# Patient Record
Sex: MALE | Race: WHITE | Employment: FULL TIME | URBAN - METROPOLITAN AREA
[De-identification: names, ages, dates, MRNs, and addresses within clinical notes are randomized per-mention and may not be internally consistent; named-entity substitution may affect disease eponyms.]

---

## 2017-01-11 ENCOUNTER — TELEPHONE (OUTPATIENT)
Dept: CARDIOLOGY CLINIC | Age: 57
End: 2017-01-11

## 2017-02-02 RX ORDER — ISOSORBIDE MONONITRATE 120 MG/1
120 TABLET, EXTENDED RELEASE ORAL DAILY
Qty: 30 TABLET | Refills: 11 | Status: SHIPPED | OUTPATIENT
Start: 2017-02-02 | End: 2018-01-26 | Stop reason: SDUPTHER

## 2017-02-22 RX ORDER — GLIPIZIDE 5 MG/1
TABLET ORAL
Qty: 30 TABLET | Refills: 1 | Status: SHIPPED | OUTPATIENT
Start: 2017-02-22 | End: 2017-04-28 | Stop reason: SDUPTHER

## 2017-02-28 ENCOUNTER — TELEPHONE (OUTPATIENT)
Dept: CARDIOLOGY CLINIC | Age: 57
End: 2017-02-28

## 2017-03-17 ENCOUNTER — TELEPHONE (OUTPATIENT)
Dept: CARDIOLOGY CLINIC | Age: 57
End: 2017-03-17

## 2017-04-04 ENCOUNTER — TELEPHONE (OUTPATIENT)
Dept: CARDIOLOGY CLINIC | Age: 57
End: 2017-04-04

## 2017-04-18 ENCOUNTER — OFFICE VISIT (OUTPATIENT)
Dept: CARDIOLOGY CLINIC | Age: 57
End: 2017-04-18

## 2017-04-18 VITALS
HEART RATE: 75 BPM | DIASTOLIC BLOOD PRESSURE: 70 MMHG | BODY MASS INDEX: 38.8 KG/M2 | WEIGHT: 271 LBS | SYSTOLIC BLOOD PRESSURE: 132 MMHG | HEIGHT: 70 IN | OXYGEN SATURATION: 97 %

## 2017-04-18 DIAGNOSIS — I25.118 CORONARY ARTERY DISEASE OF NATIVE ARTERY OF NATIVE HEART WITH STABLE ANGINA PECTORIS (HCC): Primary | ICD-10-CM

## 2017-04-18 DIAGNOSIS — E78.5 HYPERLIPIDEMIA, UNSPECIFIED HYPERLIPIDEMIA TYPE: ICD-10-CM

## 2017-04-18 PROCEDURE — 99214 OFFICE O/P EST MOD 30 MIN: CPT | Performed by: INTERNAL MEDICINE

## 2017-04-25 ENCOUNTER — TELEPHONE (OUTPATIENT)
Dept: CARDIOLOGY CLINIC | Age: 57
End: 2017-04-25

## 2017-04-28 ENCOUNTER — TELEPHONE (OUTPATIENT)
Dept: CARDIOLOGY CLINIC | Age: 57
End: 2017-04-28

## 2017-05-01 ENCOUNTER — TELEPHONE (OUTPATIENT)
Dept: CARDIOLOGY CLINIC | Age: 57
End: 2017-05-01

## 2017-05-01 RX ORDER — GLIPIZIDE 5 MG/1
TABLET ORAL
Qty: 30 TABLET | Refills: 0 | Status: SHIPPED | OUTPATIENT
Start: 2017-05-01 | End: 2017-06-06 | Stop reason: SDUPTHER

## 2017-05-24 ENCOUNTER — TELEPHONE (OUTPATIENT)
Dept: CARDIOLOGY CLINIC | Age: 57
End: 2017-05-24

## 2017-06-06 RX ORDER — GLIPIZIDE 5 MG/1
TABLET ORAL
Qty: 30 TABLET | Refills: 0 | Status: SHIPPED | OUTPATIENT
Start: 2017-06-06 | End: 2017-07-03 | Stop reason: SDUPTHER

## 2017-06-14 ENCOUNTER — TELEPHONE (OUTPATIENT)
Dept: CARDIOLOGY CLINIC | Age: 57
End: 2017-06-14

## 2017-06-16 DIAGNOSIS — Z95.5 S/P CORONARY ARTERY STENT PLACEMENT: Primary | ICD-10-CM

## 2017-07-03 RX ORDER — GLIPIZIDE 5 MG/1
TABLET ORAL
Qty: 30 TABLET | Refills: 0 | Status: SHIPPED | OUTPATIENT
Start: 2017-07-03 | End: 2017-07-18 | Stop reason: SDUPTHER

## 2017-07-07 ENCOUNTER — TELEPHONE (OUTPATIENT)
Dept: CARDIOLOGY CLINIC | Age: 57
End: 2017-07-07

## 2017-07-11 PROBLEM — I21.4 NSTEMI (NON-ST ELEVATED MYOCARDIAL INFARCTION) (HCC): Status: ACTIVE | Noted: 2017-07-11

## 2017-07-12 ENCOUNTER — CARE COORDINATION (OUTPATIENT)
Dept: CARE COORDINATION | Age: 57
End: 2017-07-12

## 2017-07-17 RX ORDER — ATORVASTATIN CALCIUM 40 MG/1
TABLET, FILM COATED ORAL
Qty: 90 TABLET | Refills: 3 | Status: SHIPPED | OUTPATIENT
Start: 2017-07-17 | End: 2018-01-23 | Stop reason: SDUPTHER

## 2017-07-18 ENCOUNTER — OFFICE VISIT (OUTPATIENT)
Dept: FAMILY MEDICINE CLINIC | Age: 57
End: 2017-07-18

## 2017-07-18 VITALS
DIASTOLIC BLOOD PRESSURE: 60 MMHG | SYSTOLIC BLOOD PRESSURE: 110 MMHG | HEIGHT: 70 IN | WEIGHT: 251 LBS | HEART RATE: 77 BPM | BODY MASS INDEX: 35.93 KG/M2 | OXYGEN SATURATION: 98 %

## 2017-07-18 DIAGNOSIS — I25.10 ASHD (ARTERIOSCLEROTIC HEART DISEASE): ICD-10-CM

## 2017-07-18 DIAGNOSIS — E66.3 OVERWEIGHT: ICD-10-CM

## 2017-07-18 DIAGNOSIS — E11.9 CONTROLLED TYPE 2 DIABETES MELLITUS WITHOUT COMPLICATION, WITHOUT LONG-TERM CURRENT USE OF INSULIN (HCC): Primary | ICD-10-CM

## 2017-07-18 LAB
CREATININE URINE POCT: NORMAL
HBA1C MFR BLD: 7.2 %
MICROALBUMIN/CREAT 24H UR: NORMAL MG/G{CREAT}
MICROALBUMIN/CREAT UR-RTO: NORMAL

## 2017-07-18 PROCEDURE — 99214 OFFICE O/P EST MOD 30 MIN: CPT | Performed by: FAMILY MEDICINE

## 2017-07-18 PROCEDURE — 83036 HEMOGLOBIN GLYCOSYLATED A1C: CPT | Performed by: FAMILY MEDICINE

## 2017-07-18 PROCEDURE — 82044 UR ALBUMIN SEMIQUANTITATIVE: CPT | Performed by: FAMILY MEDICINE

## 2017-07-18 RX ORDER — GLIPIZIDE 5 MG/1
TABLET ORAL
Qty: 90 TABLET | Refills: 2 | Status: SHIPPED | OUTPATIENT
Start: 2017-07-18 | End: 2018-01-23 | Stop reason: SDUPTHER

## 2017-07-18 ASSESSMENT — ENCOUNTER SYMPTOMS
STRIDOR: 0
SHORTNESS OF BREATH: 0
CHEST TIGHTNESS: 0
COUGH: 0
CHOKING: 0
WHEEZING: 0

## 2017-07-27 ENCOUNTER — OFFICE VISIT (OUTPATIENT)
Dept: CARDIOLOGY CLINIC | Age: 57
End: 2017-07-27

## 2017-07-27 VITALS
HEIGHT: 70 IN | OXYGEN SATURATION: 98 % | DIASTOLIC BLOOD PRESSURE: 56 MMHG | HEART RATE: 69 BPM | WEIGHT: 260 LBS | SYSTOLIC BLOOD PRESSURE: 100 MMHG | BODY MASS INDEX: 37.22 KG/M2

## 2017-07-27 DIAGNOSIS — E78.2 MIXED HYPERLIPIDEMIA: ICD-10-CM

## 2017-07-27 DIAGNOSIS — Z95.5 S/P DRUG ELUTING CORONARY STENT PLACEMENT: ICD-10-CM

## 2017-07-27 DIAGNOSIS — I25.119 CORONARY ARTERY DISEASE INVOLVING NATIVE CORONARY ARTERY OF NATIVE HEART WITH ANGINA PECTORIS (HCC): Primary | ICD-10-CM

## 2017-07-27 DIAGNOSIS — E11.9 CONTROLLED TYPE 2 DIABETES MELLITUS WITHOUT COMPLICATION, WITHOUT LONG-TERM CURRENT USE OF INSULIN (HCC): ICD-10-CM

## 2017-07-27 DIAGNOSIS — I10 ESSENTIAL HYPERTENSION: ICD-10-CM

## 2017-07-27 PROCEDURE — 99213 OFFICE O/P EST LOW 20 MIN: CPT | Performed by: NURSE PRACTITIONER

## 2017-08-10 ENCOUNTER — TELEPHONE (OUTPATIENT)
Dept: CARDIOLOGY CLINIC | Age: 57
End: 2017-08-10

## 2017-08-17 ENCOUNTER — TELEPHONE (OUTPATIENT)
Dept: CARDIOLOGY CLINIC | Age: 57
End: 2017-08-17

## 2017-08-21 ENCOUNTER — TELEPHONE (OUTPATIENT)
Dept: CARDIOLOGY CLINIC | Age: 57
End: 2017-08-21

## 2017-08-30 ENCOUNTER — TELEPHONE (OUTPATIENT)
Dept: CARDIOLOGY CLINIC | Age: 57
End: 2017-08-30

## 2017-08-31 ENCOUNTER — OFFICE VISIT (OUTPATIENT)
Dept: CARDIOLOGY CLINIC | Age: 57
End: 2017-08-31

## 2017-08-31 VITALS
DIASTOLIC BLOOD PRESSURE: 60 MMHG | HEART RATE: 70 BPM | HEIGHT: 70 IN | WEIGHT: 257 LBS | SYSTOLIC BLOOD PRESSURE: 112 MMHG | BODY MASS INDEX: 36.79 KG/M2

## 2017-08-31 DIAGNOSIS — I25.119 CORONARY ARTERY DISEASE INVOLVING NATIVE CORONARY ARTERY OF NATIVE HEART WITH ANGINA PECTORIS (HCC): Primary | ICD-10-CM

## 2017-08-31 DIAGNOSIS — E78.2 MIXED HYPERLIPIDEMIA: ICD-10-CM

## 2017-08-31 DIAGNOSIS — I10 ESSENTIAL HYPERTENSION: ICD-10-CM

## 2017-08-31 PROCEDURE — 99214 OFFICE O/P EST MOD 30 MIN: CPT | Performed by: INTERNAL MEDICINE

## 2017-09-05 ENCOUNTER — TELEPHONE (OUTPATIENT)
Dept: CARDIOLOGY CLINIC | Age: 57
End: 2017-09-05

## 2017-09-22 ENCOUNTER — TELEPHONE (OUTPATIENT)
Dept: CARDIOLOGY CLINIC | Age: 57
End: 2017-09-22

## 2017-09-25 ENCOUNTER — OFFICE VISIT (OUTPATIENT)
Dept: CARDIOLOGY CLINIC | Age: 57
End: 2017-09-25

## 2017-09-25 VITALS
BODY MASS INDEX: 36.68 KG/M2 | SYSTOLIC BLOOD PRESSURE: 100 MMHG | OXYGEN SATURATION: 98 % | HEART RATE: 77 BPM | WEIGHT: 262 LBS | HEIGHT: 71 IN | DIASTOLIC BLOOD PRESSURE: 58 MMHG

## 2017-09-25 DIAGNOSIS — Z95.5 S/P DRUG ELUTING CORONARY STENT PLACEMENT: ICD-10-CM

## 2017-09-25 DIAGNOSIS — E11.9 CONTROLLED TYPE 2 DIABETES MELLITUS WITHOUT COMPLICATION, WITHOUT LONG-TERM CURRENT USE OF INSULIN (HCC): ICD-10-CM

## 2017-09-25 DIAGNOSIS — I25.119 CORONARY ARTERY DISEASE INVOLVING NATIVE CORONARY ARTERY OF NATIVE HEART WITH ANGINA PECTORIS (HCC): Primary | ICD-10-CM

## 2017-09-25 DIAGNOSIS — I10 ESSENTIAL HYPERTENSION: ICD-10-CM

## 2017-09-25 DIAGNOSIS — E78.2 MIXED HYPERLIPIDEMIA: ICD-10-CM

## 2017-09-25 PROCEDURE — 99213 OFFICE O/P EST LOW 20 MIN: CPT | Performed by: NURSE PRACTITIONER

## 2017-09-25 NOTE — PATIENT INSTRUCTIONS
1. No change in heart medicines  2. Call back on Wednesday to check on Brilinta  3. Follow up with Dr. Ramona Vasquez in 3 months  4. Please call with any symptoms or questions       Ref.  Range 7/9/2017 07:42 9/6/2017 12:10   Cholesterol, Total Latest Ref Range: 0 - 199 mg/dL 147 158   HDL Cholesterol Latest Ref Range: 40 - 60 mg/dL 58 66 (H)   LDL Calculated Latest Ref Range: <100 mg/dL 54 69   Triglycerides Latest Ref Range: 0 - 150 mg/dL 174 (H) 115

## 2017-09-25 NOTE — PROGRESS NOTES
tablet Place 1 tablet under the tongue every 5 minutes as needed for Chest pain 8/19/17  Yes Rosie Nunez CNP   glipiZIDE (GLUCOTROL) 5 MG tablet TAKE 1 TABLET BY MOUTH IN THE MORNING for diabetes 7/18/17  Yes Haja EDWARDS Pena, DO   atorvastatin (LIPITOR) 40 MG tablet TAKE ONE TABLET BY MOUTH ONCE NIGHTLY 7/17/17  Yes Fernanda Ni MD   ticagrelor (BRILINTA) 90 MG TABS tablet Take 1 tablet by mouth 2 times daily 7/11/17  Yes Kevin Lagunas NP   metoprolol tartrate (LOPRESSOR) 25 MG tablet TAKE ONE TABLET BY MOUTH TWICE A DAY 4/18/17  Yes Agus Velazquez MD   isosorbide mononitrate (IMDUR) 120 MG extended release tablet Take 1 tablet by mouth daily 2/2/17  Yes Agus Velazquez MD   aspirin 81 MG chewable tablet CHEW ONE TABLET BY MOUTH DAILY 12/5/16  Yes [facility], DO   lisinopril (PRINIVIL;ZESTRIL) 2.5 MG tablet Take 1 tablet by mouth daily 10/19/16  Yes Agus Velazquez MD   ranolazine (RANEXA) 1000 MG extended release tablet Take 1 tablet by mouth 2 times daily 9/20/16  Yes [facility], DO        Allergies:  Vitamin d analogs and Vitamin d analogs     Review of Systems:   · Constitutional: there has been no unanticipated weight loss. · Eyes: No vision changes  · ENT: No Headaches, no nasal congestion. No mouth sores or sore throat. · Cardiovascular: Reviewed in HPI  · Respiratory: No cough or wheezing, no sputum production. · Gastrointestinal: No abdominal pain, no constipation or diarrhea  · Genitourinary: No dysuria, trouble voiding, or hematuria. · Musculoskeletal:  No weakness or joint complaints. · Integumentary: No rash or pruritis. · Neurological: No numbness or tingling. No weakness. No tremor. · Psychiatric: No anxiety, no depression. · Endocrine:  No excessive thirst or urination. · Hematologic/Lymphatic: No abnormal bruising or bleeding, blood clots or swollen lymph nodes.       Physical Examination:    Vitals:    09/25/17 0831   BP: (!) 100/58   Pulse: 77 systolic function  - Normal LV filling pressure       CATH: 5/27/16  1. Patent drug-eluting stent proximal first obtuse marginal branch. 2. Patent drug-eluting stent from wjpswayf-hv-zhp left anterior descending. 3. Patent drug-eluting stent distal left anterior descending. 4. Patent drug-eluting stent proximal first diagonal branch. 5. Normal left ventricular systolic function with an ejection fraction of  approximately 55% to 60%. 6. Normal left ventricular end-diastolic pressure at 10 mmHg. RECOMMENDATIONS: The patient's stents are all patent and he has no  obstructive disease in other arteries. His angina is from small vessel disease  and will add Ranexa 1000 mg b.i.d. to his antianginal regimen. He will  continue with Imdur and Lopressor for the angina. He will also continue with  aspirin, Brilinta, as well as a moderate-dose Lipitor. CATH: 01/21/2016  1. A 70% proximal medium-caliber first diagonal branch status post 2.25 x  12-mm Xience Alpine drug-eluting stent. 2.  Patent drug-eluting stent in proximal-mid left anterior descending. 3.  Patent drug-eluting stent in the distal left anterior descending. 4.  Patent drug-eluting stent in the wuyabxea-qo-uho first obtuse marginal  branch. RECOMMENDATIONS:  Aspirin indefinitely. Brilinta at least for 12 months. Aggressive medical therapy and risk factors modification for coronary disease. CATH: 01/15/2016  IMPRESSION:   1. An 80% diffuse mid left anterior descending with a fractional flow  reserve of 0.79 (significant), status post 2.75 x 28-mm XIENCE  Alpine drug-eluting stent postdilated with a 3.5-mm noncompliant balloon. 2. A 60% distal left anterior descending lesion with a fractional flow   reserve of 0.5 (significant), status post 2.25 x 23-mm XIENCE Alpine   drug-eluting stent postdilated with 2.5-mm noncompliant balloon. 3. Patent stent in proximal to mid 1st obtuse marginal branch. RECOMMENDATIONS:  1.  Aspirin

## 2017-09-25 NOTE — MR AVS SNAPSHOT
cancers. BMI is not perfect. It may overestimate body fat in athletes and people who are more muscular. Even a small weight loss (between 5 and 10 percent of your current weight) by decreasing your calorie intake and becoming more physically active will help lower your risk of developing or worsening diseases associated with obesity. Learn more at: [employer]          Instructions    1. No change in heart medicines  2. Call back on Wednesday to check on Brilinta  3. Follow up with Dr. Payam Rodríguez in 3 months  4. Please call with any symptoms or questions       Ref.  Range 7/9/2017 07:42 9/6/2017 12:10   Cholesterol, Total Latest Ref Range: 0 - 199 mg/dL 147 158   HDL Cholesterol Latest Ref Range: 40 - 60 mg/dL 58 66 (H)   LDL Calculated Latest Ref Range: <100 mg/dL 54 69   Triglycerides Latest Ref Range: 0 - 150 mg/dL 174 (H) 115               Medications and Orders      Your Current Medications Are              metFORMIN (GLUCOPHAGE) 1000 MG tablet Take 1 tablet by mouth daily (with breakfast)    nitroGLYCERIN (NITROSTAT) 0.4 MG SL tablet Place 1 tablet under the tongue every 5 minutes as needed for Chest pain    glipiZIDE (GLUCOTROL) 5 MG tablet TAKE 1 TABLET BY MOUTH IN THE MORNING for diabetes    atorvastatin (LIPITOR) 40 MG tablet TAKE ONE TABLET BY MOUTH ONCE NIGHTLY    ticagrelor (BRILINTA) 90 MG TABS tablet Take 1 tablet by mouth 2 times daily    metoprolol tartrate (LOPRESSOR) 25 MG tablet TAKE ONE TABLET BY MOUTH TWICE A DAY    isosorbide mononitrate (IMDUR) 120 MG extended release tablet Take 1 tablet by mouth daily    aspirin 81 MG chewable tablet CHEW ONE TABLET BY MOUTH DAILY    lisinopril (PRINIVIL;ZESTRIL) 2.5 MG tablet Take 1 tablet by mouth daily    ranolazine (RANEXA) 1000 MG extended release tablet Take 1 tablet by mouth 2 times daily      Allergies              Vitamin D Analogs Hives    Vitamin D Analogs Swelling    swelling Additional Information        Basic Information     Date Of Birth Sex Race Ethnicity Preferred Language    1960 Male White Non-/Non  English      Problem List as of 9/25/2017  Date Reviewed: 9/25/2017                NSTEMI (non-ST elevated myocardial infarction) [facility])    Coronary artery disease involving native coronary artery of native heart with unstable angina pectoris ([facility]    Precordial pain    Controlled type 2 diabetes mellitus without complication, without long-term current use of insulin (HCC)    ASHD (arteriosclerotic heart disease)    Angina pectoris (HCC)    Angina at rest [facility])    Hyperlipidemia    Angina effort ([facility] [address]    Skin lesion    Abnormal chest x-ray    CAD (coronary artery disease)    Chest pain, moderate coronary artery risk    Unstable angina (HCC)    Shoulder pain    Low serum testosterone level    Arm pain, left    Flank pain    Prostatism    Patient overweight    Noncompliance    Screening for condition    Alcohol abuse    Patient overweight    Type II or unspecified type diabetes mellitus without mention of complication, not stated as uncontrolled    Hypertension      Your Goals as of 9/25/2017 at 8:58 AM              Today    9/7/17 9/7/17       Blood Pressure    Blood Pressure < 140/90   100/58  147/77  118/76    Notes    Ideally your blood pressure goal should be below 130/80. You can learn more about blood pressure and ways to incorporate a healthy lifestlye to help treat it from the American Heart Association website: www.heart. org under the  Getting Healthy link, and the [address] website: www.nhlbi.nih.gov/hbp    A for Activity  It helps your blood pressure when you exercise regularly. You should try to exercise at least 3 times a week for 20 minutes at a pace that you can comfortably carry on a conversation without being out of breath. B for BMI  The Body Mass Index should be below 30.  This is your weight and height Eye Exam By An Eye Doctor 4/14/2017    Yearly Flu Vaccine (1) 9/1/2017    Hemoglobin A1C (Test For Long-Term Glucose Control) 7/18/2018    Urine Check For Kidney Problems 7/18/2018    Cholesterol Screening 9/6/2018    Colonoscopy 10/10/2026            MyChart Signup           Our records indicate that you have an active [employer] account. You can view your After Visit Summary by going to [email or web address] and logging in with your [employer] username and password. If you don't have a [employer] username and password but a parent or guardian has access to your record, the parent or guardian should login with their own [employer] username and password and access your record to view the After Visit Summary. Additional Information  If you have questions, please contact the physician practice where you receive care. Remember, [employer] is NOT to be used for urgent needs. For medical emergencies, dial 911. For questions regarding your [employer] account call [phone]. If you have a clinical question, please call your doctor's office.

## 2017-09-28 ENCOUNTER — TELEPHONE (OUTPATIENT)
Dept: CARDIOLOGY CLINIC | Age: 57
End: 2017-09-28

## 2017-10-12 ENCOUNTER — TELEPHONE (OUTPATIENT)
Dept: CARDIOLOGY CLINIC | Age: 57
End: 2017-10-12

## 2017-10-19 ENCOUNTER — OFFICE VISIT (OUTPATIENT)
Dept: FAMILY MEDICINE CLINIC | Age: 57
End: 2017-10-19

## 2017-10-19 VITALS
BODY MASS INDEX: 35.98 KG/M2 | SYSTOLIC BLOOD PRESSURE: 118 MMHG | OXYGEN SATURATION: 99 % | DIASTOLIC BLOOD PRESSURE: 60 MMHG | HEIGHT: 71 IN | HEART RATE: 75 BPM | WEIGHT: 257 LBS

## 2017-10-19 DIAGNOSIS — R22.41 MASS OF RIGHT FOOT: ICD-10-CM

## 2017-10-19 DIAGNOSIS — E66.3 OVERWEIGHT: ICD-10-CM

## 2017-10-19 DIAGNOSIS — E11.9 CONTROLLED TYPE 2 DIABETES MELLITUS WITHOUT COMPLICATION, WITHOUT LONG-TERM CURRENT USE OF INSULIN (HCC): Primary | ICD-10-CM

## 2017-10-19 LAB
HBA1C MFR BLD: 7.4 %
PROSTATE SPECIFIC ANTIGEN: 0.75 NG/ML (ref 0–4)

## 2017-10-19 PROCEDURE — 99214 OFFICE O/P EST MOD 30 MIN: CPT | Performed by: FAMILY MEDICINE

## 2017-10-19 PROCEDURE — 83036 HEMOGLOBIN GLYCOSYLATED A1C: CPT | Performed by: FAMILY MEDICINE

## 2017-10-19 ASSESSMENT — ENCOUNTER SYMPTOMS
CHOKING: 0
STRIDOR: 0
ABDOMINAL PAIN: 0
CHEST TIGHTNESS: 0
COUGH: 0
BACK PAIN: 0
WHEEZING: 0
SHORTNESS OF BREATH: 0

## 2017-10-19 NOTE — PROGRESS NOTES
Subjective:      Patient ID: Supriya Lyles is a 62 y.o. male. HPI J's here for follow-up on type 2 diabetes which is well-controlled with a hemoglobin A1c of 7.2. We will continue current therapy. He has a new complaint of a mass in the right foot and I referred him to Dr. Hai Dinero, podiatrist, she is J's choice. He recently had a new coronary artery stent placed and is doing well in that regard. His lipids are well controlled with a total of 158 and LDL of 69. His last PSA was in 2015 so ordered a new one. Review of Systems   Constitutional: Negative for activity change, appetite change, chills, diaphoresis, fatigue, fever and unexpected weight change. Respiratory: Negative for cough, choking, chest tightness, shortness of breath, wheezing and stridor. Cardiovascular: Negative for chest pain, palpitations and leg swelling. Gastrointestinal: Negative for abdominal pain. Genitourinary: Negative for difficulty urinating. Musculoskeletal: Negative for arthralgias and back pain. Foot mass on the right   Neurological: Negative for dizziness. All other systems reviewed and are negative. Objective:   Physical Exam   Constitutional: He is oriented to person, place, and time. He appears well-developed and well-nourished. HENT:   Head: Normocephalic and atraumatic. Right Ear: External ear normal.   Left Ear: External ear normal.   Nose: Nose normal.   Mouth/Throat: Oropharynx is clear and moist.   Eyes: Conjunctivae and EOM are normal. Pupils are equal, round, and reactive to light. Neck: Normal range of motion. Neck supple. No JVD present. No tracheal deviation present. No thyromegaly present. Cardiovascular: Normal rate, regular rhythm and normal heart sounds. Exam reveals no gallop and no friction rub. No murmur heard. Pulmonary/Chest: Effort normal and breath sounds normal. No stridor. No respiratory distress. He has no wheezes. He has no rales. He exhibits no tenderness. by mouth daily 90 tablet 3    ranolazine (RANEXA) 1000 MG extended release tablet Take 1 tablet by mouth 2 times daily 60 tablet 3    methocarbamol (ROBAXIN) 500 MG tablet Take 1 tablet by mouth 4 times daily 20 tablet 0     No facility-administered encounter medications on file as of 10/19/2017.

## 2017-10-20 ENCOUNTER — TELEPHONE (OUTPATIENT)
Dept: CARDIOLOGY CLINIC | Age: 57
End: 2017-10-20

## 2017-10-20 NOTE — TELEPHONE ENCOUNTER
Brittny,pt's wife, called requesting samples of Brilinta 90mg. Please call wife back to inform if we have any and if/when they are ready to be picked up at [phone].

## 2017-10-30 RX ORDER — LISINOPRIL 2.5 MG/1
TABLET ORAL
Qty: 30 TABLET | Refills: 2 | Status: SHIPPED | OUTPATIENT
Start: 2017-10-30 | End: 2018-01-26 | Stop reason: SDUPTHER

## 2017-11-13 ENCOUNTER — TELEPHONE (OUTPATIENT)
Dept: CARDIOLOGY CLINIC | Age: 57
End: 2017-11-13

## 2017-11-13 NOTE — TELEPHONE ENCOUNTER
Mrs Randle Dalia notified that we do not have any Renexa at this time.   She stated that she will call [facility] to see if they have any samples

## 2017-11-28 ENCOUNTER — TELEPHONE (OUTPATIENT)
Dept: CARDIOLOGY CLINIC | Age: 57
End: 2017-11-28

## 2017-12-13 ENCOUNTER — OFFICE VISIT (OUTPATIENT)
Dept: CARDIOLOGY CLINIC | Age: 57
End: 2017-12-13

## 2017-12-13 VITALS
HEART RATE: 86 BPM | BODY MASS INDEX: 36.96 KG/M2 | OXYGEN SATURATION: 97 % | HEIGHT: 71 IN | WEIGHT: 264 LBS | DIASTOLIC BLOOD PRESSURE: 66 MMHG | SYSTOLIC BLOOD PRESSURE: 130 MMHG

## 2017-12-13 DIAGNOSIS — E78.2 MIXED HYPERLIPIDEMIA: ICD-10-CM

## 2017-12-13 DIAGNOSIS — E11.9 CONTROLLED TYPE 2 DIABETES MELLITUS WITHOUT COMPLICATION, WITHOUT LONG-TERM CURRENT USE OF INSULIN (HCC): ICD-10-CM

## 2017-12-13 DIAGNOSIS — I10 ESSENTIAL HYPERTENSION: ICD-10-CM

## 2017-12-13 DIAGNOSIS — I25.10 CORONARY ARTERY DISEASE INVOLVING NATIVE CORONARY ARTERY OF NATIVE HEART WITHOUT ANGINA PECTORIS: Primary | ICD-10-CM

## 2017-12-13 PROCEDURE — 99213 OFFICE O/P EST LOW 20 MIN: CPT | Performed by: INTERNAL MEDICINE

## 2017-12-13 NOTE — LETTER
[facility] - [facility] [address] [place]. [address]  Phone: [phone]  Fax: [phone]    Ben Coulter MD        December 15, 2017     Elliot Elyse Valdez, DO  [address]    Patient: Bozena Bañuelos  MR Number: [id number]  YOB: 1960  Date of Visit: 12/13/2017    Dear Dr. Zarate Elyse Cemmary:    Thank you for the request for consultation for Bozena Bañuelos to me for the evaluation. Below are the relevant portions of my assessment and plan of care. Assessment:    - CAD, s/p rotablade guided PCI of OM-1 recurrent in-stent restenosis with placement of SAMUEL (Sept 17'), patent SAMUEL mid LAD, distal LAD, Diag 1 (Aug 17')  - Hyperlipidemia  - Hypertension: BP: (130-146)/(66-76)   - Diabetes     Recommendation:  - His angina has resolved since the last PCI. Due to multiple segments of stented vessels I will continue both his [address] [place] [address] indefinitely. He will continue his current antianginals (Ranexa, Lopressor, Lisinopril). - His LDL is within goal (69 in Sept 17') and he will continue moderate dose Lipitor.  - I will have him see Selma Pittman NP in six months and I will see him back in 12 months.         If you have questions, please do not hesitate to call me. I look forward to following Bozena Bañuelos along with you.     If you have questions, please do not hesitate to call me. I look forward to following Jovany Chow along with you.     Sincerely,        Ben Coulter MD

## 2017-12-13 NOTE — PROGRESS NOTES
Section of Cardiology                                             Outpatient Progress Note      HPI:    Claudio Poon is a 54 y.o. patient with PMH DM, HTN, CAD, s/p PCI, alcohol abuse, nonsmoker, + FH. Was admitted to hospital in January 2016 with [place] and had abnormal stress test. Underwent PCI of OM as well as [id number] LAD and was d/c'd/ Returned to hospital and noted to have mild troponin elevation and CP relieved with NTG SL. He underwent LHC on 1/18/16 and a PCI-D-1 with SAMUEL was done. Stents in prox-mid LAD, distal LAD, proximal-mid OM-1 were found patent. He was switched from Plavix to Brilinta. Imdur was added at office visit in April 2016 due to complaints of exertional chest pain. He was admitted to the hospital in late May 2016 with recurrent chest pain and transient troponin elevation. LHC was showed patent stents. He was started on Ranexa. Today he reports he has had ongoing chest pain. It occurs in the morning, wakes him from sleep. After moving around it goes away. He had to take SL NTG 9 days in a row. This pain is unchanged from the pain he's had in the past. He checks his blood pressure at home and states it can be low at times. He attempts to be as active as possible. Denies shortness of breath, edema, dizziness, palpitations and syncope. Today he is here for a follow up of CAD, HTN. Since his last visit he underwent rotablade guided PCI of OM1 recurrent instent restenosis. His chest pain has resolved since then. Denied shortness of breath, edema, dizziness, palpitations and syncope.     Medications:  Current Outpatient Prescriptions   Medication Sig Dispense Refill    lisinopril (PRINIVIL;ZESTRIL) 2.5 MG tablet TAKE ONE TABLET BY MOUTH DAILY 30 tablet 2    methocarbamol (ROBAXIN) 500 MG tablet Take 1 tablet by mouth 4 times daily 20 tablet 0    metFORMIN (GLUCOPHAGE) 1000 MG tablet Take 1 tablet by mouth daily (with breakfast) 90 tablet 2    nitroGLYCERIN (NITROSTAT) 0.4 MG SL tablet Place 1 tablet under the tongue every 5 minutes as needed for Chest pain 25 tablet 3    glipiZIDE (GLUCOTROL) 5 MG tablet TAKE 1 TABLET BY MOUTH IN THE MORNING for diabetes 90 tablet 2    atorvastatin (LIPITOR) 40 MG tablet TAKE ONE TABLET BY MOUTH ONCE NIGHTLY 90 tablet 3    ticagrelor (BRILINTA) 90 MG TABS tablet Take 1 tablet by mouth 2 times daily 60 tablet 11    metoprolol tartrate (LOPRESSOR) 25 MG tablet TAKE ONE TABLET BY MOUTH TWICE A DAY 60 tablet 11    isosorbide mononitrate (IMDUR) 120 MG extended release tablet Take 1 tablet by mouth daily 30 tablet 11    aspirin 81 MG chewable tablet CHEW ONE TABLET BY MOUTH DAILY 30 tablet 1    ranolazine (RANEXA) 1000 MG extended release tablet Take 1 tablet by mouth 2 times daily 60 tablet 3     No current facility-administered medications for this visit. ROS:  14 point ROS completed and pertinent positives are mentioned in HPI. Vital Signs:                                                 /66   Pulse 86   Ht 5' 11\" (1.803 m)   Wt 264 lb (119.7 kg)   SpO2 97%   BMI 36.82 kg/m²  Weight: 264 lb (119.7 kg)       Respiratory:  · Resp Assessment: Normal respiratory effort, no SOB   · Resp Auscultation: Clear to auscultation bilaterally     Cardiovascular:  · Auscultation: regular rhythm and normal rate, normal S1S2, no murmur, rub or gallop, no carotid bruit   · JVP:  normal  · Extremities: No Edema  · No bruit     Abdomen:  · Soft, non-tender  · Normal bowel sounds  · No organomegaly, no masses     Extremities:  ·  No Cyanosis or Clubbing  · No deformities of hands  · No edema     Neurological/Psychiatric:  · Oriented to time, place, and person  · Non-anxious    Skin Warm and dry. Normal turgor.  No rash or lesions     Lab Data:  CBC:   Lab Results   Component Value Date    WBC 6.8 09/06/2017    HGB 13.7 09/06/2017    HCT 40.1 09/06/2017    MCV 92.0 09/06/2017     09/06/2017     BMP:   Lab Results Component Value Date     09/07/2017    K 4.8 09/07/2017    CL 99 09/07/2017    CO2 23 09/07/2017    PHOS 3.7 07/09/2017    BUN 9 09/07/2017    CREATININE <0.5 09/07/2017    CALCIUM 8.6 09/07/2017    GFRAA >60 09/07/2017     LFTS:   Lab Results   Component Value Date    ALT 22 08/17/2017    AST 14 08/17/2017    ALKPHOS 72 08/17/2017    PROT 6.6 08/17/2017    AGRATIO 1.6 08/17/2017    BILITOT 0.4 08/17/2017     PT/INR: No results found for: PTINR  LIPID PANEL: No components found for: CHLPL  Lab Results   Component Value Date    TRIG 115 09/06/2017    TRIG 174 (H) 07/09/2017    TRIG 166 (H) 01/18/2016     Lab Results   Component Value Date    HDL 66 (H) 09/06/2017    HDL 58 07/09/2017    HDL 38 (L) 01/18/2016     Lab Results   Component Value Date    LDLCALC 69 09/06/2017    LDLCALC 54 07/09/2017    [address] 86 01/18/2016     TSH:   Lab Results   Component Value Date    TSH 1.39 05/21/2015     FREET4: No results found for: Becka Edinger: No components found for: FREET3  BNP: No results found for: BNP    Data Review:  I have reviewed the below testing personally and my interpretation is below. STRESS TEST:   1. Inferolateral basilar reversible stress defect compatible with stress-induced ischemia. 2. Ejection fraction of 56%   3. No focal wall motion abnormality. CATH: 5/27/16  1. Patent drug-eluting stent proximal first obtuse marginal branch. 2. Patent drug-eluting stent from nhygmhtp-rh-bpb left anterior descending. 3. Patent drug-eluting stent distal left anterior descending. 4. Patent drug-eluting stent proximal first diagonal branch. 5. Normal left ventricular systolic function with an ejection fraction of  approximately 55% to 60%. 6. Normal left ventricular end-diastolic pressure at 10 mmHg.      RECOMMENDATIONS: The patient's stents are all patent and he has no  obstructive disease in other arteries.  His angina is from small vessel disease  and will add Ranexa 1000 mg b.i.d. to his in-stent restenosis  - Patent stent prox-mid LAD with mild instent restenoss of distal 1/3rd  - Patent stent distal LAD  - Patent stent prox Diag1  - High grade OM2   - Normal left ventricular systolic function  - Normal LV filling pressure     Cath 8/18/17   LEFT HEART CATH  LM: luminals  LAD: luminals, prox and mid stents patent with minimal restenosis                        Diag 1- patent stent  LCX: mid 30%                        OM1- 90% instent restenosis with haziness suggesting possible thrombosis                        OM2- ostial 60% lesion, distal 40%  RCA: dominant. Proximal 30%, luminals  LVEDP: 20  LVEF: 55%  PCI of mid OM1 90% restenosis  Ballooned with 2.25 x as NC trek up to 18atm  Angiosculpt 2.5 x 15mm up to 14atm- multiple passes  90% GUERLINE-2 flow--> 15% GUERLINE-3 flow  Assessment  1. Restenosis of the OM1 stent following recent POBA  2. Aggressive cutting balloon and NC balloon but still with residual stenosis and concerned about ability to completely deploy stent. If reoccurs or Cp returns, would suggest laser arthrectomy and PCI. CATH: 9/6/17  Successful PCI of OM-1 recurrent in-stent restenosis with Rotablator-assisted PCI with placement of Xience Alpine drug-eluting stent. Assessment:    - CAD, s/p rotablade guided PCI of OM-1 recurrent in-stent restenosis with placement of SAMUEL (Sept 17'), patent SAMUEL mid LAD, distal LAD, Diag 1 (Aug 17')  - Hyperlipidemia  - Hypertension: BP: (130-146)/(66-76)   - Diabetes    Recommendation:  - His angina has resolved since the last PCI. Due to multiple segments of stented vessels I will continue both his [address] [place] [address] indefinitely. He will continue his current antianginals (Ranexa, Lopressor, Lisinopril). - His LDL is within goal (69 in Sept 17') and he will continue moderate dose Lipitor.  - I will have him see Elodia Johnson NP in six months and I will see him back in 12 months. If you have questions, please do not hesitate to call me. I look forward to following Yaima Mora along with you.       Sincere Lanza MD, [facility],  [facility]  [phone] [facility]  [phone] [facility]  12/13/2017 11:20 AM

## 2017-12-13 NOTE — PATIENT INSTRUCTIONS
Recommendation:  - His angina has resolved since the last PCI. Due to multiple segments of stented vessels I will continue both his [address] indefinitely. He will continue his current antianginals (Ranexa, Lopressor, Lisinopril).    - His LDL is within goal (69 in Sept 17') and he will continue moderate dose Lipitor.  - I will have him see Keyur Mederos NP in six months and I will see him back in 12 months

## 2017-12-15 ENCOUNTER — TELEPHONE (OUTPATIENT)
Dept: CARDIOLOGY CLINIC | Age: 57
End: 2017-12-15

## 2017-12-15 NOTE — COMMUNICATION BODY
Assessment:    - CAD, s/p rotablade guided PCI of OM-1 recurrent in-stent restenosis with placement of SAMUEL (Sept 17'), patent SAMUEL mid LAD, distal LAD, Diag 1 (Aug 17')  - Hyperlipidemia  - Hypertension: BP: (130-146)/(66-76)   - Diabetes     Recommendation:  - His angina has resolved since the last PCI. Due to multiple segments of stented vessels I will continue both his [address] [place] [address] indefinitely. He will continue his current antianginals (Ranexa, Lopressor, Lisinopril). - His LDL is within goal (69 in Sept 17') and he will continue moderate dose Lipitor.  - I will have him see Jackie Herrera NP in six months and I will see him back in 12 months.         If you have questions, please do not hesitate to call me.  I look forward to following Alphonse Otero along with you.

## 2017-12-20 ENCOUNTER — TELEPHONE (OUTPATIENT)
Dept: CARDIOLOGY CLINIC | Age: 57
End: 2017-12-20

## 2017-12-20 NOTE — TELEPHONE ENCOUNTER
Was told to call today and enquire if office has any ticagrelor (BRILINTA) 90 MG TABS tablet samples. Please advise.

## 2018-01-04 ENCOUNTER — TELEPHONE (OUTPATIENT)
Dept: CARDIOLOGY CLINIC | Age: 58
End: 2018-01-04

## 2018-01-16 ENCOUNTER — TELEPHONE (OUTPATIENT)
Dept: CARDIOLOGY CLINIC | Age: 58
End: 2018-01-16

## 2018-01-16 NOTE — TELEPHONE ENCOUNTER
Brittny,pt's wife, called requesting samples for pt on Brilinta 90mg BID and Ranexa 1000 BID. Please call José Myers when available at [phone].

## 2018-01-18 ENCOUNTER — OFFICE VISIT (OUTPATIENT)
Dept: FAMILY MEDICINE CLINIC | Age: 58
End: 2018-01-18

## 2018-01-18 VITALS
HEART RATE: 73 BPM | SYSTOLIC BLOOD PRESSURE: 120 MMHG | BODY MASS INDEX: 36.68 KG/M2 | OXYGEN SATURATION: 99 % | WEIGHT: 262 LBS | HEIGHT: 71 IN | DIASTOLIC BLOOD PRESSURE: 70 MMHG

## 2018-01-18 DIAGNOSIS — E11.9 CONTROLLED TYPE 2 DIABETES MELLITUS WITHOUT COMPLICATION, WITHOUT LONG-TERM CURRENT USE OF INSULIN (HCC): Primary | ICD-10-CM

## 2018-01-18 DIAGNOSIS — E66.3 OVERWEIGHT: ICD-10-CM

## 2018-01-18 DIAGNOSIS — I25.10 ASHD (ARTERIOSCLEROTIC HEART DISEASE): ICD-10-CM

## 2018-01-18 LAB — HBA1C MFR BLD: 7.7 %

## 2018-01-18 PROCEDURE — 99214 OFFICE O/P EST MOD 30 MIN: CPT | Performed by: FAMILY MEDICINE

## 2018-01-18 PROCEDURE — 83036 HEMOGLOBIN GLYCOSYLATED A1C: CPT | Performed by: FAMILY MEDICINE

## 2018-01-18 ASSESSMENT — ENCOUNTER SYMPTOMS
CHOKING: 0
STRIDOR: 0
CHEST TIGHTNESS: 0
SHORTNESS OF BREATH: 0
ABDOMINAL PAIN: 0
WHEEZING: 0
BACK PAIN: 0
COUGH: 0

## 2018-01-18 ASSESSMENT — PATIENT HEALTH QUESTIONNAIRE - PHQ9
SUM OF ALL RESPONSES TO PHQ QUESTIONS 1-9: 0
1. LITTLE INTEREST OR PLEASURE IN DOING THINGS: 0
SUM OF ALL RESPONSES TO PHQ9 QUESTIONS 1 & 2: 0
2. FEELING DOWN, DEPRESSED OR HOPELESS: 0

## 2018-01-18 NOTE — PROGRESS NOTES
tablet by mouth 2 times daily 60 tablet 3     No facility-administered encounter medications on file as of 1/18/2018. Recommended weight loss.

## 2018-01-23 RX ORDER — ATORVASTATIN CALCIUM 40 MG/1
TABLET, FILM COATED ORAL
Qty: 90 TABLET | Refills: 3 | Status: SHIPPED | OUTPATIENT
Start: 2018-01-23 | End: 2018-11-20 | Stop reason: SDUPTHER

## 2018-01-23 RX ORDER — GLIPIZIDE 5 MG/1
TABLET ORAL
Qty: 90 TABLET | Refills: 2 | Status: SHIPPED | OUTPATIENT
Start: 2018-01-23 | End: 2018-11-20 | Stop reason: SDUPTHER

## 2018-01-23 NOTE — TELEPHONE ENCOUNTER
LOV 1/18/2018    Future Appointments  Date Time Provider Abdirahman Craft   2/6/2018 9:00 AM Deidre Esparza [facility]   4/17/2018 9:30 AM DO CAYLA Delgado   6/14/2018 8:45 AM ARMIN Maynard   12/6/2018 9:15 AM MD Ralph Dyer     Pt's pharmacy has been changed from [facility] to [facility] at [address].

## 2018-01-26 RX ORDER — LISINOPRIL 2.5 MG/1
TABLET ORAL
Qty: 30 TABLET | Refills: 5 | Status: SHIPPED | OUTPATIENT
Start: 2018-01-26 | End: 2018-07-28 | Stop reason: SDUPTHER

## 2018-01-29 RX ORDER — ISOSORBIDE MONONITRATE 120 MG/1
TABLET, EXTENDED RELEASE ORAL
Qty: 30 TABLET | Refills: 10 | Status: SHIPPED | OUTPATIENT
Start: 2018-01-29 | End: 2018-12-27 | Stop reason: SDUPTHER

## 2018-01-31 ENCOUNTER — TELEPHONE (OUTPATIENT)
Dept: CARDIOLOGY CLINIC | Age: 58
End: 2018-01-31

## 2018-02-05 ENCOUNTER — TELEPHONE (OUTPATIENT)
Dept: CARDIOLOGY CLINIC | Age: 58
End: 2018-02-05

## 2018-02-05 RX ORDER — CLOPIDOGREL BISULFATE 75 MG/1
75 TABLET ORAL DAILY
Qty: 30 TABLET | Refills: 5 | Status: SHIPPED | OUTPATIENT
Start: 2018-02-05 | End: 2018-07-28 | Stop reason: SDUPTHER

## 2018-02-26 ENCOUNTER — TELEPHONE (OUTPATIENT)
Dept: CARDIOLOGY CLINIC | Age: 58
End: 2018-02-26

## 2018-03-19 ENCOUNTER — TELEPHONE (OUTPATIENT)
Dept: CARDIOLOGY CLINIC | Age: 58
End: 2018-03-19

## 2018-04-09 ENCOUNTER — TELEPHONE (OUTPATIENT)
Dept: CARDIOLOGY CLINIC | Age: 58
End: 2018-04-09

## 2018-04-13 ENCOUNTER — TELEPHONE (OUTPATIENT)
Dept: CARDIOLOGY CLINIC | Age: 58
End: 2018-04-13

## 2018-04-23 ENCOUNTER — TELEPHONE (OUTPATIENT)
Dept: CARDIOLOGY CLINIC | Age: 58
End: 2018-04-23

## 2018-05-11 ENCOUNTER — TELEPHONE (OUTPATIENT)
Dept: CARDIOLOGY CLINIC | Age: 58
End: 2018-05-11

## 2018-06-06 ENCOUNTER — TELEPHONE (OUTPATIENT)
Dept: CARDIOLOGY CLINIC | Age: 58
End: 2018-06-06

## 2018-06-14 ENCOUNTER — OFFICE VISIT (OUTPATIENT)
Dept: CARDIOLOGY CLINIC | Age: 58
End: 2018-06-14

## 2018-06-14 VITALS
WEIGHT: 267 LBS | SYSTOLIC BLOOD PRESSURE: 129 MMHG | DIASTOLIC BLOOD PRESSURE: 68 MMHG | OXYGEN SATURATION: 96 % | HEART RATE: 71 BPM | HEIGHT: 70 IN | BODY MASS INDEX: 38.22 KG/M2

## 2018-06-14 DIAGNOSIS — I25.10 CORONARY ARTERY DISEASE INVOLVING NATIVE CORONARY ARTERY OF NATIVE HEART WITHOUT ANGINA PECTORIS: Primary | ICD-10-CM

## 2018-06-14 DIAGNOSIS — I10 ESSENTIAL HYPERTENSION: ICD-10-CM

## 2018-06-14 DIAGNOSIS — E11.9 CONTROLLED TYPE 2 DIABETES MELLITUS WITHOUT COMPLICATION, WITHOUT LONG-TERM CURRENT USE OF INSULIN (HCC): ICD-10-CM

## 2018-06-14 DIAGNOSIS — E78.2 MIXED HYPERLIPIDEMIA: ICD-10-CM

## 2018-06-14 PROCEDURE — 99213 OFFICE O/P EST LOW 20 MIN: CPT | Performed by: NURSE PRACTITIONER

## 2018-06-14 PROCEDURE — 93000 ELECTROCARDIOGRAM COMPLETE: CPT | Performed by: NURSE PRACTITIONER

## 2018-06-29 ENCOUNTER — TELEPHONE (OUTPATIENT)
Dept: CARDIOLOGY CLINIC | Age: 58
End: 2018-06-29

## 2018-07-16 ENCOUNTER — TELEPHONE (OUTPATIENT)
Dept: CARDIOLOGY CLINIC | Age: 58
End: 2018-07-16

## 2018-07-30 RX ORDER — CLOPIDOGREL BISULFATE 75 MG/1
TABLET ORAL
Qty: 90 TABLET | Refills: 2 | Status: SHIPPED | OUTPATIENT
Start: 2018-07-30 | End: 2019-04-19 | Stop reason: SDUPTHER

## 2018-07-30 RX ORDER — LISINOPRIL 2.5 MG/1
TABLET ORAL
Qty: 90 TABLET | Refills: 3 | Status: SHIPPED | OUTPATIENT
Start: 2018-07-30 | End: 2019-07-27 | Stop reason: SDUPTHER

## 2018-08-10 ENCOUNTER — TELEPHONE (OUTPATIENT)
Dept: CARDIOLOGY CLINIC | Age: 58
End: 2018-08-10

## 2018-08-28 RX ORDER — NITROGLYCERIN 0.4 MG/1
0.4 TABLET SUBLINGUAL EVERY 5 MIN PRN
Qty: 25 TABLET | Refills: 3 | Status: SHIPPED | OUTPATIENT
Start: 2018-08-28 | End: 2019-09-20 | Stop reason: SDUPTHER

## 2018-09-04 ENCOUNTER — TELEPHONE (OUTPATIENT)
Dept: CARDIOLOGY CLINIC | Age: 58
End: 2018-09-04

## 2018-09-04 NOTE — TELEPHONE ENCOUNTER
Spoke with Diamante Reyes, let her know samples for Phoebe Carrasquillo of ranexa are available in [facility] to . She verbalized understanding.

## 2018-09-14 ENCOUNTER — TELEPHONE (OUTPATIENT)
Dept: CARDIOLOGY CLINIC | Age: 58
End: 2018-09-14

## 2018-10-16 ENCOUNTER — TELEPHONE (OUTPATIENT)
Dept: CARDIOLOGY CLINIC | Age: 58
End: 2018-10-16

## 2018-11-02 ENCOUNTER — TELEPHONE (OUTPATIENT)
Dept: CARDIOLOGY CLINIC | Age: 58
End: 2018-11-02

## 2018-11-05 ENCOUNTER — TELEPHONE (OUTPATIENT)
Dept: FAMILY MEDICINE CLINIC | Age: 58
End: 2018-11-05

## 2018-11-20 ENCOUNTER — OFFICE VISIT (OUTPATIENT)
Dept: FAMILY MEDICINE CLINIC | Age: 58
End: 2018-11-20
Payer: COMMERCIAL

## 2018-11-20 VITALS
DIASTOLIC BLOOD PRESSURE: 72 MMHG | SYSTOLIC BLOOD PRESSURE: 122 MMHG | WEIGHT: 266 LBS | HEIGHT: 70 IN | OXYGEN SATURATION: 98 % | HEART RATE: 72 BPM | BODY MASS INDEX: 38.08 KG/M2

## 2018-11-20 DIAGNOSIS — E11.9 TYPE 2 DIABETES MELLITUS WITHOUT COMPLICATION, WITHOUT LONG-TERM CURRENT USE OF INSULIN (HCC): Primary | ICD-10-CM

## 2018-11-20 DIAGNOSIS — I10 ESSENTIAL HYPERTENSION: ICD-10-CM

## 2018-11-20 DIAGNOSIS — I20.9 ANGINA PECTORIS (HCC): ICD-10-CM

## 2018-11-20 DIAGNOSIS — E66.3 OVERWEIGHT: ICD-10-CM

## 2018-11-20 DIAGNOSIS — Z91.119 DIETARY NONCOMPLIANCE: ICD-10-CM

## 2018-11-20 LAB — HBA1C MFR BLD: 8.2 %

## 2018-11-20 PROCEDURE — 99214 OFFICE O/P EST MOD 30 MIN: CPT | Performed by: FAMILY MEDICINE

## 2018-11-20 PROCEDURE — 83036 HEMOGLOBIN GLYCOSYLATED A1C: CPT | Performed by: FAMILY MEDICINE

## 2018-11-20 RX ORDER — ATORVASTATIN CALCIUM 40 MG/1
TABLET, FILM COATED ORAL
Qty: 90 TABLET | Refills: 3 | Status: SHIPPED | OUTPATIENT
Start: 2018-11-20 | End: 2019-12-05 | Stop reason: SDUPTHER

## 2018-11-20 RX ORDER — GLIPIZIDE 5 MG/1
TABLET ORAL
Qty: 90 TABLET | Refills: 2 | Status: SHIPPED | OUTPATIENT
Start: 2018-11-20 | End: 2019-06-20

## 2018-11-20 ASSESSMENT — ENCOUNTER SYMPTOMS
STRIDOR: 0
CHOKING: 0
CHEST TIGHTNESS: 0
COUGH: 0
WHEEZING: 0
SHORTNESS OF BREATH: 0
BACK PAIN: 0
ABDOMINAL PAIN: 0

## 2018-11-20 NOTE — PROGRESS NOTES
Subjective:      Patient ID: Nahid Hammond is a 62 y.o. male. HPI Nelida Shin here for follow-up on type 2 diabetes whose control is poor with a hemoglobin A1c of 8.1. He eats toast for breakfast and fast food for lunch. We discussed the fact that he should be avoiding high carb foods. He has some anginal chest pain when he goes out in the cold air in the morning. I recommended he get a re breathing mask to help prevent that. Recommended he discuss that with his cardiologist at his upcoming visit. He remains overweight. Review of Systems   Constitutional: Negative for activity change, appetite change, chills, diaphoresis, fatigue, fever and unexpected weight change. Respiratory: Negative for cough, choking, chest tightness, shortness of breath, wheezing and stridor. Cardiovascular: Positive for chest pain (when exposed to cold air). Negative for palpitations and leg swelling. Gastrointestinal: Negative for abdominal pain. Genitourinary: Negative for difficulty urinating. Musculoskeletal: Negative for arthralgias and back pain. Neurological: Negative for dizziness. All other systems reviewed and are negative. Objective:   Physical Exam   Constitutional: He is oriented to person, place, and time. He appears well-developed and well-nourished. HENT:   Head: Normocephalic and atraumatic. Right Ear: External ear normal.   Left Ear: External ear normal.   Nose: Nose normal.   Mouth/Throat: Oropharynx is clear and moist.   Eyes: Pupils are equal, round, and reactive to light. Conjunctivae and EOM are normal.   Neck: Normal range of motion. Neck supple. No JVD present. No tracheal deviation present. No thyromegaly present. Cardiovascular: Normal rate, regular rhythm and normal heart sounds. Exam reveals no gallop and no friction rub. No murmur heard. Pulmonary/Chest: Effort normal and breath sounds normal. No stridor. No respiratory distress. He has no wheezes. He has no rales.  He exhibits no

## 2018-11-26 ENCOUNTER — TELEPHONE (OUTPATIENT)
Dept: CARDIOLOGY CLINIC | Age: 58
End: 2018-11-26

## 2018-12-07 ENCOUNTER — TELEPHONE (OUTPATIENT)
Dept: CARDIOLOGY CLINIC | Age: 58
End: 2018-12-07

## 2018-12-28 ENCOUNTER — TELEPHONE (OUTPATIENT)
Dept: CARDIOLOGY CLINIC | Age: 58
End: 2018-12-28

## 2019-01-04 ENCOUNTER — OFFICE VISIT (OUTPATIENT)
Dept: CARDIOLOGY CLINIC | Age: 59
End: 2019-01-04
Payer: COMMERCIAL

## 2019-01-04 VITALS
SYSTOLIC BLOOD PRESSURE: 130 MMHG | WEIGHT: 264.8 LBS | BODY MASS INDEX: 37.91 KG/M2 | OXYGEN SATURATION: 98 % | HEART RATE: 72 BPM | DIASTOLIC BLOOD PRESSURE: 70 MMHG | HEIGHT: 70 IN

## 2019-01-04 DIAGNOSIS — I25.110 CORONARY ARTERY DISEASE INVOLVING NATIVE CORONARY ARTERY OF NATIVE HEART WITH UNSTABLE ANGINA PECTORIS (HCC): Primary | ICD-10-CM

## 2019-01-04 DIAGNOSIS — E78.2 MIXED HYPERLIPIDEMIA: ICD-10-CM

## 2019-01-04 DIAGNOSIS — R07.2 PRECORDIAL PAIN: ICD-10-CM

## 2019-01-04 DIAGNOSIS — I10 ESSENTIAL HYPERTENSION: ICD-10-CM

## 2019-01-04 PROCEDURE — 99213 OFFICE O/P EST LOW 20 MIN: CPT | Performed by: INTERNAL MEDICINE

## 2019-02-11 ENCOUNTER — OFFICE VISIT (OUTPATIENT)
Dept: FAMILY MEDICINE CLINIC | Age: 59
End: 2019-02-11
Payer: COMMERCIAL

## 2019-02-11 ENCOUNTER — TELEPHONE (OUTPATIENT)
Dept: FAMILY MEDICINE CLINIC | Age: 59
End: 2019-02-11

## 2019-02-11 VITALS
HEART RATE: 106 BPM | DIASTOLIC BLOOD PRESSURE: 76 MMHG | OXYGEN SATURATION: 96 % | WEIGHT: 267 LBS | HEIGHT: 70 IN | BODY MASS INDEX: 38.22 KG/M2 | SYSTOLIC BLOOD PRESSURE: 128 MMHG

## 2019-02-11 DIAGNOSIS — J01.01 ACUTE RECURRENT MAXILLARY SINUSITIS: Primary | ICD-10-CM

## 2019-02-11 DIAGNOSIS — J06.9 VIRAL URI: ICD-10-CM

## 2019-02-11 PROCEDURE — 99213 OFFICE O/P EST LOW 20 MIN: CPT | Performed by: NURSE PRACTITIONER

## 2019-02-11 RX ORDER — ACETAMINOPHEN 500 MG
1000 TABLET ORAL EVERY 8 HOURS PRN
Status: DISCONTINUED | OUTPATIENT
Start: 2019-02-11 | End: 2019-02-11

## 2019-02-11 RX ORDER — GUAIFENESIN DEXTROMETHORPHAN HYDROBROMIDE ORAL SOLUTION 10; 100 MG/5ML; MG/5ML
10 SOLUTION ORAL EVERY 4 HOURS PRN
COMMUNITY
Start: 2019-02-11 | End: 2019-06-20 | Stop reason: ALTCHOICE

## 2019-02-11 RX ORDER — AZITHROMYCIN 250 MG/1
TABLET, FILM COATED ORAL
Qty: 6 TABLET | Refills: 0 | Status: SHIPPED | OUTPATIENT
Start: 2019-02-11 | End: 2019-02-21

## 2019-02-11 ASSESSMENT — PATIENT HEALTH QUESTIONNAIRE - PHQ9
SUM OF ALL RESPONSES TO PHQ QUESTIONS 1-9: 0
2. FEELING DOWN, DEPRESSED OR HOPELESS: 0
1. LITTLE INTEREST OR PLEASURE IN DOING THINGS: 0
SUM OF ALL RESPONSES TO PHQ QUESTIONS 1-9: 0
SUM OF ALL RESPONSES TO PHQ9 QUESTIONS 1 & 2: 0

## 2019-02-11 ASSESSMENT — ENCOUNTER SYMPTOMS
FACIAL SWELLING: 0
COUGH: 1
ABDOMINAL PAIN: 0
VOICE CHANGE: 0
RECTAL PAIN: 0
APNEA: 0
COLOR CHANGE: 0
PHOTOPHOBIA: 0
CHEST TIGHTNESS: 0
EYE DISCHARGE: 0
ALLERGIC/IMMUNOLOGIC NEGATIVE: 1
SHORTNESS OF BREATH: 0
EYE PAIN: 0
CHOKING: 0
ABDOMINAL DISTENTION: 0
GASTROINTESTINAL NEGATIVE: 1
TROUBLE SWALLOWING: 1
EYE REDNESS: 0
WHEEZING: 1
STRIDOR: 0

## 2019-04-19 RX ORDER — CLOPIDOGREL BISULFATE 75 MG/1
TABLET ORAL
Qty: 30 TABLET | Refills: 1 | Status: SHIPPED | OUTPATIENT
Start: 2019-04-19 | End: 2019-06-27 | Stop reason: SDUPTHER

## 2019-06-20 ENCOUNTER — OFFICE VISIT (OUTPATIENT)
Dept: FAMILY MEDICINE CLINIC | Age: 59
End: 2019-06-20
Payer: COMMERCIAL

## 2019-06-20 VITALS
OXYGEN SATURATION: 98 % | WEIGHT: 258.4 LBS | BODY MASS INDEX: 36.99 KG/M2 | HEART RATE: 78 BPM | HEIGHT: 70 IN | DIASTOLIC BLOOD PRESSURE: 60 MMHG | SYSTOLIC BLOOD PRESSURE: 110 MMHG

## 2019-06-20 DIAGNOSIS — E66.3 OVERWEIGHT: ICD-10-CM

## 2019-06-20 DIAGNOSIS — Z91.119 DIETARY NONCOMPLIANCE: ICD-10-CM

## 2019-06-20 DIAGNOSIS — E11.9 CONTROLLED TYPE 2 DIABETES MELLITUS WITHOUT COMPLICATION, WITHOUT LONG-TERM CURRENT USE OF INSULIN (HCC): Primary | ICD-10-CM

## 2019-06-20 DIAGNOSIS — Z86.79 HISTORY OF ANGINA PECTORIS: ICD-10-CM

## 2019-06-20 LAB — HBA1C MFR BLD: 9 %

## 2019-06-20 PROCEDURE — 99214 OFFICE O/P EST MOD 30 MIN: CPT | Performed by: FAMILY MEDICINE

## 2019-06-20 PROCEDURE — 83036 HEMOGLOBIN GLYCOSYLATED A1C: CPT | Performed by: FAMILY MEDICINE

## 2019-06-20 RX ORDER — GLIPIZIDE 10 MG/1
TABLET ORAL
Qty: 90 TABLET | Refills: 1 | Status: SHIPPED | OUTPATIENT
Start: 2019-06-20 | End: 2019-12-26

## 2019-06-20 ASSESSMENT — ENCOUNTER SYMPTOMS
STRIDOR: 0
CHOKING: 0
SHORTNESS OF BREATH: 0
BACK PAIN: 0
COUGH: 0
WHEEZING: 0
CHEST TIGHTNESS: 0
ABDOMINAL PAIN: 0

## 2019-06-20 NOTE — PROGRESS NOTES
Subjective:      Patient ID: Erick Capps is a 62 y.o. male. ARMINDA EDWARDS is here for follow-up on type 2 diabetes whose control is poor with hemoglobin A1c of 9.1. He admits to not following a low-carb diet and not exercising. He reports drinking bourbon every evening. He is going to discontinue drinking and start a low-carb diet. He remains overweight. His angina has been much improved and his cardiologist was able to discontinue some of his medications. Review of Systems   Constitutional: Negative for activity change, appetite change, chills, diaphoresis, fatigue, fever and unexpected weight change. Respiratory: Negative for cough, choking, chest tightness, shortness of breath, wheezing and stridor. Cardiovascular: Negative for chest pain, palpitations and leg swelling. Gastrointestinal: Negative for abdominal pain. Genitourinary: Negative for difficulty urinating. Musculoskeletal: Negative for arthralgias and back pain. Neurological: Negative for dizziness. All other systems reviewed and are negative. Objective:   Physical Exam   Constitutional: He is oriented to person, place, and time. He appears well-developed and well-nourished. HENT:   Head: Normocephalic and atraumatic. Right Ear: External ear normal.   Left Ear: External ear normal.   Nose: Nose normal.   Mouth/Throat: Oropharynx is clear and moist.   Eyes: Pupils are equal, round, and reactive to light. Conjunctivae and EOM are normal.   Neck: Normal range of motion. Neck supple. No JVD present. No tracheal deviation present. No thyromegaly present. Cardiovascular: Normal rate, regular rhythm and normal heart sounds. Exam reveals no gallop and no friction rub. No murmur heard. Pulmonary/Chest: Effort normal and breath sounds normal. No stridor. No respiratory distress. He has no wheezes. He has no rales. He exhibits no tenderness. Abdominal: Soft. Bowel sounds are normal. He exhibits no distension and no mass.  There is no tenderness. There is no rebound and no guarding. Musculoskeletal: Normal range of motion. He exhibits no edema or tenderness. Lymphadenopathy:     He has no cervical adenopathy. Neurological: He is alert and oriented to person, place, and time. He has normal reflexes. He displays normal reflexes. No cranial nerve deficit. He exhibits normal muscle tone. Coordination normal.   Skin: Skin is warm and dry. No rash noted. No erythema. No pallor. Psychiatric: He has a normal mood and affect. Nursing note and vitals reviewed. Assessment and plan      1. Controlled type 2 diabetes mellitus without complication, without long-term current use of insulin (HCC)-begin a low-carb diet and increase glipizide to 10 mg daily in the a.m.    - POCT glycosylated hemoglobin (Hb A1C)  - TSH without Reflex; Future  - Lipid Panel; Future  - CBC; Future  - Hepatic Function Panel; Future  - Comprehensive Metabolic Panel; Future  - Psa screening; Future    2. Overweight-increase exercise and decrease calories      3. Dietary noncompliance-discontinue carbohydrate dense foods      4.  History of angina pectoris-improved          [address], DO

## 2019-06-28 RX ORDER — CLOPIDOGREL BISULFATE 75 MG/1
TABLET ORAL
Qty: 30 TABLET | Refills: 8 | Status: SHIPPED | OUTPATIENT
Start: 2019-06-28 | End: 2020-03-23

## 2019-07-29 RX ORDER — LISINOPRIL 2.5 MG/1
TABLET ORAL
Qty: 90 TABLET | Refills: 1 | Status: SHIPPED | OUTPATIENT
Start: 2019-07-29 | End: 2020-01-24

## 2019-07-29 NOTE — TELEPHONE ENCOUNTER
1/4/2019 JJP  Patient Plan:  1. Decrease ranexa to 500 mg twice daily for 1 weeks then 500 once daily for 1 weeks then stop. If symptoms recur, restart medication  2.  Follow up in 1 year

## 2019-09-20 RX ORDER — NITROGLYCERIN 0.4 MG/1
0.4 TABLET SUBLINGUAL EVERY 5 MIN PRN
Qty: 25 TABLET | Refills: 3 | Status: SHIPPED | OUTPATIENT
Start: 2019-09-20 | End: 2020-11-04

## 2019-12-05 RX ORDER — ATORVASTATIN CALCIUM 40 MG/1
TABLET, FILM COATED ORAL
Qty: 30 TABLET | Refills: 0 | Status: SHIPPED | OUTPATIENT
Start: 2019-12-05 | End: 2020-01-06

## 2019-12-06 ENCOUNTER — HOSPITAL ENCOUNTER (EMERGENCY)
Age: 59
Discharge: HOME | End: 2019-12-06
Payer: COMMERCIAL

## 2019-12-06 VITALS
HEART RATE: 75 BPM | OXYGEN SATURATION: 97 % | HEIGHT: 70 IN | WEIGHT: 250 LBS | RESPIRATION RATE: 18 BRPM | TEMPERATURE: 96.9 F | BODY MASS INDEX: 35.79 KG/M2 | SYSTOLIC BLOOD PRESSURE: 127 MMHG | DIASTOLIC BLOOD PRESSURE: 66 MMHG

## 2019-12-06 DIAGNOSIS — S39.012A STRAIN OF LUMBAR REGION, INITIAL ENCOUNTER: Primary | ICD-10-CM

## 2019-12-06 PROCEDURE — 6360000002 HC RX W HCPCS: Performed by: PHYSICIAN ASSISTANT

## 2019-12-06 PROCEDURE — 6370000000 HC RX 637 (ALT 250 FOR IP): Performed by: PHYSICIAN ASSISTANT

## 2019-12-06 PROCEDURE — 97161 PT EVAL LOW COMPLEX 20 MIN: CPT

## 2019-12-06 PROCEDURE — 96372 THER/PROPH/DIAG INJ SC/IM: CPT

## 2019-12-06 PROCEDURE — 97110 THERAPEUTIC EXERCISES: CPT

## 2019-12-06 PROCEDURE — 99283 EMERGENCY DEPT VISIT LOW MDM: CPT

## 2019-12-06 PROCEDURE — G0283 ELEC STIM OTHER THAN WOUND: HCPCS

## 2019-12-06 RX ORDER — METHYLPREDNISOLONE 4 MG/1
TABLET ORAL
Qty: 1 KIT | Refills: 0 | Status: SHIPPED | OUTPATIENT
Start: 2019-12-06 | End: 2020-01-28 | Stop reason: ALTCHOICE

## 2019-12-06 RX ORDER — METHYLPREDNISOLONE SODIUM SUCCINATE 125 MG/2ML
125 INJECTION, POWDER, LYOPHILIZED, FOR SOLUTION INTRAMUSCULAR; INTRAVENOUS ONCE
Status: COMPLETED | OUTPATIENT
Start: 2019-12-06 | End: 2019-12-06

## 2019-12-06 RX ORDER — LIDOCAINE 50 MG/G
1 PATCH TOPICAL DAILY
Qty: 10 PATCH | Refills: 0 | Status: SHIPPED | OUTPATIENT
Start: 2019-12-06 | End: 2019-12-16

## 2019-12-06 RX ORDER — HYDROCODONE BITARTRATE AND ACETAMINOPHEN 5; 325 MG/1; MG/1
1 TABLET ORAL EVERY 6 HOURS PRN
Qty: 5 TABLET | Refills: 0 | Status: SHIPPED | OUTPATIENT
Start: 2019-12-06 | End: 2019-12-09

## 2019-12-06 RX ORDER — LIDOCAINE 4 G/G
1 PATCH TOPICAL ONCE
Status: DISCONTINUED | OUTPATIENT
Start: 2019-12-06 | End: 2019-12-06 | Stop reason: HOSPADM

## 2019-12-06 RX ADMIN — METHYLPREDNISOLONE SODIUM SUCCINATE 125 MG: 125 INJECTION, POWDER, FOR SOLUTION INTRAMUSCULAR; INTRAVENOUS at 15:20

## 2019-12-06 ASSESSMENT — PAIN SCALES - GENERAL: PAINLEVEL_OUTOF10: 7

## 2019-12-06 ASSESSMENT — PAIN DESCRIPTION - ORIENTATION: ORIENTATION: LOWER;MID

## 2019-12-06 ASSESSMENT — ENCOUNTER SYMPTOMS
BACK PAIN: 1
SHORTNESS OF BREATH: 0
VOMITING: 0
NAUSEA: 0
COUGH: 0

## 2019-12-06 ASSESSMENT — PAIN DESCRIPTION - DESCRIPTORS: DESCRIPTORS: STABBING

## 2019-12-06 ASSESSMENT — PAIN DESCRIPTION - PAIN TYPE: TYPE: ACUTE PAIN

## 2019-12-06 ASSESSMENT — PAIN DESCRIPTION - FREQUENCY: FREQUENCY: CONTINUOUS

## 2019-12-06 ASSESSMENT — PAIN DESCRIPTION - LOCATION: LOCATION: BACK

## 2019-12-26 RX ORDER — ISOSORBIDE MONONITRATE 120 MG/1
TABLET, EXTENDED RELEASE ORAL
Qty: 90 TABLET | Refills: 1 | Status: SHIPPED | OUTPATIENT
Start: 2019-12-26 | End: 2020-06-15

## 2019-12-26 RX ORDER — GLIPIZIDE 10 MG/1
TABLET ORAL
Qty: 30 TABLET | Refills: 2 | Status: SHIPPED | OUTPATIENT
Start: 2019-12-26 | End: 2020-01-28 | Stop reason: SDUPTHER

## 2019-12-30 NOTE — PROGRESS NOTES
[facility]   Cardiovascular Evaluation    PATIENT: Rose Sky  DATE: 2020  MRN: <[id number]>  CSN: [id number]  : 1960      Primary Care Doctor: Josué Tierney DO  Reason for evaluation:   1 Year Follow Up (no new cardiac complaints)      Subjective:   History of present illness on initial date of evaluation:   Rose Sky is a 61 y.o. patient who presents for follow up. He has a PMH DM, HTN, CAD, s/p PCI, alcohol abuse, nonsmoker, + FH. Was admitted to hospital in 2016 with [place] and had abnormal stress test. Underwent PCI of OM as well as mlo-uh-wvvnyx LAD and was d/c'd/ Returned to hospital and noted to have mild troponin elevation and CP relieved with NTG SL. He underwent LHC on 16 and a PCI-D-1 with SAMUEL was done. Stents in prox-mid LAD, distal LAD, proximal-mid OM-1 were found patent. He was switched from Plavix to Brilinta. Imdur was added at office visit in 2016 due to complaints of exertional chest pain. He was admitted to the hospital in late May 2016 with recurrent chest pain and transient troponin elevation. LHC was showed patent stents. He was started on Ranexa. He underwent 17 rotablade guided PCI of OM1 recurrent instent restenosis. Today he reports feeling good. His wife is present and states he has been snoring. She states 2 weeks ago she didn't hear him snoring and checked on him. She states his body was ice cold. She thought he had . She woke him up and he states he felt fine. She gave him a nitro just in case. He states he had an steroid injection earlier that day, but that was the only thing different. Patient denies chest pain, sob, palpitations, dizziness or syncope.        Patient Active Problem List   Diagnosis    Type II or unspecified type diabetes mellitus without mention of complication, not stated as uncontrolled    Hypertension    Diabetes mellitus    HTN (hypertension)    History of ETOH abuse    Patient overweight    Patient overweight    Dietary noncompliance    Alcohol abuse    Flank pain    Prostatism    Low serum testosterone level    Arm pain, left    Shoulder pain    Precordial pain    Lung nodule    Acute angina (HCC)    Abnormal stress test    Acute coronary syndrome (HCC)    Unstable angina (HCC)    Coronary artery disease involving native coronary artery of native heart with unstable angina pectoris (HCC)    Chest pain, moderate coronary artery risk    S/P drug eluting coronary stent placement    Essential hypertension    Obesity    Abnormal chest x-ray    CAD (coronary artery disease)    Angina effort    Skin lesion    Angina at rest [facility])    Hyperlipidemia    Angina pectoris ([address]    NSTEMI (non-ST elevated myocardial infarction) ([address]    Controlled type 2 diabetes mellitus without complication, without long-term current use of insulin ([facility])    ASHD (arteriosclerotic heart disease)    Mass of right foot    Overweight    History of angina pectoris         Past Medical History:   has a past medical history of Alcohol abuse, Coronary artery disease, Hyperlipidemia, Hypertension, Non morbid obesity, and Type II or unspecified type diabetes mellitus without mention of complication, not stated as uncontrolled. Surgical History:   has a past surgical history that includes Vein Surgery and Coronary angioplasty with stent (1/14/2016). Social History:   reports that he has never smoked. He has never used smokeless tobacco. He reports current alcohol use of about 56.0 standard drinks of alcohol per week. He reports that he does not use drugs. Family History:  No evidence for sudden cardiac death or premature CAD    Home Medications:  Reviewed and are listed in nursing record.  and/or listed below  Current Outpatient Medications   Medication Sig Dispense Refill    atorvastatin (LIPITOR) 40 MG tablet TAKE ONE TABLET BY MOUTH ONCE NIGHTLY 30 tablet 0    isosorbide stress-induced ischemia. 2. Ejection fraction of 56%   3. No focal wall motion abnormality. CATH: 5/27/16  1. Patent drug-eluting stent proximal first obtuse marginal branch. 2. Patent drug-eluting stent from amxwlhzf-zt-tzw left anterior descending. 3. Patent drug-eluting stent distal left anterior descending. 4. Patent drug-eluting stent proximal first diagonal branch. 5. Normal left ventricular systolic function with an ejection fraction of  approximately 55% to 60%. 6. Normal left ventricular end-diastolic pressure at 10 mmHg. RECOMMENDATIONS: The patient's stents are all patent and he has no  obstructive disease in other arteries. His angina is from small vessel disease  and will add Ranexa 1000 mg b.i.d. to his antianginal regimen. He will  continue with Imdur and Lopressor for the angina. He will also continue with  aspirin, Brilinta, as well as a moderate-dose Lipitor. CATH: 01/21/2016  1. A 70% proximal medium-caliber first diagonal branch status post 2.25 x  12-mm Xience Alpine drug-eluting stent. 2.  Patent drug-eluting stent in proximal-mid left anterior descending. 3.  Patent drug-eluting stent in the distal left anterior descending. 4.  Patent drug-eluting stent in the fnmjyqgs-wd-zjb first obtuse marginal  branch. RECOMMENDATIONS:  Aspirin indefinitely. Brilinta at least for 12 months. Aggressive medical therapy and risk factors modification for coronary disease. CATH: 01/15/2016  IMPRESSION:   1. An 80% diffuse mid left anterior descending with a fractional flow  reserve of 0.79 (significant), status post 2.75 x 28-mm XIENCE  Alpine drug-eluting stent postdilated with a 3.5-mm noncompliant balloon. 2. A 60% distal left anterior descending lesion with a fractional flow   reserve of 0.5 (significant), status post 2.25 x 23-mm XIENCE Alpine   drug-eluting stent postdilated with 2.5-mm noncompliant balloon. 3. Patent stent in proximal to mid 1st obtuse marginal branch. stent.         Assessment and Plan   Jacques Abdalla is a 61 y.o. male who presents today for the following problems:      1. CAD   - 9/2017 Rotablade and PCI to [address]   - 8/2017 PCI to m/d LAD, diag 1  2. HTN: controlled  3. HLD: controlled but out of date      MD Plan:  1. Pt doing very well, active w/o CP  2. Will update lipids today  3. Given extent of CAD and dual layer stents, continue DAPT for life for now but can come off for elective procedures     Patient Active Problem List   Diagnosis    Type II or unspecified type diabetes mellitus without mention of complication, not stated as uncontrolled    Hypertension    Diabetes mellitus    HTN (hypertension)    History of ETOH abuse    Patient overweight    Patient overweight    Dietary noncompliance    Alcohol abuse    Flank pain    Prostatism    Low serum testosterone level    Arm pain, left    Shoulder pain    Precordial pain    Lung nodule    Acute angina (HCC)    Abnormal stress test    Acute coronary syndrome (HCC)    Unstable angina ([address]    Coronary artery disease involving native coronary artery of native heart with unstable angina pectoris (HCC)    Chest pain, moderate coronary artery risk    S/P drug eluting coronary stent placement    Essential hypertension    Obesity    Abnormal chest x-ray    CAD (coronary artery disease)    Angina effort    Skin lesion    Angina at rest ([address]    Hyperlipidemia    Angina pectoris ([address]    NSTEMI (non-ST elevated myocardial infarction) ([address]    Controlled type 2 diabetes mellitus without complication, without long-term current use of insulin ([address]    ASHD (arteriosclerotic heart disease)    Mass of right foot    Overweight    History of angina pectoris       Patient Plan:  1. Labs - Lipids - fasting (we will call you with the results)  2. No change in medications  3. No cardiac testing warranted at this time  4. Follow up in 1 year       It is a pleasure to assist in the care of Jacques Abdalla. Please call with any questions. This note was scribed in the presence of Hayley Yun MD by Evan Carr, ANGELITO.          Leonid Arriaga MD, personally performed the services described in this documentation as scribed by the above signed scribe in my presence, and it is both accurate and complete to the best of our ability and knowledge. I agree with the details independently gathered by my clinical support staff, while the remaining scribed note accurately describes my personal service to the patient. The above RN is working as a scribe for and in the presence of myself . Working as a scribe, the RN may have prepopulated components of this note with my historical intellectual property under my direct supervision. Any additions to this intellectual property were performed at my direction. Furthermore, the content and accuracy of this note have been reviewed by me to the best of my ability.        Josiane Abad MD, [facility] Cardiologist  [address]  [phone] [facility]  [phone] [address] [place] [address]  1/6/2020  10:21 AM

## 2020-01-06 ENCOUNTER — HOSPITAL ENCOUNTER (OUTPATIENT)
Age: 60
Discharge: HOME | End: 2020-01-06
Payer: COMMERCIAL

## 2020-01-06 ENCOUNTER — OFFICE VISIT (OUTPATIENT)
Dept: CARDIOLOGY CLINIC | Age: 60
End: 2020-01-06
Payer: COMMERCIAL

## 2020-01-06 VITALS
SYSTOLIC BLOOD PRESSURE: 118 MMHG | DIASTOLIC BLOOD PRESSURE: 62 MMHG | OXYGEN SATURATION: 98 % | BODY MASS INDEX: 38.14 KG/M2 | WEIGHT: 266.4 LBS | HEART RATE: 73 BPM | HEIGHT: 70 IN

## 2020-01-06 LAB
CHOLESTEROL, FASTING: 157 MG/DL (ref 0–199)
HDLC SERPL-MCNC: 75 MG/DL (ref 40–60)
LDL CHOLESTEROL CALCULATED: 63 MG/DL
TRIGLYCERIDE, FASTING: 95 MG/DL (ref 0–150)
VLDLC SERPL CALC-MCNC: 19 MG/DL

## 2020-01-06 PROCEDURE — 80061 LIPID PANEL: CPT

## 2020-01-06 PROCEDURE — 99213 OFFICE O/P EST LOW 20 MIN: CPT | Performed by: INTERNAL MEDICINE

## 2020-01-06 PROCEDURE — 36415 COLL VENOUS BLD VENIPUNCTURE: CPT

## 2020-01-06 RX ORDER — ATORVASTATIN CALCIUM 40 MG/1
TABLET, FILM COATED ORAL
Qty: 30 TABLET | Refills: 0 | Status: SHIPPED | OUTPATIENT
Start: 2020-01-06 | End: 2020-01-06

## 2020-01-06 RX ORDER — ATORVASTATIN CALCIUM 40 MG/1
TABLET, FILM COATED ORAL
Qty: 30 TABLET | Refills: 0 | Status: SHIPPED | OUTPATIENT
Start: 2020-01-06 | End: 2020-01-28 | Stop reason: SDUPTHER

## 2020-01-06 NOTE — LETTER
[address] [facility]   Cardiovascular Evaluation    PATIENT: Yudi Tidwell  DATE: 2020  MRN: <[id number]>  CSN: [id number]  : 1960      Primary Care Doctor: Katrin Hanson DO  Reason for evaluation:   1 Year Follow Up (no new cardiac complaints)      Subjective:   History of present illness on initial date of evaluation:   Yudi Tidwell is a 61 y.o. patient who presents for follow up. He has a PMH DM, HTN, CAD, s/p PCI, alcohol abuse, nonsmoker, + FH. Was admitted to hospital in 2016 with [place] and had abnormal stress test. Underwent PCI of OM as well as jnu-ma-dajeft LAD and was d/c'd/ Returned to hospital and noted to have mild troponin elevation and CP relieved with NTG SL. He underwent LHC on 16 and a PCI-D-1 with SAMUEL was done. Stents in prox-mid LAD, distal LAD, proximal-mid OM-1 were found patent. He was switched from Plavix to Brilinta. Imdur was added at office visit in 2016 due to complaints of exertional chest pain. He was admitted to the hospital in late May 2016 with recurrent chest pain and transient troponin elevation. LHC was showed patent stents. He was started on Ranexa. He underwent 17 rotablade guided PCI of OM1 recurrent instent restenosis. Today he reports feeling good. His wife is present and states he has been snoring. She states 2 weeks ago she didn't hear him snoring and checked on him. She states his body was ice cold. She thought he had . She woke him up and he states he felt fine. She gave him a nitro just in case. He states he had an steroid injection earlier that day, but that was the only thing different. Patient denies chest pain, sob, palpitations, dizziness or syncope.        Patient Active Problem List   Diagnosis    Type II or unspecified type diabetes mellitus without mention of complication, not stated as uncontrolled    Hypertension    Diabetes mellitus    HTN (hypertension)    History of ETOH abuse  Patient overweight    Patient overweight    Dietary noncompliance    Alcohol abuse    Flank pain    Prostatism    Low serum testosterone level    Arm pain, left    Shoulder pain    Precordial pain    Lung nodule    Acute angina (HCC)    Abnormal stress test    Acute coronary syndrome (HCC)    Unstable angina (HCC)    Coronary artery disease involving native coronary artery of native heart with unstable angina pectoris (HCC)    Chest pain, moderate coronary artery risk    S/P drug eluting coronary stent placement    Essential hypertension    Obesity    Abnormal chest x-ray    CAD (coronary artery disease)    Angina effort    Skin lesion    Angina at rest [facility])    Hyperlipidemia    Angina pectoris [facility]    NSTEMI (non-ST elevated myocardial infarction) ([facility]    Controlled type 2 diabetes mellitus without complication, without long-term current use of insulin ([facility])    ASHD (arteriosclerotic heart disease)    Mass of right foot    Overweight    History of angina pectoris         Past Medical History:   has a past medical history of Alcohol abuse, Coronary artery disease, Hyperlipidemia, Hypertension, Non morbid obesity, and Type II or unspecified type diabetes mellitus without mention of complication, not stated as uncontrolled. Surgical History:   has a past surgical history that includes Vein Surgery and Coronary angioplasty with stent (1/14/2016). Social History:   reports that he has never smoked. He has never used smokeless tobacco. He reports current alcohol use of about 56.0 standard drinks of alcohol per week. He reports that he does not use drugs. Family History:  No evidence for sudden cardiac death or premature CAD    Home Medications:  Reviewed and are listed in nursing record.  and/or listed below  Current Outpatient Medications   Medication Sig Dispense Refill    atorvastatin (LIPITOR) 40 MG tablet TAKE ONE TABLET BY MOUTH ONCE NIGHTLY 30 tablet 0  isosorbide mononitrate (IMDUR) 120 MG extended release tablet TAKE ONE TABLET BY MOUTH DAILY 90 tablet 1    glipiZIDE (GLUCOTROL) 10 MG tablet TAKE ONE TABLET BY MOUTH EVERY MORNING FOR DIABETES (Patient taking differently: 5 mg TAKE ONE TABLET BY MOUTH EVERY MORNING FOR DIABETES) 30 tablet 2    metFORMIN (GLUCOPHAGE) 1000 MG tablet TAKE ONE TABLET BY MOUTH DAILY WITH BREAKFAST 30 tablet 2    nitroGLYCERIN (NITROSTAT) 0.4 MG SL tablet Place 1 tablet under the tongue every 5 minutes as needed for Chest pain 25 tablet 3    lisinopril (PRINIVIL;ZESTRIL) 2.5 MG tablet TAKE ONE TABLET BY MOUTH DAILY 90 tablet 1    clopidogrel (PLAVIX) 75 MG tablet TAKE ONE TABLET BY MOUTH DAILY 30 tablet 8    metoprolol tartrate (LOPRESSOR) 25 MG tablet TAKE ONE TABLET BY MOUTH TWICE A  tablet 3    aspirin 81 MG chewable tablet CHEW ONE TABLET BY MOUTH DAILY 30 tablet 1    methylPREDNISolone (MEDROL, ALEJO,) 4 MG tablet Take by mouth. (Patient not taking: Reported on 1/6/2020) 1 kit 0     No current facility-administered medications for this visit. Allergies:  Vitamin d analogs and Vitamin d analogs     Review of Systems:   A 14 point review of symptoms completed. Pertinent positives identified in the HPI, all other review of symptoms negative as below.     Objective:   PHYSICAL EXAM:    Vitals:    01/06/20 1001   BP: 118/62   Pulse: 73   SpO2: 98%    Weight: 266 lb 6.4 oz (120.8 kg)     Wt Readings from Last 3 Encounters:   01/06/20 266 lb 6.4 oz (120.8 kg)   12/06/19 250 lb (113.4 kg)   06/20/19 258 lb 6.4 oz (117.2 kg)         General Appearance:  Alert, cooperative, no distress, appears stated age   Head:  Normocephalic, atraumatic   Eyes:  PERRL, conjunctiva/corneas clear   Nose: Nares normal, no drainage or sinus tenderness   Throat: Lips, mucosa, and tongue normal   Neck: Supple, symmetrical, trachea midline, NL thyroid no carotid bruit or JVD   Lungs:   CTAB, respirations unlabored Successful PCI of OM-1 recurrent in-stent restenosis with Rotablator-assisted PCI with placement of Xience Alpine drug-eluting stent. Assessment and Plan   [place] Jackelyn is a 61 y.o. male who presents today for the following problems:      1. CAD   - 9/2017 Rotablade and PCI to [address]   - 8/2017 PCI to m/d LAD, diag 1  2. HTN: controlled  3. HLD: controlled but out of date      MD Plan:  1. Pt doing very well, active w/o CP  2. Will update lipids today  3. Given extent of CAD and dual layer stents, continue DAPT for life for now but can come off for elective procedures     Patient Active Problem List   Diagnosis    Type II or unspecified type diabetes mellitus without mention of complication, not stated as uncontrolled    Hypertension    Diabetes mellitus    HTN (hypertension)    History of ETOH abuse    Patient overweight    Patient overweight    Dietary noncompliance    Alcohol abuse    Flank pain    Prostatism    Low serum testosterone level    Arm pain, left    Shoulder pain    Precordial pain    Lung nodule    Acute angina (HCC)    Abnormal stress test    Acute coronary syndrome (HCC)    Unstable angina ([address]    Coronary artery disease involving native coronary artery of native heart with unstable angina pectoris (HCC)    Chest pain, moderate coronary artery risk    S/P drug eluting coronary stent placement    Essential hypertension    Obesity    Abnormal chest x-ray    CAD (coronary artery disease)    Angina effort    Skin lesion    Angina at rest ([address]    Hyperlipidemia    Angina pectoris ([address]    NSTEMI (non-ST elevated myocardial infarction) ([address]    Controlled type 2 diabetes mellitus without complication, without long-term current use of insulin ([address]    ASHD (arteriosclerotic heart disease)    Mass of right foot    Overweight    History of angina pectoris       Patient Plan:  1. Labs - Lipids - fasting (we will call you with the results)  2.  No change in medications

## 2020-01-06 NOTE — PATIENT INSTRUCTIONS
Patient Plan:  1. Labs - Lipids - fasting (we will call you with the results)  2. No change in medications  3. No cardiac testing warranted at this time  4.  Follow up in 1 year

## 2020-01-07 ENCOUNTER — TELEPHONE (OUTPATIENT)
Dept: CARDIOLOGY CLINIC | Age: 60
End: 2020-01-07

## 2020-01-07 NOTE — TELEPHONE ENCOUNTER
Created telephone encounter. Spoke with Jackie Harley, who is on HIPAA form,  relayed message per Joshua Milian regarding labs. Pt wife verbalized understanding.

## 2020-01-16 ENCOUNTER — TELEPHONE (OUTPATIENT)
Dept: CARDIOLOGY CLINIC | Age: 60
End: 2020-01-16

## 2020-01-16 ENCOUNTER — TELEPHONE (OUTPATIENT)
Dept: FAMILY MEDICINE CLINIC | Age: 60
End: 2020-01-16

## 2020-01-16 NOTE — TELEPHONE ENCOUNTER
Patients wife advised. I scheduled him for a regular office visit with Dr. Kamron Molina for his Diabetes.   Future Appointments   Date Time Provider Abdirahman Craft   1/28/2020 10:00 AM DO CAYLA Mann

## 2020-01-16 NOTE — TELEPHONE ENCOUNTER
Please tell Cathy March that the medical board will not allow me to prescribe pain medicine without examining the patient. Tell her I think Hipolito Gallardo should go to a Urgent Dental office and be treated today. It is not reasonable for him to wait till 3 February.

## 2020-01-23 DIAGNOSIS — E11.9 CONTROLLED TYPE 2 DIABETES MELLITUS WITHOUT COMPLICATION, WITHOUT LONG-TERM CURRENT USE OF INSULIN (HCC): ICD-10-CM

## 2020-01-23 LAB
A/G RATIO: 2.1 (ref 1.1–2.2)
ALBUMIN SERPL-MCNC: 4.4 G/DL (ref 3.4–5)
ALP BLD-CCNC: 65 U/L (ref 40–129)
ALT SERPL-CCNC: 20 U/L (ref 10–40)
ANION GAP SERPL CALCULATED.3IONS-SCNC: 14 MMOL/L (ref 3–16)
AST SERPL-CCNC: 20 U/L (ref 15–37)
BILIRUB SERPL-MCNC: 0.7 MG/DL (ref 0–1)
BILIRUBIN DIRECT: <0.2 MG/DL (ref 0–0.3)
BILIRUBIN, INDIRECT: NORMAL MG/DL (ref 0–1)
BUN BLDV-MCNC: 13 MG/DL (ref 7–20)
CALCIUM SERPL-MCNC: 9.5 MG/DL (ref 8.3–10.6)
CHLORIDE BLD-SCNC: 99 MMOL/L (ref 99–110)
CO2: 27 MMOL/L (ref 21–32)
CREAT SERPL-MCNC: 0.6 MG/DL (ref 0.9–1.3)
GFR AFRICAN AMERICAN: >60
GFR NON-AFRICAN AMERICAN: >60
GLOBULIN: 2.1 G/DL
GLUCOSE BLD-MCNC: 217 MG/DL (ref 70–99)
HCT VFR BLD CALC: 43.2 % (ref 40.5–52.5)
HEMOGLOBIN: 14.6 G/DL (ref 13.5–17.5)
MCH RBC QN AUTO: 31.9 PG (ref 26–34)
MCHC RBC AUTO-ENTMCNC: 33.8 G/DL (ref 31–36)
MCV RBC AUTO: 94.4 FL (ref 80–100)
PDW BLD-RTO: 14.5 % (ref 12.4–15.4)
PLATELET # BLD: 188 K/UL (ref 135–450)
PMV BLD AUTO: 8 FL (ref 5–10.5)
POTASSIUM SERPL-SCNC: 5.1 MMOL/L (ref 3.5–5.1)
PROSTATE SPECIFIC ANTIGEN: 1.25 NG/ML (ref 0–4)
RBC # BLD: 4.57 M/UL (ref 4.2–5.9)
SODIUM BLD-SCNC: 140 MMOL/L (ref 136–145)
TOTAL PROTEIN: 6.5 G/DL (ref 6.4–8.2)
TSH SERPL DL<=0.05 MIU/L-ACNC: 1.74 UIU/ML (ref 0.27–4.2)
WBC # BLD: 5.6 K/UL (ref 4–11)

## 2020-01-24 RX ORDER — LISINOPRIL 2.5 MG/1
TABLET ORAL
Qty: 90 TABLET | Refills: 3 | Status: SHIPPED | OUTPATIENT
Start: 2020-01-24 | End: 2021-01-13

## 2020-01-28 ENCOUNTER — OFFICE VISIT (OUTPATIENT)
Dept: FAMILY MEDICINE CLINIC | Age: 60
End: 2020-01-28
Payer: COMMERCIAL

## 2020-01-28 VITALS
HEIGHT: 70 IN | SYSTOLIC BLOOD PRESSURE: 128 MMHG | HEART RATE: 68 BPM | BODY MASS INDEX: 37.71 KG/M2 | DIASTOLIC BLOOD PRESSURE: 76 MMHG | WEIGHT: 263.4 LBS | OXYGEN SATURATION: 98 %

## 2020-01-28 LAB — HBA1C MFR BLD: 7.8 %

## 2020-01-28 PROCEDURE — 83036 HEMOGLOBIN GLYCOSYLATED A1C: CPT | Performed by: FAMILY MEDICINE

## 2020-01-28 PROCEDURE — 99214 OFFICE O/P EST MOD 30 MIN: CPT | Performed by: FAMILY MEDICINE

## 2020-01-28 RX ORDER — ATORVASTATIN CALCIUM 40 MG/1
TABLET, FILM COATED ORAL
Qty: 90 TABLET | Refills: 2 | Status: SHIPPED | OUTPATIENT
Start: 2020-01-28 | End: 2020-10-09

## 2020-01-28 RX ORDER — GLIPIZIDE 10 MG/1
TABLET ORAL
Qty: 30 TABLET | Refills: 2 | Status: SHIPPED | OUTPATIENT
Start: 2020-01-28 | End: 2020-06-22

## 2020-01-28 ASSESSMENT — ENCOUNTER SYMPTOMS
COUGH: 0
BACK PAIN: 0
CHOKING: 0
ABDOMINAL PAIN: 0
CHEST TIGHTNESS: 0
WHEEZING: 0
STRIDOR: 0
SHORTNESS OF BREATH: 0

## 2020-01-28 ASSESSMENT — PATIENT HEALTH QUESTIONNAIRE - PHQ9
SUM OF ALL RESPONSES TO PHQ QUESTIONS 1-9: 0
SUM OF ALL RESPONSES TO PHQ9 QUESTIONS 1 & 2: 0
1. LITTLE INTEREST OR PLEASURE IN DOING THINGS: 0
SUM OF ALL RESPONSES TO PHQ QUESTIONS 1-9: 0
2. FEELING DOWN, DEPRESSED OR HOPELESS: 0

## 2020-02-10 NOTE — TELEPHONE ENCOUNTER
LV - 01/06/2020 NORM BARCLAY    Patient Plan:  1. Labs - Lipids - fasting (we will call you with the results)  2. No change in medications  3. No cardiac testing warranted at this time  4.  Follow up in 1 year

## 2020-02-13 ENCOUNTER — OFFICE VISIT (OUTPATIENT)
Dept: FAMILY MEDICINE CLINIC | Age: 60
End: 2020-02-13
Payer: COMMERCIAL

## 2020-02-13 VITALS
SYSTOLIC BLOOD PRESSURE: 118 MMHG | BODY MASS INDEX: 37.88 KG/M2 | HEART RATE: 80 BPM | WEIGHT: 264 LBS | TEMPERATURE: 97.4 F | RESPIRATION RATE: 16 BRPM | DIASTOLIC BLOOD PRESSURE: 64 MMHG | OXYGEN SATURATION: 94 %

## 2020-02-13 PROBLEM — J40 BRONCHITIS: Status: ACTIVE | Noted: 2020-02-13

## 2020-02-13 PROBLEM — J45.909 REACTIVE AIRWAY DISEASE: Status: ACTIVE | Noted: 2020-02-13

## 2020-02-13 LAB
CREATININE URINE POCT: 100
MICROALBUMIN/CREAT 24H UR: 10 MG/G{CREAT}
MICROALBUMIN/CREAT UR-RTO: <30

## 2020-02-13 PROCEDURE — 82044 UR ALBUMIN SEMIQUANTITATIVE: CPT | Performed by: FAMILY MEDICINE

## 2020-02-13 PROCEDURE — 99214 OFFICE O/P EST MOD 30 MIN: CPT | Performed by: FAMILY MEDICINE

## 2020-02-13 RX ORDER — METHYLPREDNISOLONE 4 MG/1
TABLET ORAL
Qty: 1 KIT | Refills: 0 | Status: SHIPPED | OUTPATIENT
Start: 2020-02-13 | End: 2020-02-19

## 2020-02-13 RX ORDER — DOXYCYCLINE HYCLATE 100 MG
100 TABLET ORAL 2 TIMES DAILY
Qty: 20 TABLET | Refills: 0 | Status: SHIPPED | OUTPATIENT
Start: 2020-02-13 | End: 2020-02-23

## 2020-02-13 RX ORDER — ALBUTEROL SULFATE 90 UG/1
2 AEROSOL, METERED RESPIRATORY (INHALATION) 4 TIMES DAILY PRN
Qty: 1 INHALER | Refills: 1 | Status: SHIPPED | OUTPATIENT
Start: 2020-02-13 | End: 2020-03-01 | Stop reason: SDUPTHER

## 2020-02-13 RX ORDER — BENZONATATE 100 MG/1
100 CAPSULE ORAL 2 TIMES DAILY PRN
Qty: 20 CAPSULE | Refills: 0 | Status: SHIPPED | OUTPATIENT
Start: 2020-02-13 | End: 2020-02-20

## 2020-02-13 ASSESSMENT — PATIENT HEALTH QUESTIONNAIRE - PHQ9
1. LITTLE INTEREST OR PLEASURE IN DOING THINGS: 0
SUM OF ALL RESPONSES TO PHQ QUESTIONS 1-9: 0
2. FEELING DOWN, DEPRESSED OR HOPELESS: 0
SUM OF ALL RESPONSES TO PHQ9 QUESTIONS 1 & 2: 0
SUM OF ALL RESPONSES TO PHQ QUESTIONS 1-9: 0

## 2020-02-13 ASSESSMENT — ENCOUNTER SYMPTOMS
CHEST TIGHTNESS: 0
CHOKING: 0
BACK PAIN: 0
COUGH: 1
STRIDOR: 0
ABDOMINAL PAIN: 0
WHEEZING: 1
SHORTNESS OF BREATH: 0

## 2020-03-01 ENCOUNTER — HOSPITAL ENCOUNTER (EMERGENCY)
Age: 60
Discharge: HOME | DRG: 203 | End: 2020-03-01
Attending: EMERGENCY MEDICINE
Payer: COMMERCIAL

## 2020-03-01 ENCOUNTER — APPOINTMENT (OUTPATIENT)
Dept: GENERAL RADIOLOGY | Age: 60
DRG: 203 | End: 2020-03-01
Payer: COMMERCIAL

## 2020-03-01 VITALS
SYSTOLIC BLOOD PRESSURE: 143 MMHG | OXYGEN SATURATION: 96 % | BODY MASS INDEX: 36.51 KG/M2 | HEART RATE: 93 BPM | WEIGHT: 255 LBS | HEIGHT: 70 IN | RESPIRATION RATE: 19 BRPM | DIASTOLIC BLOOD PRESSURE: 84 MMHG | TEMPERATURE: 99.2 F

## 2020-03-01 LAB
A/G RATIO: 1.8 (ref 1.1–2.2)
ALBUMIN SERPL-MCNC: 4.2 G/DL (ref 3.4–5)
ALP BLD-CCNC: 80 U/L (ref 40–129)
ALT SERPL-CCNC: 20 U/L (ref 10–40)
ANION GAP SERPL CALCULATED.3IONS-SCNC: 11 MMOL/L (ref 3–16)
AST SERPL-CCNC: 16 U/L (ref 15–37)
BASOPHILS ABSOLUTE: 0.1 K/UL (ref 0–0.2)
BASOPHILS RELATIVE PERCENT: 1 %
BILIRUB SERPL-MCNC: 0.7 MG/DL (ref 0–1)
BUN BLDV-MCNC: 9 MG/DL (ref 7–20)
CALCIUM SERPL-MCNC: 8.9 MG/DL (ref 8.3–10.6)
CHLORIDE BLD-SCNC: 93 MMOL/L (ref 99–110)
CO2: 25 MMOL/L (ref 21–32)
CREAT SERPL-MCNC: 0.6 MG/DL (ref 0.9–1.3)
EKG ATRIAL RATE: 78 BPM
EKG DIAGNOSIS: NORMAL
EKG P AXIS: 62 DEGREES
EKG P-R INTERVAL: 164 MS
EKG Q-T INTERVAL: 396 MS
EKG QRS DURATION: 94 MS
EKG QTC CALCULATION (BAZETT): 451 MS
EKG R AXIS: 15 DEGREES
EKG T AXIS: 54 DEGREES
EKG VENTRICULAR RATE: 78 BPM
EOSINOPHILS ABSOLUTE: 0.3 K/UL (ref 0–0.6)
EOSINOPHILS RELATIVE PERCENT: 4.6 %
GFR AFRICAN AMERICAN: >60
GFR NON-AFRICAN AMERICAN: >60
GLOBULIN: 2.3 G/DL
GLUCOSE BLD-MCNC: 388 MG/DL (ref 70–99)
HCT VFR BLD CALC: 44.6 % (ref 40.5–52.5)
HEMOGLOBIN: 14.6 G/DL (ref 13.5–17.5)
LYMPHOCYTES ABSOLUTE: 1.2 K/UL (ref 1–5.1)
LYMPHOCYTES RELATIVE PERCENT: 18.9 %
MCH RBC QN AUTO: 29.9 PG (ref 26–34)
MCHC RBC AUTO-ENTMCNC: 32.7 G/DL (ref 31–36)
MCV RBC AUTO: 91.4 FL (ref 80–100)
MONOCYTES ABSOLUTE: 0.4 K/UL (ref 0–1.3)
MONOCYTES RELATIVE PERCENT: 6.7 %
NEUTROPHILS ABSOLUTE: 4.3 K/UL (ref 1.7–7.7)
NEUTROPHILS RELATIVE PERCENT: 68.8 %
PDW BLD-RTO: 13.7 % (ref 12.4–15.4)
PLATELET # BLD: 194 K/UL (ref 135–450)
PMV BLD AUTO: 8.2 FL (ref 5–10.5)
POTASSIUM REFLEX MAGNESIUM: 4.3 MMOL/L (ref 3.5–5.1)
PRO-BNP: 123 PG/ML (ref 0–124)
RBC # BLD: 4.88 M/UL (ref 4.2–5.9)
SODIUM BLD-SCNC: 129 MMOL/L (ref 136–145)
TOTAL PROTEIN: 6.5 G/DL (ref 6.4–8.2)
WBC # BLD: 6.3 K/UL (ref 4–11)

## 2020-03-01 PROCEDURE — 93010 ELECTROCARDIOGRAM REPORT: CPT | Performed by: INTERNAL MEDICINE

## 2020-03-01 PROCEDURE — 71046 X-RAY EXAM CHEST 2 VIEWS: CPT

## 2020-03-01 PROCEDURE — 6370000000 HC RX 637 (ALT 250 FOR IP): Performed by: EMERGENCY MEDICINE

## 2020-03-01 PROCEDURE — 6360000002 HC RX W HCPCS: Performed by: EMERGENCY MEDICINE

## 2020-03-01 PROCEDURE — 99285 EMERGENCY DEPT VISIT HI MDM: CPT

## 2020-03-01 PROCEDURE — 83880 ASSAY OF NATRIURETIC PEPTIDE: CPT

## 2020-03-01 PROCEDURE — 85025 COMPLETE CBC W/AUTO DIFF WBC: CPT

## 2020-03-01 PROCEDURE — 93005 ELECTROCARDIOGRAM TRACING: CPT | Performed by: EMERGENCY MEDICINE

## 2020-03-01 PROCEDURE — 80053 COMPREHEN METABOLIC PANEL: CPT

## 2020-03-01 RX ORDER — ALBUTEROL SULFATE 2.5 MG/3ML
2.5 SOLUTION RESPIRATORY (INHALATION) ONCE
Status: COMPLETED | OUTPATIENT
Start: 2020-03-01 | End: 2020-03-01

## 2020-03-01 RX ORDER — IPRATROPIUM BROMIDE AND ALBUTEROL SULFATE 2.5; .5 MG/3ML; MG/3ML
1 SOLUTION RESPIRATORY (INHALATION) ONCE
Status: COMPLETED | OUTPATIENT
Start: 2020-03-01 | End: 2020-03-01

## 2020-03-01 RX ORDER — 0.9 % SODIUM CHLORIDE 0.9 %
1000 INTRAVENOUS SOLUTION INTRAVENOUS ONCE
Status: DISCONTINUED | OUTPATIENT
Start: 2020-03-01 | End: 2020-03-01

## 2020-03-01 RX ORDER — PREDNISONE 10 MG/1
TABLET ORAL
Qty: 20 TABLET | Refills: 0 | Status: ON HOLD | OUTPATIENT
Start: 2020-03-01 | End: 2020-03-06 | Stop reason: HOSPADM

## 2020-03-01 RX ORDER — ACETAMINOPHEN 325 MG/1
650 TABLET ORAL ONCE
Status: COMPLETED | OUTPATIENT
Start: 2020-03-01 | End: 2020-03-01

## 2020-03-01 RX ORDER — PREDNISONE 20 MG/1
60 TABLET ORAL ONCE
Status: COMPLETED | OUTPATIENT
Start: 2020-03-01 | End: 2020-03-01

## 2020-03-01 RX ADMIN — ACETAMINOPHEN 650 MG: 325 TABLET ORAL at 09:17

## 2020-03-01 RX ADMIN — PREDNISONE 60 MG: 20 TABLET ORAL at 09:17

## 2020-03-01 RX ADMIN — ALBUTEROL SULFATE 2.5 MG: 2.5 SOLUTION RESPIRATORY (INHALATION) at 09:17

## 2020-03-01 RX ADMIN — IPRATROPIUM BROMIDE AND ALBUTEROL SULFATE 1 AMPULE: .5; 3 SOLUTION RESPIRATORY (INHALATION) at 09:17

## 2020-03-01 ASSESSMENT — PAIN SCALES - GENERAL: PAINLEVEL_OUTOF10: 0

## 2020-03-01 NOTE — ED PROVIDER NOTES
[address] ED    CHIEF COMPLAINT  Shortness of Breath (pt states feels like his lungs are full just cant get his breath seen by Dr. Ronald Blue and started on atb and steroids pt states not getting any better)       HISTORY OF PRESENT ILLNESS  Wesley Singleton is a 61 y.o. male with past medical history moderate persistent reactive airway disease, DM 2, hypertension, hyperlipidemia, coronary artery disease, and as below who presents to the ED with shortness of breath. Of note, the patient did see his primary care physician on February 13 for this complaint. He was diagnosed with bronchitis and prescribed doxycycline x10 days. Also diagnosed with acute exacerbation of reactive airway disease and prescribed Medrol Dosepak and Ventolin inhaler. The patient had been feeling better. However, 2 days ago, symptoms worsened. Last night, felt like \"I was drowning\". Denies fever, chest pain, nausea, vomiting, diarrhea, abdominal pain, leg swelling. Cough, productive of scant phlegm. Wheezing. Wife with similar symptoms. No other complaints, modifying factors or associated symptoms.      I have reviewed the following from the nursing documentation:    Past Medical History:   Diagnosis Date    Alcohol abuse 12/3/2012    Coronary artery disease     Hyperlipidemia     Hypertension     Non morbid obesity     Type II or unspecified type diabetes mellitus without mention of complication, not stated as uncontrolled      Past Surgical History:   Procedure Laterality Date    CORONARY ANGIOPLASTY WITH STENT PLACEMENT  1/14/2016    1 [employer] SAMUEL [id number]    VEIN SURGERY       Family History   Problem Relation Age of Onset    Heart Attack Mother     Heart Disease Mother     Heart Attack Father     Cancer Father     Hearing Loss Father     Diabetes Father     Heart Disease Father     Heart Disease Paternal Uncle         bypass     Social History     Socioeconomic History    Marital status:      Spouse name: Not on file    Number of children: Not on file    Years of education: Not on file    Highest education level: Not on file   Occupational History    Not on file   Social Needs    Financial resource strain: Not on file    Food insecurity:     Worry: Not on file     Inability: Not on file    Transportation needs:     Medical: Not on file     Non-medical: Not on file   Tobacco Use    Smoking status: Never Smoker    Smokeless tobacco: Never Used   Substance and Sexual Activity    Alcohol use: Yes     Alcohol/week: 1.0 standard drinks     Types: 1 Shots of liquor per week     Comment: pt states he drinks whiskey daily     Drug use: No    Sexual activity: Yes     Partners: Female   Lifestyle    Physical activity:     Days per week: Not on file     Minutes per session: Not on file    Stress: Not on file   Relationships    Social connections:     Talks on phone: Not on file     Gets together: Not on file     Attends Jehov[employer]'s witness service: Not on file     Active member of club or organization: Not on file     Attends meetings of clubs or organizations: Not on file     Relationship status: Not on file    Intimate partner violence:     Fear of current or ex partner: Not on file     Emotionally abused: Not on file     Physically abused: Not on file     Forced sexual activity: Not on file   Other Topics Concern    Not on file   Social History Narrative    Not on file     No current facility-administered medications for this encounter.       Current Outpatient Medications   Medication Sig Dispense Refill    albuterol sulfate (PROAIR RESPICLICK) 923 (90 Base) MCG/ACT aerosol powder inhalation Inhale 2 puffs into the lungs every 4 hours as needed for Wheezing 1 Inhaler 1    Spacer/Aero-Holding Chambers (E-Z SPACER) REAL 1 Device by Does not apply route daily as needed (wheezing) 1 Device 0    predniSONE (DELTASONE) 10 MG tablet Take 4 tablets by mouth once daily for 5 days 20 tablet 0    metoprolol tartrate (LOPRESSOR) 25 MG tablet TAKE ONE TABLET BY MOUTH TWICE A  tablet 3    atorvastatin (LIPITOR) 40 MG tablet Take 1 qd in the evening. 90 tablet 2    glipiZIDE (GLUCOTROL) 10 MG tablet TAKE ONE TABLET BY MOUTH EVERY MORNING FOR DIABETES 30 tablet 2    metFORMIN (GLUCOPHAGE) 1000 MG tablet TAKE ONE TABLET BY MOUTH DAILY WITH BREAKFAST 90 tablet 2    lisinopril (PRINIVIL;ZESTRIL) 2.5 MG tablet TAKE ONE TABLET BY MOUTH DAILY 90 tablet 3    isosorbide mononitrate (IMDUR) 120 MG extended release tablet TAKE ONE TABLET BY MOUTH DAILY 90 tablet 1    clopidogrel (PLAVIX) 75 MG tablet TAKE ONE TABLET BY MOUTH DAILY 30 tablet 8    aspirin 81 MG chewable tablet CHEW ONE TABLET BY MOUTH DAILY 30 tablet 1    nitroGLYCERIN (NITROSTAT) 0.4 MG SL tablet Place 1 tablet under the tongue every 5 minutes as needed for Chest pain 25 tablet 3     Allergies   Allergen Reactions    Vitamin D Analogs Hives    Vitamin D Analogs Swelling     swelling       REVIEW OF SYSTEMS  10 systems reviewed, pertinent positives and negatives per HPI, otherwise noted to be negative. PHYSICAL EXAM  ED Triage Vitals [03/01/20 0854]   Enc Vitals Group      BP (!) 161/77      Pulse 82      Resp 20      Temp 99.2 °F (37.3 °C)      Temp Source Oral      SpO2 98 %      Weight 255 lb (115.7 kg)      Height 5' 10\" (1.778 m)      Head Circumference       Peak Flow       Pain Score       Pain Loc       Pain Edu? Excl. in [address]? General appearance: Awake and alert. Cooperative. No acute distress. HENT: Normocephalic. Atraumatic. Mucous membranes are moist.  Neck: Supple. Eyes: PERRL. EOMI. Heart/Chest: RRR. No murmurs. Lungs: Respirations unlabored. Scant diffuse wheezing. No increased WOB, good air exchange. Speaking comfortably in full sentences. Abdomen: Soft. Non-tender. Non-distended. No rebound or guarding. Musculoskeletal: No extremity edema. No deformity. No tenderness in the extremities.   All extremities neurovascularly intact. Skin: Warm and dry. No acute rashes. Neurological: Alert and oriented. CN II-XII intact. Strength 5/5 bilateral upper and lower extremities. Sensation intact to light touch. Gait normal.  Psychiatric: Mood/affect: normal      LABS  I have reviewed all labs for this visit.    Results for orders placed or performed during the hospital encounter of 03/01/20   Brain Natriuretic Peptide   Result Value Ref Range    Pro- 0 - 124 pg/mL   CBC Auto Differential   Result Value Ref Range    WBC 6.3 4.0 - 11.0 K/uL    RBC 4.88 4.20 - 5.90 M/uL    Hemoglobin 14.6 13.5 - 17.5 g/dL    Hematocrit 44.6 40.5 - 52.5 %    MCV 91.4 80.0 - 100.0 fL    MCH 29.9 26.0 - 34.0 pg    MCHC 32.7 31.0 - 36.0 g/dL    RDW 13.7 12.4 - 15.4 %    Platelets 919 236 - 217 K/uL    MPV 8.2 5.0 - 10.5 fL    Neutrophils % 68.8 %    Lymphocytes % 18.9 %    Monocytes % 6.7 %    Eosinophils % 4.6 %    Basophils % 1.0 %    Neutrophils Absolute 4.3 1.7 - 7.7 K/uL    Lymphocytes Absolute 1.2 1.0 - 5.1 K/uL    Monocytes Absolute 0.4 0.0 - 1.3 K/uL    Eosinophils Absolute 0.3 0.0 - 0.6 K/uL    Basophils Absolute 0.1 0.0 - 0.2 K/uL   Comprehensive Metabolic Panel w/ Reflex to MG   Result Value Ref Range    Sodium 129 (L) 136 - 145 mmol/L    Potassium reflex Magnesium 4.3 3.5 - 5.1 mmol/L    Chloride 93 (L) 99 - 110 mmol/L    CO2 25 21 - 32 mmol/L    Anion Gap 11 3 - 16    Glucose 388 (H) 70 - 99 mg/dL    BUN 9 7 - 20 mg/dL    CREATININE 0.6 (L) 0.9 - 1.3 mg/dL    GFR Non-African American >60 >60    GFR African American >60 >60    Calcium 8.9 8.3 - 10.6 mg/dL    Total Protein 6.5 6.4 - 8.2 g/dL    Alb 4.2 3.4 - 5.0 g/dL    Albumin/Globulin Ratio 1.8 1.1 - 2.2    Total Bilirubin 0.7 0.0 - 1.0 mg/dL    Alkaline Phosphatase 80 40 - 129 U/L    ALT 20 10 - 40 U/L    AST 16 15 - 37 U/L    Globulin 2.3 g/dL   EKG 12 Lead   Result Value Ref Range    Ventricular Rate 78 BPM    Atrial Rate 78 BPM    P-R Interval 164 ms    QRS Duration 94 ms

## 2020-03-02 ENCOUNTER — OFFICE VISIT (OUTPATIENT)
Dept: FAMILY MEDICINE CLINIC | Age: 60
End: 2020-03-02
Payer: COMMERCIAL

## 2020-03-02 ENCOUNTER — TELEPHONE (OUTPATIENT)
Dept: FAMILY MEDICINE CLINIC | Age: 60
End: 2020-03-02

## 2020-03-02 VITALS
WEIGHT: 258 LBS | OXYGEN SATURATION: 98 % | DIASTOLIC BLOOD PRESSURE: 64 MMHG | TEMPERATURE: 97.7 F | SYSTOLIC BLOOD PRESSURE: 122 MMHG | BODY MASS INDEX: 36.94 KG/M2 | HEART RATE: 82 BPM | HEIGHT: 70 IN

## 2020-03-02 PROCEDURE — 99214 OFFICE O/P EST MOD 30 MIN: CPT | Performed by: FAMILY MEDICINE

## 2020-03-02 ASSESSMENT — ENCOUNTER SYMPTOMS
COUGH: 1
WHEEZING: 1
ABDOMINAL PAIN: 0
CHOKING: 0
CHEST TIGHTNESS: 0
STRIDOR: 0
SHORTNESS OF BREATH: 1
BACK PAIN: 0

## 2020-03-02 NOTE — PROGRESS NOTES
Subjective:      Patient ID: Rose Sky is a 61 y.o. male. ARMINDA Berry is here for follow-up on an exacerbation of reactive airway disease. He was seen at the [facility] emergency room yesterday and he is improved. He remains overweight. His blood pressure is well controlled. During a coughing episode last night he experienced some chest pain and took nitroglycerin x2 with good relief. Review of Systems   Constitutional: Negative for activity change, appetite change, chills, diaphoresis, fatigue, fever and unexpected weight change. Respiratory: Positive for cough, shortness of breath and wheezing. Negative for choking, chest tightness and stridor. Cardiovascular: Negative for chest pain, palpitations and leg swelling. Gastrointestinal: Negative for abdominal pain. Genitourinary: Negative for difficulty urinating. Musculoskeletal: Negative for arthralgias and back pain. Neurological: Negative for dizziness. All other systems reviewed and are negative. Objective:   Physical Exam  Vitals signs and nursing note reviewed. Constitutional:       Appearance: He is well-developed. HENT:      Head: Normocephalic and atraumatic. Right Ear: External ear normal.      Left Ear: External ear normal.      Nose: Nose normal.   Eyes:      Conjunctiva/sclera: Conjunctivae normal.      Pupils: Pupils are equal, round, and reactive to light. Neck:      Musculoskeletal: Normal range of motion and neck supple. Thyroid: No thyromegaly. Vascular: No JVD. Trachea: No tracheal deviation. Cardiovascular:      Rate and Rhythm: Normal rate and regular rhythm. Heart sounds: Normal heart sounds. No murmur. No friction rub. No gallop. Pulmonary:      Effort: Pulmonary effort is normal. No respiratory distress. Breath sounds: Normal breath sounds. No stridor. No wheezing or rales. Chest:      Chest wall: No tenderness.    Abdominal:      General: Bowel sounds are normal. There is no distension. Palpations: Abdomen is soft. There is no mass. Tenderness: There is no abdominal tenderness. There is no guarding or rebound. Musculoskeletal: Normal range of motion. General: No tenderness. Lymphadenopathy:      Cervical: No cervical adenopathy. Skin:     General: Skin is warm and dry. Coloration: Skin is not pale. Findings: No erythema or rash. Neurological:      Mental Status: He is alert and oriented to person, place, and time. Cranial Nerves: No cranial nerve deficit. Motor: No abnormal muscle tone. Coordination: Coordination normal.      Deep Tendon Reflexes: Reflexes are normal and symmetric. Reflexes normal.         Assessment/Plan      1. Bronchitis-improving, continue current therapy    - albuterol sulfate (PROAIR RESPICLICK) 582 (90 Base) MCG/ACT aerosol powder inhalation; Inhale 2 puffs into the lungs every 4 hours as needed for Wheezing  Dispense: 1 Inhaler; Refill: 1    2. Moderate persistent reactive airway disease with acute exacerbation-improving, continue current therapy  - albuterol sulfate (PROAIR RESPICLICK) 848 (90 Base) MCG/ACT aerosol powder inhalation; Inhale 2 puffs into the lungs every 4 hours as needed for Wheezing  Dispense: 1 Inhaler; Refill: 1    3. Overweight-increase exercise and decrease calories      4. Essential hypertension-well-controlled, continue current therapy      5.  History of angina-treated with nitroglycerin x2 yesterday evening successfully during a severe coughing episode        [employer], DO

## 2020-03-03 ENCOUNTER — HOSPITAL ENCOUNTER (INPATIENT)
Age: 60
LOS: 3 days | Discharge: HOME | DRG: 203 | End: 2020-03-06
Attending: EMERGENCY MEDICINE | Admitting: INTERNAL MEDICINE
Payer: COMMERCIAL

## 2020-03-03 ENCOUNTER — TELEPHONE (OUTPATIENT)
Dept: FAMILY MEDICINE CLINIC | Age: 60
End: 2020-03-03

## 2020-03-03 ENCOUNTER — APPOINTMENT (OUTPATIENT)
Dept: GENERAL RADIOLOGY | Age: 60
DRG: 203 | End: 2020-03-03
Payer: COMMERCIAL

## 2020-03-03 PROBLEM — J45.41: Status: ACTIVE | Noted: 2020-03-03

## 2020-03-03 LAB
A/G RATIO: 1.4 (ref 1.1–2.2)
ALBUMIN SERPL-MCNC: 4.3 G/DL (ref 3.4–5)
ALP BLD-CCNC: 73 U/L (ref 40–129)
ALT SERPL-CCNC: 21 U/L (ref 10–40)
ANION GAP SERPL CALCULATED.3IONS-SCNC: 16 MMOL/L (ref 3–16)
AST SERPL-CCNC: 23 U/L (ref 15–37)
BASOPHILS ABSOLUTE: 0.1 K/UL (ref 0–0.2)
BASOPHILS RELATIVE PERCENT: 0.8 %
BILIRUB SERPL-MCNC: 0.4 MG/DL (ref 0–1)
BUN BLDV-MCNC: 16 MG/DL (ref 7–20)
CALCIUM SERPL-MCNC: 9.1 MG/DL (ref 8.3–10.6)
CHLORIDE BLD-SCNC: 96 MMOL/L (ref 99–110)
CO2: 21 MMOL/L (ref 21–32)
CREAT SERPL-MCNC: 0.7 MG/DL (ref 0.9–1.3)
EKG ATRIAL RATE: 89 BPM
EKG DIAGNOSIS: NORMAL
EKG P AXIS: 69 DEGREES
EKG P-R INTERVAL: 154 MS
EKG Q-T INTERVAL: 362 MS
EKG QRS DURATION: 82 MS
EKG QTC CALCULATION (BAZETT): 440 MS
EKG R AXIS: 22 DEGREES
EKG T AXIS: 39 DEGREES
EKG VENTRICULAR RATE: 89 BPM
EOSINOPHILS ABSOLUTE: 0.1 K/UL (ref 0–0.6)
EOSINOPHILS RELATIVE PERCENT: 1.5 %
ETHANOL: NORMAL MG/DL (ref 0–0.08)
GFR AFRICAN AMERICAN: >60
GFR NON-AFRICAN AMERICAN: >60
GLOBULIN: 3 G/DL
GLUCOSE BLD-MCNC: 361 MG/DL (ref 70–99)
HCT VFR BLD CALC: 43.6 % (ref 40.5–52.5)
HEMOGLOBIN: 14.6 G/DL (ref 13.5–17.5)
LYMPHOCYTES ABSOLUTE: 0.9 K/UL (ref 1–5.1)
LYMPHOCYTES RELATIVE PERCENT: 9.4 %
MCH RBC QN AUTO: 31.2 PG (ref 26–34)
MCHC RBC AUTO-ENTMCNC: 33.5 G/DL (ref 31–36)
MCV RBC AUTO: 93.3 FL (ref 80–100)
MONOCYTES ABSOLUTE: 0.4 K/UL (ref 0–1.3)
MONOCYTES RELATIVE PERCENT: 4 %
NEUTROPHILS ABSOLUTE: 8.3 K/UL (ref 1.7–7.7)
NEUTROPHILS RELATIVE PERCENT: 84.3 %
PDW BLD-RTO: 13.6 % (ref 12.4–15.4)
PLATELET # BLD: 222 K/UL (ref 135–450)
PMV BLD AUTO: 8.3 FL (ref 5–10.5)
POTASSIUM REFLEX MAGNESIUM: 4.7 MMOL/L (ref 3.5–5.1)
PRO-BNP: 178 PG/ML (ref 0–124)
PROCALCITONIN: 0.06 NG/ML (ref 0–0.15)
RBC # BLD: 4.68 M/UL (ref 4.2–5.9)
SODIUM BLD-SCNC: 133 MMOL/L (ref 136–145)
TOTAL PROTEIN: 7.3 G/DL (ref 6.4–8.2)
TROPONIN: <0.01 NG/ML
WBC # BLD: 9.8 K/UL (ref 4–11)

## 2020-03-03 PROCEDURE — 6370000000 HC RX 637 (ALT 250 FOR IP): Performed by: EMERGENCY MEDICINE

## 2020-03-03 PROCEDURE — 87804 INFLUENZA ASSAY W/OPTIC: CPT

## 2020-03-03 PROCEDURE — 93010 ELECTROCARDIOGRAM REPORT: CPT | Performed by: INTERNAL MEDICINE

## 2020-03-03 PROCEDURE — 6370000000 HC RX 637 (ALT 250 FOR IP)

## 2020-03-03 PROCEDURE — 96374 THER/PROPH/DIAG INJ IV PUSH: CPT

## 2020-03-03 PROCEDURE — 36415 COLL VENOUS BLD VENIPUNCTURE: CPT

## 2020-03-03 PROCEDURE — 85025 COMPLETE CBC W/AUTO DIFF WBC: CPT

## 2020-03-03 PROCEDURE — 6360000002 HC RX W HCPCS: Performed by: EMERGENCY MEDICINE

## 2020-03-03 PROCEDURE — 83036 HEMOGLOBIN GLYCOSYLATED A1C: CPT

## 2020-03-03 PROCEDURE — 83880 ASSAY OF NATRIURETIC PEPTIDE: CPT

## 2020-03-03 PROCEDURE — 99285 EMERGENCY DEPT VISIT HI MDM: CPT

## 2020-03-03 PROCEDURE — G0480 DRUG TEST DEF 1-7 CLASSES: HCPCS

## 2020-03-03 PROCEDURE — 93005 ELECTROCARDIOGRAM TRACING: CPT | Performed by: EMERGENCY MEDICINE

## 2020-03-03 PROCEDURE — 84484 ASSAY OF TROPONIN QUANT: CPT

## 2020-03-03 PROCEDURE — 2060000000 HC ICU INTERMEDIATE R&B

## 2020-03-03 PROCEDURE — 71046 X-RAY EXAM CHEST 2 VIEWS: CPT

## 2020-03-03 PROCEDURE — 84145 PROCALCITONIN (PCT): CPT

## 2020-03-03 PROCEDURE — 83605 ASSAY OF LACTIC ACID: CPT

## 2020-03-03 PROCEDURE — 80053 COMPREHEN METABOLIC PANEL: CPT

## 2020-03-03 RX ORDER — IPRATROPIUM BROMIDE AND ALBUTEROL SULFATE 2.5; .5 MG/3ML; MG/3ML
1 SOLUTION RESPIRATORY (INHALATION) ONCE
Status: COMPLETED | OUTPATIENT
Start: 2020-03-03 | End: 2020-03-03

## 2020-03-03 RX ORDER — METHYLPREDNISOLONE SODIUM SUCCINATE 125 MG/2ML
125 INJECTION, POWDER, LYOPHILIZED, FOR SOLUTION INTRAMUSCULAR; INTRAVENOUS DAILY
Status: DISCONTINUED | OUTPATIENT
Start: 2020-03-03 | End: 2020-03-03

## 2020-03-03 RX ORDER — IPRATROPIUM BROMIDE AND ALBUTEROL SULFATE 2.5; .5 MG/3ML; MG/3ML
SOLUTION RESPIRATORY (INHALATION)
Status: COMPLETED
Start: 2020-03-03 | End: 2020-03-03

## 2020-03-03 RX ORDER — METHYLPREDNISOLONE SODIUM SUCCINATE 125 MG/2ML
125 INJECTION, POWDER, LYOPHILIZED, FOR SOLUTION INTRAMUSCULAR; INTRAVENOUS ONCE
Status: DISCONTINUED | OUTPATIENT
Start: 2020-03-04 | End: 2020-03-06 | Stop reason: HOSPADM

## 2020-03-03 RX ADMIN — IPRATROPIUM BROMIDE AND ALBUTEROL SULFATE 1 AMPULE: 2.5; .5 SOLUTION RESPIRATORY (INHALATION) at 17:48

## 2020-03-03 RX ADMIN — IPRATROPIUM BROMIDE AND ALBUTEROL SULFATE 1 AMPULE: .5; 3 SOLUTION RESPIRATORY (INHALATION) at 21:06

## 2020-03-03 RX ADMIN — METHYLPREDNISOLONE SODIUM SUCCINATE 125 MG: 125 INJECTION, POWDER, FOR SOLUTION INTRAMUSCULAR; INTRAVENOUS at 21:06

## 2020-03-03 RX ADMIN — IPRATROPIUM BROMIDE AND ALBUTEROL SULFATE 1 AMPULE: .5; 3 SOLUTION RESPIRATORY (INHALATION) at 17:48

## 2020-03-03 NOTE — TELEPHONE ENCOUNTER
Patient called and was seen yesterday. He stated that he was told to call in today. I told him that I would pass along the message that he called. He stated that he would like a call back at [phone].

## 2020-03-03 NOTE — TELEPHONE ENCOUNTER
Loraine Up is still Short of breath, using inhaler a little more often last night, he states he cant breathe, he feels he has fluid in his upper chest.  No fever. Not really feeling much better today.

## 2020-03-04 LAB
ANION GAP SERPL CALCULATED.3IONS-SCNC: 18 MMOL/L (ref 3–16)
BASOPHILS ABSOLUTE: 0 K/UL (ref 0–0.2)
BASOPHILS RELATIVE PERCENT: 0.2 %
BUN BLDV-MCNC: 17 MG/DL (ref 7–20)
CALCIUM SERPL-MCNC: 9 MG/DL (ref 8.3–10.6)
CHLORIDE BLD-SCNC: 97 MMOL/L (ref 99–110)
CO2: 19 MMOL/L (ref 21–32)
CREAT SERPL-MCNC: 0.7 MG/DL (ref 0.9–1.3)
EOSINOPHILS ABSOLUTE: 0 K/UL (ref 0–0.6)
EOSINOPHILS RELATIVE PERCENT: 0.2 %
ESTIMATED AVERAGE GLUCOSE: 211.6 MG/DL
GFR AFRICAN AMERICAN: >60
GFR NON-AFRICAN AMERICAN: >60
GLUCOSE BLD-MCNC: 304 MG/DL (ref 70–99)
GLUCOSE BLD-MCNC: 305 MG/DL (ref 70–99)
GLUCOSE BLD-MCNC: 353 MG/DL (ref 70–99)
GLUCOSE BLD-MCNC: 356 MG/DL (ref 70–99)
GLUCOSE BLD-MCNC: 371 MG/DL (ref 70–99)
GLUCOSE BLD-MCNC: 395 MG/DL (ref 70–99)
HBA1C MFR BLD: 9 %
HCT VFR BLD CALC: 42.6 % (ref 40.5–52.5)
HEMOGLOBIN: 14.3 G/DL (ref 13.5–17.5)
LACTIC ACID: 1.4 MMOL/L (ref 0.4–2)
LV EF: 55 %
LVEF MODALITY: NORMAL
LYMPHOCYTES ABSOLUTE: 0.5 K/UL (ref 1–5.1)
LYMPHOCYTES RELATIVE PERCENT: 6.2 %
MCH RBC QN AUTO: 31.6 PG (ref 26–34)
MCHC RBC AUTO-ENTMCNC: 33.5 G/DL (ref 31–36)
MCV RBC AUTO: 94.4 FL (ref 80–100)
MONOCYTES ABSOLUTE: 0.1 K/UL (ref 0–1.3)
MONOCYTES RELATIVE PERCENT: 1.7 %
NEUTROPHILS ABSOLUTE: 7.5 K/UL (ref 1.7–7.7)
NEUTROPHILS RELATIVE PERCENT: 91.7 %
PDW BLD-RTO: 13.6 % (ref 12.4–15.4)
PERFORMED ON: ABNORMAL
PLATELET # BLD: 202 K/UL (ref 135–450)
PMV BLD AUTO: 8.4 FL (ref 5–10.5)
POTASSIUM REFLEX MAGNESIUM: 4.5 MMOL/L (ref 3.5–5.1)
RAPID INFLUENZA  B AGN: NEGATIVE
RAPID INFLUENZA A AGN: NEGATIVE
RBC # BLD: 4.51 M/UL (ref 4.2–5.9)
SODIUM BLD-SCNC: 134 MMOL/L (ref 136–145)
WBC # BLD: 8.2 K/UL (ref 4–11)

## 2020-03-04 PROCEDURE — 2500000003 HC RX 250 WO HCPCS: Performed by: INTERNAL MEDICINE

## 2020-03-04 PROCEDURE — 2060000000 HC ICU INTERMEDIATE R&B

## 2020-03-04 PROCEDURE — 80048 BASIC METABOLIC PNL TOTAL CA: CPT

## 2020-03-04 PROCEDURE — 6370000000 HC RX 637 (ALT 250 FOR IP): Performed by: INTERNAL MEDICINE

## 2020-03-04 PROCEDURE — 6360000002 HC RX W HCPCS: Performed by: INTERNAL MEDICINE

## 2020-03-04 PROCEDURE — 94150 VITAL CAPACITY TEST: CPT

## 2020-03-04 PROCEDURE — 2580000003 HC RX 258: Performed by: INTERNAL MEDICINE

## 2020-03-04 PROCEDURE — 94761 N-INVAS EAR/PLS OXIMETRY MLT: CPT

## 2020-03-04 PROCEDURE — 2580000003 HC RX 258

## 2020-03-04 PROCEDURE — 99254 IP/OBS CNSLTJ NEW/EST MOD 60: CPT | Performed by: INTERNAL MEDICINE

## 2020-03-04 PROCEDURE — 6360000004 HC RX CONTRAST MEDICATION: Performed by: INTERNAL MEDICINE

## 2020-03-04 PROCEDURE — 85025 COMPLETE CBC W/AUTO DIFF WBC: CPT

## 2020-03-04 PROCEDURE — 36415 COLL VENOUS BLD VENIPUNCTURE: CPT

## 2020-03-04 PROCEDURE — C8929 TTE W OR WO FOL WCON,DOPPLER: HCPCS

## 2020-03-04 PROCEDURE — 94640 AIRWAY INHALATION TREATMENT: CPT

## 2020-03-04 RX ORDER — SODIUM CHLORIDE 0.9 % (FLUSH) 0.9 %
10 SYRINGE (ML) INJECTION PRN
Status: DISCONTINUED | OUTPATIENT
Start: 2020-03-04 | End: 2020-03-06 | Stop reason: HOSPADM

## 2020-03-04 RX ORDER — PREDNISONE 20 MG/1
40 TABLET ORAL DAILY
Status: DISCONTINUED | OUTPATIENT
Start: 2020-03-08 | End: 2020-03-06 | Stop reason: HOSPADM

## 2020-03-04 RX ORDER — LISINOPRIL 5 MG/1
2.5 TABLET ORAL DAILY
Status: DISCONTINUED | OUTPATIENT
Start: 2020-03-04 | End: 2020-03-06 | Stop reason: HOSPADM

## 2020-03-04 RX ORDER — ISOSORBIDE MONONITRATE 60 MG/1
120 TABLET, EXTENDED RELEASE ORAL DAILY
Status: DISCONTINUED | OUTPATIENT
Start: 2020-03-04 | End: 2020-03-06 | Stop reason: HOSPADM

## 2020-03-04 RX ORDER — METHYLPREDNISOLONE SODIUM SUCCINATE 40 MG/ML
40 INJECTION, POWDER, LYOPHILIZED, FOR SOLUTION INTRAMUSCULAR; INTRAVENOUS EVERY 12 HOURS
Status: DISCONTINUED | OUTPATIENT
Start: 2020-03-04 | End: 2020-03-06 | Stop reason: HOSPADM

## 2020-03-04 RX ORDER — PROMETHAZINE HYDROCHLORIDE 25 MG/1
12.5 TABLET ORAL EVERY 6 HOURS PRN
Status: DISCONTINUED | OUTPATIENT
Start: 2020-03-04 | End: 2020-03-06 | Stop reason: HOSPADM

## 2020-03-04 RX ORDER — INSULIN GLARGINE 100 [IU]/ML
18 INJECTION, SOLUTION SUBCUTANEOUS NIGHTLY
Status: DISCONTINUED | OUTPATIENT
Start: 2020-03-04 | End: 2020-03-06 | Stop reason: HOSPADM

## 2020-03-04 RX ORDER — SODIUM CHLORIDE 0.9 % (FLUSH) 0.9 %
10 SYRINGE (ML) INJECTION EVERY 12 HOURS SCHEDULED
Status: DISCONTINUED | OUTPATIENT
Start: 2020-03-04 | End: 2020-03-06 | Stop reason: HOSPADM

## 2020-03-04 RX ORDER — IPRATROPIUM BROMIDE AND ALBUTEROL SULFATE 2.5; .5 MG/3ML; MG/3ML
1 SOLUTION RESPIRATORY (INHALATION)
Status: DISCONTINUED | OUTPATIENT
Start: 2020-03-04 | End: 2020-03-04

## 2020-03-04 RX ORDER — ATORVASTATIN CALCIUM 40 MG/1
40 TABLET, FILM COATED ORAL NIGHTLY
Status: DISCONTINUED | OUTPATIENT
Start: 2020-03-04 | End: 2020-03-06 | Stop reason: HOSPADM

## 2020-03-04 RX ORDER — SODIUM CHLORIDE 9 MG/ML
INJECTION, SOLUTION INTRAVENOUS
Status: COMPLETED
Start: 2020-03-04 | End: 2020-03-04

## 2020-03-04 RX ORDER — IPRATROPIUM BROMIDE AND ALBUTEROL SULFATE 2.5; .5 MG/3ML; MG/3ML
1 SOLUTION RESPIRATORY (INHALATION)
Status: DISCONTINUED | OUTPATIENT
Start: 2020-03-04 | End: 2020-03-06 | Stop reason: HOSPADM

## 2020-03-04 RX ORDER — NICOTINE POLACRILEX 4 MG
15 LOZENGE BUCCAL PRN
Status: DISCONTINUED | OUTPATIENT
Start: 2020-03-04 | End: 2020-03-06 | Stop reason: HOSPADM

## 2020-03-04 RX ORDER — ACETAMINOPHEN 650 MG/1
650 SUPPOSITORY RECTAL EVERY 6 HOURS PRN
Status: DISCONTINUED | OUTPATIENT
Start: 2020-03-04 | End: 2020-03-06 | Stop reason: HOSPADM

## 2020-03-04 RX ORDER — ACETAMINOPHEN 325 MG/1
650 TABLET ORAL EVERY 6 HOURS PRN
Status: DISCONTINUED | OUTPATIENT
Start: 2020-03-04 | End: 2020-03-06 | Stop reason: HOSPADM

## 2020-03-04 RX ORDER — DEXTROSE MONOHYDRATE 50 MG/ML
100 INJECTION, SOLUTION INTRAVENOUS PRN
Status: DISCONTINUED | OUTPATIENT
Start: 2020-03-04 | End: 2020-03-06 | Stop reason: HOSPADM

## 2020-03-04 RX ORDER — INSULIN GLARGINE 100 [IU]/ML
10 INJECTION, SOLUTION SUBCUTANEOUS NIGHTLY
Status: DISCONTINUED | OUTPATIENT
Start: 2020-03-04 | End: 2020-03-04

## 2020-03-04 RX ORDER — ONDANSETRON 2 MG/ML
4 INJECTION INTRAMUSCULAR; INTRAVENOUS EVERY 6 HOURS PRN
Status: DISCONTINUED | OUTPATIENT
Start: 2020-03-04 | End: 2020-03-06 | Stop reason: HOSPADM

## 2020-03-04 RX ORDER — ASPIRIN 81 MG/1
81 TABLET, CHEWABLE ORAL DAILY
Status: DISCONTINUED | OUTPATIENT
Start: 2020-03-04 | End: 2020-03-06 | Stop reason: HOSPADM

## 2020-03-04 RX ORDER — NITROGLYCERIN 0.4 MG/1
0.4 TABLET SUBLINGUAL EVERY 5 MIN PRN
Status: DISCONTINUED | OUTPATIENT
Start: 2020-03-04 | End: 2020-03-06 | Stop reason: HOSPADM

## 2020-03-04 RX ORDER — ALBUTEROL SULFATE 2.5 MG/3ML
2.5 SOLUTION RESPIRATORY (INHALATION)
Status: DISCONTINUED | OUTPATIENT
Start: 2020-03-04 | End: 2020-03-06 | Stop reason: HOSPADM

## 2020-03-04 RX ORDER — DEXTROSE MONOHYDRATE 25 G/50ML
12.5 INJECTION, SOLUTION INTRAVENOUS PRN
Status: DISCONTINUED | OUTPATIENT
Start: 2020-03-04 | End: 2020-03-06 | Stop reason: HOSPADM

## 2020-03-04 RX ORDER — CLOPIDOGREL BISULFATE 75 MG/1
75 TABLET ORAL DAILY
Status: DISCONTINUED | OUTPATIENT
Start: 2020-03-04 | End: 2020-03-06 | Stop reason: HOSPADM

## 2020-03-04 RX ADMIN — METOPROLOL TARTRATE 25 MG: 25 TABLET, FILM COATED ORAL at 08:14

## 2020-03-04 RX ADMIN — CLOPIDOGREL BISULFATE 75 MG: 75 TABLET ORAL at 08:14

## 2020-03-04 RX ADMIN — IPRATROPIUM BROMIDE AND ALBUTEROL SULFATE 1 AMPULE: .5; 3 SOLUTION RESPIRATORY (INHALATION) at 14:58

## 2020-03-04 RX ADMIN — ENOXAPARIN SODIUM 40 MG: 40 INJECTION SUBCUTANEOUS at 08:14

## 2020-03-04 RX ADMIN — Medication 10 ML: at 08:14

## 2020-03-04 RX ADMIN — METOPROLOL TARTRATE 25 MG: 25 TABLET, FILM COATED ORAL at 21:02

## 2020-03-04 RX ADMIN — INSULIN GLARGINE 10 UNITS: 100 INJECTION, SOLUTION SUBCUTANEOUS at 02:41

## 2020-03-04 RX ADMIN — ATORVASTATIN CALCIUM 40 MG: 40 TABLET, FILM COATED ORAL at 21:02

## 2020-03-04 RX ADMIN — IPRATROPIUM BROMIDE AND ALBUTEROL SULFATE 1 AMPULE: .5; 3 SOLUTION RESPIRATORY (INHALATION) at 18:56

## 2020-03-04 RX ADMIN — DOXYCYCLINE 100 MG: 100 INJECTION, POWDER, LYOPHILIZED, FOR SOLUTION INTRAVENOUS at 07:45

## 2020-03-04 RX ADMIN — LISINOPRIL 2.5 MG: 5 TABLET ORAL at 08:13

## 2020-03-04 RX ADMIN — PERFLUTREN 2.2 MG: 6.52 INJECTION, SUSPENSION INTRAVENOUS at 14:37

## 2020-03-04 RX ADMIN — METHYLPREDNISOLONE SODIUM SUCCINATE 40 MG: 40 INJECTION, POWDER, FOR SOLUTION INTRAMUSCULAR; INTRAVENOUS at 08:14

## 2020-03-04 RX ADMIN — ATORVASTATIN CALCIUM 40 MG: 40 TABLET, FILM COATED ORAL at 01:58

## 2020-03-04 RX ADMIN — ISOSORBIDE MONONITRATE 120 MG: 60 TABLET ORAL at 08:14

## 2020-03-04 RX ADMIN — INSULIN LISPRO 5 UNITS: 100 INJECTION, SOLUTION INTRAVENOUS; SUBCUTANEOUS at 21:30

## 2020-03-04 RX ADMIN — IPRATROPIUM BROMIDE AND ALBUTEROL SULFATE 1 AMPULE: .5; 3 SOLUTION RESPIRATORY (INHALATION) at 22:47

## 2020-03-04 RX ADMIN — INSULIN LISPRO 5 UNITS: 100 INJECTION, SOLUTION INTRAVENOUS; SUBCUTANEOUS at 02:40

## 2020-03-04 RX ADMIN — METHYLPREDNISOLONE SODIUM SUCCINATE 40 MG: 40 INJECTION, POWDER, FOR SOLUTION INTRAMUSCULAR; INTRAVENOUS at 21:03

## 2020-03-04 RX ADMIN — INSULIN GLARGINE 18 UNITS: 100 INJECTION, SOLUTION SUBCUTANEOUS at 22:10

## 2020-03-04 RX ADMIN — ASPIRIN 81 MG 81 MG: 81 TABLET ORAL at 08:14

## 2020-03-04 RX ADMIN — METOPROLOL TARTRATE 25 MG: 25 TABLET, FILM COATED ORAL at 01:58

## 2020-03-04 RX ADMIN — IPRATROPIUM BROMIDE AND ALBUTEROL SULFATE 1 AMPULE: .5; 3 SOLUTION RESPIRATORY (INHALATION) at 01:30

## 2020-03-04 RX ADMIN — DOXYCYCLINE 100 MG: 100 INJECTION, POWDER, LYOPHILIZED, FOR SOLUTION INTRAVENOUS at 21:02

## 2020-03-04 RX ADMIN — IPRATROPIUM BROMIDE AND ALBUTEROL SULFATE 1 AMPULE: .5; 3 SOLUTION RESPIRATORY (INHALATION) at 10:30

## 2020-03-04 RX ADMIN — OLODATEROL RESPIMAT INHALATION SPRAY 2 PUFF: 2.5 SPRAY, METERED RESPIRATORY (INHALATION) at 15:01

## 2020-03-04 RX ADMIN — INSULIN LISPRO 10 UNITS: 100 INJECTION, SOLUTION INTRAVENOUS; SUBCUTANEOUS at 12:03

## 2020-03-04 RX ADMIN — INSULIN LISPRO 8 UNITS: 100 INJECTION, SOLUTION INTRAVENOUS; SUBCUTANEOUS at 08:15

## 2020-03-04 RX ADMIN — Medication 10 ML: at 21:02

## 2020-03-04 RX ADMIN — IPRATROPIUM BROMIDE AND ALBUTEROL SULFATE 1 AMPULE: .5; 3 SOLUTION RESPIRATORY (INHALATION) at 06:38

## 2020-03-04 RX ADMIN — SODIUM CHLORIDE 250 ML: 9 INJECTION, SOLUTION INTRAVENOUS at 21:02

## 2020-03-04 RX ADMIN — INSULIN LISPRO 8 UNITS: 100 INJECTION, SOLUTION INTRAVENOUS; SUBCUTANEOUS at 17:23

## 2020-03-04 NOTE — PROGRESS NOTES
Hospitalist Progress Note      PCP: Elliot Pappas Courtney,     Date of Admission: 3/3/2020    Subjective: feels like his SOB is improved today but is still wheezing    Medications:  Reviewed    Infusion Medications    dextrose       Scheduled Medications    aspirin  81 mg Oral Daily    atorvastatin  40 mg Oral Nightly    clopidogrel  75 mg Oral Daily    isosorbide mononitrate  120 mg Oral Daily    lisinopril  2.5 mg Oral Daily    metoprolol tartrate  25 mg Oral BID    sodium chloride flush  10 mL Intravenous 2 times per day    enoxaparin  40 mg Subcutaneous Daily    insulin lispro  0-12 Units Subcutaneous TID WC    insulin lispro  0-6 Units Subcutaneous Nightly    methylPREDNISolone  40 mg Intravenous Q12H    Followed by   Lam Molina ON 3/6/2020] predniSONE  40 mg Oral Daily    ipratropium-albuterol  1 ampule Inhalation Q4H While awake    doxycycline (VIBRAMYCIN) IV  100 mg Intravenous Q12H    insulin glargine  18 Units Subcutaneous Nightly    olodaterol  2 puff Inhalation Daily    methylPREDNISolone  125 mg Intravenous Once     PRN Meds: nitroGLYCERIN, sodium chloride flush, acetaminophen **OR** acetaminophen, magnesium hydroxide, promethazine **OR** ondansetron, albuterol, glucose, dextrose, glucagon (rDNA), dextrose      Intake/Output Summary (Last 24 hours) at 3/4/2020 1801  Last data filed at 3/4/2020 1245  Gross per 24 hour   Intake 475 ml   Output 1400 ml   Net -925 ml       Physical Exam Performed:    /63   Pulse 91   Temp 97.5 °F (36.4 °C) (Oral)   Resp 18   Ht 5' 10\" (1.778 m)   Wt 252 lb (114.3 kg)   SpO2 96%   BMI 36.16 kg/m²     GEN:        A&Ox3, NAD. HEENT:   NC/AT,EOMI, MMM, no erythema/exudates or visible masses. CVS:        Normal S1,S2. Mild tachy RRR. Without M/G/R.   LUNG:     b/l exp wheezes +    ABD:        Soft, ND/NT, BS+ x4. Without G/R.  EXT:        2+ pulses, no c/c/e. Brisk cap refill. PSY:        Thought process intact, affect appropriate.   TANA: CN III-XII intact, moves all 4 spontaneously, sensory grossly intact. SKIN:       No rash or lesions on visible skin    Labs:   Recent Labs     03/03/20 1750 03/04/20  0412   WBC 9.8 8.2   HGB 14.6 14.3   HCT 43.6 42.6    202     Recent Labs     03/03/20  1750 03/04/20  0412   * 134*   K 4.7 4.5   CL 96* 97*   CO2 21 19*   BUN 16 17   CREATININE 0.7* 0.7*   CALCIUM 9.1 9.0     Recent Labs     03/03/20  1750   AST 23   ALT 21   BILITOT 0.4   ALKPHOS 73     No results for input(s): INR in the last 72 hours. Recent Labs     03/03/20 1750   TROPONINI <0.01       Urinalysis:    No results found for: [place], BACTERIA, RBCUA, BLOODU, SPECGRAV, GLUCOSEU    Radiology:  XR CHEST STANDARD (2 VW)   Final Result   No acute process. Assessment/Plan:    Active Hospital Problems    Diagnosis    Reactive airway disease with wheezing, moderate persistent, with acute exacerbation [J45.41]       1. Acute exacerbation of \"moderate persistent reactive airway disease\" (per PCP's note), extensive wheezing and + orthopnea. Failed OP ABX/steroids x2 over last few weeks. Started on IV solumedrol, PRN/MINERVA intensive NEB therapy. pulm consulted. Since pt has hx of CAD and now having orthopnea, will get an echo. BNP only minimally elevated. Hold home regimen for now. echo with EF 55%  2. DM2, poorly controlled, initial , hold oral Rx, chk A1c 9.0, on SSI, increase Lantus. Possibly exacerbated by recent steroids. 3. HTN, cont home regimen. 4. CAD, cont home BB, Plavix, ASA, Imdur.   5. HLD, cont statin    Diet: DIET CARB CONTROL; Carb Control: 4 carb choices (60 gms)/meal  Code Status: Full Code    PT/OT Eval Status: ordered    Dispo - cont care, poss d/c in 1-2 days    Josephine Beaver MD

## 2020-03-04 NOTE — ED PROVIDER NOTES
[facility] PCU TELEMETRY      CHIEF COMPLAINT  Shortness of Breath (shortness of breath x 2 days, was seen 2 days ago and given medications with improvement, worsened again that night and now having wheezing)       HISTORY OF PRESENT ILLNESS  Jade Mathis is a 61 y.o. male with a past medical history of coronary artery disease, hypertension, and diabetes who presents to the ED complaining of shortness of breath and wheezing. The patient states that he has been suffering with this for several weeks. He states this all started when he contracted a viral URI and a party a few weeks ago. He saw his family doctor and was prescribed a Medrol Dosepak, doxycycline, and an inhaler. He states this helped for a little while and then he started to have shortness of breath again this past Saturday night. He was in the emergency department here on Sunday, felt much improved after DuoNeb's, and was discharged home again on prednisone. Patient states he is continued to decline at home. He states that after an hour of using his inhaler he continues to have wheezing and shortness of breath. He also describes orthopnea. He denies any history of CHF or leg swelling. He denies chest pain, states he has a bit with coughing but otherwise denies. He denies fever or chills. States his other symptoms of nasal congestion and body aches have improved. He denies vomiting or diarrhea. He is not a smoker. He denies a history of COPD or asthma. No other complaints, modifying factors or associated symptoms. I have reviewed the following from the nursing documentation.     Past Medical History:   Diagnosis Date    Alcohol abuse 12/3/2012    Coronary artery disease     Hyperlipidemia     Hypertension     Non morbid obesity     Reactive airway disease with wheezing, moderate persistent, with acute exacerbation 3/3/2020    Type II or unspecified type diabetes mellitus without mention of complication, not stated as uncontrolled tenderness. Soft. Non distended. No peritoneal signs. Musculoskeletal: No extremity edema. Compartments soft. No deformity. No calf tenderness in the extremities. All extremities neurovascularly intact. Radial, Dp, and PT pulses +2/4 bilaterally  Neurological: Alert and oriented. No focal deficits. No aphasia or dysarthria. No gait ataxia. Psychiatric: Normal mood and affect. LABS  I have reviewed all labs for this visit.    Results for orders placed or performed during the hospital encounter of 03/03/20   Rapid influenza A/B antigens   Result Value Ref Range    Rapid Influenza A Ag Negative Negative    Rapid Influenza B Ag Negative Negative   CBC auto differential   Result Value Ref Range    WBC 9.8 4.0 - 11.0 K/uL    RBC 4.68 4.20 - 5.90 M/uL    Hemoglobin 14.6 13.5 - 17.5 g/dL    Hematocrit 43.6 40.5 - 52.5 %    MCV 93.3 80.0 - 100.0 fL    MCH 31.2 26.0 - 34.0 pg    MCHC 33.5 31.0 - 36.0 g/dL    RDW 13.6 12.4 - 15.4 %    Platelets 292 963 - 376 K/uL    MPV 8.3 5.0 - 10.5 fL    Neutrophils % 84.3 %    Lymphocytes % 9.4 %    Monocytes % 4.0 %    Eosinophils % 1.5 %    Basophils % 0.8 %    Neutrophils Absolute 8.3 (H) 1.7 - 7.7 K/uL    Lymphocytes Absolute 0.9 (L) 1.0 - 5.1 K/uL    Monocytes Absolute 0.4 0.0 - 1.3 K/uL    Eosinophils Absolute 0.1 0.0 - 0.6 K/uL    Basophils Absolute 0.1 0.0 - 0.2 K/uL   Comprehensive Metabolic Panel w/ Reflex to MG   Result Value Ref Range    Sodium 133 (L) 136 - 145 mmol/L    Potassium reflex Magnesium 4.7 3.5 - 5.1 mmol/L    Chloride 96 (L) 99 - 110 mmol/L    CO2 21 21 - 32 mmol/L    Anion Gap 16 3 - 16    Glucose 361 (H) 70 - 99 mg/dL    BUN 16 7 - 20 mg/dL    CREATININE 0.7 (L) 0.9 - 1.3 mg/dL    GFR Non-African American >60 >60    GFR African American >60 >60    Calcium 9.1 8.3 - 10.6 mg/dL    Total Protein 7.3 6.4 - 8.2 g/dL    Alb 4.3 3.4 - 5.0 g/dL    Albumin/Globulin Ratio 1.4 1.1 - 2.2    Total Bilirubin 0.4 0.0 - 1.0 mg/dL    Alkaline Phosphatase 73 40 - 129 U/L PROVIDED HISTORY: Reason for exam:->sob Reason for Exam: SOB since 1am Acuity: Acute Type of Exam: Initial FINDINGS: Frontal and lateral views of the chest were obtained. There is no acute skeletal abnormality. There is mild degenerative change within the lower thoracic spine. The heart size and mediastinal contours are stable, and within normal limits. A coronary stent is noted. The lungs are clear, without evidence of acute airspace consolidation, pneumothorax, or pleural effusion. No acute cardiopulmonary disease. ED COURSE/MDM  Patient seen and evaluated. Old records reviewed. Labs and imaging reviewed and results discussed with patient. Patient is a 22-year-old male presenting with shortness of breath. On arrival he has normal vital signs, he is afebrile. He was wheezing in triage and RN administered DuoNeb and he had good relief. Patient was brought back to the room. On my evaluation of him, he has audible wheezing at rest.  He has some mild tachypnea but does not appear to be in respiratory distress. He is given an additional DuoNeb and Solu-Medrol. He has been treated outpatient with 2 separate courses of steroids, and he has not had any improvement. It does appear that his symptoms are due to reactive airway disease, although he is never had this in the past.  He also is describing some orthopnea and has a bit of an elevated BNP and at this could be related to CHF since he has not had any improvement in his symptoms with treatments for reactive airway disease. However he is responding to duo nebs here, his has refractory wheezing at home with significant dyspnea. His EKG is nonischemic and troponin is negative. Otherwise influenza is negative and chest x-ray is clear. He will require admission since he has exhausted all treatment options at home and continues to be fairly symptomatic. He is not hypoxic. I spoke with Dr. Bhavin Mena who accepts the patient for admission.     During the patient's ED course, the patient was given:  Medications   methylPREDNISolone sodium (SOLU-MEDROL) injection 125 mg (125 mg Intravenous Not Given 3/4/20 0058)   aspirin chewable tablet 81 mg (has no administration in time range)   atorvastatin (LIPITOR) tablet 40 mg (40 mg Oral Given 3/4/20 0158)   clopidogrel (PLAVIX) tablet 75 mg (has no administration in time range)   isosorbide mononitrate (IMDUR) extended release tablet 120 mg (has no administration in time range)   lisinopril (PRINIVIL;ZESTRIL) tablet 2.5 mg (has no administration in time range)   perflutren lipid microspheres (DEFINITY) injection 1.65 mg (has no administration in time range)   metoprolol tartrate (LOPRESSOR) tablet 25 mg (25 mg Oral Given 3/4/20 0158)   nitroGLYCERIN (NITROSTAT) SL tablet 0.4 mg (has no administration in time range)   sodium chloride flush 0.9 % injection 10 mL (has no administration in time range)   sodium chloride flush 0.9 % injection 10 mL (has no administration in time range)   acetaminophen (TYLENOL) tablet 650 mg (has no administration in time range)     Or   acetaminophen (TYLENOL) suppository 650 mg (has no administration in time range)   magnesium hydroxide (MILK OF MAGNESIA) 400 MG/5ML suspension 30 mL (has no administration in time range)   promethazine (PHENERGAN) tablet 12.5 mg (has no administration in time range)     Or   ondansetron (ZOFRAN) injection 4 mg (has no administration in time range)   enoxaparin (LOVENOX) injection 40 mg (has no administration in time range)   albuterol (PROVENTIL) nebulizer solution 2.5 mg (has no administration in time range)   glucose (GLUTOSE) 40 % oral gel 15 g (has no administration in time range)   dextrose 50 % IV solution (has no administration in time range)   glucagon (rDNA) injection 1 mg (has no administration in time range)   dextrose 5 % solution (has no administration in time range)   insulin glargine (LANTUS) injection vial 10 Units (has no administration in

## 2020-03-04 NOTE — PROGRESS NOTES
4 Eyes Skin Assessment     The patient is being assess for   Admission    I agree that 2 RN's have performed a thorough Head to Toe Skin Assessment on the patient. ALL assessment sites listed below have been assessed. Areas assessed by both nurses:   [x]   Head, Face, and Ears   [x]   Shoulders, Back, and Chest, Abdomen  [x]   Arms, Elbows, and Hands   [x]   Coccyx, Sacrum, and Ischium  [x]   Legs, Feet, and Heels        No skin issues upon admission    **SHARE this note so that the co-signing nurse is able to place an eSignature**    Co-signer eSignature: {Esignature:[id number]}    Does the Patient have Skin Breakdown?   No          Octavio Prevention initiated:  No   Wound Care Orders initiated:  No      WOC nurse consulted for Pressure Injury (Stage 3,4, Unstageable, DTI, NWPT, Complex wounds)and New or Established Ostomies:  No      Primary Nurse eSignature: Electronically signed by Enmanuel Pierce RN on 3/4/20 at 5:51 AM

## 2020-03-04 NOTE — PROGRESS NOTES
hold  4. Bronchodilators will be administered via Hand Held Nebulizer BALDOMERO [facility]) to patients when ANY of the following criteria are met  a. Incognizant or uncooperative          b. Patients treated with HHN at Home        c. Unable to demonstrate proper use of MDI with spacer     d. RR > 30 bpm   5. Bronchodilators will be delivered via Metered Dose Inhaler (MDI), HHN, Aerogen to intubated patients on mechanical ventilation. 6. Inhalation medication orders will be delivered and/or substituted as outlined below. Aerosolized Medications Ordering and Administration Guidelines:    1. All Medications will be ordered by a physician, and their frequency and/or modality will be adjusted as defined by the patients Respiratory Severity Index (RSI) score. 2. If the patient does not have documented COPD, consider discontinuing anticholinergics when RSI is less than 9.  3. If the bronchospasm worsens (increased RSI), then the bronchodilator frequency can be increased to a maximum of every 4 hours. If greater than every 4 hours is required, the physician will be contacted. 4. If the bronchospasm improves, the frequency of the bronchodilator can be decreased, based on the patient's RSI, but not less than home treatment regimen frequency. 5. Bronchodilator(s) will be discontinued if patient has a RSI less than 9 and has received no scheduled or as needed treatment for 72  Hrs. Patients Ordered on a Mucolytic Agent:    1. Must always be administered with a bronchodilator. 2. Discontinue if patient experiences worsened bronchospasm, or secretions have lessened to the point that the patient is able to clear them with a cough. Anti-inflammatory and Combination Medications:    1. If the patient lacks prior history of lung disease, is not using inhaled anti-inflammatory medication at home, and lacks wheezing by examination or by history for at least 24 hours, contact physician for possible discontinuation.

## 2020-03-04 NOTE — PROGRESS NOTES
Pulmonology consult has been called to Dr. Sandra Patton on 3/4/20 through answering service @ [phone].  Jael Sheets [place]

## 2020-03-04 NOTE — CARE COORDINATION
and explained the role of the CM. Plans to return to his home. IPTA. No HHC or DME needs identified. CM not following. Explained Case Management role/services.

## 2020-03-04 NOTE — H&P
1 Device by Does not apply route daily as needed (wheezing) 3/1/20   Malinda Chandler MD   predniSONE (DELTASONE) 10 MG tablet Take 4 tablets by mouth once daily for 5 days 3/1/20 3/11/20  Malinda Chandler MD   metoprolol tartrate (LOPRESSOR) 25 MG tablet TAKE ONE TABLET BY MOUTH TWICE A DAY 2/10/20   Barney Campos MD   atorvastatin (LIPITOR) 40 MG tablet Take 1 qd in the evening. 1/28/20   [facility], DO   glipiZIDE (GLUCOTROL) 10 MG tablet TAKE ONE TABLET BY MOUTH EVERY MORNING FOR DIABETES 1/28/20   [facility], DO   metFORMIN (GLUCOPHAGE) 1000 MG tablet TAKE ONE TABLET BY MOUTH DAILY WITH BREAKFAST 1/28/20   [facility], DO   lisinopril (PRINIVIL;ZESTRIL) 2.5 MG tablet TAKE ONE TABLET BY MOUTH DAILY 1/24/20   Too Proctor MD   isosorbide mononitrate (IMDUR) 120 MG extended release tablet TAKE ONE TABLET BY MOUTH DAILY 12/26/19   Too Proctor MD   nitroGLYCERIN (NITROSTAT) 0.4 MG SL tablet Place 1 tablet under the tongue every 5 minutes as needed for Chest pain 9/20/19   Too Proctor MD   clopidogrel (PLAVIX) 75 MG tablet TAKE ONE TABLET BY MOUTH DAILY 6/28/19   Barney Campos MD   aspirin 81 MG chewable tablet CHEW ONE TABLET BY MOUTH DAILY 12/5/16   [facility], DO       Allergies:  Vitamin d analogs and Vitamin d analogs    Social History:    TOBACCO:   reports that he has never smoked. He has never used smokeless tobacco.  ETOH:   reports current alcohol use of about 1.0 standard drinks of alcohol per week. Family History:  Reviewed in detail and negative for DM, Early CAD, Cancer (except as below).  Positive as follows:        Problem Relation Age of Onset    Heart Attack Mother     Heart Disease Mother     Heart Attack Father     Cancer Father     Hearing Loss Father     Diabetes Father     Heart Disease Father     Heart Disease Paternal Uncle         bypass       REVIEW OF SYSTEMS:   Pertinent positives/negatives as follows: SOB, wheezing, orthopnea, and as discussed in HPI, to DD for cards c/s if needed. He denies hx of any lung problems prior to a few weeks ago. 2. DM2, poorly controlled, initial , hold oral Rx, chk A1c, add Mod SSI, start Lantus. Possibly exacerbated by recent steroids. 3. HTN, cont home regimen. 4. CAD, cont home BB, Plavix, ASA, Imdur. 5. HLD, cont statin. DVT Prophylaxis: Lx  Diet: carb control  Code Status: Full Code  PT/OT Eval Status: Will order if needed and as patient condition allows  Dispo - Admit to inpatient PCU w/ tele. Harris Franco MD    Thank you Francisco Javier Moreau DO for the opportunity to be involved in this patient's care. If you have any questions or concerns please feel free to contact me via the [employer] at [phone]. This chart was generated using the [address] [place] [address] [address] dictation system. I created this record but it may contain dictation errors given the limitations of this technology.

## 2020-03-04 NOTE — ED NOTES
Report given to [facility] RN at this time     Ksenia Barrios, [address] [facility]  03/03/20 0017

## 2020-03-04 NOTE — CONSULTS
Patient is being seen at the request of Dr. Jef Villalba for a consultation for wheezing    HISTORY OF PRESENT ILLNESS: This is a 63-year-old male with a history of diabetes and CAD S/P stenting who presented to the emergency department on 3/3/2020 with a 3-week history of URI symptoms that have worsened to include moderately severe shortness of breath, worse with exertion, associated with wheezing. He has been treated with steroids, initially with improvement, but the second course that he obtained in the emergency department earlier this week did not seem to make much difference. He has not had similar events in the past.  He is feeling a little better since treatment in the hospital.  He has no tobacco history and no personal history of asthma    PAST MEDICAL HISTORY:  Past Medical History:   Diagnosis Date    Alcohol abuse 12/3/2012    Coronary artery disease     Hyperlipidemia     Hypertension     Non morbid obesity     Reactive airway disease with wheezing, moderate persistent, with acute exacerbation 3/3/2020    Type II or unspecified type diabetes mellitus without mention of complication, not stated as uncontrolled      PAST SURGICAL HISTORY:  Past Surgical History:   Procedure Laterality Date    CORONARY ANGIOPLASTY WITH STENT PLACEMENT  1/14/2016    1 Prom[employer] Premier SAMUEL 2.25x16    VEIN SURGERY         FAMILY HISTORY:  family history includes Cancer in his father; Diabetes in his father; Hearing Loss in his father; Heart Attack in his father and mother; Heart Disease in his father, mother, and paternal uncle. SOCIAL HISTORY:   reports that he has never smoked.  He has never used smokeless tobacco.    Scheduled Meds:   aspirin  81 mg Oral Daily    atorvastatin  40 mg Oral Nightly    clopidogrel  75 mg Oral Daily    isosorbide mononitrate  120 mg Oral Daily    lisinopril  2.5 mg Oral Daily    metoprolol tartrate  25 mg Oral BID    sodium chloride flush  10 mL Intravenous 2 times per day  enoxaparin  40 mg Subcutaneous Daily    insulin glargine  10 Units Subcutaneous Nightly    insulin lispro  0-12 Units Subcutaneous TID WC    insulin lispro  0-6 Units Subcutaneous Nightly    methylPREDNISolone  40 mg Intravenous Q12H    Followed by   Pura Kelly ON 3/6/2020] predniSONE  40 mg Oral Daily    doxycycline (VIBRAMYCIN) IV  100 mg Intravenous Q12H    ipratropium-albuterol  1 ampule Inhalation Q4H While awake    methylPREDNISolone  125 mg Intravenous Once     Continuous Infusions:   dextrose       PRN Meds:  perflutren lipid microspheres, nitroGLYCERIN, sodium chloride flush, acetaminophen **OR** acetaminophen, magnesium hydroxide, promethazine **OR** ondansetron, albuterol, glucose, dextrose, glucagon (rDNA), dextrose    ALLERGIES:  Patient is allergic to vitamin d analogs and vitamin d analogs. REVIEW OF SYSTEMS:  Constitutional: Negative for fever  HENT: Negative for sore throat  Eyes: Negative for redness   Respiratory: + for dyspnea, cough  Cardiovascular: Negative for chest pain  Gastrointestinal: Negative for vomiting, diarrhea   Genitourinary: Negative for hematuria   Musculoskeletal: Negative for arthralgias   Skin: Negative for rash  Neurological: Negative for syncope  Hematological: Negative for adenopathy  Psychiatric/Behavorial: Negative for anxiety    PHYSICAL EXAM:  Blood pressure (!) 150/84, pulse 98, temperature 97.9 °F (36.6 °C), temperature source Oral, resp. rate 16, height 5' 10\" (1.778 m), weight 252 lb (114.3 kg), SpO2 96 %.' on RA  Gen: No distress. Eyes: PERRL. No sclera icterus. No conjunctival injection. ENT: No discharge. Pharynx clear. Neck: Trachea midline. No obvious mass. Resp: No accessory muscle use. No crackles. few wheezes. No rhonchi. No dullness on percussion. CV: Regular rate. Regular rhythm. No murmur or rub. No edema. Peripheral pulses are 2+. Capillary refill is less than 3 seconds. GI: Non-tender. Non-distended. No hernia.    Skin: Warm and

## 2020-03-05 LAB
ANION GAP SERPL CALCULATED.3IONS-SCNC: 11 MMOL/L (ref 3–16)
BUN BLDV-MCNC: 17 MG/DL (ref 7–20)
CALCIUM SERPL-MCNC: 9.1 MG/DL (ref 8.3–10.6)
CHLORIDE BLD-SCNC: 93 MMOL/L (ref 99–110)
CO2: 27 MMOL/L (ref 21–32)
CREAT SERPL-MCNC: 0.7 MG/DL (ref 0.9–1.3)
GFR AFRICAN AMERICAN: >60
GFR NON-AFRICAN AMERICAN: >60
GLUCOSE BLD-MCNC: 274 MG/DL (ref 70–99)
GLUCOSE BLD-MCNC: 297 MG/DL (ref 70–99)
GLUCOSE BLD-MCNC: 324 MG/DL (ref 70–99)
GLUCOSE BLD-MCNC: 354 MG/DL (ref 70–99)
GLUCOSE BLD-MCNC: 385 MG/DL (ref 70–99)
GLUCOSE BLD-MCNC: 391 MG/DL (ref 70–99)
PERFORMED ON: ABNORMAL
POTASSIUM SERPL-SCNC: 4.4 MMOL/L (ref 3.5–5.1)
SODIUM BLD-SCNC: 131 MMOL/L (ref 136–145)

## 2020-03-05 PROCEDURE — 6370000000 HC RX 637 (ALT 250 FOR IP): Performed by: INTERNAL MEDICINE

## 2020-03-05 PROCEDURE — 2500000003 HC RX 250 WO HCPCS: Performed by: INTERNAL MEDICINE

## 2020-03-05 PROCEDURE — 36415 COLL VENOUS BLD VENIPUNCTURE: CPT

## 2020-03-05 PROCEDURE — 6360000002 HC RX W HCPCS: Performed by: INTERNAL MEDICINE

## 2020-03-05 PROCEDURE — 94761 N-INVAS EAR/PLS OXIMETRY MLT: CPT

## 2020-03-05 PROCEDURE — 2060000000 HC ICU INTERMEDIATE R&B

## 2020-03-05 PROCEDURE — 2580000003 HC RX 258: Performed by: INTERNAL MEDICINE

## 2020-03-05 PROCEDURE — 99232 SBSQ HOSP IP/OBS MODERATE 35: CPT | Performed by: INTERNAL MEDICINE

## 2020-03-05 PROCEDURE — 80048 BASIC METABOLIC PNL TOTAL CA: CPT

## 2020-03-05 PROCEDURE — 87205 SMEAR GRAM STAIN: CPT

## 2020-03-05 PROCEDURE — 94640 AIRWAY INHALATION TREATMENT: CPT

## 2020-03-05 PROCEDURE — 87070 CULTURE OTHR SPECIMN AEROBIC: CPT

## 2020-03-05 RX ORDER — POLYETHYLENE GLYCOL 3350 17 G/17G
17 POWDER, FOR SOLUTION ORAL DAILY
Status: DISCONTINUED | OUTPATIENT
Start: 2020-03-05 | End: 2020-03-06 | Stop reason: HOSPADM

## 2020-03-05 RX ORDER — GUAIFENESIN/DEXTROMETHORPHAN 100-10MG/5
5 SYRUP ORAL EVERY 4 HOURS PRN
Status: DISCONTINUED | OUTPATIENT
Start: 2020-03-05 | End: 2020-03-06

## 2020-03-05 RX ORDER — BENZONATATE 100 MG/1
100 CAPSULE ORAL 3 TIMES DAILY PRN
Status: DISCONTINUED | OUTPATIENT
Start: 2020-03-05 | End: 2020-03-06 | Stop reason: HOSPADM

## 2020-03-05 RX ORDER — DOXYCYCLINE HYCLATE 100 MG
100 TABLET ORAL EVERY 12 HOURS SCHEDULED
Status: DISCONTINUED | OUTPATIENT
Start: 2020-03-05 | End: 2020-03-06 | Stop reason: HOSPADM

## 2020-03-05 RX ADMIN — INSULIN LISPRO 10 UNITS: 100 INJECTION, SOLUTION INTRAVENOUS; SUBCUTANEOUS at 17:29

## 2020-03-05 RX ADMIN — IPRATROPIUM BROMIDE AND ALBUTEROL SULFATE 1 AMPULE: .5; 3 SOLUTION RESPIRATORY (INHALATION) at 06:13

## 2020-03-05 RX ADMIN — METOPROLOL TARTRATE 25 MG: 25 TABLET, FILM COATED ORAL at 09:01

## 2020-03-05 RX ADMIN — IPRATROPIUM BROMIDE AND ALBUTEROL SULFATE 1 AMPULE: .5; 3 SOLUTION RESPIRATORY (INHALATION) at 18:38

## 2020-03-05 RX ADMIN — ENOXAPARIN SODIUM 40 MG: 40 INJECTION SUBCUTANEOUS at 09:01

## 2020-03-05 RX ADMIN — Medication 10 ML: at 09:12

## 2020-03-05 RX ADMIN — INSULIN LISPRO 6 UNITS: 100 INJECTION, SOLUTION INTRAVENOUS; SUBCUTANEOUS at 12:18

## 2020-03-05 RX ADMIN — IPRATROPIUM BROMIDE AND ALBUTEROL SULFATE 1 AMPULE: .5; 3 SOLUTION RESPIRATORY (INHALATION) at 22:55

## 2020-03-05 RX ADMIN — OLODATEROL RESPIMAT INHALATION SPRAY 2 PUFF: 2.5 SPRAY, METERED RESPIRATORY (INHALATION) at 06:15

## 2020-03-05 RX ADMIN — INSULIN GLARGINE 18 UNITS: 100 INJECTION, SOLUTION SUBCUTANEOUS at 20:47

## 2020-03-05 RX ADMIN — IPRATROPIUM BROMIDE AND ALBUTEROL SULFATE 1 AMPULE: .5; 3 SOLUTION RESPIRATORY (INHALATION) at 10:30

## 2020-03-05 RX ADMIN — METHYLPREDNISOLONE SODIUM SUCCINATE 40 MG: 40 INJECTION, POWDER, FOR SOLUTION INTRAMUSCULAR; INTRAVENOUS at 08:59

## 2020-03-05 RX ADMIN — IPRATROPIUM BROMIDE AND ALBUTEROL SULFATE 1 AMPULE: .5; 3 SOLUTION RESPIRATORY (INHALATION) at 14:36

## 2020-03-05 RX ADMIN — GUAIFENESIN AND DEXTROMETHORPHAN 5 ML: 100; 10 SYRUP ORAL at 19:45

## 2020-03-05 RX ADMIN — INSULIN LISPRO 5 UNITS: 100 INJECTION, SOLUTION INTRAVENOUS; SUBCUTANEOUS at 20:46

## 2020-03-05 RX ADMIN — LISINOPRIL 2.5 MG: 5 TABLET ORAL at 09:00

## 2020-03-05 RX ADMIN — CLOPIDOGREL BISULFATE 75 MG: 75 TABLET ORAL at 08:59

## 2020-03-05 RX ADMIN — ATORVASTATIN CALCIUM 40 MG: 40 TABLET, FILM COATED ORAL at 20:41

## 2020-03-05 RX ADMIN — Medication 10 ML: at 20:41

## 2020-03-05 RX ADMIN — ASPIRIN 81 MG 81 MG: 81 TABLET ORAL at 08:59

## 2020-03-05 RX ADMIN — METOPROLOL TARTRATE 25 MG: 25 TABLET, FILM COATED ORAL at 20:41

## 2020-03-05 RX ADMIN — ISOSORBIDE MONONITRATE 120 MG: 60 TABLET ORAL at 08:58

## 2020-03-05 RX ADMIN — INSULIN LISPRO 8 UNITS: 100 INJECTION, SOLUTION INTRAVENOUS; SUBCUTANEOUS at 08:55

## 2020-03-05 RX ADMIN — DOXYCYCLINE HYCLATE 100 MG: 100 TABLET, COATED ORAL at 20:41

## 2020-03-05 RX ADMIN — DOXYCYCLINE 100 MG: 100 INJECTION, POWDER, LYOPHILIZED, FOR SOLUTION INTRAVENOUS at 09:11

## 2020-03-05 RX ADMIN — METHYLPREDNISOLONE SODIUM SUCCINATE 40 MG: 40 INJECTION, POWDER, FOR SOLUTION INTRAMUSCULAR; INTRAVENOUS at 20:41

## 2020-03-05 NOTE — PROGRESS NOTES
c/c/e.  Brisk cap refill. PSY:        Thought process intact, affect appropriate. GMX:        PL III-XII intact, moves all 4 spontaneously, sensory grossly intact. SKIN:       No rash or lesions on visible skin      Labs:   Recent Labs     03/03/20  1750 03/04/20  0412   WBC 9.8 8.2   HGB 14.6 14.3   HCT 43.6 42.6    202     Recent Labs     03/03/20  1750 03/04/20  0412 03/05/20  1006   * 134* 131*   K 4.7 4.5 4.4   CL 96* 97* 93*   CO2 21 19* 27   BUN 16 17 17   CREATININE 0.7* 0.7* 0.7*   CALCIUM 9.1 9.0 9.1     Recent Labs     03/03/20  1750   AST 23   ALT 21   BILITOT 0.4   ALKPHOS 73     No results for input(s): INR in the last 72 hours. Recent Labs     03/03/20  1750   TROPONINI <0.01       Urinalysis:    No results found for: Ella Leti, BACTERIA, RBCUA, BLOODU, SPECGRAV, GLUCOSEU    Radiology:  XR CHEST STANDARD (2 VW)   Final Result   No acute process. Assessment/Plan:    Active Hospital Problems    Diagnosis    Reactive airway disease with wheezing, moderate persistent, with acute exacerbation [J45.41]       1. Acute exacerbation of \"moderate persistent reactive airway disease\" (per PCP's note), extensive wheezing and + orthopnea.  Failed OP ABX/steroids x2 over last few weeks. Started on IV solumedrol, PRN/MINERVA intensive NEB therapy. pulm consulted. Echo reviewed with EF 55%  2. DM2, poorly controlled, initial , hold oral Rx, chk A1c 9.0, on SSI, increased Lantus.  Possibly exacerbated by recent steroids.    3. HTN, cont home regimen.    4. CAD, cont home BB, Plavix, ASA, Imdur.   5. HLD, cont statin    Diet: DIET CARB CONTROL; Carb Control: 4 carb choices (60 gms)/meal  Code Status: Full Code    PT/OT Eval Status: ordered    Dispo - cont care, poss d/c in am if ok with Jaquelin Pantoja MD

## 2020-03-05 NOTE — PROGRESS NOTES
Pt assessed. Expiratory wheezes auscultated throughout bilateral lobes. Pt states he hasn't had a bowel movement in three days, offered options, he wanted to just try prune juice again like yesterday, will provide soon. Scheduled medications given at this time. Pt denies any needs. Call light and bedside table with-in easy reach. Judi Hannon

## 2020-03-05 NOTE — PROGRESS NOTES
Pt in bed, awake, A/O X4. Shift assessment complete, night meds given. No C/o pain at this time. . No other needs expressed. Call light in reach. Will monitor.   Petra Shea

## 2020-03-05 NOTE — PROGRESS NOTES
Respiratory made aware that pt is sleeping and desating into the 60's and 70's. Woke pt up and sats slowly went back to the 90's.  Pt states he is ready to go to sleep. resp made aware that sleep study is ready to start Sebastian Ricks

## 2020-03-05 NOTE — PROGRESS NOTES
93.3 94.4    202     BMP:   Recent Labs     03/03/20  1750 03/04/20  0412 03/05/20  1006   * 134* 131*   K 4.7 4.5 4.4   CL 96* 97* 93*   CO2 21 19* 27   BUN 16 17 17   CREATININE 0.7* 0.7* 0.7*       Cultures:  3/5/2020 respiratory culture pending    Films:  3/3/2020 CXR no focal infiltrate    Echo 3/4/2020  EF 55%. Mild concentric left ventricular hypertrophy. No regional wall motion abnormalities are seen. Grade I diastolic dysfunction with normal LV filling pressures. The right atrium is mildly enlarged.     CXR 3/3/2020 no focal infiltrate     ASSESSMENT:  · Reactive airway disease with acute exacerbation, airways are still very reactive   · Diabetes mellitus   · CAD s/p PCI     PLAN:  · Supplemental oxygen to maintain SaO2 >92%; wean as tolerated    · IV solumedrol with plan to convert to oral prednisone with improvement  · Inhaled bronchodilators, add LABA  · Doxycycline D#2  · Will need LABA/ICS at discharge and can f/u with me in 2-3 weeks to reassess  · Will need nebulizer at discharge

## 2020-03-05 NOTE — PROGRESS NOTES
Pt in bed, eyes closed. No distress noted. Call light in reach. Will continue to monitor.   Jignesh Starr

## 2020-03-06 ENCOUNTER — TELEPHONE (OUTPATIENT)
Dept: PULMONOLOGY | Age: 60
End: 2020-03-06

## 2020-03-06 VITALS
HEIGHT: 70 IN | SYSTOLIC BLOOD PRESSURE: 145 MMHG | HEART RATE: 107 BPM | RESPIRATION RATE: 18 BRPM | DIASTOLIC BLOOD PRESSURE: 88 MMHG | OXYGEN SATURATION: 94 % | TEMPERATURE: 97.4 F | BODY MASS INDEX: 36.33 KG/M2 | WEIGHT: 253.8 LBS

## 2020-03-06 LAB
GLUCOSE BLD-MCNC: 291 MG/DL (ref 70–99)
GLUCOSE BLD-MCNC: 309 MG/DL (ref 70–99)
GLUCOSE BLD-MCNC: 381 MG/DL (ref 70–99)
PERFORMED ON: ABNORMAL

## 2020-03-06 PROCEDURE — 6370000000 HC RX 637 (ALT 250 FOR IP): Performed by: INTERNAL MEDICINE

## 2020-03-06 PROCEDURE — 94640 AIRWAY INHALATION TREATMENT: CPT

## 2020-03-06 PROCEDURE — 99232 SBSQ HOSP IP/OBS MODERATE 35: CPT | Performed by: INTERNAL MEDICINE

## 2020-03-06 PROCEDURE — 2580000003 HC RX 258: Performed by: INTERNAL MEDICINE

## 2020-03-06 PROCEDURE — 99238 HOSP IP/OBS DSCHRG MGMT 30/<: CPT | Performed by: INTERNAL MEDICINE

## 2020-03-06 PROCEDURE — 6360000002 HC RX W HCPCS: Performed by: INTERNAL MEDICINE

## 2020-03-06 RX ORDER — GUAIFENESIN/DEXTROMETHORPHAN 100-10MG/5
5 SYRUP ORAL EVERY 4 HOURS
Qty: 120 ML | Refills: 0 | Status: SHIPPED | OUTPATIENT
Start: 2020-03-06 | End: 2020-03-16

## 2020-03-06 RX ORDER — DOXYCYCLINE HYCLATE 100 MG
100 TABLET ORAL EVERY 12 HOURS SCHEDULED
Qty: 10 TABLET | Refills: 0 | Status: SHIPPED | OUTPATIENT
Start: 2020-03-06 | End: 2020-03-11

## 2020-03-06 RX ORDER — PREDNISONE 10 MG/1
TABLET ORAL
Qty: 18 TABLET | Refills: 0 | Status: SHIPPED | OUTPATIENT
Start: 2020-03-06 | End: 2020-05-06

## 2020-03-06 RX ORDER — ALBUTEROL SULFATE 2.5 MG/3ML
2.5 SOLUTION RESPIRATORY (INHALATION) EVERY 6 HOURS PRN
Qty: 120 EACH | Refills: 3 | Status: SHIPPED | OUTPATIENT
Start: 2020-03-06 | End: 2021-03-02

## 2020-03-06 RX ORDER — GUAIFENESIN/DEXTROMETHORPHAN 100-10MG/5
5 SYRUP ORAL EVERY 4 HOURS
Status: DISCONTINUED | OUTPATIENT
Start: 2020-03-06 | End: 2020-03-06 | Stop reason: HOSPADM

## 2020-03-06 RX ADMIN — DOXYCYCLINE HYCLATE 100 MG: 100 TABLET, COATED ORAL at 09:08

## 2020-03-06 RX ADMIN — OLODATEROL RESPIMAT INHALATION SPRAY 2 PUFF: 2.5 SPRAY, METERED RESPIRATORY (INHALATION) at 06:28

## 2020-03-06 RX ADMIN — GUAIFENESIN AND DEXTROMETHORPHAN 5 ML: 100; 10 SYRUP ORAL at 09:13

## 2020-03-06 RX ADMIN — INSULIN LISPRO 6 UNITS: 100 INJECTION, SOLUTION INTRAVENOUS; SUBCUTANEOUS at 09:17

## 2020-03-06 RX ADMIN — CLOPIDOGREL BISULFATE 75 MG: 75 TABLET ORAL at 09:09

## 2020-03-06 RX ADMIN — METOPROLOL TARTRATE 25 MG: 25 TABLET, FILM COATED ORAL at 09:09

## 2020-03-06 RX ADMIN — ASPIRIN 81 MG 81 MG: 81 TABLET ORAL at 09:09

## 2020-03-06 RX ADMIN — METHYLPREDNISOLONE SODIUM SUCCINATE 40 MG: 40 INJECTION, POWDER, FOR SOLUTION INTRAMUSCULAR; INTRAVENOUS at 09:11

## 2020-03-06 RX ADMIN — IPRATROPIUM BROMIDE AND ALBUTEROL SULFATE 1 AMPULE: .5; 3 SOLUTION RESPIRATORY (INHALATION) at 06:27

## 2020-03-06 RX ADMIN — ENOXAPARIN SODIUM 40 MG: 40 INJECTION SUBCUTANEOUS at 09:13

## 2020-03-06 RX ADMIN — IPRATROPIUM BROMIDE AND ALBUTEROL SULFATE 1 AMPULE: .5; 3 SOLUTION RESPIRATORY (INHALATION) at 10:48

## 2020-03-06 RX ADMIN — LISINOPRIL 2.5 MG: 5 TABLET ORAL at 09:09

## 2020-03-06 RX ADMIN — INSULIN LISPRO 8 UNITS: 100 INJECTION, SOLUTION INTRAVENOUS; SUBCUTANEOUS at 13:30

## 2020-03-06 RX ADMIN — ISOSORBIDE MONONITRATE 120 MG: 60 TABLET ORAL at 09:08

## 2020-03-06 RX ADMIN — Medication 10 ML: at 09:12

## 2020-03-06 NOTE — DISCHARGE SUMMARY
Name:  Reese Blanton  Room:  /7507-59  MRN:    [id number]    Discharge Summary      This discharge summary is in conjunction with a complete physical exam done on the day of discharge. Discharging Physician: Dr. Alcala Bach: 3/3/2020  Discharge: 3/6/2020      HPI taken from admission H&P:    The patient is a 61 y.o. male with PMH below, presents with SOB, wheezing, orthopnea. Pt reports that the above sx have been persistent over last few weeks. He reports a had a viral URI 2-3 weeks ago. He has had varying degrees of sx since. His sx are worsened by exertion and laying flat. He has been on 2 courses of ABX/steroids since. He was in ED on 3/1 and felt somewhat better with treatment in ED. He was sent home but worsened that night. He has progressively worsened for last 2 days. He has hx of CAD but denies hx of lung problems or previous issues w/ reactive airway/asthma/COPD. He has never been a smoker. Diagnoses this Admission and Hospital Course   Acute exacerbation of \"moderate persistent reactive airway disease\"    - extensive wheezing and + orthopnea.    - Failed OP ABX/steroids x2 over last few weeks.   - Started on IV solumedrol, PRN/MINERVA intensive NEB therapy. pulm consulted.   - Echo reviewed with EF 55%  - D/C home on Doxycyline, steroid taper, and LABA/ICS, will need OP f/u with pulmonology     DM2  - poorly controlled  - initial   - A1c 9.0, on SSI, increased Lantus.   - Possibly exacerbated by recent steroids      HTN  - cont home regimen      CAD  - cont home BB, Plavix, ASA, Imdur    HLD  - cont statin    Procedures (Please Review Full Report for Details)  None     Consults    Pulmonology     Physical Exam at Discharge:    BP (!) 145/88   Pulse 107   Temp 97.4 °F (36.3 °C) (Oral)   Resp 18   Ht 5' 10\" (1.778 m)   Wt 253 lb 12.8 oz (115.1 kg)   SpO2 94%   BMI 36.42 kg/m²     GEN:        A&Ox3, NAD.   HEENT:   NC/AT,EOMI, MMM, no erythema/exudates or visible Wheezing  What changed:  Another medication with the same name was added. Make sure you understand how and when to take each. Notes to patient:  Not given in the last 4 hours     * albuterol (2.5 MG/3ML) 0.083% nebulizer solution  Commonly known as:  PROVENTIL  Take 3 mLs by nebulization every 6 hours as needed for Wheezing  What changed: You were already taking a medication with the same name, and this prescription was added. Make sure you understand how and when to take each. Notes to patient:  Not given in the last 6 hours     predniSONE 10 MG tablet  Commonly known as:  DELTASONE  Take 3 tabs a day for 3 days,  Then 2 tabs a day for 3 days ,  Then 1 tab a day for 3 days. What changed:  additional instructions         * This list has 2 medication(s) that are the same as other medications prescribed for you. Read the directions carefully, and ask your doctor or other care provider to review them with you. CONTINUE taking these medications    aspirin 81 MG chewable tablet  CHEW ONE TABLET BY MOUTH DAILY     atorvastatin 40 MG tablet  Commonly known as:  LIPITOR  Take 1 qd in the evening.      clopidogrel 75 MG tablet  Commonly known as:  PLAVIX  TAKE ONE TABLET BY MOUTH DAILY     E-Z Spacer Deja  1 Device by Does not apply route daily as needed (wheezing)     glipiZIDE 10 MG tablet  Commonly known as:  GLUCOTROL  TAKE ONE TABLET BY MOUTH EVERY MORNING FOR DIABETES     isosorbide mononitrate 120 MG extended release tablet  Commonly known as:  IMDUR  TAKE ONE TABLET BY MOUTH DAILY     lisinopril 2.5 MG tablet  Commonly known as:  PRINIVIL;ZESTRIL  TAKE ONE TABLET BY MOUTH DAILY     metFORMIN 1000 MG tablet  Commonly known as:  GLUCOPHAGE  TAKE ONE TABLET BY MOUTH DAILY WITH BREAKFAST     metoprolol tartrate 25 MG tablet  Commonly known as:  LOPRESSOR  TAKE ONE TABLET BY MOUTH TWICE A DAY     nitroGLYCERIN 0.4 MG SL tablet  Commonly known as:  Nitrostat  Place 1 tablet under the tongue every 5 minutes as needed for Chest pain           Where to Get Your Medications      You can get these medications from any pharmacy    Bring a paper prescription for each of these medications  · albuterol (2.5 MG/3ML) 0.083% nebulizer solution  · doxycycline hyclate 100 MG tablet  · fluticasone-salmeterol 250-50 MCG/DOSE Aepb  · guaiFENesin-dextromethorphan 100-10 MG/5ML syrup  · predniSONE 10 MG tablet       Discharged in stable condition to home    Follow Up:   Follow up with PCP in 1 week, pulmonology OP           Carmen Vasquez MD  3/6/2020

## 2020-03-06 NOTE — PROGRESS NOTES
Pt in bed, awake, A/O X4. Shift assessment complete, night meds given. No C/O pain at this time. . No other needs expressed. Call light in reach. Will monitor.   Ani Aguila

## 2020-03-06 NOTE — DISCHARGE INSTR - COC
 Low serum testosterone level R79.89    Arm pain, left M79.602    Shoulder pain M25.519    Precordial pain R07.2    Lung nodule R91.1    Acute angina (HCC) I20.9    Abnormal stress test R94.39    Acute coronary syndrome (HCC) I24.9    Unstable angina (HCC) I20.0    Coronary artery disease involving native coronary artery of native heart with unstable angina pectoris (HCC) I25.110    Chest pain, moderate coronary artery risk R07.9    S/P drug eluting coronary stent placement Z95.5    Essential hypertension I10    Obesity E66.9    Abnormal chest x-ray R93.89    CAD (coronary artery disease) I25.10    Angina effort I20.8    Skin lesion L98.9    Angina at rest ([facility] [address] I20.8    Hyperlipidemia E78.5    Angina pectoris ([facility]) I20.9    NSTEMI (non-ST elevated myocardial infarction) ([facility] [address] I21.4    Controlled type 2 diabetes mellitus without complication, without long-term current use of insulin ([facility]) E11.9    ASHD (arteriosclerotic heart disease) I25.10    Mass of right foot R22.41    Overweight E66.3    History of angina pectoris Z86.79    Reactive airway disease J45.909    Bronchitis J40    Reactive airway disease with wheezing, moderate persistent, with acute exacerbation J45.41       Isolation/Infection:   Isolation          No Isolation        Patient Infection Status     None to display          Nurse Assessment:  Last Vital Signs: BP (!) 145/88   Pulse 107   Temp 97.4 °F (36.3 °C) (Oral)   Resp 18   Ht 5' 10\" (1.778 m)   Wt 253 lb 12.8 oz (115.1 kg)   SpO2 94%   BMI 36.42 kg/m²     Last documented pain score (0-10 scale):    Last Weight:   Wt Readings from Last 1 Encounters:   03/06/20 253 lb 12.8 oz (115.1 kg)     Mental Status:  {IP PT MENTAL STATUS:76602}    IV Access:  {[facility] ACCESS:627533912}    Nursing Mobility/ADLs:  Walking   {P DME YJKE:117357912}  Transfer  {P DME EAHT:030059094}  Bathing  {P DME RSVB:868023002}  Dressing  {P DME MDXF:816401140}  Toileting {CHP DME TTOV:510280743}  Feeding  {CHP DME RUZH:623913945}  Med Admin  {CHP DME ODJV:226393893}  Med Delivery   { NAOMIE MED Delivery:300549636}    Wound Care Documentation and Therapy:        Elimination:  Continence:   · Bowel: {YES / Z}  · Bladder: {YES / PB:67221}  Urinary Catheter: {Urinary Catheter:495498579}   Colostomy/Ileostomy/Ileal Conduit: {YES / TH:08487}       Date of Last BM: ***    Intake/Output Summary (Last 24 hours) at 3/6/2020 1128  Last data filed at 3/6/2020 0855  Gross per 24 hour   Intake 780 ml   Output 3150 ml   Net -2370 ml     I/O last 3 completed shifts:   In: 80 [P.O.:780]  Out: 2450 [Urine:2450]    Safety Concerns:     [address] Safety Concerns:691637091}    Impairments/Disabilities:      [address] Impairments/Disabilities:662050567}    Nutrition Therapy:  Current Nutrition Therapy:   [address] Diet List:045822492}    Routes of Feeding: {[facility] DME Other Feedings:832400668}  Liquids: {Slp liquid thickness:21357}  Daily Fluid Restriction: {CHP DME Yes amt example:751607439}  Last Modified Barium Swallow with Video (Video Swallowing Test): {Done Not Done FOSV:488786799}    Treatments at the Time of Hospital Discharge:   Respiratory Treatments: ***  Oxygen Therapy:  {Therapy; copd oxygen:58031}  Ventilator:    { CC Vent TFCP:488328121}    Rehab Therapies: {THERAPEUTIC INTERVENTION:9790602004}  Weight Bearing Status/Restrictions: [address] [employer] Weight Bearin}  Other Medical Equipment (for information only, NOT a DME order):  {EQUIPMENT:181717477}  Other Treatments: ***    Patient's personal belongings (please select all that are sent with patient):  {P DME Belongings:141339806}    RN SIGNATURE:  {Esignature:765711635}    CASE MANAGEMENT/SOCIAL WORK SECTION    Inpatient Status Date: ***    Readmission Risk Assessment Score:  Readmission Risk              Risk of Unplanned Readmission:        15           Discharging to Facility/ Agency   · Name:

## 2020-03-06 NOTE — PROGRESS NOTES
Pt in bed, eyes closed. No distress noted. Call light in reach.  Will continue to monitor Dashawn Craft

## 2020-03-07 LAB
CULTURE, RESPIRATORY: NORMAL
GRAM STAIN RESULT: NORMAL

## 2020-03-17 ENCOUNTER — TELEPHONE (OUTPATIENT)
Dept: PULMONOLOGY | Age: 60
End: 2020-03-17

## 2020-03-17 NOTE — TELEPHONE ENCOUNTER
Appointment canceled for Wesley Singleton (<[id number]>)   Visit Type: OFFICE VISIT   Date        Time      Length    Provider                  Department   3/20/2020   11:45 AM  15 mins. Dilma Oneil MD             [facility] SLEEP      Reason for Cancellation: Feeling better/No longer need this appointment         ASSESSMENT:3/6/20  [facility] note  · Reactive airway disease with acute exacerbation, improved  · Diabetes mellitus   · CAD s/p PCI     PLAN:  · Supplemental oxygen to maintain SaO2 >92%; wean as tolerated    · Prednisone taper  · Doxycycline D#3  · Will need LABA/ICS at discharge and can f/u with me in 2-3 weeks to reassess - home with advair 250/50 - generic  · Will need nebulizer at discharge

## 2020-03-23 RX ORDER — CLOPIDOGREL BISULFATE 75 MG/1
TABLET ORAL
Qty: 90 TABLET | Refills: 3 | Status: SHIPPED | OUTPATIENT
Start: 2020-03-23 | End: 2021-04-08 | Stop reason: SDUPTHER

## 2020-05-06 ENCOUNTER — OFFICE VISIT (OUTPATIENT)
Dept: PRIMARY CARE CLINIC | Age: 60
End: 2020-05-06
Payer: COMMERCIAL

## 2020-05-06 ENCOUNTER — APPOINTMENT (OUTPATIENT)
Dept: GENERAL RADIOLOGY | Age: 60
End: 2020-05-06
Payer: COMMERCIAL

## 2020-05-06 ENCOUNTER — HOSPITAL ENCOUNTER (EMERGENCY)
Age: 60
Discharge: HOME | End: 2020-05-06
Attending: EMERGENCY MEDICINE
Payer: COMMERCIAL

## 2020-05-06 ENCOUNTER — NURSE TRIAGE (OUTPATIENT)
Dept: OTHER | Facility: CLINIC | Age: 60
End: 2020-05-06

## 2020-05-06 VITALS
HEART RATE: 100 BPM | TEMPERATURE: 97.8 F | OXYGEN SATURATION: 98 % | HEIGHT: 70 IN | SYSTOLIC BLOOD PRESSURE: 162 MMHG | BODY MASS INDEX: 36.51 KG/M2 | RESPIRATION RATE: 18 BRPM | WEIGHT: 255 LBS | DIASTOLIC BLOOD PRESSURE: 84 MMHG

## 2020-05-06 VITALS — HEART RATE: 91 BPM | OXYGEN SATURATION: 98 % | TEMPERATURE: 98.6 F

## 2020-05-06 LAB
A/G RATIO: 1.6 (ref 1.1–2.2)
ALBUMIN SERPL-MCNC: 4.6 G/DL (ref 3.4–5)
ALP BLD-CCNC: 85 U/L (ref 40–129)
ALT SERPL-CCNC: 20 U/L (ref 10–40)
ANION GAP SERPL CALCULATED.3IONS-SCNC: 12 MMOL/L (ref 3–16)
AST SERPL-CCNC: 15 U/L (ref 15–37)
BASOPHILS ABSOLUTE: 0.1 K/UL (ref 0–0.2)
BASOPHILS RELATIVE PERCENT: 0.8 %
BILIRUB SERPL-MCNC: 0.4 MG/DL (ref 0–1)
BUN BLDV-MCNC: 11 MG/DL (ref 7–20)
CALCIUM SERPL-MCNC: 9.9 MG/DL (ref 8.3–10.6)
CHLORIDE BLD-SCNC: 98 MMOL/L (ref 99–110)
CO2: 27 MMOL/L (ref 21–32)
CREAT SERPL-MCNC: 0.7 MG/DL (ref 0.9–1.3)
EOSINOPHILS ABSOLUTE: 0.3 K/UL (ref 0–0.6)
EOSINOPHILS RELATIVE PERCENT: 4.2 %
GFR AFRICAN AMERICAN: >60
GFR NON-AFRICAN AMERICAN: >60
GLOBULIN: 2.8 G/DL
GLUCOSE BLD-MCNC: 208 MG/DL (ref 70–99)
HCT VFR BLD CALC: 45.7 % (ref 40.5–52.5)
HEMOGLOBIN: 15.1 G/DL (ref 13.5–17.5)
LYMPHOCYTES ABSOLUTE: 1.4 K/UL (ref 1–5.1)
LYMPHOCYTES RELATIVE PERCENT: 18.4 %
MCH RBC QN AUTO: 30.6 PG (ref 26–34)
MCHC RBC AUTO-ENTMCNC: 33 G/DL (ref 31–36)
MCV RBC AUTO: 92.6 FL (ref 80–100)
MONOCYTES ABSOLUTE: 0.5 K/UL (ref 0–1.3)
MONOCYTES RELATIVE PERCENT: 6.6 %
NEUTROPHILS ABSOLUTE: 5.4 K/UL (ref 1.7–7.7)
NEUTROPHILS RELATIVE PERCENT: 70 %
PDW BLD-RTO: 14.5 % (ref 12.4–15.4)
PLATELET # BLD: 238 K/UL (ref 135–450)
PMV BLD AUTO: 8.1 FL (ref 5–10.5)
POTASSIUM REFLEX MAGNESIUM: 4 MMOL/L (ref 3.5–5.1)
PRO-BNP: 85 PG/ML (ref 0–124)
RBC # BLD: 4.93 M/UL (ref 4.2–5.9)
SODIUM BLD-SCNC: 137 MMOL/L (ref 136–145)
TOTAL PROTEIN: 7.4 G/DL (ref 6.4–8.2)
TROPONIN: <0.01 NG/ML
WBC # BLD: 7.8 K/UL (ref 4–11)

## 2020-05-06 PROCEDURE — 96374 THER/PROPH/DIAG INJ IV PUSH: CPT

## 2020-05-06 PROCEDURE — 99212 OFFICE O/P EST SF 10 MIN: CPT | Performed by: NURSE PRACTITIONER

## 2020-05-06 PROCEDURE — 84484 ASSAY OF TROPONIN QUANT: CPT

## 2020-05-06 PROCEDURE — 6360000002 HC RX W HCPCS: Performed by: EMERGENCY MEDICINE

## 2020-05-06 PROCEDURE — 71046 X-RAY EXAM CHEST 2 VIEWS: CPT

## 2020-05-06 PROCEDURE — 94640 AIRWAY INHALATION TREATMENT: CPT

## 2020-05-06 PROCEDURE — 85025 COMPLETE CBC W/AUTO DIFF WBC: CPT

## 2020-05-06 PROCEDURE — 93005 ELECTROCARDIOGRAM TRACING: CPT | Performed by: EMERGENCY MEDICINE

## 2020-05-06 PROCEDURE — 83880 ASSAY OF NATRIURETIC PEPTIDE: CPT

## 2020-05-06 PROCEDURE — 80053 COMPREHEN METABOLIC PANEL: CPT

## 2020-05-06 PROCEDURE — 6370000000 HC RX 637 (ALT 250 FOR IP): Performed by: EMERGENCY MEDICINE

## 2020-05-06 PROCEDURE — 99285 EMERGENCY DEPT VISIT HI MDM: CPT

## 2020-05-06 RX ORDER — PREDNISONE 50 MG/1
50 TABLET ORAL DAILY
Qty: 5 TABLET | Refills: 0 | Status: SHIPPED | OUTPATIENT
Start: 2020-05-06 | End: 2020-05-11

## 2020-05-06 RX ORDER — ALBUTEROL SULFATE 2.5 MG/3ML
2.5 SOLUTION RESPIRATORY (INHALATION) ONCE
Status: COMPLETED | OUTPATIENT
Start: 2020-05-06 | End: 2020-05-06

## 2020-05-06 RX ORDER — METHYLPREDNISOLONE SODIUM SUCCINATE 125 MG/2ML
125 INJECTION, POWDER, LYOPHILIZED, FOR SOLUTION INTRAMUSCULAR; INTRAVENOUS ONCE
Status: COMPLETED | OUTPATIENT
Start: 2020-05-06 | End: 2020-05-06

## 2020-05-06 RX ORDER — IPRATROPIUM BROMIDE AND ALBUTEROL SULFATE 2.5; .5 MG/3ML; MG/3ML
1 SOLUTION RESPIRATORY (INHALATION) ONCE
Status: COMPLETED | OUTPATIENT
Start: 2020-05-06 | End: 2020-05-06

## 2020-05-06 RX ADMIN — METHYLPREDNISOLONE SODIUM SUCCINATE 125 MG: 125 INJECTION, POWDER, FOR SOLUTION INTRAMUSCULAR; INTRAVENOUS at 20:06

## 2020-05-06 RX ADMIN — IPRATROPIUM BROMIDE AND ALBUTEROL SULFATE 1 AMPULE: .5; 3 SOLUTION RESPIRATORY (INHALATION) at 20:19

## 2020-05-06 RX ADMIN — ALBUTEROL SULFATE 2.5 MG: 2.5 SOLUTION RESPIRATORY (INHALATION) at 20:19

## 2020-05-06 NOTE — PATIENT INSTRUCTIONS
bathroom, if available. Animals: You should restrict contact with pets and other animals while you are sick with COVID-19, just like you would around other people. Although there have not been reports of pets or other animals becoming sick with COVID-19, it is still recommended that people sick with COVID-19 limit contact with animals until more information is known about the virus. When possible, have another member of your household care for your animals while you are sick. If you are sick with COVID-19, avoid contact with your pet, including petting, snuggling, being kissed or licked, and sharing food. If you must care for your pet or be around animals while you are sick, wash your hands before and after you interact with pets and wear a facemask. Call ahead before visiting your doctor  If you have a medical appointment, call the healthcare provider and tell them that you have or may have COVID-19. This will help the healthcare providers office take steps to keep other people from getting infected or exposed. Wear a facemask  You should wear a facemask when you are around other people (e.g., sharing a room or vehicle) or pets and before you enter a healthcare providers office. If you are not able to wear a facemask (for example, because it causes trouble breathing), then people who live with you should not stay in the same room with you, or they should wear a facemask if they enter your room. Cover your coughs and sneezes  Cover your mouth and nose with a tissue when you cough or sneeze. Throw used tissues in a lined trash can. Immediately wash your hands with soap and water for at least 20 seconds or, if soap and water are not available, clean your hands with an alcohol-based hand  that contains at least 60% alcohol.   Clean your hands often  Wash your hands often with soap and water for at least 20 seconds, especially after blowing your nose, coughing, or sneezing; going to the bathroom; and before eating or preparing food. If soap and water are not readily available, use an alcohol-based hand  with at least 60% alcohol, covering all surfaces of your hands and rubbing them together until they feel dry. Soap and water are the best option if hands are visibly dirty. Avoid touching your eyes, nose, and mouth with unwashed hands. Avoid sharing personal household items  You should not share dishes, drinking glasses, cups, eating utensils, towels, or bedding with other people or pets in your home. After using these items, they should be washed thoroughly with soap and water. Clean all high-touch surfaces everyday  High touch surfaces include counters, tabletops, doorknobs, bathroom fixtures, toilets, phones, keyboards, tablets, and bedside tables. Also, clean any surfaces that may have blood, stool, or body fluids on them. Use a household cleaning spray or wipe, according to the label instructions. Labels contain instructions for safe and effective use of the cleaning product including precautions you should take when applying the product, such as wearing gloves and making sure you have good ventilation during use of the product. Monitor your symptoms  Seek prompt medical attention if your illness is worsening (e.g., difficulty breathing). Before seeking care, call your healthcare provider and tell them that you have, or are being evaluated for, COVID-19. Put on a facemask before you enter the facility. These steps will help the healthcare providers office to keep other people in the office or waiting room from getting infected or exposed. Ask your healthcare provider to call the local or state health department. Persons who are placed under active monitoring or facilitated self-monitoring should follow instructions provided by their local health department or occupational health professionals, as appropriate. When working with your local health department check their available hours.   If you You can now view your medical record. Additional Information  If you have questions, please contact your physician practice where you receive care. Remember, [facility] is NOT to be used for urgent needs. For medical emergencies, dial 911.

## 2020-05-06 NOTE — PROGRESS NOTES
5/6/2020    FLU/COVID-19 CLINIC EVALUATION    HPI  SYMPTOMS:    Symptom duration, days:  [] 1   [x] 2   [] 3   [] 4   [] 5   [] 6   [] 7   [] 8   [] 9   [] 10   [] 11   [] 12   [] 13 [] 14 +      Symptom course:   [] Worsening     [x] Stable     [x] Improving    [] Fevers  [] Symptom (not measured)  [] Measured (Result: )  [] Chills  [x] Cough  [] Coughing up blood  [] Productive  [x] Dry  [] Chest Congestion  [] Chest Tightness  [] Nasal Congestion  [] Runny Nose  [x] Feeling short of breath, wheezing  [] Fatigue  [] Chest pain  [] Headaches  []Tolerable  [] Severe  [] Sore throat  [] Muscle aches  [] Nausea  [] Decreased appetite  [] Vomiting  []Unable to keep fluids down  [] Diarrhea  []Severe    [x] OTHER SYMPTOMS:  wheezing  RISK FACTORS:    Uses albuterol with nebulizer   Wife was tested no results yet  [] Pregnant or possibly pregnant  [] Age over 61  [x] Diabetes  [x] Heart disease  [] Asthma  [] COPD/Other chronic lung diseases  [] Active Cancer  [] On Chemotherapy  [] Taking oral steroids  [] History Lymphoma/Leukemia  [] Close contact with a lab confirmed COVID-19 patient within 14 days of symptom onset  [] History of travel from affected geographical areas within 14 days of symptom onset  [] Health Care Worker Exposure no symptoms  [] Health Care Worker Exposure symptomatic      VITALS:  Vitals:    05/06/20 1218   Pulse: 91   Temp: 98.6 °F (37 °C)   TempSrc: Oral   SpO2: 98%        PHYSICAL EXAMINATION:  [x]Alert   [x]Oriented to person/place/time    [x]No apparent distress     []Toxic appearing    [x]Breathing appears normal     []Appears tachypneic         [x]Speaking in full sentences     TESTS:  POCT FLU:  [] Positive     []Negative  POCT STREP:  [] Positive     []Negative    [x] COVID-19 Test sent: [x] Blue   [] Red   [] Chest X-ray     ASSESSMENT:  [] Flu  [] Strep Throat  [] Uncertain Viral Respiratory Illness  [x] Possible COVID-19  [] Exposure COVID-19  [] Other:     PLAN:  [x] Discharge home

## 2020-05-07 ENCOUNTER — CARE COORDINATION (OUTPATIENT)
Dept: CARE COORDINATION | Age: 60
End: 2020-05-07

## 2020-05-07 LAB
EKG ATRIAL RATE: 90 BPM
EKG DIAGNOSIS: NORMAL
EKG P AXIS: 70 DEGREES
EKG P-R INTERVAL: 162 MS
EKG Q-T INTERVAL: 372 MS
EKG QRS DURATION: 86 MS
EKG QTC CALCULATION (BAZETT): 455 MS
EKG R AXIS: 42 DEGREES
EKG T AXIS: 63 DEGREES
EKG VENTRICULAR RATE: 90 BPM

## 2020-05-07 PROCEDURE — 93010 ELECTROCARDIOGRAM REPORT: CPT | Performed by: INTERNAL MEDICINE

## 2020-05-07 NOTE — CARE COORDINATION
Author provided contact information for future reference. Patient/family/caregiver given information for [employer] and agrees to enroll yes  Patient's preferred e-mail: [email or web address]    Patient's preferred phone number: [phone]  Based on Loop alert triggers, patient will be contacted by nurse care manager for worsening symptoms.

## 2020-05-08 LAB
SARS-COV-2: NOT DETECTED
SOURCE: NORMAL

## 2020-05-12 ENCOUNTER — VIRTUAL VISIT (OUTPATIENT)
Dept: FAMILY MEDICINE CLINIC | Age: 60
End: 2020-05-12
Payer: COMMERCIAL

## 2020-05-12 ENCOUNTER — TELEPHONE (OUTPATIENT)
Dept: FAMILY MEDICINE CLINIC | Age: 60
End: 2020-05-12

## 2020-05-12 PROCEDURE — 99213 OFFICE O/P EST LOW 20 MIN: CPT | Performed by: FAMILY MEDICINE

## 2020-05-12 RX ORDER — METHYLPREDNISOLONE 4 MG/1
TABLET ORAL
Qty: 1 KIT | Refills: 0 | Status: SHIPPED | OUTPATIENT
Start: 2020-05-12 | End: 2020-05-18

## 2020-05-12 ASSESSMENT — ENCOUNTER SYMPTOMS
SHORTNESS OF BREATH: 1
WHEEZING: 1
COUGH: 1

## 2020-05-12 NOTE — PROGRESS NOTES
Subjective:      Patient ID: Justin Stone is a 61 y.o. male. ARMINDA EDWARDS is here in follow-up to a visit at [facility] for an asthma exacerbation. He feels much improved compared to his condition the day of the emergency room visit. He was sent home with 5 days of prednisone 50. We both agree that probably several more days of steroids would be a good idea so I sent him in a Medrol Dosepak. He has been spending a lot of time cutting particle board with a saw. He thinks this may be the precipitating trigger for his bronchial spasm. Review of Systems   Respiratory: Positive for cough, shortness of breath (improved) and wheezing (improved). Objective:   Physical Exam  Physical exam was not performed because this was a virtual visit  Assessment and plan      1. Bronchitis-Medrol Dosepak      2. Moderate persistent reactive airway disease with acute exacerbation-Medrol Dosepak, continue home nebulizer treatments      3. Overweight-decrease calories and increase exercise  I spent 15 minutes in activities related to this visit.         Itz Hartman, DO

## 2020-05-12 NOTE — TELEPHONE ENCOUNTER
lvm for patient Dr. Melissa Farmer wants him back or 3 m diabetic ck in August.  Please cancel June appt and move to an in office visit in office if patient calls back.     Also I was calling to collect his copay for today's visit

## 2020-06-09 ENCOUNTER — NURSE TRIAGE (OUTPATIENT)
Dept: OTHER | Facility: CLINIC | Age: 60
End: 2020-06-09

## 2020-06-09 ENCOUNTER — OFFICE VISIT (OUTPATIENT)
Dept: FAMILY MEDICINE CLINIC | Age: 60
End: 2020-06-09
Payer: COMMERCIAL

## 2020-06-09 ENCOUNTER — TELEPHONE (OUTPATIENT)
Dept: FAMILY MEDICINE CLINIC | Age: 60
End: 2020-06-09

## 2020-06-09 VITALS
HEART RATE: 79 BPM | TEMPERATURE: 98 F | HEIGHT: 70 IN | SYSTOLIC BLOOD PRESSURE: 119 MMHG | BODY MASS INDEX: 36.79 KG/M2 | WEIGHT: 257 LBS | DIASTOLIC BLOOD PRESSURE: 65 MMHG | OXYGEN SATURATION: 95 %

## 2020-06-09 LAB — HBA1C MFR BLD: 9.7 %

## 2020-06-09 PROCEDURE — 83036 HEMOGLOBIN GLYCOSYLATED A1C: CPT | Performed by: NURSE PRACTITIONER

## 2020-06-09 PROCEDURE — 99214 OFFICE O/P EST MOD 30 MIN: CPT | Performed by: NURSE PRACTITIONER

## 2020-06-09 PROCEDURE — 96372 THER/PROPH/DIAG INJ SC/IM: CPT | Performed by: NURSE PRACTITIONER

## 2020-06-09 RX ORDER — IPRATROPIUM BROMIDE AND ALBUTEROL SULFATE 2.5; .5 MG/3ML; MG/3ML
1 SOLUTION RESPIRATORY (INHALATION) ONCE
Status: CANCELLED | OUTPATIENT
Start: 2020-06-09 | End: 2020-06-09

## 2020-06-09 RX ORDER — LEVALBUTEROL INHALATION SOLUTION 0.63 MG/3ML
0.63 SOLUTION RESPIRATORY (INHALATION) ONCE
Status: COMPLETED | OUTPATIENT
Start: 2020-06-09 | End: 2020-06-09

## 2020-06-09 RX ORDER — METHYLPREDNISOLONE 4 MG/1
TABLET ORAL
Qty: 1 KIT | Refills: 0 | Status: SHIPPED | OUTPATIENT
Start: 2020-06-09 | End: 2020-08-13

## 2020-06-09 RX ORDER — METHYLPREDNISOLONE ACETATE 40 MG/ML
40 INJECTION, SUSPENSION INTRA-ARTICULAR; INTRALESIONAL; INTRAMUSCULAR; SOFT TISSUE ONCE
Status: COMPLETED | OUTPATIENT
Start: 2020-06-09 | End: 2020-06-09

## 2020-06-09 RX ADMIN — METHYLPREDNISOLONE ACETATE 40 MG: 40 INJECTION, SUSPENSION INTRA-ARTICULAR; INTRALESIONAL; INTRAMUSCULAR; SOFT TISSUE at 13:43

## 2020-06-09 RX ADMIN — LEVALBUTEROL INHALATION SOLUTION 0.63 MG: 0.63 SOLUTION RESPIRATORY (INHALATION) at 13:42

## 2020-06-09 ASSESSMENT — ENCOUNTER SYMPTOMS
WHEEZING: 1
COUGH: 1
STRIDOR: 0
SHORTNESS OF BREATH: 1
CHOKING: 0
CHEST TIGHTNESS: 1

## 2020-06-09 NOTE — TELEPHONE ENCOUNTER
Please call Prakash Arreguin and tell him I recommend he make an appointment with Imani or Srikanth Burr today. I do think a virtual visit is appropriate for this issue.

## 2020-06-09 NOTE — PROGRESS NOTES
de    6/9/20    Chief Complaint   Patient presents with    Shortness of Breath     this episode lasted for 2 days       HPI: Rosas Olsen is a 61 y.o. male who presents of shortness of breath, wheezing. Symptoms started Sunday (06/07/2020) after he was clearing a field with a bush hog. He complains of chest tightness. Denies fever, chest pain. He is using albuterol HFA and nebulizer as needed. Did start back using Advair nightly, was off of it prior to Sunday. He has been in the ER 3 times since March for similar symptoms. DM: uncontrolled, a1C 9.7. Diet poor. Not monitoring glucose at home. Past Medical History:   Diagnosis Date    Alcohol abuse 12/3/2012    Coronary artery disease     Hyperlipidemia     Hypertension     Non morbid obesity     Reactive airway disease with wheezing, moderate persistent, with acute exacerbation 3/3/2020    Type II or unspecified type diabetes mellitus without mention of complication, not stated as uncontrolled        Past Surgical History:   Procedure Laterality Date    CORONARY ANGIOPLASTY WITH STENT PLACEMENT  1/14/2016    1 [employer] Premier SAMUEL [id number]    VEIN SURGERY         Social History     Tobacco Use    Smoking status: Never Smoker    Smokeless tobacco: Never Used   Substance Use Topics    Alcohol use: Yes     Alcohol/week: 1.0 standard drinks     Types: 1 Shots of liquor per week     Comment: pt states he drinks whiskey daily     Drug use: No       Family History   Problem Relation Age of Onset    Heart Attack Mother     Heart Disease Mother     Heart Attack Father     Cancer Father     Hearing Loss Father     Diabetes Father     Heart Disease Father     Heart Disease Paternal Uncle         bypass       Review of Systems   Constitutional: Negative for chills, diaphoresis, fatigue and fever. Respiratory: Positive for cough, chest tightness, shortness of breath and wheezing. Negative for choking and stridor.     Cardiovascular: Negative for chest pain and leg swelling. Physical Exam  Vitals signs and nursing note reviewed. Constitutional:       General: He is not in acute distress. Appearance: He is well-developed. He is not diaphoretic. HENT:      Head: Normocephalic and atraumatic. Eyes:      General: No scleral icterus. Neck:      Musculoskeletal: Normal range of motion and neck supple. Cardiovascular:      Rate and Rhythm: Normal rate and regular rhythm. Heart sounds: Normal heart sounds. No murmur. No friction rub. No gallop. Pulmonary:      Effort: Pulmonary effort is normal. No respiratory distress. Breath sounds: No stridor. Examination of the right-upper field reveals wheezing. Examination of the left-upper field reveals wheezing. Examination of the right-middle field reveals wheezing. Examination of the left-middle field reveals wheezing. Examination of the right-lower field reveals wheezing. Examination of the left-lower field reveals wheezing. Wheezing present. No rhonchi or rales. Lymphadenopathy:      Cervical: No cervical adenopathy. Skin:     General: Skin is warm and dry. Neurological:      Mental Status: He is alert and oriented to person, place, and time. Cranial Nerves: No cranial nerve deficit. Vitals:    06/09/20 1307   BP: 119/65   Site: Right Upper Arm   Position: Sitting   Cuff Size: Large Adult   Pulse: 79   Temp: 98 °F (36.7 °C)   TempSrc: Infrared   SpO2: 95%   Weight: 257 lb (116.6 kg)   Height: 5' 10\" (1.778 m)       Assessment/Plan:  1. Moderate persistent reactive airway disease with acute exacerbation  - Lisset  Bill Balbuena MD, Pulmonary, [place]  - methylPREDNISolone (MEDROL DOSEPACK) 4 MG tablet; Take by mouth. Dispense: 1 kit; Refill: 0  - methylPREDNISolone acetate (DEPO-MEDROL) injection 40 mg  - levalbuterol (XOPENEX) nebulization 0.63 mg    2.  Shortness of breath  - Cassidy Regan [facility]  - methylPREDNISolone (MEDROL DOSEPACK) 4 MG tablet; Take by mouth. Dispense: 1 kit; Refill: 0  - methylPREDNISolone acetate (DEPO-MEDROL) injection 40 mg  - levalbuterol (XOPENEX) nebulization 0.63 mg    3. Type 2 diabetes mellitus without complication, without long-term current use of insulin (HCC)  - POCT glycosylated hemoglobin (Hb A1C)  - metFORMIN (GLUCOPHAGE) 1000 MG tablet; TAKE ONE TABLET BY MOUTH TWICE DAILY  Dispense: 90 tablet; Refill: 2    Pt given depo-medrol injection and nebulizer treatment in office  SpO2 95% on room air  Diabetes is uncontrolled-- increase Metformin 1,000 mg BID  Advair 2 puffs BID  Carry rescue inhaler with you at all times (does not have with him today)  Continue nebulizer as needed  Referral to Dr. Reta Penn    Patient Instructions   Follow up with Dr. Reta Penn  Medrol dose pack (oral steroid taper)- you should avoid taking NSAIDs while on this medication (ex: ibuprofen, aleve, aspirin). Tylenol is OK to take. Advair two puffs twice daily-- rinse mouth after each use  Continue albuterol and neb as needed  Take one tablet of Metformin twice daily       Outpatient Encounter Medications as of 6/9/2020   Medication Sig Dispense Refill    methylPREDNISolone (MEDROL DOSEPACK) 4 MG tablet Take by mouth.  1 kit 0    metFORMIN (GLUCOPHAGE) 1000 MG tablet TAKE ONE TABLET BY MOUTH TWICE DAILY 90 tablet 2    clopidogrel (PLAVIX) 75 MG tablet TAKE ONE TABLET BY MOUTH DAILY 90 tablet 3    fluticasone-salmeterol (ADVAIR DISKUS) 250-50 MCG/DOSE AEPB Inhale 1 puff into the lungs every 12 hours 60 each 3    albuterol (PROVENTIL) (2.5 MG/3ML) 0.083% nebulizer solution Take 3 mLs by nebulization every 6 hours as needed for Wheezing 120 each 3    albuterol sulfate (PROAIR RESPICLICK) 959 (90 Base) MCG/ACT aerosol powder inhalation Inhale 2 puffs into the lungs every 4 hours as needed for Wheezing 1 Inhaler 1    Spacer/Aero-Holding Chambers (E-Z SPACER) REAL 1 Device by Does not apply route daily as needed (wheezing) 1 Device 0    metoprolol

## 2020-06-10 ENCOUNTER — VIRTUAL VISIT (OUTPATIENT)
Dept: PULMONOLOGY | Age: 60
End: 2020-06-10
Payer: COMMERCIAL

## 2020-06-10 PROCEDURE — 99213 OFFICE O/P EST LOW 20 MIN: CPT | Performed by: INTERNAL MEDICINE

## 2020-06-10 RX ORDER — PREDNISONE 10 MG/1
TABLET ORAL
Qty: 30 TABLET | Refills: 0 | Status: SHIPPED | OUTPATIENT
Start: 2020-06-10 | End: 2020-06-22

## 2020-06-10 NOTE — PROGRESS NOTES
Pulmonary Follow-up Note  Chief Complaint: shortness of breath, wheezing  Referring Provider: hospital f/u    Interval history: sent home with Advair 250/50 BID and did well. In early May was cutting plywood and had flare requiring ED visit. Used nebulizer with benefit. More recently had flare related to cutting grass. Saw Dr. Minnie Tomas and feels better after receiving steroids. Presenting history: ED 3/3/2020 with a 3-week history of URI symptoms that have worsened to include moderately severe shortness of breath, worse with exertion, associated with wheezing. He has been treated with steroids, initially with improvement, but the second course that he obtained in the emergency department earlier this week did not seem to make much difference     reports that he has never smoked. He has never used smokeless tobacco.     Review of Systems:    Physical Exam:  There were no vitals taken for this visit. VIRTUAL  Constitutional:  No acute distress. Appears well developed and nourished. Eyes: EOM intact. Conjunctivae anicteric. No visible discharge. HENT: Head is normocephalic and atraumatic. Mucus membranes are moist and the tongue appears normal. Normal appearing nose. External Ears normal. Neck with no obvious mass and the trachea is midline. Respiratory: No accessory muscle usage. Respiratory effort normal. No visualized signs of difficulty breathing or respiratory distress  Cardiovascular: No obvious peripheral edema. Skin: No significant exanthematous lesions or discoloration noted on facial skin    Psychiatric: No anxiety or Agitation. Alert and Oriented to person, place and time. Normal judgement and insight. Data:   5/6/20 CXR clear  2/12/16 CT CHEST no nodule or mass      Assessment:  · Severe persistent asthma with 3 flares in 1st half of 2020. Very steroid responsive    Not addressed today  · Diabetes mellitus   · CAD s/p PCI    Plan:   · Advair 250/50 BID without exception.   Increase to 500/50 if still inadequate control with better usage  · Albuterol neb/MDI PRN  · Emergency action plan/emergency prescription for asthma  · See me in 3 months.

## 2020-06-15 RX ORDER — ISOSORBIDE MONONITRATE 120 MG/1
TABLET, EXTENDED RELEASE ORAL
Qty: 90 TABLET | Refills: 2 | Status: ON HOLD
Start: 2020-06-15 | End: 2021-02-23 | Stop reason: HOSPADM

## 2020-06-29 ENCOUNTER — TELEPHONE (OUTPATIENT)
Dept: FAMILY MEDICINE CLINIC | Age: 60
End: 2020-06-29

## 2020-06-29 NOTE — TELEPHONE ENCOUNTER
Patient was seen in office 6/9/2020 by [facility]. His metformin 1000 mg was increased to 1 1/2 tablets daily.     Can we please send new Rx to [facility]

## 2020-08-10 ENCOUNTER — TELEPHONE (OUTPATIENT)
Dept: CARDIOLOGY CLINIC | Age: 60
End: 2020-08-10

## 2020-08-11 NOTE — TELEPHONE ENCOUNTER
Spoke with patient. Making sure tylenol was ok. Also if IBF was used sparingly. I informed him that as long as it was sparingly. Don't recommend it daily or long term. He voiced understanding.

## 2020-08-13 ENCOUNTER — OFFICE VISIT (OUTPATIENT)
Dept: FAMILY MEDICINE CLINIC | Age: 60
End: 2020-08-13
Payer: COMMERCIAL

## 2020-08-13 VITALS
OXYGEN SATURATION: 96 % | HEART RATE: 74 BPM | BODY MASS INDEX: 37.31 KG/M2 | WEIGHT: 260 LBS | TEMPERATURE: 98.2 F | DIASTOLIC BLOOD PRESSURE: 74 MMHG | RESPIRATION RATE: 18 BRPM | SYSTOLIC BLOOD PRESSURE: 128 MMHG

## 2020-08-13 PROBLEM — Z23 NEED FOR PROPHYLACTIC VACCINATION AND INOCULATION AGAINST VARICELLA: Status: ACTIVE | Noted: 2020-08-13

## 2020-08-13 PROBLEM — Z23 NEED FOR PROPHYLACTIC VACCINATION AGAINST DIPHTHERIA-TETANUS-PERTUSSIS (DTP): Status: ACTIVE | Noted: 2020-08-13

## 2020-08-13 LAB — HBA1C MFR BLD: 8.6 %

## 2020-08-13 PROCEDURE — 99214 OFFICE O/P EST MOD 30 MIN: CPT | Performed by: FAMILY MEDICINE

## 2020-08-13 PROCEDURE — 3052F HG A1C>EQUAL 8.0%<EQUAL 9.0%: CPT | Performed by: FAMILY MEDICINE

## 2020-08-13 PROCEDURE — 83036 HEMOGLOBIN GLYCOSYLATED A1C: CPT | Performed by: FAMILY MEDICINE

## 2020-08-13 RX ORDER — METHYLPREDNISOLONE 4 MG/1
TABLET ORAL
Qty: 1 KIT | Refills: 0 | Status: SHIPPED | OUTPATIENT
Start: 2020-08-13 | End: 2020-08-19

## 2020-08-13 ASSESSMENT — ENCOUNTER SYMPTOMS
CHOKING: 0
WHEEZING: 0
SHORTNESS OF BREATH: 0
ABDOMINAL PAIN: 0
STRIDOR: 0
CHEST TIGHTNESS: 0
BACK PAIN: 0
COUGH: 0

## 2020-08-17 ENCOUNTER — TELEPHONE (OUTPATIENT)
Dept: FAMILY MEDICINE CLINIC | Age: 60
End: 2020-08-17

## 2020-08-17 NOTE — TELEPHONE ENCOUNTER
I talked to K. She tells me that so far the Yasemin More has not been helpful to her  JERRY.  He took an Aleve today which relieved his pain. I explained to her that you should not take Aleve while taking steroids. And she tells me that she and her  both knew that. I recommended they discontinue the Medrol Dosepak today take no more Aleve today and then starting tomorrow take 1 or 2 Aleve twice daily with food. I asked her or J to call me tomorrow afternoon with progress.

## 2020-08-18 ENCOUNTER — TELEPHONE (OUTPATIENT)
Dept: FAMILY MEDICINE CLINIC | Age: 60
End: 2020-08-18

## 2020-08-18 NOTE — TELEPHONE ENCOUNTER
Please tell J that I think 2 Aleve in the morning with food, 1 at noon with food, 1 in the evening with food would be a good schedule.

## 2020-08-18 NOTE — TELEPHONE ENCOUNTER
Patients wife called back to update Dr. Hortencia Mtz regarding the patients medication. She stated that the Aleve is helping more than the Medrol dose emanuel. Within 10 minutes of taking the Aleve his pain is gone.

## 2020-08-18 NOTE — TELEPHONE ENCOUNTER
I called Trena Love gave her the information she stated that she spoke with Carlos Hashimoto a few minutes prior to me calling she stated that he was saying that the am dose of Aleve works  But after lunch he starts having issues again and would like to know if he can change up the way he is taking the Aleve, examples one every 4 hours or 2 in the am in and one at noon and then one at night .  He would like to know what way you feel is best.

## 2020-08-24 NOTE — PROGRESS NOTES
[address] [facility]   Cardiovascular Evaluation    PATIENT: Atif Michael  DATE: 2020  MRN: <[id number]>  CSN: [id number]  : 1960      Primary Care Doctor: Elicia Fraser DO  Reason for evaluation:   Follow-up; Chest Pain (comes and goes, usually in the morning); and Back Pain      Subjective:   History of present illness on initial date of evaluation:   Atif Michael is a 61 y.o. patient who presents for follow up. He has a PMH DM, HTN, CAD, s/p PCI, alcohol abuse, nonsmoker, + FH. Was admitted to hospital in 2016 with [place] and had abnormal stress test. Underwent PCI of OM as well as bld-re-gopopq LAD and was d/c'd/ Returned to hospital and noted to have mild troponin elevation and CP relieved with NTG SL. He underwent LHC on 16 and a PCI-D-1 with SAMUEL was done. Stents in prox-mid LAD, distal LAD, proximal-mid OM-1 were found patent. He was switched from Plavix to Brilinta. Imdur was added at office visit in 2016 due to complaints of exertional chest pain. He was admitted to the hospital in late May 2016 with recurrent chest pain and transient troponin elevation. LHC was showed patent stents. He was started on Ranexa. He underwent 17 rotablade guided PCI of OM1 recurrent instent restenosis. Today he reports he has been having frequent chest pain. He states it occurs mainly in the mornings. He has been taking SL Nitro for this. He states it feels similar prior to his stents. He states the feeling is making him concerned. Echo 3/3/20 demonstrated EF 55%, Grade 1 DD. He denies SOB, palpitations, dizziness or syncope.        Patient Active Problem List   Diagnosis    Type II or unspecified type diabetes mellitus without mention of complication, not stated as uncontrolled    Hypertension    Diabetes mellitus    HTN (hypertension)    History of ETOH abuse    Patient overweight    Patient overweight    Dietary noncompliance    Alcohol abuse    Flank pain    Prostatism    Low serum testosterone level    Arm pain, left    Shoulder pain    Precordial pain    Lung nodule    Acute angina (HCC)    Abnormal stress test    Acute coronary syndrome (HCC)    Unstable angina (HCC)    Coronary artery disease involving native coronary artery of native heart with unstable angina pectoris (HCC)    Chest pain, moderate coronary artery risk    S/P drug eluting coronary stent placement    Essential hypertension    Obesity    Abnormal chest x-ray    CAD (coronary artery disease)    Angina effort    Skin lesion    Angina at rest [facility])    Hyperlipidemia    Angina pectoris [facility]    NSTEM[facility]non-ST elevated [facility] infarction) [facility]    Controlled type 2 diabetes mellitus without complication, without long-term current use of insulin (HCC)    ASHD (arteriosclerotic heart disease)    Mass of right foot    Overweight    History of angina pectoris    Reactive airway disease    Bronchitis    Reactive airway disease with wheezing, moderate persistent, with acute exacerbation    Need for prophylactic vaccination and inoculation against varicella    Need for prophylactic vaccination against diphtheria-tetanus-pertussis (DTP)         Past Medical History:   has a past medical history of Alcohol abuse, Coronary artery disease, Hyperlipidemia, Hypertension, Non morbid obesity, Reactive airway disease with wheezing, moderate persistent, with acute exacerbation, and Type II or unspecified type diabetes mellitus without mention of complication, not stated as uncontrolled. Surgical History:   has a past surgical history that includes Vein Surgery and Coronary angioplasty with stent (1/14/2016). Social History:   reports that he has never smoked. He has never used smokeless tobacco. He reports current alcohol use of about 1.0 standard drinks of alcohol per week. He reports that he does not use drugs.      Family History:  No evidence for sudden cardiac death or premature CAD    Home Medications:  Reviewed and are listed in nursing record. and/or listed below  Current Outpatient Medications   Medication Sig Dispense Refill    Naproxen Sodium (ALEVE PO) Take by mouth as needed      metFORMIN (GLUCOPHAGE) 1000 MG tablet TAKE ONE  TABLET BY MOUTH TWICE DAILY 135 tablet 2    glipiZIDE (GLUCOTROL) 10 MG tablet TAKE ONE TABLET BY MOUTH EVERY MORNING FOR DIABETES 30 tablet 2    isosorbide mononitrate (IMDUR) 120 MG extended release tablet TAKE ONE TABLET BY MOUTH DAILY 90 tablet 2    clopidogrel (PLAVIX) 75 MG tablet TAKE ONE TABLET BY MOUTH DAILY 90 tablet 3    fluticasone-salmeterol (ADVAIR DISKUS) 250-50 MCG/DOSE AEPB Inhale 1 puff into the lungs every 12 hours 60 each 3    albuterol (PROVENTIL) (2.5 MG/3ML) 0.083% nebulizer solution Take 3 mLs by nebulization every 6 hours as needed for Wheezing 120 each 3    albuterol sulfate (PROAIR RESPICLICK) 189 (90 Base) MCG/ACT aerosol powder inhalation Inhale 2 puffs into the lungs every 4 hours as needed for Wheezing 1 Inhaler 1    Spacer/Aero-Holding Chambers (E-Z SPACER) REAL 1 Device by Does not apply route daily as needed (wheezing) 1 Device 0    metoprolol tartrate (LOPRESSOR) 25 MG tablet TAKE ONE TABLET BY MOUTH TWICE A  tablet 3    atorvastatin (LIPITOR) 40 MG tablet Take 1 qd in the evening. 90 tablet 2    lisinopril (PRINIVIL;ZESTRIL) 2.5 MG tablet TAKE ONE TABLET BY MOUTH DAILY 90 tablet 3    nitroGLYCERIN (NITROSTAT) 0.4 MG SL tablet Place 1 tablet under the tongue every 5 minutes as needed for Chest pain 25 tablet 3    aspirin 81 MG chewable tablet CHEW ONE TABLET BY MOUTH DAILY 30 tablet 1     No current facility-administered medications for this visit. Allergies:  Vitamin d analogs and Vitamin d analogs     Review of Systems:   A 14 point review of symptoms completed. Pertinent positives identified in the HPI, all other review of symptoms negative as below.     Objective:   PHYSICAL EXAM: Vitals:    08/25/20 0816   BP: 120/66   Pulse: 67   SpO2: 97%    Weight: 259 lb (117.5 kg)     Wt Readings from Last 3 Encounters:   08/25/20 259 lb (117.5 kg)   08/13/20 260 lb (117.9 kg)   06/09/20 257 lb (116.6 kg)         General Appearance:  Alert, cooperative, no distress, appears stated age   Head:  Normocephalic, atraumatic   Eyes:  PERRL, conjunctiva/corneas clear   Nose: Nares normal, no drainage or sinus tenderness   Throat: Lips, mucosa, and tongue normal   Neck: Supple, symmetrical, trachea midline, NL thyroid no carotid bruit or JVD   Lungs:   CTAB, respirations unlabored   Chest Wall:  No tenderness or deformity   Heart:  Regular rhythm and normal rate; S1, S2 are normal;   no murmur noted; no rub or gallop   Abdomen:   Soft, non-tender, +BS x 4, no masses, no organomegaly   Extremities: Extremities normal, atraumatic, no cyanosis or edema   Pulses: 2+ and symmetric   Skin: Skin color, texture, turgor normal, no rashes or lesions   Pysch: Normal mood and affect   Neurologic: Normal gross motor and sensory exam.         LABS   CBC:      Lab Results   Component Value Date    WBC 7.8 05/06/2020    RBC 4.93 05/06/2020    HGB 15.1 05/06/2020    HCT 45.7 05/06/2020    MCV 92.6 05/06/2020    RDW 14.5 05/06/2020     05/06/2020     CMP:  Lab Results   Component Value Date     05/06/2020    K 4.0 05/06/2020    CL 98 05/06/2020    CO2 27 05/06/2020    BUN 11 05/06/2020    CREATININE 0.7 05/06/2020    GFRAA >60 05/06/2020    AGRATIO 1.6 05/06/2020    LABGLOM >60 05/06/2020    GLUCOSE 208 05/06/2020    PROT 7.4 05/06/2020    CALCIUM 9.9 05/06/2020    BILITOT 0.4 05/06/2020    ALKPHOS 85 05/06/2020    AST 15 05/06/2020    ALT 20 05/06/2020     PT/INR:   No results found for: PTINR  Liver:  No components found for: CHLPL  Lab Results   Component Value Date    ALT 20 05/06/2020    AST 15 05/06/2020    ALKPHOS 85 05/06/2020    BILITOT 0.4 05/06/2020     Lab Results   Component Value Date    LABA1C 8.6 08/13/2020     Lipids:         Lab Results   Component Value Date    TRIG 115 09/06/2017    TRIG 174 (H) 07/09/2017    TRIG 166 (H) 01/18/2016            Lab Results   Component Value Date    HDL 75 (H) 01/06/2020    HDL 66 (H) 09/06/2017    HDL 58 07/09/2017            Lab Results   Component Value Date    LDLCALC 63 01/06/2020    LDLCALC 69 09/06/2017    LDLCALC 54 07/09/2017            Lab Results   Component Value Date    LABVLDL 19 01/06/2020    LABVLDL 23 09/06/2017    LABVLDL 35 07/09/2017         CARDIAC DATA   EKG:    ECHO 3/3/2020  Summary   Technically difficult examination due to body habitus. Definity® used for   myocardial border enhancement. Normal left ventricle size and systolic function with a visually estimated   ejection fraction of 55%. Mild concentric left ventricular hypertrophy. No regional wall motion abnormalities are seen. Grade I diastolic dysfunction with normal LV filling pressures. The right atrium is mildly enlarged. STRESS TEST:   1. Inferolateral basilar reversible stress defect compatible with stress-induced ischemia. 2. Ejection fraction of 56%   3. No focal wall motion abnormality. CATH: 5/27/16  1. Patent drug-eluting stent proximal first obtuse marginal branch. 2. Patent drug-eluting stent from [id number]-tve left anterior descending. 3. Patent drug-eluting stent distal left anterior descending. 4. Patent drug-eluting stent proximal first diagonal branch. 5. Normal left ventricular systolic function with an ejection fraction of  approximately 55% to 60%. 6. Normal left ventricular end-diastolic pressure at 10 mmHg. RECOMMENDATIONS: The patient's stents are all patent and he has no  obstructive disease in other arteries. His angina is from small vessel disease  and will add Ranexa 1000 mg b.i.d. to his antianginal regimen. He will  continue with Imdur and Lopressor for the angina.  He will also continue with  aspirin, Brilinta, as well as a moderate-dose Lipitor. CATH: 01/21/2016  1. A 70% proximal medium-caliber first diagonal branch status post 2.25 x  12-mm Xience Alpine drug-eluting stent. 2.  Patent drug-eluting stent in proximal-mid left anterior descending. 3.  Patent drug-eluting stent in the distal left anterior descending. 4.  Patent drug-eluting stent in the olzyqwfw-ha-lvx first obtuse marginal  branch. RECOMMENDATIONS:  Aspirin indefinitely. Brilinta at least for 12 months. Aggressive medical therapy and risk factors modification for coronary disease. CATH: 01/15/2016  IMPRESSION:   1. An 80% diffuse mid left anterior descending with a fractional flow  reserve of 0.79 (significant), status post 2.75 x 28-mm XIENCE  Alpine drug-eluting stent postdilated with a 3.5-mm noncompliant balloon. 2. A 60% distal left anterior descending lesion with a fractional flow   reserve of 0.5 (significant), status post 2.25 x 23-mm XIENCE Alpine   drug-eluting stent postdilated with 2.5-mm noncompliant balloon. 3. Patent stent in proximal to mid 1st obtuse marginal branch. RECOMMENDATIONS:  1. Aspirin indefinitely. 2. Brilinta at least for 12 months. 3. Continue aggressive medical therapy for coronary artery disease. CATH: 01/14/2016  1. A 90% large first obtuse marginal branch status post 2.25 x 16 mm Promus  Premier drug-eluting stent. 2. An 80% diffuse mid and 60% distal left anterior descending. 3. An 80% proximal medium caliber first diagonal branch. 4. Normal left ventricular systolic function with an ejection fraction of  55%. 5. Elevated left ventricular end diastolic pressure 19 mmHg.      Cath 7/11/17  Intervention:  90% prox OM1 in-stent restenosis, s/p PTCA with 2.5-mm NC as well as 2.5-mm Flexitome cutting balloon  Assessment:  - Successful PCI of high grade prox OM1 in-stent restenosis  - Patent stent prox-mid LAD with mild instent restenoss of distal 1/3rd  - Patent stent distal LAD  - Patent stent prox  Hypertension    Diabetes mellitus    HTN (hypertension)    History of ETOH abuse    Patient overweight    Patient overweight    Dietary noncompliance    Alcohol abuse    Flank pain    Prostatism    Low serum testosterone level    Arm pain, left    Shoulder pain    Precordial pain    Lung nodule    Acute angina (HCC)    Abnormal stress test    Acute coronary syndrome (HCC)    Unstable angina (HCC)    Coronary artery disease involving native coronary artery of native heart with unstable angina pectoris (HCC)    Chest pain, moderate coronary artery risk    S/P drug eluting coronary stent placement    Essential hypertension    Obesity    Abnormal chest x-ray    CAD (coronary artery disease)    Angina effort    Skin lesion    Angina at rest ([address]    Hyperlipidemia    Angina pectoris ([address]    NSTEMI (non-ST elevated myocardial infarction) ([facility]    Controlled type 2 diabetes mellitus without complication, without long-term current use of insulin (HCC)    ASHD (arteriosclerotic heart disease)    Mass of right foot    Overweight    History of angina pectoris    Reactive airway disease    Bronchitis    Reactive airway disease with wheezing, moderate persistent, with acute exacerbation    Need for prophylactic vaccination and inoculation against varicella    Need for prophylactic vaccination against diphtheria-tetanus-pertussis (DTP)       Patient Plan:  1. Left Heart Cath (angiogram) - Schedule rep for LASER    Hold Metformin 48 hours prior to the procedure and 2 days after    Hold Glipizide the morning of the procedure   NO COVID TESTING REQUIRED     2. Follow up after procedure          It is a pleasure to assist in the care of Atif Michael. Please call with any questions.       This note was scribed in the presence of Ti Lazo MD by Afshan Schilling RN.      Chas Delgado MD, personally performed the services described in this documentation as scribed by the above signed scribe in my presence, and it is both accurate and complete to the best of our ability and knowledge. I agree with the details independently gathered by my clinical support staff, while the remaining scribed note accurately describes my personal service to the patient. The above RN is working as a scribe for and in the presence of myself . Working as a scribe, the RN may have prepopulated components of this note with my historical intellectual property under my direct supervision. Any additions to this intellectual property were performed at my direction. Furthermore, the content and accuracy of this note have been reviewed by me to the best of my ability.          Tashia Belle MD, [id number] [facility] Cardiologist  [facility]  [phone] [facility]  [phone] [address] [place] [address]  8/25/2020  8:28 AM

## 2020-08-25 ENCOUNTER — OFFICE VISIT (OUTPATIENT)
Dept: CARDIOLOGY CLINIC | Age: 60
End: 2020-08-25
Payer: COMMERCIAL

## 2020-08-25 ENCOUNTER — TELEPHONE (OUTPATIENT)
Dept: CARDIOLOGY CLINIC | Age: 60
End: 2020-08-25

## 2020-08-25 VITALS
WEIGHT: 259 LBS | HEIGHT: 70 IN | OXYGEN SATURATION: 97 % | BODY MASS INDEX: 37.08 KG/M2 | DIASTOLIC BLOOD PRESSURE: 66 MMHG | HEART RATE: 67 BPM | SYSTOLIC BLOOD PRESSURE: 120 MMHG

## 2020-08-25 PROCEDURE — 99215 OFFICE O/P EST HI 40 MIN: CPT | Performed by: INTERNAL MEDICINE

## 2020-08-25 NOTE — LETTER
[address] [facility]   Cardiovascular Evaluation    PATIENT: Lety Umanzor  DATE: 2020  MRN: <[id number]>  CSN: [id number]  : 1960      Primary Care Doctor: Elliot Valdez DO  Reason for evaluation:   Follow-up; Chest Pain (comes and goes, usually in the morning); and Back Pain      Subjective:   History of present illness on initial date of evaluation:   Lety Umanzor is a 61 y.o. patient who presents for follow up. He has a PMH DM, HTN, CAD, s/p PCI, alcohol abuse, nonsmoker, + FH. Was admitted to hospital in 2016 with [place] and had abnormal stress test. Underwent PCI of OM as well as [id number] LAD and was d/c'd/ Returned to hospital and noted to have mild troponin elevation and CP relieved with NTG SL. He underwent LHC on 16 and a PCI-D-1 with SAMUEL was done. Stents in prox-mid LAD, distal LAD, proximal-mid OM-1 were found patent. He was switched from Plavix to Brilinta. Imdur was added at office visit in 2016 due to complaints of exertional chest pain. He was admitted to the hospital in late May 2016 with recurrent chest pain and transient troponin elevation. LHC was showed patent stents. He was started on Ranexa. He underwent 17 rotablade guided PCI of OM1 recurrent instent restenosis. Today he reports he has been having frequent chest pain. He states it occurs mainly in the mornings. He has been taking SL Nitro for this. He states it feels similar prior to his stents. He states the feeling is making him concerned. Echo 3/3/20 demonstrated EF 55%, Grade 1 DD. He denies SOB, palpitations, dizziness or syncope.        Patient Active Problem List   Diagnosis    Type II or unspecified type diabetes mellitus without mention of complication, not stated as uncontrolled    Hypertension    Diabetes mellitus    HTN (hypertension)    History of ETOH abuse    Patient overweight    Patient overweight    Dietary noncompliance    Alcohol abuse    Flank pain  Prostatism    Low serum testosterone level    Arm pain, left    Shoulder pain    Precordial pain    Lung nodule    Acute angina (HCC)    Abnormal stress test    Acute coronary syndrome (HCC)    Unstable angina (HCC)    Coronary artery disease involving native coronary artery of native heart with unstable angina pectoris (HCC)    Chest pain, moderate coronary artery risk    S/P drug eluting coronary stent placement    Essential hypertension    Obesity    Abnormal chest x-ray    CAD (coronary artery disease)    Angina effort    Skin lesion    Angina at rest [facility])    Hyperlipidemia    Angina pectoris ([facility]    NSTEMI (non-ST elevated [facility] infarction) [facility]    Controlled type 2 diabetes mellitus without complication, without long-term current use of insulin (HCC)    ASHD (arteriosclerotic heart disease)    Mass of right foot    Overweight    History of angina pectoris    Reactive airway disease    Bronchitis    Reactive airway disease with wheezing, moderate persistent, with acute exacerbation    Need for prophylactic vaccination and inoculation against varicella    Need for prophylactic vaccination against diphtheria-tetanus-pertussis (DTP)         Past Medical History:   has a past medical history of Alcohol abuse, Coronary artery disease, Hyperlipidemia, Hypertension, Non morbid obesity, Reactive airway disease with wheezing, moderate persistent, with acute exacerbation, and Type II or unspecified type diabetes mellitus without mention of complication, not stated as uncontrolled. Surgical History:   has a past surgical history that includes Vein Surgery and Coronary angioplasty with stent (1/14/2016). Social History:   reports that he has never smoked. He has never used smokeless tobacco. He reports current alcohol use of about 1.0 standard drinks of alcohol per week. He reports that he does not use drugs.      Family History: No evidence for sudden cardiac death or premature CAD    Home Medications:  Reviewed and are listed in nursing record. and/or listed below  Current Outpatient Medications   Medication Sig Dispense Refill    Naproxen Sodium (ALEVE PO) Take by mouth as needed      metFORMIN (GLUCOPHAGE) 1000 MG tablet TAKE ONE  TABLET BY MOUTH TWICE DAILY 135 tablet 2    glipiZIDE (GLUCOTROL) 10 MG tablet TAKE ONE TABLET BY MOUTH EVERY MORNING FOR DIABETES 30 tablet 2    isosorbide mononitrate (IMDUR) 120 MG extended release tablet TAKE ONE TABLET BY MOUTH DAILY 90 tablet 2    clopidogrel (PLAVIX) 75 MG tablet TAKE ONE TABLET BY MOUTH DAILY 90 tablet 3    fluticasone-salmeterol (ADVAIR DISKUS) 250-50 MCG/DOSE AEPB Inhale 1 puff into the lungs every 12 hours 60 each 3    albuterol (PROVENTIL) (2.5 MG/3ML) 0.083% nebulizer solution Take 3 mLs by nebulization every 6 hours as needed for Wheezing 120 each 3    albuterol sulfate (PROAIR RESPICLICK) 045 (90 Base) MCG/ACT aerosol powder inhalation Inhale 2 puffs into the lungs every 4 hours as needed for Wheezing 1 Inhaler 1    Spacer/Aero-Holding Chambers (E-Z SPACER) REAL 1 Device by Does not apply route daily as needed (wheezing) 1 Device 0    metoprolol tartrate (LOPRESSOR) 25 MG tablet TAKE ONE TABLET BY MOUTH TWICE A  tablet 3    atorvastatin (LIPITOR) 40 MG tablet Take 1 qd in the evening. 90 tablet 2    lisinopril (PRINIVIL;ZESTRIL) 2.5 MG tablet TAKE ONE TABLET BY MOUTH DAILY 90 tablet 3    nitroGLYCERIN (NITROSTAT) 0.4 MG SL tablet Place 1 tablet under the tongue every 5 minutes as needed for Chest pain 25 tablet 3    aspirin 81 MG chewable tablet CHEW ONE TABLET BY MOUTH DAILY 30 tablet 1     No current facility-administered medications for this visit. Allergies:  Vitamin d analogs and Vitamin d analogs     Review of Systems:   A 14 point review of symptoms completed.  Pertinent positives identified in the HPI, all other review of symptoms negative as below.     Objective:   PHYSICAL EXAM:    Vitals:    08/25/20 0816   BP: 120/66   Pulse: 67   SpO2: 97%    Weight: 259 lb (117.5 kg)     Wt Readings from Last 3 Encounters:   08/25/20 259 lb (117.5 kg)   08/13/20 260 lb (117.9 kg)   06/09/20 257 lb (116.6 kg)         General Appearance:  Alert, cooperative, no distress, appears stated age   Head:  Normocephalic, atraumatic   Eyes:  PERRL, conjunctiva/corneas clear   Nose: Nares normal, no drainage or sinus tenderness   Throat: Lips, mucosa, and tongue normal   Neck: Supple, symmetrical, trachea midline, NL thyroid no carotid bruit or JVD   Lungs:   CTAB, respirations unlabored   Chest Wall:  No tenderness or deformity   Heart:  Regular rhythm and normal rate; S1, S2 are normal;   no murmur noted; no rub or gallop   Abdomen:   Soft, non-tender, +BS x 4, no masses, no organomegaly   Extremities: Extremities normal, atraumatic, no cyanosis or edema   Pulses: 2+ and symmetric   Skin: Skin color, texture, turgor normal, no rashes or lesions   Pysch: Normal mood and affect   Neurologic: Normal gross motor and sensory exam.         LABS   CBC:      Lab Results   Component Value Date    WBC 7.8 05/06/2020    RBC 4.93 05/06/2020    HGB 15.1 05/06/2020    HCT 45.7 05/06/2020    MCV 92.6 05/06/2020    RDW 14.5 05/06/2020     05/06/2020     CMP:  Lab Results   Component Value Date     05/06/2020    K 4.0 05/06/2020    CL 98 05/06/2020    CO2 27 05/06/2020    BUN 11 05/06/2020    CREATININE 0.7 05/06/2020    GFRAA >60 05/06/2020    AGRATIO 1.6 05/06/2020    LABGLOM >60 05/06/2020    GLUCOSE 208 05/06/2020    PROT 7.4 05/06/2020    CALCIUM 9.9 05/06/2020    BILITOT 0.4 05/06/2020    ALKPHOS 85 05/06/2020    AST 15 05/06/2020    ALT 20 05/06/2020     PT/INR:   No results found for: PTINR  Liver:  No components found for: CHLPL  Lab Results   Component Value Date    ALT 20 05/06/2020    AST 15 05/06/2020 ALKPHOS 85 05/06/2020    BILITOT 0.4 05/06/2020     Lab Results   Component Value Date    LABA1C 8.6 08/13/2020     Lipids:         Lab Results   Component Value Date    TRIG 115 09/06/2017    TRIG 174 (H) 07/09/2017    TRIG 166 (H) 01/18/2016            Lab Results   Component Value Date    HDL 75 (H) 01/06/2020    HDL 66 (H) 09/06/2017    HDL 58 07/09/2017            Lab Results   Component Value Date    LDLCALC 63 01/06/2020    LDLCALC 69 09/06/2017    LDLCALC 54 07/09/2017            Lab Results   Component Value Date    LABVLDL 19 01/06/2020    LABVLDL 23 09/06/2017    LABVLDL 35 07/09/2017         CARDIAC DATA   EKG:    ECHO 3/3/2020  Summary   Technically difficult examination due to body habitus. Definity® used for   myocardial border enhancement. Normal left ventricle size and systolic function with a visually estimated   ejection fraction of 55%. Mild concentric left ventricular hypertrophy. No regional wall motion abnormalities are seen. Grade I diastolic dysfunction with normal LV filling pressures. The right atrium is mildly enlarged. STRESS TEST:   1. Inferolateral basilar reversible stress defect compatible with stress-induced ischemia. 2. Ejection fraction of 56%   3. No focal wall motion abnormality. CATH: 5/27/16  1. Patent drug-eluting stent proximal first obtuse marginal branch. 2. Patent drug-eluting stent from xijaegzg-cz-xrs left anterior descending. 3. Patent drug-eluting stent distal left anterior descending. 4. Patent drug-eluting stent proximal first diagonal branch. 5. Normal left ventricular systolic function with an ejection fraction of  approximately 55% to 60%. 6. Normal left ventricular end-diastolic pressure at 10 mmHg. RECOMMENDATIONS: The patient's stents are all patent and he has no  obstructive disease in other arteries. His angina is from small vessel disease  and will add Ranexa 1000 mg b.i.d. to his antianginal regimen.  He will continue with Imdur and Lopressor for the angina. He will also continue with  aspirin, Brilinta, as well as a moderate-dose Lipitor. CATH: 01/21/2016  1. A 70% proximal medium-caliber first diagonal branch status post 2.25 x  12-mm Xience Alpine drug-eluting stent. 2.  Patent drug-eluting stent in proximal-mid left anterior descending. 3.  Patent drug-eluting stent in the distal left anterior descending. 4.  Patent drug-eluting stent in the [id number]-gb-zza first obtuse marginal  branch. RECOMMENDATIONS:  Aspirin indefinitely. Brilinta at least for 12 months. Aggressive medical therapy and risk factors modification for coronary disease. CATH: 01/15/2016  IMPRESSION:   1. An 80% diffuse mid left anterior descending with a fractional flow  reserve of 0.79 (significant), status post 2.75 x 28-mm XIENCE  Alpine drug-eluting stent postdilated with a 3.5-mm noncompliant balloon. 2. A 60% distal left anterior descending lesion with a fractional flow   reserve of 0.5 (significant), status post 2.25 x 23-mm XIENCE Alpine   drug-eluting stent postdilated with 2.5-mm noncompliant balloon. 3. Patent stent in proximal to mid 1st obtuse marginal branch. RECOMMENDATIONS:  1. Aspirin indefinitely. 2. Brilinta at least for 12 months. 3. Continue aggressive medical therapy for coronary artery disease. CATH: 01/14/2016  1. A 90% large first obtuse marginal branch status post 2.25 x 16 mm Promus  Premier drug-eluting stent. 2. An 80% diffuse mid and 60% distal left anterior descending. 3. An 80% proximal medium caliber first diagonal branch. 4. Normal left ventricular systolic function with an ejection fraction of  55%. 5. Elevated left ventricular end diastolic pressure 19 mmHg.      Cath 7/11/17  Intervention:  90% prox OM1 in-stent restenosis, s/p PTCA with 2.5-mm NC as well as 2.5-mm Flexitome cutting balloon  Assessment:  - Successful PCI of high grade prox OM1 in-stent restenosis - Patent stent prox-mid LAD with mild instent restenoss of distal 1/3rd  - Patent stent distal LAD  - Patent stent prox Diag1  - High grade OM2   - Normal left ventricular systolic function  - Normal LV filling pressure      Cath 8/18/17   LEFT HEART CATH  LM: luminals  LAD: luminals, prox and mid stents patent with minimal restenosis                        Diag 1- patent stent  LCX: mid 30%                        OM1- 90% instent restenosis with haziness suggesting possible thrombosis                        OM2- ostial 60% lesion, distal 40%  RCA: dominant. Proximal 30%, luminals  LVEDP: 20  LVEF: 55%  PCI of mid OM1 90% restenosis  Ballooned with 2.25 x as NC trek up to 18atm  Angiosculpt 2.5 x 15mm up to 14atm- multiple passes  90% GUERLINE-2 flow--> 15% GUERLINE-3 flow  Assessment  1. Restenosis of the OM1 stent following recent POBA  2. Aggressive cutting balloon and NC balloon but still with residual stenosis and concerned about ability to completely deploy stent. If reoccurs or Cp returns, would suggest laser arthrectomy and PCI. CATH: 9/6/17  Successful PCI of OM-1 recurrent in-stent restenosis with Rotablator-assisted PCI with placement of Xience Alpine drug-eluting stent. Assessment and Plan   Jose Temple is a 61 y.o. male who presents today for the following problems:      1. CAD   - 9/2017 Rotablade and PCI to [facility]   - 8/2017 PCI to m/d LAD, diag 1  2. HTN: controlled  3. HLD: controlled but out of date  4. HX of likely COVID +: Patient has an excellent story for COVID symptomatology but unfortunately testing was too late and was negative  5. CP      MD Plan:  1. Story concerning for unstable angina. 2.  Plan for left heart cath, laser backup  3. If testing is negative will consider cardiac MRI given likely COVID infection back in February/March 2020  4.  Given extent of CAD and dual layer stents, continue DAPT for life for now but can come off for elective procedures     Patient Active Problem List Diagnosis    Type II or unspecified type diabetes mellitus without mention of complication, not stated as uncontrolled    Hypertension    Diabetes mellitus    HTN (hypertension)    History of ETOH abuse    Patient overweight    Patient overweight    Dietary noncompliance    Alcohol abuse    Flank pain    Prostatism    Low serum testosterone level    Arm pain, left    Shoulder pain    Precordial pain    Lung nodule    Acute angina (HCC)    Abnormal stress test    Acute coronary syndrome (HCC)    Unstable angina ([address]    Coronary artery disease involving native coronary artery of native heart with unstable angina pectoris (HCC)    Chest pain, moderate coronary artery risk    S/P drug eluting coronary stent placement    Essential hypertension    Obesity    Abnormal chest x-ray    CAD (coronary artery disease)    Angina effort    Skin lesion    Angina at rest ([address]    Hyperlipidemia    Angina pectoris ([address]    NSTEMI (non-ST elevated myocardial infarction) ([address]    Controlled type 2 diabetes mellitus without complication, without long-term current use of insulin (HCC)    ASHD (arteriosclerotic heart disease)    Mass of right foot    Overweight    History of angina pectoris    Reactive airway disease    Bronchitis    Reactive airway disease with wheezing, moderate persistent, with acute exacerbation    Need for prophylactic vaccination and inoculation against varicella    Need for prophylactic vaccination against diphtheria-tetanus-pertussis (DTP)       Patient Plan:  1. Left Heart Cath (angiogram) - Schedule rep for LASER    Hold Metformin 48 hours prior to the procedure and 2 days after    Hold Glipizide the morning of the procedure   NO COVID TESTING REQUIRED     2. Follow up after procedure          It is a pleasure to assist in the care of Angelita Boyer. Please call with any questions. This note was scribed in the presence of Thaddeus Eugene MD by Eloisa Lima RN. Teresita Villafana MD, personally performed the services described in this documentation as scribed by the above signed scribe in my presence, and it is both accurate and complete to the best of our ability and knowledge. I agree with the details independently gathered by my clinical support staff, while the remaining scribed note accurately describes my personal service to the patient. The above RN is working as a scribe for and in the presence of myself . Working as a scribe, the RN may have prepopulated components of this note with my historical intellectual property under my direct supervision. Any additions to this intellectual property were performed at my direction. Furthermore, the content and accuracy of this note have been reviewed by me to the best of my ability.          Dasha Jauregui MD, [facility] Cardiologist  [address]  [phone] [facility]  [phone] [address] [place] [address]  8/25/2020  8:28 AM

## 2020-08-25 NOTE — PATIENT INSTRUCTIONS
Patient Plan:  1. Left Heart Cath (angiogram) - Schedule rep for LASER    Hold Metformin 48 hours prior to the procedure and 2 days after    Hold Glipizide the morning of the procedure   NO COVID TESTING REQUIRED     2.  Follow up after procedure

## 2020-08-25 NOTE — TELEPHONE ENCOUNTER
Patient Plan:  1. Left Heart Cath (angiogram) - Schedule rep for LASER    Hold Metformin 48 hours prior to the procedure   Hold Glipizide the morning of the procedure   NO COVID TESTING REQUIRED   2.  Follow up after procedure

## 2020-08-26 ENCOUNTER — TELEPHONE (OUTPATIENT)
Dept: FAMILY MEDICINE CLINIC | Age: 60
End: 2020-08-26

## 2020-08-26 NOTE — TELEPHONE ENCOUNTER
Carin Rodríguez the pt's wife called and stated that Sergio Gar will be having Surgery and she would like for you to change the Metformin documentation to say take 1 and a half tablet daily. This is how Sergio Gar has been taking it and she does not want the hospital to give him two tablets a day.

## 2020-08-26 NOTE — TELEPHONE ENCOUNTER
Wife called back she also wants  to know Clive Murcia is still having the back pain.   The Aleve 4 x a day only give temporary relief

## 2020-08-26 NOTE — TELEPHONE ENCOUNTER
I talked to [address] wife Surinder Sparks. I changed the Sig on the metformin. I told her that as soon as she recovers from his upcoming heart procedures we should begin a diagnostic work-up for his back pain and she agreed.

## 2020-08-27 NOTE — TELEPHONE ENCOUNTER
Spoke with patient. Patient is scheduled with Dr. Sammy Chaudhry for Left Heart Cath with Laser/Cheryl on 9/1/20 at [facility], arrival time of 6:30am to the Cath Lab. Please have patient arrive to the main entrance of [facility] and check in with the registration desk. Remind patient to be NPO after midnight (8 hours prior). Do not apply lotions/creams on skin the day of procedure. COVID testing - NO per JERRYJP.

## 2020-08-31 ENCOUNTER — TELEPHONE (OUTPATIENT)
Dept: CARDIOLOGY CLINIC | Age: 60
End: 2020-08-31

## 2020-09-01 ENCOUNTER — TELEPHONE (OUTPATIENT)
Dept: FAMILY MEDICINE CLINIC | Age: 60
End: 2020-09-01

## 2020-09-01 ENCOUNTER — NURSE TRIAGE (OUTPATIENT)
Dept: OTHER | Facility: CLINIC | Age: 60
End: 2020-09-01

## 2020-09-01 ENCOUNTER — OFFICE VISIT (OUTPATIENT)
Dept: PRIMARY CARE CLINIC | Age: 60
End: 2020-09-01
Payer: COMMERCIAL

## 2020-09-01 PROCEDURE — 99211 OFF/OP EST MAY X REQ PHY/QHP: CPT | Performed by: NURSE PRACTITIONER

## 2020-09-01 NOTE — TELEPHONE ENCOUNTER
I talked to Arturo's wife Shira Chavez. He was found to have symptoms of a viral illness when he presented to the hospital this morning. His procedure was canceled. I asked her to call me with a progress report on Thursday. We discussed his persistent back pain. I told Antoinette Michel that as soon as he recovers from his viral illness we should begin some diagnostic testing to try to determine the cause of his back pain and she agreed.

## 2020-09-01 NOTE — PROGRESS NOTES
Reyna Montemayor received a viral test for COVID-19. They were educated on isolation and quarantine as appropriate. For any symptoms, they were directed to seek care from their PCP, given contact information to establish with a doctor, directed to an urgent care or the emergency room.

## 2020-09-01 NOTE — PATIENT INSTRUCTIONS

## 2020-09-01 NOTE — TELEPHONE ENCOUNTER
----- Message from Momo Rodarte sent at 9/1/2020  7:48 AM EDT -----  Subject: Appointment Request    Reason for Call: Immediate Return from RN Triage    QUESTIONS  Type of Appointment? Established Patient  Reason for appointment request? Other - Patient out in the Parking lot  Additional Information for Provider? Spoke to spouse again and she stated   the patient's temp was 100.6 . Advised again office doesn't open until 8am   and may not be seen due to fever.   ---------------------------------------------------------------------------  --------------  CALL BACK INFO  What is the best way for the office to contact you? OK to leave message on   voicemail  Preferred Call Back Phone Number? [phone]  ---------------------------------------------------------------------------  --------------  SCRIPT ANSWERS  Patient needs to be seen today? Yes  Nurse Name? Luis Eduardo  (Did RN indicate the need for Red Scheduling?)?  Yes

## 2020-09-01 NOTE — TELEPHONE ENCOUNTER
Reason for Disposition   [1] Fever > 100.0 F (37.8 C) AND [2] diabetes mellitus or weak immune system (e.g., HIV positive, cancer chemo, splenectomy, organ transplant, chronic steroids)    Answer Assessment - Initial Assessment Questions  1. TEMPERATURE: \"What is the most recent temperature? \"  \"How was it measured? \"       101.6 oral this morning before heart surgery  2. ONSET: \"When did the fever start? \"       Last night but did not measure wife states pt was very hot to the touch  3. SYMPTOMS: \"Do you have any other symptoms besides the fever? \"  (e.g., colds, headache, sore throat, earache, cough, rash, diarrhea, vomiting, abdominal pain)      Chlls, nausea  4. CAUSE: If there are no symptoms, ask: \"What do you think is causing the fever? \"       Not sure  5. CONTACTS: \"Does anyone else in the family have an infection? \"      NO  6. TREATMENT: \"What have you done so far to treat this fever? \" (e.g., medications)      No  7. IMMUNOCOMPROMISE: \"Do you have of the following: diabetes, HIV positive, splenectomy, cancer chemotherapy, chronic steroid treatment, transplant patient, etc.\"      no  8. PREGNANCY: \"Is there any chance you are pregnant? \" \"When was your last menstrual period? \"      n/a  9. TRAVEL: \"Have you traveled out of the country in the last month? \" (e.g., travel history, exposures)      no    Protocols used: FEVER-ADULT-AH  Pt went in the morning for heart surgery and had temp of 101.6 on arrival was sent by cardiolgist to see primary care pt states chills and nausea asking if they can see Dr. Elie Medina transfer to [facility] will put message to MD office as they are not open yet pt and his wife want to wait in the parking lot although informed they may not be able to be seen with symptoms  Please do not respond to the triage nurse through this encounter. Any subsequent communication should be directly with the patient.

## 2020-09-01 NOTE — TELEPHONE ENCOUNTER
----- Message from Nasimgordo Wolf sent at 9/1/2020  7:34 AM EDT -----  Subject: Message to Provider    QUESTIONS  Information for Provider? Return from Nurse Triage call   Patient to be seen with in 4 hrs today   Fever 101.6   Nausea   surgery cancelled Per Cardiologist see PCP. PATIENT IS WAITING IN PARKING   LOT   Too far to leave and come back   Please call  ---------------------------------------------------------------------------  --------------  CALL BACK INFO  What is the best way for the office to contact you? OK to leave message on   voicemail  Preferred Call Back Phone Number? [phone]  ---------------------------------------------------------------------------  --------------  SCRIPT ANSWERS  Relationship to Patient? Other  Representative Name? Brittny/ Spouse  Is the Representative on the appropriate HIPAA document in Epic?  Yes

## 2020-09-02 ENCOUNTER — TELEPHONE (OUTPATIENT)
Dept: ADMINISTRATIVE | Age: 60
End: 2020-09-02

## 2020-09-02 ENCOUNTER — TELEPHONE (OUTPATIENT)
Dept: CARDIOLOGY CLINIC | Age: 60
End: 2020-09-02

## 2020-09-02 LAB — SARS-COV-2, NAA: NOT DETECTED

## 2020-09-02 NOTE — TELEPHONE ENCOUNTER
Pt wife called, did get his covid test back, was negative, but his PCP stated he might have the flu, is feeling better. But when pt laid down his chest did hurt, took nitro. Pt has been using more of them because of the CP. Needs to get rescheduled at some point.

## 2020-09-03 NOTE — TELEPHONE ENCOUNTER
Patient needs to be afebrile for 48 hours without the use of fever reducing medicines like Tylenol or ibuprofen's. And ideally symptoms should be improving. once that happens okay to schedule back in Cath Lab with laser backup. Of note we will check his labs that morning but if there is any signs of infection cannot proceed with invasive procedures.   Just make sure patient is aware

## 2020-09-04 NOTE — TELEPHONE ENCOUNTER
Spoke with pt, he has had no fever today, but still feels sick to his stomach. He will call on Tuesday if he feels better to reschedule.

## 2020-09-06 ENCOUNTER — TELEPHONE (OUTPATIENT)
Dept: FAMILY MEDICINE CLINIC | Age: 60
End: 2020-09-06

## 2020-09-07 NOTE — TELEPHONE ENCOUNTER
----- Message from [id number] sent at 9/5/2020 10:38 AM EDT -----  Subject: Message to Provider    QUESTIONS  Information for Provider? Klarissa Mehtas called in to report that Arturo's fever has   broken and is calling heart surgeon to reschedule to surgery that was   postponed. Also reports that back is much better. ---------------------------------------------------------------------------  --------------  [employer] INFO  What is the best way for the office to contact you? OK to leave message on   voicemail  Preferred Call Back Phone Number? [phone]  ---------------------------------------------------------------------------  --------------  SCRIPT ANSWERS  Relationship to Patient? Other  Representative Name? Chase Baker - wife  Is the Representative on the appropriate HIPAA document in Epic?  Yes

## 2020-09-08 ENCOUNTER — TELEPHONE (OUTPATIENT)
Dept: CARDIOLOGY CLINIC | Age: 60
End: 2020-09-08

## 2020-09-08 NOTE — TELEPHONE ENCOUNTER
Another encounter was started as wife called the office this AM.  That message was forwarded to Dr. Nancy Durbin.

## 2020-09-08 NOTE — TELEPHONE ENCOUNTER
Pt's wife Marcial Morris's fever broke 4 days ago. Today temp is 98.0. This past weekend Diana Reyes was SOB, fatigued, and no appetite. Pt has lost 18 lbs over 5 days. Pt felt weak, to where he could not walk from 1 end of house to the other without feeling weak. Pt has appt with his pulmonary doctor 9/22. Marti Obrien does say he feels good today. Marti Obrien wants to know is it better to go ahead with angio or wait. Please advise.

## 2020-09-09 NOTE — TELEPHONE ENCOUNTER
Pt's wife called stating pt has everything SX of COVID except fever. Pt sts he feels it in his lungs. Pt has productive cough, not much though.

## 2020-09-10 NOTE — TELEPHONE ENCOUNTER
Spoke with patient's wife. Patient is scheduled with Dr. Avinash Diaz for Left Heart Cath with Laser back up on 9/21/20 at [facility], arrival time of 8:30am to the Cath Lab. Please have patient arrive to the main entrance of [facility] and check in with the registration desk. No changes in medications from last time reviewed - Hold Metformin 48 hours prior to the procedure. Hold Glipizide the morning of the procedure. Remind patient to be NPO after midnight (8 hours prior). Do not apply lotions/creams on skin the day of procedure. COVID testing 9/15. She is asking if patient can have a prescription for COVID testing due to procedure so that insurance will cover it instead of having a separate lab charge. Please advise.

## 2020-09-15 ENCOUNTER — OFFICE VISIT (OUTPATIENT)
Dept: PRIMARY CARE CLINIC | Age: 60
End: 2020-09-15
Payer: COMMERCIAL

## 2020-09-15 PROCEDURE — 99211 OFF/OP EST MAY X REQ PHY/QHP: CPT | Performed by: NURSE PRACTITIONER

## 2020-09-15 NOTE — PROGRESS NOTES
Karina Darek received a viral test for COVID-19. They were educated on isolation and quarantine as appropriate. For any symptoms, they were directed to seek care from their PCP, given contact information to establish with a doctor, directed to an urgent care or the emergency room.

## 2020-09-15 NOTE — TELEPHONE ENCOUNTER
Message left got patient to see if he needs meds reviewed. Told him to call back if they have not been reviewed yet.

## 2020-09-15 NOTE — PATIENT INSTRUCTIONS
Advance Care Planning  People with COVID-19 may have no symptoms, mild symptoms, such as fever, cough, and shortness of breath or they may have more severe illness, developing severe and fatal pneumonia. As a result, Advance Care Planning with attention to naming a health care decision maker (someone you trust to make healthcare decisions for you if you could not speak for yourself) and sharing other health care preferences is important BEFORE a possible health crisis. Please contact your Primary Care Provider to discuss Advance Care Planning. Preventing the Spread of Coronavirus Disease 2019 in Homes and Residential Communities  For the most recent information go to [email or web address]    Prevention steps for People with confirmed or suspected COVID-19 (including persons under investigation) who do not need to be hospitalized  and   People with confirmed COVID-19 who were hospitalized and determined to be medically stable to go home    Your healthcare provider and public health staff will evaluate whether you can be cared for at home. If it is determined that you do not need to be hospitalized and can be isolated at home, you will be monitored by staff from your local or state health department. You should follow the prevention steps below until a healthcare provider or local or state health department says you can return to your normal activities. Stay home except to get medical care  People who are mildly ill with COVID-19 are able to isolate at home during their illness. You should restrict activities outside your home, except for getting medical care. Do not go to work, school, or public areas. Avoid using public transportation, ride-sharing, or taxis. Separate yourself from other people and animals in your home  People: As much as possible, you should stay in a specific room and away from other people in your home.  Also, you should use a separate have a medical emergency and need to call 911, notify the dispatch personnel that you have, or are being evaluated for COVID-19. If possible, put on a facemask before emergency medical services arrive. Discontinuing home isolation  Patients with confirmed COVID-19 should remain under home isolation precautions until the risk of secondary transmission to others is thought to be low. The decision to discontinue home isolation precautions should be made on a case-by-case basis, in consultation with healthcare providers and state and local health departments.

## 2020-09-16 LAB — SARS-COV-2, NAA: NOT DETECTED

## 2020-09-21 ENCOUNTER — HOSPITAL ENCOUNTER (INPATIENT)
Dept: CARDIAC CATH/INVASIVE PROCEDURES | Age: 60
LOS: 1 days | Discharge: HOME | DRG: 247 | End: 2020-09-22
Attending: INTERNAL MEDICINE | Admitting: INTERNAL MEDICINE
Payer: COMMERCIAL

## 2020-09-21 PROBLEM — R07.9 CHEST PAIN: Status: ACTIVE | Noted: 2020-09-21

## 2020-09-21 LAB
ANION GAP SERPL CALCULATED.3IONS-SCNC: 8 MMOL/L (ref 3–16)
BUN BLDV-MCNC: 21 MG/DL (ref 7–20)
CALCIUM SERPL-MCNC: 8.8 MG/DL (ref 8.3–10.6)
CHLORIDE BLD-SCNC: 99 MMOL/L (ref 99–110)
CHOLESTEROL, TOTAL: 151 MG/DL (ref 0–199)
CO2: 28 MMOL/L (ref 21–32)
CREAT SERPL-MCNC: 0.6 MG/DL (ref 0.9–1.3)
EKG ATRIAL RATE: 67 BPM
EKG DIAGNOSIS: NORMAL
EKG P AXIS: 2 DEGREES
EKG P-R INTERVAL: 158 MS
EKG Q-T INTERVAL: 398 MS
EKG QRS DURATION: 84 MS
EKG QTC CALCULATION (BAZETT): 420 MS
EKG R AXIS: 9 DEGREES
EKG T AXIS: 24 DEGREES
EKG VENTRICULAR RATE: 67 BPM
GFR AFRICAN AMERICAN: >60
GFR NON-AFRICAN AMERICAN: >60
GLUCOSE BLD-MCNC: 300 MG/DL (ref 70–99)
GLUCOSE BLD-MCNC: 331 MG/DL (ref 70–99)
GLUCOSE BLD-MCNC: 380 MG/DL (ref 70–99)
HCT VFR BLD CALC: 40 % (ref 40.5–52.5)
HDLC SERPL-MCNC: 57 MG/DL (ref 40–60)
HEMOGLOBIN: 13.4 G/DL (ref 13.5–17.5)
INR BLD: 0.99 (ref 0.86–1.14)
LDL CHOLESTEROL CALCULATED: 69 MG/DL
LEFT VENTRICULAR EJECTION FRACTION HIGH VALUE: 55 %
MCH RBC QN AUTO: 31.2 PG (ref 26–34)
MCHC RBC AUTO-ENTMCNC: 33.6 G/DL (ref 31–36)
MCV RBC AUTO: 93 FL (ref 80–100)
PDW BLD-RTO: 13.7 % (ref 12.4–15.4)
PERFORMED ON: ABNORMAL
PERFORMED ON: ABNORMAL
PLATELET # BLD: 260 K/UL (ref 135–450)
PMV BLD AUTO: 7.9 FL (ref 5–10.5)
POC ACT LR: 251 SEC
POTASSIUM SERPL-SCNC: 4.6 MMOL/L (ref 3.5–5.1)
PROTHROMBIN TIME: 11.5 SEC (ref 10–13.2)
RBC # BLD: 4.3 M/UL (ref 4.2–5.9)
SODIUM BLD-SCNC: 135 MMOL/L (ref 136–145)
TRIGL SERPL-MCNC: 123 MG/DL (ref 0–150)
VLDLC SERPL CALC-MCNC: 25 MG/DL
WBC # BLD: 6.5 K/UL (ref 4–11)

## 2020-09-21 PROCEDURE — 99152 MOD SED SAME PHYS/QHP 5/>YRS: CPT

## 2020-09-21 PROCEDURE — G0378 HOSPITAL OBSERVATION PER HR: HCPCS

## 2020-09-21 PROCEDURE — 6360000004 HC RX CONTRAST MEDICATION

## 2020-09-21 PROCEDURE — 93458 L HRT ARTERY/VENTRICLE ANGIO: CPT

## 2020-09-21 PROCEDURE — C1874 STENT, COATED/COV W/DEL SYS: HCPCS

## 2020-09-21 PROCEDURE — 80048 BASIC METABOLIC PNL TOTAL CA: CPT

## 2020-09-21 PROCEDURE — 2500000003 HC RX 250 WO HCPCS

## 2020-09-21 PROCEDURE — 6370000000 HC RX 637 (ALT 250 FOR IP): Performed by: INTERNAL MEDICINE

## 2020-09-21 PROCEDURE — 2060000000 HC ICU INTERMEDIATE R&B

## 2020-09-21 PROCEDURE — 99153 MOD SED SAME PHYS/QHP EA: CPT

## 2020-09-21 PROCEDURE — 85027 COMPLETE CBC AUTOMATED: CPT

## 2020-09-21 PROCEDURE — C1887 CATHETER, GUIDING: HCPCS

## 2020-09-21 PROCEDURE — 93005 ELECTROCARDIOGRAM TRACING: CPT | Performed by: INTERNAL MEDICINE

## 2020-09-21 PROCEDURE — 2709999900 HC NON-CHARGEABLE SUPPLY

## 2020-09-21 PROCEDURE — 92933 PRQ TRLML C ATHRC ST ANGIOP1: CPT | Performed by: INTERNAL MEDICINE

## 2020-09-21 PROCEDURE — 93458 L HRT ARTERY/VENTRICLE ANGIO: CPT | Performed by: INTERNAL MEDICINE

## 2020-09-21 PROCEDURE — 99152 MOD SED SAME PHYS/QHP 5/>YRS: CPT | Performed by: INTERNAL MEDICINE

## 2020-09-21 PROCEDURE — 6360000002 HC RX W HCPCS

## 2020-09-21 PROCEDURE — 027034Z DILATION OF CORONARY ARTERY, ONE ARTERY WITH DRUG-ELUTING INTRALUMINAL DEVICE, PERCUTANEOUS APPROACH: ICD-10-PCS | Performed by: INTERNAL MEDICINE

## 2020-09-21 PROCEDURE — B2111ZZ FLUOROSCOPY OF MULTIPLE CORONARY ARTERIES USING LOW OSMOLAR CONTRAST: ICD-10-PCS | Performed by: INTERNAL MEDICINE

## 2020-09-21 PROCEDURE — C1769 GUIDE WIRE: HCPCS

## 2020-09-21 PROCEDURE — 6370000000 HC RX 637 (ALT 250 FOR IP)

## 2020-09-21 PROCEDURE — C1725 CATH, TRANSLUMIN NON-LASER: HCPCS

## 2020-09-21 PROCEDURE — C1894 INTRO/SHEATH, NON-LASER: HCPCS

## 2020-09-21 PROCEDURE — C1885 CATH, TRANSLUMIN ANGIO LASER: HCPCS

## 2020-09-21 PROCEDURE — 85610 PROTHROMBIN TIME: CPT

## 2020-09-21 PROCEDURE — 85347 COAGULATION TIME ACTIVATED: CPT

## 2020-09-21 PROCEDURE — 92933 PRQ TRLML C ATHRC ST ANGIOP1: CPT

## 2020-09-21 PROCEDURE — 80061 LIPID PANEL: CPT

## 2020-09-21 PROCEDURE — 93010 ELECTROCARDIOGRAM REPORT: CPT | Performed by: INTERNAL MEDICINE

## 2020-09-21 PROCEDURE — 4A023N7 MEASUREMENT OF CARDIAC SAMPLING AND PRESSURE, LEFT HEART, PERCUTANEOUS APPROACH: ICD-10-PCS | Performed by: INTERNAL MEDICINE

## 2020-09-21 PROCEDURE — B2151ZZ FLUOROSCOPY OF LEFT HEART USING LOW OSMOLAR CONTRAST: ICD-10-PCS | Performed by: INTERNAL MEDICINE

## 2020-09-21 RX ORDER — SODIUM CHLORIDE 9 MG/ML
INJECTION, SOLUTION INTRAVENOUS CONTINUOUS
Status: DISCONTINUED | OUTPATIENT
Start: 2020-09-21 | End: 2020-09-21

## 2020-09-21 RX ORDER — SODIUM CHLORIDE 9 MG/ML
INJECTION, SOLUTION INTRAVENOUS CONTINUOUS
Status: ACTIVE | OUTPATIENT
Start: 2020-09-21 | End: 2020-09-21

## 2020-09-21 RX ORDER — NICOTINE POLACRILEX 4 MG
15 LOZENGE BUCCAL PRN
Status: DISCONTINUED | OUTPATIENT
Start: 2020-09-21 | End: 2020-09-22 | Stop reason: HOSPADM

## 2020-09-21 RX ORDER — FENTANYL CITRATE 50 UG/ML
INJECTION, SOLUTION INTRAMUSCULAR; INTRAVENOUS
Status: COMPLETED | OUTPATIENT
Start: 2020-09-21 | End: 2020-09-21

## 2020-09-21 RX ORDER — DEXTROSE MONOHYDRATE 50 MG/ML
100 INJECTION, SOLUTION INTRAVENOUS PRN
Status: DISCONTINUED | OUTPATIENT
Start: 2020-09-21 | End: 2020-09-22 | Stop reason: HOSPADM

## 2020-09-21 RX ORDER — MIDAZOLAM HYDROCHLORIDE 5 MG/ML
INJECTION INTRAMUSCULAR; INTRAVENOUS
Status: COMPLETED | OUTPATIENT
Start: 2020-09-21 | End: 2020-09-21

## 2020-09-21 RX ORDER — CLOPIDOGREL BISULFATE 75 MG/1
75 TABLET ORAL DAILY
Status: DISCONTINUED | OUTPATIENT
Start: 2020-09-22 | End: 2020-09-22 | Stop reason: HOSPADM

## 2020-09-21 RX ORDER — MORPHINE SULFATE 2 MG/ML
2 INJECTION, SOLUTION INTRAMUSCULAR; INTRAVENOUS EVERY 4 HOURS PRN
Status: DISCONTINUED | OUTPATIENT
Start: 2020-09-21 | End: 2020-09-22 | Stop reason: HOSPADM

## 2020-09-21 RX ORDER — HEPARIN SODIUM 1000 [USP'U]/ML
INJECTION, SOLUTION INTRAVENOUS; SUBCUTANEOUS
Status: COMPLETED | OUTPATIENT
Start: 2020-09-21 | End: 2020-09-21

## 2020-09-21 RX ORDER — ISOSORBIDE MONONITRATE 60 MG/1
120 TABLET, EXTENDED RELEASE ORAL DAILY
Status: DISCONTINUED | OUTPATIENT
Start: 2020-09-21 | End: 2020-09-22 | Stop reason: HOSPADM

## 2020-09-21 RX ORDER — NITROGLYCERIN 0.4 MG/1
0.4 TABLET SUBLINGUAL EVERY 5 MIN PRN
Status: DISCONTINUED | OUTPATIENT
Start: 2020-09-21 | End: 2020-09-22 | Stop reason: HOSPADM

## 2020-09-21 RX ORDER — ATORVASTATIN CALCIUM 40 MG/1
40 TABLET, FILM COATED ORAL NIGHTLY
Status: DISCONTINUED | OUTPATIENT
Start: 2020-09-21 | End: 2020-09-22 | Stop reason: HOSPADM

## 2020-09-21 RX ORDER — DEXTROSE MONOHYDRATE 25 G/50ML
12.5 INJECTION, SOLUTION INTRAVENOUS PRN
Status: DISCONTINUED | OUTPATIENT
Start: 2020-09-21 | End: 2020-09-22 | Stop reason: HOSPADM

## 2020-09-21 RX ORDER — LISINOPRIL 2.5 MG/1
2.5 TABLET ORAL DAILY
Status: DISCONTINUED | OUTPATIENT
Start: 2020-09-21 | End: 2020-09-22 | Stop reason: HOSPADM

## 2020-09-21 RX ORDER — ASPIRIN 81 MG/1
81 TABLET, CHEWABLE ORAL DAILY
Status: DISCONTINUED | OUTPATIENT
Start: 2020-09-21 | End: 2020-09-22 | Stop reason: HOSPADM

## 2020-09-21 RX ORDER — GLIPIZIDE 5 MG/1
10 TABLET ORAL
Status: DISCONTINUED | OUTPATIENT
Start: 2020-09-22 | End: 2020-09-22 | Stop reason: HOSPADM

## 2020-09-21 RX ORDER — CLOPIDOGREL 300 MG/1
TABLET, FILM COATED ORAL
Status: COMPLETED | OUTPATIENT
Start: 2020-09-21 | End: 2020-09-21

## 2020-09-21 RX ORDER — MORPHINE SULFATE 4 MG/ML
4 INJECTION, SOLUTION INTRAMUSCULAR; INTRAVENOUS EVERY 4 HOURS PRN
Status: DISCONTINUED | OUTPATIENT
Start: 2020-09-21 | End: 2020-09-22 | Stop reason: HOSPADM

## 2020-09-21 RX ADMIN — MIDAZOLAM HYDROCHLORIDE 1 MG: 5 INJECTION INTRAMUSCULAR; INTRAVENOUS at 11:27

## 2020-09-21 RX ADMIN — MIDAZOLAM HYDROCHLORIDE 2 MG: 5 INJECTION INTRAMUSCULAR; INTRAVENOUS at 10:53

## 2020-09-21 RX ADMIN — FENTANYL CITRATE 50 MCG: 50 INJECTION, SOLUTION INTRAMUSCULAR; INTRAVENOUS at 10:46

## 2020-09-21 RX ADMIN — FENTANYL CITRATE 25 MCG: 50 INJECTION, SOLUTION INTRAMUSCULAR; INTRAVENOUS at 11:05

## 2020-09-21 RX ADMIN — ATORVASTATIN CALCIUM 40 MG: 40 TABLET, FILM COATED ORAL at 20:56

## 2020-09-21 RX ADMIN — HEPARIN SODIUM 3000 UNITS: 1000 INJECTION, SOLUTION INTRAVENOUS; SUBCUTANEOUS at 11:15

## 2020-09-21 RX ADMIN — FENTANYL CITRATE 25 MCG: 50 INJECTION, SOLUTION INTRAMUSCULAR; INTRAVENOUS at 11:26

## 2020-09-21 RX ADMIN — INSULIN LISPRO 10 UNITS: 100 INJECTION, SOLUTION INTRAVENOUS; SUBCUTANEOUS at 17:19

## 2020-09-21 RX ADMIN — MIDAZOLAM HYDROCHLORIDE 1 MG: 5 INJECTION INTRAMUSCULAR; INTRAVENOUS at 11:05

## 2020-09-21 RX ADMIN — FENTANYL CITRATE 50 MCG: 50 INJECTION, SOLUTION INTRAMUSCULAR; INTRAVENOUS at 11:40

## 2020-09-21 RX ADMIN — METOPROLOL TARTRATE 25 MG: 25 TABLET, FILM COATED ORAL at 20:56

## 2020-09-21 RX ADMIN — MIDAZOLAM HYDROCHLORIDE 1 MG: 5 INJECTION INTRAMUSCULAR; INTRAVENOUS at 11:21

## 2020-09-21 RX ADMIN — FENTANYL CITRATE 50 MCG: 50 INJECTION, SOLUTION INTRAMUSCULAR; INTRAVENOUS at 11:28

## 2020-09-21 RX ADMIN — CLOPIDOGREL 300 MG: 300 TABLET, FILM COATED ORAL at 11:53

## 2020-09-21 RX ADMIN — MIDAZOLAM HYDROCHLORIDE 1 MG: 5 INJECTION INTRAMUSCULAR; INTRAVENOUS at 11:28

## 2020-09-21 RX ADMIN — HEPARIN SODIUM 5000 UNITS: 1000 INJECTION, SOLUTION INTRAVENOUS; SUBCUTANEOUS at 11:06

## 2020-09-21 RX ADMIN — FENTANYL CITRATE 50 MCG: 50 INJECTION, SOLUTION INTRAMUSCULAR; INTRAVENOUS at 10:53

## 2020-09-21 RX ADMIN — MIDAZOLAM HYDROCHLORIDE 1 MG: 5 INJECTION INTRAMUSCULAR; INTRAVENOUS at 11:15

## 2020-09-21 RX ADMIN — MIDAZOLAM HYDROCHLORIDE 2 MG: 5 INJECTION INTRAMUSCULAR; INTRAVENOUS at 10:47

## 2020-09-21 RX ADMIN — INSULIN LISPRO 4 UNITS: 100 INJECTION, SOLUTION INTRAVENOUS; SUBCUTANEOUS at 20:56

## 2020-09-21 RX ADMIN — SODIUM CHLORIDE: 9 INJECTION, SOLUTION INTRAVENOUS at 09:06

## 2020-09-21 RX ADMIN — HEPARIN SODIUM 1500 UNITS: 1000 INJECTION, SOLUTION INTRAVENOUS; SUBCUTANEOUS at 11:41

## 2020-09-21 ASSESSMENT — PAIN SCALES - GENERAL
PAINLEVEL_OUTOF10: 0
PAINLEVEL_OUTOF10: 0

## 2020-09-21 NOTE — H&P
Brief Pre-Op Note/Sedation Assessment      Germaine oJy  1960  Cath Pool Rm/NONE      [id number]  8:54 AM    Planned Procedure: Cardiac Catheterization Procedure    Post Procedure Plan: Return to same level of care    Consent: I have discussed with the patient and/or the patient representative the indication, alternatives, and the possible risks and/or complications of the planned procedure and the anesthesia methods. The patient and/or patient representative appear to understand and agree to proceed. Chief Complaint: Chest Pain/Pressure      Indications for Cath Procedure:  ACS > 24 hrs  Anginal Classification within 2 weeks:  CCS III - Symptoms with everyday living activities, i.e., moderate limitation  NYHA Heart Failure Class within 2 weeks: Class II - Symptoms of HF on ordinary exertion, Newly Diagnosed? No, Heart Failure Type: Unknown  Is Cath Lab Visit Valve-related?: No  Surgical Risk: N/A  Functional Type: N/A    Anti- Anginal Meds within 2 weeks:   Yes: Beta Blockers, Long Acting Nitrates (Any), Aspirin and Statin (Any)    Stress or Imaging Studies Performed (within 6 months):  None     Vital Signs: There were no vitals taken for this visit. Allergies:   Allergies   Allergen Reactions    Vitamin D Analogs Hives    Vitamin D Analogs Swelling     swelling       Past Medical History:  Past Medical History:   Diagnosis Date    Alcohol abuse 12/3/2012    Coronary artery disease     Hyperlipidemia     Hypertension     Non morbid obesity     Reactive airway disease with wheezing, moderate persistent, with acute exacerbation 3/3/2020    Type II or unspecified type diabetes mellitus without mention of complication, not stated as uncontrolled          Surgical History:  Past Surgical History:   Procedure Laterality Date    CORONARY ANGIOPLASTY WITH STENT PLACEMENT  1/14/2016    1 Promus Premier SAMUEL 2.25x16    VEIN SURGERY           Medications:  Current Facility-Administered Medications

## 2020-09-21 NOTE — BRIEF OP NOTE
Brief Postoperative Note      Patient: Antonio Candelaria  YOB: 1960  MRN: [id number]      Brief Postoperative Note  Pre-operative Diagnosis: unstable angina  Post-operative Diagnosis: Same  Procedure: Moderate sedation. Ultrasound-guided access to right radial artery. .  Left heart catheterization. Bilateral coronary angiography. Left ventriculogram.  Laser atherectomy of mid LAD. Balloon angioplasty and PCI to mid LAD      Anesthesia: Moderate Sedation  Surgeons/Assistants: Sonali Gale MD, Kristen Aguilar  Estimated Blood Loss: less than 50   Complications: None  Specimens: Was Not Obtained    Findings:     LEFT HEART CATH  LM: Luminal  LAD: Prior stenting in the proximal to mid with an 80% restenosis in the mid LAD stent. There is another stent in the mid to distal that has minimal restenosis. GUERLINE II flow   Diagonal 1: Prior stent with minimal restenosis. LCX: Luminal   Om1-prior proximal stent that is patent 50% distal restenosis. RCA: Minute. Proximal/mid eccentric 30%. LVEDP: 15  LVEF: 55%    PCI of mid LAD  Status post predilation using laser arthrectomy, angioscope and NC balloon dilatation. Stent resolute [place] 3.5 mm x 15 mm, postdilated to 3.6 mm. Assessment  1. Interval restenosis of mid LAD in-stent 80% lesion, GUERLINE II flow. Treated successfully with laser atherectomy and aggressive predilatation using cutting balloons    -0% residual, GUERLINE-3 flow. 2.  Continue aspirin 81 mg daily for life. Plavix 75 mg daily for 1 year minimum, given extensive CAD will continue dual therapy for life as tolerated  3. We will closely follow OM1 lesion as appears to be borderline at this time  4.   Continue beta-blocker and statin as tolerated aggressive risk factor reduction medical management              Electronically signed by Gumaro Mackenzie MD on 9/21/2020 at 12:00 PM

## 2020-09-21 NOTE — H&P
Hospital Medicine History & Physical      PCP: Katelin Rosado DO    Date of Admission: 9/21/2020    Date of Service: Pt seen/examined on 9/21/2020 and placed in outpatient in a bed status with expected LOS greater than two midnights due to medical therapy. Chief Complaint:  Chest pain    History Of Present Illness:   61 y.o. male who presented to [facility] with chest pain. PMHx significant for CAD, hypertension, hyperlipidemia, diabetes, obesity. He was recently evaluated as an outpatient and noted to have symptoms concerning for unstable angina. He was scheduled for elective cardiac catheterization by his cardiologist.  However this was delayed due to a febrile illness that lasted 1 to 2 weeks. He did have multiple negative cover tests during that time and the etiology of his illness was unclear. He did take a course of prednisone for some respiratory symptoms. Symptoms had resolved and he now presents for elective cardiac catheterization. He was found to have in-stent stenosis in the mid LAD which was treated with laser atherectomy. He was admitted for further monitoring. Past Medical History:          Diagnosis Date    Alcohol abuse 12/3/2012    Coronary artery disease     Hyperlipidemia     Hypertension     Non morbid obesity     Reactive airway disease with wheezing, moderate persistent, with acute exacerbation 3/3/2020    Type II or unspecified type diabetes mellitus without mention of complication, not stated as uncontrolled        Past Surgical History:          Procedure Laterality Date    CORONARY ANGIOPLASTY WITH STENT PLACEMENT  1/14/2016    1 Promus Premier SAMUEL 2.25x16    VEIN SURGERY         Medications Prior to Admission:      Prior to Admission medications    Medication Sig Start Date End Date Taking?  Authorizing Provider   Naproxen Sodium (ALEVE PO) Take by mouth as needed   Yes Historical Provider, MD   glipiZIDE (GLUCOTROL) 10 MG tablet TAKE ONE TABLET BY MOUTH EVERY MORNING FOR DIABETES 6/22/20  Yes [facility], DO   isosorbide mononitrate (IMDUR) 120 MG extended release tablet TAKE ONE TABLET BY MOUTH DAILY 6/15/20  Yes Noel Devine MD   clopidogrel (PLAVIX) 75 MG tablet TAKE ONE TABLET BY MOUTH DAILY 3/23/20  Yes Adrián Garcia MD   fluticasone-salmeterol (ADVAIR DISKUS) 250-50 MCG/DOSE AEPB Inhale 1 puff into the lungs every 12 hours 3/6/20  Yes Rito Ma MD   albuterol (PROVENTIL) (2.5 MG/3ML) 0.083% nebulizer solution Take 3 mLs by nebulization every 6 hours as needed for Wheezing 3/6/20  Yes Rito Ma MD   albuterol sulfate (PROAIR RESPICLICK) 229 (90 Base) MCG/ACT aerosol powder inhalation Inhale 2 puffs into the lungs every 4 hours as needed for Wheezing 3/2/20  Yes [facility], DO   metoprolol tartrate (LOPRESSOR) 25 MG tablet TAKE ONE TABLET BY MOUTH TWICE A DAY 2/10/20  Yes Adrián Garcia MD   atorvastatin (LIPITOR) 40 MG tablet Take 1 qd in the evening. 1/28/20  Yes [facility], DO   lisinopril (PRINIVIL;ZESTRIL) 2.5 MG tablet TAKE ONE TABLET BY MOUTH DAILY 1/24/20  Yes Noel Devine MD   nitroGLYCERIN (NITROSTAT) 0.4 MG SL tablet Place 1 tablet under the tongue every 5 minutes as needed for Chest pain 9/20/19  Yes Noel Devine MD   aspirin 81 MG chewable tablet CHEW ONE TABLET BY MOUTH DAILY 12/5/16  Yes [facility], DO   metFORMIN (GLUCOPHAGE) 1000 MG tablet TAKE ONE AND ONE HALF TABLETS DAILY. 8/26/20   [facility], DO   Spacer/Aero-Holding Chambers (E-Z SPACER) REAL 1 Device by Does not apply route daily as needed (wheezing) 3/1/20   Froilan Reynoso MD       Allergies:  Vitamin d analogs and Vitamin d analogs    Social History:    TOBACCO:   reports that he has never smoked. He has never used smokeless tobacco.  ETOH:   reports current alcohol use of about 1.0 standard drinks of alcohol per week. E-Cigarettes Vaping or Juuling     Questions Responses    Vaping Use Never User    Start Date     Does device contain nicotine? Quit Date     Vaping Type             Family History:          Problem Relation Age of Onset    Heart Attack Mother     Heart Disease Mother     Heart Attack Father     Cancer Father     Hearing Loss Father     Diabetes Father     Heart Disease Father     Heart Disease Paternal Uncle         bypass       REVIEW OF SYSTEMS:   Pertinent positives as noted in the HPI. All other systems reviewed and negative. Physical Exam Performed:    Ht 5' 10\" (1.778 m)   Wt 237 lb (107.5 kg)   BMI 34.01 kg/m²     General appearance: No apparent distress, appears stated age and cooperative. HEENT:  Normal cephalic, atraumatic without obvious deformity. Pupils equal, round, and reactive to light. Extra ocular muscles intact. Conjunctivae/corneas clear. Neck: Supple, no jugular venous distention. Trachea midline with full range of motion. Respiratory:  Normal respiratory effort. Clear to auscultation, bilaterally without Rales/Wheezes/Rhonchi. Cardiovascular: Regular rate and rhythm with normal S1/S2 without murmurs, rubs or gallops. Abdomen: Soft, non-tender, non-distended with normal bowel sounds. Musculoskelatal: No clubbing, cyanosis or edema bilaterally. Full range of motion without deformity. Neurologic:  Neurovascularly intact without any focal sensory/motor deficits. Cranial nerves: II-XII intact, grossly non-focal.  Psychiatric: Alert and oriented, thought content appropriate, normal insight  Skin: Skin color, texture, turgor normal.  No rashes or lesions. Capillary Refill: Brisk,< 3 seconds   Peripheral Pulses: +2 palpable, equal bilaterally       Labs:     Recent Labs     09/21/20  0850   WBC 6.5   HGB 13.4*   HCT 40.0*        Recent Labs     09/21/20  0850   *   K 4.6   CL 99   CO2 28   BUN 21*   CREATININE 0.6*   CALCIUM 8.8     No results for input(s): AST, ALT, BILIDIR, BILITOT, ALKPHOS in the last 72 hours.   Recent Labs     09/21/20  0850   INR 0.99     No results for input(s): Nya Arambula in the last 72 hours. ASSESSMENT:    Active Problems:    Coronary artery disease involving native coronary artery of native heart with unstable angina pectoris (HCC)    Diabetes mellitus    HTN (hypertension)    Unstable angina (HCC)    S/P drug eluting coronary stent placement    Reactive airway disease with wheezing, moderate persistent, with acute exacerbation    Chest pain  Resolved Problems:    * No resolved hospital problems. *      PLAN:    CAD/unstable angina: Underwent cardiac catheterization with treatment of in-stent stenosis. Continue on DAPT, beta-blocker, statin. Monitor. DM2, controlled. BS likely elevated due to recent Prednisone taper for bronchitis, and being off Glipizide. Okay to resume glipizide tomorrow morning as usual.  Hold metformin while inpatient. Continue medium SSI and monitor. Hypertension, benign: Controlled. Continue current medication regimen, monitor and adjust as needed. DVT Prophylaxis: Post PCI  Diet: DIET CARDIAC; Carb Control: 4 carb choices (60 gms)/meal  Code Status: Full Code    PT/OT Eval Status: NA    Dispo -1 to 2 days       Thai Mitchell MD    Thank you Leslye Espinoza DO for the opportunity to be involved in this patient's care. If you have any questions or concerns please feel free to contact me at [phone].

## 2020-09-22 VITALS
BODY MASS INDEX: 33.92 KG/M2 | TEMPERATURE: 97.9 F | SYSTOLIC BLOOD PRESSURE: 131 MMHG | RESPIRATION RATE: 18 BRPM | OXYGEN SATURATION: 98 % | WEIGHT: 236.9 LBS | HEIGHT: 70 IN | HEART RATE: 69 BPM | DIASTOLIC BLOOD PRESSURE: 62 MMHG

## 2020-09-22 LAB
ANION GAP SERPL CALCULATED.3IONS-SCNC: 11 MMOL/L (ref 3–16)
BUN BLDV-MCNC: 13 MG/DL (ref 7–20)
CALCIUM SERPL-MCNC: 9.2 MG/DL (ref 8.3–10.6)
CHLORIDE BLD-SCNC: 98 MMOL/L (ref 99–110)
CO2: 25 MMOL/L (ref 21–32)
CREAT SERPL-MCNC: <0.5 MG/DL (ref 0.9–1.3)
EKG ATRIAL RATE: 66 BPM
EKG DIAGNOSIS: NORMAL
EKG P AXIS: 46 DEGREES
EKG P-R INTERVAL: 172 MS
EKG Q-T INTERVAL: 410 MS
EKG QRS DURATION: 96 MS
EKG QTC CALCULATION (BAZETT): 429 MS
EKG R AXIS: 19 DEGREES
EKG T AXIS: 28 DEGREES
EKG VENTRICULAR RATE: 66 BPM
GFR AFRICAN AMERICAN: >60
GFR NON-AFRICAN AMERICAN: >60
GLUCOSE BLD-MCNC: 238 MG/DL (ref 70–99)
GLUCOSE BLD-MCNC: 317 MG/DL (ref 70–99)
GLUCOSE BLD-MCNC: 358 MG/DL (ref 70–99)
HCT VFR BLD CALC: 42 % (ref 40.5–52.5)
HEMOGLOBIN: 14.3 G/DL (ref 13.5–17.5)
MCH RBC QN AUTO: 31.5 PG (ref 26–34)
MCHC RBC AUTO-ENTMCNC: 33.9 G/DL (ref 31–36)
MCV RBC AUTO: 92.9 FL (ref 80–100)
PDW BLD-RTO: 13.6 % (ref 12.4–15.4)
PERFORMED ON: ABNORMAL
PERFORMED ON: ABNORMAL
PLATELET # BLD: 248 K/UL (ref 135–450)
PMV BLD AUTO: 8.3 FL (ref 5–10.5)
POC ACT LR: >400 SEC
POTASSIUM REFLEX MAGNESIUM: 4.9 MMOL/L (ref 3.5–5.1)
RBC # BLD: 4.52 M/UL (ref 4.2–5.9)
SODIUM BLD-SCNC: 134 MMOL/L (ref 136–145)
WBC # BLD: 7.6 K/UL (ref 4–11)

## 2020-09-22 PROCEDURE — 6370000000 HC RX 637 (ALT 250 FOR IP): Performed by: INTERNAL MEDICINE

## 2020-09-22 PROCEDURE — G0378 HOSPITAL OBSERVATION PER HR: HCPCS

## 2020-09-22 PROCEDURE — 80048 BASIC METABOLIC PNL TOTAL CA: CPT

## 2020-09-22 PROCEDURE — 93005 ELECTROCARDIOGRAM TRACING: CPT | Performed by: INTERNAL MEDICINE

## 2020-09-22 PROCEDURE — 85027 COMPLETE CBC AUTOMATED: CPT

## 2020-09-22 PROCEDURE — 93010 ELECTROCARDIOGRAM REPORT: CPT | Performed by: INTERNAL MEDICINE

## 2020-09-22 PROCEDURE — 99232 SBSQ HOSP IP/OBS MODERATE 35: CPT | Performed by: NURSE PRACTITIONER

## 2020-09-22 PROCEDURE — 36415 COLL VENOUS BLD VENIPUNCTURE: CPT

## 2020-09-22 RX ADMIN — INSULIN LISPRO 4 UNITS: 100 INJECTION, SOLUTION INTRAVENOUS; SUBCUTANEOUS at 08:57

## 2020-09-22 RX ADMIN — METOPROLOL TARTRATE 25 MG: 25 TABLET, FILM COATED ORAL at 08:55

## 2020-09-22 RX ADMIN — ASPIRIN 81 MG: 81 TABLET, CHEWABLE ORAL at 08:55

## 2020-09-22 RX ADMIN — ISOSORBIDE MONONITRATE 120 MG: 60 TABLET, EXTENDED RELEASE ORAL at 08:55

## 2020-09-22 RX ADMIN — LISINOPRIL 2.5 MG: 2.5 TABLET ORAL at 08:55

## 2020-09-22 RX ADMIN — CLOPIDOGREL BISULFATE 75 MG: 75 TABLET ORAL at 08:55

## 2020-09-22 ASSESSMENT — ENCOUNTER SYMPTOMS
RESPIRATORY NEGATIVE: 1
GASTROINTESTINAL NEGATIVE: 1

## 2020-09-22 NOTE — PLAN OF CARE
Problem: SAFETY  Goal: Free from accidental physical injury  Outcome: Ongoing     Problem: Falls - Risk of:  Goal: Will remain free from falls  Description: Will remain free from falls  Outcome: Ongoing

## 2020-09-22 NOTE — DISCHARGE SUMMARY
Hospital Medicine Discharge Summary    Patient: Italo Wadsworth     Age: 61 y.o. Gender: male  : 1960   MRN: [id number]  Code Status: Full     Admit Date: 2020   Discharge Date: 2020    Disposition:  Home     Condition at Discharge: Stable    Primary Care Provider: Ashely Amaro DO    Admitting Physician: Brea Jiménez MD  Discharge Physician: Brea Jiménez MD       Discharge Diagnoses: Active Hospital Problems    Diagnosis    Coronary artery disease involving native coronary artery of native heart with unstable angina pectoris ([facility] [address] [I25.110]     Priority: High    Chest pain [R07.9]    Reactive airway disease with wheezing, moderate persistent, with acute exacerbation [J45.41]    S/P drug eluting coronary stent placement [Z95.5]    Unstable angina ([facility] [address] [I20.0]    Diabetes mellitus [E11.9]    HTN (hypertension) [I10]       Hospital Course:     61 y.o. male who presented to [facility] with chest pain. PMHx significant for CAD, hypertension, hyperlipidemia, diabetes, obesity. He was recently evaluated as an outpatient and noted to have symptoms concerning for unstable angina. He was scheduled for elective cardiac catheterization by his cardiologist.  However this was delayed due to a febrile illness that lasted 1 to 2 weeks. He did have multiple negative cover tests during that time and the etiology of his illness was unclear. He did take a course of prednisone for some respiratory symptoms. Symptoms had resolved and he now presents for elective cardiac catheterization. He was found to have in-stent stenosis in the mid LAD which was treated with laser atherectomy. He was admitted for further monitoring. Assessment/Plan:    CAD/unstable angina: Underwent cardiac catheterization with treatment of in-stent stenosis. Continue on DAPT, beta-blocker, statin. DM2, controlled. BS likely elevated due to recent Prednisone taper for bronchitis, and being off Glipizide.   Okay to resume glipizide and Metformin. Hypertension, benign: Controlled. Continue current medication regimen. Exam:   /62   Pulse 69   Temp 97.9 °F (36.6 °C) (Oral)   Resp 18   Ht 5' 10\" (1.778 m)   Wt 236 lb 14.4 oz (107.5 kg)   SpO2 98%   BMI 33.99 kg/m²   General appearance: No apparent distress, appears stated age and cooperative. HEENT:  Normal cephalic, atraumatic without obvious deformity. Pupils equal, round, and reactive to light. Extra ocular muscles intact. Conjunctivae/corneas clear. Neck: Supple, no jugular venous distention. Trachea midline with full range of motion. Respiratory:  Normal respiratory effort. Clear to auscultation, bilaterally without Rales/Wheezes/Rhonchi. Cardiovascular: Regular rate and rhythm with normal S1/S2 without murmurs, rubs or gallops. Abdomen: Soft, non-tender, non-distended with normal bowel sounds. Musculoskelatal: No clubbing, cyanosis or edema bilaterally. Full range of motion without deformity. Neurologic:  Neurovascularly intact without any focal sensory/motor deficits. Cranial nerves: II-XII intact, grossly non-focal.  Psychiatric: Alert and oriented, thought content appropriate, normal insight  Skin: Skin color, texture, turgor normal.  No rashes or lesions. Capillary Refill: Brisk,< 3 seconds   Peripheral Pulses: +2 palpable, equal bilaterally       Patient Discharge Instructions: Follow-up appointment on October 12th at 9:30AM with Saray García CNP, [facility]. Discharge Medications:   Discharge Medication List as of 9/22/2020 12:17 PM        Discharge Medication List as of 9/22/2020 12:17 PM        Discharge Medication List as of 9/22/2020 12:17 PM      CONTINUE these medications which have NOT CHANGED    Details   metFORMIN (GLUCOPHAGE) 1000 MG tablet TAKE ONE AND ONE HALF TABLETS DAILY. , Disp-135 tablet,R-2NO PRINT      Naproxen Sodium (ALEVE PO) Take by mouth as neededHistorical Med      glipiZIDE (GLUCOTROL) 10 MG tablet TAKE ONE TABLET BY MOUTH EVERY MORNING FOR DIABETES, Disp-30 tablet, R-2Normal      isosorbide mononitrate (IMDUR) 120 MG extended release tablet TAKE ONE TABLET BY MOUTH DAILY, Disp-90 tablet, R-2Normal      clopidogrel (PLAVIX) 75 MG tablet TAKE ONE TABLET BY MOUTH DAILY, Disp-90 tablet, R-3Normal      fluticasone-salmeterol (ADVAIR DISKUS) 250-50 MCG/DOSE AEPB Inhale 1 puff into the lungs every 12 hours, Disp-60 each, R-3Print      albuterol (PROVENTIL) (2.5 MG/3ML) 0.083% nebulizer solution Take 3 mLs by nebulization every 6 hours as needed for Wheezing, Disp-120 each, R-3Print      albuterol sulfate (PROAIR RESPICLICK) 600 (90 Base) MCG/ACT aerosol powder inhalation Inhale 2 puffs into the lungs every 4 hours as needed for Wheezing, Disp-1 Inhaler, R-1Normal      Spacer/Aero-Holding Chambers (E-Z SPACER) REAL DAILY PRN Starting Sun 3/1/2020, Disp-1 Device, R-0, Print      metoprolol tartrate (LOPRESSOR) 25 MG tablet TAKE ONE TABLET BY MOUTH TWICE A DAY, Disp-180 tablet, R-3Normal      atorvastatin (LIPITOR) 40 MG tablet Take 1 qd in the evening., Disp-90 tablet, R-2Normal      lisinopril (PRINIVIL;ZESTRIL) 2.5 MG tablet TAKE ONE TABLET BY MOUTH DAILY, Disp-90 tablet, R-3Normal      nitroGLYCERIN (NITROSTAT) 0.4 MG SL tablet Place 1 tablet under the tongue every 5 minutes as needed for Chest pain, Disp-25 tablet, R-3Normal      aspirin 81 MG chewable tablet CHEW ONE TABLET BY MOUTH DAILY, Disp-30 tablet, R-1           Discharge Medication List as of 9/22/2020 12:17 PM            Significant Test Results    No results found. Consults:     IP CONSULT TO CARDIAC REHAB    Labs:  For convenience and continuity at follow-up the following most recent labs are provided:    Lab Results   Component Value Date    WBC 7.6 09/22/2020    HGB 14.3 09/22/2020    HCT 42.0 09/22/2020    MCV 92.9 09/22/2020     09/22/2020     09/22/2020    K 4.9 09/22/2020    CL 98 09/22/2020    CO2 25 09/22/2020    BUN 13 09/22/2020    CREATININE <0.5 09/22/2020    CALCIUM 9.2 09/22/2020    PHOS 3.7 07/09/2017    TROPONINI <0.01 05/06/2020    ALKPHOS 85 05/06/2020    ALT 20 05/06/2020    AST 15 05/06/2020    BILITOT 0.4 05/06/2020    BILIDIR <0.2 01/23/2020    LABALBU 4.6 05/06/2020    LDLCALC 69 09/21/2020    TRIG 123 09/21/2020    LABA1C 8.6 08/13/2020     Lab Results   Component Value Date    INR 0.99 09/21/2020    INR 1.04 09/06/2017    INR 1.04 07/08/2017         The patient was seen and examined on day of discharge and this discharge summary is in conjunction with any daily progress note from day of discharge. Time spent on discharge is more than 30 minutes in the examination, evaluation, counseling and review of medications and discharge plan. Signed:    Donovan Blandon MD   10/18/2020    Thank you Elliot Valdez DO for the opportunity to be involved in this patient's care. If you have any questions or concerns please feel free to contact my office [phone].

## 2020-09-22 NOTE — FLOWSHEET NOTE
09/21/20 2017   Assessment   Charting Type Shift assessment   Neurological   Neuro (WDL) WDL   Level of Consciousness 0   [place] Coma Scale   Eye Opening 4   Best Verbal Response 5   Best Motor Response 6   Sree Coma Scale Score 15   HEENT   HEENT (WDL) WDL   Respiratory   Respiratory (WDL) WDL   Cardiac   Cardiac (WDL) WDL   Cardiac Monitor   Telemetry Monitor On Yes   Telemetry Audible Yes   Telemetry Alarms Set Yes   Gastrointestinal   Abdominal (WDL) WDL   Peripheral Vascular   Peripheral Vascular (WDL) WDL   Puncture Site Assessment 1   Location Radial - right   Site Assessment No redness, drainage, swelling or hematoma   Dressing Applied Transparent occlusive dressing   Skin Color/Condition   Skin Color/Condition (WDL) WDL   Skin Integrity   Skin Integrity (WDL) WDL   Musculoskeletal   Musculoskeletal (WDL) WDL   Genitourinary   Genitourinary (WDL) WDL

## 2020-09-22 NOTE — PROGRESS NOTES
Pt d/c'd. Removed IV and stopped bleeding. Catheter intact. Pt tolerated well. No redness noted at site. Notified [facility] and removed tele box. Reviewed d/c instructions, including s/p radial cath restrictions, home meds, and  f/u information utilizing teach-back method. Patient verbalized understanding.

## 2020-09-22 NOTE — PROGRESS NOTES
[address]  Cardiology  Progress Note    Admission date:  2020    Reason for follow up visit: CAD    HPI/CC: Marcelina Gutierrez is a 61 y.o. male who was admitted 2020 following laser atherectomy and SAMUEL LAD. No post procedure complications, rhythm overnight has been sinus. Subjective: Denies chest pain, shortness of breath, palpitations and dizziness. Did have chest soreness post procedure that has resolved. Vitals:  Blood pressure 131/62, pulse 69, temperature 97.9 °F (36.6 °C), temperature source Oral, resp. rate 18, height 5' 10\" (1.778 m), weight 236 lb 14.4 oz (107.5 kg), SpO2 98 %.   Temp  Av.8 °F (36.6 °C)  Min: 97.6 °F (36.4 °C)  Max: 98 °F (36.7 °C)  Pulse  Av.3  Min: 68  Max: 85  BP  Min: 108/67  Max: 137/73  SpO2  Av %  Min: 97 %  Max: 99 %    24 hour I/O    Intake/Output Summary (Last 24 hours) at 2020 0950  Last data filed at 2020 0532  Gross per 24 hour   Intake 240 ml   Output --   Net 240 ml     Current Facility-Administered Medications   Medication Dose Route Frequency Provider Last Rate Last Dose    aspirin chewable tablet 81 mg  81 mg Oral Daily Ginny Fatima MD   81 mg at 20 0855    atorvastatin (LIPITOR) tablet 40 mg  40 mg Oral Nightly Ginny Fatima MD   40 mg at 20    clopidogrel (PLAVIX) tablet 75 mg  75 mg Oral Daily Ginny Fatima MD   75 mg at 20 0855    isosorbide mononitrate (IMDUR) extended release tablet 120 mg  120 mg Oral Daily Ginny Fatima MD   120 mg at 20 0855    lisinopril (PRINIVIL;ZESTRIL) tablet 2.5 mg  2.5 mg Oral Daily Ginny Fatima MD   2.5 mg at 20 0855    metoprolol tartrate (LOPRESSOR) tablet 25 mg  25 mg Oral BID Ginny Fatima MD   25 mg at 20 0855    glucose (GLUTOSE) 40 % oral gel 15 g  15 g Oral PRN Leena Jang MD        dextrose 50 % IV solution  12.5 g Intravenous PRN Leena Jang MD        glucagon (rDNA) injection 1 mg  1 mg Intramuscular PRN Thai Mitchell MD        dextrose 5 % solution  100 mL/hr Intravenous PRN Thai Mitchell MD        glipiZIDE (GLUCOTROL) tablet 10 mg  10 mg Oral QAM AC Thai Mitchell MD        insulin lispro (HUMALOG) injection vial 0-12 Units  0-12 Units Subcutaneous TID WC Thai Mitchell MD   4 Units at 09/22/20 0857    insulin lispro (HUMALOG) injection vial 0-6 Units  0-6 Units Subcutaneous Nightly Thai Mitchell MD   4 Units at 09/21/20 2056    nitroGLYCERIN (NITROSTAT) SL tablet 0.4 mg  0.4 mg Sublingual Q5 Min PRN Thai Mitchell MD        morphine (PF) injection 2 mg  2 mg Intravenous Q4H PRN Thai Mitchell MD        Or   [facility] morphine (PF) injection 4 mg  4 mg Intravenous Q4H PRN Thai Mitchell MD         Review of Systems   Constitutional: Negative. Respiratory: Negative. Cardiovascular: Negative. Gastrointestinal: Negative. Neurological: Negative. Objective:     Telemetry monitor: SR    Physical Exam:  Constitutional:  Comfortable and alert, NAD, appears stated age, obese  Eyes: PERRL, sclera nonicteric  Neck:  Supple, no masses, no thyroidmegaly, no JVD  Skin:  Warm and dry; no rash or lesions  Heart: Regular, normal apex, S1 and S2 normal, no M/G/R  Lungs:  Normal respiratory effort; clear; no wheezing/rhonchi/rales  Abdomen: soft, non tender, + bowel sounds  Extremities:  No edema or cyanosis; no clubbing  Neuro: alert and oriented, moves legs and arms equally, normal mood and affect  Right radial site soft, no hematoma, 2+ pulse    Data Reviewed:    Coronary angiogram 9/21/2020:  LM: Luminal  LAD: Prior stenting in the proximal to mid with an 80% restenosis in the mid LAD stent. There is another stent in the mid to distal that has minimal restenosis. GUERLINE II flow                Diagonal 1: Prior stent with minimal restenosis. LCX: Luminal                Om1-prior proximal stent that is patent 50% distal restenosis. RCA: Minute. Proximal/mid eccentric 30%.   LVEDP: 15  LVEF: 55%  PCI of mid LAD  Status post predilation using laser arthrectomy, angioscope and NC balloon dilatation. Stent resolute Zak 3.5 mm x 15 mm, postdilated to 3.6 mm. Assessment  1. Interval restenosis of mid LAD in-stent 80% lesion, GUERLINE II flow. Treated successfully with laser atherectomy and aggressive predilatation using cutting balloons                           -0% residual, GUERLINE-3 flow. 2.  Continue aspirin 81 mg daily for life. Plavix 75 mg daily for 1 year minimum, given extensive CAD will continue dual therapy for life as tolerated  3. We will closely follow OM1 lesion as appears to be borderline at this time  4. Continue beta-blocker and statin as tolerated aggressive risk factor reduction medical management    Echo 3/4/2020:   Technically difficult examination due to body habitus. Definity® used for   myocardial border enhancement. Normal left ventricle size and systolic function with a visually estimated   ejection fraction of 55%. Mild concentric left ventricular hypertrophy. No regional wall motion abnormalities are seen. Grade I diastolic dysfunction with normal LV filling pressures. The right atrium is mildly enlarged.     Lab Reviewed:     Renal Profile:  Lab Results   Component Value Date    CREATININE <0.5 09/22/2020    BUN 13 09/22/2020     09/22/2020    K 4.9 09/22/2020    CL 98 09/22/2020    CO2 25 09/22/2020     CBC:    Lab Results   Component Value Date    WBC 7.6 09/22/2020    RBC 4.52 09/22/2020    HGB 14.3 09/22/2020    HCT 42.0 09/22/2020    MCV 92.9 09/22/2020    RDW 13.6 09/22/2020     09/22/2020     BNP:    Lab Results   Component Value Date    PROBNP 85 05/06/2020    PROBNP 178 03/03/2020    PROBNP 123 03/01/2020    PROBNP 136 01/13/2016     Fasting Lipid Panel:    Lab Results   Component Value Date    CHOL 151 09/21/2020    HDL 57 09/21/2020    TRIG 123 09/21/2020     Cardiac Enzymes:  CK/MbTroponin  Lab Results   Component Value Date TROPONINI <0.01 05/06/2020     PT/ INR   Lab Results   Component Value Date    INR 0.99 09/21/2020    INR 1.04 09/06/2017    INR 1.04 07/08/2017    PROTIME 11.5 09/21/2020    PROTIME 11.7 09/06/2017    PROTIME 11.7 07/08/2017     PTT No results found for: PTT No results found for: MG   Lab Results   Component Value Date    TSH 1.74 01/23/2020     All labs and imaging reviewed today    Assessment:  CAD: no angina; s/p laser atherectomy/SAMUEL LAD 9/21/2020; s/p rotablator/ SAMUEL OM 2017; s/p PCI Diag, LAD and OM 1/2016  HTN: controlled  HLD: controlled, LDL 69, continue statin  DM: uncontrolled, hgb a1c 8.6    Plan:   1. Activity restrictions reviewed  2. Continue aspirin, statin, plavix, lisinopril, lopressor; consider lifelong DAPT given extensive CAD  3. Aggressive DM management  4. Check BP at home and call the office if consistently out of goal range  5. Ok to discharge from cardiology perspective, please call with any questions. Follow up will be arranged.     Clement Cueva, APRN-CNP  [facility]  [phone]

## 2020-09-30 ENCOUNTER — OFFICE VISIT (OUTPATIENT)
Dept: PULMONOLOGY | Age: 60
End: 2020-09-30
Payer: COMMERCIAL

## 2020-09-30 VITALS
HEART RATE: 81 BPM | DIASTOLIC BLOOD PRESSURE: 64 MMHG | WEIGHT: 245 LBS | HEIGHT: 70 IN | BODY MASS INDEX: 35.07 KG/M2 | SYSTOLIC BLOOD PRESSURE: 122 MMHG | OXYGEN SATURATION: 98 % | RESPIRATION RATE: 16 BRPM

## 2020-09-30 PROCEDURE — 99213 OFFICE O/P EST LOW 20 MIN: CPT | Performed by: INTERNAL MEDICINE

## 2020-09-30 RX ORDER — PREDNISONE 10 MG/1
TABLET ORAL
Qty: 30 TABLET | Refills: 0 | Status: SHIPPED | OUTPATIENT
Start: 2020-09-30 | End: 2020-10-12

## 2020-09-30 RX ORDER — AMOXICILLIN 500 MG/1
500 CAPSULE ORAL 3 TIMES DAILY
Qty: 21 CAPSULE | Refills: 0 | Status: SHIPPED | OUTPATIENT
Start: 2020-09-30 | End: 2020-10-07

## 2020-09-30 NOTE — PROGRESS NOTES
Pulmonary Follow-up Note  Chief Complaint: shortness of breath, wheezing  Referring Provider: hospital f/u    Interval history: doing well with advair. Some shortness of breath when exposed to cold dry air. Single use of prednisone in Sept with viral illness prior to cardiac cath. PCI with in stent stenosis, laser and new stent to LAD    Presenting history: ED 3/3/2020 with a 3-week history of URI symptoms that have worsened to include moderately severe shortness of breath, worse with exertion, associated with wheezing. He has been treated with steroids, initially with improvement, but the second course that he obtained in the emergency department earlier this week did not seem to make much difference     reports that he has never smoked. He has never used smokeless tobacco.     Review of Systems:    Physical Exam:  Blood pressure 122/64, pulse 81, resp. rate 16, height 5' 10\" (1.778 m), weight 245 lb (111.1 kg), SpO2 98 %. Constitutional:  No acute distress. HENT:  Oropharynx is clear and moist.   Neck: No tracheal deviation present. Cardiovascular: Normal heart sounds. No lower extremity edema. Pulmonary/Chest: No wheezes. No rhonchi. No rales. No decreased breath sounds. No accessory muscle usage or stridor. Musculoskeletal: No cyanosis. No clubbing. Skin: Skin is warm and dry. Psychiatric: Normal mood and affect. Neurologic: speech fluent, alert and oriented, strength symmetric       Data:   5/6/20 CXR clear  2/12/16 CT CHEST no nodule or mass    Assessment:  · Moderate persistent asthma with 3 flares in 1st half of 2020. Very steroid responsive. 1 flare in September with viral illness around time of cardiac PCI. Otherwise doing well.       Not addressed today  · Diabetes mellitus   · CAD s/p PCI    Plan:   · Advair 250/50 BID without exception   · Albuterol neb/MDI PRN; use prior to exposure to cold/dry air  · Emergency Rx for pred/amox given   · See me in 6 months

## 2020-10-01 RX ORDER — GLIPIZIDE 10 MG/1
TABLET ORAL
Qty: 30 TABLET | Refills: 3 | Status: SHIPPED | OUTPATIENT
Start: 2020-10-01 | End: 2021-02-03

## 2020-10-09 RX ORDER — ATORVASTATIN CALCIUM 40 MG/1
TABLET, FILM COATED ORAL
Qty: 30 TABLET | Refills: 0 | Status: SHIPPED | OUTPATIENT
Start: 2020-10-09 | End: 2020-12-07

## 2020-10-09 NOTE — TELEPHONE ENCOUNTER
Future Appointments   Date Time Provider Abdirahman Craft   10/12/2020  9:30 AM Melba Romberg, APRN - CNP P [facility]   11/12/2020  9:00 AM DO CAYLA Goodman [facility]   4/7/2021  9:00 AM [facility]

## 2020-10-12 ENCOUNTER — OFFICE VISIT (OUTPATIENT)
Dept: CARDIOLOGY CLINIC | Age: 60
End: 2020-10-12
Payer: COMMERCIAL

## 2020-10-12 VITALS
WEIGHT: 253 LBS | BODY MASS INDEX: 36.22 KG/M2 | TEMPERATURE: 96 F | DIASTOLIC BLOOD PRESSURE: 62 MMHG | SYSTOLIC BLOOD PRESSURE: 114 MMHG | HEIGHT: 70 IN | HEART RATE: 70 BPM | OXYGEN SATURATION: 97 %

## 2020-10-12 PROCEDURE — 99214 OFFICE O/P EST MOD 30 MIN: CPT | Performed by: NURSE PRACTITIONER

## 2020-10-12 RX ORDER — RANOLAZINE 500 MG/1
500 TABLET, EXTENDED RELEASE ORAL 2 TIMES DAILY
Qty: 60 TABLET | Refills: 3 | Status: SHIPPED | OUTPATIENT
Start: 2020-10-12 | End: 2020-11-20 | Stop reason: ALTCHOICE

## 2020-10-12 ASSESSMENT — ENCOUNTER SYMPTOMS
RESPIRATORY NEGATIVE: 1
GASTROINTESTINAL NEGATIVE: 1

## 2020-10-12 NOTE — PROGRESS NOTES
DR FREEMAN CALLS TO CHECK ON PT. PT STABLE. -140 SYSTOLIC. 98% 1L O2.   CBI IN PROGRESS TO KEEP  WILDE PALE PINK.   [facility]   Cardiology Note              Date:  October 11, 2020  Patientname: Chantal Quintanilla  YOB: 1960    Primary Care physician: Elliot Valdez DO    HISTORY OF PRESENT ILLNESS: Chantal Quintanilla is a 61 y.o. male with a history of CAD, HTN, HLD, DM. In 1/2016 he had an abnormal stress test and SAMUEL OM and LAD. Readmitted 1/18/2016 with chest pain and had SAMUEL Diag 1. In 8/2017 he had rotablator and SAMULE OM1. On 9/21/2020 he had laser atherectomy and SAMUEL LAD. Today he presents for hospital follow up for CAD post PCI. Initially after PCI he felt great but has been having increased chest pain over the past week. Chest pain usually occurs when he exerts himself after he has been sitting. He takes 1-2 SL nitro per day and pain resolves. Pain is similar to symptoms with PCI. He has no shortness of breath, edema, dizziness, palpitations. BP controlled at home. His wife is concerned because he drinks bourbon nightly, about 6 shots total.     Past Medical History:   has a past medical history of Alcohol abuse, Coronary artery disease, Hyperlipidemia, Hypertension, Non morbid obesity, Reactive airway disease with wheezing, moderate persistent, with acute exacerbation, and Type II or unspecified type diabetes mellitus without mention of complication, not stated as uncontrolled. Past Surgical History:   has a past surgical history that includes Vein Surgery and Coronary angioplasty with stent (1/14/2016 9/21/2020). Home Medications:    Prior to Admission medications    Medication Sig Start Date End Date Taking?  Authorizing Provider   atorvastatin (LIPITOR) 40 MG tablet TAKE ONE TABLET BY MOUTH EVERY EVENING 10/9/20   [facility], DO   glipiZIDE (GLUCOTROL) 10 MG tablet TAKE ONE TABLET BY MOUTH EVERY MORNING FOR DIABETES 10/1/20   [facility], DO   predniSONE (DELTASONE) 10 MG tablet Take 4 tabs by mouth for three days, then 3 tabs by mouth for 3 days, then 2 tabs by mouth for 3 days, then 1 tab by continue dual therapy for life as tolerated  3.  We will closely follow OM1 lesion as appears to be borderline at this time  4.  Continue beta-blocker and statin as tolerated aggressive risk factor reduction medical management     Echo 3/4/2020:   Technically difficult examination due to body habitus. Definity® used for   myocardial border enhancement.   Normal left ventricle size and systolic function with a visually estimated   ejection fraction of 55%.   Mild concentric left ventricular hypertrophy.   No regional wall motion abnormalities are seen.   Grade I diastolic dysfunction with normal LV filling pressures.   The right atrium is mildly enlarged. Cardiology Labs Reviewed:   CBC: No results for input(s): WBC, HGB, HCT, PLT in the last 72 hours. BMP:No results for input(s): NA, K, CO2, BUN, CREATININE, LABGLOM, GLUCOSE in the last 72 hours. PT/INR: No results for input(s): PROTIME, INR in the last 72 hours. APTT:No results for input(s): APTT in the last 72 hours. FASTING LIPID PANEL:  Lab Results   Component Value Date    HDL 57 09/21/2020    LDLCALC 69 09/21/2020    TRIG 123 09/21/2020     LIVER PROFILE:No results for input(s): AST, ALT, ALB in the last 72 hours. BNP:   Lab Results   Component Value Date    PROBNP 85 05/06/2020    PROBNP 178 03/03/2020    PROBNP 123 03/01/2020    PROBNP 136 01/13/2016     Reviewed all labs and imaging today    Assessment:   Chest pain: recurrent  CAD: concern for progression; s/p laser atherectomy/SAMUEL LAD 9/21/2020; s/p rotablator/ SAMUEL OM 2017; s/p PCI Diag, LAD and OM 1/2016  HTN: controlled  HLD: controlled, LDL 69, continue statin  DM: uncontrolled, hgb a1c 8.6  Alcohol abuse: drinks bourbon nightly, about 6 shots total     Plan:   1. Start ranexa for worsening chest pain  2. Will discuss recurrent chest pain and need for coronary angiography with Dr. Marrero and contact patient  3. Hold cardiac rehab referral until plan determined  4.  Continue aspirin, statin, plavix, imdur, lisinopril, lopressor; likely lifelong DAPT given extensive CAD  5. Aggressive DM management  6. Check BP at home and call the office if consistently out of goal range  7.  Follow up to be determined    [facility] [address]  [phone]

## 2020-10-12 NOTE — PATIENT INSTRUCTIONS
Start ranexa for chest pain  Continue other medications  Will discuss with Dr. Eladia Fihs and call you

## 2020-10-14 ENCOUNTER — TELEPHONE (OUTPATIENT)
Dept: CARDIOLOGY | Age: 60
End: 2020-10-14

## 2020-10-14 ENCOUNTER — TELEPHONE (OUTPATIENT)
Dept: FAMILY MEDICINE CLINIC | Age: 60
End: 2020-10-14

## 2020-10-14 NOTE — TELEPHONE ENCOUNTER
Discussed recurrent angina with Dr. Jake Langley. Recommend increased anti anginals and cardiac rehab. If symptoms persist/worsen, can consider repeat coronary angiography. Started on ranexa at office visit. Called patient and he agrees with plan. Cardiac rehab referral sent. He will call the office with worsening symptoms. Please make an appointment with Dr. Jake Langley in 4-5 weeks. Thanks!

## 2020-10-14 NOTE — TELEPHONE ENCOUNTER
Pt called about question about the recall on metformin, I suggested he contact the pharmacy to see if it was the dose he is on that was recalled.

## 2020-11-03 PROBLEM — I10 HYPERTENSION: Status: RESOLVED | Noted: 2020-11-03 | Resolved: 2020-11-03

## 2020-11-03 PROBLEM — R07.9 CHEST PAIN: Status: RESOLVED | Noted: 2020-09-21 | Resolved: 2020-11-03

## 2020-11-04 RX ORDER — NITROGLYCERIN 0.4 MG/1
TABLET SUBLINGUAL
Qty: 25 TABLET | Refills: 3 | Status: ON HOLD
Start: 2020-11-04 | End: 2021-02-23 | Stop reason: HOSPADM

## 2020-11-11 ENCOUNTER — TELEPHONE (OUTPATIENT)
Dept: FAMILY MEDICINE CLINIC | Age: 60
End: 2020-11-11

## 2020-11-11 NOTE — TELEPHONE ENCOUNTER
Pt wife called asking refill on medication    fluticasone-salmeterol (ADVAIR DISKUS) 250-50 MCG/DOSE AE     Pharmacy:  [address] [place] [address] Drive [address] [place] [address]    Last appt.   8/13/2020    Future Appointments   Date Time Provider Abdirahman Craft   11/12/2020  9:00 AM Pradeep Pena DO [facility]   11/20/2020 10:15 AM MD Chandni Breaux [facility]   4/7/2021  9:00 AM [facility]

## 2020-11-12 ENCOUNTER — OFFICE VISIT (OUTPATIENT)
Dept: FAMILY MEDICINE CLINIC | Age: 60
End: 2020-11-12
Payer: COMMERCIAL

## 2020-11-12 ENCOUNTER — HOSPITAL ENCOUNTER (OUTPATIENT)
Dept: GENERAL RADIOLOGY | Age: 60
Discharge: HOME | End: 2020-11-12
Payer: COMMERCIAL

## 2020-11-12 VITALS
DIASTOLIC BLOOD PRESSURE: 66 MMHG | TEMPERATURE: 97.9 F | OXYGEN SATURATION: 97 % | SYSTOLIC BLOOD PRESSURE: 140 MMHG | HEIGHT: 70 IN | HEART RATE: 75 BPM | BODY MASS INDEX: 36.51 KG/M2 | WEIGHT: 255 LBS

## 2020-11-12 PROBLEM — M54.6 CHRONIC RIGHT-SIDED THORACIC BACK PAIN: Status: ACTIVE | Noted: 2020-11-12

## 2020-11-12 PROBLEM — G89.29 CHRONIC RIGHT-SIDED THORACIC BACK PAIN: Status: ACTIVE | Noted: 2020-11-12

## 2020-11-12 LAB — HBA1C MFR BLD: 8.2 %

## 2020-11-12 PROCEDURE — 3052F HG A1C>EQUAL 8.0%<EQUAL 9.0%: CPT | Performed by: FAMILY MEDICINE

## 2020-11-12 PROCEDURE — 99214 OFFICE O/P EST MOD 30 MIN: CPT | Performed by: FAMILY MEDICINE

## 2020-11-12 PROCEDURE — 90471 IMMUNIZATION ADMIN: CPT | Performed by: FAMILY MEDICINE

## 2020-11-12 PROCEDURE — 90686 IIV4 VACC NO PRSV 0.5 ML IM: CPT | Performed by: FAMILY MEDICINE

## 2020-11-12 PROCEDURE — 72072 X-RAY EXAM THORAC SPINE 3VWS: CPT

## 2020-11-12 PROCEDURE — 83036 HEMOGLOBIN GLYCOSYLATED A1C: CPT | Performed by: FAMILY MEDICINE

## 2020-11-12 RX ORDER — ALBUTEROL SULFATE 90 UG/1
2 POWDER, METERED RESPIRATORY (INHALATION) EVERY 4 HOURS PRN
Qty: 1 INHALER | Refills: 1 | Status: SHIPPED | OUTPATIENT
Start: 2020-11-12 | End: 2021-03-02

## 2020-11-12 ASSESSMENT — ENCOUNTER SYMPTOMS
CHEST TIGHTNESS: 0
SHORTNESS OF BREATH: 0
COUGH: 0
ABDOMINAL PAIN: 0
CHOKING: 0
BACK PAIN: 0
WHEEZING: 0
STRIDOR: 0

## 2020-11-12 NOTE — TELEPHONE ENCOUNTER
11/12/2020  Future Appointments   Date Time Provider Abdirahman Scotti   11/20/2020 10:15 [facility]   4/7/2021  9:00 AM MD SANDHYA Mccabe [facility]

## 2020-11-12 NOTE — PROGRESS NOTES
sounds are normal. There is no distension. Palpations: Abdomen is soft. There is no mass. Tenderness: There is no abdominal tenderness. There is no guarding or rebound. Musculoskeletal: Normal range of motion. General: Tenderness (to palpation in thoracolumbar paraspinal muscles) present. Lymphadenopathy:      Cervical: No cervical adenopathy. Skin:     General: Skin is warm and dry. Coloration: Skin is not pale. Findings: No erythema or rash. Neurological:      Mental Status: He is alert and oriented to person, place, and time. Cranial Nerves: No cranial nerve deficit. Motor: No abnormal muscle tone. Coordination: Coordination normal.      Deep Tendon Reflexes: Reflexes are normal and symmetric. Reflexes normal.         Assessment and plan      1. Bronchitis-continue as needed albuterol  - albuterol sulfate (PROAIR RESPICLICK) 965 (90 Base) MCG/ACT aerosol powder inhalation; Inhale 2 puffs into the lungs every 4 hours as needed for Wheezing  Dispense: 1 Inhaler; Refill: 1    2. Moderate persistent reactive airway disease with acute exacerbation-stable, continue as needed albuterol    - albuterol sulfate (PROAIR RESPICLICK) 880 (90 Base) MCG/ACT aerosol powder inhalation; Inhale 2 puffs into the lungs every 4 hours as needed for Wheezing  Dispense: 1 Inhaler; Refill: 1    3. Type 2 diabetes mellitus without complication, without long-term current use of insulin (HCC)-improved control, decrease calories and increase exercise  - POCT glycosylated hemoglobin (Hb A1C)    4. Chronic right-sided thoracic back pain-x-ray thoracic spine    - XR THORACIC SPINE (2 VIEWS); Future    5.  Angina at rest (HCC)-continue Ranexa and follow-up with cardiologist        Shen Cough, DO

## 2020-11-19 NOTE — PROGRESS NOTES
[facility]   Cardiovascular Evaluation    PATIENT: Kelly Temple  DATE: 2020  MRN: <[id number]>  CSN: [id number]  : 1960      Primary Care Doctor: Abhay Maki DO  Reason for evaluation:   Other (4-5 week follow up. Pt states he has been having chest pain in the mornings and in the evenings, takes nitro 2 times daily for this. Pain is felt on the left side. Rates pain at a 7)      Subjective:   History of present illness on initial date of evaluation:   Kelly Temple is a 61 y.o. patient who presents for follow up. He has a PMH DM, HTN, CAD, s/p PCI, alcohol abuse, nonsmoker, + FH. Was admitted to hospital in 2016 with [place] and had abnormal stress test. Underwent PCI of OM as well as [id number] LAD and was d/c'd/ Returned to hospital and noted to have mild troponin elevation and CP relieved with NTG SL. He underwent LHC on 16 and a PCI-D-1 with SAMUEL was done. Stents in prox-mid LAD, distal LAD, proximal-mid OM-1 were found patent. He was switched from Plavix to Brilinta. Imdur was added at office visit in 2016 due to complaints of exertional chest pain. He was admitted to the hospital in late May 2016 with recurrent chest pain and transient troponin elevation. LHC was showed patent stents. He was started on Ranexa. He underwent 17 rotablade guided PCI of OM1 recurrent instent restenosis. Today he presents for follow up. He had an elective LHC on 2020 that demonstrated restenosis of mid LAD. Treated successfully with laser atherectomy and aggressive pre dilatation using cutting balloons. He has not noticed any improvement with the Renexa. He states he continues to have chest pain. He states he has been taking 2 SL nitro's daily and this does help. He states the pain will occur in the AM and when he takes a SL nitro the pain stops. He continues to drink a good amount of bourbon nightly. He was positive for COVID in October.        Patient Active Problem List   Diagnosis    Type II or unspecified type diabetes mellitus without mention of complication, not stated as uncontrolled    Diabetes mellitus    History of ETOH abuse    Patient overweight    Patient overweight    Dietary noncompliance    Alcohol abuse    Flank pain    Prostatism    Low serum testosterone level    Arm pain, left    Shoulder pain    Precordial pain    Lung nodule    Acute angina (HCC)    Abnormal stress test    Acute coronary syndrome (HCC)    Unstable angina ([facility] [address]    Coronary artery disease involving native coronary artery of native heart with unstable angina pectoris (HCC)    Other chest pain    S/P drug eluting coronary stent placement    Essential hypertension    Obesity    Abnormal chest x-ray    Angina effort    Skin lesion    Angina at rest [facility])    Hyperlipidemia    Angina pectoris ([facility] [address]    NSTEMI (non-ST elevated myocardial infarction) ([facility] [address]    Controlled type 2 diabetes mellitus without complication, without long-term current use of insulin ([facility] [address]    ASHD (arteriosclerotic heart disease)    Mass of right foot    Overweight    History of angina pectoris    Reactive airway disease    Bronchitis    Reactive airway disease with wheezing, moderate persistent, with acute exacerbation    Need for prophylactic vaccination and inoculation against varicella    Need for prophylactic vaccination against diphtheria-tetanus-pertussis (DTP)    Chronic right-sided thoracic back pain         Past Medical History:   has a past medical history of Alcohol abuse, Coronary artery disease, Hyperlipidemia, Hypertension, Non morbid obesity, Reactive airway disease with wheezing, moderate persistent, with acute exacerbation, and Type II or unspecified type diabetes mellitus without mention of complication, not stated as uncontrolled.     Surgical History:   has a past surgical history that includes Vein Surgery and Coronary angioplasty with stent (1/14/2016 9/21/2020). Social History:   reports that he has never smoked. He has never used smokeless tobacco. He reports current alcohol use of about 1.0 standard drinks of alcohol per week. He reports that he does not use drugs. Family History:  No evidence for sudden cardiac death or premature CAD    Home Medications:  Reviewed and are listed in nursing record. and/or listed below  Current Outpatient Medications   Medication Sig Dispense Refill    albuterol sulfate (PROAIR RESPICLICK) 617 (90 Base) MCG/ACT aerosol powder inhalation Inhale 2 puffs into the lungs every 4 hours as needed for Wheezing 1 Inhaler 1    fluticasone-salmeterol (ADVAIR DISKUS) 250-50 MCG/DOSE AEPB Inhale 1 puff into the lungs every 12 hours 60 each 3    nitroGLYCERIN (NITROSTAT) 0.4 MG SL tablet DISSOLVE 1 TAB UNDER TONGUE FOR CHEST PAIN - IF PAIN REMAINS AFTER 5 MIN, CALL 911 AND REPEAT DOSE. MAX 3 TABS IN 15 MINUTES 25 tablet 3    ranolazine (RANEXA) 500 MG extended release tablet Take 1 tablet by mouth 2 times daily 60 tablet 3    atorvastatin (LIPITOR) 40 MG tablet TAKE ONE TABLET BY MOUTH EVERY EVENING 30 tablet 0    glipiZIDE (GLUCOTROL) 10 MG tablet TAKE ONE TABLET BY MOUTH EVERY MORNING FOR DIABETES 30 tablet 3    metFORMIN (GLUCOPHAGE) 1000 MG tablet TAKE ONE AND ONE HALF TABLETS DAILY.  135 tablet 2    Naproxen Sodium (ALEVE PO) Take by mouth as needed      isosorbide mononitrate (IMDUR) 120 MG extended release tablet TAKE ONE TABLET BY MOUTH DAILY 90 tablet 2    clopidogrel (PLAVIX) 75 MG tablet TAKE ONE TABLET BY MOUTH DAILY 90 tablet 3    albuterol (PROVENTIL) (2.5 MG/3ML) 0.083% nebulizer solution Take 3 mLs by nebulization every 6 hours as needed for Wheezing 120 each 3    metoprolol tartrate (LOPRESSOR) 25 MG tablet TAKE ONE TABLET BY MOUTH TWICE A  tablet 3    lisinopril (PRINIVIL;ZESTRIL) 2.5 MG tablet TAKE ONE TABLET BY MOUTH DAILY 90 tablet 3    aspirin 81 MG chewable tablet CHEW ONE TABLET BY MOUTH DAILY 30 tablet 1    Spacer/Aero-Holding Chambers (E-Z SPACER) REAL 1 Device by Does not apply route daily as needed (wheezing) 1 Device 0     No current facility-administered medications for this visit. Allergies:  Vitamin d analogs and Vitamin d analogs     Review of Systems:   A 14 point review of symptoms completed. Pertinent positives identified in the HPI, all other review of symptoms negative as below.     Objective:   PHYSICAL EXAM:    Vitals:    11/20/20 1023   BP: 98/60   Pulse: 83   SpO2: 97%    Weight: 256 lb (116.1 kg)     Wt Readings from Last 3 Encounters:   11/20/20 256 lb (116.1 kg)   11/12/20 255 lb (115.7 kg)   10/12/20 253 lb (114.8 kg)         General Appearance:  Alert, cooperative, no distress, appears stated age   Head:  Normocephalic, atraumatic   Eyes:  PERRL, conjunctiva/corneas clear   Nose: Nares normal, no drainage or sinus tenderness   Throat: Lips, mucosa, and tongue normal   Neck: Supple, symmetrical, trachea midline, NL thyroid no carotid bruit or JVD   Lungs:   CTAB, respirations unlabored   Chest Wall:  No tenderness or deformity   Heart:  Regular rhythm and normal rate; S1, S2 are normal;   no murmur noted; no rub or gallop   Abdomen:   Soft, non-tender, +BS x 4, no masses, no organomegaly   Extremities: Extremities normal, atraumatic, no cyanosis or edema   Pulses: 2+ and symmetric   Skin: Skin color, texture, turgor normal, no rashes or lesions   Pysch: Normal mood and affect   Neurologic: Normal gross motor and sensory exam.         LABS   CBC:      Lab Results   Component Value Date    WBC 7.6 09/22/2020    RBC 4.52 09/22/2020    HGB 14.3 09/22/2020    HCT 42.0 09/22/2020    MCV 92.9 09/22/2020    RDW 13.6 09/22/2020     09/22/2020     CMP:  Lab Results   Component Value Date     09/22/2020    K 4.9 09/22/2020    CL 98 09/22/2020    CO2 25 09/22/2020    BUN 13 09/22/2020    CREATININE <0.5 09/22/2020    GFRAA >60 09/22/2020    AGRATIO 1.6 05/06/2020 LABGLOM >60 09/22/2020    GLUCOSE 358 09/22/2020    PROT 7.4 05/06/2020    CALCIUM 9.2 09/22/2020    BILITOT 0.4 05/06/2020    ALKPHOS 85 05/06/2020    AST 15 05/06/2020    ALT 20 05/06/2020     PT/INR:   No results found for: PTINR  Liver:  No components found for: CHLPL  Lab Results   Component Value Date    ALT 20 05/06/2020    AST 15 05/06/2020    ALKPHOS 85 05/06/2020    BILITOT 0.4 05/06/2020     Lab Results   Component Value Date    LABA1C 8.2 11/12/2020     Lipids:         Lab Results   Component Value Date    TRIG 123 09/21/2020    TRIG 115 09/06/2017    TRIG 174 (H) 07/09/2017            Lab Results   Component Value Date    HDL 57 09/21/2020    HDL 75 (H) 01/06/2020    HDL 66 (H) 09/06/2017            Lab Results   Component Value Date    LDLCALC 69 09/21/2020    LDLCALC 63 01/06/2020    LDLCALC 69 09/06/2017            Lab Results   Component Value Date    LABVLDL 25 09/21/2020    LABVLDL 19 01/06/2020    LABVLDL 23 09/06/2017         CARDIAC DATA   EKG:    ECHO 3/3/2020  Summary   Technically difficult examination due to body habitus. Definity® used for   myocardial border enhancement. Normal left ventricle size and systolic function with a visually estimated   ejection fraction of 55%. Mild concentric left ventricular hypertrophy. No regional wall motion abnormalities are seen. Grade I diastolic dysfunction with normal LV filling pressures. The right atrium is mildly enlarged. STRESS TEST: 1/13/2016   1. Inferolateral basilar reversible stress defect compatible with stress-induced ischemia. 2. Ejection fraction of 56%   3. No focal wall motion abnormality. CARDIAC CATH   LEFT HEART CATH 9/21/2020  LM: Luminal  LAD: Prior stenting in the proximal to mid with an 80% restenosis in the mid LAD stent. There is another stent in the mid to distal that has minimal restenosis. GUERLINE II flow                Diagonal 1: Prior stent with minimal restenosis.   LCX: Luminal Om1-prior proximal stent that is patent 50% distal restenosis. RCA: Minute. Proximal/mid eccentric 30%. LVEDP: 15  LVEF: 55%  PCI of mid LAD  Status post predilation using laser arthrectomy, angioscope and NC balloon dilatation. Stent resolute [place] 3.5 mm x 15 mm, postdilated to 3.6 mm. Assessment  1. Interval restenosis of mid LAD in-stent 80% lesion, GUERLINE II flow. Treated successfully with laser atherectomy and aggressive predilatation using cutting balloons                           -0% residual, GUERLINE-3 flow. 2.  Continue aspirin 81 mg daily for life. Plavix 75 mg daily for 1 year minimum, given extensive CAD will continue dual therapy for life as tolerated  3. We will closely follow OM1 lesion as appears to be borderline at this time  4. Continue beta-blocker and statin as tolerated aggressive risk factor reduction medical management    CATH: 5/27/16  1. Patent drug-eluting stent proximal first obtuse marginal branch. 2. Patent drug-eluting stent from [id number] left anterior descending. 3. Patent drug-eluting stent distal left anterior descending. 4. Patent drug-eluting stent proximal first diagonal branch. 5. Normal left ventricular systolic function with an ejection fraction of  approximately 55% to 60%. 6. Normal left ventricular end-diastolic pressure at 10 mmHg. RECOMMENDATIONS: The patient's stents are all patent and he has no  obstructive disease in other arteries. His angina is from small vessel disease  and will add Ranexa 1000 mg b.i.d. to his antianginal regimen. He will  continue with Imdur and Lopressor for the angina. He will also continue with  aspirin, Brilinta, as well as a moderate-dose Lipitor. CATH: 01/21/2016  1. A 70% proximal medium-caliber first diagonal branch status post 2.25 x  12-mm Xience Alpine drug-eluting stent. 2.  Patent drug-eluting stent in proximal-mid left anterior descending.   3.  Patent drug-eluting stent in the distal left anterior OM1- 90% instent restenosis with haziness suggesting possible thrombosis                        OM2- ostial 60% lesion, distal 40%  RCA: dominant. Proximal 30%, luminals  LVEDP: 20  LVEF: 55%  PCI of mid OM1 90% restenosis  Ballooned with 2.25 x as NC trek up to 18atm  Angiosculpt 2.5 x 15mm up to 14atm- multiple passes  90% GUERLINE-2 flow--> 15% GUERLINE-3 flow  Assessment  1. Restenosis of the OM1 stent following recent POBA  2. Aggressive cutting balloon and NC balloon but still with residual stenosis and concerned about ability to completely deploy stent. If reoccurs or Cp returns, would suggest laser arthrectomy and PCI. CATH: 9/6/17  Successful PCI of OM-1 recurrent in-stent restenosis with Rotablator-assisted PCI with placement of Xience Alpine drug-eluting stent. Assessment and Plan   Dony Wharton is a 61 y.o. male who presents today for the following problems:      1. CAD    -9/21/2020 Laser/cutter and PCI to mid LAD   - 9/2017 Rotablade and PCI to [address]   - 8/2017 PCI to m/d LAD, diag 1  2. HTN: controlled  3. HLD: controlled  4. HX of likely COVID +: Patient has an excellent story for COVID symptomatology but unfortunately testing was too late and was negative  5. CP  6. EtOH abuse: Several bourbon drinks a night    MD Plan:  1. Continues to have chest pain despite Ranexa and despite PCI  2.  DC Ranexa at this time for lack of benefit  3. We will send off for GXT mild to evaluate for ischemia  4. Also check sed rate/CRP for inflammation  5. If all above is negative will get cardiac MRI to evaluate for myocardial edema given history of Covid positive   -If all above is negative may need to see PCP to evaluate noncardiac chest pain   -Also asked for alcohol cessation as can be a direct cardiac toxin  6.  Given extent of CAD and dual layer stents, continue DAPT for life for now but can come off for elective procedures     Patient Active Problem List   Diagnosis    Type II or unspecified type diabetes mellitus without mention of complication, not stated as uncontrolled    Diabetes mellitus    History of ETOH abuse    Patient overweight    Patient overweight    Dietary noncompliance    Alcohol abuse    Flank pain    Prostatism    Low serum testosterone level    Arm pain, left    Shoulder pain    Precordial pain    Lung nodule    Acute angina (HCC)    Abnormal stress test    Acute coronary syndrome (HCC)    Unstable angina (HCC)    Coronary artery disease involving native coronary artery of native heart with unstable angina pectoris (HCC)    Other chest pain    S/P drug eluting coronary stent placement    Essential hypertension    Obesity    Abnormal chest x-ray    Angina effort    Skin lesion    Angina at rest [facility])    Hyperlipidemia    Angina pectoris ([facility] [address]    NSTEMI (non-ST elevated myocardial infarction) ([facility] [address]    Controlled type 2 diabetes mellitus without complication, without long-term current use of insulin ([facility] [address]    ASHD (arteriosclerotic heart disease)    Mass of right foot    Overweight    History of angina pectoris    Reactive airway disease    Bronchitis    Reactive airway disease with wheezing, moderate persistent, with acute exacerbation    Need for prophylactic vaccination and inoculation against varicella    Need for prophylactic vaccination against diphtheria-tetanus-pertussis (DTP)    Chronic right-sided thoracic back pain       Patient Plan:  1. Stress test to risk stratify   2. Labs -  Sed rate and CRP. 3. We will call you with the results  4. May stop taking the Renexa   5. Continue all other medications        It is a pleasure to assist in the care of Reyna Black. Please call with any questions.     This note was scribed in the presence of Andre Garcia MD by Adriana Holloway RN.      Johnna Rangel MD, personally performed the services described in this documentation as scribed by the above signed scribe in my presence, and it is both accurate and complete to the best of our ability and knowledge. I agree with the details independently gathered by my clinical support staff, while the remaining scribed note accurately describes my personal service to the patient. The above RN is working as a scribe for and in the presence of myself . Working as a scribe, the RN may have prepopulated components of this note with my historical intellectual property under my direct supervision. Any additions to this intellectual property were performed at my direction. Furthermore, the content and accuracy of this note have been reviewed by me to the best of my ability.        Judy Kingsley MD, [facility] Cardiologist  [facility], [place], operated by [facility]  [phone] [facility]  [phone] [address] [place] [address]  11/20/2020  10:53 AM

## 2020-11-20 ENCOUNTER — OFFICE VISIT (OUTPATIENT)
Dept: CARDIOLOGY CLINIC | Age: 60
End: 2020-11-20
Payer: COMMERCIAL

## 2020-11-20 VITALS
HEIGHT: 70 IN | WEIGHT: 256 LBS | BODY MASS INDEX: 36.65 KG/M2 | SYSTOLIC BLOOD PRESSURE: 98 MMHG | DIASTOLIC BLOOD PRESSURE: 60 MMHG | HEART RATE: 83 BPM | OXYGEN SATURATION: 97 %

## 2020-11-20 PROCEDURE — 99214 OFFICE O/P EST MOD 30 MIN: CPT | Performed by: INTERNAL MEDICINE

## 2020-11-20 NOTE — LETTER
Patient Active Problem List   Diagnosis    Type II or unspecified type diabetes mellitus without mention of complication, not stated as uncontrolled    Diabetes mellitus    History of ETOH abuse    Patient overweight    Patient overweight    Dietary noncompliance    Alcohol abuse    Flank pain    Prostatism    Low serum testosterone level    Arm pain, left    Shoulder pain    Precordial pain    Lung nodule    Acute angina (HCC)    Abnormal stress test    Acute coronary syndrome (HCC)    [facility]    Coronary artery disease involving native coronary artery of native heart with unstable angina pectoris (HCC)    Other chest pain    S/P drug eluting coronary stent placement    Essential hypertension    Obesity    Abnormal chest x-ray    Angina effort    Skin lesion    Angina at rest [facility])    Hyperlipidemia    Angina pectoris ([facility]    NSTEMI (non-ST elevated myocardial infarction) ([facility]    Controlled type 2 diabetes mellitus without complication, without long-term current use of insulin ([facility]    ASHD (arteriosclerotic heart disease)    Mass of right foot    Overweight    History of angina pectoris    Reactive airway disease    Bronchitis    Reactive airway disease with wheezing, moderate persistent, with acute exacerbation    Need for prophylactic vaccination and inoculation against varicella    Need for prophylactic vaccination against diphtheria-tetanus-pertussis (DTP)    Chronic right-sided thoracic back pain         Past Medical History:   has a past medical history of Alcohol abuse, Coronary artery disease, Hyperlipidemia, Hypertension, Non morbid obesity, Reactive airway disease with wheezing, moderate persistent, with acute exacerbation, and Type II or unspecified type diabetes mellitus without mention of complication, not stated as uncontrolled.     Surgical History:   has a past surgical history that includes Vein Surgery and Coronary angioplasty with stent (1/14/2016 9/21/2020). Social History:   reports that he has never smoked. He has never used smokeless tobacco. He reports current alcohol use of about 1.0 standard drinks of alcohol per week. He reports that he does not use drugs. Family History:  No evidence for sudden cardiac death or premature CAD    Home Medications:  Reviewed and are listed in nursing record. and/or listed below  Current Outpatient Medications   Medication Sig Dispense Refill    albuterol sulfate (PROAIR RESPICLICK) 411 (90 Base) MCG/ACT aerosol powder inhalation Inhale 2 puffs into the lungs every 4 hours as needed for Wheezing 1 Inhaler 1    fluticasone-salmeterol (ADVAIR DISKUS) 250-50 MCG/DOSE AEPB Inhale 1 puff into the lungs every 12 hours 60 each 3    nitroGLYCERIN (NITROSTAT) 0.4 MG SL tablet DISSOLVE 1 TAB UNDER TONGUE FOR CHEST PAIN - IF PAIN REMAINS AFTER 5 MIN, CALL 911 AND REPEAT DOSE. MAX 3 TABS IN 15 MINUTES 25 tablet 3    ranolazine (RANEXA) 500 MG extended release tablet Take 1 tablet by mouth 2 times daily 60 tablet 3    atorvastatin (LIPITOR) 40 MG tablet TAKE ONE TABLET BY MOUTH EVERY EVENING 30 tablet 0    glipiZIDE (GLUCOTROL) 10 MG tablet TAKE ONE TABLET BY MOUTH EVERY MORNING FOR DIABETES 30 tablet 3    metFORMIN (GLUCOPHAGE) 1000 MG tablet TAKE ONE AND ONE HALF TABLETS DAILY.  135 tablet 2    Naproxen Sodium (ALEVE PO) Take by mouth as needed      isosorbide mononitrate (IMDUR) 120 MG extended release tablet TAKE ONE TABLET BY MOUTH DAILY 90 tablet 2    clopidogrel (PLAVIX) 75 MG tablet TAKE ONE TABLET BY MOUTH DAILY 90 tablet 3    albuterol (PROVENTIL) (2.5 MG/3ML) 0.083% nebulizer solution Take 3 mLs by nebulization every 6 hours as needed for Wheezing 120 each 3    metoprolol tartrate (LOPRESSOR) 25 MG tablet TAKE ONE TABLET BY MOUTH TWICE A  tablet 3    lisinopril (PRINIVIL;ZESTRIL) 2.5 MG tablet TAKE ONE TABLET BY MOUTH DAILY 90 tablet 3 CREATININE <0.5 09/22/2020    GFRAA >60 09/22/2020    AGRATIO 1.6 05/06/2020    LABGLOM >60 09/22/2020    GLUCOSE 358 09/22/2020    PROT 7.4 05/06/2020    CALCIUM 9.2 09/22/2020    BILITOT 0.4 05/06/2020    ALKPHOS 85 05/06/2020    AST 15 05/06/2020    ALT 20 05/06/2020     PT/INR:   No results found for: PTINR  Liver:  No components found for: CHLPL  Lab Results   Component Value Date    ALT 20 05/06/2020    AST 15 05/06/2020    ALKPHOS 85 05/06/2020    BILITOT 0.4 05/06/2020     Lab Results   Component Value Date    LABA1C 8.2 11/12/2020     Lipids:         Lab Results   Component Value Date    TRIG 123 09/21/2020    TRIG 115 09/06/2017    TRIG 174 (H) 07/09/2017            Lab Results   Component Value Date    HDL 57 09/21/2020    HDL 75 (H) 01/06/2020    HDL 66 (H) 09/06/2017            Lab Results   Component Value Date    LDLCALC 69 09/21/2020    LDLCALC 63 01/06/2020    LDLCALC 69 09/06/2017            Lab Results   Component Value Date    LABVLDL 25 09/21/2020    LABVLDL 19 01/06/2020    LABVLDL 23 09/06/2017         CARDIAC DATA   EKG:    ECHO 3/3/2020  Summary   Technically difficult examination due to body habitus. Definity® used for   myocardial border enhancement. Normal left ventricle size and systolic function with a visually estimated   ejection fraction of 55%. Mild concentric left ventricular hypertrophy. No regional wall motion abnormalities are seen. Grade I diastolic dysfunction with normal LV filling pressures. The right atrium is mildly enlarged. STRESS TEST: 1/13/2016   1. Inferolateral basilar reversible stress defect compatible with stress-induced ischemia. 2. Ejection fraction of 56%   3. No focal wall motion abnormality. CARDIAC CATH   LEFT HEART CATH 9/21/2020  LM: Luminal  LAD: Prior stenting in the proximal to mid with an 80% restenosis in the mid LAD stent. There is another stent in the mid to distal that has minimal restenosis.   GUERLINE II flow Diagonal 1: Prior stent with minimal restenosis. LCX: Luminal                Om1-prior proximal stent that is patent 50% distal restenosis. RCA: Minute. Proximal/mid eccentric 30%. LVEDP: 15  LVEF: 55%  PCI of mid LAD  Status post predilation using laser arthrectomy, angioscope and NC balloon dilatation. Stent resolute Zak 3.5 mm x 15 mm, postdilated to 3.6 mm. Assessment  1. Interval restenosis of mid LAD in-stent 80% lesion, GUERLINE II flow. Treated successfully with laser atherectomy and aggressive predilatation using cutting balloons                           -0% residual, GUERLINE-3 flow. 2.  Continue aspirin 81 mg daily for life. Plavix 75 mg daily for 1 year minimum, given extensive CAD will continue dual therapy for life as tolerated  3. We will closely follow OM1 lesion as appears to be borderline at this time  4. Continue beta-blocker and statin as tolerated aggressive risk factor reduction medical management    CATH: 5/27/16  1. Patent drug-eluting stent proximal first obtuse marginal branch. 2. Patent drug-eluting stent from xeeegfaq-wv-ovn left anterior descending. 3. Patent drug-eluting stent distal left anterior descending. 4. Patent drug-eluting stent proximal first diagonal branch. 5. Normal left ventricular systolic function with an ejection fraction of  approximately 55% to 60%. 6. Normal left ventricular end-diastolic pressure at 10 mmHg. RECOMMENDATIONS: The patient's stents are all patent and he has no  obstructive disease in other arteries. His angina is from small vessel disease  and will add Ranexa 1000 mg b.i.d. to his antianginal regimen. He will  continue with Imdur and Lopressor for the angina. He will also continue with  aspirin, Brilinta, as well as a moderate-dose Lipitor. CATH: 01/21/2016  1. A 70% proximal medium-caliber first diagonal branch status post 2.25 x  12-mm Xience Alpine drug-eluting stent. 2.  Patent drug-eluting stent in proximal-mid left anterior descending. 3.  Patent drug-eluting stent in the distal left anterior descending. 4.  Patent drug-eluting stent in the yptcefix-qi-fet first obtuse marginal  branch. RECOMMENDATIONS:  Aspirin indefinitely. Brilinta at least for 12 months. Aggressive medical therapy and risk factors modification for coronary disease. CATH: 01/15/2016  IMPRESSION:   1. An 80% diffuse mid left anterior descending with a fractional flow  reserve of 0.79 (significant), status post 2.75 x 28-mm XIENCE  Alpine drug-eluting stent postdilated with a 3.5-mm noncompliant balloon. 2. A 60% distal left anterior descending lesion with a fractional flow   reserve of 0.5 (significant), status post 2.25 x 23-mm XIENCE Alpine   drug-eluting stent postdilated with 2.5-mm noncompliant balloon. 3. Patent stent in proximal to mid 1st obtuse marginal branch. RECOMMENDATIONS:  1. Aspirin indefinitely. 2. Brilinta at least for 12 months. 3. Continue aggressive medical therapy for coronary artery disease. CATH: 01/14/2016  1. A 90% large first obtuse marginal branch status post 2.25 x 16 mm Promus  Premier drug-eluting stent. 2. An 80% diffuse mid and 60% distal left anterior descending. 3. An 80% proximal medium caliber first diagonal branch. 4. Normal left ventricular systolic function with an ejection fraction of  55%. 5. Elevated left ventricular end diastolic pressure 19 mmHg.      Cath 7/11/17  Intervention:  90% prox OM1 in-stent restenosis, s/p PTCA with 2.5-mm NC as well as 2.5-mm Flexitome cutting balloon  Assessment:  - Successful PCI of high grade prox OM1 in-stent restenosis  - Patent stent prox-mid LAD with mild instent restenoss of distal 1/3rd  - Patent stent distal LAD  - Patent stent prox Diag1  - High grade OM2   - Normal left ventricular systolic function  - Normal LV filling pressure      Cath 8/18/17   LEFT HEART CATH  LM: luminals LAD: luminals, prox and mid stents patent with minimal restenosis                        Diag 1- patent stent  LCX: mid 30%                        OM1- 90% instent restenosis with haziness suggesting possible thrombosis                        OM2- ostial 60% lesion, distal 40%  RCA: dominant. Proximal 30%, luminals  LVEDP: 20  LVEF: 55%  PCI of mid OM1 90% restenosis  Ballooned with 2.25 x as NC trek up to 18atm  Angiosculpt 2.5 x 15mm up to 14atm- multiple passes  90% GUERLINE-2 flow--> 15% GUERLINE-3 flow  Assessment  1. Restenosis of the OM1 stent following recent POBA  2. Aggressive cutting balloon and NC balloon but still with residual stenosis and concerned about ability to completely deploy stent. If reoccurs or Cp returns, would suggest laser arthrectomy and PCI. CATH: 9/6/17  Successful PCI of OM-1 recurrent in-stent restenosis with Rotablator-assisted PCI with placement of Xience Alpine drug-eluting stent. Assessment and Plan   Nelida Reddy is a 61 y.o. male who presents today for the following problems:      1. CAD    -9/21/2020 Laser/cutter and PCI to mid LAD   - 9/2017 Rotablade and PCI to [address]   - 8/2017 PCI to m/d LAD, diag 1  2. HTN: controlled  3. HLD: controlled  4. HX of likely COVID +: Patient has an excellent story for COVID symptomatology but unfortunately testing was too late and was negative  5. CP  6. EtOH abuse: Several bourbon drinks a night    MD Plan:  1. Continues to have chest pain despite Ranexa and despite PCI  2.  DC Ranexa at this time for lack of benefit  3. We will send off for GXT mild to evaluate for ischemia  4. Also check sed rate/CRP for inflammation  5.   If all above is negative will get cardiac MRI to evaluate for myocardial edema given history of Covid positive   -If all above is negative may need to see PCP to evaluate noncardiac chest pain   -Also asked for alcohol cessation as can be a direct cardiac toxin 6. Given extent of CAD and dual layer stents, continue DAPT for life for now but can come off for elective procedures     Patient Active Problem List   Diagnosis    Type II or unspecified type diabetes mellitus without mention of complication, not stated as uncontrolled    Diabetes mellitus    History of ETOH abuse    Patient overweight    Patient overweight    Dietary noncompliance    Alcohol abuse    Flank pain    Prostatism    Low serum testosterone level    Arm pain, left    Shoulder pain    Precordial pain    Lung nodule    Acute angina (HCC)    Abnormal stress test    Acute coronary syndrome (HCC)    Unstable angina ([address]    Coronary artery disease involving native coronary artery of native heart with unstable angina pectoris (HCC)    Other chest pain    S/P drug eluting coronary stent placement    Essential hypertension    Obesity    Abnormal chest x-ray    Angina effort    Skin lesion    Angina at rest [facility])    Hyperlipidemia    Angina pectoris ([address]    NSTEMI (non-ST elevated myocardial infarction) ([address]    Controlled type 2 diabetes mellitus without complication, without long-term current use of insulin ([facility]    ASHD (arteriosclerotic heart disease)    Mass of right foot    Overweight    History of angina pectoris    Reactive airway disease    Bronchitis    Reactive airway disease with wheezing, moderate persistent, with acute exacerbation    Need for prophylactic vaccination and inoculation against varicella    Need for prophylactic vaccination against diphtheria-tetanus-pertussis (DTP)    Chronic right-sided thoracic back pain       Patient Plan:  1. Stress test to risk stratify   2. Labs -  Sed rate and CRP. 3. We will call you with the results  4. May stop taking the Renexa   5. Continue all other medications        It is a pleasure to assist in the care of Trev Eng. Please call with any questions. This note was scribed in the presence of Brenda Madison MD by Brenda Rebollar, ANGELITO.      Mary Mena MD, personally performed the services described in this documentation as scribed by the above signed scribe in my presence, and it is both accurate and complete to the best of our ability and knowledge. I agree with the details independently gathered by my clinical support staff, while the remaining scribed note accurately describes my personal service to the patient. The above RN is working as a scribe for and in the presence of myself . Working as a scribe, the RN may have prepopulated components of this note with my historical intellectual property under my direct supervision. Any additions to this intellectual property were performed at my direction. Furthermore, the content and accuracy of this note have been reviewed by me to the best of my ability.        Nahomy Pérez MD, [address] Cardiologist  [facility]  [phone] [facility]  [phone] [address] [place] [address]  11/20/2020  10:53 AM

## 2020-11-20 NOTE — PATIENT INSTRUCTIONS
Patient Plan:  1. Stress test to risk stratify   2. Labs -  Sed rate and CRP. 3. We will call you with the results  4. May stop taking the Renexa   5. Continue all other medications    Your provider has ordered testing for further evaluation. An order/prescription has been included in your paper work.  To schedule outpatient testing, contact Central Scheduling by calling [address] [place] [address] ([phone]).

## 2020-11-30 ENCOUNTER — TELEPHONE (OUTPATIENT)
Dept: CARDIOLOGY CLINIC | Age: 60
End: 2020-11-30

## 2020-11-30 NOTE — TELEPHONE ENCOUNTER
Pt wife called on answering service pt has stress test on Tues 12/01 and they need to know about his medications should he take them or not?  Pls call to advise Thank you

## 2020-11-30 NOTE — TELEPHONE ENCOUNTER
I would recommend he hold his beta-blocker and diabetes medicines on the day of the test.  Thank you.

## 2020-12-01 ENCOUNTER — TELEPHONE (OUTPATIENT)
Dept: CARDIOLOGY CLINIC | Age: 60
End: 2020-12-01

## 2020-12-01 NOTE — TELEPHONE ENCOUNTER
Patient's wife called requesting a return call to clarify if patient should hold medications prior to stress test scheduled for tomorrow  12/2

## 2020-12-01 NOTE — TELEPHONE ENCOUNTER
Patient will only need to hold diabetes medicines and the beta-blocker for the test tomorrow. Thank you.

## 2020-12-01 NOTE — TELEPHONE ENCOUNTER
Pt wife states Donegustavo Hidden told the pt to take all but the diabetic medications for testing tomorrow. She would like SRJ to look at his chart again to verify that he can take beta blockers per JJP? Pt wife says he takes 2 nitro daily for CP while in a resting state. She wants to know if he can take the nitro with testing tomorrow?

## 2020-12-02 ENCOUNTER — HOSPITAL ENCOUNTER (OUTPATIENT)
Dept: NUCLEAR MEDICINE | Age: 60
Discharge: HOME | End: 2020-12-02
Payer: COMMERCIAL

## 2020-12-02 ENCOUNTER — HOSPITAL ENCOUNTER (OUTPATIENT)
Dept: NON INVASIVE DIAGNOSTICS | Age: 60
Discharge: HOME | End: 2020-12-02
Payer: COMMERCIAL

## 2020-12-02 LAB
LV EF: 59 %
LVEF MODALITY: NORMAL

## 2020-12-02 PROCEDURE — 78452 HT MUSCLE IMAGE SPECT MULT: CPT

## 2020-12-02 PROCEDURE — 93017 CV STRESS TEST TRACING ONLY: CPT

## 2020-12-02 PROCEDURE — A9502 TC99M TETROFOSMIN: HCPCS | Performed by: INTERNAL MEDICINE

## 2020-12-02 PROCEDURE — 3430000000 HC RX DIAGNOSTIC RADIOPHARMACEUTICAL: Performed by: INTERNAL MEDICINE

## 2020-12-02 RX ADMIN — TETROFOSMIN 33.3 MILLICURIE: 1.38 INJECTION, POWDER, LYOPHILIZED, FOR SOLUTION INTRAVENOUS at 10:00

## 2020-12-02 RX ADMIN — TETROFOSMIN 11.6 MILLICURIE: 1.38 INJECTION, POWDER, LYOPHILIZED, FOR SOLUTION INTRAVENOUS at 08:45

## 2020-12-02 NOTE — PROGRESS NOTES
Pt completed stress portion of cardiac stress test. Patient unable to reach target HR due to chest pain 7/10. myoview injected at max effort. Power outage also occurred at the same time that patient wanted to stop, so treadmill was stopped anyway. Chest pain resolved at rest. Pt is discharged to nuclear department for final scan. Nuclear tech will remove PIV. Discharge instructions given to pt. Pt verbalizes understanding to discharge instructions.

## 2020-12-03 ENCOUNTER — TELEPHONE (OUTPATIENT)
Dept: CARDIOLOGY CLINIC | Age: 60
End: 2020-12-03

## 2020-12-03 NOTE — TELEPHONE ENCOUNTER
----- Message from Cheyenne Durant MD sent at 12/2/2020 12:58 PM EST -----  Let patient know their stress test is abnormal, rec f/u cath, he had one recently but I would rec: a f/u procedure with dr Heidi Mendes on his return. Thanks.

## 2020-12-07 NOTE — TELEPHONE ENCOUNTER
I called and spoke to patient. He continues to have chest pain. Please go ahead and schedule him for left heart cath. Metformin needs to be held 2 days prior.   I did discuss if test is negative can consider MRI to evaluate for edema in the heart following Covid diagnosed

## 2020-12-10 ENCOUNTER — OFFICE VISIT (OUTPATIENT)
Dept: PRIMARY CARE CLINIC | Age: 60
End: 2020-12-10

## 2020-12-10 NOTE — PROGRESS NOTES
Marcelina Gutierrez received a viral test for COVID-19. They were educated on isolation and quarantine as appropriate. For any symptoms, they were directed to seek care from their PCP, given contact information to establish with a doctor, directed to an urgent care or the emergency room.

## 2020-12-10 NOTE — PATIENT INSTRUCTIONS

## 2020-12-10 NOTE — TELEPHONE ENCOUNTER
Spoke with patient. Patient is scheduled with Dr. Rebekah Her for Left Heart Cath on 12/15/20 at [facility], arrival time of 11:30am to the Cath Lab. Please have patient arrive to the main entrance of [facility] and check in with the registration desk. Please call patient regarding medication instructions. Remind patient to be NPO after midnight (8 hours prior). Do not apply lotions/creams on skin the day of procedure. COVID testing 12/10.

## 2020-12-10 NOTE — TELEPHONE ENCOUNTER
HOLD Metformin for 48 hours. HOLD Glipizide that morning. NPO. No lotions/creams. Pt informed and VU.

## 2020-12-12 LAB — SARS-COV-2, NAA: NOT DETECTED

## 2020-12-15 ENCOUNTER — HOSPITAL ENCOUNTER (OUTPATIENT)
Dept: CARDIAC CATH/INVASIVE PROCEDURES | Age: 60
Setting detail: OBSERVATION
Discharge: HOME | End: 2020-12-16
Attending: INTERNAL MEDICINE | Admitting: INTERNAL MEDICINE
Payer: COMMERCIAL

## 2020-12-15 PROBLEM — R07.9 CHEST PAIN: Status: ACTIVE | Noted: 2020-12-15

## 2020-12-15 PROBLEM — I25.10 CAD IN NATIVE ARTERY: Status: ACTIVE | Noted: 2020-12-15

## 2020-12-15 PROBLEM — Z98.61 S/P PTCA (PERCUTANEOUS TRANSLUMINAL CORONARY ANGIOPLASTY): Status: ACTIVE | Noted: 2020-12-15

## 2020-12-15 LAB
A/G RATIO: 2 (ref 1.1–2.2)
ALBUMIN SERPL-MCNC: 4.5 G/DL (ref 3.4–5)
ALP BLD-CCNC: 60 U/L (ref 40–129)
ALT SERPL-CCNC: 18 U/L (ref 10–40)
ANION GAP SERPL CALCULATED.3IONS-SCNC: 7 MMOL/L (ref 3–16)
AST SERPL-CCNC: 17 U/L (ref 15–37)
BILIRUB SERPL-MCNC: 0.8 MG/DL (ref 0–1)
BUN BLDV-MCNC: 13 MG/DL (ref 7–20)
CALCIUM SERPL-MCNC: 9.2 MG/DL (ref 8.3–10.6)
CHLORIDE BLD-SCNC: 98 MMOL/L (ref 99–110)
CHOLESTEROL, TOTAL: 163 MG/DL (ref 0–199)
CO2: 30 MMOL/L (ref 21–32)
CREAT SERPL-MCNC: 0.7 MG/DL (ref 0.8–1.3)
EKG ATRIAL RATE: 65 BPM
EKG DIAGNOSIS: NORMAL
EKG P AXIS: 63 DEGREES
EKG P-R INTERVAL: 184 MS
EKG Q-T INTERVAL: 420 MS
EKG QRS DURATION: 92 MS
EKG QTC CALCULATION (BAZETT): 436 MS
EKG R AXIS: 11 DEGREES
EKG T AXIS: 38 DEGREES
EKG VENTRICULAR RATE: 65 BPM
GFR AFRICAN AMERICAN: >60
GFR NON-AFRICAN AMERICAN: >60
GLOBULIN: 2.2 G/DL
GLUCOSE BLD-MCNC: 183 MG/DL (ref 70–99)
GLUCOSE BLD-MCNC: 245 MG/DL (ref 70–99)
HCT VFR BLD CALC: 43.4 % (ref 40.5–52.5)
HDLC SERPL-MCNC: 76 MG/DL (ref 40–60)
HEMOGLOBIN: 14.4 G/DL (ref 13.5–17.5)
INR BLD: 1.01 (ref 0.86–1.14)
LDL CHOLESTEROL CALCULATED: 71 MG/DL
LEFT VENTRICULAR EJECTION FRACTION MODE: NORMAL
LV EF: 55 %
MCH RBC QN AUTO: 31.1 PG (ref 26–34)
MCHC RBC AUTO-ENTMCNC: 33.3 G/DL (ref 31–36)
MCV RBC AUTO: 93.5 FL (ref 80–100)
PDW BLD-RTO: 14.2 % (ref 12.4–15.4)
PERFORMED ON: ABNORMAL
PLATELET # BLD: 195 K/UL (ref 135–450)
PMV BLD AUTO: 7.7 FL (ref 5–10.5)
POC ACT LR: 254 SEC
POC ACT LR: 298 SEC
POTASSIUM SERPL-SCNC: 4.6 MMOL/L (ref 3.5–5.1)
PROTHROMBIN TIME: 11.7 SEC (ref 10–13.2)
RBC # BLD: 4.65 M/UL (ref 4.2–5.9)
SODIUM BLD-SCNC: 135 MMOL/L (ref 136–145)
TOTAL PROTEIN: 6.7 G/DL (ref 6.4–8.2)
TRIGL SERPL-MCNC: 80 MG/DL (ref 0–150)
VLDLC SERPL CALC-MCNC: 16 MG/DL
WBC # BLD: 7.1 K/UL (ref 4–11)

## 2020-12-15 PROCEDURE — 85347 COAGULATION TIME ACTIVATED: CPT

## 2020-12-15 PROCEDURE — 2500000003 HC RX 250 WO HCPCS

## 2020-12-15 PROCEDURE — 6370000000 HC RX 637 (ALT 250 FOR IP): Performed by: INTERNAL MEDICINE

## 2020-12-15 PROCEDURE — 6360000004 HC RX CONTRAST MEDICATION

## 2020-12-15 PROCEDURE — 92924 PRQ TRLUML C ATHRC 1 ART&/BR: CPT | Performed by: INTERNAL MEDICINE

## 2020-12-15 PROCEDURE — C1725 CATH, TRANSLUMIN NON-LASER: HCPCS

## 2020-12-15 PROCEDURE — 93458 L HRT ARTERY/VENTRICLE ANGIO: CPT | Performed by: INTERNAL MEDICINE

## 2020-12-15 PROCEDURE — C1887 CATHETER, GUIDING: HCPCS

## 2020-12-15 PROCEDURE — 99152 MOD SED SAME PHYS/QHP 5/>YRS: CPT | Performed by: INTERNAL MEDICINE

## 2020-12-15 PROCEDURE — 92924 PRQ TRLUML C ATHRC 1 ART&/BR: CPT

## 2020-12-15 PROCEDURE — 2580000003 HC RX 258: Performed by: INTERNAL MEDICINE

## 2020-12-15 PROCEDURE — C1769 GUIDE WIRE: HCPCS

## 2020-12-15 PROCEDURE — 85610 PROTHROMBIN TIME: CPT

## 2020-12-15 PROCEDURE — 2709999900 HC NON-CHARGEABLE SUPPLY

## 2020-12-15 PROCEDURE — C1894 INTRO/SHEATH, NON-LASER: HCPCS

## 2020-12-15 PROCEDURE — 6360000002 HC RX W HCPCS

## 2020-12-15 PROCEDURE — 93010 ELECTROCARDIOGRAM REPORT: CPT | Performed by: INTERNAL MEDICINE

## 2020-12-15 PROCEDURE — 93005 ELECTROCARDIOGRAM TRACING: CPT | Performed by: INTERNAL MEDICINE

## 2020-12-15 PROCEDURE — C1885 CATH, TRANSLUMIN ANGIO LASER: HCPCS

## 2020-12-15 PROCEDURE — 80061 LIPID PANEL: CPT

## 2020-12-15 PROCEDURE — 80053 COMPREHEN METABOLIC PANEL: CPT

## 2020-12-15 PROCEDURE — 6370000000 HC RX 637 (ALT 250 FOR IP)

## 2020-12-15 PROCEDURE — 93458 L HRT ARTERY/VENTRICLE ANGIO: CPT

## 2020-12-15 PROCEDURE — G0378 HOSPITAL OBSERVATION PER HR: HCPCS

## 2020-12-15 PROCEDURE — 85027 COMPLETE CBC AUTOMATED: CPT

## 2020-12-15 RX ORDER — SODIUM CHLORIDE 0.9 % (FLUSH) 0.9 %
10 SYRINGE (ML) INJECTION EVERY 12 HOURS SCHEDULED
Status: DISCONTINUED | OUTPATIENT
Start: 2020-12-15 | End: 2020-12-16 | Stop reason: HOSPADM

## 2020-12-15 RX ORDER — HEPARIN SODIUM 1000 [USP'U]/ML
INJECTION, SOLUTION INTRAVENOUS; SUBCUTANEOUS
Status: COMPLETED | OUTPATIENT
Start: 2020-12-15 | End: 2020-12-15

## 2020-12-15 RX ORDER — MIDAZOLAM HYDROCHLORIDE 5 MG/ML
INJECTION INTRAMUSCULAR; INTRAVENOUS
Status: COMPLETED | OUTPATIENT
Start: 2020-12-15 | End: 2020-12-15

## 2020-12-15 RX ORDER — FENTANYL CITRATE 50 UG/ML
INJECTION, SOLUTION INTRAMUSCULAR; INTRAVENOUS
Status: COMPLETED | OUTPATIENT
Start: 2020-12-15 | End: 2020-12-15

## 2020-12-15 RX ORDER — ATORVASTATIN CALCIUM 40 MG/1
40 TABLET, FILM COATED ORAL EVERY EVENING
Status: DISCONTINUED | OUTPATIENT
Start: 2020-12-15 | End: 2020-12-16 | Stop reason: HOSPADM

## 2020-12-15 RX ORDER — NICOTINE POLACRILEX 4 MG
15 LOZENGE BUCCAL PRN
Status: DISCONTINUED | OUTPATIENT
Start: 2020-12-15 | End: 2020-12-16 | Stop reason: HOSPADM

## 2020-12-15 RX ORDER — HYDRALAZINE HYDROCHLORIDE 20 MG/ML
INJECTION INTRAMUSCULAR; INTRAVENOUS
Status: COMPLETED | OUTPATIENT
Start: 2020-12-15 | End: 2020-12-15

## 2020-12-15 RX ORDER — ISOSORBIDE MONONITRATE 60 MG/1
120 TABLET, EXTENDED RELEASE ORAL DAILY
Status: DISCONTINUED | OUTPATIENT
Start: 2020-12-15 | End: 2020-12-16 | Stop reason: HOSPADM

## 2020-12-15 RX ORDER — ONDANSETRON 2 MG/ML
4 INJECTION INTRAMUSCULAR; INTRAVENOUS ONCE
Status: COMPLETED | OUTPATIENT
Start: 2020-12-15 | End: 2020-12-15

## 2020-12-15 RX ORDER — LISINOPRIL 2.5 MG/1
2.5 TABLET ORAL DAILY
Status: DISCONTINUED | OUTPATIENT
Start: 2020-12-15 | End: 2020-12-16 | Stop reason: HOSPADM

## 2020-12-15 RX ORDER — SODIUM CHLORIDE 9 MG/ML
INJECTION, SOLUTION INTRAVENOUS ONCE
Status: DISCONTINUED | OUTPATIENT
Start: 2020-12-15 | End: 2020-12-16 | Stop reason: HOSPADM

## 2020-12-15 RX ORDER — CLOPIDOGREL 300 MG/1
TABLET, FILM COATED ORAL
Status: COMPLETED | OUTPATIENT
Start: 2020-12-15 | End: 2020-12-15

## 2020-12-15 RX ORDER — ONDANSETRON 2 MG/ML
INJECTION INTRAMUSCULAR; INTRAVENOUS
Status: COMPLETED | OUTPATIENT
Start: 2020-12-15 | End: 2020-12-15

## 2020-12-15 RX ORDER — DEXTROSE MONOHYDRATE 25 G/50ML
12.5 INJECTION, SOLUTION INTRAVENOUS PRN
Status: DISCONTINUED | OUTPATIENT
Start: 2020-12-15 | End: 2020-12-16 | Stop reason: HOSPADM

## 2020-12-15 RX ORDER — CLOPIDOGREL BISULFATE 75 MG/1
75 TABLET ORAL DAILY
Status: DISCONTINUED | OUTPATIENT
Start: 2020-12-16 | End: 2020-12-16 | Stop reason: HOSPADM

## 2020-12-15 RX ORDER — SODIUM CHLORIDE 0.9 % (FLUSH) 0.9 %
10 SYRINGE (ML) INJECTION PRN
Status: DISCONTINUED | OUTPATIENT
Start: 2020-12-15 | End: 2020-12-16 | Stop reason: HOSPADM

## 2020-12-15 RX ORDER — ASPIRIN 81 MG/1
81 TABLET, CHEWABLE ORAL DAILY
Status: DISCONTINUED | OUTPATIENT
Start: 2020-12-15 | End: 2020-12-16 | Stop reason: HOSPADM

## 2020-12-15 RX ORDER — DEXTROSE MONOHYDRATE 50 MG/ML
100 INJECTION, SOLUTION INTRAVENOUS PRN
Status: DISCONTINUED | OUTPATIENT
Start: 2020-12-15 | End: 2020-12-16 | Stop reason: HOSPADM

## 2020-12-15 RX ADMIN — HEPARIN SODIUM 2500 UNITS: 1000 INJECTION, SOLUTION INTRAVENOUS; SUBCUTANEOUS at 14:25

## 2020-12-15 RX ADMIN — ATORVASTATIN CALCIUM 40 MG: 40 TABLET, FILM COATED ORAL at 22:20

## 2020-12-15 RX ADMIN — CLOPIDOGREL 300 MG: 300 TABLET, FILM COATED ORAL at 14:56

## 2020-12-15 RX ADMIN — MIDAZOLAM HYDROCHLORIDE 1 MG: 5 INJECTION INTRAMUSCULAR; INTRAVENOUS at 14:02

## 2020-12-15 RX ADMIN — MIDAZOLAM HYDROCHLORIDE 2 MG: 5 INJECTION INTRAMUSCULAR; INTRAVENOUS at 14:43

## 2020-12-15 RX ADMIN — MIDAZOLAM HYDROCHLORIDE 2 MG: 5 INJECTION INTRAMUSCULAR; INTRAVENOUS at 14:17

## 2020-12-15 RX ADMIN — FENTANYL CITRATE 25 MCG: 50 INJECTION, SOLUTION INTRAMUSCULAR; INTRAVENOUS at 13:35

## 2020-12-15 RX ADMIN — FENTANYL CITRATE 50 MCG: 50 INJECTION, SOLUTION INTRAMUSCULAR; INTRAVENOUS at 14:46

## 2020-12-15 RX ADMIN — HEPARIN SODIUM 5900 UNITS: 1000 INJECTION, SOLUTION INTRAVENOUS; SUBCUTANEOUS at 13:52

## 2020-12-15 RX ADMIN — HEPARIN SODIUM 2300 UNITS: 1000 INJECTION, SOLUTION INTRAVENOUS; SUBCUTANEOUS at 14:00

## 2020-12-15 RX ADMIN — FENTANYL CITRATE 25 MCG: 50 INJECTION, SOLUTION INTRAMUSCULAR; INTRAVENOUS at 14:35

## 2020-12-15 RX ADMIN — FENTANYL CITRATE 25 MCG: 50 INJECTION, SOLUTION INTRAMUSCULAR; INTRAVENOUS at 13:40

## 2020-12-15 RX ADMIN — METOPROLOL TARTRATE 25 MG: 25 TABLET, FILM COATED ORAL at 22:21

## 2020-12-15 RX ADMIN — Medication 10 ML: at 22:23

## 2020-12-15 RX ADMIN — MIDAZOLAM HYDROCHLORIDE 2 MG: 5 INJECTION INTRAMUSCULAR; INTRAVENOUS at 13:40

## 2020-12-15 RX ADMIN — FENTANYL CITRATE 50 MCG: 50 INJECTION, SOLUTION INTRAMUSCULAR; INTRAVENOUS at 14:17

## 2020-12-15 RX ADMIN — ISOSORBIDE MONONITRATE 120 MG: 60 TABLET, EXTENDED RELEASE ORAL at 22:20

## 2020-12-15 RX ADMIN — FENTANYL CITRATE 25 MCG: 50 INJECTION, SOLUTION INTRAMUSCULAR; INTRAVENOUS at 14:12

## 2020-12-15 RX ADMIN — LISINOPRIL 2.5 MG: 2.5 TABLET ORAL at 22:21

## 2020-12-15 RX ADMIN — HYDRALAZINE HYDROCHLORIDE 10 MG: 20 INJECTION INTRAMUSCULAR; INTRAVENOUS at 15:04

## 2020-12-15 RX ADMIN — MIDAZOLAM HYDROCHLORIDE 2 MG: 5 INJECTION INTRAMUSCULAR; INTRAVENOUS at 14:35

## 2020-12-15 RX ADMIN — ONDANSETRON 4 MG: 2 INJECTION INTRAMUSCULAR; INTRAVENOUS at 16:03

## 2020-12-15 RX ADMIN — ONDANSETRON 4 MG: 2 INJECTION INTRAMUSCULAR; INTRAVENOUS at 14:47

## 2020-12-15 RX ADMIN — MIDAZOLAM HYDROCHLORIDE 2 MG: 5 INJECTION INTRAMUSCULAR; INTRAVENOUS at 13:48

## 2020-12-15 RX ADMIN — FENTANYL CITRATE 25 MCG: 50 INJECTION, SOLUTION INTRAMUSCULAR; INTRAVENOUS at 13:48

## 2020-12-15 RX ADMIN — MIDAZOLAM HYDROCHLORIDE 2 MG: 5 INJECTION INTRAMUSCULAR; INTRAVENOUS at 13:35

## 2020-12-15 RX ADMIN — FENTANYL CITRATE 25 MCG: 50 INJECTION, SOLUTION INTRAMUSCULAR; INTRAVENOUS at 14:43

## 2020-12-15 RX ADMIN — HYDRALAZINE HYDROCHLORIDE 10 MG: 20 INJECTION INTRAMUSCULAR; INTRAVENOUS at 15:15

## 2020-12-15 RX ADMIN — MIDAZOLAM HYDROCHLORIDE 2 MG: 5 INJECTION INTRAMUSCULAR; INTRAVENOUS at 14:12

## 2020-12-15 RX ADMIN — FENTANYL CITRATE 25 MCG: 50 INJECTION, SOLUTION INTRAMUSCULAR; INTRAVENOUS at 14:02

## 2020-12-15 NOTE — H&P
Brief Pre-Op Note/Sedation Assessment      Trev Eng  1960  Cath Pool Rm/NONE      [id number]  1:13 PM    Planned Procedure: Cardiac Catheterization Procedure    Post Procedure Plan: Return to same level of care    Consent: I have discussed with the patient and/or the patient representative the indication, alternatives, and the possible risks and/or complications of the planned procedure and the anesthesia methods. The patient and/or patient representative appear to understand and agree to proceed. Chief Complaint: Chest Pain/Pressure      Indications for Cath Procedure:  ACS > 24 hrs and Worsening Angina  Anginal Classification within 2 weeks:  CCS IV - Inability to perform any activity without angina or angina at rest, i.e., severe limitation  NYHA Heart Failure Class within 2 weeks: No symptoms  Is Cath Lab Visit Valve-related?: No  Surgical Risk: N/A  Functional Type: >= 4 METS with symptoms    Anti- Anginal Meds within 2 weeks:   Yes: Beta Blockers, Long Acting Nitrates (Any), Aspirin and Statin (Any)    Stress or Imaging Studies Performed (within 6 months):  Stress Test with SPECT Result: Positive:  apical Risk/Extent of Ischemia:  Intermediate     Vital Signs:  Ht 5' 10\" (1.778 m)   Wt 257 lb 9.6 oz (116.8 kg)   BMI 36.96 kg/m²     Allergies:   Allergies   Allergen Reactions    Vitamin D Analogs Hives    Vitamin D Analogs Swelling     swelling       Past Medical History:  Past Medical History:   Diagnosis Date    Alcohol abuse 12/3/2012    Coronary artery disease     Hyperlipidemia     Hypertension     Non morbid obesity     Reactive airway disease with wheezing, moderate persistent, with acute exacerbation 3/3/2020    Type II or unspecified type diabetes mellitus without mention of complication, not stated as uncontrolled          Surgical History:  Past Surgical History:   Procedure Laterality Date    CORONARY ANGIOPLASTY WITH STENT PLACEMENT  1/14/2016 9/21/2020 1 Promus Premier SAMUEL 2.25x16    VEIN SURGERY           Medications:  Current Facility-Administered Medications   Medication Dose Route Frequency Provider Last Rate Last Admin    0.9 % sodium chloride infusion   Intravenous Once Katie Pan MD               Pre-Sedation:    Pre-Sedation Documentation and Exam:  I have assessed the patient and agree with the H&P present on the chart. Prior History of Anesthesia Complications:   none    Modified Mallampati:  III (soft palate, base of uvula visible)    ASA Classification:  Class 3 - A patient with severe systemic disease that limits activity but is not incapacitating      Una Scale: Activity:  2 - Able to move 4 extremities voluntarily on command  Respiration:  2 - Able to breathe deeply and cough freely  Circulation:  2 - BP+/- 20mmHg of normal  Consciousness:  2 - Fully awake  Oxygen Saturation (color):  2 - Able to maintain oxygen saturation >92% on room air    Sedation/Anesthesia Plan:  Guard the patient's safety and welfare. Minimize physical discomfort and pain. Minimize negative psychological responses to treatment by providing sedation and analgesia and maximize the potential amnesia. Patient to meet pre-procedure discharge plan.     Medication Planned:  midazolam intravenously and fentanyl intravenously    Patient is an appropriate candidate for plan of sedation: yes      Electronically signed by Jeison Mederos MD on 12/15/2020 at 1:13 PM

## 2020-12-15 NOTE — BRIEF OP NOTE
Brief Postoperative Note      Patient: Nelida Reddy  YOB: 1960  MRN: [id number]    Brief Postoperative Note  1. Pre-operative Diagnosis: unstable angina        Post-operative Diagnosis: Same  2. Surgeons/Assistants: Marilyn House MD, [facility]  3. Complications: None  4. Estimated Blood Loss: less than 50   5. Specimens: Were Not Obtained  6. Anesthesia: Moderate Sedation  For sedation: Moderated sedation was achieved with Versed (16mg) and Fentanyl (300mcg). Monitoring of the patient's vital signs and respiratory status was provided by a trained independent observer nurse during the entire course of the procedure(s) and under my direct supervision and recorded in patient's medical record. The duration of sedation was 1334 to 1505. 7. Procedure(s) performed: Moderate sedation. Ultrasound-guided access to right radial artery. Left heart catheterization. Bilateral coronary angiogram.  Left ventriculogram.  Balloon and cutting angioplasty to OM1. Laser atherectomy to OM1 for restenosis. Procedure Details:  Consent Access  site Bleed Risk Sedat US   Used*? Contrast Flouro TIme Fluoro  mGy   Yes  right radial  MCSFC  yes  170 mL Optiray  13.6  2849   *CPT 21139: If stated, Ultrasound guidance was used to access right radial using Seldinger technique after local infiltration of 1% lidocaine. Ultrasound documented/visualized size, patency, pulsatility and exact location for puncture and determined ok to proceed. [id number] imaging used for needle entry into the vessel(s). Image was printed off [employer] and sent to medical records on a progress note. *Sedation Note: MCSFC = minimal conscious sedation for comfort      LEFT HEART CATH:  Artery Findings/Result   LM  luminal regularities   LAD  patent stent in proximal LAD with no significant restenosis. ,  GUERLINE-3 flow  Diagonal 1.   Patent stent with normal significant restenosis   Cx  luminal irregularities OM1-proximal 70%. Proximal stent with 50% restenosis. Distal stent edge with 90% restenosis  OM2proximal 40%       RCA  dominant. Mid 30%   LVEDP  16   LVG  55%. Normal wall motion  No aortic stenosis, 1+ mitral regurgitation       Results of intervention     Lesion 1: OM1  Multiple noncompliant balloons used up to 2.75 mm. Angio sculpt also used. In addition to several runs of laser atherectomy. Pre:   90% stenosis, GUERLINE 2 flow  Post: 40% stenosis, GUERLINE 3 flow      Assessment/Plan  1. Severe and progressive in-stent restenosis of OM1. Heavily fibrotic. Multiple runs of noncompliant balloon inflations were performed but there was balloon slippage. Angioscope and multiple runs of laser atherectomy was performed. Unfortunately patient was no longer able to tolerate the procedure was severe chest pain and hypertension therefore we aborted the procedure. Residual stenosis was 40% of the distal stent edge but is GUERLINE-3 flow.   -Symptoms return we will consider again laser atherectomy versus rotoblade versus shockwave lithotripsy therapy  2.   Continue aspirin and plavix        Electronically signed by Evita Suarez MD on 12/15/2020 at 3:11 PM

## 2020-12-16 VITALS
RESPIRATION RATE: 18 BRPM | HEIGHT: 70 IN | TEMPERATURE: 98.4 F | OXYGEN SATURATION: 96 % | DIASTOLIC BLOOD PRESSURE: 78 MMHG | BODY MASS INDEX: 36.92 KG/M2 | SYSTOLIC BLOOD PRESSURE: 164 MMHG | HEART RATE: 101 BPM | WEIGHT: 257.9 LBS

## 2020-12-16 LAB
ANION GAP SERPL CALCULATED.3IONS-SCNC: 9 MMOL/L (ref 3–16)
BUN BLDV-MCNC: 14 MG/DL (ref 7–20)
CALCIUM SERPL-MCNC: 8.7 MG/DL (ref 8.3–10.6)
CHLORIDE BLD-SCNC: 101 MMOL/L (ref 99–110)
CO2: 28 MMOL/L (ref 21–32)
CREAT SERPL-MCNC: 0.6 MG/DL (ref 0.8–1.3)
EKG ATRIAL RATE: 88 BPM
EKG DIAGNOSIS: NORMAL
EKG P AXIS: 58 DEGREES
EKG P-R INTERVAL: 186 MS
EKG Q-T INTERVAL: 376 MS
EKG QRS DURATION: 94 MS
EKG QTC CALCULATION (BAZETT): 454 MS
EKG R AXIS: 30 DEGREES
EKG T AXIS: 38 DEGREES
EKG VENTRICULAR RATE: 88 BPM
GFR AFRICAN AMERICAN: >60
GFR NON-AFRICAN AMERICAN: >60
GLUCOSE BLD-MCNC: 224 MG/DL (ref 70–99)
GLUCOSE BLD-MCNC: 267 MG/DL (ref 70–99)
GLUCOSE BLD-MCNC: 372 MG/DL (ref 70–99)
GLUCOSE BLD-MCNC: 395 MG/DL (ref 70–99)
HCT VFR BLD CALC: 39.4 % (ref 40.5–52.5)
HEMOGLOBIN: 13.2 G/DL (ref 13.5–17.5)
MCH RBC QN AUTO: 31.6 PG (ref 26–34)
MCHC RBC AUTO-ENTMCNC: 33.5 G/DL (ref 31–36)
MCV RBC AUTO: 94.3 FL (ref 80–100)
PDW BLD-RTO: 13.8 % (ref 12.4–15.4)
PERFORMED ON: ABNORMAL
PLATELET # BLD: 190 K/UL (ref 135–450)
PMV BLD AUTO: 7.8 FL (ref 5–10.5)
POC ACT LR: >400 SEC
POTASSIUM SERPL-SCNC: 3.9 MMOL/L (ref 3.5–5.1)
RBC # BLD: 4.18 M/UL (ref 4.2–5.9)
SODIUM BLD-SCNC: 138 MMOL/L (ref 136–145)
WBC # BLD: 7.2 K/UL (ref 4–11)

## 2020-12-16 PROCEDURE — 6370000000 HC RX 637 (ALT 250 FOR IP): Performed by: INTERNAL MEDICINE

## 2020-12-16 PROCEDURE — G0378 HOSPITAL OBSERVATION PER HR: HCPCS

## 2020-12-16 PROCEDURE — 93005 ELECTROCARDIOGRAM TRACING: CPT | Performed by: NURSE PRACTITIONER

## 2020-12-16 PROCEDURE — 80048 BASIC METABOLIC PNL TOTAL CA: CPT

## 2020-12-16 PROCEDURE — 2580000003 HC RX 258: Performed by: INTERNAL MEDICINE

## 2020-12-16 PROCEDURE — 93010 ELECTROCARDIOGRAM REPORT: CPT | Performed by: INTERNAL MEDICINE

## 2020-12-16 PROCEDURE — 99214 OFFICE O/P EST MOD 30 MIN: CPT | Performed by: NURSE PRACTITIONER

## 2020-12-16 PROCEDURE — 85027 COMPLETE CBC AUTOMATED: CPT

## 2020-12-16 PROCEDURE — 36415 COLL VENOUS BLD VENIPUNCTURE: CPT

## 2020-12-16 RX ADMIN — Medication 10 ML: at 10:04

## 2020-12-16 RX ADMIN — LISINOPRIL 2.5 MG: 2.5 TABLET ORAL at 10:03

## 2020-12-16 RX ADMIN — CLOPIDOGREL BISULFATE 75 MG: 75 TABLET ORAL at 10:03

## 2020-12-16 RX ADMIN — METOPROLOL TARTRATE 25 MG: 25 TABLET, FILM COATED ORAL at 10:03

## 2020-12-16 RX ADMIN — ISOSORBIDE MONONITRATE 120 MG: 60 TABLET, EXTENDED RELEASE ORAL at 10:03

## 2020-12-16 RX ADMIN — ASPIRIN 81 MG CHEWABLE TABLET 81 MG: 81 TABLET CHEWABLE at 10:03

## 2020-12-16 RX ADMIN — INSULIN LISPRO 3 UNITS: 100 INJECTION, SOLUTION INTRAVENOUS; SUBCUTANEOUS at 10:09

## 2020-12-16 ASSESSMENT — ENCOUNTER SYMPTOMS
GASTROINTESTINAL NEGATIVE: 1
RESPIRATORY NEGATIVE: 1

## 2020-12-16 NOTE — PLAN OF CARE
Problem:  Activity:  Goal: Risk for activity intolerance will decrease  Description: Risk for activity intolerance will decrease  Outcome: Completed     Problem: Coping:  Goal: Ability to adjust to condition or change in health will improve  Description: Ability to adjust to condition or change in health will improve  Outcome: Completed     Problem: Fluid Volume:  Goal: Ability to maintain a balanced intake and output will improve  Description: Ability to maintain a balanced intake and output will improve  Outcome: Completed     Problem: Health Behavior:  Goal: Ability to identify and utilize available resources and services will improve  Description: Ability to identify and utilize available resources and services will improve  Outcome: Completed  Goal: Ability to manage health-related needs will improve  Description: Ability to manage health-related needs will improve  Outcome: Completed     Problem: Metabolic:  Goal: Ability to maintain appropriate glucose levels will improve  Description: Ability to maintain appropriate glucose levels will improve  12/16/2020 1306 by Hola Schaffer RN  Outcome: Completed  12/16/2020 0145 by Cindy Collins RN  Outcome: Ongoing     Problem: Nutritional:  Goal: Maintenance of adequate nutrition will improve  Description: Maintenance of adequate nutrition will improve  Outcome: Completed  Goal: Progress toward achieving an optimal weight will improve  Description: Progress toward achieving an optimal weight will improve  Outcome: Completed     Problem: Physical Regulation:  Goal: Complications related to the disease process, condition or treatment will be avoided or minimized  Description: Complications related to the disease process, condition or treatment will be avoided or minimized  Outcome: Completed  Goal: Diagnostic test results will improve  Description: Diagnostic test results will improve  Outcome: Completed     Problem: Skin Integrity: Goal: Risk for impaired skin integrity will decrease  Description: Risk for impaired skin integrity will decrease  Outcome: Completed     Problem: Tissue Perfusion:  Goal: Adequacy of tissue perfusion will improve  Description: Adequacy of tissue perfusion will improve  Outcome: Completed     Problem: SAFETY  Goal: Free from accidental physical injury  Outcome: Completed  Goal: Free from intentional harm  Outcome: Completed     Problem: DAILY CARE  Goal: Daily care needs are met  12/16/2020 1306 by Lupis Onofre RN  Outcome: Completed  12/16/2020 0145 by Gisele Hatchet, RN  Outcome: Ongoing     Problem: PAIN  Goal: Patient's pain/discomfort is manageable  Outcome: Completed     Problem: SKIN INTEGRITY  Goal: Skin integrity is maintained or improved  Outcome: Completed     Problem: KNOWLEDGE DEFICIT  Goal: Patient/S.O. demonstrates understanding of disease process, treatment plan, medications, and discharge instructions.   Outcome: Completed     Problem: DISCHARGE BARRIERS  Goal: Patient's continuum of care needs are met  Outcome: Completed     Problem: Pain:  Goal: Pain level will decrease  Description: Pain level will decrease  Outcome: Completed  Goal: Control of acute pain  Description: Control of acute pain  12/16/2020 1306 by Lupis Onofre RN  Outcome: Completed  12/16/2020 0147 by Gisele Hatchet, RN  Outcome: Ongoing  Goal: Control of chronic pain  Description: Control of chronic pain  Outcome: Completed

## 2020-12-16 NOTE — PROGRESS NOTES
Patient discharged home with wife transporting. Discharge instructions explained and given to patient. Tele box removed and returned. IV removed with no complications. Patient belongings gathered and accounted for. Patient declined wheelchair to hospital lobby and ambulated out of hospital with wife.

## 2020-12-16 NOTE — DISCHARGE SUMMARY
Hospital Medicine Discharge Summary    Patient: Mary George     Age: 61 y.o. Gender: male  : 1960   MRN: [id number]  Code Status: Full     Admit Date: 12/15/2020   Discharge Date:   2020  Disposition:  Home     Condition at Discharge: Stable    Primary Care Provider: Katelin Rosado DO    Admitting Physician: Lee Ann Shah MD  Discharge Physician: Johny English MD       Discharge Diagnoses: Active Hospital Problems    Diagnosis    CAD in native artery [I25.10]    S/P PTCA (percutaneous transluminal coronary angioplasty) [Z98.61]    Chest pain [R07.9]    Essential hypertension [I10]    Obesity [E66.9]    Diabetes mellitus [E11.9]       Hospital Course:   61 y.o. male who [facility] to [facility] with above complaints  Patient with complicated history of CAD s/p multiple PCI's, HTN, HLD, obesity, EtOH abuse, DM 2 presented for an elective cath today. Patient had ongoing anginal symptoms even with Ranexa. He underwent a stress test on  which was abnormal with moderate to high risk study. He was brought in today for an elective cath and PCI of OM1 stent for restenosis. The procedure was difficult, patient stenosed lesion was heavily fibrotic requiring multiple rounds of noncompliant balloon inflations, laser atherectomy. Patient could not tolerate the procedure any longer and developed severe chest pain and severe hypertension, procedure was aborted, residual stenosis at the end of the procedure was 40% of the distal stent edge with GUERLINE-3 flow. Plan is to admit the patient overnight for observation and resolution of his symptoms, and possible laser atherectomy versus Rotablator versus shockwave lithotripsy in the near future. Currently patient seen in the post-cath room, he reports his chest tightness is slowly resolving. Patient almost had a vagal episode with lightheadedness nausea vomiting which resolved. Blood pressures are much better now with systolic in the 359C. Assessment/Plan:    CAD in native artery   Complicated by in-stent restenosis OM1 , also s/p multiple PCI's in the past to LAD, OM1, diagonal 1  S/p C today with difficult OM1 stenosis heavily fibrotic lesion, requiring multiple attempts of balloon inflation and laser atherectomy. Patient could not tolerate procedure anymore, developed chest pain and procedure aborted. Symptoms now resolved. Continue DAPT    Essential hypertension, uncontrolled  Periprocedure hypertensive crisis slowly resolving  Resume home antihypertensive regimen    Hyperlipidemia-resume statin    DM type II-controlled, Metformin and glipizide on hold, placed on low-dose sliding scale insulin with Accu-Cheks and hypoglycemia protocol    Obesity BMI 29.57  Complicating assessment and treatment, placing patient at high risk for multiple co-morbidities as well as early death and contributing to the patient's presentation. Counseled on weight loss. Exam:   BP (!) 164/78   Pulse 101   Temp 98.4 °F (36.9 °C) (Oral)   Resp 18   Ht 5' 10\" (1.778 m)   Wt 257 lb 14.4 oz (117 kg)   SpO2 96%   BMI 37.00 kg/m²   General appearance:  No apparent distress, appears stated age and cooperative. HEENT:  Normal cephalic, atraumatic without obvious deformity. Pupils equal, round, and reactive to light. Extra ocular muscles intact. Conjunctivae/corneas clear. Neck: Supple, with full range of motion. No jugular venous distention. Trachea midline. Respiratory:  Normal respiratory effort. Clear to auscultation, bilaterally without Rales/Wheezes/Rhonchi. Cardiovascular:  Regular rate and rhythm with normal S1/S2 without murmurs, rubs or gallops. Abdomen: Soft, non-tender, non-distended with normal bowel sounds. Musculoskeletal:  No clubbing, cyanosis or edema bilaterally. Full range of motion without deformity. Skin: Skin color, texture, turgor normal.  No rashes or lesions. glipiZIDE (GLUCOTROL) 10 MG tablet TAKE ONE TABLET BY MOUTH EVERY MORNING FOR DIABETES  Qty: 30 tablet, Refills: 3      metFORMIN (GLUCOPHAGE) 1000 MG tablet TAKE ONE AND ONE HALF TABLETS DAILY.   Qty: 135 tablet, Refills: 2    Associated Diagnoses: Type 2 diabetes mellitus without complication, without long-term current use of insulin (HCC)      albuterol (PROVENTIL) (2.5 MG/3ML) 0.083% nebulizer solution Take 3 mLs by nebulization every 6 hours as needed for Wheezing  Qty: 120 each, Refills: 3      Spacer/Aero-Holding Chambers (E-Z SPACER) REAL 1 Device by Does not apply route daily as needed (wheezing)  Qty: 1 Device, Refills: 0           Current Discharge Medication List            Significant Test Results    Nm Cardiac Stress Test Nuclear Imaging    Result Date: 12/2/2020 Cardiac Perfusion Imaging  Demographics   Patient Name       Alexei Randhawa   Date of Study      12/02/2020         Gender              Male   Patient Number     [id number]         Date of Birth       1960   Visit Number       [id number]          Age                 61 year(s)   Accession Number   [id number]         Room Number         OP   Corporate ID       [id number]            NM Technician       Susana Ch,                                                            [facility]   Nurse              Karmen Jamil      Interpreting        [address]           [facility] MD   Ordering Physician Lawrence Storm MD   The procedure was explained in detail to the patient. Risks,  complications and alternative treatments were reviewed. Written consent  was obtained. Procedure Procedure Type:   Nuclear Stress Test:Exercise, NM MYOCARDIAL SPECT REST EXERCISE OR RX   Study location: [address] [place] [address] Nuclear Medicine   Indications: Chest pain. Hospital Status: Outpatient. Height: 70 inches Weight: 256 pounds  Risk Factors   The patient risk factors include:prior PCI on 09/21/2020;obesity, physical  activity, former tobacco use, treated hypercholesterolemia, treated  hypertension, family history of premature CAD appeared at age 48,  orally-treated diabetes mellitus and chronic lung disease.    Conclusions   Summary  Normal LVEF 59%  Normal wall motion  Diaphragmatic attenuation artifact  Apical scar with small amount of inferoapical gifty-infarct ischemia   Overall, this would be considered an abnormal, intermediate to high risk,  study  Stress Protocols   Resting ECG  nsr, irbbb   Resting HR:67 bpm  Resting BP:163/79 mmHg  Stress Protocol:Exercise - David  Peak HR:112 bpm                             HR/BP product:[id number]  Peak BP:206/96 mmHg                         Max exercise: 7 METS  Predicted HR: 160 bpm  % of predicted HR: 70  Test duration:4 min and 9 sec  Reason for termination:Chest pain   ECG Findings  There were technical difficulties during ekg acquistion with loss of  electrical power, limited ekgs were taken, no significant ST changes noted  at 120bpm.   Overall, ekgs would be considered nondiagnostic due to technical  difficulties. Arrhythmias  None noted with limitation of data acquisition   Symptoms  There was stress induced chest pain 7/10. patient unable to reach target  HR due to chest pain. chest pain resolved at rest.   Complications  Procedure complication was none. Stress Interpretation  Normal LVEF 59%  Normal wall motion  Diaphragmatic attenuation artifact  Apical scar with small amount of inferoapical gifty-infarct ischemia   Imaging Protocols   - One Day   Rest                          Stress   Isotope:Myoview/Tetrofosmin   Isotope: Myoview/Tetrofosmin  Isotope dose:11.6 mCi         Isotope dose:33.3 mCi  Administration Route:I.V. Administration Route:I.V.  Date:12/02/2020 08:45         Date:12/02/2020 10:00                                 Technique:      Gated  Imaging Results   Applied corrections   - Attenuation correction  applied     Stress ejection    Ejection fraction:59 %    EDV :108 ml    ESV :44 ml    Stroke volume :64 ml    LV mass :134 gr  Medical History  Signatures   ------------------------------------------------------------------  Electronically signed by Gal Sandhu MD (Interpreting  physician) on 12/02/2020 at 12:57  ------------------------------------------------------------------          Consults:     IP CONSULT TO CARDIAC REHAB    Labs:  For convenience and continuity at follow-up the following most recent labs are provided:    Lab Results   Component Value Date    WBC 7.2 12/16/2020    HGB 13.2 12/16/2020    HCT 39.4 12/16/2020    MCV 94.3 12/16/2020     12/16/2020     12/16/2020    K 3.9 12/16/2020    K 4.9 09/22/2020     12/16/2020    CO2 28 12/16/2020 BUN 14 12/16/2020    CREATININE 0.6 12/16/2020    CALCIUM 8.7 12/16/2020    PHOS 3.7 07/09/2017    TROPONINI <0.01 05/06/2020    ALKPHOS 60 12/15/2020    ALT 18 12/15/2020    AST 17 12/15/2020    BILITOT 0.8 12/15/2020    BILIDIR <0.2 01/23/2020    LABALBU 4.5 12/15/2020    LDLCALC 71 12/15/2020    TRIG 80 12/15/2020    LABA1C 8.2 11/12/2020     Lab Results   Component Value Date    INR 1.01 12/15/2020    INR 0.99 09/21/2020    INR 1.04 09/06/2017         The patient was seen and examined on day of discharge and this discharge summary is in conjunction with any daily progress note from day of discharge. Time spent on discharge is more than 30 minutes in the examination, evaluation, counseling and review of medications and discharge plan. Signed:    Osito March MD   12/16/2020    Thank you Mago Swan DO for the opportunity to be involved in this patient's care. If you have any questions or concerns please feel free to contact my office [phone].

## 2020-12-16 NOTE — PLAN OF CARE
Problem: Metabolic:  Goal: Ability to maintain appropriate glucose levels will improve  Description: Ability to maintain appropriate glucose levels will improve  Outcome: Ongoing   Glucose monitoring. Insulin provided if needed. Problem: DAILY CARE  Goal: Daily care needs are met  Outcome: Ongoing   Hourly rounding  Problem: Pain:  Goal: Control of acute pain  Description: Control of acute pain  Outcome: Ongoing   Q4hr vitals.

## 2020-12-16 NOTE — H&P
Hospital Medicine History & Physical      PCP: Janny Monterroso DO    Date of Admission: 12/15/2020    Date of Service: Pt seen/examined on 12/15/2020 and Placed in Observation. Chief Complaint: Post cath for unstable angina      History Of Present Illness:    61 y.o. male who presented to [facility] with above complaints  Patient with complicated history of CAD s/p multiple PCI's, HTN, HLD, obesity, EtOH abuse, DM 2 presented for an elective cath today. Patient had ongoing anginal symptoms even with Ranexa. He underwent a stress test on 12/2 which was abnormal with moderate to high risk study. He was brought in today for an elective cath and PCI of OM1 stent for restenosis. The procedure was difficult, patient stenosed lesion was heavily fibrotic requiring multiple rounds of noncompliant balloon inflations, laser atherectomy. Patient could not tolerate the procedure any longer and developed severe chest pain and severe hypertension, procedure was aborted, residual stenosis at the end of the procedure was 40% of the distal stent edge with GUERLINE-3 flow. Plan is to admit the patient overnight for observation and resolution of his symptoms, and possible laser atherectomy versus Rotablator versus shockwave lithotripsy in the near future. Currently patient seen in the post-cath room, he reports his chest tightness is slowly resolving. Patient almost had a vagal episode with lightheadedness nausea vomiting which resolved. Blood pressures are much better now with systolic in the 488O.     Past Medical History:          Diagnosis Date    Alcohol abuse 12/3/2012    Coronary artery disease     Hyperlipidemia     Hypertension     Non morbid obesity     Reactive airway disease with wheezing, moderate persistent, with acute exacerbation 3/3/2020    Type II or unspecified type diabetes mellitus without mention of complication, not stated as uncontrolled        Past Surgical History: Spacer/Aero-Holding Chambers (E-Z SPACER) REAL 1 Device by Does not apply route daily as needed (wheezing) 3/1/20   Didi Mora MD       Allergies:  Vitamin d analogs and Vitamin d analogs    Social History:      The patient currently lives at home    TOBACCO:   reports that he has never smoked. He has never used smokeless tobacco.  ETOH:   reports current alcohol use of about 1.0 standard drinks of alcohol per week. E-Cigarettes/Vaping Use     Questions Responses    E-Cigarette/Vaping Use Never User    Start Date     Passive Exposure     Quit Date     Counseling Given     Comments             Family History:     Positive as follows:        Problem Relation Age of Onset    Heart Attack Mother     Heart Disease Mother     Heart Attack Father     Cancer Father     Hearing Loss Father     Diabetes Father     Heart Disease Father     Heart Disease Paternal Uncle         bypass       REVIEW OF SYSTEMS:   Pertinent positives as noted in the HPI. All other systems reviewed and negative. PHYSICAL EXAM PERFORMED:    Ht 5' 10\" (1.778 m)   Wt 257 lb 9.6 oz (116.8 kg)   BMI 36.96 kg/m²     General appearance:  No apparent distress, appears stated age and cooperative. HEENT:  Normal cephalic, atraumatic without obvious deformity. Pupils equal, round, and reactive to light. Extra ocular muscles intact. Conjunctivae/corneas clear. Neck: Supple, with full range of motion. No jugular venous distention. Trachea midline. Respiratory:  Normal respiratory effort. Clear to auscultation, bilaterally without Rales/Wheezes/Rhonchi. Cardiovascular:  Regular rate and rhythm with normal S1/S2 without murmurs, rubs or gallops. Abdomen: Soft, non-tender, non-distended with normal bowel sounds. Musculoskeletal:  No clubbing, cyanosis or edema bilaterally. Full range of motion without deformity. Skin: Skin color, texture, turgor normal.  No rashes or lesions. Neurologic:  Neurovascularly intact without any focal sensory/motor deficits. Cranial nerves: II-XII intact, grossly non-focal.  Psychiatric:  Alert and oriented, thought content appropriate, normal insight  Capillary Refill: Brisk,< 3 seconds   Peripheral Pulses: +2 palpable, equal bilaterally       Labs:     Recent Labs     12/15/20  1146   WBC 7.1   HGB 14.4   HCT 43.4        Recent Labs     12/15/20  1146   *   K 4.6   CL 98*   CO2 30   BUN 13   CREATININE 0.7*   CALCIUM 9.2     Recent Labs     12/15/20  1146   AST 17   ALT 18   BILITOT 0.8   ALKPHOS 60     Recent Labs     12/15/20  1146   INR 1.01     EKG:  I have reviewed the EKG with the following interpretation: NSR, no acute ischemic changes    No orders to display       ASSESSMENT:PLAN:    CAD in native artery   Complicated by in-stent restenosis OM1 , also s/p multiple PCI's in the past to LAD, OM1, diagonal 1  S/p C today with difficult OM1 stenosis heavily fibrotic lesion, requiring multiple attempts of balloon inflation and laser atherectomy. Patient could not tolerate procedure anymore, developed chest pain and procedure aborted. Will observe overnight for symptom resolution  Cardiology following  Continue DAPT  Check BMP in a.m. Essential hypertension, uncontrolled  Periprocedure hypertensive crisis slowly resolving  Continue to monitor BP  Resume home antihypertensive regimen    Hyperlipidemia-resume statin    DM type II-controlled, Metformin and glipizide on hold, placed on low-dose sliding scale insulin with Accu-Cheks and hypoglycemia protocol    Obesity BMI 53.69  Complicating assessment and treatment, placing patient at high risk for multiple co-morbidities as well as early death and contributing to the patient's presentation. Counseled on weight loss.       DVT Prophylaxis: Lovenox  Diet: DIET CARDIAC;  Code Status: Full Code    PT/OT Eval Status: Not consulted    Dispo -observation       Kentrell Aceves MD Thank you [facility], DO for the opportunity to be involved in this patient's care. If you have any questions or concerns please feel free to contact me at [phone].

## 2020-12-16 NOTE — FLOWSHEET NOTE
12/15/20 6249   Neurological   Neuro (WDL) WDL   Level of Consciousness Alert (0)   Sree Coma Scale   Eye Opening 4   Best Verbal Response 5   Best Motor Response 6   [place] Coma Scale Score 15   HEENT   HEENT (WDL) WDL   Respiratory   Respiratory (WDL) WDL   Cardiac   Cardiac (WDL) X  (ST at times)   Cardiac Regularity Regular   Heart Sounds S1, S2   Cardiac Rhythm ST   Cardiac Monitor   Telemetry Monitor On Yes   Telemetry Audible Yes   Telemetry Alarms Set Yes   Gastrointestinal   Abdominal (WDL) WDL   Peripheral Vascular   Peripheral Vascular (WDL) WDL   Edema None   Skin Color/Condition   Skin Color/Condition (WDL) WDL   Skin Integrity   Skin Integrity (WDL) WDL   Musculoskeletal   Musculoskeletal (WDL) WDL   Genitourinary   Genitourinary (WDL) WDL   Flank Tenderness No   Suprapubic Tenderness No   Dysuria No   Anus/Rectum   Anus/Rectum (WDL) WDL   Psychosocial   Psychosocial (WDL) WDL

## 2020-12-16 NOTE — PROGRESS NOTES
Patient admitted to room 440 from cath. Patient oriented to room, call light, bed rails, phone, lights and bathroom. Patient instructed about the schedule of the day including: vital sign frequency, lab draws, possible tests, frequency of MD and staff rounds, including RN/MD rounding together at bedside, daily weights, and I &O's. Patient instructed about prescribed diet, how to use 8MENU, and television. No bed alarm in place, patient aware of placement and reason. Telemetry box  in place, patient aware of placement and reason. Bed locked, in lowest position, side rails up 2/4, call light within reach. Will continue to monitor.

## 2020-12-16 NOTE — PROGRESS NOTES
[address]  Cardiology  Progress Note    Admission date:  12/15/2020    Reason for follow up visit: CAD    HPI/CC: Elicia Braden is a 61 y.o. male who was admitted 12/15/2020 following POBA and laser atherectomy OM1. Rhythm has been sinus. Subjective: Denies chest pain, shortness of breath, palpitations and dizziness. Vitals:  Blood pressure 125/75, pulse 88, temperature 98.4 °F (36.9 °C), temperature source Oral, resp. rate 18, height 5' 10\" (1.778 m), weight 257 lb 14.4 oz (117 kg), SpO2 97 %.   Temp  Av °F (36.7 °C)  Min: 97.6 °F (36.4 °C)  Max: 98.4 °F (36.9 °C)  Pulse  Av.5  Min: 88  Max: 111  BP  Min: 125/75  Max: 131/96  SpO2  Av.5 %  Min: 97 %  Max: 98 %    24 hour I/O    Intake/Output Summary (Last 24 hours) at 2020 0908  Last data filed at 2020 0550  Gross per 24 hour   Intake 480 ml   Output 500 ml   Net -20 ml     Current Facility-Administered Medications   Medication Dose Route Frequency Provider Last Rate Last Admin    0.9 % sodium chloride infusion   Intravenous Once Felicia MD Reginald        aspirin chewable tablet 81 mg  81 mg Oral Daily Felicia MD Reginald        atorvastatin (LIPITOR) tablet 40 mg  40 mg Oral QPM Felicia MD Reginald   40 mg at 12/15/20 2220    clopidogrel (PLAVIX) tablet 75 mg  75 mg Oral Daily Felicia MD Reginald        isosorbide mononitrate (IMDUR) extended release tablet 120 mg  120 mg Oral Daily Felicia MD Reginald   120 mg at 12/15/20 2220    lisinopril (PRINIVIL;ZESTRIL) tablet 2.5 mg  2.5 mg Oral Daily Felicia MD Reginald   2.5 mg at 12/15/20 222    metoprolol tartrate (LOPRESSOR) tablet 25 mg  25 mg Oral BID Felicia MD Reginald   25 mg at 12/15/20 222    sodium chloride flush 0.9 % injection 10 mL  10 mL Intravenous 2 times per day Felicia MD Reginald   10 mL at 12/15/20 2223    sodium chloride flush 0.9 % injection 10 mL  10 mL Intravenous PRN Felicia Reginald, MD Results of intervention    Lesion 1: OM1  Multiple noncompliant balloons used up to 2.75 mm. Angio sculpt also used. In addition to several runs of laser atherectomy. Pre:   90% stenosis, GUERLINE 2 flow  Post: 40% stenosis, GUERLINE 3 flow  Assessment/Plan  1. Severe and progressive in-stent restenosis of OM1. Heavily fibrotic. Multiple runs of noncompliant balloon inflations were performed but there was balloon slippage. Angioscope and multiple runs of laser atherectomy was performed. Unfortunately patient was no longer able to tolerate the procedure was severe chest pain and hypertension therefore we aborted the procedure. Residual stenosis was 40% of the distal stent edge but is GUERLINE-3 flow.                -Symptoms return we will consider again laser atherectomy versus rotoblade versus shockwave lithotripsy therapy  2. Continue aspirin and plavix    Coronary angiogram 9/21/2020:  LM: Luminal  LAD: Prior stenting in the proximal to mid with an 80% restenosis in the mid LAD stent.  There is another stent in the mid to distal that has minimal restenosis.  GUERLINE II flow                [id number] 1: Prior stent with minimal restenosis. LCX: Luminal                Om1-prior proximal stent that is patent 50% distal restenosis. RCA: Minute.  Proximal/mid eccentric 30%. LVEDP: 15  LVEF: 55%  PCI of mid LAD  Status post predilation using laser arthrectomy, angioscope and NC balloon dilatation. Stent resolute [place] 3.5 mm x 15 mm, postdilated to 3.6 mm. Assessment  1.  Interval restenosis of mid LAD in-stent 80% lesion, GUERLINE II flow.  Treated successfully with laser atherectomy and aggressive predilatation using cutting balloons                           -0% residual, GUERLINE-3 flow.   2.  Continue aspirin 81 mg daily for life.  Plavix 75 mg daily for 1 year minimum, given extensive CAD will continue dual therapy for life as tolerated  3.  We will closely follow OM1 lesion as appears to be borderline at this time 4.  Continue beta-blocker and statin as tolerated aggressive risk factor reduction medical management     Echo 3/4/2020:   Technically difficult examination due to body habitus. Definity® used for   myocardial border enhancement.   Normal left ventricle size and systolic function with a visually estimated   ejection fraction of 55%.   Mild concentric left ventricular hypertrophy.   No regional wall motion abnormalities are seen.   Grade I diastolic dysfunction with normal LV filling pressures.   The right atrium is mildly enlarged.     Lab Reviewed:     Renal Profile:  Lab Results   Component Value Date    CREATININE 0.6 12/16/2020    BUN 14 12/16/2020     12/16/2020    K 3.9 12/16/2020    K 4.9 09/22/2020     12/16/2020    CO2 28 12/16/2020     CBC:    Lab Results   Component Value Date    WBC 7.2 12/16/2020    RBC 4.18 12/16/2020    HGB 13.2 12/16/2020    HCT 39.4 12/16/2020    MCV 94.3 12/16/2020    RDW 13.8 12/16/2020     12/16/2020     BNP:    Lab Results   Component Value Date    PROBNP 85 05/06/2020    PROBNP 178 03/03/2020    PROBNP 123 03/01/2020    PROBNP 136 01/13/2016     Fasting Lipid Panel:    Lab Results   Component Value Date    CHOL 163 12/15/2020    HDL 76 12/15/2020    TRIG 80 12/15/2020     Cardiac Enzymes:  CK/MbTroponin  Lab Results   Component Value Date    TROPONINI <0.01 05/06/2020     PT/ INR   Lab Results   Component Value Date    INR 1.01 12/15/2020    INR 0.99 09/21/2020    INR 1.04 09/06/2017    PROTIME 11.7 12/15/2020    PROTIME 11.5 09/21/2020    PROTIME 11.7 09/06/2017     PTT No results found for: PTT No results found for: MG   Lab Results   Component Value Date    TSH 1.74 01/23/2020     All labs and imaging reviewed today    Assessment:  CAD: s/p POBA and laser atherectomy OM1 in stent restenosis 12/15/2020; s/p laser atherectomy/SAMUEL LAD 9/21/2020; s/p rotablator/ SAMUEL OM 2017; s/p PCI Diag, LAD and OM 1/2016  HTN: controlled  HLD: stable, LDL 71, continue statin

## 2020-12-21 ENCOUNTER — OFFICE VISIT (OUTPATIENT)
Dept: FAMILY MEDICINE CLINIC | Age: 60
End: 2020-12-21
Payer: COMMERCIAL

## 2020-12-21 VITALS
OXYGEN SATURATION: 99 % | HEIGHT: 70 IN | DIASTOLIC BLOOD PRESSURE: 65 MMHG | BODY MASS INDEX: 36.94 KG/M2 | HEART RATE: 97 BPM | TEMPERATURE: 97.1 F | SYSTOLIC BLOOD PRESSURE: 94 MMHG | WEIGHT: 258 LBS

## 2020-12-21 PROCEDURE — 3052F HG A1C>EQUAL 8.0%<EQUAL 9.0%: CPT | Performed by: FAMILY MEDICINE

## 2020-12-21 PROCEDURE — 99214 OFFICE O/P EST MOD 30 MIN: CPT | Performed by: FAMILY MEDICINE

## 2020-12-21 ASSESSMENT — ENCOUNTER SYMPTOMS
CHEST TIGHTNESS: 0
ABDOMINAL PAIN: 0
SHORTNESS OF BREATH: 0
COUGH: 0
CHOKING: 0
STRIDOR: 0
BACK PAIN: 0
WHEEZING: 0

## 2020-12-21 NOTE — PROGRESS NOTES
Subjective:      Patient ID: Germaine Hamilton is a 61 y.o. male. ARMINDA EDWARDS is here for follow-up on his thoracic spine x-rays. The x-rays revealed DISH. We discussed this at length. He remains overweight. He has stopped drinking. His thoracic pain is minimal at the moment. Review of Systems   Constitutional: Negative for activity change, appetite change, chills, diaphoresis, fatigue, fever and unexpected weight change. Respiratory: Negative for cough, choking, chest tightness, shortness of breath, wheezing and stridor. Cardiovascular: Negative for chest pain, palpitations and leg swelling. Gastrointestinal: Negative for abdominal pain. Genitourinary: Negative for difficulty urinating. Musculoskeletal: Negative for arthralgias and back pain. Neurological: Negative for dizziness. All other systems reviewed and are negative. Objective:   Physical Exam  Vitals signs and nursing note reviewed. Constitutional:       Appearance: He is well-developed. HENT:      Head: Normocephalic and atraumatic. Right Ear: External ear normal.      Left Ear: External ear normal.      Nose: Nose normal.   Eyes:      Conjunctiva/sclera: Conjunctivae normal.      Pupils: Pupils are equal, round, and reactive to light. Neck:      Musculoskeletal: Normal range of motion and neck supple. Thyroid: No thyromegaly. Vascular: No JVD. Trachea: No tracheal deviation. Cardiovascular:      Rate and Rhythm: Normal rate and regular rhythm. Heart sounds: Normal heart sounds. No murmur. No friction rub. No gallop. Pulmonary:      Effort: Pulmonary effort is normal. No respiratory distress. Breath sounds: Normal breath sounds. No stridor. No wheezing or rales. Chest:      Chest wall: No tenderness. Abdominal:      General: Bowel sounds are normal. There is no distension. Palpations: Abdomen is soft. There is no mass. Tenderness: There is no abdominal tenderness. There is no guarding or rebound. Musculoskeletal: Normal range of motion. General: No tenderness. Lymphadenopathy:      Cervical: No cervical adenopathy. Skin:     General: Skin is warm and dry. Coloration: Skin is not pale. Findings: No erythema or rash. Neurological:      Mental Status: He is alert and oriented to person, place, and time. Cranial Nerves: No cranial nerve deficit. Motor: No abnormal muscle tone. Coordination: Coordination normal.      Deep Tendon Reflexes: Reflexes are normal and symmetric. Reflexes normal.         Assessment and plan      1. DISH (diffuse idiopathic skeletal hyperostosis)-discussed at length      2. Overweight-decrease calories and increase exercise      3. Type 2 diabetes mellitus without complication, without long-term current use of insulin (HCC)-reasonable control, continue current therapy    4.  History of alcohol abuse-no drinking for the last 2 weeks, discussed at length, continue          [employer], DO

## 2021-01-06 NOTE — PROGRESS NOTES
[address] [facility]   Cardiovascular Evaluation    PATIENT: Agus rGoss  DATE: 2021  MRN: [id number]  CSN: [id number]  : 1960      Primary Care Doctor: Deborah Terrazas DO  Reason for evaluation:   Follow-Up from Hospital (no cardiac complaints )      Subjective:   History of present illness on initial date of evaluation:   Agus Gross is a 61 y.o. patient who presents for follow up. He has a PMH DM, HTN, CAD, s/p PCI, alcohol abuse, nonsmoker, + FH. Was admitted to hospital in 2016 with [place] and had abnormal stress test. Underwent PCI of OM as well as [id number] LAD and was d/c'd/ Returned to hospital and noted to have mild troponin elevation and CP relieved with NTG SL. He underwent LHC on 16 and a PCI-D-1 with SAMUEL was done. Stents in prox-mid LAD, distal LAD, proximal-mid OM-1 were found patent. He was switched from Plavix to Brilinta. Imdur was added at office visit in 2016 due to complaints of exertional chest pain. He was admitted to the hospital in late May 2016 with recurrent chest pain and transient troponin elevation. LHC was showed patent stents. He was started on Ranexa. He underwent 17 rotablade guided PCI of OM1 recurrent instent restenosis. Last OV 20 he presented for follow up. He had an elective LHC on 2020 that demonstrated restenosis of mid LAD. Treated successfully with laser atherectomy and aggressive pre dilatation using cutting balloons. He has not noticed any improvement with the Renexa. He stated he continues to have chest pain. He stated he has been taking 2 SL nitro's daily and this does help. He stated the pain will occur in the AM and when he takes a SL nitro the pain stops. He continued to drink a good amount of bourbon nightly. He was positive for COVID in October. Today he presents for follow up s/p LHC. On 12/15/20 LHC performed. Severe and progressive in-stent restenosis of OM1. Heavily fibrotic.   Multiple runs of noncompliant balloon inflations were performed but there was balloon slippage. Procedure aborted due to HTN and severe chest pain. He reports he feels well overall. He denies CP, SOB, dizziness or syncope. He has not needed NTG. He has been trying to control his carb intake. He has stopped alcohol consumption.  His BP at home has been controlled but is elevated on exam.        Patient Active Problem List   Diagnosis    Type II or unspecified type diabetes mellitus without mention of complication, not stated as uncontrolled    Diabetes mellitus    History of ETOH abuse    Patient overweight    Patient overweight    Dietary noncompliance    Alcohol abuse    Flank pain    Prostatism    Low serum testosterone level    Arm pain, left    Shoulder pain    Precordial pain    Lung nodule    Acute angina (HCC)    Abnormal stress test    Acute coronary syndrome (HCC)    Unstable angina ([address]    Coronary artery disease involving native coronary artery of native heart with unstable angina pectoris (HCC)    Other chest pain    S/P drug eluting coronary stent placement    Essential hypertension    Obesity    Abnormal chest x-ray    Angina effort    Skin lesion    Angina at rest [facility])    Hyperlipidemia    Angina pectoris ([address]    NSTEMI (non-ST elevated myocardial infarction) ([address]    Controlled type 2 diabetes mellitus without complication, without long-term current use of insulin ([address]    ASHD (arteriosclerotic heart disease)    Mass of right foot    Overweight    History of angina pectoris    Reactive airway disease    Bronchitis    Reactive airway disease with wheezing, moderate persistent, with acute exacerbation    Need for prophylactic vaccination and inoculation against varicella    Need for prophylactic vaccination against diphtheria-tetanus-pertussis (DTP)    Chronic right-sided thoracic back pain    CAD in native artery    S/P PTCA (percutaneous transluminal coronary angioplasty)    Chest pain         Past Medical History:   has a past medical history of Alcohol abuse, Coronary artery disease, Hyperlipidemia, Hypertension, Non morbid obesity, Reactive airway disease with wheezing, moderate persistent, with acute exacerbation, and Type II or unspecified type diabetes mellitus without mention of complication, not stated as uncontrolled. Surgical History:   has a past surgical history that includes Vein Surgery and Coronary angioplasty with stent (1/14/2016 9/21/2020). Social History:   reports that he has never smoked. He has never used smokeless tobacco. He reports current alcohol use of about 1.0 standard drinks of alcohol per week. He reports that he does not use drugs. Family History:  No evidence for sudden cardiac death or premature CAD    Home Medications:  Reviewed and are listed in nursing record. and/or listed below  Current Outpatient Medications   Medication Sig Dispense Refill    metoprolol tartrate (LOPRESSOR) 25 MG tablet TAKE ONE TABLET BY MOUTH TWICE A DAY 60 tablet 5    atorvastatin (LIPITOR) 40 MG tablet TAKE ONE TABLET BY MOUTH EVERY EVENING 29 tablet 1    fluticasone-salmeterol (ADVAIR DISKUS) 250-50 MCG/DOSE AEPB Inhale 1 puff into the lungs every 12 hours 60 each 3    nitroGLYCERIN (NITROSTAT) 0.4 MG SL tablet DISSOLVE 1 TAB UNDER TONGUE FOR CHEST PAIN - IF PAIN REMAINS AFTER 5 MIN, CALL 911 AND REPEAT DOSE. MAX 3 TABS IN 15 MINUTES 25 tablet 3    glipiZIDE (GLUCOTROL) 10 MG tablet TAKE ONE TABLET BY MOUTH EVERY MORNING FOR DIABETES 30 tablet 3    metFORMIN (GLUCOPHAGE) 1000 MG tablet TAKE ONE AND ONE HALF TABLETS DAILY.  135 tablet 2    isosorbide mononitrate (IMDUR) 120 MG extended release tablet TAKE ONE TABLET BY MOUTH DAILY 90 tablet 2    clopidogrel (PLAVIX) 75 MG tablet TAKE ONE TABLET BY MOUTH DAILY 90 tablet 3    lisinopril (PRINIVIL;ZESTRIL) 2.5 MG tablet TAKE ONE TABLET BY MOUTH DAILY 90 tablet 3    aspirin 81 MG chewable tablet CHEW ONE TABLET BY MOUTH DAILY 30 tablet 1    albuterol sulfate (PROAIR RESPICLICK) 541 (90 Base) MCG/ACT aerosol powder inhalation Inhale 2 puffs into the lungs every 4 hours as needed for Wheezing (Patient not taking: Reported on 1/7/2021) 1 Inhaler 1    Naproxen Sodium (ALEVE PO) Take by mouth as needed      albuterol (PROVENTIL) (2.5 MG/3ML) 0.083% nebulizer solution Take 3 mLs by nebulization every 6 hours as needed for Wheezing (Patient not taking: Reported on 1/7/2021) 120 each 3    Spacer/Aero-Holding Chambers (E-Z SPACER) REAL 1 Device by Does not apply route daily as needed (wheezing) 1 Device 0     No current facility-administered medications for this visit. Allergies:  Vitamin d analogs     Review of Systems:   A 14 point review of symptoms completed. Pertinent positives identified in the HPI, all other review of symptoms negative as below.     Objective:   PHYSICAL EXAM:    Vitals:    01/07/21 0921   BP: (!) 152/81   Pulse:    SpO2:     Weight: 258 lb (117 kg)     Wt Readings from Last 3 Encounters:   01/07/21 258 lb (117 kg)   12/21/20 258 lb (117 kg)   12/16/20 257 lb 14.4 oz (117 kg)         General Appearance:  Alert, cooperative, no distress, appears stated age   Head:  Normocephalic, atraumatic   Eyes:  PERRL, conjunctiva/corneas clear   Nose: Nares normal, no drainage or sinus tenderness   Throat: Lips, mucosa, and tongue normal   Neck: Supple, symmetrical, trachea midline, NL thyroid no carotid bruit or JVD   Lungs:   CTAB, respirations unlabored   Chest Wall:  No tenderness or deformity   Heart:  Regular rhythm and normal rate; S1, S2 are normal;   no murmur noted; no rub or gallop   Abdomen:   Soft, non-tender, +BS x 4, no masses, no organomegaly   Extremities: Extremities normal, atraumatic, no cyanosis or edema   Pulses: 2+ and symmetric   Skin: Skin color, texture, turgor normal, no rashes or lesions   Pysch: Normal mood and affect   Neurologic: Normal gross motor and sensory exam.         LABS   CBC:      Lab Results   Component Value Date    WBC 7.2 2020    RBC 4.18 2020    HGB 13.2 2020    HCT 39.4 2020    MCV 94.3 2020    RDW 13.8 2020     2020     CMP:  Lab Results   Component Value Date     2020    K 3.9 2020    K 4.9 2020     2020    CO2 28 2020    BUN 14 2020    CREATININE 0.6 2020    GFRAA >60 2020    AGRATIO 2.0 12/15/2020    LABGLOM >60 2020    GLUCOSE 224 2020    PROT 6.7 12/15/2020    CALCIUM 8.7 2020    BILITOT 0.8 12/15/2020    ALKPHOS 60 12/15/2020    AST 17 12/15/2020    ALT 18 12/15/2020     PT/INR:   No results found for: PTINR  Liver:  No components found for: CHLPL  Lab Results   Component Value Date    ALT 18 12/15/2020    AST 17 12/15/2020    ALKPHOS 60 12/15/2020    BILITOT 0.8 12/15/2020     Lab Results   Component Value Date    LABA1C 8.2 2020     Lipids:         Lab Results   Component Value Date    TRIG 80 12/15/2020    TRIG 123 2020    TRIG 115 2017            Lab Results   Component Value Date    HDL 76 (H) 12/15/2020    HDL 57 2020    HDL 75 (H) 2020            Lab Results   Component Value Date    LDLCALC 71 12/15/2020    LDLCALC 69 2020    LDLCALC 63 2020            Lab Results   Component Value Date    LABVLDL 16 12/15/2020    LABVLDL 25 2020    LABVLDL 19 2020         CARDIAC DATA   EK/15/2020 NSR    ECHO 3/3/2020   Technically difficult examination due to body habitus. Definity® used for   myocardial border enhancement. Normal left ventricle size and systolic function with a visually estimated   ejection fraction of 55%. Mild concentric left ventricular hypertrophy. No regional wall motion abnormalities are seen. Grade I diastolic dysfunction with normal LV filling pressures. The right atrium is mildly enlarged.     STRESS TEST 2020  Summary Normal LVEF 59%  Normal wall motion  Diaphragmatic attenuation artifact  Apical scar with small amount of inferoapical gifty-infarct ischemia   Overall, this would be considered an abnormal, intermediate to high risk,  study     STRESS TEST: 1/13/2016   1. Inferolateral basilar reversible stress defect compatible with stress-induced ischemia. 2. Ejection fraction of 56%   3. No focal wall motion abnormality. CARDIAC CATH  LEFT HEART CATH: 12/15/20  Artery Findings/Result   LM  luminal regularities   LAD  patent stent in proximal LAD with no significant restenosis. ,  GUERLINE-3 flow  Diagonal 1. Patent stent with normal significant restenosis   Cx  luminal irregularities  OM1-proximal 70%. Proximal stent with 50% restenosis. Distal stent edge with 90% restenosis  OM2proximal 40%         RCA  dominant. Mid 30%   LVEDP  16   LVG  55%. Normal wall motion  No aortic stenosis, 1+ mitral regurgitation      Results of intervention      Lesion 1: OM1  Multiple noncompliant balloons used up to 2.75 mm. Angio sculpt also used. In addition to several runs of laser atherectomy. Pre:   90% stenosis, GUERLINE 2 flow  Post: 40% stenosis, GUERLINE 3 flow  Assessment/Plan  1. Severe and progressive in-stent restenosis of OM1. Heavily fibrotic. Multiple runs of noncompliant balloon inflations were performed but there was balloon slippage. Angioscope and multiple runs of laser atherectomy was performed. Unfortunately patient was no longer able to tolerate the procedure was severe chest pain and hypertension therefore we aborted the procedure. Residual stenosis was 40% of the distal stent edge but is GUERLINE-3 flow.                -Symptoms return we will consider again laser atherectomy versus rotoblade versus shockwave lithotripsy therapy  2. Continue aspirin and plavix       LEFT HEART CATH 9/21/2020  PCI of mid LAD  Status post predilation using laser arthrectomy, angioscope and NC balloon dilatation.   Stent resolute Zak 3.5 mm x 15 mm, postdilated to 3.6 mm. Assessment  1. Interval restenosis of mid LAD in-stent 80% lesion, GUERLINE II flow. Treated successfully with laser atherectomy and aggressive predilatation using cutting balloons                           -0% residual, GUERLINE-3 flow. CATH: 9/6/17  Successful PCI of OM-1 recurrent in-stent restenosis with Rotablator-assisted PCI with placement of Xience Alpine drug-eluting stent. Cath 7/11/17  Intervention:  90% prox OM1 in-stent restenosis, s/p PTCA with 2.5-mm NC as well as 2.5-mm Flexitome cutting balloon     Cath 8/18/17  LEFT HEART CATH  PCI of mid OM1 90% restenosis  Ballooned with 2.25 x as NC trek up to 18atm  Angiosculpt 2.5 x 15mm up to 14atm- multiple passes  90% GUERLINE-2 flow--> 15% GUERLINE-3 flow  Assessment  1. Restenosis of the OM1 stent following recent POBA  2. Aggressive cutting balloon and NC balloon but still with residual stenosis and concerned about ability to completely deploy stent. If reoccurs or Cp returns, would suggest laser arthrectomy and PCI. CATH: 01/21/2016  1. A 70% proximal medium-caliber first diagonal branch status post 2.25 x  12-mm Xience Alpine drug-eluting stent. 2.  Patent drug-eluting stent in proximal-mid left anterior descending. 3.  Patent drug-eluting stent in the distal left anterior descending. 4.  Patent drug-eluting stent in the [id number] first obtuse marginal  branch. RECOMMENDATIONS:  Aspirin indefinitely. Brilinta at least for 12 months. Aggressive medical therapy and risk factors modification for coronary disease. CATH: 01/15/2016  IMPRESSION:   1. An 80% diffuse mid left anterior descending with a fractional flow  reserve of 0.79 (significant), status post 2.75 x 28-mm XIENCE  Alpine drug-eluting stent postdilated with a 3.5-mm noncompliant balloon.    2. A 60% distal left anterior descending lesion with a fractional flow   reserve of 0.5 (significant), status post 2.25 x 23-mm XIENCE Alpine Lung nodule    Acute angina (HCC)    Abnormal stress test    Acute coronary syndrome (HCC)    Unstable angina (HCC)    Coronary artery disease involving native coronary artery of native heart with unstable angina pectoris (HCC)    Other chest pain    S/P drug eluting coronary stent placement    Essential hypertension    Obesity    Abnormal chest x-ray    Angina effort    Skin lesion    Angina at rest [facility])    Hyperlipidemia    Angina pectoris [facility]    NSTEMI (non-ST elevated myocardial infarction) ([facility]    Controlled type 2 diabetes mellitus without complication, without long-term current use of insulin (HCC)    ASHD (arteriosclerotic heart disease)    Mass of right foot    Overweight    History of angina pectoris    Reactive airway disease    Bronchitis    Reactive airway disease with wheezing, moderate persistent, with acute exacerbation    Need for prophylactic vaccination and inoculation against varicella    Need for prophylactic vaccination against diphtheria-tetanus-pertussis (DTP)    Chronic right-sided thoracic back pain    CAD in native artery    S/P PTCA (percutaneous transluminal coronary angioplasty)    Chest pain       Patient Plan:  1. Discussed referral to DR Elbert Maradiaga at [facility]. Will defer for now. 2. Continue current medications  3. Follow up in 6 months        It is a pleasure to assist in the care of Drea Zamudio. Please call with any questions. Randy Bae MD, personally performed the services described in this documentation as scribed by the above signed scribe in my presence, and it is both accurate and complete to the best of our ability and knowledge. I agree with the details independently gathered by my clinical support staff, while the remaining scribed note accurately describes my personal service to the patient. The above RN is working as a scribe for and in the presence of myself .   Working as a scribe, the RN may have prepopulated components of this note with my historical intellectual property under my direct supervision. Any additions to this intellectual property were performed at my direction. Furthermore, the content and accuracy of this note have been reviewed by me to the best of my ability. Scribe's attestation:   This note was scribed in the presence of Dr. Yusef Avila by Owen Avial MD, [address] [facility] Cardiologist  [place]   [phone] [facility]  [phone] [address] [place] [address]  1/7/2021  9:31 AM

## 2021-01-07 ENCOUNTER — OFFICE VISIT (OUTPATIENT)
Dept: CARDIOLOGY CLINIC | Age: 61
End: 2021-01-07
Payer: COMMERCIAL

## 2021-01-07 VITALS
HEART RATE: 69 BPM | BODY MASS INDEX: 36.94 KG/M2 | WEIGHT: 258 LBS | SYSTOLIC BLOOD PRESSURE: 152 MMHG | HEIGHT: 70 IN | DIASTOLIC BLOOD PRESSURE: 81 MMHG | OXYGEN SATURATION: 98 %

## 2021-01-07 DIAGNOSIS — I25.110 CORONARY ARTERY DISEASE INVOLVING NATIVE CORONARY ARTERY OF NATIVE HEART WITH UNSTABLE ANGINA PECTORIS (HCC): Primary | ICD-10-CM

## 2021-01-07 DIAGNOSIS — E78.2 MIXED HYPERLIPIDEMIA: ICD-10-CM

## 2021-01-07 DIAGNOSIS — I10 ESSENTIAL HYPERTENSION: ICD-10-CM

## 2021-01-07 DIAGNOSIS — I25.9 CHEST PAIN DUE TO MYOCARDIAL ISCHEMIA, UNSPECIFIED ISCHEMIC CHEST PAIN TYPE: ICD-10-CM

## 2021-01-07 DIAGNOSIS — F10.10 ETOH ABUSE: ICD-10-CM

## 2021-01-07 PROCEDURE — 99213 OFFICE O/P EST LOW 20 MIN: CPT | Performed by: INTERNAL MEDICINE

## 2021-01-07 NOTE — LETTER
[address] [facility]   Cardiovascular Evaluation    PATIENT: Padmini Page  DATE: 2021  MRN: [id number]  CSN: [id number]  : 1960      Primary Care Doctor: Sammy Dalal DO  Reason for evaluation:   Follow-Up from Hospital (no cardiac complaints )      Subjective:   History of present illness on initial date of evaluation:   Padmini Page is a 61 y.o. patient who presents for follow up. He has a PMH DM, HTN, CAD, s/p PCI, alcohol abuse, nonsmoker, + FH. Was admitted to hospital in 2016 with [place] and had abnormal stress test. Underwent PCI of OM as well as owz-in-scmfyh LAD and was d/c'd/ Returned to hospital and noted to have mild troponin elevation and CP relieved with NTG SL. He underwent LHC on 16 and a PCI-D-1 with SAMUEL was done. Stents in prox-mid LAD, distal LAD, proximal-mid OM-1 were found patent. He was switched from Plavix to Brilinta. Imdur was added at office visit in 2016 due to complaints of exertional chest pain. He was admitted to the hospital in late May 2016 with recurrent chest pain and transient troponin elevation. LHC was showed patent stents. He was started on Ranexa. He underwent 17 rotablade guided PCI of OM1 recurrent instent restenosis. Last OV 20 he presented for follow up. He had an elective LHC on 2020 that demonstrated restenosis of mid LAD. Treated successfully with laser atherectomy and aggressive pre dilatation using cutting balloons. He has not noticed any improvement with the Renexa. He stated he continues to have chest pain. He stated he has been taking 2 SL nitro's daily and this does help. He stated the pain will occur in the AM and when he takes a SL nitro the pain stops. He continued to drink a good amount of bourbon nightly. He was positive for COVID in October. Today he presents for follow up s/p [facility]. On 12/15/20 [facility] performed. Severe and progressive in-stent restenosis of OM1. Heavily fibrotic. Multiple runs of noncompliant balloon inflations were performed but there was balloon slippage. Procedure aborted due to HTN and severe chest pain. He reports he feels well overall. He denies CP, SOB, dizziness or syncope. He has not needed NTG. He has been trying to control his carb intake. He has stopped alcohol consumption.  His BP at home has been controlled but is elevated on exam.        Patient Active Problem List   Diagnosis    Type II or unspecified type diabetes mellitus without mention of complication, not stated as uncontrolled    Diabetes mellitus    History of ETOH abuse    Patient overweight    Patient overweight    Dietary noncompliance    Alcohol abuse    Flank pain    Prostatism    Low serum testosterone level    Arm pain, left    Shoulder pain    Precordial pain    Lung nodule    Acute angina (HCC)    Abnormal stress test    Acute coronary syndrome (HCC)    Unstable angina ([address]    Coronary artery disease involving native coronary artery of native heart with unstable angina pectoris (HCC)    Other chest pain    S/P drug eluting coronary stent placement    Essential hypertension    Obesity    Abnormal chest x-ray    Angina effort    Skin lesion    Angina at rest [facility])    Hyperlipidemia    Angina pectoris ([address]    NSTEMI (non-ST elevated myocardial infarction) ([address]    Controlled type 2 diabetes mellitus without complication, without long-term current use of insulin ([address]    ASHD (arteriosclerotic heart disease)    Mass of right foot    Overweight    History of angina pectoris    Reactive airway disease    Bronchitis    Reactive airway disease with wheezing, moderate persistent, with acute exacerbation    Need for prophylactic vaccination and inoculation against varicella  clopidogrel (PLAVIX) 75 MG tablet TAKE ONE TABLET BY MOUTH DAILY 90 tablet 3    lisinopril (PRINIVIL;ZESTRIL) 2.5 MG tablet TAKE ONE TABLET BY MOUTH DAILY 90 tablet 3    aspirin 81 MG chewable tablet CHEW ONE TABLET BY MOUTH DAILY 30 tablet 1    albuterol sulfate (PROAIR RESPICLICK) 757 (90 Base) MCG/ACT aerosol powder inhalation Inhale 2 puffs into the lungs every 4 hours as needed for Wheezing (Patient not taking: Reported on 1/7/2021) 1 Inhaler 1    Naproxen Sodium (ALEVE PO) Take by mouth as needed      albuterol (PROVENTIL) (2.5 MG/3ML) 0.083% nebulizer solution Take 3 mLs by nebulization every 6 hours as needed for Wheezing (Patient not taking: Reported on 1/7/2021) 120 each 3    Spacer/Aero-Holding Chambers (E-Z SPACER) REAL 1 Device by Does not apply route daily as needed (wheezing) 1 Device 0     No current facility-administered medications for this visit. Allergies:  Vitamin d analogs     Review of Systems:   A 14 point review of symptoms completed. Pertinent positives identified in the HPI, all other review of symptoms negative as below.     Objective:   PHYSICAL EXAM:    Vitals:    01/07/21 0921   BP: (!) 152/81   Pulse:    SpO2:     Weight: 258 lb (117 kg)     Wt Readings from Last 3 Encounters:   01/07/21 258 lb (117 kg)   12/21/20 258 lb (117 kg)   12/16/20 257 lb 14.4 oz (117 kg)         General Appearance:  Alert, cooperative, no distress, appears stated age   Head:  Normocephalic, atraumatic   Eyes:  PERRL, conjunctiva/corneas clear   Nose: Nares normal, no drainage or sinus tenderness   Throat: Lips, mucosa, and tongue normal   Neck: Supple, symmetrical, trachea midline, NL thyroid no carotid bruit or JVD   Lungs:   CTAB, respirations unlabored   Chest Wall:  No tenderness or deformity   Heart:  Regular rhythm and normal rate; S1, S2 are normal;   no murmur noted; no rub or gallop   Abdomen:   Soft, non-tender, +BS x 4, no masses, no organomegaly Extremities: Extremities normal, atraumatic, no cyanosis or edema   Pulses: 2+ and symmetric   Skin: Skin color, texture, turgor normal, no rashes or lesions   Pysch: Normal mood and affect   Neurologic: Normal gross motor and sensory exam.         LABS   CBC:      Lab Results   Component Value Date    WBC 7.2 2020    RBC 4.18 2020    HGB 13.2 2020    HCT 39.4 2020    MCV 94.3 2020    RDW 13.8 2020     2020     CMP:  Lab Results   Component Value Date     2020    K 3.9 2020    K 4.9 2020     2020    CO2 28 2020    BUN 14 2020    CREATININE 0.6 2020    GFRAA >60 2020    AGRATIO 2.0 12/15/2020    LABGLOM >60 2020    GLUCOSE 224 2020    PROT 6.7 12/15/2020    CALCIUM 8.7 2020    BILITOT 0.8 12/15/2020    ALKPHOS 60 12/15/2020    AST 17 12/15/2020    ALT 18 12/15/2020     PT/INR:   No results found for: PTINR  Liver:  No components found for: CHLPL  Lab Results   Component Value Date    ALT 18 12/15/2020    AST 17 12/15/2020    ALKPHOS 60 12/15/2020    BILITOT 0.8 12/15/2020     Lab Results   Component Value Date    LABA1C 8.2 2020     Lipids:         Lab Results   Component Value Date    TRIG 80 12/15/2020    TRIG 123 2020    TRIG 115 2017            Lab Results   Component Value Date    HDL 76 (H) 12/15/2020    HDL 57 2020    HDL 75 (H) 2020            Lab Results   Component Value Date    LDLCALC 71 12/15/2020    LDLCALC 69 2020    LDLCALC 63 2020            Lab Results   Component Value Date    LABVLDL 16 12/15/2020    LABVLDL 25 2020    LABVLDL 19 2020         CARDIAC DATA   EK/15/2020 NSR    ECHO 3/3/2020   Technically difficult examination due to body habitus. Definity® used for   myocardial border enhancement. Normal left ventricle size and systolic function with a visually estimated   ejection fraction of 55%. Mild concentric left ventricular hypertrophy. No regional wall motion abnormalities are seen. Grade I diastolic dysfunction with normal LV filling pressures. The right atrium is mildly enlarged. STRESS TEST 12/2/2020  Summary Normal LVEF 59%  Normal wall motion  Diaphragmatic attenuation artifact  Apical scar with small amount of inferoapical gifty-infarct ischemia   Overall, this would be considered an abnormal, intermediate to high risk,  study     STRESS TEST: 1/13/2016   1. Inferolateral basilar reversible stress defect compatible with stress-induced ischemia. 2. Ejection fraction of 56%   3. No focal wall motion abnormality. CARDIAC CATH  LEFT HEART CATH: 12/15/20  Artery Findings/Result   LM  luminal regularities   LAD  patent stent in proximal LAD with no significant restenosis. ,  GUERLINE-3 flow  Diagonal 1. Patent stent with normal significant restenosis   Cx  luminal irregularities  OM1-proximal 70%. Proximal stent with 50% restenosis. Distal stent edge with 90% restenosis  OM2proximal 40%         RCA  dominant. Mid 30%   LVEDP  16   LVG  55%. Normal wall motion  No aortic stenosis, 1+ mitral regurgitation      Results of intervention      Lesion 1: OM1  Multiple noncompliant balloons used up to 2.75 mm. Angio sculpt also used. In addition to several runs of laser atherectomy. Pre:   90% stenosis, GUERLINE 2 flow  Post: 40% stenosis, GUERLINE 3 flow  Assessment/Plan  1. Severe and progressive in-stent restenosis of OM1. Heavily fibrotic. Multiple runs of noncompliant balloon inflations were performed but there was balloon slippage. Angioscope and multiple runs of laser atherectomy was performed. Unfortunately patient was no longer able to tolerate the procedure was severe chest pain and hypertension therefore we aborted the procedure. Residual stenosis was 40% of the distal stent edge but is GUERLINE-3 flow. -Symptoms return we will consider again laser atherectomy versus rotoblade versus shockwave lithotripsy therapy  2. Continue aspirin and plavix       LEFT HEART CATH 9/21/2020  PCI of mid LAD  Status post predilation using laser arthrectomy, angioscope and NC balloon dilatation. Stent resolute [place] 3.5 mm x 15 mm, postdilated to 3.6 mm. Assessment  1. Interval restenosis of mid LAD in-stent 80% lesion, GUERLINE II flow. Treated successfully with laser atherectomy and aggressive predilatation using cutting balloons                           -0% residual, GUERLINE-3 flow. CATH: 9/6/17  Successful PCI of OM-1 recurrent in-stent restenosis with Rotablator-assisted PCI with placement of Xience Alpine drug-eluting stent. Cath 7/11/17  Intervention:  90% prox OM1 in-stent restenosis, s/p PTCA with 2.5-mm NC as well as 2.5-mm Flexitome cutting balloon     Cath 8/18/17  LEFT HEART CATH  PCI of mid OM1 90% restenosis  Ballooned with 2.25 x as NC trek up to 18atm  Angiosculpt 2.5 x 15mm up to 14atm- multiple passes  90% GUERLINE-2 flow--> 15% GUERLINE-3 flow  Assessment  1. Restenosis of the OM1 stent following recent POBA  2. Aggressive cutting balloon and NC balloon but still with residual stenosis and concerned about ability to completely deploy stent. If reoccurs or Cp returns, would suggest laser arthrectomy and PCI. CATH: 01/21/2016  1. A 70% proximal medium-caliber first diagonal branch status post 2.25 x  12-mm Xience Alpine drug-eluting stent. 2.  Patent drug-eluting stent in proximal-mid left anterior descending. 3.  Patent drug-eluting stent in the distal left anterior descending. 4.  Patent drug-eluting stent in the [id number] first obtuse marginal  branch. RECOMMENDATIONS:  Aspirin indefinitely. Brilinta at least for 12 months. Aggressive medical therapy and risk factors modification for coronary disease.      CATH: 01/15/2016  IMPRESSION: 1. An 80% diffuse mid left anterior descending with a fractional flow  reserve of 0.79 (significant), status post 2.75 x 28-mm XIENCE  Alpine drug-eluting stent postdilated with a 3.5-mm noncompliant balloon. 2. A 60% distal left anterior descending lesion with a fractional flow   reserve of 0.5 (significant), status post 2.25 x 23-mm XIENCE Alpine   drug-eluting stent postdilated with 2.5-mm noncompliant balloon. 3. Patent stent in proximal to mid 1st obtuse marginal branch. CATH: 01/14/2016  1. A 90% large first obtuse marginal branch status post 2.25 x 16 mm Promus  Premier drug-eluting stent. 2. An 80% diffuse mid and 60% distal left anterior descending. 3. An 80% proximal medium caliber first diagonal branch. 4. Normal left ventricular systolic function with an ejection fraction of  55%. 5. Elevated left ventricular end diastolic pressure 19 mmHg. Assessment and Plan   Jax Melton is a 61 y.o. male who presents today for the following problems:      1. CAD   - 12/15/2020 POBA/Laser to OM1 (40% residual) case aborted due to intollerance    -9/21/2020 Laser/cutter and PCI to mid LAD   - 9/2017 Rotablade and PCI to [address]   - 8/2017 PCI to m/d LAD, diag 1  2. HTN: controlled (high today a in visit but normal home logs)  3. HLD: controlled  4. HX of likely COVID +: Patient has an excellent story for COVID symptomatology but unfortunately testing was too late and was negative  5. CP: resolved  6. EtOH abuse: Has quit!    -Previous several bourbon drinks a night    MD Plan:  1. Patient states all chest pain has resolved following angioplasty  2. He has stopped drinking in hopes that and with less sugar intake improves his diabetes  3. If chest pain returns may need to look at brachytherapy or lithotripsy for restenosis  4.  Given extent of CAD and dual layer stents, continue DAPT for life for now but can come off for elective procedures     Patient Active Problem List   Diagnosis  Type II or unspecified type diabetes mellitus without mention of complication, not stated as uncontrolled    Diabetes mellitus    History of ETOH abuse    Patient overweight    Patient overweight    Dietary noncompliance    Alcohol abuse    Flank pain    Prostatism    Low serum testosterone level    Arm pain, left    Shoulder pain    Precordial pain    Lung nodule    Acute angina (HCC)    Abnormal stress test    Acute coronary syndrome (HCC)    Unstable angina (HCC)    Coronary artery disease involving native coronary artery of native heart with unstable angina pectoris (HCC)    Other chest pain    S/P drug eluting coronary stent placement    Essential hypertension    Obesity    Abnormal chest x-ray    Angina effort    Skin lesion    Angina at rest [facility])    Hyperlipidemia    Angina pectoris [address]    NSTEMI (non-ST elevated myocardial infarction) ([facility]    Controlled type 2 diabetes mellitus without complication, without long-term current use of insulin ([facility]    ASHD (arteriosclerotic heart disease)    Mass of right foot    Overweight    History of angina pectoris    Reactive airway disease    Bronchitis    Reactive airway disease with wheezing, moderate persistent, with acute exacerbation    Need for prophylactic vaccination and inoculation against varicella    Need for prophylactic vaccination against diphtheria-tetanus-pertussis (DTP)    Chronic right-sided thoracic back pain    CAD in native artery    S/P PTCA (percutaneous transluminal coronary angioplasty)    Chest pain       Patient Plan:  1. Discussed referral to DR Elbert Maradiaga at [facility]. Will defer for now. 2. Continue current medications  3. Follow up in 6 months        It is a pleasure to assist in the care of Drea Zamudio. Please call with any questions. Dorothy Natarajan MD, personally performed the services described in this documentation as scribed by the above signed scribe in my presence, and it is both accurate and complete to the best of our ability and knowledge. I agree with the details independently gathered by my clinical support staff, while the remaining scribed note accurately describes my personal service to the patient. The above RN is working as a scribe for and in the presence of myself . Working as a scribe, the RN may have prepopulated components of this note with my historical intellectual property under my direct supervision. Any additions to this intellectual property were performed at my direction. Furthermore, the content and accuracy of this note have been reviewed by me to the best of my ability. Scribe's attestation:   This note was scribed in the presence of Dr. Eve Gamino by Latonya Gamino MD, [address] [facility] Cardiologist  [facility]  [phone] [facility]  [phone] [address] [place] [address]  1/7/2021  9:31 AM

## 2021-01-07 NOTE — PATIENT INSTRUCTIONS
Patient Plan:  1. Discussed referral to DR Marco Antonio Escamilla at [facility]. Will defer for now. 2. Continue current medications  3.  Follow up in 6 months

## 2021-01-13 RX ORDER — LISINOPRIL 2.5 MG/1
TABLET ORAL
Qty: 90 TABLET | Refills: 2 | Status: ON HOLD
Start: 2021-01-13 | End: 2021-02-23 | Stop reason: HOSPADM

## 2021-02-03 RX ORDER — ATORVASTATIN CALCIUM 40 MG/1
TABLET, FILM COATED ORAL
Qty: 29 TABLET | Refills: 0 | Status: SHIPPED | OUTPATIENT
Start: 2021-02-03 | End: 2021-03-17

## 2021-02-03 RX ORDER — GLIPIZIDE 10 MG/1
TABLET ORAL
Qty: 30 TABLET | Refills: 2 | Status: SHIPPED | OUTPATIENT
Start: 2021-02-03 | End: 2021-05-20

## 2021-02-03 NOTE — TELEPHONE ENCOUNTER
12/21/2020  Future Appointments   Date Time Provider Abdirahman Craft   7/8/2021  8:30 AM [facility]

## 2021-02-08 ENCOUNTER — TELEPHONE (OUTPATIENT)
Dept: CARDIOLOGY CLINIC | Age: 61
End: 2021-02-08

## 2021-02-08 NOTE — TELEPHONE ENCOUNTER
Pt's wife sts pt still having CP. He takes 1 nitroGLYCERIN (NITROSTAT) 0.4 MG SL tablet in AM and 1 in PM.  Wants to know if there is another med that would be better for CP. Please advise.

## 2021-02-08 NOTE — TELEPHONE ENCOUNTER
He gets chest pain when he lies down for bed and then again in the morning after he drinks his coffee. Wife is not sure if it's heart burn or not. This happens every morning and night. Pain does go away after taking 1 nitro though.  called back and states that he does have heartburn. Cold also causes his chest pain.

## 2021-02-09 NOTE — TELEPHONE ENCOUNTER
I called patient back spoke to his wife as he was out hanging the door. He is having return of the same angina he had before. Went over in great detail the issues with restenosis that we have. I Lou reach out to our colleagues over [facility] and see if they can perform shock. Wave and then brachytherapy. Aggressive attempts were made including laser arthrectomy last time that he was unable to tolerate had a 40% residual.  Informed the wife the patient should not be doing strenuous activity if having chest pain.   Also if things get worse he needs to go the emergency room

## 2021-02-09 NOTE — TELEPHONE ENCOUNTER
Spoke with patient. .. Paige Beltre he states he knows this is not heartburn related. He states he has had that before and this is different. He states he is actually scared. He states he took 5 SL Nitro yesterday. He states it's when he gets up and moves. He thinks you may need to take another look or even a bypass. Please advise.     Call patient back on his cell #  [phone]

## 2021-02-12 ENCOUNTER — HOSPITAL ENCOUNTER (INPATIENT)
Age: 61
LOS: 11 days | Discharge: HOME HEALTH | DRG: 231 | End: 2021-02-23
Attending: EMERGENCY MEDICINE | Admitting: THORACIC SURGERY (CARDIOTHORACIC VASCULAR SURGERY)
Payer: COMMERCIAL

## 2021-02-12 ENCOUNTER — APPOINTMENT (OUTPATIENT)
Dept: GENERAL RADIOLOGY | Age: 61
DRG: 231 | End: 2021-02-12
Payer: COMMERCIAL

## 2021-02-12 ENCOUNTER — TELEPHONE (OUTPATIENT)
Dept: CARDIOLOGY CLINIC | Age: 61
End: 2021-02-12

## 2021-02-12 DIAGNOSIS — G89.18 ACUTE POST-OPERATIVE PAIN: ICD-10-CM

## 2021-02-12 DIAGNOSIS — R07.9 CHEST PAIN, UNSPECIFIED TYPE: Primary | ICD-10-CM

## 2021-02-12 LAB
A/G RATIO: 1.8 (ref 1.1–2.2)
ALBUMIN SERPL-MCNC: 4.6 G/DL (ref 3.4–5)
ALP BLD-CCNC: 82 U/L (ref 40–129)
ALT SERPL-CCNC: 38 U/L (ref 10–40)
ANION GAP SERPL CALCULATED.3IONS-SCNC: 14 MMOL/L (ref 3–16)
APTT: 29.1 SEC (ref 24.2–36.2)
AST SERPL-CCNC: 29 U/L (ref 15–37)
BASOPHILS ABSOLUTE: 0.1 K/UL (ref 0–0.2)
BASOPHILS RELATIVE PERCENT: 0.6 %
BILIRUB SERPL-MCNC: 0.7 MG/DL (ref 0–1)
BUN BLDV-MCNC: 14 MG/DL (ref 7–20)
CALCIUM SERPL-MCNC: 10.1 MG/DL (ref 8.3–10.6)
CHLORIDE BLD-SCNC: 97 MMOL/L (ref 99–110)
CO2: 27 MMOL/L (ref 21–32)
CREAT SERPL-MCNC: 0.6 MG/DL (ref 0.8–1.3)
EOSINOPHILS ABSOLUTE: 0.1 K/UL (ref 0–0.6)
EOSINOPHILS RELATIVE PERCENT: 1.2 %
GFR AFRICAN AMERICAN: >60
GFR NON-AFRICAN AMERICAN: >60
GLOBULIN: 2.6 G/DL
GLUCOSE BLD-MCNC: 165 MG/DL (ref 70–99)
GLUCOSE BLD-MCNC: 229 MG/DL (ref 70–99)
HCT VFR BLD CALC: 44.8 % (ref 40.5–52.5)
HEMOGLOBIN: 15.2 G/DL (ref 13.5–17.5)
LYMPHOCYTES ABSOLUTE: 2 K/UL (ref 1–5.1)
LYMPHOCYTES RELATIVE PERCENT: 20.3 %
MCH RBC QN AUTO: 30.5 PG (ref 26–34)
MCHC RBC AUTO-ENTMCNC: 34 G/DL (ref 31–36)
MCV RBC AUTO: 89.8 FL (ref 80–100)
MONOCYTES ABSOLUTE: 0.6 K/UL (ref 0–1.3)
MONOCYTES RELATIVE PERCENT: 5.8 %
NEUTROPHILS ABSOLUTE: 7 K/UL (ref 1.7–7.7)
NEUTROPHILS RELATIVE PERCENT: 72.1 %
PDW BLD-RTO: 13.3 % (ref 12.4–15.4)
PERFORMED ON: ABNORMAL
PLATELET # BLD: 257 K/UL (ref 135–450)
PMV BLD AUTO: 8.1 FL (ref 5–10.5)
POTASSIUM SERPL-SCNC: 4.4 MMOL/L (ref 3.5–5.1)
RBC # BLD: 4.99 M/UL (ref 4.2–5.9)
SODIUM BLD-SCNC: 138 MMOL/L (ref 136–145)
TOTAL PROTEIN: 7.2 G/DL (ref 6.4–8.2)
TROPONIN: <0.01 NG/ML
TROPONIN: <0.01 NG/ML
WBC # BLD: 9.8 K/UL (ref 4–11)

## 2021-02-12 PROCEDURE — 6370000000 HC RX 637 (ALT 250 FOR IP): Performed by: EMERGENCY MEDICINE

## 2021-02-12 PROCEDURE — 2500000003 HC RX 250 WO HCPCS: Performed by: NURSE PRACTITIONER

## 2021-02-12 PROCEDURE — 93005 ELECTROCARDIOGRAM TRACING: CPT | Performed by: EMERGENCY MEDICINE

## 2021-02-12 PROCEDURE — 6360000002 HC RX W HCPCS: Performed by: INTERNAL MEDICINE

## 2021-02-12 PROCEDURE — 36415 COLL VENOUS BLD VENIPUNCTURE: CPT

## 2021-02-12 PROCEDURE — 85730 THROMBOPLASTIN TIME PARTIAL: CPT

## 2021-02-12 PROCEDURE — 85025 COMPLETE CBC W/AUTO DIFF WBC: CPT

## 2021-02-12 PROCEDURE — 80053 COMPREHEN METABOLIC PANEL: CPT

## 2021-02-12 PROCEDURE — 6360000002 HC RX W HCPCS: Performed by: NURSE PRACTITIONER

## 2021-02-12 PROCEDURE — 6370000000 HC RX 637 (ALT 250 FOR IP): Performed by: INTERNAL MEDICINE

## 2021-02-12 PROCEDURE — 6370000000 HC RX 637 (ALT 250 FOR IP): Performed by: NURSE PRACTITIONER

## 2021-02-12 PROCEDURE — 99284 EMERGENCY DEPT VISIT MOD MDM: CPT

## 2021-02-12 PROCEDURE — 2060000000 HC ICU INTERMEDIATE R&B

## 2021-02-12 PROCEDURE — 84484 ASSAY OF TROPONIN QUANT: CPT

## 2021-02-12 PROCEDURE — 71045 X-RAY EXAM CHEST 1 VIEW: CPT

## 2021-02-12 PROCEDURE — 94640 AIRWAY INHALATION TREATMENT: CPT

## 2021-02-12 RX ORDER — DEXTROSE MONOHYDRATE 50 MG/ML
100 INJECTION, SOLUTION INTRAVENOUS PRN
Status: DISCONTINUED | OUTPATIENT
Start: 2021-02-12 | End: 2021-02-19

## 2021-02-12 RX ORDER — ATORVASTATIN CALCIUM 40 MG/1
40 TABLET, FILM COATED ORAL EVERY EVENING
Status: DISCONTINUED | OUTPATIENT
Start: 2021-02-12 | End: 2021-02-19

## 2021-02-12 RX ORDER — HEPARIN SODIUM 1000 [USP'U]/ML
4000 INJECTION, SOLUTION INTRAVENOUS; SUBCUTANEOUS PRN
Status: DISCONTINUED | OUTPATIENT
Start: 2021-02-12 | End: 2021-02-15

## 2021-02-12 RX ORDER — ONDANSETRON 2 MG/ML
4 INJECTION INTRAMUSCULAR; INTRAVENOUS EVERY 6 HOURS PRN
Status: DISCONTINUED | OUTPATIENT
Start: 2021-02-12 | End: 2021-02-19

## 2021-02-12 RX ORDER — NITROGLYCERIN 0.4 MG/1
0.4 TABLET SUBLINGUAL EVERY 5 MIN PRN
Status: DISCONTINUED | OUTPATIENT
Start: 2021-02-12 | End: 2021-02-19

## 2021-02-12 RX ORDER — ACETAMINOPHEN 650 MG/1
650 SUPPOSITORY RECTAL EVERY 6 HOURS PRN
Status: DISCONTINUED | OUTPATIENT
Start: 2021-02-12 | End: 2021-02-19

## 2021-02-12 RX ORDER — MORPHINE SULFATE 4 MG/ML
4 INJECTION, SOLUTION INTRAMUSCULAR; INTRAVENOUS
Status: COMPLETED | OUTPATIENT
Start: 2021-02-12 | End: 2021-02-12

## 2021-02-12 RX ORDER — ACETAMINOPHEN 325 MG/1
650 TABLET ORAL EVERY 6 HOURS PRN
Status: DISCONTINUED | OUTPATIENT
Start: 2021-02-12 | End: 2021-02-19

## 2021-02-12 RX ORDER — PROMETHAZINE HYDROCHLORIDE 25 MG/1
12.5 TABLET ORAL EVERY 6 HOURS PRN
Status: DISCONTINUED | OUTPATIENT
Start: 2021-02-12 | End: 2021-02-19

## 2021-02-12 RX ORDER — ASPIRIN 81 MG/1
81 TABLET, CHEWABLE ORAL DAILY
Status: DISCONTINUED | OUTPATIENT
Start: 2021-02-13 | End: 2021-02-19

## 2021-02-12 RX ORDER — MORPHINE SULFATE 2 MG/ML
2 INJECTION, SOLUTION INTRAMUSCULAR; INTRAVENOUS EVERY 4 HOURS PRN
Status: DISCONTINUED | OUTPATIENT
Start: 2021-02-12 | End: 2021-02-14

## 2021-02-12 RX ORDER — LISINOPRIL 5 MG/1
2.5 TABLET ORAL DAILY
Status: DISCONTINUED | OUTPATIENT
Start: 2021-02-13 | End: 2021-02-19

## 2021-02-12 RX ORDER — NICOTINE POLACRILEX 4 MG
15 LOZENGE BUCCAL PRN
Status: DISCONTINUED | OUTPATIENT
Start: 2021-02-12 | End: 2021-02-19

## 2021-02-12 RX ORDER — HEPARIN SODIUM 1000 [USP'U]/ML
4000 INJECTION, SOLUTION INTRAVENOUS; SUBCUTANEOUS ONCE
Status: COMPLETED | OUTPATIENT
Start: 2021-02-12 | End: 2021-02-12

## 2021-02-12 RX ORDER — HEPARIN SODIUM 10000 [USP'U]/100ML
910 INJECTION, SOLUTION INTRAVENOUS CONTINUOUS
Status: DISCONTINUED | OUTPATIENT
Start: 2021-02-12 | End: 2021-02-14

## 2021-02-12 RX ORDER — BUDESONIDE AND FORMOTEROL FUMARATE DIHYDRATE 80; 4.5 UG/1; UG/1
2 AEROSOL RESPIRATORY (INHALATION) 2 TIMES DAILY
Status: DISCONTINUED | OUTPATIENT
Start: 2021-02-12 | End: 2021-02-14

## 2021-02-12 RX ORDER — POLYETHYLENE GLYCOL 3350 17 G/17G
17 POWDER, FOR SOLUTION ORAL DAILY PRN
Status: DISCONTINUED | OUTPATIENT
Start: 2021-02-12 | End: 2021-02-19

## 2021-02-12 RX ORDER — SODIUM CHLORIDE 0.9 % (FLUSH) 0.9 %
10 SYRINGE (ML) INJECTION PRN
Status: DISCONTINUED | OUTPATIENT
Start: 2021-02-12 | End: 2021-02-19

## 2021-02-12 RX ORDER — CLOPIDOGREL BISULFATE 75 MG/1
75 TABLET ORAL DAILY
Status: DISCONTINUED | OUTPATIENT
Start: 2021-02-13 | End: 2021-02-15

## 2021-02-12 RX ORDER — ASPIRIN 81 MG/1
324 TABLET, CHEWABLE ORAL ONCE
Status: COMPLETED | OUTPATIENT
Start: 2021-02-12 | End: 2021-02-12

## 2021-02-12 RX ORDER — HEPARIN SODIUM 1000 [USP'U]/ML
2000 INJECTION, SOLUTION INTRAVENOUS; SUBCUTANEOUS PRN
Status: DISCONTINUED | OUTPATIENT
Start: 2021-02-12 | End: 2021-02-15

## 2021-02-12 RX ORDER — ISOSORBIDE MONONITRATE 60 MG/1
120 TABLET, EXTENDED RELEASE ORAL DAILY
Status: DISCONTINUED | OUTPATIENT
Start: 2021-02-13 | End: 2021-02-14

## 2021-02-12 RX ORDER — SODIUM CHLORIDE 0.9 % (FLUSH) 0.9 %
10 SYRINGE (ML) INJECTION EVERY 12 HOURS SCHEDULED
Status: DISCONTINUED | OUTPATIENT
Start: 2021-02-12 | End: 2021-02-19

## 2021-02-12 RX ORDER — DEXTROSE MONOHYDRATE 25 G/50ML
12.5 INJECTION, SOLUTION INTRAVENOUS PRN
Status: DISCONTINUED | OUTPATIENT
Start: 2021-02-12 | End: 2021-02-19

## 2021-02-12 RX ADMIN — INSULIN LISPRO 1 UNITS: 100 INJECTION, SOLUTION INTRAVENOUS; SUBCUTANEOUS at 21:15

## 2021-02-12 RX ADMIN — MORPHINE SULFATE 4 MG: 4 INJECTION, SOLUTION INTRAMUSCULAR; INTRAVENOUS at 19:38

## 2021-02-12 RX ADMIN — ATORVASTATIN CALCIUM 40 MG: 40 TABLET, FILM COATED ORAL at 21:10

## 2021-02-12 RX ADMIN — MORPHINE SULFATE 4 MG: 4 INJECTION, SOLUTION INTRAMUSCULAR; INTRAVENOUS at 22:17

## 2021-02-12 RX ADMIN — HEPARIN SODIUM 1000 UNITS/HR: 10000 INJECTION, SOLUTION INTRAVENOUS at 19:41

## 2021-02-12 RX ADMIN — NITROGLYCERIN 0.5 INCH: 20 OINTMENT TOPICAL at 19:14

## 2021-02-12 RX ADMIN — METOPROLOL TARTRATE 25 MG: 25 TABLET, FILM COATED ORAL at 21:10

## 2021-02-12 RX ADMIN — Medication 2 PUFF: at 20:43

## 2021-02-12 RX ADMIN — ASPIRIN 324 MG: 81 TABLET, CHEWABLE ORAL at 18:34

## 2021-02-12 RX ADMIN — HEPARIN SODIUM 4000 UNITS: 1000 INJECTION INTRAVENOUS; SUBCUTANEOUS at 19:40

## 2021-02-12 RX ADMIN — MORPHINE SULFATE 2 MG: 2 INJECTION, SOLUTION INTRAMUSCULAR; INTRAVENOUS at 21:10

## 2021-02-12 ASSESSMENT — PAIN DESCRIPTION - PAIN TYPE: TYPE: ACUTE PAIN

## 2021-02-12 ASSESSMENT — PAIN SCALES - GENERAL
PAINLEVEL_OUTOF10: 8
PAINLEVEL_OUTOF10: 8
PAINLEVEL_OUTOF10: 7
PAINLEVEL_OUTOF10: 7

## 2021-02-12 ASSESSMENT — PAIN DESCRIPTION - LOCATION: LOCATION: CHEST

## 2021-02-12 NOTE — ED PROVIDER NOTES
Emergency Department Provider Note     Location: [address] [place] [address]  ED  2/12/2021    I independently performed a history and physical on Torin Bates. All diagnostic, treatment, and disposition decisions were made by myself in conjunction with the mid-level provider. Briefly, this is a 61 y.o. male here for worsening chest pain especially on exertion as well as shortness of breath on exertion. Patient has known coronary artery disease. He is taking 9 nitro today but pain keeps coming back. Patient met with a new cardiologist today to discuss a procedure to help with his in-stent restenosis. Patient was advised to come to [facility] for further work-up. ED Triage Vitals   BP Temp Temp src Pulse Resp SpO2 Height Weight   02/12/21 1731 02/12/21 1735 -- 02/12/21 1715 02/12/21 1731 02/12/21 1715 02/12/21 1736 02/12/21 1736   (!) 189/77 98.9 °F (37.2 °C)  97 20 98 % 5' 10\" (1.778 m) 244 lb (110.7 kg)        Patient resting comfortably in no acute distress. Heart is regular rate and rhythm. Lungs clear to auscultation bilaterally. Abdomen is soft, nondistended, and nontender. No peripheral edema noted. Patient seen and examined. Vital signs notable for hypertension. Physical exam as documented above. EKG without acute changes. Initial troponin is negative. Cardiology consulted and they recommend admission here at [facility] with heparin drip. Patient given Nitropaste. Will admit for further work-up and management. EKG  The Ekg interpreted by me in the absence of a cardiologist shows. Normal sinus rhythm, rate 74, normal intervals, no STEMI, similar to previous EKG dated December 16, 2020      Xr Chest Portable    Result Date: 2/12/2021  EXAMINATION: ONE XRAY VIEW OF THE CHEST 2/12/2021 5:49 pm COMPARISON: 05/06/2020.  HISTORY: ORDERING SYSTEM PROVIDED HISTORY: chest pain TECHNOLOGIST PROVIDED HISTORY: Reason for exam:->chest pain FINDINGS: The cardiomediastinal silhouette is unremarkable. The lungs clear. No infiltrate, pleural fluid or focal process is identified. No acute cardiopulmonary disease.          Results for orders placed or performed during the hospital encounter of 02/12/21   CBC auto differential   Result Value Ref Range    WBC 9.8 4.0 - 11.0 K/uL    RBC 4.99 4.20 - 5.90 M/uL    Hemoglobin 15.2 13.5 - 17.5 g/dL    Hematocrit 44.8 40.5 - 52.5 %    MCV 89.8 80.0 - 100.0 fL    MCH 30.5 26.0 - 34.0 pg    MCHC 34.0 31.0 - 36.0 g/dL    RDW 13.3 12.4 - 15.4 %    Platelets 797 058 - 138 K/uL    MPV 8.1 5.0 - 10.5 fL    Neutrophils % 72.1 %    Lymphocytes % 20.3 %    Monocytes % 5.8 %    Eosinophils % 1.2 %    Basophils % 0.6 %    Neutrophils Absolute 7.0 1.7 - 7.7 K/uL    Lymphocytes Absolute 2.0 1.0 - 5.1 K/uL    Monocytes Absolute 0.6 0.0 - 1.3 K/uL    Eosinophils Absolute 0.1 0.0 - 0.6 K/uL    Basophils Absolute 0.1 0.0 - 0.2 K/uL   Comprehensive metabolic panel   Result Value Ref Range    Sodium 138 136 - 145 mmol/L    Potassium 4.4 3.5 - 5.1 mmol/L    Chloride 97 (L) 99 - 110 mmol/L    CO2 27 21 - 32 mmol/L    Anion Gap 14 3 - 16    Glucose 165 (H) 70 - 99 mg/dL    BUN 14 7 - 20 mg/dL    CREATININE 0.6 (L) 0.8 - 1.3 mg/dL    GFR Non-African American >60 >60    GFR African American >60 >60    Calcium 10.1 8.3 - 10.6 mg/dL    Total Protein 7.2 6.4 - 8.2 g/dL    Albumin 4.6 3.4 - 5.0 g/dL    Albumin/Globulin Ratio 1.8 1.1 - 2.2    Total Bilirubin 0.7 0.0 - 1.0 mg/dL    Alkaline Phosphatase 82 40 - 129 U/L    ALT 38 10 - 40 U/L    AST 29 15 - 37 U/L    Globulin 2.6 g/dL   Troponin   Result Value Ref Range    Troponin <0.01 <0.01 ng/mL   EKG 12 Lead   Result Value Ref Range    Ventricular Rate 74 BPM    Atrial Rate 74 BPM    P-R Interval 172 ms    QRS Duration 92 ms    Q-T Interval 384 ms    QTc Calculation (Bazett) 426 ms    P Axis 58 degrees    R Axis 13 degrees    T Axis 50 degrees    Diagnosis       Normal sinus rhythmNormal ECGWhen compared with ECG of 16-DEC-2020 11: 28,ST now depressed in Lateral leads       For further details [facility] encounter, please see Evie Sung's documentation. Total critical care time is 10 minutes, which excludes separately billable procedures and updating family. Time spent is specifically for management of the presenting complaint and symptoms initially, direct bedside care, reevaluation, review of records, and consultation. There was a high probability of clinically significant life-threatening deterioration in the patient's condition, which required my urgent intervention. This chart was generated in part by using Dragon Dictation system and may contain errors related to that system including errors in grammar, punctuation, and spelling, as well as words and phrases that may be inappropriate. If there are any questions or concerns please feel free to contact the dictating provider for clarification.             Juventino Shepard MD  02/12/21 2036

## 2021-02-12 NOTE — TELEPHONE ENCOUNTER
Patient called and stated that he has taken 8 nitro today for chest pain with no relief. I referred to [facility] ED. He states that he will go.

## 2021-02-12 NOTE — ED PROVIDER NOTES
EMERGENCY DEPARTMENT ENCOUNTER      This patient was seen and evaluated by the attending physician. Pt Name: Rhina Montanez  MRN: [id number]  [place] 1960  Date of evaluation: 2/12/2021  Provider: Dewane Meigs, APRN - CNP-C  PCP: [address] [place] [address]  ED Attending: Kelly Pearl MD    History provided by the patient. CHIEF COMPLAINT:     Chief Complaint   Patient presents with    Chest Pain     for a couple of days saw cards today, and is scheduled for a procedure on 3/5/21. pt reports he's taken 9 nitro today, took 6 yesterday. and 5 for two days prior to that. HX of CAD, stent placement. HISTORY OF PRESENT ILLNESS:      Rhina Montanez is a 61 y.o. male who presents [address] [place] [address]  ED with complaints of chest pain. Patient has an extensive cardiac history, states that he actually saw a cardiologist today, he scheduled for some type of procedure in early March. Patient has had worsening exertional chest pain and shortness of breath. He denies any trauma. Denies fever. He is here for further evaluation. Location:[facility]  Severity:7  Duration:last few days  Modifying factors:worse with exertion. Nursing Notes were reviewed     REVIEW OF SYSTEMS:     Review of Systems  All systems, atotal of 10, are reviewed and negative except for those that were just noted in history present illness.         PAST MEDICAL HISTORY:     Past Medical History:   Diagnosis Date    Alcohol abuse 12/3/2012    Coronary artery disease     Hyperlipidemia     Hypertension     Non morbid obesity     Reactive airway disease with wheezing, moderate persistent, with acute exacerbation 3/3/2020    Type II or unspecified type diabetes mellitus without mention of complication, not stated as uncontrolled          SURGICAL HISTORY:      Past Surgical History:   Procedure Laterality Date    CORONARY ANGIOPLASTY WITH STENT PLACEMENT  1/14/2016 9/21/2020    1 [employer] SAMUEL 2.25x16   [facility] None    Years of education: None    Highest education level: None   Occupational History    None   Social Needs    Financial resource strain: None    Food insecurity     Worry: None     Inability: None    Transportation needs     Medical: None     Non-medical: None   Tobacco Use    Smoking status: Never Smoker    Smokeless tobacco: Never Used   Substance and Sexual Activity    Alcohol use: Not Currently     Alcohol/week: 1.0 standard drinks     Types: 1 Shots of liquor per week     Comment: no alcohol in 2 months.  Drug use: No    Sexual activity: Yes     Partners: Female   Lifestyle    Physical activity     Days per week: None     Minutes per session: None    Stress: None   Relationships    Social connections     Talks on phone: None     Gets together: None     Attends Jainism service: None     Active member of club or organization: None     Attends meetings of clubs or organizations: None     Relationship status: None    Intimate partner violence     Fear of current or ex partner: None     Emotionally abused: None     Physically abused: None     Forced sexual activity: None   Other Topics Concern    None   Social History Narrative    None       SCREENINGS:   [place] Coma Scale  Eye Opening: Spontaneous  Best Verbal Response: Oriented  Best Motor Response: Obeys commands  Sree Coma Scale Score: 15        PHYSICAL EXAM:       ED Triage Vitals   BP Temp Temp src Pulse Resp SpO2 Height Weight   02/12/21 1731 02/12/21 1735 -- 02/12/21 1715 02/12/21 1731 02/12/21 1715 02/12/21 1736 02/12/21 1736   (!) 189/77 98.9 °F (37.2 °C)  97 20 98 % 5' 10\" (1.778 m) 244 lb (110.7 kg)       Physical Exam    CONSTITUTIONAL: Awake and alert. Cooperative. Well-developed. Well-nourished.    Vitals:    02/12/21 1735 02/12/21 1736 02/12/21 1749 02/12/21 1819   BP:   (!) 148/69 (!) 144/68   Pulse:   77 81   Resp:   12 16   Temp: 98.9 °F (37.2 °C)      SpO2:   96% 97%   Weight:  244 lb (110.7 kg)     Height:  5' 10\" (1.778 m)       HENT: Normocephalic. Atraumatic. External ears normal, without discharge. TMs clear bilaterally. No nasal discharge. Oropharynx clear, no erythema. Mucous membranes moist.  EYES: Conjunctiva non-injected, no lid abnormalities noted. No scleral icterus. PERRL. EOM's grossly intact. Anterior chambers clear. NECK: Supple. Normal ROM. No meningismus. No thyroid tenderness or swelling noted. CARDIOVASCULAR: RRR. No Murmer. PULMONARY/CHEST WALL: Effort normal. No tachypnea. Lungs clear to ausculation. ABDOMEN: Normal BS. Soft. Nondistended. No tenderness to palpate. No guarding. No hernias noted. No splenomegaly. Back: Spine is midline. No ecchymosis. No crepitus on palpation. No obvious subluxation of vertebral column. No saddle anesthesia or evidence of cauda equina. /ANORECTAL: Not assessed  MUSKULOSKELETAL: Normal ROM. No acute deformities. No edema. No tenderness to palpate. SKIN: Warm and dry. NEUROLOGICAL:  GCS 15. CN II-XII grossly intact. Strength is 5/5 in allextremities and sensation is intact. PSYCHIATRIC: Normal affect, normal insight and judgement. Alert andoriented x 3.         DIAGNOSTIC RESULTS:     LABS:    Results for orders placed or performed during the hospital encounter of 02/12/21   CBC auto differential   Result Value Ref Range    WBC 9.8 4.0 - 11.0 K/uL    RBC 4.99 4.20 - 5.90 M/uL    Hemoglobin 15.2 13.5 - 17.5 g/dL    Hematocrit 44.8 40.5 - 52.5 %    MCV 89.8 80.0 - 100.0 fL    MCH 30.5 26.0 - 34.0 pg    MCHC 34.0 31.0 - 36.0 g/dL    RDW 13.3 12.4 - 15.4 %    Platelets 789 908 - 249 K/uL    MPV 8.1 5.0 - 10.5 fL    Neutrophils % 72.1 %    Lymphocytes % 20.3 %    Monocytes % 5.8 %    Eosinophils % 1.2 %    Basophils % 0.6 %    Neutrophils Absolute 7.0 1.7 - 7.7 K/uL    Lymphocytes Absolute 2.0 1.0 - 5.1 K/uL    Monocytes Absolute 0.6 0.0 - 1.3 K/uL    Eosinophils Absolute 0.1 0.0 - 0.6 K/uL    Basophils Absolute 0.1 0.0 - 0.2 K/uL   Comprehensive metabolic panel Result Value Ref Range    Sodium 138 136 - 145 mmol/L    Potassium 4.4 3.5 - 5.1 mmol/L    Chloride 97 (L) 99 - 110 mmol/L    CO2 27 21 - 32 mmol/L    Anion Gap 14 3 - 16    Glucose 165 (H) 70 - 99 mg/dL    BUN 14 7 - 20 mg/dL    CREATININE 0.6 (L) 0.8 - 1.3 mg/dL    GFR Non-African American >60 >60    GFR African American >60 >60    Calcium 10.1 8.3 - 10.6 mg/dL    Total Protein 7.2 6.4 - 8.2 g/dL    Albumin 4.6 3.4 - 5.0 g/dL    Albumin/Globulin Ratio 1.8 1.1 - 2.2    Total Bilirubin 0.7 0.0 - 1.0 mg/dL    Alkaline Phosphatase 82 40 - 129 U/L    ALT 38 10 - 40 U/L    AST 29 15 - 37 U/L    Globulin 2.6 g/dL   Troponin   Result Value Ref Range    Troponin <0.01 <0.01 ng/mL   EKG 12 Lead   Result Value Ref Range    Ventricular Rate 74 BPM    Atrial Rate 74 BPM    P-R Interval 172 ms    QRS Duration 92 ms    Q-T Interval 384 ms    QTc Calculation (Bazett) 426 ms    P Axis 58 degrees    R Axis 13 degrees    T Axis 50 degrees    Diagnosis       Normal sinus rhythmNormal ECGWhen compared with ECG of 16-DEC-2020 11:28,ST now depressed in Lateral leads         RADIOLOGY:  All x-ray studies are viewed/reviewedby me. Formal interpretations per the radiologist are as follows:      XR CHEST PORTABLE   Final Result   No acute cardiopulmonary disease.                  EKG:  See EKG interpretation by an attending phsyician      PROCEDURES:   N/A    CRITICAL CARE TIME:   N/A    CONSULTS:  IP CONSULT TO CARDIOLOGY      EMERGENCYDEPARTMENT COURSE and DIFFERENTIAL DIAGNOSIS/MDM:   Vitals:    Vitals:    02/12/21 1735 02/12/21 1736 02/12/21 1749 02/12/21 1819   BP:   (!) 148/69 (!) 144/68   Pulse:   77 81   Resp:   12 16   Temp: 98.9 °F (37.2 °C)      SpO2:   96% 97%   Weight:  244 lb (110.7 kg)     Height:  5' 10\" (1.778 m)         Patient was given the following medications:  Medications   heparin (porcine) injection 4,000 Units (has no administration in time range)   heparin (porcine) injection 4,000 Units (has no administration in time range)   heparin (porcine) injection 2,000 Units (has no administration in time range)   heparin 25,000 unit in sodium chloride 0.45% 250 mL (premix) infusion (has no administration in time range)   nitroglycerin (NITRO-BID) 2 % ointment 0.5 inch (has no administration in time range)   morphine (PF) injection 4 mg (has no administration in time range)   aspirin chewable tablet 324 mg (324 mg Oral Given 2/12/21 9204)         Patient was evaluated by both myself and Juventino Ballesteros MD.    Patient presented to the emergency room today with complaints of chest pain. Patient has had worsening exertional chest pain over last 3 days. Patient with extensive cardiac disease. I did review notations from cardiologist today, Dr. Tomasa Younger suggested patient will need a left heart cath. I did consult cardiology from the ER and spoke with Dr. Rosie Quezada, he agreed with plan for admission to the hospital for further evaluation and treatment. Patient was started on a heparin drip. Patient has remained stable here in the ED. Troponin negative. EKG showed no ischemia here. Patient laboratory studies, radiographic imaging, andassessment were all discussed with the patient and/or patient family. There was shared decision-making between myself, the attending physician, as well as the patient and/or their surrogate and we are all in agreement with admission. There was an opportunity for questions and all questions were answered to the best of my ability and to the satisfaction of the patient and/or patient family. FINAL IMPRESSION:      1. Chest pain, unspecified type          DISPOSITION/PLAN:   DISPOSITION      PATIENT REFERRED TO:  No follow-up provider specified.     DISCHARGE MEDICATIONS:  New Prescriptions    No medications on file                  (Please note that portions of this note were completed with a voice recognition program.  Efforts were made to edit the dictations, but occasionally words are mis-transcribed.)    KORIN Vaughan CNP-C (electronically signed)        KORIN Vaughan CNP  02/12/21 2004

## 2021-02-12 NOTE — TELEPHONE ENCOUNTER
Noted and absolutely agree. Patient notes his chest pain if is increasing he does need to be seen in the ER. We can evaluate him. Likely needs left heart cath with possible balloon angioplasty to circumflex as this is known to be a area of restenosis and concern.   He did see Dr. Tracie Brennan today who they are looking to get him in for brachytherapy in this region

## 2021-02-12 NOTE — ED NOTES
Called [facility] @1131  RE: Dr. Sajan Lyons, ACS per ARMIN [address] Dr. called back @ [phone] [facility]  02/12/21 3891

## 2021-02-13 LAB
ANTI-XA UNFRAC HEPARIN: 0.11 IU/ML (ref 0.3–0.7)
ANTI-XA UNFRAC HEPARIN: 0.12 IU/ML (ref 0.3–0.7)
ANTI-XA UNFRAC HEPARIN: 0.33 IU/ML (ref 0.3–0.7)
APTT: 50.1 SEC (ref 24.2–36.2)
APTT: 55.1 SEC (ref 24.2–36.2)
APTT: 80.3 SEC (ref 24.2–36.2)
EKG ATRIAL RATE: 74 BPM
EKG DIAGNOSIS: NORMAL
EKG P AXIS: 58 DEGREES
EKG P-R INTERVAL: 172 MS
EKG Q-T INTERVAL: 384 MS
EKG QRS DURATION: 92 MS
EKG QTC CALCULATION (BAZETT): 426 MS
EKG R AXIS: 13 DEGREES
EKG T AXIS: 50 DEGREES
EKG VENTRICULAR RATE: 74 BPM
GLUCOSE BLD-MCNC: 140 MG/DL (ref 70–99)
GLUCOSE BLD-MCNC: 189 MG/DL (ref 70–99)
GLUCOSE BLD-MCNC: 264 MG/DL (ref 70–99)
GLUCOSE BLD-MCNC: 277 MG/DL (ref 70–99)
PERFORMED ON: ABNORMAL
TROPONIN: 0.02 NG/ML

## 2021-02-13 PROCEDURE — 2060000000 HC ICU INTERMEDIATE R&B

## 2021-02-13 PROCEDURE — 94640 AIRWAY INHALATION TREATMENT: CPT

## 2021-02-13 PROCEDURE — 83036 HEMOGLOBIN GLYCOSYLATED A1C: CPT

## 2021-02-13 PROCEDURE — 2500000003 HC RX 250 WO HCPCS: Performed by: INTERNAL MEDICINE

## 2021-02-13 PROCEDURE — 93010 ELECTROCARDIOGRAM REPORT: CPT | Performed by: INTERNAL MEDICINE

## 2021-02-13 PROCEDURE — 6360000002 HC RX W HCPCS: Performed by: INTERNAL MEDICINE

## 2021-02-13 PROCEDURE — 85730 THROMBOPLASTIN TIME PARTIAL: CPT

## 2021-02-13 PROCEDURE — 85520 HEPARIN ASSAY: CPT

## 2021-02-13 PROCEDURE — 36415 COLL VENOUS BLD VENIPUNCTURE: CPT

## 2021-02-13 PROCEDURE — 6370000000 HC RX 637 (ALT 250 FOR IP): Performed by: INTERNAL MEDICINE

## 2021-02-13 PROCEDURE — 84484 ASSAY OF TROPONIN QUANT: CPT

## 2021-02-13 PROCEDURE — 99254 IP/OBS CNSLTJ NEW/EST MOD 60: CPT | Performed by: INTERNAL MEDICINE

## 2021-02-13 RX ORDER — AMLODIPINE BESYLATE 2.5 MG/1
2.5 TABLET ORAL DAILY
Status: DISCONTINUED | OUTPATIENT
Start: 2021-02-13 | End: 2021-02-19

## 2021-02-13 RX ADMIN — AMLODIPINE BESYLATE 2.5 MG: 2.5 TABLET ORAL at 18:30

## 2021-02-13 RX ADMIN — ASPIRIN 81 MG: 81 TABLET, CHEWABLE ORAL at 11:26

## 2021-02-13 RX ADMIN — LISINOPRIL 2.5 MG: 5 TABLET ORAL at 11:27

## 2021-02-13 RX ADMIN — INSULIN LISPRO 2 UNITS: 100 INJECTION, SOLUTION INTRAVENOUS; SUBCUTANEOUS at 20:22

## 2021-02-13 RX ADMIN — ISOSORBIDE MONONITRATE 120 MG: 60 TABLET, EXTENDED RELEASE ORAL at 11:26

## 2021-02-13 RX ADMIN — Medication 2 PUFF: at 21:07

## 2021-02-13 RX ADMIN — MORPHINE SULFATE 2 MG: 2 INJECTION, SOLUTION INTRAMUSCULAR; INTRAVENOUS at 01:36

## 2021-02-13 RX ADMIN — INSULIN LISPRO 3 UNITS: 100 INJECTION, SOLUTION INTRAVENOUS; SUBCUTANEOUS at 18:31

## 2021-02-13 RX ADMIN — HEPARIN SODIUM 820 UNITS/HR: 10000 INJECTION, SOLUTION INTRAVENOUS at 22:19

## 2021-02-13 RX ADMIN — ATORVASTATIN CALCIUM 40 MG: 40 TABLET, FILM COATED ORAL at 20:22

## 2021-02-13 RX ADMIN — METOPROLOL TARTRATE 25 MG: 25 TABLET, FILM COATED ORAL at 20:22

## 2021-02-13 RX ADMIN — METOPROLOL TARTRATE 25 MG: 25 TABLET, FILM COATED ORAL at 11:26

## 2021-02-13 RX ADMIN — Medication 2 PUFF: at 09:19

## 2021-02-13 RX ADMIN — CLOPIDOGREL BISULFATE 75 MG: 75 TABLET ORAL at 11:26

## 2021-02-13 ASSESSMENT — PAIN SCALES - GENERAL: PAINLEVEL_OUTOF10: 5

## 2021-02-13 NOTE — PROGRESS NOTES

## 2021-02-13 NOTE — PROGRESS NOTES
Hospitalist Progress Note      PCP: Sury Fernandez DO    Date of Admission: 2/12/2021    Chief Complaint: chest pain    Hospital Course: 60 yo WM with hx of cad(multiple pci), htn, hld etoh use, dm2 p/w chest pain. Subjective: currently cp free       Medications:  Reviewed    Infusion Medications    heparin (PORCINE) Infusion 820 Units/hr (02/13/21 0300)    dextrose       Scheduled Medications    aspirin  81 mg Oral Daily    atorvastatin  40 mg Oral QPM    clopidogrel  75 mg Oral Daily    budesonide-formoterol  2 puff Inhalation BID    isosorbide mononitrate  120 mg Oral Daily    lisinopril  2.5 mg Oral Daily    metoprolol tartrate  25 mg Oral BID    insulin lispro  0-6 Units Subcutaneous TID WC    insulin lispro  0-3 Units Subcutaneous Nightly    sodium chloride flush  10 mL Intravenous 2 times per day     PRN Meds: heparin (porcine), heparin (porcine), glucose, dextrose, glucagon (rDNA), dextrose, sodium chloride flush, promethazine **OR** ondansetron, polyethylene glycol, acetaminophen **OR** acetaminophen, morphine, nitroGLYCERIN      Intake/Output Summary (Last 24 hours) at 2/13/2021 0805  Last data filed at 2/13/2021 0428  Gross per 24 hour   Intake 240 ml   Output    Net 240 ml       Physical Exam Performed:    /64   Pulse 87   Temp 97.4 °F (36.3 °C) (Oral)   Resp 16   Ht 5' 10\" (1.778 m)   Wt 242 lb 4.8 oz (109.9 kg)   SpO2 98%   BMI 34.77 kg/m²     General appearance: No apparent distress, appears stated age and cooperative. HEENT: Pupils equal, round, and reactive to light. Conjunctivae/corneas clear. Neck: Supple, with full range of motion. No jugular venous distention. Trachea midline. Respiratory:  Normal respiratory effort. Clear to auscultation, bilaterally without Rales/Wheezes/Rhonchi. Cardiovascular: Regular rate and rhythm with normal S1/S2 without murmurs, rubs or gallops. Abdomen: Soft, non-tender, non-distended with normal bowel sounds.   Musculoskeletal: No clubbing, cyanosis or edema bilaterally. Full range of motion without deformity. Skin: Skin color, texture, turgor normal.  No rashes or lesions. Neurologic:  Neurovascularly intact without any focal sensory/motor deficits. Cranial nerves: II-XII intact, grossly non-focal.  Psychiatric: Alert and oriented, thought content appropriate, normal insight  Capillary Refill: Brisk,< 3 seconds   Peripheral Pulses: +2 palpable, equal bilaterally       Labs:   Recent Labs     02/12/21  1746   WBC 9.8   HGB 15.2   HCT 44.8        Recent Labs     02/12/21  1746      K 4.4   CL 97*   CO2 27   BUN 14   CREATININE 0.6*   CALCIUM 10.1     Recent Labs     02/12/21  1746   AST 29   ALT 38   BILITOT 0.7   ALKPHOS 82     No results for input(s): INR in the last 72 hours. Recent Labs     02/12/21  1746 02/12/21  2139 02/13/21  0053   TROPONINI <0.01 <0.01 0.02*       Urinalysis:    No results found for: Burton Kroner, BACTERIA, RBCUA, BLOODU, SPECGRAV, GLUCOSEU    Radiology:  XR CHEST PORTABLE   Final Result   No acute cardiopulmonary disease. Assessment/Plan:    Active Hospital Problems    Diagnosis    Coronary artery disease involving native coronary artery of native heart with unstable angina pectoris ([address] [I25.110]     Priority: High    CAD in native artery [I25.10]    Hyperlipidemia [E78.5]    Unstable angina ([address] [I20.0]    Diabetes mellitus [E11.9]     Chest pain- with Coronary artery disease involving native coronary artery of native heart with unstable angina pectoris  -Known LCx 40% residual stenosis in OM1 stent.   Needing intervention.   -Cardiology consulted  -Rule out ACS with serial cardiac biomarkers  -Heparin GTT initiated  -Telemetry monitored  -Continued Imdur and beta-blocker     CAD in native artery   Complicated by in-stent restenosis OM1 , also s/p multiple PCI's in the past to LAD, OM1, diagonal 1  last [facility] - difficult OM1 stenosis heavily fibrotic lesion, requiring multiple attempts of balloon inflation and laser atherectomy.  Patient could not tolerate procedure anymore, developed chest pain and procedure aborted. Still has 40% residual stenosis. Continued DAPT, statin, beta-blocker, Imdur, lisinopril  May need lithotripsy and brachytherapy in [facility](defer to cards on this)     Essential hypertension, uncontrolled iinitially on admission  -Resumed home antihypertensive regimen     Hyperlipidemia-resume statin     DM type II-controlled, Metformin and glipizide on hold, placed on low-dose sliding scale insulin with Accu-Cheks and hypoglycemia protocol     Obesity BMI  96.59  Complicating assessment and treatment, placing patient at high risk for multiple co-morbidities as well as early death and contributing to the patient's presentation.  Counseled on weight loss.        DVT Prophylaxis: On heparin drip  Diet: Diet NPO, After Midnight  Code Status: Full Code    PT/OT Eval Status: not ordered    Dispo - pending cards eval    Dorota Montoya MD

## 2021-02-13 NOTE — H&P
Hospital Medicine History & Physical      PCP: Paz Bello DO    Date of Admission: 2/12/2021    Date of Service: Pt seen/examined on 2/12/2021 and Admitted to Inpatient with expected LOS greater than two midnights due to medical therapy. Chief Complaint: Chest pain      History Of Present Illness:   61 y.o. male who presented to [facility] with above complaints  Patient with complicated history of CAD s/p multiple PCI's, HTN, HLD, obesity, EtOH abuse, DM 2 presented today with chest pain. He recently underwent left heart cath in December 2020 to open up OM1 stent that had 70% blockage, it was heavily fibrotic and underwent multiple noncompliant balloon inflations and laser atherectomy, unfortunately patient could not tolerate the procedure well and needs the patient still had 40% residual stenosis. He is supposed to see cardiology in [facility] to get brachytherapy and possible shockwave lithotripsy. He has been having intermittent severe chest pain typical of his angina over the past few days which responds to sublingual nitroglycerin and is worse with exertion. Today the pain was severe enough that he took 9 nitroglycerin at home and ultimately decided to come to the ED. he has been in touch with his cardiologist Dr. Saad Gomez who recommended he come to the ED. patient being admitted to the hospital, started on a heparin drip.       Past Medical History:          Diagnosis Date    Alcohol abuse 12/3/2012    Coronary artery disease     Hyperlipidemia     Hypertension     Non morbid obesity     Reactive airway disease with wheezing, moderate persistent, with acute exacerbation 3/3/2020    Type II or unspecified type diabetes mellitus without mention of complication, not stated as uncontrolled        Past Surgical History:          Procedure Laterality Date    CORONARY ANGIOPLASTY WITH STENT PLACEMENT  1/14/2016 9/21/2020    1 Prom[employer] Premier SAMUEL 2.25x16    VEIN SURGERY         Medications Prior to Admission:      Prior to Admission medications    Medication Sig Start Date End Date Taking? Authorizing Provider   glipiZIDE (GLUCOTROL) 10 MG tablet TAKE ONE TABLET BY MOUTH EVERY MORNING FOR DIABETES 2/3/21   [place] Flirt Pena, DO   atorvastatin (LIPITOR) 40 MG tablet TAKE ONE TABLET BY MOUTH EVERY EVENING 2/3/21   [place] Fl[place] Pena, DO   lisinopril (PRINIVIL;ZESTRIL) 2.5 MG tablet TAKE ONE TABLET BY MOUTH DAILY 1/13/21   Franck Lopez MD   metoprolol tartrate (LOPRESSOR) 25 MG tablet TAKE ONE TABLET BY MOUTH TWICE A DAY 1/6/21   Franck Lopez MD   albuterol sulfate (PROAIR RESPICLICK) 803 (90 Base) MCG/ACT aerosol powder inhalation Inhale 2 puffs into the lungs every 4 hours as needed for Wheezing  Patient not taking: Reported on 1/7/2021 11/12/20   [place] Flirt Pena, DO   fluticasone-salmeterol (ADVAIR DISKUS) 250-50 MCG/DOSE AEPB Inhale 1 puff into the lungs every 12 hours 11/12/20   [facility], DO   nitroGLYCERIN (NITROSTAT) 0.4 MG SL tablet DISSOLVE 1 TAB UNDER TONGUE FOR CHEST PAIN - IF PAIN REMAINS AFTER 5 MIN, CALL 911 AND REPEAT DOSE. MAX 3 TABS IN 15 MINUTES 11/4/20   James Maynard, APRN - CNP   metFORMIN (GLUCOPHAGE) 1000 MG tablet TAKE ONE AND ONE HALF TABLETS DAILY.  8/26/20   [facility], DO   Naproxen Sodium (ALEVE PO) Take by mouth as needed    Historical Provider, MD   isosorbide mononitrate (IMDUR) 120 MG extended release tablet TAKE ONE TABLET BY MOUTH DAILY 6/15/20   Chester Aguirre MD   clopidogrel (PLAVIX) 75 MG tablet TAKE ONE TABLET BY MOUTH DAILY 3/23/20   Franck Lopez MD   albuterol (PROVENTIL) (2.5 MG/3ML) 0.083% nebulizer solution Take 3 mLs by nebulization every 6 hours as needed for Wheezing  Patient not taking: Reported on 1/7/2021 3/6/20   Magaly Linares MD   Spacer/Aero-Holding Chambers (E-Z SPACER) REAL 1 Device by Does not apply route daily as needed (wheezing) 3/1/20   Naeem Knight MD   aspirin 81 MG chewable tablet CHEW ONE TABLET BY MOUTH DAILY 12/5/16   [facility], DO Allergies:  Vitamin d analogs    Social History:      The patient currently lives at home    TOBACCO:   reports that he has never smoked. He has never used smokeless tobacco.  ETOH:   reports previous alcohol use of about 1.0 standard drinks of alcohol per week. E-Cigarettes/Vaping Use     Questions Responses    E-Cigarette/Vaping Use Never User    Start Date     Passive Exposure     Quit Date     Counseling Given     Comments             Family History:     Positive as follows:        Problem Relation Age of Onset    Heart Attack Mother     Heart Disease Mother     Heart Attack Father     Cancer Father     Hearing Loss Father     Diabetes Father     Heart Disease Father     Heart Disease Paternal Uncle         bypass       REVIEW OF SYSTEMS:   Pertinent positives as noted in the HPI. All other systems reviewed and negative. PHYSICAL EXAM PERFORMED:    BP (!) 166/79   Pulse 75   Temp 98.1 °F (36.7 °C) (Oral)   Resp 18   Ht 5' 10\" (1.778 m)   Wt 244 lb (110.7 kg)   SpO2 99%   BMI 35.01 kg/m²     General appearance:  No apparent distress, appears stated age and cooperative. HEENT:  Normal cephalic, atraumatic without obvious deformity. Pupils equal, round, and reactive to light. Extra ocular muscles intact. Conjunctivae/corneas clear. Neck: Supple, with full range of motion. No jugular venous distention. Trachea midline. Respiratory:  Normal respiratory effort. Clear to auscultation, bilaterally without Rales/Wheezes/Rhonchi. Cardiovascular:  Regular rate and rhythm with normal S1/S2 without murmurs, rubs or gallops. Abdomen: Soft, non-tender, non-distended with normal bowel sounds. Musculoskeletal:  No clubbing, cyanosis or edema bilaterally. Full range of motion without deformity. Skin: Skin color, texture, turgor normal.  No rashes or lesions. Neurologic:  Neurovascularly intact without any focal sensory/motor deficits.  Cranial nerves: II-XII intact, grossly non-focal.  Psychiatric:  Alert and oriented, thought content appropriate, normal insight  Capillary Refill: Brisk,< 3 seconds   Peripheral Pulses: +2 palpable, equal bilaterally       Labs:     Recent Labs     02/12/21  1746   WBC 9.8   HGB 15.2   HCT 44.8        Recent Labs     02/12/21  1746      K 4.4   CL 97*   CO2 27   BUN 14   CREATININE 0.6*   CALCIUM 10.1     Recent Labs     02/12/21  1746   AST 29   ALT 38   BILITOT 0.7   ALKPHOS 82     No results for input(s): INR in the last 72 hours. Recent Labs     02/12/21  1746   TROPONINI <0.01       Urinalysis:    No results found for: Flor Mitchell, [address], BACTERIA, RBCUA, BLOODU, [address], GLUCOSEU    Radiology:     CXR: I have reviewed the CXR with the following interpretation: No acute process  EKG:  I have reviewed the EKG with the following interpretation: NSR, no acute ischemic changes, normal intervals    XR CHEST PORTABLE   Final Result   No acute cardiopulmonary disease. ASSESSMENT:  PLAN:    Coronary artery disease involving native coronary artery of native heart with unstable angina pectoris  -Known LCx 40% residual stenosis in OM1 stent. Needing intervention.   -Cardiology consulted  -Rule out ACS with serial cardiac biomarkers  -Heparin GTT initiated  -Telemetry monitoring  -N.p.o. after midnight for possible cath in a.m.  -Continue Imdur and beta-blocker    CAD in native artery   Complicated by in-stent restenosis OM1 , also s/p multiple PCI's in the past to LAD, OM1, diagonal 1  last [facility] - difficult OM1 stenosis heavily fibrotic lesion, requiring multiple attempts of balloon inflation and laser atherectomy. Patient could not tolerate procedure anymore, developed chest pain and procedure aborted. Still has 40% residual stenosis.   Continue DAPT, statin, beta-blocker, Imdur, lisinopril  May need lithotripsy and brachytherapy in [facility]     Essential hypertension, uncontrolled  Controlled  Resume home antihypertensive regimen     Hyperlipidemia-resume statin     DM type II-controlled, Metformin and glipizide on hold, placed on low-dose sliding scale insulin with Accu-Cheks and hypoglycemia protocol     Obesity BMI  30.37  Complicating assessment and treatment, placing patient at high risk for multiple co-morbidities as well as early death and contributing to the patient's presentation. Counseled on weight loss. DVT Prophylaxis: On heparin drip  Diet: Diet NPO, After Midnight  DIET CARB CONTROL;  Code Status: Full Code    PT/OT Eval Status: Ambulatory    Dispo -IP stay       Caio MD Michele    Thank you Ronnie Merritt DO for the opportunity to be involved in this patient's care. If you have any questions or concerns please feel free to contact me at [phone].

## 2021-02-13 NOTE — CONSULTS
Cardiovascular Consultation     Attending Physician: Rohan Hernandez MD    PATIENT: Gurjit Chavis  : 1960  MRN: [id number]    Reason for Consultation:   Chief Complaint   Patient presents with    Chest Pain     for a couple of days saw cards today, and is scheduled for a procedure on 3/5/21. pt reports he's taken 9 nitro today, took 6 yesterday. and 5 for two days prior to that. HX of CAD, stent placement. History of present illness:   Mr. Gurjit Chavis is a 61 y.o. male patient, with Dr. Genet Doan of [facility], who arrived to the emergency room yesterday following increased nitroglycerin usage. He had actually established by referral with Dr. Scott Bentley at [facility] for consideration of brachytherapy regarding complex CAD and in-stent restenosis. He states that he was scheduled for  to have this procedure under general anesthesia at [facility].  He admits leaving the office and feeling as if he was having a 9 out of 10 level of angina and took multiple additional nitro. Dr. Potts Gain when last intervention was in 2020 and was aborted due to combativeness and difficulty tolerating the procedure under conscious sedation. Orquidea Fajardo states that he is made significant changes in knowing the effect of diabetes on his recurrent stent issues. No longer drinks daily bourbon; having cut out alcohol altogether. He also is on the keto diet and down to 242 pounds with \"A1c under 10\". He has hesitated with endocrinology referral as he would like to continue to try to get control on his own. Non-smoker. Compliant  with medications. States his chest calmed on the heparin drip and no concerns this morning. Denies palpitations, lightheadedness, F/chills, N/V, shortness of breath, PND, orthopnea, or LE edema.        Medical History:      Diagnosis Date    Alcohol abuse 12/3/2012    Coronary artery disease     Hyperlipidemia     Hypertension  Non morbid obesity     Reactive airway disease with wheezing, moderate persistent, with acute exacerbation 3/3/2020    Type II or unspecified type diabetes mellitus without mention of complication, not stated as uncontrolled        Surgical History:      Procedure Laterality Date    CORONARY ANGIOPLASTY WITH STENT PLACEMENT  1/14/2016 9/21/2020    1 Rome[employer] Valent[employer] SAMUEL 2.25x16    VEIN SURGERY         Social History:  Social History     Socioeconomic History    Marital status:      Spouse name: Not on file    Number of children: Not on file    Years of education: Not on file    Highest education level: Not on file   Occupational History    Not on file   Social Needs    Financial resource strain: Not on file    Food insecurity     Worry: Not on file     Inability: Not on file    Transportation needs     Medical: Not on file     Non-medical: Not on file   Tobacco Use    Smoking status: Never Smoker    Smokeless tobacco: Never Used   Substance and Sexual Activity    Alcohol use: Not Currently     Alcohol/week: 1.0 standard drinks     Types: 1 Shots of liquor per week     Comment: no alcohol in 2 months.      Drug use: No    Sexual activity: Yes     Partners: Female   Lifestyle    Physical activity     Days per week: Not on file     Minutes per session: Not on file    Stress: Not on file   Relationships    Social connections     Talks on phone: Not on file     Gets together: Not on file     Attends [employer] service: Not on file     Active member of club or organization: Not on file     Attends meetings of clubs or organizations: Not on file     Relationship status: Not on file    Intimate partner violence     Fear of current or ex partner: Not on file     Emotionally abused: Not on file     Physically abused: Not on file     Forced sexual activity: Not on file   Other Topics Concern    Not on file   Social History Narrative    Not on file        Family History:  No evidence for sudden cardiac death or premature CAD.       Problem Relation Age of Onset    Heart Attack Mother     Heart Disease Mother     Heart Attack Father     Cancer Father     Hearing Loss Father     Diabetes Father     Heart Disease Father     Heart Disease Paternal Uncle         bypass       Medications:      amLODIPine (NORVASC) tablet 2.5 mg, Daily      heparin (porcine) injection 4,000 Units, PRN      heparin (porcine) injection 2,000 Units, PRN      heparin 25,000 unit in sodium chloride 0.45% 250 mL (premix) infusion, Continuous      aspirin chewable tablet 81 mg, Daily      atorvastatin (LIPITOR) tablet 40 mg, QPM      clopidogrel (PLAVIX) tablet 75 mg, Daily      budesonide-formoterol (SYMBICORT) 80-4.5 MCG/ACT inhaler 2 puff, BID      isosorbide mononitrate (IMDUR) extended release tablet 120 mg, Daily      lisinopril (PRINIVIL;ZESTRIL) tablet 2.5 mg, Daily      metoprolol tartrate (LOPRESSOR) tablet 25 mg, BID      glucose (GLUTOSE) 40 % oral gel 15 g, PRN      dextrose 50 % IV solution, PRN      glucagon (rDNA) injection 1 mg, PRN      dextrose 5 % solution, PRN      insulin lispro (HUMALOG) injection vial 0-6 Units, TID WC      insulin lispro (HUMALOG) injection vial 0-3 Units, Nightly      sodium chloride flush 0.9 % injection 10 mL, 2 times per day      sodium chloride flush 0.9 % injection 10 mL, PRN      promethazine (PHENERGAN) tablet 12.5 mg, Q6H PRN    Or      ondansetron (ZOFRAN) injection 4 mg, Q6H PRN      polyethylene glycol (GLYCOLAX) packet 17 g, Daily PRN      acetaminophen (TYLENOL) tablet 650 mg, Q6H PRN    Or      acetaminophen (TYLENOL) suppository 650 mg, Q6H PRN      morphine (PF) injection 2 mg, Q4H PRN      nitroGLYCERIN (NITROSTAT) SL tablet 0.4 mg, Q5 Min PRN        Allergies:  Vitamin d analogs     Review of Systems:   [x]Full ROS obtained and negative except as mentioned in HPI    Physical Examination:    BP (!) 157/82   Pulse 79   Temp 97.9 °F (36.6 °C) (Oral)   Resp 16   Ht 5' 10\" (1.778 m)   Wt 242 lb 4.8 oz (109.9 kg)   SpO2 97%   BMI 34.77 kg/m²   Wt Readings from Last 3 Encounters:   21 242 lb 4.8 oz (109.9 kg)   21 258 lb (117 kg)   20 258 lb (117 kg)       GENERAL: Well developed, well nourished, no acute distress  NEUROLOGICAL: Alert and oriented x3  PSYCH: Normal mood and affect   SKIN: Warm and dry, without lesions  HEENT: Normocephalic, atraumatic, Sclera non-icteric, mucous membranes moist  NECK: supple, JVP normal, thyroid not enlarged   CAROTID: Normal upstroke, no bruits  CARDIAC: Normal PMI, regular rate and rhythm, normal S1S2, no murmur, rub  RESPIRATORY: Normal respiratory effort, clear to auscultation bilaterally  EXTREMITIES: No cyanosis, clubbing or edema, palpable pulses bilaterally   MUSCULOSKELETAL: No joint swelling or tenderness, no chest wall tenderness  GASTROINTESTINAL:  soft, non-tender, no bruit    Labs:  Lab Review   Lab Results   Component Value Date     2021    K 4.4 2021    K 4.9 2020    CL 97 2021    CO2 27 2021    BUN 14 2021    CREATININE 0.6 2021    GLUCOSE 165 2021    CALCIUM 10.1 2021     Lab Results   Component Value Date    TROPONINI 0.02 2021     Lab Results   Component Value Date    WBC 9.8 2021    HGB 15.2 2021    HCT 44.8 2021    MCV 89.8 2021     2021     Lab Results   Component Value Date    CHOL 163 12/15/2020    TRIG 80 12/15/2020    HDL 76 12/15/2020       Imaging:  I have reviewed the below testing personally:    EK/12/21  NSR    ECHO 3/3/2020   Technically difficult examination due to body habitus.  Definity® used for   myocardial border enhancement.   Normal left ventricle size and systolic function with a visually estimated   ejection fraction of 55%.   Mild concentric left ventricular hypertrophy.   No regional wall motion abnormalities are seen.   Grade I diastolic dysfunction with normal LV filling pressures.   The right atrium is mildly enlarged.     STRESS TEST 12/2/2020  Summary Normal LVEF 59%  Normal wall motion  Diaphragmatic attenuation artifact  Apical scar with small amount of inferoapical gifty-infarct ischemia   Overall, this would be considered an abnormal, intermediate to high risk,  study     CARDIAC CATH  LEFT HEART CATH: 12/15/20  Artery Findings/Result   LM  luminal regularities   LAD  patent stent in proximal LAD with no significant restenosis. ,  GUERLINE-3 flow  Diagonal 1.  Patent stent with normal significant restenosis   Cx  luminal irregularities  OM1-proximal 70%.  Proximal stent with 50% restenosis.  Distal stent edge with 90% restenosis  OM2proximal 40%         RCA  dominant.  Mid 30%   LVEDP  16   LVG  55%.  Normal wall motion  No aortic stenosis, 1+ mitral regurgitation      Results of intervention      Lesion 1: OM1  Multiple noncompliant balloons used up to 2.75 mm.  Angio sculpt also used.  In addition to several runs of laser atherectomy.   Pre:   90% stenosis, GUERLINE 2 flow  Post: 40% stenosis, GUERLINE 3 flow  Assessment/Plan  1.  Severe and progressive in-stent restenosis of OM1.  Heavily fibrotic.  Multiple runs of noncompliant balloon inflations were performed but there was balloon slippage.  Angioscope and multiple runs of laser atherectomy was performed.  Unfortunately patient was no longer able to tolerate the procedure was severe chest pain and hypertension therefore we aborted the procedure.  Residual stenosis was 40% of the distal stent edge but is GUERLINE-3 flow.                -Symptoms return we will consider again laser atherectomy versus rotoblade versus shockwave lithotripsy therapy  2.  Continue aspirin and plavix      Impression/Recommendations    Mr. Nelli Norman is a 61 y.o. male patient of Dr. Warren Ledesma:    Chest pain, consistent with refractory angina  Complex CAD: history of MVPCI starting in 2016 to D1, LAD, OM1 with most recent 12/15/2020 POBA/Laser atherectomy to recurrent ISR of OM1 (40% residual 2/2 case aborted due to intolerance)  Diabetes mellitus, uncontrolled    Hypertension  Hyperlipidemia  Obesity       Donya Diop is currently on maximally tolerated beta blocker and nitrates (previously trialed Ranexa) and was prescribed Amlodipine yesterday when establishing with Dr. Rommel Kumari at [facility]  re: consideration for brachytherapy or lithotripsy for recurrent in stent restenosis. He was scheduled for 3/5/21 and came to ER here shortly after appointment with worsening angina CCS III---> CCSIV. His chest calmed on Heparin gtt, alongside long term DAPT and statin. Discussed temporizing with POBA this admission but concerns for preferring general anesthesia, based on last procedure. Tentatively, he has opted to stay in hospital for this with Dr. Maribel Ahmadi 2/15/21. We discussed proceeding with LHC+/-PCI over the weekend if breakthrough angina or significant electrical change dictates sooner. Risks, benefits, goals, and alternatives of left heart catheterization with the potential for percutaneous coronary intervention discussed with patient; including stroke, heart attack, kidney damage, death, paralysis, disability, damage to nerves/arteries/veins. All questions answered and informed consent obtained. Further recommendations pending coronary angiography and clinical course. Thank you for allowing me to participate in the care of your patient. Please do not hesitate to call. Winsome Coy DO, [facility]  Interventional Cardiology     o: [phone]  [address] [place] [address]., [address] [facility], [facility]      NOTE:  This report was transcribed using voice recognition software. Every effort was made to ensure accuracy; however, inadvertent computerized transcription errors may be present.

## 2021-02-13 NOTE — CONSULTS
Pharmacy to Manage Heparin Infusion per [facility] CL[facility]    Dx: Chest pain  Pt wt = 88.1 kg (adjusted body weight). Baseline aPTT = 29.1 sec at 1746. Low Dose Heparin Infusion  Heparin 4000 units IVP bolus followed by Heparin infusion at 10 mL/hr. Recheck aPTT in 6 hours. Goal aPTT = 49-76 seconds. aPTT < 36.3    Heparin 60 units/kg bolus  Increase infusion by 4 units/kg/hr  aPTT 36.3 - 48.9 Heparin 30 units/kg bolus Increase infusion by 2 units/kg/hr  aPTT 49 - 76    No bolus No change   aPTT 76.1 - 85 No bolus Decrease infusion by 2 units/kg/hr  aPTT 85.1 - 94 Hold heparin for 1 hour Decrease infusion by 3 units/kg/hr  aPTT 94.1 - 103    Hold heparin for 1 hour Decrease infusion by 4 units/kg/hr  aPTT > 103 Hold heparin for 1 hour Decrease infusion by 6 units/kg/hr    Obtain aPTT 6 hours after bolus and 6 hours after any dose change until two consecutive therapeutic aPTT are achieved- then daily. William Gauthier, PharmD 2/12/2021  7:11 PM     2/13/2021  Recent Labs     02/13/21  0053   APTT 80.3*     Decrease heparin gtt to 8.2 mL/hr. Recheck aPTT in 6 hours. Cathy Barrera PharmD  2/13/2021 2:51 AM    2/13  Aptt= 55.1 sec  - no changes needed  - continue infusion at current rate of 8.2ml/hr protocol  - next aPTT at 16:00. Niecy Mcgee/Colin. 2/13/21 12:17 PM EST    2/13  aPTT - 50. 1sec @ 1638  Continue heparin infusion at 8.2mL/hr  Next aPTT with AM labs 2/14  BENNY HENRIQUEZ PharmD 2/13/2021 5:46 PM     2/14/2021  Recent Labs     02/14/21  0544   APTT 39.8*     Will give bolus of 2000 units and increase heparin gtt to 9.1 mL/hr. Recheck aPTT in 6 hours. Cathy Barrera PharmD  2/14/2021 6:55 AM      2/14  APTT= 30.8 sec  - 4,000 units bolus  - increase infusion rate to 12.6ml/hr per protocol  - next aptt ~20;00. Niecy Mcgee/[facility]. 2/14/21 2:01 PM EST    aPTT - 99.3sec @ 2006.   Hold heparin x 1 hour, then resume infusion at 9.1mL/hr  Next aPTT due [address] PharmD 2/14/2021 9:05 PM 2/15/2021  Recent Labs     02/15/21  0421   APTT 60.2*     Continue heparin gtt at 9.1 mL/hr. Recheck aPTT in 6 hours.   Sai Rodriguez PharmD  2/15/2021 5:19 AM

## 2021-02-14 LAB
ANION GAP SERPL CALCULATED.3IONS-SCNC: 11 MMOL/L (ref 3–16)
ANTI-XA UNFRAC HEPARIN: 0.06 IU/ML (ref 0.3–0.7)
APTT: 30.8 SEC (ref 24.2–36.2)
APTT: 39.8 SEC (ref 24.2–36.2)
APTT: 99.3 SEC (ref 24.2–36.2)
BUN BLDV-MCNC: 12 MG/DL (ref 7–20)
CALCIUM SERPL-MCNC: 9.6 MG/DL (ref 8.3–10.6)
CHLORIDE BLD-SCNC: 99 MMOL/L (ref 99–110)
CO2: 28 MMOL/L (ref 21–32)
CREAT SERPL-MCNC: 0.6 MG/DL (ref 0.8–1.3)
EKG ATRIAL RATE: 67 BPM
EKG DIAGNOSIS: NORMAL
EKG P AXIS: 49 DEGREES
EKG P-R INTERVAL: 172 MS
EKG Q-T INTERVAL: 414 MS
EKG QRS DURATION: 94 MS
EKG QTC CALCULATION (BAZETT): 437 MS
EKG R AXIS: 0 DEGREES
EKG T AXIS: 41 DEGREES
EKG VENTRICULAR RATE: 67 BPM
ESTIMATED AVERAGE GLUCOSE: 194.4 MG/DL
GFR AFRICAN AMERICAN: >60
GFR NON-AFRICAN AMERICAN: >60
GLUCOSE BLD-MCNC: 178 MG/DL (ref 70–99)
GLUCOSE BLD-MCNC: 198 MG/DL (ref 70–99)
GLUCOSE BLD-MCNC: 228 MG/DL (ref 70–99)
GLUCOSE BLD-MCNC: 234 MG/DL (ref 70–99)
HBA1C MFR BLD: 8.4 %
HCT VFR BLD CALC: 43.3 % (ref 40.5–52.5)
HEMOGLOBIN: 14.7 G/DL (ref 13.5–17.5)
MCH RBC QN AUTO: 30.3 PG (ref 26–34)
MCHC RBC AUTO-ENTMCNC: 33.9 G/DL (ref 31–36)
MCV RBC AUTO: 89.3 FL (ref 80–100)
PDW BLD-RTO: 13.3 % (ref 12.4–15.4)
PERFORMED ON: ABNORMAL
PLATELET # BLD: 244 K/UL (ref 135–450)
PMV BLD AUTO: 7.7 FL (ref 5–10.5)
POTASSIUM SERPL-SCNC: 4.1 MMOL/L (ref 3.5–5.1)
RBC # BLD: 4.85 M/UL (ref 4.2–5.9)
SODIUM BLD-SCNC: 138 MMOL/L (ref 136–145)
TROPONIN: 0.05 NG/ML
TROPONIN: 0.06 NG/ML
TROPONIN: 0.07 NG/ML
WBC # BLD: 8 K/UL (ref 4–11)

## 2021-02-14 PROCEDURE — 2500000003 HC RX 250 WO HCPCS: Performed by: NURSE PRACTITIONER

## 2021-02-14 PROCEDURE — 84484 ASSAY OF TROPONIN QUANT: CPT

## 2021-02-14 PROCEDURE — 6360000002 HC RX W HCPCS: Performed by: INTERNAL MEDICINE

## 2021-02-14 PROCEDURE — 2580000003 HC RX 258: Performed by: INTERNAL MEDICINE

## 2021-02-14 PROCEDURE — 36415 COLL VENOUS BLD VENIPUNCTURE: CPT

## 2021-02-14 PROCEDURE — 6370000000 HC RX 637 (ALT 250 FOR IP): Performed by: INTERNAL MEDICINE

## 2021-02-14 PROCEDURE — 85520 HEPARIN ASSAY: CPT

## 2021-02-14 PROCEDURE — 93010 ELECTROCARDIOGRAM REPORT: CPT | Performed by: INTERNAL MEDICINE

## 2021-02-14 PROCEDURE — 93005 ELECTROCARDIOGRAM TRACING: CPT | Performed by: INTERNAL MEDICINE

## 2021-02-14 PROCEDURE — 99233 SBSQ HOSP IP/OBS HIGH 50: CPT | Performed by: NURSE PRACTITIONER

## 2021-02-14 PROCEDURE — 80048 BASIC METABOLIC PNL TOTAL CA: CPT

## 2021-02-14 PROCEDURE — 85027 COMPLETE CBC AUTOMATED: CPT

## 2021-02-14 PROCEDURE — 2000000000 HC ICU R&B

## 2021-02-14 PROCEDURE — 6360000002 HC RX W HCPCS: Performed by: NURSE PRACTITIONER

## 2021-02-14 PROCEDURE — 93005 ELECTROCARDIOGRAM TRACING: CPT | Performed by: NURSE PRACTITIONER

## 2021-02-14 PROCEDURE — 85730 THROMBOPLASTIN TIME PARTIAL: CPT

## 2021-02-14 PROCEDURE — 6360000002 HC RX W HCPCS

## 2021-02-14 RX ORDER — MORPHINE SULFATE 2 MG/ML
2 INJECTION, SOLUTION INTRAMUSCULAR; INTRAVENOUS ONCE
Status: COMPLETED | OUTPATIENT
Start: 2021-02-14 | End: 2021-02-14

## 2021-02-14 RX ORDER — NITROGLYCERIN 20 MG/100ML
5-200 INJECTION INTRAVENOUS CONTINUOUS
Status: DISCONTINUED | OUTPATIENT
Start: 2021-02-14 | End: 2021-02-19

## 2021-02-14 RX ORDER — LORAZEPAM 2 MG/ML
1 INJECTION INTRAMUSCULAR EVERY 4 HOURS PRN
Status: DISCONTINUED | OUTPATIENT
Start: 2021-02-14 | End: 2021-02-19

## 2021-02-14 RX ORDER — DIAZEPAM 2 MG/1
2 TABLET ORAL EVERY 12 HOURS PRN
Status: DISCONTINUED | OUTPATIENT
Start: 2021-02-14 | End: 2021-02-19

## 2021-02-14 RX ORDER — HEPARIN SODIUM 1000 [USP'U]/ML
4000 INJECTION, SOLUTION INTRAVENOUS; SUBCUTANEOUS ONCE
Status: COMPLETED | OUTPATIENT
Start: 2021-02-14 | End: 2021-02-14

## 2021-02-14 RX ORDER — HEPARIN SODIUM 10000 [USP'U]/100ML
12.6 INJECTION, SOLUTION INTRAVENOUS CONTINUOUS
Status: DISCONTINUED | OUTPATIENT
Start: 2021-02-14 | End: 2021-02-14

## 2021-02-14 RX ORDER — MORPHINE SULFATE 2 MG/ML
2 INJECTION, SOLUTION INTRAMUSCULAR; INTRAVENOUS
Status: DISCONTINUED | OUTPATIENT
Start: 2021-02-14 | End: 2021-02-14

## 2021-02-14 RX ORDER — LORAZEPAM 2 MG/ML
INJECTION INTRAMUSCULAR
Status: COMPLETED
Start: 2021-02-14 | End: 2021-02-14

## 2021-02-14 RX ORDER — MORPHINE SULFATE 4 MG/ML
4 INJECTION, SOLUTION INTRAMUSCULAR; INTRAVENOUS
Status: DISCONTINUED | OUTPATIENT
Start: 2021-02-14 | End: 2021-02-19

## 2021-02-14 RX ORDER — HEPARIN SODIUM 10000 [USP'U]/100ML
9.1 INJECTION, SOLUTION INTRAVENOUS CONTINUOUS
Status: DISCONTINUED | OUTPATIENT
Start: 2021-02-14 | End: 2021-02-15

## 2021-02-14 RX ORDER — BUDESONIDE AND FORMOTEROL FUMARATE DIHYDRATE 80; 4.5 UG/1; UG/1
2 AEROSOL RESPIRATORY (INHALATION) PRN
Status: DISCONTINUED | OUTPATIENT
Start: 2021-02-14 | End: 2021-02-17

## 2021-02-14 RX ORDER — EPTIFIBATIDE 0.75 MG/ML
2 INJECTION, SOLUTION INTRAVENOUS CONTINUOUS
Status: DISCONTINUED | OUTPATIENT
Start: 2021-02-14 | End: 2021-02-15

## 2021-02-14 RX ORDER — MORPHINE SULFATE 4 MG/ML
INJECTION, SOLUTION INTRAMUSCULAR; INTRAVENOUS
Status: COMPLETED
Start: 2021-02-14 | End: 2021-02-14

## 2021-02-14 RX ADMIN — EPTIFIBATIDE 2 MCG/KG/MIN: 0.75 INJECTION INTRAVENOUS at 16:57

## 2021-02-14 RX ADMIN — MORPHINE SULFATE 2 MG: 2 INJECTION, SOLUTION INTRAMUSCULAR; INTRAVENOUS at 12:39

## 2021-02-14 RX ADMIN — MORPHINE SULFATE 2 MG: 2 INJECTION, SOLUTION INTRAMUSCULAR; INTRAVENOUS at 08:37

## 2021-02-14 RX ADMIN — MORPHINE SULFATE 4 MG: 4 INJECTION, SOLUTION INTRAMUSCULAR; INTRAVENOUS at 23:01

## 2021-02-14 RX ADMIN — EPTIFIBATIDE 2 MCG/KG/MIN: 0.75 INJECTION INTRAVENOUS at 22:55

## 2021-02-14 RX ADMIN — ASPIRIN 81 MG: 81 TABLET, CHEWABLE ORAL at 08:37

## 2021-02-14 RX ADMIN — INSULIN LISPRO 1 UNITS: 100 INJECTION, SOLUTION INTRAVENOUS; SUBCUTANEOUS at 08:37

## 2021-02-14 RX ADMIN — MORPHINE SULFATE 4 MG: 4 INJECTION, SOLUTION INTRAMUSCULAR; INTRAVENOUS at 16:51

## 2021-02-14 RX ADMIN — MORPHINE SULFATE 2 MG: 2 INJECTION, SOLUTION INTRAMUSCULAR; INTRAVENOUS at 05:32

## 2021-02-14 RX ADMIN — LORAZEPAM 2 MG: 2 INJECTION, SOLUTION INTRAMUSCULAR; INTRAVENOUS at 16:51

## 2021-02-14 RX ADMIN — HEPARIN SODIUM 4000 UNITS: 1000 INJECTION INTRAVENOUS; SUBCUTANEOUS at 14:06

## 2021-02-14 RX ADMIN — METOPROLOL TARTRATE 25 MG: 25 TABLET, FILM COATED ORAL at 21:15

## 2021-02-14 RX ADMIN — INSULIN LISPRO 1 UNITS: 100 INJECTION, SOLUTION INTRAVENOUS; SUBCUTANEOUS at 21:16

## 2021-02-14 RX ADMIN — INSULIN LISPRO 2 UNITS: 100 INJECTION, SOLUTION INTRAVENOUS; SUBCUTANEOUS at 17:43

## 2021-02-14 RX ADMIN — MORPHINE SULFATE 2 MG: 2 INJECTION, SOLUTION INTRAMUSCULAR; INTRAVENOUS at 09:43

## 2021-02-14 RX ADMIN — SODIUM CHLORIDE, PRESERVATIVE FREE 10 ML: 5 INJECTION INTRAVENOUS at 21:15

## 2021-02-14 RX ADMIN — ATORVASTATIN CALCIUM 40 MG: 40 TABLET, FILM COATED ORAL at 17:43

## 2021-02-14 RX ADMIN — NITROGLYCERIN 5 MCG/MIN: 20 INJECTION INTRAVENOUS at 08:57

## 2021-02-14 RX ADMIN — MORPHINE SULFATE 2 MG: 2 INJECTION, SOLUTION INTRAMUSCULAR; INTRAVENOUS at 15:46

## 2021-02-14 RX ADMIN — HEPARIN SODIUM 2000 UNITS: 1000 INJECTION INTRAVENOUS; SUBCUTANEOUS at 08:37

## 2021-02-14 RX ADMIN — NITROGLYCERIN 0.4 MG: 0.4 TABLET, ORALLY DISINTEGRATING SUBLINGUAL at 07:22

## 2021-02-14 RX ADMIN — CLOPIDOGREL BISULFATE 75 MG: 75 TABLET ORAL at 08:37

## 2021-02-14 RX ADMIN — EPTIFIBATIDE 2 MCG/KG/MIN: 0.75 INJECTION INTRAVENOUS at 11:51

## 2021-02-14 ASSESSMENT — PAIN DESCRIPTION - PROGRESSION
CLINICAL_PROGRESSION: GRADUALLY WORSENING
CLINICAL_PROGRESSION: GRADUALLY WORSENING

## 2021-02-14 ASSESSMENT — PAIN DESCRIPTION - LOCATION
LOCATION: CHEST

## 2021-02-14 ASSESSMENT — PAIN SCALES - GENERAL
PAINLEVEL_OUTOF10: 9
PAINLEVEL_OUTOF10: 6
PAINLEVEL_OUTOF10: 8
PAINLEVEL_OUTOF10: 6
PAINLEVEL_OUTOF10: 4
PAINLEVEL_OUTOF10: 7
PAINLEVEL_OUTOF10: 3
PAINLEVEL_OUTOF10: 6
PAINLEVEL_OUTOF10: 9

## 2021-02-14 ASSESSMENT — PAIN - FUNCTIONAL ASSESSMENT: PAIN_FUNCTIONAL_ASSESSMENT: PREVENTS OR INTERFERES SOME ACTIVE ACTIVITIES AND ADLS

## 2021-02-14 ASSESSMENT — PAIN DESCRIPTION - PAIN TYPE: TYPE: ACUTE PAIN

## 2021-02-14 NOTE — PROGRESS NOTES
Perfect Serve    Please come to bedside. Need MD to assess pt. Severe chest pain cardiology aware need input from you. Thank you.

## 2021-02-14 NOTE — CARE COORDINATION
CASE MANAGEMENT INITIAL ASSESSMENT      Reviewed chart and completed assessment  With:patient and wife  Explained Case Management role/services. Primary contact information:Brittny zapata [phone]    Health Care Decision Maker :     Jaquelin Graf      Can this person be reached and be able to respond quickly, such as within a few minutes or hours? Yes  Who would be your back-up decision maker? Name prakash [employer]  Phone Dheeraj Swift date/status:2/12/21  Diagnosis:unstable angina   Is this a Readmission?:  No      Insurance:med [employer]   Precert required for SNF: Yes       3 night stay required: No    Living arrangements, Adls, care needs, prior to admission:lives in ranch style home with wife    Nia Bernal at home:  Walker__Cane__RTS__ BSC__Shower Chair__  02__ HHN__ CPAP__  BiPap__  Hospital Bed__ W/C___ Other__________    Services in the home and/or outpatient, prior to [address] (if applicable)   · Name:  · Address:  · Dialysis Schedule:  · Phone:  · Fax:    PT/OT recs:none    Hospital Exemption Notification (HEN):needed for SNF    Barriers to discharge:none    Plan/comments:spoke with patient. Plans on returning home at discharge. Reported IPT[employer] drives and works. Denied any DCP needs.   NPO after MN for cardiac cath in am. Stephane Smith RN      [facility] on chart for MD august

## 2021-02-14 NOTE — PROGRESS NOTES
[address]   Daily Progress Note    Admit Date:  2/12/2021  HPI:    Chief Complaint   Patient presents with    Chest Pain     for a couple of days saw cards today, and is scheduled for a procedure on 3/5/21. pt reports he's taken 9 nitro today, took 6 yesterday. and 5 for two days prior to that. HX of CAD, stent placement. Interval history: Alba Wolf is being followed for chest pain. Hx of in-stent restenosis, complex CAD. Subjective:  Mr. Melchor Gonsalez did well yesterday with improved chest pain after morphine and heparin infusion. Developed chest pain again overnight, woke up from sleep. Pain 7/10 this am, improved to 5/10 after SL nitro.    No shortness of breath     Objective:   BP (!) 165/68   Pulse 74   Temp 97.5 °F (36.4 °C) (Axillary)   Resp 16   Ht 5' 10\" (1.778 m)   Wt 242 lb 4.8 oz (109.9 kg)   SpO2 98%   BMI 34.77 kg/m²       Intake/Output Summary (Last 24 hours) at 2/14/2021 0810  Last data filed at 2/13/2021 2025  Gross per 24 hour   Intake 240 ml   Output 0 ml   Net 240 ml       NYHA: IV    Physical Exam:  General:  Awake, alert, NAD, standing in the room   Skin:  Warm and dry  Neck:  JVD<8  Chest:  Clear to auscultation, no wheezes/rhonchi/rales  Telemetry: NSR rate 60's   Cardiovascular:  RRR S1S2, no m/r/g   Abdomen:  Soft, nontender, +bowel sounds  Extremities:  No ble  bilateral lower extremity edema    Medications:    amLODIPine  2.5 mg Oral Daily    aspirin  81 mg Oral Daily    atorvastatin  40 mg Oral QPM    clopidogrel  75 mg Oral Daily    budesonide-formoterol  2 puff Inhalation BID    isosorbide mononitrate  120 mg Oral Daily    lisinopril  2.5 mg Oral Daily    metoprolol tartrate  25 mg Oral BID    insulin lispro  0-6 Units Subcutaneous TID WC    insulin lispro  0-3 Units Subcutaneous Nightly    sodium chloride flush  10 mL Intravenous 2 times per day      nitroGLYCERIN      heparin (PORCINE) Infusion 910 Units/hr (02/14/21 0721)    dextrose Lab Data:  CBC:   Recent Labs     02/12/21  1746   WBC 9.8   HGB 15.2        BMP:    Recent Labs     02/12/21  1746      K 4.4   CO2 27   BUN 14   CREATININE 0.6*     INR:  No results for input(s): INR in the last 72 hours. BNP:  No results for input(s): PROBNP in the last 72 hours. Lab Results   Component Value Date    LVEF 55 12/15/2020    LVEFMODE Cardiac Cath 12/15/2020       Testing:  ECHO 3/3/2020   Technically difficult examination due to body habitus. Definity® used for   myocardial border enhancement.   Normal left ventricle size and systolic function with a visually estimated   ejection fraction of 55%.   Mild concentric left ventricular hypertrophy.   No regional wall motion abnormalities are seen.   Grade I diastolic dysfunction with normal LV filling pressures.   The right atrium is mildly enlarged.     STRESS TEST 12/2/2020  Summary Normal LVEF 59%  Normal wall motion  Diaphragmatic attenuation artifact  Apical scar with small amount of inferoapical gifty-infarct ischemia   Overall, this would be considered an abnormal, intermediate to high risk,  study      CARDIAC CATH  LEFT HEART CATH: 12/15/20  Artery Findings/Result   LM  luminal regularities   LAD  patent stent in proximal LAD with no significant restenosis. ,  GUERLINE-3 flow  Diagonal 1.  Patent stent with normal significant restenosis   Cx  luminal irregularities  OM1-proximal 70%.  Proximal stent with 50% restenosis.  Distal stent edge with 90% restenosis  OM2proximal 40%         RCA  dominant.  Mid 30%   LVEDP  16   LVG  55%.  Normal wall motion  No aortic stenosis, 1+ mitral regurgitation      Results of intervention      Lesion 1: OM1  Multiple noncompliant balloons used up to 2.75 mm.  Angio sculpt also used.  In addition to several runs of laser atherectomy. Pre:   90% stenosis, GUERLINE 2 flow  Post: 40% stenosis, GUERLINE 3 flow  Assessment/Plan  1.  Severe and progressive in-stent restenosis of OM1.  Heavily fibrotic.  Multiple runs of noncompliant balloon inflations were performed but there was balloon slippage.  Angioscope and multiple runs of laser atherectomy was performed.  Unfortunately patient was no longer able to tolerate the procedure was severe chest pain and hypertension therefore we aborted the procedure.  Residual stenosis was 40% of the distal stent edge but is GUERLINE-3 flow.                -Symptoms return we will consider again laser atherectomy versus rotoblade versus shockwave lithotripsy therapy  2.  Continue aspirin and plavix      Principal Problem:    Unstable angina ([address]  Active Problems:    Coronary artery disease involving native coronary artery of native heart with unstable angina pectoris ([address]    Diabetes mellitus    Hyperlipidemia    CAD in native artery  Resolved Problems:    * No resolved hospital problems.  *      Assessment:  Chest pain, angina, unstable  Complex CAD, history of MVPCI starting in 2016 to D1, LAD, OM1 with most recent 12/15/2020 POBA/Laser atherectomy to recurrent ISR of OM1 (40% residual 2/2 case aborted due to intolerance)  Dm  HTN  HLD  obese    Plan:  EKG now- shows no ST depression or elevation, unchanged from prior  Continue heparin infusion  Check BMP, CBC, troponin  Morphine x1 now  Add nitro infusion- titrate for chest pain; will hold isosorbide for now while on nitro infusion  Continue aspirin and plavix  Continue statin  Continue norvasc 2.5mg daily (has previously been on ranexa)  Continue lisinopril 2.5 daily     Discussed with nursing   NPO after midnight for cardiac cath tomorrow with Dr. Mila Page, CNP, 2/14/2021, 8:25 AM

## 2021-02-14 NOTE — NOTE TO PATIENT VIA PORTAL
Spoke with Dr. Leilani De La Rosa with cardiology regarding heparin gtt and intergrilin gtt. Verbal orders to run both gtt together, okay to change frequency of PRN morphine from q4hr to q3hr. Nirtro gtt restarted, MD aware.

## 2021-02-14 NOTE — PROGRESS NOTES
Hospitalist Progress Note      PCP: Ady Delgado DO    Date of Admission: 2/12/2021      Chief Complaint: chest pain     Hospital Course: 60 yo WM with hx of cad(multiple pci), htn, hld etoh use, dm2 p/w chest pain.      Subjective: currently 4/10 cp, developed last night all of a sudden, now on nitro ggt         Medications:  Reviewed    Infusion Medications    nitroGLYCERIN      heparin (PORCINE) Infusion 910 Units/hr (02/14/21 0721)    dextrose       Scheduled Medications    amLODIPine  2.5 mg Oral Daily    aspirin  81 mg Oral Daily    atorvastatin  40 mg Oral QPM    clopidogrel  75 mg Oral Daily    budesonide-formoterol  2 puff Inhalation BID    lisinopril  2.5 mg Oral Daily    metoprolol tartrate  25 mg Oral BID    insulin lispro  0-6 Units Subcutaneous TID WC    insulin lispro  0-3 Units Subcutaneous Nightly    sodium chloride flush  10 mL Intravenous 2 times per day     PRN Meds: heparin (porcine), heparin (porcine), glucose, dextrose, glucagon (rDNA), dextrose, sodium chloride flush, promethazine **OR** ondansetron, polyethylene glycol, acetaminophen **OR** acetaminophen, morphine, nitroGLYCERIN      Intake/Output Summary (Last 24 hours) at 2/14/2021 0853  Last data filed at 2/13/2021 2025  Gross per 24 hour   Intake 240 ml   Output 0 ml   Net 240 ml       Physical Exam Performed:    /66   Pulse 75   Temp 97.5 °F (36.4 °C) (Axillary)   Resp 16   Ht 5' 10\" (1.778 m)   Wt 242 lb 4.8 oz (109.9 kg)   SpO2 99%   BMI 34.77 kg/m²   General appearance: No apparent distress, appears stated age and cooperative. HEENT: Pupils equal, round, and reactive to light. Conjunctivae/corneas clear. Neck: Supple, with full range of motion. No jugular venous distention. Trachea midline. Respiratory:  Normal respiratory effort. Clear to auscultation, bilaterally without Rales/Wheezes/Rhonchi. Cardiovascular: Regular rate and rhythm with normal S1/S2 without murmurs, rubs or gallops.   Abdomen:

## 2021-02-14 NOTE — PROGRESS NOTES
Patient transferred to room 240 per Maik Joy DO's request. Family at bedside. Patient transferred with all belongings in stable condition via bed.

## 2021-02-14 NOTE — PROGRESS NOTES
Cardiology paged again in regards to patient continuing to report chest pain with Levines sign at a 10 on a 0-10 scale. Pt given morphine and ativan per order.  Nitro infusing at 60 mcg

## 2021-02-15 ENCOUNTER — ANESTHESIA (OUTPATIENT)
Dept: CARDIAC CATH/INVASIVE PROCEDURES | Age: 61
DRG: 231 | End: 2021-02-15
Payer: COMMERCIAL

## 2021-02-15 ENCOUNTER — APPOINTMENT (OUTPATIENT)
Dept: INTERVENTIONAL RADIOLOGY/VASCULAR | Age: 61
DRG: 231 | End: 2021-02-15
Payer: COMMERCIAL

## 2021-02-15 ENCOUNTER — APPOINTMENT (OUTPATIENT)
Dept: CARDIAC CATH/INVASIVE PROCEDURES | Age: 61
DRG: 231 | End: 2021-02-15
Payer: COMMERCIAL

## 2021-02-15 ENCOUNTER — ANESTHESIA EVENT (OUTPATIENT)
Dept: CARDIAC CATH/INVASIVE PROCEDURES | Age: 61
DRG: 231 | End: 2021-02-15
Payer: COMMERCIAL

## 2021-02-15 VITALS
OXYGEN SATURATION: 100 % | RESPIRATION RATE: 1 BRPM | SYSTOLIC BLOOD PRESSURE: 209 MMHG | DIASTOLIC BLOOD PRESSURE: 102 MMHG

## 2021-02-15 LAB
APTT: 137.6 SEC (ref 24.2–36.2)
APTT: 60.2 SEC (ref 24.2–36.2)
BILIRUBIN URINE: NEGATIVE
BLOOD, URINE: NEGATIVE
CLARITY: CLEAR
COLOR: YELLOW
EKG ATRIAL RATE: 79 BPM
EKG DIAGNOSIS: NORMAL
EKG P AXIS: 63 DEGREES
EKG P-R INTERVAL: 178 MS
EKG Q-T INTERVAL: 410 MS
EKG QRS DURATION: 92 MS
EKG QTC CALCULATION (BAZETT): 470 MS
EKG R AXIS: 9 DEGREES
EKG T AXIS: 47 DEGREES
EKG VENTRICULAR RATE: 79 BPM
GLUCOSE BLD-MCNC: 211 MG/DL (ref 70–99)
GLUCOSE BLD-MCNC: 219 MG/DL (ref 70–99)
GLUCOSE BLD-MCNC: 219 MG/DL (ref 70–99)
GLUCOSE BLD-MCNC: 232 MG/DL (ref 70–99)
GLUCOSE URINE: 500 MG/DL
KETONES, URINE: NEGATIVE MG/DL
LEUKOCYTE ESTERASE, URINE: NEGATIVE
MICROSCOPIC EXAMINATION: ABNORMAL
NITRITE, URINE: NEGATIVE
PERFORMED ON: ABNORMAL
PH UA: 6 (ref 5–8)
PROTEIN UA: NEGATIVE MG/DL
SPECIFIC GRAVITY UA: 1.01 (ref 1–1.03)
TROPONIN: 0.05 NG/ML
TROPONIN: 0.06 NG/ML
TROPONIN: 0.06 NG/ML
TROPONIN: 0.08 NG/ML
URINE REFLEX TO CULTURE: ABNORMAL
URINE TYPE: ABNORMAL
UROBILINOGEN, URINE: 1 E.U./DL

## 2021-02-15 PROCEDURE — 99152 MOD SED SAME PHYS/QHP 5/>YRS: CPT | Performed by: INTERNAL MEDICINE

## 2021-02-15 PROCEDURE — 92978 ENDOLUMINL IVUS OCT C 1ST: CPT | Performed by: INTERNAL MEDICINE

## 2021-02-15 PROCEDURE — 6370000000 HC RX 637 (ALT 250 FOR IP): Performed by: INTERNAL MEDICINE

## 2021-02-15 PROCEDURE — 85730 THROMBOPLASTIN TIME PARTIAL: CPT

## 2021-02-15 PROCEDURE — 2500000003 HC RX 250 WO HCPCS: Performed by: NURSE PRACTITIONER

## 2021-02-15 PROCEDURE — 2500000003 HC RX 250 WO HCPCS

## 2021-02-15 PROCEDURE — 93010 ELECTROCARDIOGRAM REPORT: CPT | Performed by: INTERNAL MEDICINE

## 2021-02-15 PROCEDURE — C1894 INTRO/SHEATH, NON-LASER: HCPCS

## 2021-02-15 PROCEDURE — 93458 L HRT ARTERY/VENTRICLE ANGIO: CPT

## 2021-02-15 PROCEDURE — 92978 ENDOLUMINL IVUS OCT C 1ST: CPT

## 2021-02-15 PROCEDURE — 2580000003 HC RX 258: Performed by: NURSE ANESTHETIST, CERTIFIED REGISTERED

## 2021-02-15 PROCEDURE — 6370000000 HC RX 637 (ALT 250 FOR IP): Performed by: ANESTHESIOLOGY

## 2021-02-15 PROCEDURE — 94010 BREATHING CAPACITY TEST: CPT

## 2021-02-15 PROCEDURE — 6360000002 HC RX W HCPCS: Performed by: ANESTHESIOLOGY

## 2021-02-15 PROCEDURE — 36573 INSJ PICC RS&I 5 YR+: CPT

## 2021-02-15 PROCEDURE — 6360000002 HC RX W HCPCS: Performed by: INTERNAL MEDICINE

## 2021-02-15 PROCEDURE — 6360000002 HC RX W HCPCS: Performed by: NURSE ANESTHETIST, CERTIFIED REGISTERED

## 2021-02-15 PROCEDURE — 2500000003 HC RX 250 WO HCPCS: Performed by: INTERNAL MEDICINE

## 2021-02-15 PROCEDURE — 99255 IP/OBS CONSLTJ NEW/EST HI 80: CPT | Performed by: NURSE PRACTITIONER

## 2021-02-15 PROCEDURE — C1725 CATH, TRANSLUMIN NON-LASER: HCPCS

## 2021-02-15 PROCEDURE — 2700000000 HC OXYGEN THERAPY PER DAY

## 2021-02-15 PROCEDURE — 2709999900 HC NON-CHARGEABLE SUPPLY

## 2021-02-15 PROCEDURE — C9460 INJECTION, CANGRELOR: HCPCS | Performed by: INTERNAL MEDICINE

## 2021-02-15 PROCEDURE — 85347 COAGULATION TIME ACTIVATED: CPT

## 2021-02-15 PROCEDURE — B2111ZZ FLUOROSCOPY OF MULTIPLE CORONARY ARTERIES USING LOW OSMOLAR CONTRAST: ICD-10-PCS | Performed by: INTERNAL MEDICINE

## 2021-02-15 PROCEDURE — 94761 N-INVAS EAR/PLS OXIMETRY MLT: CPT

## 2021-02-15 PROCEDURE — 3700000001 HC ADD 15 MINUTES (ANESTHESIA)

## 2021-02-15 PROCEDURE — 92920 PRQ TRLUML C ANGIOP 1ART&/BR: CPT | Performed by: INTERNAL MEDICINE

## 2021-02-15 PROCEDURE — 6360000002 HC RX W HCPCS

## 2021-02-15 PROCEDURE — 6360000004 HC RX CONTRAST MEDICATION

## 2021-02-15 PROCEDURE — 2580000003 HC RX 258: Performed by: INTERNAL MEDICINE

## 2021-02-15 PROCEDURE — C1751 CATH, INF, PER/CENT/MIDLINE: HCPCS

## 2021-02-15 PROCEDURE — 4A023N7 MEASUREMENT OF CARDIAC SAMPLING AND PRESSURE, LEFT HEART, PERCUTANEOUS APPROACH: ICD-10-PCS | Performed by: INTERNAL MEDICINE

## 2021-02-15 PROCEDURE — 3700000000 HC ANESTHESIA ATTENDED CARE

## 2021-02-15 PROCEDURE — C1769 GUIDE WIRE: HCPCS

## 2021-02-15 PROCEDURE — 93458 L HRT ARTERY/VENTRICLE ANGIO: CPT | Performed by: INTERNAL MEDICINE

## 2021-02-15 PROCEDURE — 2720000010 HC SURG SUPPLY STERILE

## 2021-02-15 PROCEDURE — 2500000003 HC RX 250 WO HCPCS: Performed by: NURSE ANESTHETIST, CERTIFIED REGISTERED

## 2021-02-15 PROCEDURE — 92920 PRQ TRLUML C ANGIOP 1ART&/BR: CPT

## 2021-02-15 PROCEDURE — 36415 COLL VENOUS BLD VENIPUNCTURE: CPT

## 2021-02-15 PROCEDURE — 81003 URINALYSIS AUTO W/O SCOPE: CPT

## 2021-02-15 PROCEDURE — C1887 CATHETER, GUIDING: HCPCS

## 2021-02-15 PROCEDURE — C1760 CLOSURE DEV, VASC: HCPCS

## 2021-02-15 PROCEDURE — 84484 ASSAY OF TROPONIN QUANT: CPT

## 2021-02-15 PROCEDURE — 02703ZZ DILATION OF CORONARY ARTERY, ONE ARTERY, PERCUTANEOUS APPROACH: ICD-10-PCS | Performed by: INTERNAL MEDICINE

## 2021-02-15 PROCEDURE — 02HV33Z INSERTION OF INFUSION DEVICE INTO SUPERIOR VENA CAVA, PERCUTANEOUS APPROACH: ICD-10-PCS | Performed by: THORACIC SURGERY (CARDIOTHORACIC VASCULAR SURGERY)

## 2021-02-15 PROCEDURE — 2000000000 HC ICU R&B

## 2021-02-15 RX ORDER — MORPHINE SULFATE 4 MG/ML
1 INJECTION, SOLUTION INTRAMUSCULAR; INTRAVENOUS EVERY 5 MIN PRN
Status: DISCONTINUED | OUTPATIENT
Start: 2021-02-15 | End: 2021-02-15

## 2021-02-15 RX ORDER — PROMETHAZINE HYDROCHLORIDE 25 MG/ML
6.25 INJECTION, SOLUTION INTRAMUSCULAR; INTRAVENOUS
Status: DISCONTINUED | OUTPATIENT
Start: 2021-02-15 | End: 2021-02-15

## 2021-02-15 RX ORDER — LIDOCAINE HYDROCHLORIDE 10 MG/ML
5 INJECTION, SOLUTION INFILTRATION; PERINEURAL ONCE
Status: DISCONTINUED | OUTPATIENT
Start: 2021-02-15 | End: 2021-02-18 | Stop reason: SDUPTHER

## 2021-02-15 RX ORDER — SODIUM CHLORIDE 0.9 % (FLUSH) 0.9 %
10 SYRINGE (ML) INJECTION PRN
Status: DISCONTINUED | OUTPATIENT
Start: 2021-02-15 | End: 2021-02-15

## 2021-02-15 RX ORDER — DIPHENHYDRAMINE HYDROCHLORIDE 50 MG/ML
12.5 INJECTION INTRAMUSCULAR; INTRAVENOUS
Status: DISCONTINUED | OUTPATIENT
Start: 2021-02-15 | End: 2021-02-15

## 2021-02-15 RX ORDER — ONDANSETRON 2 MG/ML
4 INJECTION INTRAMUSCULAR; INTRAVENOUS PRN
Status: DISCONTINUED | OUTPATIENT
Start: 2021-02-15 | End: 2021-02-15

## 2021-02-15 RX ORDER — SODIUM CHLORIDE 0.9 % (FLUSH) 0.9 %
10 SYRINGE (ML) INJECTION EVERY 12 HOURS SCHEDULED
Status: DISCONTINUED | OUTPATIENT
Start: 2021-02-15 | End: 2021-02-15

## 2021-02-15 RX ORDER — MEPERIDINE HYDROCHLORIDE 50 MG/ML
12.5 INJECTION INTRAMUSCULAR; INTRAVENOUS; SUBCUTANEOUS EVERY 5 MIN PRN
Status: DISCONTINUED | OUTPATIENT
Start: 2021-02-15 | End: 2021-02-15

## 2021-02-15 RX ORDER — MORPHINE SULFATE 4 MG/ML
2 INJECTION, SOLUTION INTRAMUSCULAR; INTRAVENOUS EVERY 5 MIN PRN
Status: DISCONTINUED | OUTPATIENT
Start: 2021-02-15 | End: 2021-02-15

## 2021-02-15 RX ORDER — SODIUM CHLORIDE 0.9 % (FLUSH) 0.9 %
10 SYRINGE (ML) INJECTION EVERY 12 HOURS SCHEDULED
Status: DISCONTINUED | OUTPATIENT
Start: 2021-02-15 | End: 2021-02-16

## 2021-02-15 RX ORDER — PHENYLEPHRINE HCL IN 0.9% NACL 1 MG/10 ML
SYRINGE (ML) INTRAVENOUS PRN
Status: DISCONTINUED | OUTPATIENT
Start: 2021-02-15 | End: 2021-02-15 | Stop reason: SDUPTHER

## 2021-02-15 RX ORDER — SODIUM CHLORIDE 9 MG/ML
INJECTION, SOLUTION INTRAVENOUS CONTINUOUS PRN
Status: DISCONTINUED | OUTPATIENT
Start: 2021-02-15 | End: 2021-02-15 | Stop reason: SDUPTHER

## 2021-02-15 RX ORDER — HYDRALAZINE HYDROCHLORIDE 20 MG/ML
5 INJECTION INTRAMUSCULAR; INTRAVENOUS EVERY 10 MIN PRN
Status: DISCONTINUED | OUTPATIENT
Start: 2021-02-15 | End: 2021-02-15

## 2021-02-15 RX ORDER — LABETALOL HYDROCHLORIDE 5 MG/ML
5 INJECTION, SOLUTION INTRAVENOUS EVERY 10 MIN PRN
Status: DISCONTINUED | OUTPATIENT
Start: 2021-02-15 | End: 2021-02-15

## 2021-02-15 RX ORDER — OXYCODONE HYDROCHLORIDE AND ACETAMINOPHEN 5; 325 MG/1; MG/1
1 TABLET ORAL PRN
Status: COMPLETED | OUTPATIENT
Start: 2021-02-15 | End: 2021-02-15

## 2021-02-15 RX ORDER — SODIUM CHLORIDE 0.9 % (FLUSH) 0.9 %
10 SYRINGE (ML) INJECTION PRN
Status: DISCONTINUED | OUTPATIENT
Start: 2021-02-15 | End: 2021-02-16

## 2021-02-15 RX ORDER — ONDANSETRON 2 MG/ML
INJECTION INTRAMUSCULAR; INTRAVENOUS PRN
Status: DISCONTINUED | OUTPATIENT
Start: 2021-02-15 | End: 2021-02-15 | Stop reason: SDUPTHER

## 2021-02-15 RX ORDER — FENTANYL CITRATE 50 UG/ML
INJECTION, SOLUTION INTRAMUSCULAR; INTRAVENOUS
Status: COMPLETED | OUTPATIENT
Start: 2021-02-15 | End: 2021-02-15

## 2021-02-15 RX ORDER — ROCURONIUM BROMIDE 10 MG/ML
INJECTION, SOLUTION INTRAVENOUS PRN
Status: DISCONTINUED | OUTPATIENT
Start: 2021-02-15 | End: 2021-02-15 | Stop reason: SDUPTHER

## 2021-02-15 RX ORDER — PROPOFOL 10 MG/ML
INJECTION, EMULSION INTRAVENOUS PRN
Status: DISCONTINUED | OUTPATIENT
Start: 2021-02-15 | End: 2021-02-15 | Stop reason: SDUPTHER

## 2021-02-15 RX ORDER — LIDOCAINE HYDROCHLORIDE 20 MG/ML
INJECTION, SOLUTION INFILTRATION; PERINEURAL PRN
Status: DISCONTINUED | OUTPATIENT
Start: 2021-02-15 | End: 2021-02-15 | Stop reason: SDUPTHER

## 2021-02-15 RX ORDER — OXYCODONE HYDROCHLORIDE AND ACETAMINOPHEN 5; 325 MG/1; MG/1
2 TABLET ORAL PRN
Status: COMPLETED | OUTPATIENT
Start: 2021-02-15 | End: 2021-02-15

## 2021-02-15 RX ADMIN — AMLODIPINE BESYLATE 2.5 MG: 2.5 TABLET ORAL at 10:20

## 2021-02-15 RX ADMIN — PROMETHAZINE HYDROCHLORIDE 6.25 MG: 25 INJECTION INTRAMUSCULAR; INTRAVENOUS at 11:11

## 2021-02-15 RX ADMIN — Medication 100 MCG: at 09:02

## 2021-02-15 RX ADMIN — ROCURONIUM BROMIDE 50 MG: 10 SOLUTION INTRAVENOUS at 08:34

## 2021-02-15 RX ADMIN — ONDANSETRON 4 MG: 2 INJECTION INTRAMUSCULAR; INTRAVENOUS at 08:45

## 2021-02-15 RX ADMIN — MUPIROCIN: 20 OINTMENT TOPICAL at 21:49

## 2021-02-15 RX ADMIN — CANGRELOR 0.75 MCG/KG/MIN: 50 INJECTION, POWDER, LYOPHILIZED, FOR SOLUTION INTRAVENOUS at 10:22

## 2021-02-15 RX ADMIN — INSULIN LISPRO 2 UNITS: 100 INJECTION, SOLUTION INTRAVENOUS; SUBCUTANEOUS at 07:54

## 2021-02-15 RX ADMIN — CLOPIDOGREL BISULFATE 75 MG: 75 TABLET ORAL at 07:55

## 2021-02-15 RX ADMIN — OXYCODONE HYDROCHLORIDE AND ACETAMINOPHEN 2 TABLET: 5; 325 TABLET ORAL at 11:11

## 2021-02-15 RX ADMIN — LISINOPRIL 2.5 MG: 5 TABLET ORAL at 10:21

## 2021-02-15 RX ADMIN — ATORVASTATIN CALCIUM 40 MG: 40 TABLET, FILM COATED ORAL at 17:30

## 2021-02-15 RX ADMIN — SODIUM CHLORIDE: 9 INJECTION, SOLUTION INTRAVENOUS at 08:25

## 2021-02-15 RX ADMIN — MUPIROCIN: 20 OINTMENT TOPICAL at 10:25

## 2021-02-15 RX ADMIN — ASPIRIN 81 MG: 81 TABLET, CHEWABLE ORAL at 07:54

## 2021-02-15 RX ADMIN — NITROGLYCERIN 60 MCG/MIN: 20 INJECTION INTRAVENOUS at 02:46

## 2021-02-15 RX ADMIN — PROPOFOL 150 MG: 10 INJECTION, EMULSION INTRAVENOUS at 08:32

## 2021-02-15 RX ADMIN — INSULIN LISPRO 2 UNITS: 100 INJECTION, SOLUTION INTRAVENOUS; SUBCUTANEOUS at 17:30

## 2021-02-15 RX ADMIN — METOPROLOL TARTRATE 25 MG: 25 TABLET, FILM COATED ORAL at 21:00

## 2021-02-15 RX ADMIN — METOPROLOL TARTRATE 25 MG: 25 TABLET, FILM COATED ORAL at 10:20

## 2021-02-15 RX ADMIN — LIDOCAINE HYDROCHLORIDE 60 MG: 20 INJECTION, SOLUTION INFILTRATION; PERINEURAL at 08:32

## 2021-02-15 RX ADMIN — SODIUM CHLORIDE, PRESERVATIVE FREE 10 ML: 5 INJECTION INTRAVENOUS at 10:20

## 2021-02-15 RX ADMIN — HEPARIN SODIUM 9.1 ML/HR: 10000 INJECTION, SOLUTION INTRAVENOUS at 02:47

## 2021-02-15 RX ADMIN — MORPHINE SULFATE 4 MG: 4 INJECTION, SOLUTION INTRAMUSCULAR; INTRAVENOUS at 10:05

## 2021-02-15 RX ADMIN — FENTANYL CITRATE 50 MCG: 50 INJECTION, SOLUTION INTRAMUSCULAR; INTRAVENOUS at 08:19

## 2021-02-15 RX ADMIN — INSULIN LISPRO 1 UNITS: 100 INJECTION, SOLUTION INTRAVENOUS; SUBCUTANEOUS at 21:01

## 2021-02-15 RX ADMIN — PROPOFOL 50 MG: 10 INJECTION, EMULSION INTRAVENOUS at 08:35

## 2021-02-15 RX ADMIN — INSULIN LISPRO 2 UNITS: 100 INJECTION, SOLUTION INTRAVENOUS; SUBCUTANEOUS at 12:10

## 2021-02-15 RX ADMIN — SUGAMMADEX 200 MG: 100 INJECTION, SOLUTION INTRAVENOUS at 09:45

## 2021-02-15 RX ADMIN — Medication 10 ML: at 21:05

## 2021-02-15 ASSESSMENT — PULMONARY FUNCTION TESTS
PIF_VALUE: 27
PIF_VALUE: 1
PIF_VALUE: 0
PIF_VALUE: 28
PIF_VALUE: 0
PIF_VALUE: 26
PIF_VALUE: 27
PIF_VALUE: 28
PIF_VALUE: 26
PIF_VALUE: 26
PIF_VALUE: 1
PIF_VALUE: 29
PIF_VALUE: 27
PIF_VALUE: 27
PIF_VALUE: 26
PIF_VALUE: 26
PIF_VALUE: 1
PIF_VALUE: 0
PIF_VALUE: 26
PIF_VALUE: 26
PIF_VALUE: 0
PIF_VALUE: 29
PIF_VALUE: 26
PIF_VALUE: 26
PIF_VALUE: 27
PIF_VALUE: 27
PIF_VALUE: 28
PIF_VALUE: 26
PIF_VALUE: 1
PIF_VALUE: 42
PIF_VALUE: 27
PIF_VALUE: 26
PIF_VALUE: 27
PIF_VALUE: 26
PIF_VALUE: 26
PIF_VALUE: 27
PIF_VALUE: 27
PIF_VALUE: 26
PIF_VALUE: 1
PIF_VALUE: 26
PIF_VALUE: 2
PIF_VALUE: 27
PIF_VALUE: 27
PIF_VALUE: 1
PIF_VALUE: 26
PIF_VALUE: 0
PIF_VALUE: 26
PIF_VALUE: 27
PIF_VALUE: 35
PIF_VALUE: 3
PIF_VALUE: 22

## 2021-02-15 ASSESSMENT — PAIN SCALES - GENERAL
PAINLEVEL_OUTOF10: 7
PAINLEVEL_OUTOF10: 0
PAINLEVEL_OUTOF10: 3
PAINLEVEL_OUTOF10: 0
PAINLEVEL_OUTOF10: 5
PAINLEVEL_OUTOF10: 0

## 2021-02-15 ASSESSMENT — PAIN DESCRIPTION - PROGRESSION
CLINICAL_PROGRESSION: GRADUALLY IMPROVING
CLINICAL_PROGRESSION: GRADUALLY IMPROVING

## 2021-02-15 ASSESSMENT — PAIN DESCRIPTION - PAIN TYPE: TYPE: ACUTE PAIN

## 2021-02-15 ASSESSMENT — PAIN DESCRIPTION - LOCATION: LOCATION: CHEST

## 2021-02-15 NOTE — CARE COORDINATION
Chart review completed. Patient a 61year old male, admitted for unstable angina. Hospital day 3, currently in ICU level of care. Initial assessment done yesterday showing pt was independent prior to admission. Denied any needs. Cardiac cath today showed need for CT surgery consult. Pt was seen by CT surgery today, will need surgery following a plavix wash out. Plan is for OR Friday or Monday. CM will follow up with patient for needs as they arise.  Liz Peck RN

## 2021-02-15 NOTE — PROGRESS NOTES
Bedside Spirometry completed at this time. Results placed in patient's chart. Patient had great effort during spirometry testing. Patient was 97% on room air.     Electronically signed by Oren Rouse RCP, RRT, RRT-ACCS on 2/15/2021 at 4:53 PM

## 2021-02-15 NOTE — BRIEF OP NOTE
Brief Postoperative Note      Patient: Nelli Norman  YOB: 1960  MRN: [id number]    Brief Postoperative Note  1. Pre-operative Diagnosis: NSTEMI        Post-operative Diagnosis: Same  2. Surgeons/Assistants: Warren Ledesma MD, [facility]  3. Complications: None  4. Estimated Blood Loss: less than 50   5. Specimens: Were Not Obtained  6. Anesthesia: General  Provided by anesthesia departement    7. Procedure(s) performed:   [facility]  LVG  Cors  OCT of prox LAD  Angioseal to Rt femoral artery  US access to Rt groin        Procedure Details:  Consent Access  site Bleed Risk Sedat US   Used*? Contrast Flouro TIme Fluoro  mGy   Yes Rt femoral low MCSFC Yes 170mL 8.6 955   *CPT 74813: If stated \"yes\", Ultrasound guidance was used to access Rt femoral artery using Seldinger technique after local infiltration of 1% lidocaine. Ultrasound(US) documented/visualized size, patency, pulsatility and exact location for puncture and determined ok to proceed. Real-time imaging used for needle entry into the vessel(s). Image was printed off "[employer]" and sent to medical records on a progress note. *Sedation Note: MCSFC = minimal conscious sedation for comfort      Left Heart Cath:   Findings   LVEDP 14   LVEF 40%   LV wall motion Mild anterior HK   Gradient across AV none   Mitral regurg No signficant     Coronary Angiogram:  Artery Findings/Result   LM luminals   LAD prox 70% (OCT 3.74mm2) patent prox stent (no restenosis) and patent separate mid stent with mild restenosis <15%   LCx prox stent restenosis with 95% (Guerline-2 flow)       RCA Dominant, prox 40%         Results of intervention     Lesion 1: prox LAD  OCT showed <90deg arc of thick calcium. MLA 3.74. disease extends to left main (LM with minimal disease)    Lesion 2: Om11  NC balloon and Angiosculpt  Pre:   95% stenosis, GUERLINE 2 flow  Post: 60% stenosis, GUERLINE 3 flow      Assessment/Plan  1. Severe restenosis of OM1 treated for palliation with NC and angiosculpt  2. Severe stenosis of Prox LAD  3. CT surgery eval for definitive revascularization   - cont cangrelor      Med Rec:   Recommendation Indicated?  Not Given Due To: Note   DAPT  Y HOLD FOR CABG  on cangrelor   STATIN - HIGH DOSE Y     BETA BLOCKER Y     ACE/ARB/ARNI Y  Hypotension    ALDACTONE            Electronically signed by Guadalupe Salinas MD on 2/15/2021 at 9:44 AM

## 2021-02-15 NOTE — CONSULTS
Department of Cardiovascular & Thoracic Surgery  History and Physical          DIAGNOSIS: NSTEMI/multivessel CAD (multiple PCI)      CHIEF COMPLAINT:    Chief Complaint   Patient presents with    Chest Pain     for a couple of days saw cards today, and is scheduled for a procedure on 3/5/21. pt reports he's taken 9 nitro today, took 6 yesterday. and 5 for two days prior to that. HX of CAD, stent placement. History Obtained From:  patient, spouse, electronic medical record    HISTORY OF PRESENT ILLNESS:      The patient is a 61 y.o. obese male with significant past medical history of T2DM, HTN, HLD, history of ETOH abuse, and CAD (multiple PCI's) who presented to [facility] ED on 2/12 with c/o worsening CP and MARK. Despite taking 9 nitroglycerin on 2/12, his chest pain is persistent. Cardiology was consulted and performed a cardiac catheterization revealing recurrent in stent restenosis. We have been consulted for surgical revascularization.       Past Medical History:        Diagnosis Date    Alcohol abuse 12/3/2012    Coronary artery disease     Hyperlipidemia     Hypertension     Non morbid obesity     Reactive airway disease with wheezing, moderate persistent, with acute exacerbation 3/3/2020    Type II or unspecified type diabetes mellitus without mention of complication, not stated as uncontrolled        Past Surgical History:        Procedure Laterality Date    CORONARY ANGIOPLASTY WITH STENT PLACEMENT  1/14/2016 9/21/2020    1 Promus Premier SAMUEL 2.25x16    VEIN SURGERY         Medications Prior to Admission:   Medications Prior to Admission: glipiZIDE (GLUCOTROL) 10 MG tablet, TAKE ONE TABLET BY MOUTH EVERY MORNING FOR DIABETES  atorvastatin (LIPITOR) 40 MG tablet, TAKE ONE TABLET BY MOUTH EVERY EVENING  lisinopril (PRINIVIL;ZESTRIL) 2.5 MG tablet, TAKE ONE TABLET BY MOUTH DAILY  metoprolol tartrate (LOPRESSOR) 25 MG tablet, TAKE ONE TABLET BY MOUTH TWICE A DAY  albuterol sulfate (PROAIR RESPICLICK) 108 (90 Base) MCG/ACT aerosol powder inhalation, Inhale 2 puffs into the lungs every 4 hours as needed for Wheezing (Patient not taking: Reported on 1/7/2021)  fluticasone-salmeterol (ADVAIR DISKUS) 250-50 MCG/DOSE AEPB, Inhale 1 puff into the lungs every 12 hours  nitroGLYCERIN (NITROSTAT) 0.4 MG SL tablet, DISSOLVE 1 TAB UNDER TONGUE FOR CHEST PAIN - IF PAIN REMAINS AFTER 5 MIN, CALL 911 AND REPEAT DOSE. MAX 3 TABS IN 15 MINUTES  metFORMIN (GLUCOPHAGE) 1000 MG tablet, TAKE ONE AND ONE HALF TABLETS DAILY. Naproxen Sodium (ALEVE PO), Take by mouth as needed  isosorbide mononitrate (IMDUR) 120 MG extended release tablet, TAKE ONE TABLET BY MOUTH DAILY  clopidogrel (PLAVIX) 75 MG tablet, TAKE ONE TABLET BY MOUTH DAILY  albuterol (PROVENTIL) (2.5 MG/3ML) 0.083% nebulizer solution, Take 3 mLs by nebulization every 6 hours as needed for Wheezing (Patient not taking: Reported on 1/7/2021)  Spacer/Aero-Holding Chambers (E-Z SPACER) REAL, 1 Device by Does not apply route daily as needed (wheezing)  aspirin 81 MG chewable tablet, CHEW ONE TABLET BY MOUTH DAILY    Allergies:  Vitamin d analogs    Social History:    TOBACCO:   reports that he has never smoked. He has never used smokeless tobacco.  ETOH:   reports previous alcohol use of about 1.0 standard drinks of alcohol per week. CAFFEINE ABUSE:  No, two caffeinated drinks daily  DRUGS:   reports no history of drug use. 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 Eyes:  No glaucoma, cataracts. ENMT:  No nosebleeds, deviated septum.    Cardiac:  No arrhythmias, LVEDP 14   LVEF 40%   LV wall motion Mild anterior HK   Gradient across AV none   Mitral regurg No signficant      Coronary Angiogram:  Artery Findings/Result   LM luminals   LAD prox 70% (OCT 3.74mm2) patent prox stent (no restenosis) and patent separate mid stent with mild restenosis <15%   LCx prox stent restenosis with 95% (Guerline-2 flow)         RCA Dominant, prox 40%         Results of intervention     Lesion 1: prox LAD  OCT showed <90deg arc of thick calcium. MLA 3.74. disease extends to left main (LM with minimal disease)     Lesion 2: Om11  NC balloon and Angiosculpt  Pre:   95% stenosis, GUERLINE 2 flow  Post: 60% stenosis, GUERLINE 3 flow      Assessment/Plan  1. Severe restenosis of OM1 treated for palliation with NC and angiosculpt  2. Severe stenosis of Prox LAD  3. CT surgery eval for definitive revascularization                - cont cangrelor     Med Rec:            Recommendation Indicated?  Not Given Due To: Note   DAPT  Y HOLD FOR CABG  on cangrelor   STATIN - HIGH DOSE Y       BETA BLOCKER Y       ACE/ARB/ARNI Y  Hypotension     ALDACTONE            [id number] Score for Atrial Fibrillation Stroke Risk   Risk   Factors  Component Value   C CHF No 0   H HTN Yes 1   A2 Age >= 76 No,  (57 y.o.) 0   D DM Yes 1   S2 Prior Stroke/TIA No 0   V Vascular Disease Yes 1   A Age 74-69 No,  (57 y.o.) 0   Sc Sex male 0    [id number]  Score  3   Score last updated 2/15/21 9:31 PM EST    Click here for a link to the UpToDate guideline \"Atrial Fibrillation: Anticoagulation therapy to prevent embolization    Disclaimer: Risk Score calculation is dependent on accuracy of patient problem list and past encounter diagnosis.        ASSESSMENT AND PLAN:  STS Cardiac Surgery Risk profile: CABG (prior to echo results)    Mortality:  0.50%  Renal Failure:  0.47%  Permanent Stroke:  0.55%  Prolonged Ventilation:  3.30%  Deep Sternal Infection:  0.19%  Reoperation:  1.56%  Morbidity and Mortality:  5.31%  Short LOS: 68.62%  Long LOS:  1.31%    Pt is an obese male with previous stents, on Plavix. His last dose was this morning, 2/15/21. Will plan for wash out and continue with pre-operative testing in the meantime. Pt on Cangrelor drip. Surgery will be either Friday or Monday, will discuss further timing with team.     Carotid duplex. Vein mapping. PFTs. Urine. Echo.       Monica Salinas CNP   2/15/2021  1:30 PM

## 2021-02-15 NOTE — PROGRESS NOTES
Attempted to place PICC line, was able to access the cephalic vein in the R arm without difficulty but was unable to pass the wire smoothly, will call PICC agency to re-attempt for access, Sy Mccartney RN updated, DIT agency called and referral for PICC line placed.

## 2021-02-15 NOTE — PROCEDURES
1 Promus Premier SAMUEL 2.25x16    VEIN SURGERY           Medications:  Current Facility-Administered Medications   Medication Dose Route Frequency Provider Last Rate Last Admin    nitroGLYCERIN 50 mg in dextrose 5% 250 mL infusion  5-200 mcg/min Intravenous Continuous LonKORIN Harrison - CNP 18 mL/hr at 02/15/21 0246 60 mcg/min at 02/15/21 0246    eptifibatide (INTEGRILIN) 0.75 mg/mL infusion  2 mcg/kg/min Intravenous Continuous Morton Halt, DO 17.6 mL/hr at 02/14/21 2255 2 mcg/kg/min at 02/14/21 2255    budesonide-formoterol (SYMBICORT) 80-4.5 MCG/ACT inhaler 2 puff  2 puff Inhalation PRN Terrance Birmingham MD        morphine (PF) injection 4 mg  4 mg Intravenous Q2H PRN Adan Frias MD   4 mg at 02/14/21 2301    LORazepam (ATIVAN) injection 1 mg  1 mg Intravenous Q4H PRN Adan Frias MD        diazePAM (VALIUM) tablet 2 mg  2 mg Oral Q12H PRN Terrance Birmingham MD        heparin 25,000 unit in sodium chloride 0.45% 250 mL (premix) infusion  9.1 mL/hr Intravenous Continuous Terrance Birmingham MD 9.1 mL/hr at 02/15/21 0247 9.1 mL/hr at 02/15/21 0247    amLODIPine (NORVASC) tablet 2.5 mg  2.5 mg Oral Daily Praveen Halt, DO   2.5 mg at 02/13/21 1830    heparin (porcine) injection 4,000 Units  4,000 Units Intravenous PRN Nguyen Chavez MD        heparin (porcine) injection 2,000 Units  2,000 Units Intravenous PRN Nguyen Chavez MD   2,000 Units at 02/14/21 0837    aspirin chewable tablet 81 mg  81 mg Oral Daily Nguyen Chavez MD   81 mg at 02/14/21 0837    atorvastatin (LIPITOR) tablet 40 mg  40 mg Oral QPM Nguyen Chavez MD   40 mg at 02/14/21 1743    clopidogrel (PLAVIX) tablet 75 mg  75 mg Oral Daily Nguyen Chavez MD   75 mg at 02/14/21 0837    lisinopril (PRINIVIL;ZESTRIL) tablet 2.5 mg  2.5 mg Oral Daily Nguyen Chavez MD   2.5 mg at 02/13/21 1126  metoprolol tartrate (LOPRESSOR) tablet 25 mg  25 mg Oral BID Samuel Singh MD   25 mg at 02/14/21 2115    glucose (GLUTOSE) 40 % oral gel 15 g  15 g Oral PRN Samuel Singh MD        dextrose 50 % IV solution  12.5 g Intravenous PRN Samuel Singh MD        glucagon (rDNA) injection 1 mg  1 mg Intramuscular PRN Samuel Singh MD        dextrose 5 % solution  100 mL/hr Intravenous PRN Samuel Singh MD        insulin lispro (HUMALOG) injection vial 0-6 Units  0-6 Units Subcutaneous TID WC Samuel Singh MD   2 Units at 02/14/21 1743    insulin lispro (HUMALOG) injection vial 0-3 Units  0-3 Units Subcutaneous Nightly Samuel Singh MD   1 Units at 02/14/21 2116    sodium chloride flush 0.9 % injection 10 mL  10 mL Intravenous 2 times per day Samuel Singh MD   10 mL at 02/14/21 2115    sodium chloride flush 0.9 % injection 10 mL  10 mL Intravenous PRN Samuel Singh MD        promethazine (PHENERGAN) tablet 12.5 mg  12.5 mg Oral Q6H PRN Samuel Singh MD        Or    ondansetron (ZOFRAN) injection 4 mg  4 mg Intravenous Q6H PRN Samuel Singh MD        polyethylene glycol (GLYCOLAX) packet 17 g  17 g Oral Daily PRN Samuel Singh MD        acetaminophen (TYLENOL) tablet 650 mg  650 mg Oral Q6H PRN Samuel Singh MD        Or    acetaminophen (TYLENOL) suppository 650 mg  650 mg Rectal Q6H PRN Samuel Singh MD        nitroGLYCERIN (NITROSTAT) SL tablet 0.4 mg  0.4 mg Sublingual Q5 Min PRN Samuel Singh MD   0.4 mg at 02/14/21 8444           Pre-Sedation:    Pre-Sedation Documentation and Exam:  I have assessed the patient and agree with the H&P present on the chart.     Prior History of Anesthesia Complications:   none    Modified Mallampati:  III (soft palate, base of uvula visible)    ASA Classification:  Class 3 - A patient with severe systemic disease that limits activity but is not incapacitating Una Scale: Activity:  2 - Able to move 4 extremities voluntarily on command  Respiration:  2 - Able to breathe deeply and cough freely  Circulation:  2 - BP+/- 20mmHg of normal  Consciousness:  2 - Fully awake  Oxygen Saturation (color):  2 - Able to maintain oxygen saturation >92% on room air    Sedation/Anesthesia Plan:  Guard the patient's safety and welfare. Minimize physical discomfort and pain. Minimize negative psychological responses to treatment by providing sedation and analgesia and maximize the potential amnesia. Patient to meet pre-procedure discharge plan.     Medication Planned:  midazolam intravenously and fentanyl intravenously    Patient is an appropriate candidate for plan of sedation: yes      Electronically signed by Shaheen Driver MD on 2/15/2021 at 7:37 AM

## 2021-02-15 NOTE — ANESTHESIA PRE PROCEDURE
Department of Anesthesiology  Preprocedure Note       Name:  Kenton Or   Age:  61 y.o.  :  1960                                          MRN:  [id number]         Date:  2/15/2021      Surgeon: * Surgery not found *    Procedure:     Medications prior to admission:   Prior to Admission medications    Medication Sig Start Date End Date Taking? Authorizing Provider   glipiZIDE (GLUCOTROL) 10 MG tablet TAKE ONE TABLET BY MOUTH EVERY MORNING FOR DIABETES 2/3/21   [facility], DO   atorvastatin (LIPITOR) 40 MG tablet TAKE ONE TABLET BY MOUTH EVERY EVENING 2/3/21   [facility], DO   lisinopril (PRINIVIL;ZESTRIL) 2.5 MG tablet TAKE ONE TABLET BY MOUTH DAILY 21   Susy Moran MD   metoprolol tartrate (LOPRESSOR) 25 MG tablet TAKE ONE TABLET BY MOUTH TWICE A DAY 21   Susy Moran MD   albuterol sulfate (PROAIR RESPICLICK) 441 (90 Base) MCG/ACT aerosol powder inhalation Inhale 2 puffs into the lungs every 4 hours as needed for Wheezing  Patient not taking: Reported on 20   [facility], DO   fluticasone-salmeterol (ADVAIR DISKUS) 250-50 MCG/DOSE AEPB Inhale 1 puff into the lungs every 12 hours 20   [facility], DO   nitroGLYCERIN (NITROSTAT) 0.4 MG SL tablet DISSOLVE 1 TAB UNDER TONGUE FOR CHEST PAIN - IF PAIN REMAINS AFTER 5 MIN, CALL 911 AND REPEAT DOSE. MAX 3 TABS IN 15 MINUTES 20   Divine Newell APRN - CNP   metFORMIN (GLUCOPHAGE) 1000 MG tablet TAKE ONE AND ONE HALF TABLETS DAILY.  20   [facility], DO   Naproxen Sodium (ALEVE PO) Take by mouth as needed    Historical Provider, MD   isosorbide mononitrate (IMDUR) 120 MG extended release tablet TAKE ONE TABLET BY MOUTH DAILY 6/15/20   Greg Saab MD   clopidogrel (PLAVIX) 75 MG tablet TAKE ONE TABLET BY MOUTH DAILY 3/23/20   Susy Moran MD   albuterol (PROVENTIL) (2.5 MG/3ML) 0.083% nebulizer solution Take 3 mLs by nebulization every 6 hours as needed for Wheezing Patient not taking: Reported on 1/7/2021 3/6/20   Jeffrey Castaneda MD   Spacer/Aero-Holding Chambers (E-Z SPACER) REAL 1 Device by Does not apply route daily as needed (wheezing) 3/1/20   Amandeep Herrera MD   aspirin 81 MG chewable tablet CHEW ONE TABLET BY MOUTH DAILY 12/5/16   Conor Pena, DO       Current medications:    Current Facility-Administered Medications   Medication Dose Route Frequency Provider Last Rate Last Admin    mupirocin (BACTROBAN) 2 % ointment   Nasal BID Rashid Bender MD        nitroGLYCERIN 50 mg in dextrose 5% 250 mL infusion  5-200 mcg/min Intravenous Continuous Loralyn HARMAN RamanN - CNP 18 mL/hr at 02/15/21 0246 60 mcg/min at 02/15/21 0246    eptifibatide (INTEGRILIN) 0.75 mg/mL infusion  2 mcg/kg/min Intravenous Continuous Velvet Matias DO 17.6 mL/hr at 02/14/21 2255 2 mcg/kg/min at 02/14/21 2255    budesonide-formoterol (SYMBICORT) 80-4.5 MCG/ACT inhaler 2 puff  2 puff Inhalation PRN Rashid Bender MD        morphine (PF) injection 4 mg  4 mg Intravenous Q2H PRN Ganesh Cabral MD   4 mg at 02/14/21 2301    LORazepam (ATIVAN) injection 1 mg  1 mg Intravenous Q4H PRN Ganesh Cabral MD        diazePAM (VALIUM) tablet 2 mg  2 mg Oral Q12H PRN Rashid Bender MD        heparin 25,000 unit in sodium chloride 0.45% 250 mL (premix) infusion  9.1 mL/hr Intravenous Continuous Rashid Bender MD 9.1 mL/hr at 02/15/21 0247 9.1 mL/hr at 02/15/21 0247    amLODIPine (NORVASC) tablet 2.5 mg  2.5 mg Oral Daily Velvet Matias, DO   2.5 mg at 02/13/21 1830    heparin (porcine) injection 4,000 Units  4,000 Units Intravenous PRN Taryn Cheema MD        heparin (porcine) injection 2,000 Units  2,000 Units Intravenous PRN Taryn Cheema MD   2,000 Units at 02/14/21 0837    aspirin chewable tablet 81 mg  81 mg Oral Daily Taryn Cheema MD   81 mg at 02/14/21 5022  atorvastatin (LIPITOR) tablet 40 mg  40 mg Oral QPM Tahmina Minaya MD   40 mg at 02/14/21 1743    clopidogrel (PLAVIX) tablet 75 mg  75 mg Oral Daily Tahmina Minaya MD   75 mg at 02/14/21 0837    lisinopril (PRINIVIL;ZESTRIL) tablet 2.5 mg  2.5 mg Oral Daily Tahmina Minaya MD   2.5 mg at 02/13/21 1127    metoprolol tartrate (LOPRESSOR) tablet 25 mg  25 mg Oral BID Tahmina Minaya MD   25 mg at 02/14/21 2115    glucose (GLUTOSE) 40 % oral gel 15 g  15 g Oral PRN Tahmina Minaya MD        dextrose 50 % IV solution  12.5 g Intravenous PRN Tahmina Minaya MD        glucagon (rDNA) injection 1 mg  1 mg Intramuscular PRN Tahmina Minaya MD        dextrose 5 % solution  100 mL/hr Intravenous PRN Tahmina Minaya MD        insulin lispro (HUMALOG) injection vial 0-6 Units  0-6 Units Subcutaneous TID WC Tahmina Minaya MD   2 Units at 02/14/21 1743    insulin lispro (HUMALOG) injection vial 0-3 Units  0-3 Units Subcutaneous Nightly Tahmina Minaya MD   1 Units at 02/14/21 2116    sodium chloride flush 0.9 % injection 10 mL  10 mL Intravenous 2 times per day Tahmina Minaya MD   10 mL at 02/14/21 2115    sodium chloride flush 0.9 % injection 10 mL  10 mL Intravenous PRN Tahmina Minaya MD        promethazine (PHENERGAN) tablet 12.5 mg  12.5 mg Oral Q6H PRN Tahmina Minaya MD        Or    ondansetron (ZOFRAN) injection 4 mg  4 mg Intravenous Q6H PRN Tahmina Minaya MD        polyethylene glycol (GLYCOLAX) packet 17 g  17 g Oral Daily PRN Tahmina Minaya MD        acetaminophen (TYLENOL) tablet 650 mg  650 mg Oral Q6H PRN Tahmina Minaya MD        Or    acetaminophen (TYLENOL) suppository 650 mg  650 mg Rectal Q6H PRN Tahmina Minaya MD        nitroGLYCERIN (NITROSTAT) SL tablet 0.4 mg  0.4 mg Sublingual Q5 Min PRN Tahmina Minaya MD   0.4 mg at 02/14/21 7186       Allergies:     Allergies   Allergen Reactions  Vitamin D Analogs Hives       Problem List:    Patient Active Problem List   Diagnosis Code    Type II or unspecified type diabetes mellitus without mention of complication, not stated as uncontrolled E11.9    Diabetes mellitus E11.9    History of ETOH abuse F10.11    Patient overweight E66.3    Patient overweight E66.3    Dietary noncompliance Z91.11    Alcohol abuse F10.10    Flank pain R10.9    Prostatism N40.0    Low serum testosterone level R79.89    Arm pain, left M79.602    Shoulder pain M25.519    Precordial pain R07.2    Lung nodule R91.1    Acute angina (HCC) I20.9    Abnormal stress test R94.39    Acute coronary syndrome (HCC) I24.9    Unstable angina (HCC) I20.0    Coronary artery disease involving native coronary artery of native heart with unstable angina pectoris (HCC) I25.110    Other chest pain R07.89    S/P drug eluting coronary stent placement Z95.5    Essential hypertension I10    Obesity E66.9    Abnormal chest x-ray R93.89    Angina effort I20.8    Skin lesion L98.9    Angina at rest ([address] I20.8    Hyperlipidemia E78.5    Angina pectoris ([facility]) I20.9    NSTEMI (non-ST elevated myocardial infarction) ([address] I21.4    Controlled type 2 diabetes mellitus without complication, without long-term current use of insulin (HCC) E11.9    ASHD (arteriosclerotic heart disease) I25.10    Mass of right foot R22.41    Overweight E66.3    History of angina pectoris Z86.79    Reactive airway disease J45.909    Bronchitis J40    Reactive airway disease with wheezing, moderate persistent, with acute exacerbation J45.41    Need for prophylactic vaccination and inoculation against varicella Z23    Need for prophylactic vaccination against diphtheria-tetanus-pertussis (DTP) Z23    Chronic right-sided thoracic back pain M54.6, G89.29    CAD in native artery I25.10    S/P PTCA (percutaneous transluminal coronary angioplasty) Z98.61    Chest pain R07.9 BUN 12 02/14/2021    CREATININE 0.6 02/14/2021    GFRAA >60 02/14/2021    AGRATIO 1.8 02/12/2021    LABGLOM >60 02/14/2021    GLUCOSE 198 02/14/2021    PROT 7.2 02/12/2021    CALCIUM 9.6 02/14/2021    BILITOT 0.7 02/12/2021    ALKPHOS 82 02/12/2021    AST 29 02/12/2021    ALT 38 02/12/2021       POC Tests:   Recent Labs     02/15/21  0749   POCGLU 219*       Coags:   Lab Results   Component Value Date    PROTIME 11.7 12/15/2020    INR 1.01 12/15/2020    APTT 60.2 02/15/2021       HCG (If Applicable): No results found for: PREGTESTUR, PREGSERUM, HCG, HCGQUANT     ABGs: No results found for: PHART, PO2ART, ZLE5FHS, UFX1SNY, BEART, C9RGYGRV     Type & Screen (If Applicable):  No results found for: LABABO, LABRH    Drug/Infectious Status (If Applicable):  No results found for: HIV, HEPCAB    COVID-19 Screening (If Applicable):   Lab Results   Component Value Date    COVID19 NOT DETECTED 12/10/2020         Anesthesia Evaluation  Patient summary reviewed and Nursing notes reviewed  Airway: Mallampati: III  TM distance: <3 FB   Neck ROM: full  Mouth opening: > = 3 FB Dental: normal exam         Pulmonary:normal exam  breath sounds clear to auscultation  (+) asthma: exercise-induced asthma,                            Cardiovascular:    (+) hypertension:, angina: at rest, past MI:, CAD:, CABG/stent:,         Rhythm: regular  Rate: normal                    Neuro/Psych:   Negative Neuro/Psych ROS  (+) psychiatric history:            GI/Hepatic/Renal: Neg GI/Hepatic/Renal ROS            Endo/Other:    (+) DiabetesType II DM, , .                 Abdominal:   (+) obese,         Vascular: negative vascular ROS. Anesthesia Plan      general     ASA 4       Induction: intravenous. MIPS: Postoperative opioids intended and Prophylactic antiemetics administered. Anesthetic plan and risks discussed with patient. Plan discussed with CRNA.                   Rikki Bermeo MD   2/15/2021

## 2021-02-15 NOTE — ANESTHESIA POSTPROCEDURE EVALUATION
Department of Anesthesiology  Postprocedure Note    Patient: Tom Medeiros  MRN: [id number]  YOB: 1960  Date of evaluation: 2/15/2021  Time:  3:25 PM     Procedure Summary     Date: 02/15/21 Room / Location: [facility] Cardiac Cath Lab    Anesthesia Start: 0825 Anesthesia Stop: 1004    Procedure: PTCA/STENT Diagnosis:     Scheduled Providers:  Responsible Provider: Alex Estrada MD    Anesthesia Type: general ASA Status: 4          Anesthesia Type: general    Una Phase I:      Una Phase II:      Last vitals: Reviewed and per EMR flowsheets.        Anesthesia Post Evaluation    Patient location during evaluation: bedside  Level of consciousness: awake  Airway patency: patent  Nausea & Vomiting: no nausea  Complications: no  Cardiovascular status: blood pressure returned to baseline  Respiratory status: acceptable  Hydration status: euvolemic

## 2021-02-15 NOTE — PROGRESS NOTES
Hospitalist Progress Note      PCP: Yasemin Adams DO    Date of Admission: 2/12/2021    [facility] Complaint: chest pain     Hospital Course: 59 yo WM with hx of cad(multiple pci), htn, hld etoh use, dm2 p/w chest pain.      Subjective: currently denies cp, had angiogram today, wife at bedside         Medications:  Reviewed    Infusion Medications    cangrelor [facility]) infusion 0.75 mcg/kg/min (02/15/21 1022)    nitroGLYCERIN Stopped (02/15/21 0858)    eptifibatide Stopped (02/15/21 0813)    heparin (PORCINE) Infusion Stopped (02/15/21 1900)    dextrose       Scheduled Medications    mupirocin   Nasal BID    amLODIPine  2.5 mg Oral Daily    aspirin  81 mg Oral Daily    atorvastatin  40 mg Oral QPM    clopidogrel  75 mg Oral Daily    lisinopril  2.5 mg Oral Daily    metoprolol tartrate  25 mg Oral BID    insulin lispro  0-6 Units Subcutaneous TID WC    insulin lispro  0-3 Units Subcutaneous Nightly    sodium chloride flush  10 mL Intravenous 2 times per day     PRN Meds: meperidine, HYDROmorphone, HYDROmorphone, morphine, morphine, ondansetron, promethazine, diphenhydrAMINE, labetalol, hydrALAZINE, budesonide-formoterol, morphine, LORazepam, diazePAM, heparin (porcine), heparin (porcine), glucose, dextrose, glucagon (rDNA), dextrose, sodium chloride flush, promethazine **OR** ondansetron, polyethylene glycol, acetaminophen **OR** acetaminophen, nitroGLYCERIN      Intake/Output Summary (Last 24 hours) at 2/15/2021 1316  Last data filed at 2/15/2021 1200  Gross per 24 hour   Intake 1527.3 ml   Output 1350 ml   Net 177.3 ml       Physical Exam Performed:    BP (!) 161/84   Pulse 78   Temp 97.9 °F (36.6 °C) (Oral)   Resp 16   Ht 5' 10\" (1.778 m)   Wt 242 lb 4.8 oz (109.9 kg)   SpO2 100%   BMI 34.77 kg/m²     General appearance: No apparent distress, appears stated age and cooperative. HEENT: Pupils equal, round, and reactive to light. Conjunctivae/corneas clear.   Neck: Supple, with full range of motion. No jugular venous distention. Trachea midline. Respiratory:  Normal respiratory effort. Clear to auscultation, bilaterally without Rales/Wheezes/Rhonchi. Cardiovascular: Regular rate and rhythm with normal S1/S2 without murmurs, rubs or gallops. Abdomen: Soft, non-tender, non-distended with normal bowel sounds. Musculoskeletal: No clubbing, cyanosis or edema bilaterally.  Full range of motion without deformity. Skin: Skin color, texture, turgor normal.  No rashes or lesions. Neurologic:  Neurovascularly intact without any focal sensory/motor deficits. Cranial nerves: II-XII intact, grossly non-focal.  Psychiatric: Alert and oriented, thought content appropriate, normal insight  Capillary Refill: Brisk,< 3 seconds   Peripheral Pulses: +2 palpable, equal bilaterally        Labs:   Recent Labs     02/12/21  1746 02/14/21  0839   WBC 9.8 8.0   HGB 15.2 14.7   HCT 44.8 43.3    244     Recent Labs     02/12/21  1746 02/14/21  0839    138   K 4.4 4.1   CL 97* 99   CO2 27 28   BUN 14 12   CREATININE 0.6* 0.6*   CALCIUM 10.1 9.6     Recent Labs     02/12/21  1746   AST 29   ALT 38   BILITOT 0.7   ALKPHOS 82     No results for input(s): INR in the last 72 hours. Recent Labs     02/15/21  0011 02/15/21  0421 02/15/21  1100   TROPONINI 0.05* 0.06* 0.06*       Urinalysis:    No results found for: Medina Savage, BACTERIA, RBCUA, BLOODU, SPECGRAV, GLUCOSEU    Radiology:  XR CHEST PORTABLE   Final Result   No acute cardiopulmonary disease.                  Assessment/Plan:    Active Hospital Problems    Diagnosis    Coronary artery disease involving native coronary artery of native heart with unstable angina pectoris ([facility] [I25.110]     Priority: High    CAD in native artery [I25.10]    Hyperlipidemia [E78.5]    Unstable angina ([address] [I20.0]    Diabetes mellitus [E11.9]          Chest pain- with Coronary artery disease involving native coronary artery of native heart with unstable angina pectoris  -Known LCx 40% residual stenosis in OM1 stent.  Needing intervention.   -Cardiology consulted  -Rule out ACS with serial cardiac biomarkers  -Heparin GTT initiated  -Telemetry monitored  -Continued Imdur and beta-blocker   -nitro ggt started 2/14, stopped 2/15  -supp oxygen started   -LHC done 2/15, noted severe restenosis of OM1(tx with NC and angiosculpt), severe stenosis of prox LAD, started on cangrelor ggt  -CT surg consulted    CAD in native artery   Complicated by in-stent restenosis OM1 , also s/p multiple PCI's in the past to LAD, OM1, diagonal 1  last LHC - difficult OM1 stenosis heavily fibrotic lesion, requiring multiple attempts of balloon inflation and laser atherectomy.  Patient could not tolerate procedure anymore, developed chest pain and procedure aborted.  Still has 40% residual stenosis. Continued DAPT, statin, beta-blocker, Imdur, lisinopril  May need lithotripsy and brachytherapy in [facility](defer to cards on this)     Essential hypertension, uncontrolled iinitially on admission  -Resumed home antihypertensive regimen     Hyperlipidemia-resumed statin     DM type II-controlled, Metformin and glipizide on hold, placed on low-dose sliding scale insulin with Accu-Cheks and hypoglycemia protocol     JZLAVNX XSD  33.32  Complicating assessment and treatment, placing patient at high risk for multiple co-morbidities as well as early death and contributing to the patient's presentation.  Counseled on weight loss.        DVT Prophylaxis: On heparin drip  Diet: DIET CARDIAC; Carb Control: 3 carb choices (45 gms)/meal  Code Status: Full Code    PT/OT Eval Status: not ordered    Dispo - icu care, per cards, pending CT surg Sylvia Linton MD

## 2021-02-16 ENCOUNTER — APPOINTMENT (OUTPATIENT)
Dept: VASCULAR LAB | Age: 61
DRG: 231 | End: 2021-02-16
Payer: COMMERCIAL

## 2021-02-16 LAB
ANION GAP SERPL CALCULATED.3IONS-SCNC: 10 MMOL/L (ref 3–16)
BUN BLDV-MCNC: 10 MG/DL (ref 7–20)
CALCIUM SERPL-MCNC: 9.2 MG/DL (ref 8.3–10.6)
CHLORIDE BLD-SCNC: 100 MMOL/L (ref 99–110)
CHOLESTEROL, TOTAL: 106 MG/DL (ref 0–199)
CO2: 25 MMOL/L (ref 21–32)
CREAT SERPL-MCNC: 0.6 MG/DL (ref 0.8–1.3)
GFR AFRICAN AMERICAN: >60
GFR NON-AFRICAN AMERICAN: >60
GLUCOSE BLD-MCNC: 198 MG/DL (ref 70–99)
GLUCOSE BLD-MCNC: 233 MG/DL (ref 70–99)
GLUCOSE BLD-MCNC: 238 MG/DL (ref 70–99)
GLUCOSE BLD-MCNC: 254 MG/DL (ref 70–99)
HCT VFR BLD CALC: 43.7 % (ref 40.5–52.5)
HDLC SERPL-MCNC: 44 MG/DL (ref 40–60)
HEMOGLOBIN: 14.5 G/DL (ref 13.5–17.5)
LDL CHOLESTEROL CALCULATED: 43 MG/DL
LV EF: 55 %
LVEF MODALITY: NORMAL
MCH RBC QN AUTO: 30.2 PG (ref 26–34)
MCHC RBC AUTO-ENTMCNC: 33.2 G/DL (ref 31–36)
MCV RBC AUTO: 90.9 FL (ref 80–100)
PDW BLD-RTO: 13.3 % (ref 12.4–15.4)
PERFORMED ON: ABNORMAL
PLATELET # BLD: 213 K/UL (ref 135–450)
PMV BLD AUTO: 8.1 FL (ref 5–10.5)
POTASSIUM SERPL-SCNC: 3.8 MMOL/L (ref 3.5–5.1)
RBC # BLD: 4.81 M/UL (ref 4.2–5.9)
SODIUM BLD-SCNC: 135 MMOL/L (ref 136–145)
TRIGL SERPL-MCNC: 96 MG/DL (ref 0–150)
TROPONIN: 0.08 NG/ML
TROPONIN: 0.09 NG/ML
VLDLC SERPL CALC-MCNC: 19 MG/DL
WBC # BLD: 12.3 K/UL (ref 4–11)

## 2021-02-16 PROCEDURE — 84484 ASSAY OF TROPONIN QUANT: CPT

## 2021-02-16 PROCEDURE — 93971 EXTREMITY STUDY: CPT

## 2021-02-16 PROCEDURE — 6370000000 HC RX 637 (ALT 250 FOR IP): Performed by: INTERNAL MEDICINE

## 2021-02-16 PROCEDURE — 99232 SBSQ HOSP IP/OBS MODERATE 35: CPT | Performed by: NURSE PRACTITIONER

## 2021-02-16 PROCEDURE — 80048 BASIC METABOLIC PNL TOTAL CA: CPT

## 2021-02-16 PROCEDURE — 93880 EXTRACRANIAL BILAT STUDY: CPT

## 2021-02-16 PROCEDURE — 99233 SBSQ HOSP IP/OBS HIGH 50: CPT | Performed by: INTERNAL MEDICINE

## 2021-02-16 PROCEDURE — 80061 LIPID PANEL: CPT

## 2021-02-16 PROCEDURE — 2580000003 HC RX 258: Performed by: INTERNAL MEDICINE

## 2021-02-16 PROCEDURE — 6360000002 HC RX W HCPCS: Performed by: INTERNAL MEDICINE

## 2021-02-16 PROCEDURE — 93306 TTE W/DOPPLER COMPLETE: CPT

## 2021-02-16 PROCEDURE — 36415 COLL VENOUS BLD VENIPUNCTURE: CPT

## 2021-02-16 PROCEDURE — 2000000000 HC ICU R&B

## 2021-02-16 PROCEDURE — 85027 COMPLETE CBC AUTOMATED: CPT

## 2021-02-16 PROCEDURE — C9460 INJECTION, CANGRELOR: HCPCS | Performed by: INTERNAL MEDICINE

## 2021-02-16 RX ADMIN — INSULIN LISPRO 2 UNITS: 100 INJECTION, SOLUTION INTRAVENOUS; SUBCUTANEOUS at 13:01

## 2021-02-16 RX ADMIN — SODIUM CHLORIDE, PRESERVATIVE FREE 10 ML: 5 INJECTION INTRAVENOUS at 20:56

## 2021-02-16 RX ADMIN — INSULIN LISPRO 1 UNITS: 100 INJECTION, SOLUTION INTRAVENOUS; SUBCUTANEOUS at 08:40

## 2021-02-16 RX ADMIN — LISINOPRIL 2.5 MG: 5 TABLET ORAL at 08:45

## 2021-02-16 RX ADMIN — CANGRELOR 0.75 MCG/KG/MIN: 50 INJECTION, POWDER, LYOPHILIZED, FOR SOLUTION INTRAVENOUS at 08:43

## 2021-02-16 RX ADMIN — MUPIROCIN: 20 OINTMENT TOPICAL at 20:56

## 2021-02-16 RX ADMIN — METOPROLOL TARTRATE 25 MG: 25 TABLET, FILM COATED ORAL at 20:56

## 2021-02-16 RX ADMIN — INSULIN LISPRO 2 UNITS: 100 INJECTION, SOLUTION INTRAVENOUS; SUBCUTANEOUS at 20:57

## 2021-02-16 RX ADMIN — AMLODIPINE BESYLATE 2.5 MG: 2.5 TABLET ORAL at 13:04

## 2021-02-16 RX ADMIN — METOPROLOL TARTRATE 25 MG: 25 TABLET, FILM COATED ORAL at 08:44

## 2021-02-16 RX ADMIN — INSULIN LISPRO 2 UNITS: 100 INJECTION, SOLUTION INTRAVENOUS; SUBCUTANEOUS at 17:06

## 2021-02-16 RX ADMIN — MUPIROCIN: 20 OINTMENT TOPICAL at 13:51

## 2021-02-16 RX ADMIN — CANGRELOR 0.75 MCG/KG/MIN: 50 INJECTION, POWDER, LYOPHILIZED, FOR SOLUTION INTRAVENOUS at 19:55

## 2021-02-16 RX ADMIN — ATORVASTATIN CALCIUM 40 MG: 40 TABLET, FILM COATED ORAL at 17:06

## 2021-02-16 RX ADMIN — ASPIRIN 81 MG: 81 TABLET, CHEWABLE ORAL at 08:43

## 2021-02-16 RX ADMIN — POLYETHYLENE GLYCOL 3350 17 G: 17 POWDER, FOR SOLUTION ORAL at 13:51

## 2021-02-16 ASSESSMENT — PAIN SCALES - GENERAL: PAINLEVEL_OUTOF10: 0

## 2021-02-16 NOTE — PROGRESS NOTES
CVTS Thoracic Progress Note:          CC: multivessel CAD     Subj: Awake, sitting up in bed, in NAD    Obj:    Blood pressure 101/65, pulse 88, temperature 99.3 °F (37.4 °C), temperature source Oral, resp. rate 24, height 5' 10\" (1.778 m), weight 245 lb 6 oz (111.3 kg), SpO2 97 %. Lungs CTAB   Abdomen round, soft, non-tender   UOP 1500 ml in 24 hrs; Cr 0.6    Diagnostics:   Recent Labs     21  0839 21  0521   WBC 8.0 12.3*   HGB 14.7 14.5   HCT 43.3 43.7    213                                                                  Recent Labs     21  0839 21  0521    135*   K 4.1 3.8   CL 99 100   CO2 28 25   BUN 12 10   CREATININE 0.6* 0.6*   GLUCOSE 198* 198*     Recent Labs     02/15/21  1747 02/15/21  2343 21  0521   TROPONINI 0.08* 0.08* 0.09*     CXR: 2/15/21  Impression   No acute cardiopulmonary disease. ECHO: 21 with Dr. Lou Delgado   Summary:    Left ventricular systolic function is normal with ejection fraction   estimated at 55%. No regional wall motion abnormalities. There is mild concentric left ventricular hypertrophy. Grade I diastolic dysfunction with normal left ventricular filling pressure. Carotid dopplers: 21   Right   The right internal carotid artery reveals a <50% diameter reducing stenosis. The right vertebral artery demonstrates normal antegrade flow. The right subclavian artery is visualized and demonstrates multiphasic flow. Left   The left internal carotid artery reveals a <50% diameter reducing stenosis. The left vertebral artery demonstrates normal antegrade flow. The left subclavian artery is visualized and demonstrates multiphasic flow. There are no prior studies for comparison. Vein mappin21   Right   There is no evidence of superficial venous thrombosis involving the greater   saphenous vein. Please refer to diameter measurements.    Left   There is no evidence of superficial venous thrombosis involving the greater   saphenous vein. Please refer to diameter measurements. Urine 2/15/21 (negative)     Assess/Plan:   Care per primary team    Multivessel CAD:   Continue with Plavix metabolization and Cangrelor gtt  Plan for OR on Friday. Had at length discussion with patient. All questions answered.    ______________________________________________________      Paulino Keller CNP  2/16/2021  1:15 PM

## 2021-02-16 NOTE — PLAN OF CARE
Description: Satisfaction with pain management regimen will improve  2/16/2021 1433 by Krystal Polo RN  Outcome: Ongoing  2/16/2021 1432 by Krystal Polo RN  Outcome: Ongoing  Goal: Diagnostic test results will improve  Description: Diagnostic test results will improve  2/16/2021 1433 by Krystal Polo RN  Outcome: Ongoing  2/16/2021 1432 by Krystal Polo RN  Outcome: Ongoing  Goal: Complications related to the disease process, condition or treatment will be avoided or minimized  Description: Complications related to the disease process, condition or treatment will be avoided or minimized  2/16/2021 1433 by Krystal Polo RN  Outcome: Ongoing  2/16/2021 1432 by Krystal Polo RN  Outcome: Ongoing     Problem: Coping:  Goal: Level of anxiety will decrease  Description: Level of anxiety will decrease  2/16/2021 1433 by Krystal Polo RN  Outcome: Ongoing  2/16/2021 1432 by Krystal Polo RN  Outcome: Ongoing  Goal: Ability to cope will improve  Description: Ability to cope will improve  2/16/2021 1433 by Krystal Polo RN  Outcome: Ongoing  2/16/2021 1432 by Krystal Polo RN  Outcome: Ongoing  Goal: Understanding of sexual limitations or changes related to disease process or condition will improve  Description: Understanding of sexual limitations or changes related to disease process or condition will improve  2/16/2021 1433 by Krystal Polo RN  Outcome: Ongoing  2/16/2021 1432 by Krystal Polo RN  Outcome: Ongoing     Problem: Fluid Volume:  Goal: Risk for excess fluid volume will decrease  Description: Risk for excess fluid volume will decrease  2/16/2021 1433 by Krystal Polo RN  Outcome: Ongoing  2/16/2021 1432 by Krystal Polo RN  Outcome: Ongoing     Problem: Health Behavior:  Goal: Identification of resources available to assist in meeting health care needs will improve Description: Identification of resources available to assist in meeting health care needs will improve  2/16/2021 1433 by Mouna Harman RN  Outcome: Ongoing  2/16/2021 1432 by Mouna Harman RN  Outcome: Ongoing     Problem: Nutritional:  Goal: Ability to identify appropriate dietary choices will improve  Description: Ability to identify appropriate dietary choices will improve  2/16/2021 1433 by Mouna Harman RN  Outcome: Ongoing  2/16/2021 1432 by Mouna Harman RN  Outcome: Ongoing     Problem: Respiratory:  Goal: Levels of oxygenation will improve  Description: Levels of oxygenation will improve  2/16/2021 1433 by Mouna Harman RN  Outcome: Ongoing  2/16/2021 1432 by Mouna Harman RN  Outcome: Ongoing     Problem: Safety:  Goal: Ability to remain free from injury will improve  Description: Ability to remain free from injury will improve  2/16/2021 1433 by Mouna Harman RN  Outcome: Ongoing  2/16/2021 1432 by Mouna Harman RN  Outcome: Ongoing     Problem: Preoperative Routine:  Description: WHO Universal Protocol. Consider the implementation of a low glycemic index diet prior to surgery. Consider the implementation of an infection control program.Consider the preoperative use of intra-aortic balloon pump counterpulsation.   Goal: Will comply with preparation for surgery or procedure  Description: Will comply with preparation for surgery or procedure  Outcome: Ongoing

## 2021-02-16 NOTE — PLAN OF CARE
Problem: Pain:  Description: Pain management should include both nonpharmacologic and pharmacologic interventions. Goal: Pain level will decrease  Description: Pain level will decrease  2/16/2021 1432 by Nasim Newman RN  Outcome: Ongoing  2/16/2021 8884 by Julia Ann RN  Outcome: Ongoing  Goal: Control of acute pain  Description: Control of acute pain  2/16/2021 1432 by Nasim Newman RN  Outcome: Ongoing  2/16/2021 0613 by Julia Ann RN  Outcome: Ongoing  Goal: Control of chronic pain  Description: Control of chronic pain  2/16/2021 1432 by Nasim Newman RN  Outcome: Ongoing  2/16/2021 0613 by Julia Ann RN  Outcome: Ongoing     Problem:  Activity:  Goal: Ability to tolerate increased activity will improve  Description: Ability to tolerate increased activity will improve  Outcome: Ongoing     Problem: Cardiac:  Goal: Ability to achieve and maintain adequate cardiopulmonary perfusion will improve  Description: Ability to achieve and maintain adequate cardiopulmonary perfusion will improve  Outcome: Ongoing  Goal: Hemodynamic stability will improve  Description: Hemodynamic stability will improve  Outcome: Ongoing  Goal: Will show no evidence of cardiac arrhythmias  Description: Will show no evidence of cardiac arrhythmias  Outcome: Ongoing  Goal: Satisfaction with pain management regimen will improve  Description: Satisfaction with pain management regimen will improve  Outcome: Ongoing  Goal: Diagnostic test results will improve  Description: Diagnostic test results will improve  Outcome: Ongoing  Goal: Complications related to the disease process, condition or treatment will be avoided or minimized  Description: Complications related to the disease process, condition or treatment will be avoided or minimized  Outcome: Ongoing     Problem: Coping:  Goal: Level of anxiety will decrease  Description: Level of anxiety will decrease  Outcome: Ongoing  Goal: Ability to cope will improve Description: Ability to cope will improve  Outcome: Ongoing  Goal: Understanding of sexual limitations or changes related to disease process or condition will improve  Description: Understanding of sexual limitations or changes related to disease process or condition will improve  Outcome: Ongoing     Problem: Fluid Volume:  Goal: Risk for excess fluid volume will decrease  Description: Risk for excess fluid volume will decrease  Outcome: Ongoing     Problem: Health Behavior:  Goal: Identification of resources available to assist in meeting health care needs will improve  Description: Identification of resources available to assist in meeting health care needs will improve  Outcome: Ongoing     Problem: Nutritional:  Goal: Ability to identify appropriate dietary choices will improve  Description: Ability to identify appropriate dietary choices will improve  Outcome: Ongoing     Problem: Respiratory:  Goal: Levels of oxygenation will improve  Description: Levels of oxygenation will improve  Outcome: Ongoing     Problem: Safety:  Goal: Ability to remain free from injury will improve  Description: Ability to remain free from injury will improve  Outcome: Ongoing

## 2021-02-16 NOTE — PROGRESS NOTES
Hospitalist Progress Note      PCP: Ani Marte DO    Date of Admission: 2/12/2021    [facility] Complaint: chest pain     Hospital Course: 61 yo WM with hx of cad(multiple pci), htn, hld etoh use, dm2 p/w chest pain.      Subjective: currently denies cp, resting in chair, wife not currently at bedside     Medications:  Reviewed    Infusion Medications    [facility] 0.75 mcg/kg/min (02/15/21 1022)    nitroGLYCERIN Stopped (02/15/21 0858)    dextrose       Scheduled Medications    mupirocin   Nasal BID    lidocaine 1 % injection  5 mL Intradermal Once    sodium chloride flush  10 mL Intravenous 2 times per day    amLODIPine  2.5 mg Oral Daily    aspirin  81 mg Oral Daily    atorvastatin  40 mg Oral QPM    lisinopril  2.5 mg Oral Daily    metoprolol tartrate  25 mg Oral BID    insulin lispro  0-6 Units Subcutaneous TID WC    insulin lispro  0-3 Units Subcutaneous Nightly    sodium chloride flush  10 mL Intravenous 2 times per day     PRN Meds: sodium chloride flush, perflutren lipid microspheres, budesonide-formoterol, morphine, LORazepam, diazePAM, glucose, dextrose, glucagon (rDNA), dextrose, sodium chloride flush, promethazine **OR** ondansetron, polyethylene glycol, acetaminophen **OR** acetaminophen, nitroGLYCERIN      Intake/Output Summary (Last 24 hours) at 2/16/2021 0718  Last data filed at 2/15/2021 1800  Gross per 24 hour   Intake 1081 ml   Output 1500 ml   Net -419 ml       Physical Exam Performed:    BP (!) 118/51   Pulse 89   Temp 98.8 °F (37.1 °C) (Axillary)   Resp 14   Ht 5' 10\" (1.778 m)   Wt 245 lb 6 oz (111.3 kg)   SpO2 95%   BMI 35.21 kg/m²     General appearance: No apparent distress, appears stated age and cooperative. overweight  HEENT: Pupils equal, round, and reactive to light. Conjunctivae/corneas clear. Neck: Supple, with full range of motion. No jugular venous distention. Trachea midline. Respiratory:  Normal respiratory effort.  Clear to auscultation, bilaterally without Rales/Wheezes/Rhonchi. Cardiovascular: Regular rate and rhythm with normal S1/S2 without murmurs, rubs or gallops. Abdomen: Soft, non-tender, non-distended with normal bowel sounds. Musculoskeletal: No clubbing, cyanosis or edema bilaterally.  Full range of motion without deformity. Skin: Skin color, texture, turgor normal.  No rashes or lesions. Neurologic:  Neurovascularly intact without any focal sensory/motor deficits. Cranial nerves: II-XII intact, grossly non-focal.  Psychiatric: Alert and oriented, thought content appropriate, normal insight  Capillary Refill: Brisk,< 3 seconds   Peripheral Pulses: +2 palpable, equal bilaterally       Labs:   Recent Labs     02/14/21  0839 02/16/21  0521   WBC 8.0 12.3*   HGB 14.7 14.5   HCT 43.3 43.7    213     Recent Labs     02/14/21  0839 02/16/21  0521    135*   K 4.1 3.8   CL 99 100   CO2 28 25   BUN 12 10   CREATININE 0.6* 0.6*   CALCIUM 9.6 9.2     No results for input(s): AST, ALT, BILIDIR, BILITOT, ALKPHOS in the last 72 hours. No results for input(s): INR in the last 72 hours. Recent Labs     02/15/21  1747 02/15/21  2343 02/16/21  0521   TROPONINI 0.08* 0.08* 0.09*       Urinalysis:      Lab Results   Component Value Date    NITRU Negative 02/15/2021    BLOODU Negative 02/15/2021    SPECGRAV 1.015 02/15/2021    GLUCOSEU 500 02/15/2021       Radiology:  IR PICC WO SQ PORT/PUMP > 5 YEARS   Final Result   Unsuccessful placement of PICC line. XR CHEST PORTABLE   Final Result   No acute cardiopulmonary disease.          VL PRE OP VEIN MAPPING    (Results Pending)   VL DUP CAROTID BILATERAL    (Results Pending)           Assessment/Plan:    Active Hospital Problems    Diagnosis    Coronary artery disease involving native coronary artery of native heart with unstable angina pectoris [facility] [I25.110]     Priority: High    CAD in native artery [I25.10]    Hyperlipidemia [E78.5]    Unstable angina ([facility] [I20.0]  Diabetes mellitus [E11.9]            Chest pain- with Coronary artery disease involving native coronary artery of native heart with unstable angina pectoris  -Known LCx 40% residual stenosis in OM1 stent.  Needing intervention.   -Cardiology consulted  -Rule out ACS with serial cardiac biomarkers  -Heparin GTT initiated  -Telemetry monitored  -Continued Imdur and beta-blocker   -nitro ggt started 2/14, stopped 2/15  -supp oxygen started   -LHC done 2/15, noted severe restenosis of OM1(tx with NC and angiosculpt), severe stenosis of prox LAD, started on cangrelor ggt  -CT surg consulted     CAD in native artery   Complicated by in-stent restenosis OM1 , also s/p multiple PCI's in the past to LAD, OM1, diagonal 1  last LHC - difficult OM1 stenosis heavily fibrotic lesion, requiring multiple attempts of balloon inflation and laser atherectomy.  Patient could not tolerate procedure anymore, developed chest pain and procedure aborted.  Still has 40% residual stenosis. -There was prior consideration of lithotripsy and brachytherapy in [facility](defer to cards on this)  Continued DAPT, statin, beta-blocker, Imdur, lisinopril     Essential hypertension, uncontrolled iinitially on admission  -Resumed home antihypertensive regimen     Hyperlipidemia-resumed statin     DM type II- somewhat controlled, a1c 8.4, Metformin and glipizide on hold, placed on low-dose sliding scale insulin with Accu-Cheks and hypoglycemia protocol     BREOK O  09.61  Complicating assessment and treatment, placing patient at high risk for multiple co-morbidities as well as early death and contributing to the patient's presentation.  Counseled on weight loss.        DVT Prophylaxis: On cangrelor ggt  Diet: DIET CARDIAC; Carb Control: 3 carb choices (45 gms)/meal  Code Status: Full Code    PT/OT Eval Status: not ordered    Dispo - pending cards/CTsurg recs, pending Kyle Forbes MD

## 2021-02-16 NOTE — PROGRESS NOTES
[facility]   Daily Cardiovascular Progress Note    Admit Date: 2/12/2021    Chief complaint: CP  HPI: states feels much much better      Medications/Labs all Reviewed:  Patient Active Problem List   Diagnosis    Type II or unspecified type diabetes mellitus without mention of complication, not stated as uncontrolled    Diabetes mellitus    History of ETOH abuse    Patient overweight    Patient overweight    Dietary noncompliance    Alcohol abuse    Flank pain    Prostatism    Low serum testosterone level    Arm pain, left    Shoulder pain    Precordial pain    Lung nodule    Acute angina (HCC)    Abnormal stress test    Acute coronary syndrome (HCC)    Unstable angina ([address]    Coronary artery disease involving native coronary artery of native heart with unstable angina pectoris ([address]    Other chest pain    S/P drug eluting coronary stent placement    Essential hypertension    Obesity    Abnormal chest x-ray    Angina effort    Skin lesion    Angina at rest [facility])    Hyperlipidemia    Angina pectoris ([address]    NSTEMI (non-ST elevated myocardial infarction) ([address]    Controlled type 2 diabetes mellitus without complication, without long-term current use of insulin ([facility]    ASHD (arteriosclerotic heart disease)    Mass of right foot    Overweight    History of angina pectoris    Reactive airway disease    Bronchitis    Reactive airway disease with wheezing, moderate persistent, with acute exacerbation    Need for prophylactic vaccination and inoculation against varicella    Need for prophylactic vaccination against diphtheria-tetanus-pertussis (DTP)    Chronic right-sided thoracic back pain    CAD in native artery    S/P PTCA (percutaneous transluminal coronary angioplasty)    Chest pain       Medications:      mupirocin (BACTROBAN) 2 % ointment, BID      cangrelor (KENGREAL) 50 mg in sodium chloride 0.9 % 250 mL infusion, Continuous      lidocaine 1 % injection 5 mL, Once      perflutren lipid microspheres (DEFINITY) injection 1.65 mg, ONCE PRN      nitroGLYCERIN 50 mg in dextrose 5% 250 mL infusion, Continuous      budesonide-formoterol (SYMBICORT) 80-4.5 MCG/ACT inhaler 2 puff, PRN      morphine (PF) injection 4 mg, Q2H PRN      LORazepam (ATIVAN) injection 1 mg, Q4H PRN      diazePAM (VALIUM) tablet 2 mg, Q12H PRN      amLODIPine (NORVASC) tablet 2.5 mg, Daily      aspirin chewable tablet 81 mg, Daily      atorvastatin (LIPITOR) tablet 40 mg, QPM      lisinopril (PRINIVIL;ZESTRIL) tablet 2.5 mg, Daily      metoprolol tartrate (LOPRESSOR) tablet 25 mg, BID      glucose (GLUTOSE) 40 % oral gel 15 g, PRN      dextrose 50 % IV solution, PRN      glucagon (rDNA) injection 1 mg, PRN      dextrose 5 % solution, PRN      insulin lispro (HUMALOG) injection vial 0-6 Units, TID WC      insulin lispro (HUMALOG) injection vial 0-3 Units, Nightly      sodium chloride flush 0.9 % injection 10 mL, 2 times per day      sodium chloride flush 0.9 % injection 10 mL, PRN      promethazine (PHENERGAN) tablet 12.5 mg, Q6H PRN    Or      ondansetron (ZOFRAN) injection 4 mg, Q6H PRN      polyethylene glycol (GLYCOLAX) packet 17 g, Daily PRN      acetaminophen (TYLENOL) tablet 650 mg, Q6H PRN    Or      acetaminophen (TYLENOL) suppository 650 mg, Q6H PRN      nitroGLYCERIN (NITROSTAT) SL tablet 0.4 mg, Q5 Min PRN           PHYSICAL EXAM   /65   Pulse 88   Temp 99.3 °F (37.4 °C) (Oral)   Resp 24   Ht 5' 10\" (1.778 m)   Wt 245 lb 6 oz (111.3 kg)   SpO2 97%   BMI 35.21 kg/m²    Vitals:    02/16/21 0400 02/16/21 0500 02/16/21 0600 02/16/21 0807   BP: 122/70 130/71 (!) 118/51 101/65   Pulse: 92 86 89 88   Resp: 14 13 14 24   Temp:    99.3 °F (37.4 °C)   TempSrc:    Oral   SpO2: 96% 96% 95% 97%   Weight:   245 lb 6 oz (111.3 kg)    Height:             Intake/Output Summary (Last 24 hours) at 2/16/2021 0848  Last data filed at 2/15/2021 1800  Gross per 24 hour   Intake 1081 ml   Output 1125 ml   Net -44 ml     Wt Readings from Last 3 Encounters:   02/16/21 245 lb 6 oz (111.3 kg)   01/07/21 258 lb (117 kg)   12/21/20 258 lb (117 kg)         Gen: Patient in NAD, resting comfortably  Neck: No JVD or bruits  Respiratory: CTAB no WRR  Chest: normal without deformity  Cardiovascular:RRR, S1S2, no mrg, normal PMI  Abdomen: Soft, NTND, Normal BS  Extremities: No clubbing, cyanosis, or edema, rt groin site c/d/i  Neurological/Psychiatric: AxO x4, No gross motors/sensory deficits  Skin:  Warm and dry      Labs:  CBC:   Recent Labs     02/14/21  0839 02/16/21  0521   WBC 8.0 12.3*   HGB 14.7 14.5   HCT 43.3 43.7   MCV 89.3 90.9    213     BMP:   Recent Labs     02/14/21  0839 02/16/21  0521    135*   K 4.1 3.8   CL 99 100   CO2 28 25   BUN 12 10   CREATININE 0.6* 0.6*     MG:  No results for input(s): MG in the last 72 hours. PT/INR: No results for input(s): PROTIME, INR in the last 72 hours. APTT:   Recent Labs     02/14/21  2006 02/15/21  0421 02/15/21  1100   APTT 99.3* 60.2* 137.6*     Cardiac Enzymes:   Recent Labs     02/15/21  1747 02/15/21  2343 02/16/21  0521   TROPONINI 0.08* 0.08* 0.09*       Cardiac Studies:          Assessment and Plan     1. CAD   - 2/15/2021 NSTEMI-->Palliation POBA to OM              - 12/15/2020 POBA/Laser to OM1 (40% residual) case aborted due to intollerance              - 9/21/2020 Laser/cutter and PCI to mid LAD              - 9/2017 Rotablade and PCI to [address]              - 8/2017 PCI to m/d LAD, diag 1  2. Ischemic cardiomyopathy: LVEF 40% on LV Gram  3. HTN: controlled (high today a in visit but normal home logs)  4. HLD: controlled  5. HX of likely COVID +: Patient has an excellent story for COVID symptomatology but unfortunately testing was too late and was negative  6. EtOH abuse: Has quit! PLAN  1. Awaiting surgical timing. 2. Updated ECHO pending  3. Cont Norvasc, ASA, lipitor, Lisinopril, lopressor  4.  Cont Cangrelor until surgery        Patient Active Problem List   Diagnosis    Type II or unspecified type diabetes mellitus without mention of complication, not stated as uncontrolled    Diabetes mellitus    History of ETOH abuse    Patient overweight    Patient overweight    Dietary noncompliance    Alcohol abuse    Flank pain    Prostatism    Low serum testosterone level    Arm pain, left    Shoulder pain    Precordial pain    Lung nodule    Acute angina (HCC)    Abnormal stress test    Acute coronary syndrome (HCC)    Unstable [facility]ina ([facility]    Coronary artery disease involving native coronary artery of native heart with unstable angina pectoris (HCC)    Other chest pain    S/P drug eluting coronary stent placement    Essential hypertension    Obesity    Abnormal chest x-ray    Angina effort    Skin lesion    Angina at rest [facility])    Hyperlipidemia    Angina pectoris ([facility]    NSTEMI (non-ST elevated myocardial infarction) ([facility]    Controlled type 2 diabetes mellitus without complication, without long-term current use of insulin ([facility]    ASHD (arteriosclerotic heart disease)    Mass of right foot    Overweight    History of angina pectoris    Reactive airway disease    Bronchitis    Reactive airway disease with wheezing, moderate persistent, with acute exacerbation    Need for prophylactic vaccination and inoculation against varicella    Need for prophylactic vaccination against diphtheria-tetanus-pertussis (DTP)    Chronic right-sided thoracic back pain    CAD in native artery    S/P PTCA (percutaneous transluminal coronary angioplasty)    Chest pain         Thank you for allowing me to participate in the care of your patient. Please call me with any questions [phone].       Iwona Barros MD, [address] [place] [address] Cardiologist  [address]  [phone] [facility]  [phone] [address] [place]  2/16/2021 8:48 AM

## 2021-02-16 NOTE — PROGRESS NOTES
Spoke with Dr. Maribel Ahmadi via phone and informed that [facility] still ordered and on call PICC team calling to question need. MD ok that pt does not have PICC since we are not doing serial lab draws. Pt OK with having daily labs drawn peripherally. Cancel PICC order.  Corona Pacheco

## 2021-02-17 LAB
ANION GAP SERPL CALCULATED.3IONS-SCNC: 8 MMOL/L (ref 3–16)
BASOPHILS ABSOLUTE: 0.1 K/UL (ref 0–0.2)
BASOPHILS RELATIVE PERCENT: 0.9 %
BUN BLDV-MCNC: 10 MG/DL (ref 7–20)
CALCIUM SERPL-MCNC: 9.4 MG/DL (ref 8.3–10.6)
CHLORIDE BLD-SCNC: 100 MMOL/L (ref 99–110)
CO2: 27 MMOL/L (ref 21–32)
CREAT SERPL-MCNC: 0.6 MG/DL (ref 0.8–1.3)
EOSINOPHILS ABSOLUTE: 0.3 K/UL (ref 0–0.6)
EOSINOPHILS RELATIVE PERCENT: 3.3 %
GFR AFRICAN AMERICAN: >60
GFR NON-AFRICAN AMERICAN: >60
GLUCOSE BLD-MCNC: 216 MG/DL (ref 70–99)
GLUCOSE BLD-MCNC: 219 MG/DL (ref 70–99)
GLUCOSE BLD-MCNC: 232 MG/DL (ref 70–99)
GLUCOSE BLD-MCNC: 265 MG/DL (ref 70–99)
GLUCOSE BLD-MCNC: 303 MG/DL (ref 70–99)
HCT VFR BLD CALC: 41.1 % (ref 40.5–52.5)
HEMOGLOBIN: 14 G/DL (ref 13.5–17.5)
LYMPHOCYTES ABSOLUTE: 2 K/UL (ref 1–5.1)
LYMPHOCYTES RELATIVE PERCENT: 23.4 %
MCH RBC QN AUTO: 30.8 PG (ref 26–34)
MCHC RBC AUTO-ENTMCNC: 34.1 G/DL (ref 31–36)
MCV RBC AUTO: 90.2 FL (ref 80–100)
MONOCYTES ABSOLUTE: 0.6 K/UL (ref 0–1.3)
MONOCYTES RELATIVE PERCENT: 7.4 %
NEUTROPHILS ABSOLUTE: 5.6 K/UL (ref 1.7–7.7)
NEUTROPHILS RELATIVE PERCENT: 65 %
PDW BLD-RTO: 13.5 % (ref 12.4–15.4)
PERFORMED ON: ABNORMAL
PLATELET # BLD: 246 K/UL (ref 135–450)
PMV BLD AUTO: 8 FL (ref 5–10.5)
POTASSIUM REFLEX MAGNESIUM: 4 MMOL/L (ref 3.5–5.1)
RBC # BLD: 4.56 M/UL (ref 4.2–5.9)
SODIUM BLD-SCNC: 135 MMOL/L (ref 136–145)
WBC # BLD: 8.5 K/UL (ref 4–11)

## 2021-02-17 PROCEDURE — 6360000002 HC RX W HCPCS: Performed by: INTERNAL MEDICINE

## 2021-02-17 PROCEDURE — C9460 INJECTION, CANGRELOR: HCPCS | Performed by: INTERNAL MEDICINE

## 2021-02-17 PROCEDURE — 36415 COLL VENOUS BLD VENIPUNCTURE: CPT

## 2021-02-17 PROCEDURE — 2580000003 HC RX 258: Performed by: INTERNAL MEDICINE

## 2021-02-17 PROCEDURE — 6370000000 HC RX 637 (ALT 250 FOR IP): Performed by: INTERNAL MEDICINE

## 2021-02-17 PROCEDURE — 80048 BASIC METABOLIC PNL TOTAL CA: CPT

## 2021-02-17 PROCEDURE — 2060000000 HC ICU INTERMEDIATE R&B

## 2021-02-17 PROCEDURE — 99232 SBSQ HOSP IP/OBS MODERATE 35: CPT | Performed by: NURSE PRACTITIONER

## 2021-02-17 PROCEDURE — 85025 COMPLETE CBC W/AUTO DIFF WBC: CPT

## 2021-02-17 RX ADMIN — INSULIN LISPRO 6 UNITS: 100 INJECTION, SOLUTION INTRAVENOUS; SUBCUTANEOUS at 17:38

## 2021-02-17 RX ADMIN — ASPIRIN 81 MG: 81 TABLET, CHEWABLE ORAL at 08:30

## 2021-02-17 RX ADMIN — METOPROLOL TARTRATE 25 MG: 25 TABLET, FILM COATED ORAL at 08:30

## 2021-02-17 RX ADMIN — ATORVASTATIN CALCIUM 40 MG: 40 TABLET, FILM COATED ORAL at 17:37

## 2021-02-17 RX ADMIN — SODIUM CHLORIDE, PRESERVATIVE FREE 10 ML: 5 INJECTION INTRAVENOUS at 08:30

## 2021-02-17 RX ADMIN — MUPIROCIN: 20 OINTMENT TOPICAL at 08:30

## 2021-02-17 RX ADMIN — METOPROLOL TARTRATE 25 MG: 25 TABLET, FILM COATED ORAL at 21:23

## 2021-02-17 RX ADMIN — LISINOPRIL 2.5 MG: 5 TABLET ORAL at 08:30

## 2021-02-17 RX ADMIN — CANGRELOR 0.75 MCG/KG/MIN: 50 INJECTION, POWDER, LYOPHILIZED, FOR SOLUTION INTRAVENOUS at 17:37

## 2021-02-17 RX ADMIN — CANGRELOR 0.75 MCG/KG/MIN: 50 INJECTION, POWDER, LYOPHILIZED, FOR SOLUTION INTRAVENOUS at 08:30

## 2021-02-17 RX ADMIN — SODIUM CHLORIDE, PRESERVATIVE FREE 10 ML: 5 INJECTION INTRAVENOUS at 21:27

## 2021-02-17 RX ADMIN — INSULIN LISPRO 3 UNITS: 100 INJECTION, SOLUTION INTRAVENOUS; SUBCUTANEOUS at 21:23

## 2021-02-17 RX ADMIN — AMLODIPINE BESYLATE 2.5 MG: 2.5 TABLET ORAL at 08:30

## 2021-02-17 RX ADMIN — INSULIN LISPRO 9 UNITS: 100 INJECTION, SOLUTION INTRAVENOUS; SUBCUTANEOUS at 13:07

## 2021-02-17 RX ADMIN — INSULIN LISPRO 2 UNITS: 100 INJECTION, SOLUTION INTRAVENOUS; SUBCUTANEOUS at 08:33

## 2021-02-17 ASSESSMENT — PAIN SCALES - GENERAL
PAINLEVEL_OUTOF10: 0

## 2021-02-17 NOTE — CARE COORDINATION
Chart review completed. Patient a 61year old male, admitted for unstable angina. Hospital day 5, currently in ICU level of care for plavix washout and plan for CABG on Friday. Per initial assessment pt independent prior to admission and denied needs. Call placed to patient room to introduce self. Pt states his wife is able to provide 24 hour care post discharge. Discussed likely need of HHC at discharge. No preference on agency . Referral to [facility]. Lianne Bernabe to follow.   Skylar Driver RN

## 2021-02-17 NOTE — PROGRESS NOTES
Hospitalist Progress Note      PCP: Ani Marte DO    Date of Admission: 2/12/2021    [facility] Complaint: chest pain     Hospital Course: 61 yo WM with hx of cad(multiple pci), htn, hld etoh use, dm2 p/w chest pain.      Subjective: currently denies cp, resting in chair, wife not currently at bedside, remains on cangrelor ggt    Medications:  Reviewed    Infusion Medications    cangrelor [facility]) infusion 0.75 mcg/kg/min (02/17/21 0830)    nitroGLYCERIN Stopped (02/15/21 0858)    dextrose       Scheduled Medications    mupirocin   Nasal BID    lidocaine 1 % injection  5 mL Intradermal Once    amLODIPine  2.5 mg Oral Daily    aspirin  81 mg Oral Daily    atorvastatin  40 mg Oral QPM    lisinopril  2.5 mg Oral Daily    metoprolol tartrate  25 mg Oral BID    insulin lispro  0-6 Units Subcutaneous TID WC    insulin lispro  0-3 Units Subcutaneous Nightly    sodium chloride flush  10 mL Intravenous 2 times per day     PRN Meds: perflutren lipid microspheres, morphine, LORazepam, diazePAM, glucose, dextrose, glucagon (rDNA), dextrose, sodium chloride flush, promethazine **OR** ondansetron, polyethylene glycol, acetaminophen **OR** acetaminophen, nitroGLYCERIN    No intake or output data in the 24 hours ending 02/17/21 1045    Physical Exam Performed:    BP (!) 162/70   Pulse 77   Temp 98.2 °F (36.8 °C) (Oral)   Resp 18   Ht 5' 10\" (1.778 m)   Wt 246 lb 11.1 oz (111.9 kg)   SpO2 96%   BMI 35.40 kg/m²     General appearance: No apparent distress, appears stated age and cooperative. overweight  HEENT: Pupils equal, round, and reactive to light. Conjunctivae/corneas clear. Neck: Supple, with full range of motion. No jugular venous distention. Trachea midline. Respiratory:  Normal respiratory effort. Clear to auscultation, bilaterally without Rales/Wheezes/Rhonchi. Cardiovascular: Regular rate and rhythm with normal S1/S2 without murmurs, rubs or gallops.   Abdomen: Soft, non-tender, non-distended with normal bowel sounds. Musculoskeletal: No clubbing, cyanosis or edema bilaterally.  Full range of motion without deformity. Skin: Skin color, texture, turgor normal.  No rashes or lesions. Neurologic:  Neurovascularly intact without any focal sensory/motor deficits. Cranial nerves: II-XII intact, grossly non-focal.  Psychiatric: Alert and oriented, thought content appropriate, normal insight  Capillary Refill: Brisk,< 3 seconds   Peripheral Pulses: +2 palpable, equal bilaterally       Labs:   Recent Labs     02/16/21  0521 02/17/21  0923   WBC 12.3* 8.5   HGB 14.5 14.0   HCT 43.7 41.1    246     Recent Labs     02/16/21  0521 02/17/21  0923   * 135*   K 3.8 4.0    100   CO2 25 27   BUN 10 10   CREATININE 0.6* 0.6*   CALCIUM 9.2 9.4     No results for input(s): AST, ALT, BILIDIR, BILITOT, ALKPHOS in the last 72 hours. No results for input(s): INR in the last 72 hours. Recent Labs     02/15/21  1747 02/15/21  2343 02/16/21  0521   TROPONINI 0.08* 0.08* 0.09*       Urinalysis:      Lab Results   Component Value Date    NITRU Negative 02/15/2021    BLOODU Negative 02/15/2021    SPECGRAV 1.015 02/15/2021    GLUCOSEU 500 02/15/2021       Radiology:  VL PRE OP VEIN MAPPING   Final Result      VL DUP CAROTID BILATERAL   Final Result      IR PICC WO SQ PORT/PUMP > 5 YEARS   Final Result   Unsuccessful placement of PICC line. XR CHEST PORTABLE   Final Result   No acute cardiopulmonary disease.                  Assessment/Plan:    Active Hospital Problems    Diagnosis    Coronary artery disease involving native coronary artery of native heart with unstable angina pectoris ([facility] [I25.110]     Priority: High    Ischemic cardiomyopathy [I25.5]    CAD in native artery [I25.10]    Hyperlipidemia [E78.5]    Unstable angina ([facility] [I20.0]    Diabetes mellitus [E11.9]            Chest pain- with Coronary artery disease involving native coronary artery of native heart with unstable angina

## 2021-02-17 NOTE — PLAN OF CARE
Problem: Pain:  Goal: Pain level will decrease  Description: Pain level will decrease  Outcome: Ongoing  Goal: Control of acute pain  Description: Control of acute pain  Outcome: Ongoing  Goal: Control of chronic pain  Description: Control of chronic pain  Outcome: Ongoing     Problem:  Activity:  Goal: Ability to tolerate increased activity will improve  Description: Ability to tolerate increased activity will improve  Outcome: Ongoing     Problem: Cardiac:  Goal: Ability to achieve and maintain adequate cardiopulmonary perfusion will improve  Description: Ability to achieve and maintain adequate cardiopulmonary perfusion will improve  Outcome: Ongoing  Goal: Hemodynamic stability will improve  Description: Hemodynamic stability will improve  Outcome: Ongoing  Goal: Will show no evidence of cardiac arrhythmias  Description: Will show no evidence of cardiac arrhythmias  Outcome: Ongoing  Goal: Satisfaction with pain management regimen will improve  Description: Satisfaction with pain management regimen will improve  Outcome: Ongoing  Goal: Diagnostic test results will improve  Description: Diagnostic test results will improve  Outcome: Ongoing  Goal: Complications related to the disease process, condition or treatment will be avoided or minimized  Description: Complications related to the disease process, condition or treatment will be avoided or minimized  Outcome: Ongoing     Problem: Coping:  Goal: Level of anxiety will decrease  Description: Level of anxiety will decrease  Outcome: Ongoing  Goal: Ability to cope will improve  Description: Ability to cope will improve  Outcome: Ongoing  Goal: Understanding of sexual limitations or changes related to disease process or condition will improve  Description: Understanding of sexual limitations or changes related to disease process or condition will improve  Outcome: Ongoing     Problem: Fluid Volume:  Goal: Risk for excess fluid volume will decrease Description: Risk for excess fluid volume will decrease  Outcome: Ongoing     Problem: Health Behavior:  Goal: Identification of resources available to assist in meeting health care needs will improve  Description: Identification of resources available to assist in meeting health care needs will improve  Outcome: Ongoing     Problem: Nutritional:  Goal: Ability to identify appropriate dietary choices will improve  Description: Ability to identify appropriate dietary choices will improve  Outcome: Ongoing     Problem: Respiratory:  Goal: Levels of oxygenation will improve  Description: Levels of oxygenation will improve  Outcome: Ongoing     Problem: Safety:  Goal: Ability to remain free from injury will improve  Description: Ability to remain free from injury will improve  Outcome: Ongoing     Problem: Preoperative Routine:  Goal: Will comply with preparation for surgery or procedure  Description: Will comply with preparation for surgery or procedure  Outcome: Ongoing

## 2021-02-17 NOTE — PROGRESS NOTES
CVTS Thoracic Progress Note:          CC: multivessel CAD     Subj: Awake, sitting up in chair, in NAD    Obj:    Blood pressure (!) 162/70, pulse 77, temperature 98.2 °F (36.8 °C), temperature source Oral, resp. rate 18, height 5' 10\" (1.778 m), weight 246 lb 11.1 oz (111.9 kg), SpO2 96 %. Lungs CTAB   Abdomen round, soft, non-tender   UOP 1500 ml in 24 hrs; Cr 0.6     Diagnostics:   Recent Labs     21  0521 21  0923   WBC 12.3* 8.5   HGB 14.5 14.0   HCT 43.7 41.1    246                                                                  Recent Labs     21  0521 21  0923   * 135*   K 3.8 4.0    100   CO2 25 27   BUN 10 10   CREATININE 0.6* 0.6*   GLUCOSE 198* 303*     Recent Labs     02/15/21  1747 02/15/21  2343 21  0521   TROPONINI 0.08* 0.08* 0.09*     CXR: 2/15/21  Impression   No acute cardiopulmonary disease. ECHO: 21 with Dr. Rudolph Alvarez   Summary:    Left ventricular systolic function is normal with ejection fraction   estimated at 55%. No regional wall motion abnormalities. There is mild concentric left ventricular hypertrophy. Grade I diastolic dysfunction with normal left ventricular filling pressure. Carotid dopplers: 21       Summary        The bilateral internal carotid arteries reveal <50% diameter reducing    stenosis.    The bilateral vertebral arteries have normal antegrade flow. Vein mappin21       Summary        Usable greater Saphenous vein conduit in both lower extremities, although    bilateral GSV small diameter.    Vein diameters listed in table. Urine 2/15/21 (negative)     Assess/Plan:   Care per primary team    Multivessel CAD:   Continue with Plavix metabolization and Cangrelor gtt  Plan for OR on Friday. Had at length discussion with patient and his wife. He denies any further questions at this time. OK for pt to transfer to C4 if ICU bed is needed for more critical patient. ______________________________________________________      Teresa Garcia, CNP  2/17/2021  12:31 PM

## 2021-02-17 NOTE — CARE COORDINATION
[facility]    Referral received from  to follow for home care services. I will follow for needs, and speak with patient to verify demos.     Matty Celestin RN, BSN CTN  [facility] [phone]

## 2021-02-17 NOTE — PROGRESS NOTES
Patient admitted to room 438 from 240, report received from [facility]. Patient oriented to room, call light, bed rails, phone, lights and bathroom. Patient instructed about the schedule of the day including: vital sign frequency, lab draws, possible tests, frequency of MD and staff rounds, including RN/MD rounding together at bedside, daily weights, and I &O's. Patient instructed about prescribed diet, how to use 8MENU, and television. No bed alarm in place, patient alert and independent. Telemetry box in place, patient aware of placement and reason. Bed locked, in lowest position, side rails up 2/4, call light within reach. Will continue to monitor.

## 2021-02-18 ENCOUNTER — ANESTHESIA EVENT (OUTPATIENT)
Dept: OPERATING ROOM | Age: 61
DRG: 231 | End: 2021-02-18
Payer: COMMERCIAL

## 2021-02-18 LAB
ABO/RH: NORMAL
ANION GAP SERPL CALCULATED.3IONS-SCNC: 14 MMOL/L (ref 3–16)
ANTIBODY SCREEN: NORMAL
BASOPHILS ABSOLUTE: 0.3 K/UL (ref 0–0.2)
BASOPHILS RELATIVE PERCENT: 3.9 %
BUN BLDV-MCNC: 11 MG/DL (ref 7–20)
CALCIUM SERPL-MCNC: 9.2 MG/DL (ref 8.3–10.6)
CHLORIDE BLD-SCNC: 101 MMOL/L (ref 99–110)
CO2: 20 MMOL/L (ref 21–32)
CREAT SERPL-MCNC: <0.5 MG/DL (ref 0.8–1.3)
EOSINOPHILS ABSOLUTE: 0.3 K/UL (ref 0–0.6)
EOSINOPHILS RELATIVE PERCENT: 4.4 %
GFR AFRICAN AMERICAN: >60
GFR NON-AFRICAN AMERICAN: >60
GLUCOSE BLD-MCNC: 192 MG/DL (ref 70–99)
GLUCOSE BLD-MCNC: 204 MG/DL (ref 70–99)
GLUCOSE BLD-MCNC: 220 MG/DL (ref 70–99)
GLUCOSE BLD-MCNC: 237 MG/DL (ref 70–99)
GLUCOSE BLD-MCNC: 294 MG/DL (ref 70–99)
HCT VFR BLD CALC: 45 % (ref 40.5–52.5)
HEMOGLOBIN: 14.7 G/DL (ref 13.5–17.5)
LYMPHOCYTES ABSOLUTE: 1.6 K/UL (ref 1–5.1)
LYMPHOCYTES RELATIVE PERCENT: 20 %
MCH RBC QN AUTO: 30.7 PG (ref 26–34)
MCHC RBC AUTO-ENTMCNC: 32.7 G/DL (ref 31–36)
MCV RBC AUTO: 94 FL (ref 80–100)
MONOCYTES ABSOLUTE: 0.5 K/UL (ref 0–1.3)
MONOCYTES RELATIVE PERCENT: 5.6 %
NEUTROPHILS ABSOLUTE: 5.3 K/UL (ref 1.7–7.7)
NEUTROPHILS RELATIVE PERCENT: 66.1 %
PDW BLD-RTO: 13.7 % (ref 12.4–15.4)
PERFORMED ON: ABNORMAL
PLATELET # BLD: 165 K/UL (ref 135–450)
PMV BLD AUTO: 7.8 FL (ref 5–10.5)
POC ACT LR: 143 SEC
POC ACT LR: 267 SEC
POC ACT LR: 310 SEC
POTASSIUM REFLEX MAGNESIUM: 4.2 MMOL/L (ref 3.5–5.1)
RBC # BLD: 4.78 M/UL (ref 4.2–5.9)
SODIUM BLD-SCNC: 135 MMOL/L (ref 136–145)
WBC # BLD: 8 K/UL (ref 4–11)

## 2021-02-18 PROCEDURE — 86900 BLOOD TYPING SEROLOGIC ABO: CPT

## 2021-02-18 PROCEDURE — 85025 COMPLETE CBC W/AUTO DIFF WBC: CPT

## 2021-02-18 PROCEDURE — 6370000000 HC RX 637 (ALT 250 FOR IP): Performed by: INTERNAL MEDICINE

## 2021-02-18 PROCEDURE — 2580000003 HC RX 258: Performed by: INTERNAL MEDICINE

## 2021-02-18 PROCEDURE — 99233 SBSQ HOSP IP/OBS HIGH 50: CPT | Performed by: INTERNAL MEDICINE

## 2021-02-18 PROCEDURE — 6370000000 HC RX 637 (ALT 250 FOR IP): Performed by: NURSE PRACTITIONER

## 2021-02-18 PROCEDURE — C9460 INJECTION, CANGRELOR: HCPCS | Performed by: INTERNAL MEDICINE

## 2021-02-18 PROCEDURE — 80048 BASIC METABOLIC PNL TOTAL CA: CPT

## 2021-02-18 PROCEDURE — 86923 COMPATIBILITY TEST ELECTRIC: CPT

## 2021-02-18 PROCEDURE — 6360000002 HC RX W HCPCS: Performed by: INTERNAL MEDICINE

## 2021-02-18 PROCEDURE — 86901 BLOOD TYPING SEROLOGIC RH(D): CPT

## 2021-02-18 PROCEDURE — 86850 RBC ANTIBODY SCREEN: CPT

## 2021-02-18 PROCEDURE — 2060000000 HC ICU INTERMEDIATE R&B

## 2021-02-18 PROCEDURE — 36415 COLL VENOUS BLD VENIPUNCTURE: CPT

## 2021-02-18 RX ORDER — BISACODYL 10 MG
10 SUPPOSITORY, RECTAL RECTAL ONCE
Status: DISCONTINUED | OUTPATIENT
Start: 2021-02-18 | End: 2021-02-19

## 2021-02-18 RX ORDER — MIDAZOLAM HYDROCHLORIDE 1 MG/ML
2 INJECTION INTRAMUSCULAR; INTRAVENOUS ONCE
Status: COMPLETED | OUTPATIENT
Start: 2021-02-19 | End: 2021-02-19

## 2021-02-18 RX ORDER — CHLORHEXIDINE GLUCONATE 4 G/100ML
SOLUTION TOPICAL SEE ADMIN INSTRUCTIONS
Status: COMPLETED | OUTPATIENT
Start: 2021-02-18 | End: 2021-02-19

## 2021-02-18 RX ORDER — SODIUM CHLORIDE, SODIUM LACTATE, POTASSIUM CHLORIDE, CALCIUM CHLORIDE 600; 310; 30; 20 MG/100ML; MG/100ML; MG/100ML; MG/100ML
1000 INJECTION, SOLUTION INTRAVENOUS CONTINUOUS
Status: DISCONTINUED | OUTPATIENT
Start: 2021-02-19 | End: 2021-02-19

## 2021-02-18 RX ORDER — CHLORHEXIDINE GLUCONATE 0.12 MG/ML
15 RINSE ORAL ONCE
Status: COMPLETED | OUTPATIENT
Start: 2021-02-18 | End: 2021-02-18

## 2021-02-18 RX ORDER — SODIUM CHLORIDE, SODIUM LACTATE, POTASSIUM CHLORIDE, CALCIUM CHLORIDE 600; 310; 30; 20 MG/100ML; MG/100ML; MG/100ML; MG/100ML
INJECTION, SOLUTION INTRAVENOUS CONTINUOUS
Status: DISCONTINUED | OUTPATIENT
Start: 2021-02-18 | End: 2021-02-19

## 2021-02-18 RX ORDER — ASPIRIN 81 MG/1
81 TABLET ORAL ONCE
Status: COMPLETED | OUTPATIENT
Start: 2021-02-19 | End: 2021-02-19

## 2021-02-18 RX ADMIN — CANGRELOR 0.75 MCG/KG/MIN: 50 INJECTION, POWDER, LYOPHILIZED, FOR SOLUTION INTRAVENOUS at 04:45

## 2021-02-18 RX ADMIN — MUPIROCIN: 20 OINTMENT TOPICAL at 20:34

## 2021-02-18 RX ADMIN — ASPIRIN 81 MG: 81 TABLET, CHEWABLE ORAL at 08:43

## 2021-02-18 RX ADMIN — INSULIN LISPRO 6 UNITS: 100 INJECTION, SOLUTION INTRAVENOUS; SUBCUTANEOUS at 17:37

## 2021-02-18 RX ADMIN — CANGRELOR 0.75 MCG/KG/MIN: 50 INJECTION, POWDER, LYOPHILIZED, FOR SOLUTION INTRAVENOUS at 23:34

## 2021-02-18 RX ADMIN — Medication 15 ML: at 21:02

## 2021-02-18 RX ADMIN — INSULIN LISPRO 3 UNITS: 100 INJECTION, SOLUTION INTRAVENOUS; SUBCUTANEOUS at 08:43

## 2021-02-18 RX ADMIN — SODIUM CHLORIDE, PRESERVATIVE FREE 10 ML: 5 INJECTION INTRAVENOUS at 20:35

## 2021-02-18 RX ADMIN — ANTISEPTIC SURGICAL SCRUB: 0.04 SOLUTION TOPICAL at 20:34

## 2021-02-18 RX ADMIN — LISINOPRIL 2.5 MG: 5 TABLET ORAL at 08:43

## 2021-02-18 RX ADMIN — POLYETHYLENE GLYCOL 3350 17 G: 17 POWDER, FOR SOLUTION ORAL at 21:33

## 2021-02-18 RX ADMIN — INSULIN LISPRO 3 UNITS: 100 INJECTION, SOLUTION INTRAVENOUS; SUBCUTANEOUS at 21:07

## 2021-02-18 RX ADMIN — AMLODIPINE BESYLATE 2.5 MG: 2.5 TABLET ORAL at 08:43

## 2021-02-18 RX ADMIN — METOPROLOL TARTRATE 25 MG: 25 TABLET, FILM COATED ORAL at 08:42

## 2021-02-18 RX ADMIN — ATORVASTATIN CALCIUM 40 MG: 40 TABLET, FILM COATED ORAL at 17:29

## 2021-02-18 RX ADMIN — METOPROLOL TARTRATE 25 MG: 25 TABLET, FILM COATED ORAL at 20:34

## 2021-02-18 RX ADMIN — INSULIN LISPRO 9 UNITS: 100 INJECTION, SOLUTION INTRAVENOUS; SUBCUTANEOUS at 12:40

## 2021-02-18 RX ADMIN — CANGRELOR 0.75 MCG/KG/MIN: 50 INJECTION, POWDER, LYOPHILIZED, FOR SOLUTION INTRAVENOUS at 17:29

## 2021-02-18 ASSESSMENT — PAIN SCALES - GENERAL
PAINLEVEL_OUTOF10: 0

## 2021-02-18 NOTE — PROGRESS NOTES
[facility]   Daily Cardiovascular Progress Note    Admit Date: 2/12/2021    Chief complaint: CP  HPI: feels ok.  Nervious about surgery      Medications/Labs all Reviewed:  Patient Active Problem List   Diagnosis    Type II or unspecified type diabetes mellitus without mention of complication, not stated as uncontrolled    Diabetes mellitus    History of ETOH abuse    Patient overweight    Patient overweight    Dietary noncompliance    Alcohol abuse    Flank pain    Prostatism    Low serum testosterone level    Arm pain, left    Shoulder pain    Precordial pain    Lung nodule    Acute angina (HCC)    Abnormal stress test    Acute coronary syndrome (HCC)    Unstable angina ([address]    Coronary artery disease involving native coronary artery of native heart with unstable angina pectoris (HCC)    Other chest pain    S/P drug eluting coronary stent placement    Essential hypertension    Obesity    Abnormal chest x-ray    Angina effort    Skin lesion    Angina at rest [facility])    Hyperlipidemia    Angina pectoris ([address]    NSTEMI (non-ST elevated myocardial infarction) ([address]    Controlled type 2 diabetes mellitus without complication, without long-term current use of insulin ([facility]    ASHD (arteriosclerotic heart disease)    Mass of right foot    Overweight    History of angina pectoris    Reactive airway disease    Bronchitis    Reactive airway disease with wheezing, moderate persistent, with acute exacerbation    Need for prophylactic vaccination and inoculation against varicella    Need for prophylactic vaccination against diphtheria-tetanus-pertussis (DTP)    Chronic right-sided thoracic back pain    CAD in native artery    S/P PTCA (percutaneous transluminal coronary angioplasty)    Chest pain    Ischemic cardiomyopathy       Medications:      insulin lispro (HUMALOG) injection vial 0-18 Units, TID WC      insulin lispro (HUMALOG) injection vial 0-9 Units, Nightly      mupirocin (BACTROBAN) 2 % ointment, BID      cangrelor (KENGREAL) 50 mg in sodium chloride 0.9 % 250 mL infusion, Continuous      lidocaine 1 % injection 5 mL, Once      perflutren lipid microspheres (DEFINITY) injection 1.65 mg, ONCE PRN      nitroGLYCERIN 50 mg in dextrose 5% 250 mL infusion, Continuous      morphine (PF) injection 4 mg, Q2H PRN      LORazepam (ATIVAN) injection 1 mg, Q4H PRN      diazePAM (VALIUM) tablet 2 mg, Q12H PRN      amLODIPine (NORVASC) tablet 2.5 mg, Daily      aspirin chewable tablet 81 mg, Daily      atorvastatin (LIPITOR) tablet 40 mg, QPM      lisinopril (PRINIVIL;ZESTRIL) tablet 2.5 mg, Daily      metoprolol tartrate (LOPRESSOR) tablet 25 mg, BID      glucose (GLUTOSE) 40 % oral gel 15 g, PRN      dextrose 50 % IV solution, PRN      glucagon (rDNA) injection 1 mg, PRN      dextrose 5 % solution, PRN      sodium chloride flush 0.9 % injection 10 mL, 2 times per day      sodium chloride flush 0.9 % injection 10 mL, PRN      promethazine (PHENERGAN) tablet 12.5 mg, Q6H PRN    Or      ondansetron (ZOFRAN) injection 4 mg, Q6H PRN      polyethylene glycol (GLYCOLAX) packet 17 g, Daily PRN      acetaminophen (TYLENOL) tablet 650 mg, Q6H PRN    Or      acetaminophen (TYLENOL) suppository 650 mg, Q6H PRN      nitroGLYCERIN (NITROSTAT) SL tablet 0.4 mg, Q5 Min PRN           PHYSICAL EXAM   /78   Pulse 74   Temp 97.9 °F (36.6 °C) (Oral)   Resp 18   Ht 5' 10\" (1.778 m)   Wt 243 lb 3.2 oz (110.3 kg)   SpO2 99%   BMI 34.90 kg/m²    Vitals:    02/17/21 2115 02/18/21 0000 02/18/21 0430 02/18/21 0445   BP: (!) 155/81 (!) 143/89 124/78    Pulse: 76 69 74    Resp: 18 18 18    Temp: 97.8 °F (36.6 °C) 97.8 °F (36.6 °C) 97.9 °F (36.6 °C)    TempSrc: Oral Oral Oral    SpO2: 100% 98% 99%    Weight:    243 lb 3.2 oz (110.3 kg)   Height:             Intake/Output Summary (Last 24 hours) at 2/18/2021 5694  Last data filed at 2/17/2021 2125  Gross per 24 hour Intake 240 ml   Output    Net 240 ml     Wt Readings from Last 3 Encounters:   02/18/21 243 lb 3.2 oz (110.3 kg)   01/07/21 258 lb (117 kg)   12/21/20 258 lb (117 kg)         Gen: Patient in NAD, resting comfortably  Neck: No JVD or bruits  Respiratory: CTAB no WRR  Chest: normal without deformity  Cardiovascular:RRR, S1S2, no mrg, normal PMI  Abdomen: Soft, NTND, Normal BS  Extremities: No clubbing, cyanosis, or edema, rt groin site c/d/i  Neurological/Psychiatric: AxO x4, No gross motors/sensory deficits  Skin:  Warm and dry      Labs:  CBC:   Recent Labs     02/16/21  0521 02/17/21  0923 02/18/21  0504   WBC 12.3* 8.5 8.0   HGB 14.5 14.0 14.7   HCT 43.7 41.1 45.0   MCV 90.9 90.2 94.0    246 165     BMP:   Recent Labs     02/16/21  0521 02/17/21  0923 02/18/21  0504   * 135* 135*   K 3.8 4.0 4.2    100 101   CO2 25 27 20*   BUN 10 10 11   CREATININE 0.6* 0.6* <0.5*     MG:  No results for input(s): MG in the last 72 hours. PT/INR: No results for input(s): PROTIME, INR in the last 72 hours. APTT:   Recent Labs     02/15/21  1100   APTT 137.6*     Cardiac Enzymes:   Recent Labs     02/15/21  1747 02/15/21  2343 02/16/21  0521   TROPONINI 0.08* 0.08* 0.09*       Cardiac Studies:     Summary   Left ventricular systolic function is normal with ejection fraction   estimated at 55%. No regional wall motion abnormalities. There is mild concentric left ventricular hypertrophy. Grade I diastolic dysfunction with normal left ventricular filling pressure. Assessment and Plan     1. CAD   - 2/15/2021 NSTEMI-->Palliation POBA to OM              - 12/15/2020 POBA/Laser to OM1 (40% residual) case aborted due to intollerance              - 9/21/2020 Laser/cutter and PCI to mid LAD              - 9/2017 Rotablade and PCI to [address] - 8/2017 PCI to m/d LAD, diag 1  2. Ischemic cardiomyopathy: LVEF 40% on LV Gram--> 55% on recent echo  3.  HTN: controlled (high today a in visit but normal home logs)  4. HLD: controlled  5. Hx of likely COVID +: Patient has an excellent story for COVID symptomatology but unfortunately testing was too late and was negative  6. EtOH abuse: Has quit! PLAN  1. CABG in AM  2. Cont Norvasc, ASA, lipitor, Lisinopril, lopressor  3. Cont Cangrelor until surgery.  Ok to shut off on way to OR (t1/2 life is 6 mins)      Patient Active Problem List   Diagnosis    Type II or unspecified type diabetes mellitus without mention of complication, not stated as uncontrolled    Diabetes mellitus    History of ETOH abuse    Patient overweight    Patient overweight    Dietary noncompliance    Alcohol abuse    Flank pain    Prostatism    Low serum testosterone level    Arm pain, left    Shoulder pain    Precordial pain    Lung nodule    Acute angina (HCC)    Abnormal stress test    Acute coronary syndrome (HCC)    Unstable angina ([address]    Coronary artery disease involving native coronary artery of native heart with unstable angina pectoris (HCC)    Other chest pain    S/P drug eluting coronary stent placement    Essential hypertension    Obesity    Abnormal chest x-ray    Angina effort    Skin lesion    Angina at rest [facility])    Hyperlipidemia    Angina pectoris ([address]    NSTEMI (non-ST elevated myocardial infarction) ([address]    Controlled type 2 diabetes mellitus without complication, without long-term current use of insulin ([address]    ASHD (arteriosclerotic heart disease)    Mass of right foot    Overweight    History of angina pectoris    Reactive airway disease    Bronchitis    Reactive airway disease with wheezing, moderate persistent, with acute exacerbation    Need for prophylactic vaccination and inoculation against varicella    Need for prophylactic vaccination against diphtheria-tetanus-pertussis (DTP)    Chronic right-sided thoracic back pain    CAD in native artery    S/P PTCA (percutaneous transluminal coronary angioplasty)    Chest pain    Ischemic cardiomyopathy         Thank you for allowing me to participate in the care of your patient. Please call me with any questions [phone].       Juan Singh MD, [address] Cardiologist  [address]  [phone] [facility]  [phone] [address] [place] [address]  2/18/2021 8:11 AM

## 2021-02-18 NOTE — PROGRESS NOTES
CVTS Thoracic Progress Note:          CC: multivessel CAD     Subj: Awake, sitting up in chair, in NAD    Obj:    Blood pressure (!) 148/89, pulse 80, temperature 98.9 °F (37.2 °C), temperature source Oral, resp. rate 18, height 5' 10\" (1.778 m), weight 243 lb 3.2 oz (110.3 kg), SpO2 97 %. Lungs CTAB   Abdomen round, soft, non-tender   UOP satisfactory in 24 hrs; Cr 0.5    Diagnostics:   Recent Labs     21  0521 21  0923 21  0504   WBC 12.3* 8.5 8.0   HGB 14.5 14.0 14.7   HCT 43.7 41.1 45.0    246 165                                                                  Recent Labs     21  0521 21  0923 21  0504   * 135* 135*   K 3.8 4.0 4.2    100 101   CO2 25 27 20*   BUN 10 10 11   CREATININE 0.6* 0.6* <0.5*   GLUCOSE 198* 303* 204*     Recent Labs     02/15/21  1747 02/15/21  2343 21  0521   TROPONINI 0.08* 0.08* 0.09*     CXR: 2/15/21  Impression   No acute cardiopulmonary disease. ECHO: 21 with Dr. Jacqueline De Leon   Summary:    Left ventricular systolic function is normal with ejection fraction   estimated at 55%. No regional wall motion abnormalities. There is mild concentric left ventricular hypertrophy. Grade I diastolic dysfunction with normal left ventricular filling pressure. Carotid dopplers: 21       Summary        The bilateral internal carotid arteries reveal <50% diameter reducing    stenosis.    The bilateral vertebral arteries have normal antegrade flow. Vein mappin21       Summary        Usable greater Saphenous vein conduit in both lower extremities, although    bilateral GSV small diameter.    Vein diameters listed in table. Urine 2/15/21 (negative)     Assess/Plan:   Care per primary team    Multivessel CAD:   Continue with Plavix metabolization and Cangrelor gtt  Plan for OR tomorrow.    NPO at midnight.  ______________________________________________________      Manuel Lundy CNP  2021  12:53 PM Note reviewed, events of last 24 hours reviewed along with vital signs and I/Os and patient examined. Images reviewed. Assessment & Plans  Patient doing well, denies any angina or dyspnea. .  I have discussed the risks, benefits, and alternatives of surgery. He expressed understanding and agrees for surgery tomorrow.    Will plan for tentative CABG x 2 / ALEX clip.       Mortality:  0.50%  Renal Failure:  0.47%  Permanent Stroke:  0.55%  Prolonged Ventilation:  3.30%  Deep Sternal Infection:  0.19%  Reoperation:  1.56%  Morbidity and Mortality:  5.31%  Short LOS:  68.62%  Long LOS:  1.31%    Laurie Nicholas MD  Cardiothoracic Surgery

## 2021-02-18 NOTE — PROGRESS NOTES
Hospitalist Progress Note      PCP: Elliot Elyse Valdez,     Date of Admission: 2/12/2021    [facility] Complaint: chest pain     Hospital Course: 59 yo WM with hx of cad(multiple pci), htn, hld etoh use, dm2 p/w chest pain.      Subjective: currently denies cp, resting in bed, wife currently at bedside, remains on cangrelor ggt      Medications:  Reviewed    Infusion Medications    cangrelor [facility]) infusion 0.75 mcg/kg/min (02/18/21 8800)    nitroGLYCERIN Stopped (02/15/21 5634)    dextrose       Scheduled Medications    insulin lispro  0-18 Units Subcutaneous TID WC    insulin lispro  0-9 Units Subcutaneous Nightly    mupirocin   Nasal BID    lidocaine 1 % injection  5 mL Intradermal Once    amLODIPine  2.5 mg Oral Daily    aspirin  81 mg Oral Daily    atorvastatin  40 mg Oral QPM    lisinopril  2.5 mg Oral Daily    metoprolol tartrate  25 mg Oral BID    sodium chloride flush  10 mL Intravenous 2 times per day     PRN Meds: perflutren lipid microspheres, morphine, LORazepam, diazePAM, glucose, dextrose, glucagon (rDNA), dextrose, sodium chloride flush, promethazine **OR** ondansetron, polyethylene glycol, acetaminophen **OR** acetaminophen, nitroGLYCERIN      Intake/Output Summary (Last 24 hours) at 2/18/2021 1107  Last data filed at 2/18/2021 0839  Gross per 24 hour   Intake 600 ml   Output    Net 600 ml       Physical Exam Performed:    /84   Pulse 91   Temp 98.2 °F (36.8 °C) (Oral)   Resp 18   Ht 5' 10\" (1.778 m)   Wt 243 lb 3.2 oz (110.3 kg)   SpO2 97%   BMI 34.90 kg/m²     General appearance: No apparent distress, appears stated age and cooperative. HEENT: Pupils equal, round, and reactive to light. Conjunctivae/corneas clear. Neck: Supple, with full range of motion. No jugular venous distention. Trachea midline. Respiratory:  Normal respiratory effort. Clear to auscultation, bilaterally without Rales/Wheezes/Rhonchi.   Cardiovascular: Regular rate and rhythm with normal S1/S2 without murmurs, rubs or gallops. Abdomen: Soft, non-tender, non-distended with normal bowel sounds. Musculoskeletal: No clubbing, cyanosis or edema bilaterally. Full range of motion without deformity. Skin: Skin color, texture, turgor normal.  No rashes or lesions. Neurologic:  Neurovascularly intact without any focal sensory/motor deficits. Cranial nerves: II-XII intact, grossly non-focal.  Psychiatric: Alert and oriented, thought content appropriate, normal insight  Capillary Refill: Brisk,< 3 seconds   Peripheral Pulses: +2 palpable, equal bilaterally       Labs:   Recent Labs     02/16/21  0521 02/17/21  0923 02/18/21  0504   WBC 12.3* 8.5 8.0   HGB 14.5 14.0 14.7   HCT 43.7 41.1 45.0    246 165     Recent Labs     02/16/21  0521 02/17/21  0923 02/18/21  0504   * 135* 135*   K 3.8 4.0 4.2    100 101   CO2 25 27 20*   BUN 10 10 11   CREATININE 0.6* 0.6* <0.5*   CALCIUM 9.2 9.4 9.2     No results for input(s): AST, ALT, BILIDIR, BILITOT, ALKPHOS in the last 72 hours. No results for input(s): INR in the last 72 hours. Recent Labs     02/15/21  1747 02/15/21  2343 02/16/21  0521   TROPONINI 0.08* 0.08* 0.09*       Urinalysis:      Lab Results   Component Value Date    NITRU Negative 02/15/2021    BLOODU Negative 02/15/2021    SPECGRAV 1.015 02/15/2021    GLUCOSEU 500 02/15/2021       Radiology:  VL PRE OP VEIN MAPPING   Final Result      VL DUP CAROTID BILATERAL   Final Result      IR PICC WO SQ PORT/PUMP > 5 YEARS   Final Result   Unsuccessful placement of PICC line. XR CHEST PORTABLE   Final Result   No acute cardiopulmonary disease.                  Assessment/Plan:    Active Hospital Problems    Diagnosis    Coronary artery disease involving native coronary artery of native heart with unstable angina pectoris ([facility] [address] [I25.110]     Priority: High    Ischemic cardiomyopathy [I25.5]    CAD in native artery [I25.10]    Hyperlipidemia [E78.5]    Unstable angina ([address] [I20.0]    Diabetes mellitus [E11.9]               Chest pain- with Coronary artery disease involving native coronary artery of native heart with unstable angina pectoris  -Known LCx 40% residual stenosis in OM1 stent.  Needing intervention.   -Cardiology consulted  -Rule out ACS with serial cardiac biomarkers  -Heparin GTT initiated  -Telemetry monitored  -Continued Imdur and beta-blocker   -nitro ggt started 2/14, stopped 2/15  -supp oxygen started   -LHC done 2/15, noted severe restenosis of OM1(tx with NC and angiosculpt), severe stenosis of prox LAD, started on cangrelor ggt  -CT surg consulted     CAD in native artery   Complicated by in-stent restenosis OM1 , also s/p multiple PCI's in the past to LAD, OM1, diagonal 1  last LHC - difficult OM1 stenosis heavily fibrotic lesion, requiring multiple attempts of balloon inflation and laser atherectomy.  Patient could not tolerate procedure anymore, developed chest pain and procedure aborted.  Still has 40% residual stenosis. -There was prior consideration of lithotripsy and brachytherapy in [facility](defer to cards on this)  Continued DAPT, statin, beta-blocker, Imdur, lisinopril     Essential hypertension, uncontrolled iinitially on admission  -Resumed home antihypertensive regimen     Hyperlipidemia-resumed statin     DM type II- somewhat controlled, a1c 8.4, Metformin and glipizide on hold, placed on low-dose sliding scale insulin with Accu-Cheks and hypoglycemia protocol     MFOFGWU CNE  39.65  Complicating assessment and treatment, placing patient at high risk for multiple co-morbidities as well as early death and contributing to the patient's presentation.  Counseled on weight loss.        DVT Prophylaxis: On cangrelor ggt  Diet: DIET CARDIAC; Carb Control: 3 carb choices (45 gms)/meal  Code Status: Full Code    PT/OT Eval Status: not ordered    Dispo - pending Valerio Edwards MD

## 2021-02-18 NOTE — PROCEDURES
[facility] [address], [address]                               PULMONARY FUNCTION    PATIENT NAME: Shedrick Cushing                           :        1960  MED REC NO:   [id number]                          ROOM:       7606  ACCOUNT NO:   [de-identified]                           ADMIT DATE: 2021  PROVIDER:     Mak Craft MD    DATE OF PROCEDURE:  2021    PFT INTERPRETATION    SPIROMETRY:  The patient's FVC was 49%, FEV1 was 48%, FEV1 to FVC ratio  was 98%. The patient's KOM70-55% was 47%. On the basis of this PFT, the patient appears to have severe obstructive  airway disease. Also the patient has significantly decreased forced  vital capacity and if restriction is suspected,Please consider full PFT and please correlate clinically.         Miguelito David MD    D: 2021 12:49:37       T: 2021 16:39:30     [email or web address]  Job#: [id number]     Doc#: [id number]    CC:

## 2021-02-18 NOTE — FLOWSHEET NOTE
02/17/21 2120   Assessment   Charting Type Shift assessment   Neurological   Neuro (WDL) WDL   Level of Consciousness Alert (0)   Orientation Level Oriented X4   Cognition Appropriate judgement; Appropriate safety awareness; Appropriate attention/concentration; Appropriate for developmental age; Follows commands   Language Clear   [place] Coma Scale   Eye Opening 4   Best Verbal Response 5   Best Motor Response 6   [place] Coma Scale Score 15   HEENT   HEENT (WDL) WDL   Right Eye Intact   Left Eye Intact   Teeth Intact   Respiratory   Respiratory (WDL) WDL   Respiratory Pattern Regular   Respiratory Depth Normal   Respiratory Quality/Effort Unlabored   Chest Assessment Chest expansion symmetrical   L Breath Sounds Clear   R Breath Sounds Clear   Cardiac   Cardiac (WDL) X   Cardiac Regularity Regular   Heart Sounds S1, S2   Cardiac Rhythm NSR   Gastrointestinal   Abdominal (WDL) WDL   Abdomen Inspection Rounded; Soft   RUQ Bowel Sounds Active   LUQ Bowel Sounds Active   RLQ Bowel Sounds Active   LLQ Bowel Sounds Active   Peripheral Vascular   Peripheral Vascular (WDL) WDL   Edema Generalized   Edema Generalized Trace   Skin Color/Condition   Skin Color/Condition (WDL) WDL   Skin Integrity   Skin Integrity (WDL) X   Skin Integrity Other (Comment)  (surgical incision)   Location R groin   Musculoskeletal   Musculoskeletal (WDL) WDL   Genitourinary   Genitourinary (WDL) WDL   Flank Tenderness No   Suprapubic Tenderness No   Dysuria No   Anus/Rectum   Anus/Rectum (WDL) WDL  (per pt)   Psychosocial   Psychosocial (WDL) WDL

## 2021-02-18 NOTE — PLAN OF CARE
Problem: Pain:  Goal: Pain level will decrease  Description: Pain level will decrease  Outcome: Ongoing  Goal: Control of acute pain  Description: Control of acute pain  Outcome: Ongoing  Goal: Control of chronic pain  Description: Control of chronic pain  Outcome: Ongoing     Problem:  Activity:  Goal: Ability to tolerate increased activity will improve  Description: Ability to tolerate increased activity will improve  Outcome: Ongoing     Problem: Cardiac:  Goal: Ability to achieve and maintain adequate cardiopulmonary perfusion will improve  Description: Ability to achieve and maintain adequate cardiopulmonary perfusion will improve  Outcome: Ongoing  Goal: Hemodynamic stability will improve  Description: Hemodynamic stability will improve  Outcome: Ongoing  Goal: Will show no evidence of cardiac arrhythmias  Description: Will show no evidence of cardiac arrhythmias  Outcome: Ongoing  Goal: Satisfaction with pain management regimen will improve  Description: Satisfaction with pain management regimen will improve  Outcome: Ongoing  Goal: Diagnostic test results will improve  Description: Diagnostic test results will improve  Outcome: Ongoing  Goal: Complications related to the disease process, condition or treatment will be avoided or minimized  Description: Complications related to the disease process, condition or treatment will be avoided or minimized  Outcome: Ongoing     Problem: Coping:  Goal: Level of anxiety will decrease  Description: Level of anxiety will decrease  Outcome: Ongoing  Goal: Ability to cope will improve  Description: Ability to cope will improve  Outcome: Ongoing  Goal: Understanding of sexual limitations or changes related to disease process or condition will improve  Description: Understanding of sexual limitations or changes related to disease process or condition will improve  Outcome: Ongoing     Problem: Fluid Volume:  Goal: Risk for excess fluid volume will decrease Description: Risk for excess fluid volume will decrease  Outcome: Ongoing     Problem: Health Behavior:  Goal: Identification of resources available to assist in meeting health care needs will improve  Description: Identification of resources available to assist in meeting health care needs will improve  Outcome: Ongoing     Problem: Nutritional:  Goal: Ability to identify appropriate dietary choices will improve  Description: Ability to identify appropriate dietary choices will improve  Outcome: Ongoing     Problem: Respiratory:  Goal: Levels of oxygenation will improve  Description: Levels of oxygenation will improve  Outcome: Ongoing     Problem: Safety:  Goal: Ability to remain free from injury will improve  Description: Ability to remain free from injury will improve  Outcome: Ongoing

## 2021-02-19 ENCOUNTER — APPOINTMENT (OUTPATIENT)
Dept: GENERAL RADIOLOGY | Age: 61
DRG: 231 | End: 2021-02-19
Payer: COMMERCIAL

## 2021-02-19 ENCOUNTER — ANESTHESIA (OUTPATIENT)
Dept: OPERATING ROOM | Age: 61
DRG: 231 | End: 2021-02-19
Payer: COMMERCIAL

## 2021-02-19 VITALS
DIASTOLIC BLOOD PRESSURE: 74 MMHG | OXYGEN SATURATION: 99 % | RESPIRATION RATE: 16 BRPM | SYSTOLIC BLOOD PRESSURE: 127 MMHG

## 2021-02-19 LAB
ACTIVATED CLOTTING TIME: 111 SEC (ref 99–130)
ACTIVATED CLOTTING TIME: 116 SEC (ref 99–130)
ACTIVATED CLOTTING TIME: 470 SEC (ref 99–130)
ACTIVATED CLOTTING TIME: 488 SEC (ref 99–130)
ACTIVATED CLOTTING TIME: 516 SEC (ref 99–130)
ACTIVATED CLOTTING TIME: 516 SEC (ref 99–130)
ALBUMIN SERPL-MCNC: 3.9 G/DL (ref 3.4–5)
ALP BLD-CCNC: 79 U/L (ref 40–129)
ALT SERPL-CCNC: 59 U/L (ref 10–40)
ANION GAP SERPL CALCULATED.3IONS-SCNC: 10 MMOL/L (ref 3–16)
ANION GAP SERPL CALCULATED.3IONS-SCNC: 6 MMOL/L (ref 3–16)
ANION GAP SERPL CALCULATED.3IONS-SCNC: 9 MMOL/L (ref 3–16)
APTT: 30.4 SEC (ref 24.2–36.2)
AST SERPL-CCNC: 24 U/L (ref 15–37)
BASE EXCESS ARTERIAL: -1 (ref -3–3)
BASE EXCESS ARTERIAL: -4 (ref -3–3)
BASE EXCESS ARTERIAL: -4 (ref -3–3)
BASE EXCESS ARTERIAL: -7 (ref -3–3)
BASOPHILS ABSOLUTE: 0.1 K/UL (ref 0–0.2)
BASOPHILS RELATIVE PERCENT: 0.6 %
BILIRUB SERPL-MCNC: 0.8 MG/DL (ref 0–1)
BILIRUBIN DIRECT: <0.2 MG/DL (ref 0–0.3)
BILIRUBIN, INDIRECT: ABNORMAL MG/DL (ref 0–1)
BUN BLDV-MCNC: 10 MG/DL (ref 7–20)
BUN BLDV-MCNC: 13 MG/DL (ref 7–20)
BUN BLDV-MCNC: 9 MG/DL (ref 7–20)
CALCIUM IONIZED: 1.04 MMOL/L (ref 1.12–1.32)
CALCIUM IONIZED: 1.06 MMOL/L (ref 1.12–1.32)
CALCIUM IONIZED: 1.07 MMOL/L (ref 1.12–1.32)
CALCIUM IONIZED: 1.15 MMOL/L (ref 1.12–1.32)
CALCIUM IONIZED: 1.49 MMOL/L (ref 1.12–1.32)
CALCIUM IONIZED: 2.03 MMOL/L (ref 1.12–1.32)
CALCIUM SERPL-MCNC: 10.2 MG/DL (ref 8.3–10.6)
CALCIUM SERPL-MCNC: 8.9 MG/DL (ref 8.3–10.6)
CALCIUM SERPL-MCNC: 9.1 MG/DL (ref 8.3–10.6)
CHLORIDE BLD-SCNC: 102 MMOL/L (ref 99–110)
CHLORIDE BLD-SCNC: 110 MMOL/L (ref 99–110)
CHLORIDE BLD-SCNC: 110 MMOL/L (ref 99–110)
CHOLESTEROL, TOTAL: 104 MG/DL (ref 0–199)
CO2: 20 MMOL/L (ref 21–32)
CO2: 25 MMOL/L (ref 21–32)
CO2: 26 MMOL/L (ref 21–32)
CREAT SERPL-MCNC: 0.6 MG/DL (ref 0.8–1.3)
CREAT SERPL-MCNC: 0.7 MG/DL (ref 0.8–1.3)
CREAT SERPL-MCNC: <0.5 MG/DL (ref 0.8–1.3)
EOSINOPHILS ABSOLUTE: 0.3 K/UL (ref 0–0.6)
EOSINOPHILS RELATIVE PERCENT: 3.7 %
ESTIMATED AVERAGE GLUCOSE: 194.4 MG/DL
FIBRINOGEN: 285 MG/DL (ref 200–397)
GFR AFRICAN AMERICAN: >60
GFR NON-AFRICAN AMERICAN: >60
GLUCOSE BLD-MCNC: 135 MG/DL (ref 70–99)
GLUCOSE BLD-MCNC: 147 MG/DL (ref 70–99)
GLUCOSE BLD-MCNC: 148 MG/DL (ref 70–99)
GLUCOSE BLD-MCNC: 151 MG/DL (ref 70–99)
GLUCOSE BLD-MCNC: 160 MG/DL (ref 70–99)
GLUCOSE BLD-MCNC: 168 MG/DL (ref 70–99)
GLUCOSE BLD-MCNC: 168 MG/DL (ref 70–99)
GLUCOSE BLD-MCNC: 170 MG/DL (ref 70–99)
GLUCOSE BLD-MCNC: 176 MG/DL (ref 70–99)
GLUCOSE BLD-MCNC: 205 MG/DL (ref 70–99)
GLUCOSE BLD-MCNC: 209 MG/DL (ref 70–99)
GLUCOSE BLD-MCNC: 229 MG/DL (ref 70–99)
GLUCOSE BLD-MCNC: 230 MG/DL (ref 70–99)
GLUCOSE BLD-MCNC: 241 MG/DL (ref 70–99)
GLUCOSE BLD-MCNC: 246 MG/DL (ref 70–99)
GLUCOSE BLD-MCNC: 260 MG/DL (ref 70–99)
GLUCOSE BLD-MCNC: 283 MG/DL (ref 70–99)
GLUCOSE BLD-MCNC: 288 MG/DL (ref 70–99)
HBA1C MFR BLD: 8.4 %
HCO3 ARTERIAL: 20.3 MMOL/L (ref 21–29)
HCO3 ARTERIAL: 21.8 MMOL/L (ref 21–29)
HCO3 ARTERIAL: 21.8 MMOL/L (ref 21–29)
HCO3 ARTERIAL: 23.5 MMOL/L (ref 21–29)
HCO3 ARTERIAL: 23.7 MMOL/L (ref 21–29)
HCO3 ARTERIAL: 23.7 MMOL/L (ref 21–29)
HCO3 ARTERIAL: 24.2 MMOL/L (ref 21–29)
HCT VFR BLD CALC: 30.9 % (ref 40.5–52.5)
HCT VFR BLD CALC: 34.5 % (ref 40.5–52.5)
HCT VFR BLD CALC: 39.1 % (ref 40.5–52.5)
HDLC SERPL-MCNC: 38 MG/DL (ref 40–60)
HEMOGLOBIN: 10.6 G/DL (ref 13.5–17.5)
HEMOGLOBIN: 11.2 GM/DL (ref 13.5–17.5)
HEMOGLOBIN: 11.5 G/DL (ref 13.5–17.5)
HEMOGLOBIN: 12.2 GM/DL (ref 13.5–17.5)
HEMOGLOBIN: 12.6 GM/DL (ref 13.5–17.5)
HEMOGLOBIN: 13.3 G/DL (ref 13.5–17.5)
HEMOGLOBIN: 8.4 GM/DL (ref 13.5–17.5)
HEMOGLOBIN: 9 GM/DL (ref 13.5–17.5)
HEMOGLOBIN: 9.4 GM/DL (ref 13.5–17.5)
HEMOGLOBIN: 9.5 GM/DL (ref 13.5–17.5)
INR BLD: 1.12 (ref 0.86–1.14)
INR BLD: 1.37 (ref 0.86–1.14)
LACTATE: 3.24 MMOL/L (ref 0.4–2)
LDL CHOLESTEROL CALCULATED: 47 MG/DL
LYMPHOCYTES ABSOLUTE: 1.9 K/UL (ref 1–5.1)
LYMPHOCYTES RELATIVE PERCENT: 22.7 %
MAGNESIUM: 3.3 MG/DL (ref 1.8–2.4)
MCH RBC QN AUTO: 30.4 PG (ref 26–34)
MCH RBC QN AUTO: 30.5 PG (ref 26–34)
MCH RBC QN AUTO: 30.6 PG (ref 26–34)
MCHC RBC AUTO-ENTMCNC: 33.3 G/DL (ref 31–36)
MCHC RBC AUTO-ENTMCNC: 34.1 G/DL (ref 31–36)
MCHC RBC AUTO-ENTMCNC: 34.2 G/DL (ref 31–36)
MCV RBC AUTO: 89.3 FL (ref 80–100)
MCV RBC AUTO: 89.4 FL (ref 80–100)
MCV RBC AUTO: 91.5 FL (ref 80–100)
MONOCYTES ABSOLUTE: 0.7 K/UL (ref 0–1.3)
MONOCYTES RELATIVE PERCENT: 8.5 %
NEUTROPHILS ABSOLUTE: 5.5 K/UL (ref 1.7–7.7)
NEUTROPHILS RELATIVE PERCENT: 64.5 %
O2 SAT, ARTERIAL: 100 % (ref 93–100)
O2 SAT, ARTERIAL: 98 % (ref 93–100)
PCO2 ARTERIAL: 34 MM HG (ref 35–45)
PCO2 ARTERIAL: 37.1 MM HG (ref 35–45)
PCO2 ARTERIAL: 38.4 MM HG (ref 35–45)
PCO2 ARTERIAL: 38.7 MM HG (ref 35–45)
PCO2 ARTERIAL: 39.1 MM HG (ref 35–45)
PCO2 ARTERIAL: 42.4 MM HG (ref 35–45)
PCO2 ARTERIAL: 45.7 MM HG (ref 35–45)
PDW BLD-RTO: 13.2 % (ref 12.4–15.4)
PDW BLD-RTO: 13.3 % (ref 12.4–15.4)
PDW BLD-RTO: 13.4 % (ref 12.4–15.4)
PERFORMED ON: ABNORMAL
PH ARTERIAL: 7.26 (ref 7.35–7.45)
PH ARTERIAL: 7.32 (ref 7.35–7.45)
PH ARTERIAL: 7.36 (ref 7.35–7.45)
PH ARTERIAL: 7.4 (ref 7.35–7.45)
PH ARTERIAL: 7.41 (ref 7.35–7.45)
PH ARTERIAL: 7.41 (ref 7.35–7.45)
PH ARTERIAL: 7.45 (ref 7.35–7.45)
PLATELET # BLD: 167 K/UL (ref 135–450)
PLATELET # BLD: 189 K/UL (ref 135–450)
PLATELET # BLD: 228 K/UL (ref 135–450)
PMV BLD AUTO: 8 FL (ref 5–10.5)
PMV BLD AUTO: 8.1 FL (ref 5–10.5)
PMV BLD AUTO: 8.1 FL (ref 5–10.5)
PO2 ARTERIAL: 108.5 MM HG (ref 75–108)
PO2 ARTERIAL: 210.9 MM HG (ref 75–108)
PO2 ARTERIAL: 228.6 MM HG (ref 75–108)
PO2 ARTERIAL: 317.4 MM HG (ref 75–108)
PO2 ARTERIAL: 426.3 MM HG (ref 75–108)
PO2 ARTERIAL: 531.8 MM HG (ref 75–108)
PO2 ARTERIAL: 624.7 MM HG (ref 75–108)
POC HEMATOCRIT: 25 % (ref 40.5–52.5)
POC HEMATOCRIT: 26 % (ref 40.5–52.5)
POC HEMATOCRIT: 28 % (ref 40.5–52.5)
POC HEMATOCRIT: 28 % (ref 40.5–52.5)
POC HEMATOCRIT: 33 % (ref 40.5–52.5)
POC HEMATOCRIT: 36 % (ref 40.5–52.5)
POC HEMATOCRIT: 37 % (ref 40.5–52.5)
POC POTASSIUM: 3.2 MMOL/L (ref 3.5–5.1)
POC POTASSIUM: 3.5 MMOL/L (ref 3.5–5.1)
POC POTASSIUM: 4.1 MMOL/L (ref 3.5–5.1)
POC POTASSIUM: 4.2 MMOL/L (ref 3.5–5.1)
POC POTASSIUM: 4.2 MMOL/L (ref 3.5–5.1)
POC POTASSIUM: 4.6 MMOL/L (ref 3.5–5.1)
POC POTASSIUM: 4.9 MMOL/L (ref 3.5–5.1)
POC SAMPLE TYPE: ABNORMAL
POC SODIUM: 132 MMOL/L (ref 136–145)
POC SODIUM: 135 MMOL/L (ref 136–145)
POC SODIUM: 136 MMOL/L (ref 136–145)
POC SODIUM: 137 MMOL/L (ref 136–145)
POC SODIUM: 138 MMOL/L (ref 136–145)
POC SODIUM: 141 MMOL/L (ref 136–145)
POTASSIUM REFLEX MAGNESIUM: 3.9 MMOL/L (ref 3.5–5.1)
POTASSIUM SERPL-SCNC: 3.3 MMOL/L (ref 3.5–5.1)
POTASSIUM SERPL-SCNC: 4.4 MMOL/L (ref 3.5–5.1)
POTASSIUM SERPL-SCNC: 4.6 MMOL/L (ref 3.5–5.1)
PROTHROMBIN TIME: 13 SEC (ref 10–13.2)
PROTHROMBIN TIME: 15.9 SEC (ref 10–13.2)
RBC # BLD: 3.46 M/UL (ref 4.2–5.9)
RBC # BLD: 3.77 M/UL (ref 4.2–5.9)
RBC # BLD: 4.38 M/UL (ref 4.2–5.9)
SODIUM BLD-SCNC: 137 MMOL/L (ref 136–145)
SODIUM BLD-SCNC: 140 MMOL/L (ref 136–145)
SODIUM BLD-SCNC: 141 MMOL/L (ref 136–145)
TCO2 ARTERIAL: 22 MMOL/L
TCO2 ARTERIAL: 23 MMOL/L
TCO2 ARTERIAL: 23 MMOL/L
TCO2 ARTERIAL: 25 MMOL/L
TOTAL PROTEIN: 6.1 G/DL (ref 6.4–8.2)
TRIGL SERPL-MCNC: 97 MG/DL (ref 0–150)
VLDLC SERPL CALC-MCNC: 19 MG/DL
WBC # BLD: 14.9 K/UL (ref 4–11)
WBC # BLD: 24.1 K/UL (ref 4–11)
WBC # BLD: 8.6 K/UL (ref 4–11)

## 2021-02-19 PROCEDURE — 82947 ASSAY GLUCOSE BLOOD QUANT: CPT

## 2021-02-19 PROCEDURE — 6370000000 HC RX 637 (ALT 250 FOR IP): Performed by: THORACIC SURGERY (CARDIOTHORACIC VASCULAR SURGERY)

## 2021-02-19 PROCEDURE — 2580000003 HC RX 258: Performed by: ANESTHESIOLOGY

## 2021-02-19 PROCEDURE — 2720000010 HC SURG SUPPLY STERILE: Performed by: THORACIC SURGERY (CARDIOTHORACIC VASCULAR SURGERY)

## 2021-02-19 PROCEDURE — 2500000003 HC RX 250 WO HCPCS: Performed by: ANESTHESIOLOGY

## 2021-02-19 PROCEDURE — 33517 CABG ARTERY-VEIN SINGLE: CPT | Performed by: THORACIC SURGERY (CARDIOTHORACIC VASCULAR SURGERY)

## 2021-02-19 PROCEDURE — C9290 INJ, BUPIVACAINE LIPOSOME: HCPCS | Performed by: THORACIC SURGERY (CARDIOTHORACIC VASCULAR SURGERY)

## 2021-02-19 PROCEDURE — 6360000002 HC RX W HCPCS: Performed by: THORACIC SURGERY (CARDIOTHORACIC VASCULAR SURGERY)

## 2021-02-19 PROCEDURE — 3700000000 HC ANESTHESIA ATTENDED CARE: Performed by: THORACIC SURGERY (CARDIOTHORACIC VASCULAR SURGERY)

## 2021-02-19 PROCEDURE — 82803 BLOOD GASES ANY COMBINATION: CPT

## 2021-02-19 PROCEDURE — 2100000000 HC CCU R&B

## 2021-02-19 PROCEDURE — 84295 ASSAY OF SERUM SODIUM: CPT

## 2021-02-19 PROCEDURE — 2580000003 HC RX 258: Performed by: NURSE PRACTITIONER

## 2021-02-19 PROCEDURE — 3600000008 HC SURGERY OHS BASE: Performed by: THORACIC SURGERY (CARDIOTHORACIC VASCULAR SURGERY)

## 2021-02-19 PROCEDURE — 3700000001 HC ADD 15 MINUTES (ANESTHESIA): Performed by: THORACIC SURGERY (CARDIOTHORACIC VASCULAR SURGERY)

## 2021-02-19 PROCEDURE — 3600000018 HC SURGERY OHS ADDTL 15MIN: Performed by: THORACIC SURGERY (CARDIOTHORACIC VASCULAR SURGERY)

## 2021-02-19 PROCEDURE — 80076 HEPATIC FUNCTION PANEL: CPT

## 2021-02-19 PROCEDURE — 83735 ASSAY OF MAGNESIUM: CPT

## 2021-02-19 PROCEDURE — 6360000002 HC RX W HCPCS: Performed by: ANESTHESIOLOGY

## 2021-02-19 PROCEDURE — 83036 HEMOGLOBIN GLYCOSYLATED A1C: CPT

## 2021-02-19 PROCEDURE — 85014 HEMATOCRIT: CPT

## 2021-02-19 PROCEDURE — 02100Z9 BYPASS CORONARY ARTERY, ONE ARTERY FROM LEFT INTERNAL MAMMARY, OPEN APPROACH: ICD-10-PCS | Performed by: THORACIC SURGERY (CARDIOTHORACIC VASCULAR SURGERY)

## 2021-02-19 PROCEDURE — 7100000010 HC PHASE II RECOVERY - FIRST 15 MIN

## 2021-02-19 PROCEDURE — 84132 ASSAY OF SERUM POTASSIUM: CPT

## 2021-02-19 PROCEDURE — 85027 COMPLETE CBC AUTOMATED: CPT

## 2021-02-19 PROCEDURE — 82330 ASSAY OF CALCIUM: CPT

## 2021-02-19 PROCEDURE — 2780000010 HC IMPLANT OTHER: Performed by: THORACIC SURGERY (CARDIOTHORACIC VASCULAR SURGERY)

## 2021-02-19 PROCEDURE — 80048 BASIC METABOLIC PNL TOTAL CA: CPT

## 2021-02-19 PROCEDURE — 33533 CABG ARTERIAL SINGLE: CPT | Performed by: THORACIC SURGERY (CARDIOTHORACIC VASCULAR SURGERY)

## 2021-02-19 PROCEDURE — 2500000003 HC RX 250 WO HCPCS: Performed by: THORACIC SURGERY (CARDIOTHORACIC VASCULAR SURGERY)

## 2021-02-19 PROCEDURE — B24BZZ4 ULTRASONOGRAPHY OF HEART WITH AORTA, TRANSESOPHAGEAL: ICD-10-PCS | Performed by: THORACIC SURGERY (CARDIOTHORACIC VASCULAR SURGERY)

## 2021-02-19 PROCEDURE — 6360000002 HC RX W HCPCS: Performed by: NURSE PRACTITIONER

## 2021-02-19 PROCEDURE — 83605 ASSAY OF LACTIC ACID: CPT

## 2021-02-19 PROCEDURE — 021009W BYPASS CORONARY ARTERY, ONE ARTERY FROM AORTA WITH AUTOLOGOUS VENOUS TISSUE, OPEN APPROACH: ICD-10-PCS | Performed by: THORACIC SURGERY (CARDIOTHORACIC VASCULAR SURGERY)

## 2021-02-19 PROCEDURE — 85347 COAGULATION TIME ACTIVATED: CPT

## 2021-02-19 PROCEDURE — 7100000011 HC PHASE II RECOVERY - ADDTL 15 MIN

## 2021-02-19 PROCEDURE — 85384 FIBRINOGEN ACTIVITY: CPT

## 2021-02-19 PROCEDURE — 06BY4ZZ EXCISION OF LOWER VEIN, PERCUTANEOUS ENDOSCOPIC APPROACH: ICD-10-PCS | Performed by: THORACIC SURGERY (CARDIOTHORACIC VASCULAR SURGERY)

## 2021-02-19 PROCEDURE — 85610 PROTHROMBIN TIME: CPT

## 2021-02-19 PROCEDURE — 02L70CK OCCLUSION OF LEFT ATRIAL APPENDAGE WITH EXTRALUMINAL DEVICE, OPEN APPROACH: ICD-10-PCS | Performed by: THORACIC SURGERY (CARDIOTHORACIC VASCULAR SURGERY)

## 2021-02-19 PROCEDURE — 6370000000 HC RX 637 (ALT 250 FOR IP): Performed by: NURSE PRACTITIONER

## 2021-02-19 PROCEDURE — 2709999900 HC NON-CHARGEABLE SUPPLY: Performed by: THORACIC SURGERY (CARDIOTHORACIC VASCULAR SURGERY)

## 2021-02-19 PROCEDURE — 2580000003 HC RX 258: Performed by: THORACIC SURGERY (CARDIOTHORACIC VASCULAR SURGERY)

## 2021-02-19 PROCEDURE — C1729 CATH, DRAINAGE: HCPCS | Performed by: THORACIC SURGERY (CARDIOTHORACIC VASCULAR SURGERY)

## 2021-02-19 PROCEDURE — 80061 LIPID PANEL: CPT

## 2021-02-19 PROCEDURE — 5A1221Z PERFORMANCE OF CARDIAC OUTPUT, CONTINUOUS: ICD-10-PCS | Performed by: THORACIC SURGERY (CARDIOTHORACIC VASCULAR SURGERY)

## 2021-02-19 PROCEDURE — 85025 COMPLETE CBC W/AUTO DIFF WBC: CPT

## 2021-02-19 PROCEDURE — P9045 ALBUMIN (HUMAN), 5%, 250 ML: HCPCS | Performed by: THORACIC SURGERY (CARDIOTHORACIC VASCULAR SURGERY)

## 2021-02-19 PROCEDURE — 85730 THROMBOPLASTIN TIME PARTIAL: CPT

## 2021-02-19 PROCEDURE — C1889 IMPLANT/INSERT DEVICE, NOC: HCPCS | Performed by: THORACIC SURGERY (CARDIOTHORACIC VASCULAR SURGERY)

## 2021-02-19 PROCEDURE — 3E0T3BZ INTRODUCTION OF ANESTHETIC AGENT INTO PERIPHERAL NERVES AND PLEXI, PERCUTANEOUS APPROACH: ICD-10-PCS | Performed by: THORACIC SURGERY (CARDIOTHORACIC VASCULAR SURGERY)

## 2021-02-19 PROCEDURE — 6370000000 HC RX 637 (ALT 250 FOR IP): Performed by: ANESTHESIOLOGY

## 2021-02-19 PROCEDURE — 71045 X-RAY EXAM CHEST 1 VIEW: CPT

## 2021-02-19 PROCEDURE — C1786 PMKR, SINGLE, RATE-RESP: HCPCS | Performed by: THORACIC SURGERY (CARDIOTHORACIC VASCULAR SURGERY)

## 2021-02-19 DEVICE — Z INACTIVE USE 2424443 DEVICE OCCL CLP L40MM SM FOOTPRINT 1 HND APPL SUTURELESS W/: Type: IMPLANTABLE DEVICE | Site: HEART | Status: FUNCTIONAL

## 2021-02-19 RX ORDER — DEXMEDETOMIDINE HYDROCHLORIDE 4 UG/ML
.2-1.4 INJECTION, SOLUTION INTRAVENOUS
Status: COMPLETED | OUTPATIENT
Start: 2021-02-19 | End: 2021-02-19

## 2021-02-19 RX ORDER — SODIUM CHLORIDE 0.9 % (FLUSH) 0.9 %
10 SYRINGE (ML) INJECTION EVERY 12 HOURS SCHEDULED
Status: DISCONTINUED | OUTPATIENT
Start: 2021-02-19 | End: 2021-02-23 | Stop reason: HOSPADM

## 2021-02-19 RX ORDER — OXYCODONE HYDROCHLORIDE 5 MG/1
10 TABLET ORAL EVERY 4 HOURS PRN
Status: DISCONTINUED | OUTPATIENT
Start: 2021-02-19 | End: 2021-02-23 | Stop reason: HOSPADM

## 2021-02-19 RX ORDER — MEPERIDINE HYDROCHLORIDE 50 MG/ML
25 INJECTION INTRAMUSCULAR; INTRAVENOUS; SUBCUTANEOUS
Status: ACTIVE | OUTPATIENT
Start: 2021-02-19 | End: 2021-02-19

## 2021-02-19 RX ORDER — HEPARIN SODIUM 1000 [USP'U]/ML
INJECTION, SOLUTION INTRAVENOUS; SUBCUTANEOUS PRN
Status: DISCONTINUED | OUTPATIENT
Start: 2021-02-19 | End: 2021-02-19 | Stop reason: SDUPTHER

## 2021-02-19 RX ORDER — KETAMINE HYDROCHLORIDE 100 MG/ML
INJECTION, SOLUTION INTRAMUSCULAR; INTRAVENOUS PRN
Status: DISCONTINUED | OUTPATIENT
Start: 2021-02-19 | End: 2021-02-19 | Stop reason: SDUPTHER

## 2021-02-19 RX ORDER — CHLORHEXIDINE GLUCONATE 0.12 MG/ML
15 RINSE ORAL 2 TIMES DAILY
Status: DISCONTINUED | OUTPATIENT
Start: 2021-02-19 | End: 2021-02-23 | Stop reason: HOSPADM

## 2021-02-19 RX ORDER — CLOPIDOGREL BISULFATE 75 MG/1
75 TABLET ORAL DAILY
Status: DISCONTINUED | OUTPATIENT
Start: 2021-02-21 | End: 2021-02-23 | Stop reason: HOSPADM

## 2021-02-19 RX ORDER — AMIODARONE HYDROCHLORIDE 50 MG/ML
INJECTION, SOLUTION INTRAVENOUS PRN
Status: DISCONTINUED | OUTPATIENT
Start: 2021-02-19 | End: 2021-02-19 | Stop reason: SDUPTHER

## 2021-02-19 RX ORDER — ALBUTEROL SULFATE 90 UG/1
2 AEROSOL, METERED RESPIRATORY (INHALATION) EVERY 6 HOURS PRN
Status: DISCONTINUED | OUTPATIENT
Start: 2021-02-19 | End: 2021-02-23 | Stop reason: HOSPADM

## 2021-02-19 RX ORDER — POLYETHYLENE GLYCOL 3350 17 G/17G
17 POWDER, FOR SOLUTION ORAL DAILY PRN
Status: DISCONTINUED | OUTPATIENT
Start: 2021-02-20 | End: 2021-02-23 | Stop reason: HOSPADM

## 2021-02-19 RX ORDER — FAMOTIDINE 20 MG/1
20 TABLET, FILM COATED ORAL 2 TIMES DAILY
Status: DISCONTINUED | OUTPATIENT
Start: 2021-02-20 | End: 2021-02-23 | Stop reason: HOSPADM

## 2021-02-19 RX ORDER — NITROGLYCERIN 5 MG/ML
INJECTION, SOLUTION INTRAVENOUS PRN
Status: DISCONTINUED | OUTPATIENT
Start: 2021-02-19 | End: 2021-02-19 | Stop reason: SDUPTHER

## 2021-02-19 RX ORDER — DOBUTAMINE HYDROCHLORIDE 200 MG/100ML
2 INJECTION INTRAVENOUS CONTINUOUS PRN
Status: DISCONTINUED | OUTPATIENT
Start: 2021-02-19 | End: 2021-02-22

## 2021-02-19 RX ORDER — DEXMEDETOMIDINE HYDROCHLORIDE 4 UG/ML
.2-1.4 INJECTION, SOLUTION INTRAVENOUS CONTINUOUS
Status: DISCONTINUED | OUTPATIENT
Start: 2021-02-19 | End: 2021-02-19

## 2021-02-19 RX ORDER — ACETAMINOPHEN 650 MG/1
SUPPOSITORY RECTAL PRN
Status: DISCONTINUED | OUTPATIENT
Start: 2021-02-19 | End: 2021-02-19 | Stop reason: ALTCHOICE

## 2021-02-19 RX ORDER — INSULIN GLARGINE 100 [IU]/ML
0.15 INJECTION, SOLUTION SUBCUTANEOUS NIGHTLY
Status: DISCONTINUED | OUTPATIENT
Start: 2021-02-20 | End: 2021-02-22

## 2021-02-19 RX ORDER — SODIUM CHLORIDE 0.9 % (FLUSH) 0.9 %
10 SYRINGE (ML) INJECTION PRN
Status: DISCONTINUED | OUTPATIENT
Start: 2021-02-19 | End: 2021-02-23 | Stop reason: HOSPADM

## 2021-02-19 RX ORDER — DEXTROSE MONOHYDRATE 25 G/50ML
12.5 INJECTION, SOLUTION INTRAVENOUS PRN
Status: DISCONTINUED | OUTPATIENT
Start: 2021-02-19 | End: 2021-02-23 | Stop reason: HOSPADM

## 2021-02-19 RX ORDER — NICOTINE POLACRILEX 4 MG
15 LOZENGE BUCCAL PRN
Status: DISCONTINUED | OUTPATIENT
Start: 2021-02-19 | End: 2021-02-23 | Stop reason: HOSPADM

## 2021-02-19 RX ORDER — FENTANYL CITRATE 50 UG/ML
INJECTION, SOLUTION INTRAMUSCULAR; INTRAVENOUS PRN
Status: DISCONTINUED | OUTPATIENT
Start: 2021-02-19 | End: 2021-02-19 | Stop reason: SDUPTHER

## 2021-02-19 RX ORDER — LIDOCAINE HYDROCHLORIDE 20 MG/ML
INJECTION, SOLUTION INFILTRATION; PERINEURAL CONTINUOUS PRN
Status: DISCONTINUED | OUTPATIENT
Start: 2021-02-19 | End: 2021-02-19 | Stop reason: SDUPTHER

## 2021-02-19 RX ORDER — ACETAMINOPHEN 325 MG/1
650 TABLET ORAL EVERY 6 HOURS SCHEDULED
Status: DISCONTINUED | OUTPATIENT
Start: 2021-02-20 | End: 2021-02-20

## 2021-02-19 RX ORDER — SEVOFLURANE 250 ML/250ML
LIQUID RESPIRATORY (INHALATION) CONTINUOUS PRN
Status: DISCONTINUED | OUTPATIENT
Start: 2021-02-19 | End: 2021-02-19 | Stop reason: SDUPTHER

## 2021-02-19 RX ORDER — PROTAMINE SULFATE 10 MG/ML
INJECTION, SOLUTION INTRAVENOUS PRN
Status: DISCONTINUED | OUTPATIENT
Start: 2021-02-19 | End: 2021-02-19 | Stop reason: SDUPTHER

## 2021-02-19 RX ORDER — ATORVASTATIN CALCIUM 40 MG/1
40 TABLET, FILM COATED ORAL NIGHTLY
Status: DISCONTINUED | OUTPATIENT
Start: 2021-02-20 | End: 2021-02-23 | Stop reason: HOSPADM

## 2021-02-19 RX ORDER — DEXTROSE AND SODIUM CHLORIDE 5; .45 G/100ML; G/100ML
INJECTION, SOLUTION INTRAVENOUS CONTINUOUS
Status: DISCONTINUED | OUTPATIENT
Start: 2021-02-19 | End: 2021-02-22

## 2021-02-19 RX ORDER — ONDANSETRON 2 MG/ML
4 INJECTION INTRAMUSCULAR; INTRAVENOUS EVERY 8 HOURS PRN
Status: DISCONTINUED | OUTPATIENT
Start: 2021-02-19 | End: 2021-02-23 | Stop reason: HOSPADM

## 2021-02-19 RX ORDER — ASPIRIN 300 MG/1
300 SUPPOSITORY RECTAL ONCE
Status: DISCONTINUED | OUTPATIENT
Start: 2021-02-19 | End: 2021-02-19 | Stop reason: CLARIF

## 2021-02-19 RX ORDER — CALCIUM CHLORIDE 100 MG/ML
INJECTION INTRAVENOUS; INTRAVENTRICULAR PRN
Status: DISCONTINUED | OUTPATIENT
Start: 2021-02-19 | End: 2021-02-19 | Stop reason: SDUPTHER

## 2021-02-19 RX ORDER — FUROSEMIDE 10 MG/ML
40 INJECTION INTRAMUSCULAR; INTRAVENOUS 2 TIMES DAILY
Status: DISCONTINUED | OUTPATIENT
Start: 2021-02-20 | End: 2021-02-23

## 2021-02-19 RX ORDER — SUFENTANIL CITRATE 50 UG/ML
INJECTION EPIDURAL; INTRAVENOUS PRN
Status: DISCONTINUED | OUTPATIENT
Start: 2021-02-19 | End: 2021-02-19 | Stop reason: SDUPTHER

## 2021-02-19 RX ORDER — LIDOCAINE HYDROCHLORIDE 20 MG/ML
INJECTION, SOLUTION INFILTRATION; PERINEURAL PRN
Status: DISCONTINUED | OUTPATIENT
Start: 2021-02-19 | End: 2021-02-19 | Stop reason: SDUPTHER

## 2021-02-19 RX ORDER — MILRINONE LACTATE 0.2 MG/ML
INJECTION, SOLUTION INTRAVENOUS CONTINUOUS PRN
Status: DISCONTINUED | OUTPATIENT
Start: 2021-02-19 | End: 2021-02-19 | Stop reason: SDUPTHER

## 2021-02-19 RX ORDER — PROTAMINE SULFATE 10 MG/ML
50 INJECTION, SOLUTION INTRAVENOUS
Status: ACTIVE | OUTPATIENT
Start: 2021-02-19 | End: 2021-02-19

## 2021-02-19 RX ORDER — PROPOFOL 10 MG/ML
INJECTION, EMULSION INTRAVENOUS PRN
Status: DISCONTINUED | OUTPATIENT
Start: 2021-02-19 | End: 2021-02-19 | Stop reason: SDUPTHER

## 2021-02-19 RX ORDER — AMINOCAPROIC ACID 250 MG/ML
INJECTION, SOLUTION INTRAVENOUS PRN
Status: DISCONTINUED | OUTPATIENT
Start: 2021-02-19 | End: 2021-02-19 | Stop reason: SDUPTHER

## 2021-02-19 RX ORDER — CISATRACURIUM BESYLATE 2 MG/ML
INJECTION, SOLUTION INTRAVENOUS PRN
Status: DISCONTINUED | OUTPATIENT
Start: 2021-02-19 | End: 2021-02-19 | Stop reason: SDUPTHER

## 2021-02-19 RX ORDER — DEXTROSE MONOHYDRATE 50 MG/ML
100 INJECTION, SOLUTION INTRAVENOUS PRN
Status: DISCONTINUED | OUTPATIENT
Start: 2021-02-19 | End: 2021-02-23 | Stop reason: HOSPADM

## 2021-02-19 RX ORDER — VANCOMYCIN HYDROCHLORIDE 1 G/20ML
INJECTION, POWDER, LYOPHILIZED, FOR SOLUTION INTRAVENOUS PRN
Status: DISCONTINUED | OUTPATIENT
Start: 2021-02-19 | End: 2021-02-19 | Stop reason: ALTCHOICE

## 2021-02-19 RX ORDER — POTASSIUM CHLORIDE 29.8 MG/ML
20 INJECTION INTRAVENOUS PRN
Status: DISCONTINUED | OUTPATIENT
Start: 2021-02-19 | End: 2021-02-22

## 2021-02-19 RX ORDER — MORPHINE SULFATE 2 MG/ML
2 INJECTION, SOLUTION INTRAMUSCULAR; INTRAVENOUS
Status: DISCONTINUED | OUTPATIENT
Start: 2021-02-19 | End: 2021-02-22

## 2021-02-19 RX ORDER — DIPHENHYDRAMINE HCL 25 MG
25 TABLET ORAL NIGHTLY PRN
Status: DISCONTINUED | OUTPATIENT
Start: 2021-02-20 | End: 2021-02-23 | Stop reason: HOSPADM

## 2021-02-19 RX ORDER — ALBUMIN, HUMAN INJ 5% 5 %
SOLUTION INTRAVENOUS PRN
Status: DISCONTINUED | OUTPATIENT
Start: 2021-02-19 | End: 2021-02-19 | Stop reason: SDUPTHER

## 2021-02-19 RX ORDER — HYDRALAZINE HYDROCHLORIDE 20 MG/ML
5 INJECTION INTRAMUSCULAR; INTRAVENOUS EVERY 5 MIN PRN
Status: DISCONTINUED | OUTPATIENT
Start: 2021-02-19 | End: 2021-02-22

## 2021-02-19 RX ORDER — MAGNESIUM SULFATE IN WATER 40 MG/ML
2000 INJECTION, SOLUTION INTRAVENOUS PRN
Status: DISCONTINUED | OUTPATIENT
Start: 2021-02-19 | End: 2021-02-22

## 2021-02-19 RX ORDER — ASPIRIN 81 MG/1
81 TABLET ORAL DAILY
Status: DISCONTINUED | OUTPATIENT
Start: 2021-02-22 | End: 2021-02-23 | Stop reason: HOSPADM

## 2021-02-19 RX ORDER — ALBUMIN, HUMAN INJ 5% 5 %
SOLUTION INTRAVENOUS PRN
Status: DISCONTINUED | OUTPATIENT
Start: 2021-02-19 | End: 2021-02-19

## 2021-02-19 RX ORDER — MORPHINE SULFATE 4 MG/ML
4 INJECTION, SOLUTION INTRAMUSCULAR; INTRAVENOUS
Status: DISCONTINUED | OUTPATIENT
Start: 2021-02-19 | End: 2021-02-22

## 2021-02-19 RX ORDER — MIDAZOLAM HYDROCHLORIDE 1 MG/ML
INJECTION INTRAMUSCULAR; INTRAVENOUS PRN
Status: DISCONTINUED | OUTPATIENT
Start: 2021-02-19 | End: 2021-02-19 | Stop reason: SDUPTHER

## 2021-02-19 RX ORDER — OXYCODONE HYDROCHLORIDE 5 MG/1
5 TABLET ORAL EVERY 4 HOURS PRN
Status: DISCONTINUED | OUTPATIENT
Start: 2021-02-19 | End: 2021-02-23 | Stop reason: HOSPADM

## 2021-02-19 RX ORDER — SODIUM CHLORIDE 9 MG/ML
INJECTION, SOLUTION INTRAVENOUS CONTINUOUS PRN
Status: DISCONTINUED | OUTPATIENT
Start: 2021-02-19 | End: 2021-02-19 | Stop reason: SDUPTHER

## 2021-02-19 RX ORDER — ALBUMIN, HUMAN INJ 5% 5 %
25 SOLUTION INTRAVENOUS PRN
Status: DISCONTINUED | OUTPATIENT
Start: 2021-02-19 | End: 2021-02-22

## 2021-02-19 RX ADMIN — SUFENTANIL CITRATE 5 MCG: 50 INJECTION EPIDURAL; INTRAVENOUS at 08:21

## 2021-02-19 RX ADMIN — DEXTROSE MONOHYDRATE 3 MCG/MIN: 50 INJECTION, SOLUTION INTRAVENOUS at 10:24

## 2021-02-19 RX ADMIN — SUFENTANIL CITRATE 5 MCG: 50 INJECTION EPIDURAL; INTRAVENOUS at 08:20

## 2021-02-19 RX ADMIN — SODIUM BICARBONATE 50 MEQ: 84 INJECTION, SOLUTION INTRAVENOUS at 13:29

## 2021-02-19 RX ADMIN — MIDAZOLAM 2 MG: 1 INJECTION INTRAMUSCULAR; INTRAVENOUS at 06:33

## 2021-02-19 RX ADMIN — FENTANYL CITRATE 250 MCG: 50 INJECTION, SOLUTION INTRAMUSCULAR; INTRAVENOUS at 07:50

## 2021-02-19 RX ADMIN — ONDANSETRON 4 MG: 2 INJECTION INTRAMUSCULAR; INTRAVENOUS at 15:01

## 2021-02-19 RX ADMIN — DEXTROSE MONOHYDRATE 3 MCG/MIN: 50 INJECTION, SOLUTION INTRAVENOUS at 15:35

## 2021-02-19 RX ADMIN — MORPHINE SULFATE 2 MG: 2 INJECTION, SOLUTION INTRAMUSCULAR; INTRAVENOUS at 17:16

## 2021-02-19 RX ADMIN — MIDAZOLAM HYDROCHLORIDE 10 MG: 1 INJECTION INTRAMUSCULAR; INTRAVENOUS at 07:50

## 2021-02-19 RX ADMIN — SODIUM CHLORIDE, POTASSIUM CHLORIDE, SODIUM LACTATE AND CALCIUM CHLORIDE 1000 ML: 600; 310; 30; 20 INJECTION, SOLUTION INTRAVENOUS at 04:41

## 2021-02-19 RX ADMIN — SODIUM CHLORIDE: 9 INJECTION, SOLUTION INTRAVENOUS at 07:37

## 2021-02-19 RX ADMIN — HEPARIN SODIUM 5000 UNITS: 1000 INJECTION, SOLUTION INTRAVENOUS; SUBCUTANEOUS at 09:34

## 2021-02-19 RX ADMIN — ASPIRIN 325 MG: 325 TABLET, COATED ORAL at 21:17

## 2021-02-19 RX ADMIN — CISATRACURIUM BESYLATE 20 MG: 2 INJECTION INTRAVENOUS at 07:50

## 2021-02-19 RX ADMIN — PROPOFOL 100 MG: 10 INJECTION, EMULSION INTRAVENOUS at 08:19

## 2021-02-19 RX ADMIN — ALBUMIN (HUMAN) 25 G: 12.5 INJECTION, SOLUTION INTRAVENOUS at 13:43

## 2021-02-19 RX ADMIN — CEFAZOLIN SODIUM 2000 MG: 10 INJECTION, POWDER, FOR SOLUTION INTRAVENOUS at 19:57

## 2021-02-19 RX ADMIN — CEFAZOLIN SODIUM 2 G: 10 INJECTION, POWDER, FOR SOLUTION INTRAVENOUS at 07:52

## 2021-02-19 RX ADMIN — PROPOFOL 50 MG: 10 INJECTION, EMULSION INTRAVENOUS at 07:50

## 2021-02-19 RX ADMIN — METOPROLOL TARTRATE 12.5 MG: 25 TABLET, FILM COATED ORAL at 04:40

## 2021-02-19 RX ADMIN — MUPIROCIN: 20 OINTMENT TOPICAL at 21:17

## 2021-02-19 RX ADMIN — DEXMEDETOMIDINE 0.2 MCG/KG/HR: 100 INJECTION, SOLUTION, CONCENTRATE INTRAVENOUS at 10:19

## 2021-02-19 RX ADMIN — Medication 15 ML: at 21:17

## 2021-02-19 RX ADMIN — PROTAMINE SULFATE 450 MG: 10 INJECTION, SOLUTION INTRAVENOUS at 11:46

## 2021-02-19 RX ADMIN — ALBUMIN, HUMAN INJ 5% 12.5 G: 5 SOLUTION at 12:39

## 2021-02-19 RX ADMIN — MILRINONE LACTATE 0.38 MCG/KG/MIN: 0.2 INJECTION, SOLUTION INTRAVENOUS at 11:55

## 2021-02-19 RX ADMIN — MORPHINE SULFATE 2 MG: 2 INJECTION, SOLUTION INTRAMUSCULAR; INTRAVENOUS at 19:53

## 2021-02-19 RX ADMIN — AMINOCAPROIC ACID 5000 MG: 250 INJECTION, SOLUTION INTRAVENOUS at 07:54

## 2021-02-19 RX ADMIN — ASPIRIN 81 MG: 81 TABLET, COATED ORAL at 04:40

## 2021-02-19 RX ADMIN — CEFAZOLIN SODIUM 2 G: 10 INJECTION, POWDER, FOR SOLUTION INTRAVENOUS at 11:52

## 2021-02-19 RX ADMIN — SODIUM CHLORIDE 8.94 UNITS/HR: 9 INJECTION, SOLUTION INTRAVENOUS at 14:18

## 2021-02-19 RX ADMIN — LIDOCAINE HYDROCHLORIDE 60 MG: 20 INJECTION, SOLUTION INFILTRATION; PERINEURAL at 07:50

## 2021-02-19 RX ADMIN — CALCIUM CHLORIDE 2 G: 100 INJECTION, SOLUTION INTRAVENOUS at 11:46

## 2021-02-19 RX ADMIN — ALBUMIN, HUMAN INJ 5% 12.5 G: 5 SOLUTION at 12:48

## 2021-02-19 RX ADMIN — SODIUM CHLORIDE 9.28 UNITS/HR: 9 INJECTION, SOLUTION INTRAVENOUS at 16:10

## 2021-02-19 RX ADMIN — DEXTROSE AND SODIUM CHLORIDE: 5; 450 INJECTION, SOLUTION INTRAVENOUS at 13:35

## 2021-02-19 RX ADMIN — AMIODARONE HYDROCHLORIDE 150 MG: 50 INJECTION, SOLUTION INTRAVENOUS at 11:35

## 2021-02-19 RX ADMIN — OXYCODONE 10 MG: 5 TABLET ORAL at 21:17

## 2021-02-19 RX ADMIN — MORPHINE SULFATE 4 MG: 4 INJECTION, SOLUTION INTRAMUSCULAR; INTRAVENOUS at 22:00

## 2021-02-19 RX ADMIN — SEVOFLURANE 1 %: 250 LIQUID RESPIRATORY (INHALATION) at 09:22

## 2021-02-19 RX ADMIN — POTASSIUM CHLORIDE 20 MEQ: 29.8 INJECTION, SOLUTION INTRAVENOUS at 13:53

## 2021-02-19 RX ADMIN — SUFENTANIL CITRATE 40 MCG: 50 INJECTION EPIDURAL; INTRAVENOUS at 08:43

## 2021-02-19 RX ADMIN — PROPOFOL 50 MG: 10 INJECTION, EMULSION INTRAVENOUS at 08:43

## 2021-02-19 RX ADMIN — POTASSIUM CHLORIDE 20 MEQ: 29.8 INJECTION, SOLUTION INTRAVENOUS at 14:51

## 2021-02-19 RX ADMIN — LIDOCAINE HYDROCHLORIDE 100 MG/KG/HR: 20 INJECTION, SOLUTION INFILTRATION; PERINEURAL at 11:46

## 2021-02-19 RX ADMIN — POTASSIUM CHLORIDE 20 MEQ: 29.8 INJECTION, SOLUTION INTRAVENOUS at 15:50

## 2021-02-19 RX ADMIN — Medication 1500 MG: at 21:11

## 2021-02-19 RX ADMIN — KETAMINE HYDROCHLORIDE 500 MG: 100 INJECTION, SOLUTION INTRAMUSCULAR; INTRAVENOUS at 07:50

## 2021-02-19 RX ADMIN — SODIUM CHLORIDE 8 UNITS/HR: 9 INJECTION, SOLUTION INTRAVENOUS at 08:27

## 2021-02-19 RX ADMIN — VANCOMYCIN HYDROCHLORIDE 1.5 G: 10 INJECTION, POWDER, LYOPHILIZED, FOR SOLUTION INTRAVENOUS at 08:22

## 2021-02-19 RX ADMIN — HEPARIN SODIUM 32000 UNITS: 1000 INJECTION, SOLUTION INTRAVENOUS; SUBCUTANEOUS at 10:08

## 2021-02-19 RX ADMIN — ANTISEPTIC SURGICAL SCRUB: 0.04 SOLUTION TOPICAL at 04:59

## 2021-02-19 RX ADMIN — NITROGLYCERIN 20 MCG: 5 INJECTION, SOLUTION INTRAVENOUS at 10:09

## 2021-02-19 ASSESSMENT — PULMONARY FUNCTION TESTS
PIF_VALUE: 2
PIF_VALUE: 25
PIF_VALUE: 21
PIF_VALUE: 20
PIF_VALUE: 23
PIF_VALUE: 24
PIF_VALUE: 0
PIF_VALUE: 0
PIF_VALUE: 20
PIF_VALUE: 0
PIF_VALUE: 21
PIF_VALUE: 23
PIF_VALUE: 0
PIF_VALUE: 23
PIF_VALUE: 0
PIF_VALUE: 0
PIF_VALUE: 20
PIF_VALUE: 24
PIF_VALUE: 20
PIF_VALUE: 19
PIF_VALUE: 20
PIF_VALUE: 0
PIF_VALUE: 6
PIF_VALUE: 24
PIF_VALUE: 20
PIF_VALUE: 22
PIF_VALUE: 0
PIF_VALUE: 25
PIF_VALUE: 24
PIF_VALUE: 25
PIF_VALUE: 6
PIF_VALUE: 25
PIF_VALUE: 23
PIF_VALUE: 25
PIF_VALUE: 22
PIF_VALUE: 0
PIF_VALUE: 24
PIF_VALUE: 24
PIF_VALUE: 7
PIF_VALUE: 23
PIF_VALUE: 21
PIF_VALUE: 22
PIF_VALUE: 0
PIF_VALUE: 22
PIF_VALUE: 25
PIF_VALUE: 5
PIF_VALUE: 22
PIF_VALUE: 0
PIF_VALUE: 23
PIF_VALUE: 0
PIF_VALUE: 0
PIF_VALUE: 25
PIF_VALUE: 0
PIF_VALUE: 23
PIF_VALUE: 0
PIF_VALUE: 0
PIF_VALUE: 20
PIF_VALUE: 24
PIF_VALUE: 24
PIF_VALUE: 22
PIF_VALUE: 0
PIF_VALUE: 0
PIF_VALUE: 20
PIF_VALUE: 4
PIF_VALUE: 21
PIF_VALUE: 23
PIF_VALUE: 0
PIF_VALUE: 23
PIF_VALUE: 24
PIF_VALUE: 6
PIF_VALUE: 6
PIF_VALUE: 7
PIF_VALUE: 0
PIF_VALUE: 0
PIF_VALUE: 21
PIF_VALUE: 0
PIF_VALUE: 21
PIF_VALUE: 0
PIF_VALUE: 22
PIF_VALUE: 19
PIF_VALUE: 22
PIF_VALUE: 21
PIF_VALUE: 21
PIF_VALUE: 22
PIF_VALUE: 23
PIF_VALUE: 13
PIF_VALUE: 0
PIF_VALUE: 21
PIF_VALUE: 24
PIF_VALUE: 24
PIF_VALUE: 23
PIF_VALUE: 21
PIF_VALUE: 23
PIF_VALUE: 0
PIF_VALUE: 14
PIF_VALUE: 21
PIF_VALUE: 23
PIF_VALUE: 23
PIF_VALUE: 24
PIF_VALUE: 21
PIF_VALUE: 21
PIF_VALUE: 20
PIF_VALUE: 0
PIF_VALUE: 0
PIF_VALUE: 21
PIF_VALUE: 22
PIF_VALUE: 24
PIF_VALUE: 21
PIF_VALUE: 24
PIF_VALUE: 23
PIF_VALUE: 22
PIF_VALUE: 24
PIF_VALUE: 25
PIF_VALUE: 0
PIF_VALUE: 20
PIF_VALUE: 5
PIF_VALUE: 0
PIF_VALUE: 20
PIF_VALUE: 24
PIF_VALUE: 0
PIF_VALUE: 20
PIF_VALUE: 23
PIF_VALUE: 21
PIF_VALUE: 0
PIF_VALUE: 22
PIF_VALUE: 0
PIF_VALUE: 23
PIF_VALUE: 22
PIF_VALUE: 19
PIF_VALUE: 0
PIF_VALUE: 23
PIF_VALUE: 24
PIF_VALUE: 0
PIF_VALUE: 20
PIF_VALUE: 6
PIF_VALUE: 2
PIF_VALUE: 22
PIF_VALUE: 0
PIF_VALUE: 2
PIF_VALUE: 0
PIF_VALUE: 24
PIF_VALUE: 0
PIF_VALUE: 0
PIF_VALUE: 24
PIF_VALUE: 9
PIF_VALUE: 21
PIF_VALUE: 0
PIF_VALUE: 20
PIF_VALUE: 0
PIF_VALUE: 0
PIF_VALUE: 13
PIF_VALUE: 32
PIF_VALUE: 0
PIF_VALUE: 25
PIF_VALUE: 0
PIF_VALUE: 22
PIF_VALUE: 24
PIF_VALUE: 0
PIF_VALUE: 4
PIF_VALUE: 25
PIF_VALUE: 0
PIF_VALUE: 21
PIF_VALUE: 23
PIF_VALUE: 23
PIF_VALUE: 0
PIF_VALUE: 24
PIF_VALUE: 22
PIF_VALUE: 0
PIF_VALUE: 24
PIF_VALUE: 0
PIF_VALUE: 24
PIF_VALUE: 0
PIF_VALUE: 0
PIF_VALUE: 21
PIF_VALUE: 0
PIF_VALUE: 20
PIF_VALUE: 26
PIF_VALUE: 0
PIF_VALUE: 20
PIF_VALUE: 13
PIF_VALUE: 6

## 2021-02-19 ASSESSMENT — PAIN SCALES - GENERAL
PAINLEVEL_OUTOF10: 9
PAINLEVEL_OUTOF10: 6
PAINLEVEL_OUTOF10: 0
PAINLEVEL_OUTOF10: 6

## 2021-02-19 ASSESSMENT — ENCOUNTER SYMPTOMS: SHORTNESS OF BREATH: 1

## 2021-02-19 NOTE — ANESTHESIA PRE PROCEDURE
albuterol (PROVENTIL) (2.5 MG/3ML) 0.083% nebulizer solution Take 3 mLs by nebulization every 6 hours as needed for Wheezing  Patient not taking: Reported on 1/7/2021 3/6/20   Jesus Zendejas MD   Spacer/Aero-Holding Chambers (E-Z SPACER) REAL 1 Device by Does not apply route daily as needed (wheezing) 3/1/20   Raylene Ormond, MD   aspirin 81 MG chewable tablet CHEW ONE TABLET BY MOUTH DAILY 12/5/16   [facility], DO       Current medications:    Current Facility-Administered Medications   Medication Dose Route Frequency Provider Last Rate Last Admin    EPINEPHrine (EPINEPHrine HCL) 5 mg in dextrose 5 % 250 mL infusion  1-30 mcg/min Intravenous On Call to [address] [place] [address] Road, MD        dexmedetomidine (PRECEDEX) 400 mcg in sodium chloride 0.9 % 100 mL infusion  0.2-1.4 mcg/kg/hr Intravenous On Call to [facility] [address], MD        acetaminophen (TYLENOL) suppository    PRN Kiersten Merchant MD   650 mg at 02/19/21 0755    bisacodyl (DULCOLAX) suppository 10 mg  10 mg Rectal Once Timothy Inks, APRN - CNP        lactated ringers infusion   Intravenous Continuous Timothy Inks, APRN - CNP        norepinephrine (LEVOPHED) 16 mg in dextrose 5 % 250 mL infusion  2 mcg/min Intravenous Continuous Rashida Love MD        lactated ringers infusion 1,000 mL  1,000 mL Intravenous Continuous Rashida Love MD 50 mL/hr at 02/19/21 0441 1,000 mL at 02/19/21 0441    insulin lispro (HUMALOG) injection vial 0-18 Units  0-18 Units Subcutaneous TID WC Barbara Williamson MD   6 Units at 02/18/21 1737    insulin lispro (HUMALOG) injection vial 0-9 Units  0-9 Units Subcutaneous Nightly Barbara Williamson MD   3 Units at 02/18/21 2107    mupirocin (BACTROBAN) 2 % ointment   Nasal BID Aminah Herring MD   Given at 02/18/21 2034    cangrelor (KENGREAL) 50 mg in sodium chloride 0.9 % 250 mL infusion  0.75 mcg/kg/min Intravenous Continuous Aminah Herring MD 24.7 mL/hr at 02/18/21 2334 0.75 mcg/kg/min at 02/18/21 7063  perflutren lipid microspheres (DEFINITY) injection 1.65 mg  1.5 mL Intravenous ONCE PRN KORIN Smith - CNP        nitroGLYCERIN 50 mg in dextrose 5% 250 mL infusion  5-200 mcg/min Intravenous Continuous Ede Cheng MD   Stopped at 02/15/21 8114    morphine (PF) injection 4 mg  4 mg Intravenous Q2H PRN Ede Cheng MD   4 mg at 02/15/21 1005    LORazepam (ATIVAN) injection 1 mg  1 mg Intravenous Q4H PRN Ede Cheng MD        diazePAM (VALIUM) tablet 2 mg  2 mg Oral Q12H PRN Ede Cheng MD        amLODIPine (NORVASC) tablet 2.5 mg  2.5 mg Oral Daily Ede Cheng MD   2.5 mg at 02/18/21 3359    aspirin chewable tablet 81 mg  81 mg Oral Daily Ede Cheng MD   81 mg at 02/18/21 0843    atorvastatin (LIPITOR) tablet 40 mg  40 mg Oral QPM Ede Cheng MD   40 mg at 02/18/21 1729    lisinopril (PRINIVIL;ZESTRIL) tablet 2.5 mg  2.5 mg Oral Daily Ede Cheng MD   2.5 mg at 02/18/21 0843    metoprolol tartrate (LOPRESSOR) tablet 25 mg  25 mg Oral BID Ede Cheng MD   25 mg at 02/18/21 2034    glucose (GLUTOSE) 40 % oral gel 15 g  15 g Oral PRN Ede Cheng MD        dextrose 50 % IV solution  12.5 g Intravenous PRN Ede Cheng MD        glucagon (rDNA) injection 1 mg  1 mg Intramuscular PRN Ede Cheng MD        dextrose 5 % solution  100 mL/hr Intravenous PRN Ede Cheng MD        sodium chloride flush 0.9 % injection 10 mL  10 mL Intravenous 2 times per day Ede Cheng MD   10 mL at 02/18/21 2035    sodium chloride flush 0.9 % injection 10 mL  10 mL Intravenous PRN Ede Cheng MD        promethazine (PHENERGAN) tablet 12.5 mg  12.5 mg Oral Q6H PRN Ede Cheng MD        Or    ondansetron [facility]) injection 4 mg  4 mg Intravenous Q6H PRN Ede Cheng MD        polyethylene glycol [facility]) packet 17 g  17 g Oral Daily PRN Ede Cheng MD   17 g at 02/18/21 7014  Arm pain, left M79.602    Shoulder pain M25.519    Precordial pain R07.2    Lung nodule R91.1    Acute angina (HCC) I20.9    Abnormal stress test R94.39    Acute coronary syndrome (HCC) I24.9    Unstable angina (HCC) I20.0    Coronary artery disease involving native coronary artery of native heart with unstable angina pectoris (HCC) I25.110    Other chest pain R07.89    S/P drug eluting coronary stent placement Z95.5    Essential hypertension I10    Obesity E66.9    Abnormal chest x-ray R93.89    Angina effort I20.8    Skin lesion L98.9    Angina at rest ([facility] [address] I20.8    Hyperlipidemia E78.5    Angina pectoris (HCC) I20.9    NSTEMI (non-ST elevated myocardial infarction) ([facility] [address] I21.4    Controlled type 2 diabetes mellitus without complication, without long-term current use of insulin ([facility]) E11.9    ASHD (arteriosclerotic heart disease) I25.10    Mass of right foot R22.41    Overweight E66.3    History of angina pectoris Z86.79    Reactive airway disease J45.909    Bronchitis J40    Reactive airway disease with wheezing, moderate persistent, with acute exacerbation J45.41    Need for prophylactic vaccination and inoculation against varicella Z23    Need for prophylactic vaccination against diphtheria-tetanus-pertussis (DTP) Z23    Chronic right-sided thoracic back pain M54.6, G89.29    CAD in native artery I25.10    S/P PTCA (percutaneous transluminal coronary angioplasty) Z98.61    Chest pain R07.9    Ischemic cardiomyopathy I25.5       Past Medical History:        Diagnosis Date    Alcohol abuse 12/3/2012    Coronary artery disease     Hyperlipidemia     Hypertension     Non morbid obesity     Reactive airway disease with wheezing, moderate persistent, with acute exacerbation 3/3/2020    Type II or unspecified type diabetes mellitus without mention of complication, not stated as uncontrolled        Past Surgical History:        Procedure Laterality Date Lab Results   Component Value Date    PROTIME 13.0 02/19/2021    INR 1.12 02/19/2021    APTT 137.6 02/15/2021       HCG (If Applicable): No results found for: PREGTESTUR, PREGSERUM, HCG, HCGQUANT     ABGs:   Lab Results   Component Value Date    PHART 7.447 02/19/2021    PO2ART 624.7 02/19/2021    UZF5JLJ 34.0 02/19/2021    FWS9UMF 23.5 02/19/2021    BEART -1 02/19/2021    L4HUPXEX 100 02/19/2021        Type & Screen (If Applicable):  No results found for: LABABO, LABRH    Drug/Infectious Status (If Applicable):  No results found for: HIV, HEPCAB    COVID-19 Screening (If Applicable):   Lab Results   Component Value Date    COVID19 NOT DETECTED 12/10/2020         Anesthesia Evaluation  Patient summary reviewed  Airway: Mallampati: II  TM distance: >3 FB   Neck ROM: full  Mouth opening: > = 3 FB Dental: normal exam         Pulmonary: breath sounds clear to auscultation  (+) shortness of breath: chronic,  sleep apnea:  asthma: allergic asthma,           Patient did not smoke on day of surgery. Cardiovascular:  Exercise tolerance: poor (<4 METS),   (+) hypertension:, angina:, past MI: < 1 month, CAD: obstructive, CABG/stent:, orthopnea, hyperlipidemia      ECG reviewed  Rhythm: regular  Rate: normal  Echocardiogram reviewed    Cleared by cardiology     Beta Blocker:  Dose within 24 Hrs         Neuro/Psych:   (+) psychiatric history: stable with treatment   (-) neuromuscular disease           GI/Hepatic/Renal:   (+) morbid obesity          Endo/Other:    (+) DiabetesType II DM, well controlled, , .                 Abdominal:   (+) obese,     Abdomen: soft. Vascular:                                        Anesthesia Plan      general     ASA 4       Induction: intravenous. arterial line, BIS, central line and MESHA  MIPS: Postoperative opioids intended and Prophylactic antiemetics administered. Anesthetic plan and risks discussed with patient. Use of blood products discussed with patient whom consented to blood products. Plan discussed with attending.     Attending anesthesiologist reviewed and agrees with [address], DO   2/19/2021

## 2021-02-19 NOTE — BRIEF OP NOTE
Brief Postoperative Note      Patient: Jax Melton  YOB: 1960  MRN: [id number]    Date of Procedure: 2/19/2021    Pre-Op Diagnosis:   1. S/p balloon angioplasty of LCx (on this admission)  2. S/p mulitple PCIs/stents to LAD/LCx  3. NSTEMI  4. Hypertension  5. Hyperlipidemia    Post-Op Diagnosis: Same       Procedure(s):  1. CORONARY ARTERY BYPASS GRAFTING X 2  2. TOTAL CARDIOPULMONARY BYPASS  3. ENDOSCOPIC VEIN HARVESTING OF RIGHT LOWER EXTREMITY  4. REVERSE SAPHENOUS VEIN GRAFTING TO OBTUSE MARGINAL ARTERY   5. LEFT INTERNAL MAMMARY ARTERY TO LEFT ANTERIOR DESCENDING ARTERY  6. LEFT ATRIAL APPENDAGE CLIP 40  7. BILATERAL INTERCOSTAL NERVE BLOCK  8. INDEPENDENT INTERPRETATION OF TRANSOESOPHAGEAL ECHOCARDIOGRAM     Surgeon(s):  Queenie Mabry MD    Assistant:  Surgical Assistant: Ra Swan    Anesthesia: General    Estimated Blood Loss (mL): 900 CC CELL SAVER    Complications: None    Specimens:   * No specimens in log *    Implants:  Implant Name Type Inv. Item Serial No.  Lot No. LRB No. Used Action   DEVICE OCCL CLP L40MM SM FOOTPRINT 1 HND APPL SUTURELESS W/  DEVICE OCCL CLP L40MM SM FOOTPRINT 1 HND APPL SUTURELESS W/  [id number] N/A 1 Implanted         Drains:   Chest Tube 1 Mediastinal 32 Danish (Active)       Chest Tube 2 Left Pleural 28 Danish (Active)       Urethral Catheter Latex; Temperature probe 16 fr (Active)       Findings: ACCEPTABLE DISTAL TARGETS.  POST BYPASS LVEF PRESERVED    Electronically signed by Fernandez Nguyễn MD on 2/19/2021 at 12:54 PM

## 2021-02-19 NOTE — ANESTHESIA PRE PROCEDURE
Department of Anesthesiology  Preprocedure Note       Name:  Tamela Overton   Age:  61 y.o.  :  1960                                          MRN:  [id number]         Date:  2021      Surgeon: Hima Morales):  Kiersten Merchant MD    Procedure: Procedure(s):  CORONARY ARTERY BYPASS GRAFTING, INTERNAL MAMMARY ARTERY, SAPHENOUS VEIN GRAFT, ON PUMP WITH LEFT ATRIAL APPENDAGE CLIP    Medications prior to admission:   Prior to Admission medications    Medication Sig Start Date End Date Taking? Authorizing Provider   glipiZIDE (GLUCOTROL) 10 MG tablet TAKE ONE TABLET BY MOUTH EVERY MORNING FOR DIABETES 2/3/21   [facility], DO   atorvastatin (LIPITOR) 40 MG tablet TAKE ONE TABLET BY MOUTH EVERY EVENING 2/3/21   [facility], DO   lisinopril (PRINIVIL;ZESTRIL) 2.5 MG tablet TAKE ONE TABLET BY MOUTH DAILY 21   Aminah Herring MD   metoprolol tartrate (LOPRESSOR) 25 MG tablet TAKE ONE TABLET BY MOUTH TWICE A DAY 21   Aminah Herring MD   albuterol sulfate (PROAIR RESPICLICK) 988 (90 Base) MCG/ACT aerosol powder inhalation Inhale 2 puffs into the lungs every 4 hours as needed for Wheezing  Patient not taking: Reported on 20   [facility], DO   fluticasone-salmeterol (ADVAIR DISKUS) 250-50 MCG/DOSE AEPB Inhale 1 puff into the lungs every 12 hours 20   [facility], DO   nitroGLYCERIN (NITROSTAT) 0.4 MG SL tablet DISSOLVE 1 TAB UNDER TONGUE FOR CHEST PAIN - IF PAIN REMAINS AFTER 5 MIN, CALL 911 AND REPEAT DOSE. MAX 3 TABS IN 15 MINUTES 20   KORIN Galarza - CNP   metFORMIN (GLUCOPHAGE) 1000 MG tablet TAKE ONE AND ONE HALF TABLETS DAILY.  20   [facility], DO   Naproxen Sodium (ALEVE PO) Take by mouth as needed    Historical Provider, MD   isosorbide mononitrate (IMDUR) 120 MG extended release tablet TAKE ONE TABLET BY MOUTH DAILY 6/15/20   Tanesha Ellison MD   clopidogrel (PLAVIX) 75 MG tablet TAKE ONE TABLET BY MOUTH DAILY 3/23/20   Aminah Herring MD albuterol (PROVENTIL) (2.5 MG/3ML) 0.083% nebulizer solution Take 3 mLs by nebulization every 6 hours as needed for Wheezing  Patient not taking: Reported on 1/7/2021 3/6/20   Gerard Martel MD   Spacer/Aero-Holding Chambers (E-Z SPACER) REAL 1 Device by Does not apply route daily as needed (wheezing) 3/1/20   Cora Lozano MD   aspirin 81 MG chewable tablet CHEW ONE TABLET BY MOUTH DAILY 12/5/16   [facility], DO       Current medications:    Current Facility-Administered Medications   Medication Dose Route Frequency Provider Last Rate Last Admin    bisacodyl (DULCOLAX) suppository 10 mg  10 mg Rectal Once Catalino Zuluaga, APRN - CNP        lactated ringers infusion   Intravenous Continuous Catalino Zuluaga APRN - CNP        [START ON 2/19/2021] aspirin EC tablet 81 mg  81 mg Oral Once Catalino Zuluaga, APRN - CNP        chlorhexidine (PERIDEX) 0.12 % solution 15 mL  15 mL Mouth/Throat Once Catalino Zuluaga APRN - CNP        chlorhexidine (HIBICLENS) 4 % liquid   Topical See Admin Instructions Catalino Zuluaga APRN - CNP        [START ON 2/19/2021] ceFAZolin (ANCEF) 2000 mg in dextrose 5 % 100 mL IVPB  2,000 mg Intravenous On Call to [address], APRN - CNP        [START ON 2/19/2021] vancomycin 1500 mg in dextrose 5% 300 mL IVPB  1,500 mg Intravenous On Call to [address], APRN - CNP        metoprolol tartrate (LOPRESSOR) tablet 12.5 mg  12.5 mg Oral Once Catalino Amelidia, APRN - CNP        [START ON 2/19/2021] midazolam (VERSED) injection 2 mg  2 mg Intravenous Once Phill Sky MD       [facility] [START ON 2/19/2021] insulin regular (HUMULIN R;NOVOLIN R) 100 Units in sodium chloride 0.9 % 100 mL infusion  0.5 Units/hr Intravenous On Call to [address] [place] [address] [address], [place]        [START ON 2/19/2021] norepinephrine (LEVOPHED) 16 mg in dextrose 5 % 250 mL infusion  2 mcg/min Intravenous Continuous Phill Sky MD  [START ON 2/19/2021] lactated ringers infusion 1,000 mL  1,000 mL Intravenous Continuous Damion Williamson MD        insulin lispro (HUMALOG) injection vial 0-18 Units  0-18 Units Subcutaneous TID WC Hema Moreira MD   6 Units at 02/18/21 1737    insulin lispro (HUMALOG) injection vial 0-9 Units  0-9 Units Subcutaneous Nightly Hema Moreira MD   3 Units at 02/17/21 2123    mupirocin (BACTROBAN) 2 % ointment   Nasal BID Sharon Hernandez MD   Given at 02/17/21 0830    [facility]) 50 mg in sodium chloride 0.9 % 250 mL infusion  0.75 mcg/kg/min Intravenous Continuous Sharon Hernandez MD 24.7 mL/hr at 02/18/21 1729 0.75 mcg/kg/min at 02/18/21 1729    perflutren lipid microspheres (DEFINITY) injection 1.65 mg  1.5 mL Intravenous ONCE PRN KORIN Ennis - CNP        nitroGLYCERIN 50 mg in dextrose 5% 250 mL infusion  5-200 mcg/min Intravenous Continuous Sharon Hernandez MD   Stopped at 02/15/21 0858    morphine (PF) injection 4 mg  4 mg Intravenous Q2H PRN Sharon Hernandez MD   4 mg at 02/15/21 1005    LORazepam (ATIVAN) injection 1 mg  1 mg Intravenous Q4H PRN Sharon Hernandez MD        diazePAM (VALIUM) tablet 2 mg  2 mg Oral Q12H PRN Sharon Hernandez MD        amLODIPine (NORVASC) tablet 2.5 mg  2.5 mg Oral Daily Sharon Hernandez MD   2.5 mg at 02/18/21 3516    aspirin chewable tablet 81 mg  81 mg Oral Daily Sharon Hernandez MD   81 mg at 02/18/21 0843    atorvastatin (LIPITOR) tablet 40 mg  40 mg Oral QPM Sharon Hernandez MD   40 mg at 02/18/21 1729    lisinopril (PRINIVIL;ZESTRIL) tablet 2.5 mg  2.5 mg Oral Daily Sharon Hernandez MD   2.5 mg at 02/18/21 0843    metoprolol tartrate (LOPRESSOR) tablet 25 mg  25 mg Oral BID Sharon Hernandez MD   25 mg at 02/18/21 0842    glucose (GLUTOSE) 40 % oral gel 15 g  15 g Oral PRN Sharon Hernandez MD        dextrose 50 % IV solution  12.5 g Intravenous PRN Sharon Hernandez MD  Essential hypertension I10    Obesity E66.9    Abnormal chest x-ray R93.89    Angina effort I20.8    Skin lesion L98.9    Angina at rest (HCC) I20.8    Hyperlipidemia E78.5    Angina pectoris (HCC) I20.9    NSTEMI (non-ST elevated myocardial infarction) ([facility]) I21.4    Controlled type 2 diabetes mellitus without complication, without long-term current use of insulin ([facility]) E11.9    ASHD (arteriosclerotic heart disease) I25.10    Mass of right foot R22.41    Overweight E66.3    History of angina pectoris Z86.79    Reactive airway disease J45.909    Bronchitis J40    Reactive airway disease with wheezing, moderate persistent, with acute exacerbation J45.41    Need for prophylactic vaccination and inoculation against varicella Z23    Need for prophylactic vaccination against diphtheria-tetanus-pertussis (DTP) Z23    Chronic right-sided thoracic back pain M54.6, G89.29    CAD in native artery I25.10    S/P PTCA (percutaneous transluminal coronary angioplasty) Z98.61    Chest pain R07.9    Ischemic cardiomyopathy I25.5       Past Medical History:        Diagnosis Date    Alcohol abuse 12/3/2012    Coronary artery disease     Hyperlipidemia     Hypertension     Non morbid obesity     Reactive airway disease with wheezing, moderate persistent, with acute exacerbation 3/3/2020    Type II or unspecified type diabetes mellitus without mention of complication, not stated as uncontrolled        Past Surgical History:        Procedure Laterality Date    CORONARY ANGIOPLASTY WITH STENT PLACEMENT  1/14/2016 9/21/2020    1 [employer] Premier SAMUEL 2.25x16    VEIN SURGERY         Social History:    Social History     Tobacco Use    Smoking status: Never Smoker    Smokeless tobacco: Never Used   Substance Use Topics    Alcohol use: Not Currently     Alcohol/week: 1.0 standard drinks     Types: 1 Shots of liquor per week     Comment: no alcohol in 2 months. Counseling given: Not Answered      Vital Signs (Current):   Vitals:    02/18/21 0445 02/18/21 0830 02/18/21 1112 02/18/21 1545   BP:  138/84 (!) 148/89 122/74   Pulse:  91 80 80   Resp:  18 18 16   Temp:  98.2 °F (36.8 °C) 98.9 °F (37.2 °C) 97.6 °F (36.4 °C)   TempSrc:  Oral Oral Oral   SpO2:  97% 97% 98%   Weight: 243 lb 3.2 oz (110.3 kg)      Height:                                                  BP Readings from Last 3 Encounters:   02/18/21 122/74   02/15/21 (!) 209/102   01/07/21 (!) 152/81       NPO Status:                                                                                 BMI:   Wt Readings from Last 3 Encounters:   02/18/21 243 lb 3.2 oz (110.3 kg)   01/07/21 258 lb (117 kg)   12/21/20 258 lb (117 kg)     Body mass index is 34.9 kg/m².     CBC:   Lab Results   Component Value Date    WBC 8.0 02/18/2021    RBC 4.78 02/18/2021    HGB 14.7 02/18/2021    HCT 45.0 02/18/2021    MCV 94.0 02/18/2021    RDW 13.7 02/18/2021     02/18/2021       CMP:   Lab Results   Component Value Date     02/18/2021    K 4.2 02/18/2021     02/18/2021    CO2 20 02/18/2021    BUN 11 02/18/2021    CREATININE <0.5 02/18/2021    GFRAA >60 02/18/2021    AGRATIO 1.8 02/12/2021    LABGLOM >60 02/18/2021    GLUCOSE 204 02/18/2021    PROT 7.2 02/12/2021    CALCIUM 9.2 02/18/2021    BILITOT 0.7 02/12/2021    ALKPHOS 82 02/12/2021    AST 29 02/12/2021    ALT 38 02/12/2021       POC Tests:   Recent Labs     02/18/21  1617   POCGLU 220*       Coags:   Lab Results   Component Value Date    PROTIME 11.7 12/15/2020    INR 1.01 12/15/2020    APTT 137.6 02/15/2021       HCG (If Applicable): No results found for: PREGTESTUR, PREGSERUM, HCG, HCGQUANT     ABGs: No results found for: PHART, PO2ART, URF9UJK, KXO2GXZ, BEART, P8RKQKNB     Type & Screen (If Applicable):  No results found for: LABABO, LABRH    Drug/Infectious Status (If Applicable):  No results found for: HIV, HEPCAB COVID-19 Screening (If Applicable):   Lab Results   Component Value Date    COVID19 NOT DETECTED 12/10/2020         Anesthesia Evaluation  Patient summary reviewed and Nursing notes reviewed no history of anesthetic complications:   Airway: Mallampati: III     Neck ROM: full   Dental:          Pulmonary:   (+) asthma:                            Cardiovascular:    (+) hypertension:, angina:, past MI:, CAD: obstructive, CABG/stent (stents x8):,                   Neuro/Psych:   (+) psychiatric history:            GI/Hepatic/Renal:            ROS comment: obesity. Endo/Other:    (+) Diabetes, . Abdominal:           Vascular:                                        Anesthesia Plan      general     ASA 4     (Medications & allergies reviewed  All available lab & EKG data reviewed)  Induction: intravenous. arterial line, PA catheter and MESHA  MIPS: Postoperative ventilation. Anesthetic plan and risks discussed with patient.                       Mary Jo Harvey MD   2/18/2021

## 2021-02-19 NOTE — ANESTHESIA PROCEDURE NOTES
Procedure Performed: MESHA     Start Time:        End Time:      Preanesthesia Checklist:  Patient identified, IV assessed, risks and benefits discussed, monitors and equipment assessed, procedure being performed at surgeon's request and anesthesia consent obtained.     General Procedure Information  Diagnostic Indications for Echo:  hemodynamic monitoring  Location performed:  OR  Intubated  Bite block placed  Heart visualized  Probe Insertion:  Easy  Probe Type:  3D  Modalities:  2D only and 3D

## 2021-02-19 NOTE — PLAN OF CARE
Problem: Pain:  Goal: Pain level will decrease  Description: Pain level will decrease  Outcome: Ongoing  Goal: Control of acute pain  Description: Control of acute pain  Outcome: Ongoing  Goal: Control of chronic pain  Description: Control of chronic pain  Outcome: Ongoing     Problem:  Activity:  Goal: Ability to tolerate increased activity will improve  Description: Ability to tolerate increased activity will improve  Outcome: Ongoing     Problem: Cardiac:  Goal: Ability to achieve and maintain adequate cardiopulmonary perfusion will improve  Description: Ability to achieve and maintain adequate cardiopulmonary perfusion will improve  Outcome: Ongoing  Goal: Hemodynamic stability will improve  Description: Hemodynamic stability will improve  Outcome: Ongoing  Goal: Will show no evidence of cardiac arrhythmias  Description: Will show no evidence of cardiac arrhythmias  Outcome: Ongoing  Goal: Satisfaction with pain management regimen will improve  Description: Satisfaction with pain management regimen will improve  Outcome: Ongoing  Goal: Diagnostic test results will improve  Description: Diagnostic test results will improve  Outcome: Ongoing  Goal: Complications related to the disease process, condition or treatment will be avoided or minimized  Description: Complications related to the disease process, condition or treatment will be avoided or minimized  Outcome: Ongoing

## 2021-02-20 ENCOUNTER — APPOINTMENT (OUTPATIENT)
Dept: GENERAL RADIOLOGY | Age: 61
DRG: 231 | End: 2021-02-20
Payer: COMMERCIAL

## 2021-02-20 LAB
ANION GAP SERPL CALCULATED.3IONS-SCNC: 6 MMOL/L (ref 3–16)
BUN BLDV-MCNC: 9 MG/DL (ref 7–20)
CALCIUM SERPL-MCNC: 8.5 MG/DL (ref 8.3–10.6)
CHLORIDE BLD-SCNC: 105 MMOL/L (ref 99–110)
CO2: 25 MMOL/L (ref 21–32)
CREAT SERPL-MCNC: 0.6 MG/DL (ref 0.8–1.3)
GFR AFRICAN AMERICAN: >60
GFR NON-AFRICAN AMERICAN: >60
GLUCOSE BLD-MCNC: 103 MG/DL (ref 70–99)
GLUCOSE BLD-MCNC: 110 MG/DL (ref 70–99)
GLUCOSE BLD-MCNC: 117 MG/DL (ref 70–99)
GLUCOSE BLD-MCNC: 121 MG/DL (ref 70–99)
GLUCOSE BLD-MCNC: 138 MG/DL (ref 70–99)
GLUCOSE BLD-MCNC: 178 MG/DL (ref 70–99)
GLUCOSE BLD-MCNC: 195 MG/DL (ref 70–99)
GLUCOSE BLD-MCNC: 207 MG/DL (ref 70–99)
GLUCOSE BLD-MCNC: 209 MG/DL (ref 70–99)
GLUCOSE BLD-MCNC: 90 MG/DL (ref 70–99)
GLUCOSE BLD-MCNC: 94 MG/DL (ref 70–99)
GLUCOSE BLD-MCNC: 99 MG/DL (ref 70–99)
HCT VFR BLD CALC: 27.7 % (ref 40.5–52.5)
HEMOGLOBIN: 9.5 G/DL (ref 13.5–17.5)
INR BLD: 1.24 (ref 0.86–1.14)
MAGNESIUM: 2 MG/DL (ref 1.8–2.4)
MCH RBC QN AUTO: 30.8 PG (ref 26–34)
MCHC RBC AUTO-ENTMCNC: 34.4 G/DL (ref 31–36)
MCV RBC AUTO: 89.6 FL (ref 80–100)
PDW BLD-RTO: 13.6 % (ref 12.4–15.4)
PERFORMED ON: ABNORMAL
PERFORMED ON: NORMAL
PLATELET # BLD: 161 K/UL (ref 135–450)
PMV BLD AUTO: 8.2 FL (ref 5–10.5)
POTASSIUM SERPL-SCNC: 4 MMOL/L (ref 3.5–5.1)
PROTHROMBIN TIME: 14.4 SEC (ref 10–13.2)
RBC # BLD: 3.09 M/UL (ref 4.2–5.9)
SODIUM BLD-SCNC: 136 MMOL/L (ref 136–145)
WBC # BLD: 11.7 K/UL (ref 4–11)

## 2021-02-20 PROCEDURE — 36592 COLLECT BLOOD FROM PICC: CPT

## 2021-02-20 PROCEDURE — 85610 PROTHROMBIN TIME: CPT

## 2021-02-20 PROCEDURE — 85027 COMPLETE CBC AUTOMATED: CPT

## 2021-02-20 PROCEDURE — 71045 X-RAY EXAM CHEST 1 VIEW: CPT

## 2021-02-20 PROCEDURE — 2580000003 HC RX 258: Performed by: THORACIC SURGERY (CARDIOTHORACIC VASCULAR SURGERY)

## 2021-02-20 PROCEDURE — 97110 THERAPEUTIC EXERCISES: CPT

## 2021-02-20 PROCEDURE — 6370000000 HC RX 637 (ALT 250 FOR IP): Performed by: THORACIC SURGERY (CARDIOTHORACIC VASCULAR SURGERY)

## 2021-02-20 PROCEDURE — 97163 PT EVAL HIGH COMPLEX 45 MIN: CPT

## 2021-02-20 PROCEDURE — 2700000000 HC OXYGEN THERAPY PER DAY

## 2021-02-20 PROCEDURE — 97530 THERAPEUTIC ACTIVITIES: CPT

## 2021-02-20 PROCEDURE — 83735 ASSAY OF MAGNESIUM: CPT

## 2021-02-20 PROCEDURE — 94761 N-INVAS EAR/PLS OXIMETRY MLT: CPT

## 2021-02-20 PROCEDURE — 6360000002 HC RX W HCPCS: Performed by: THORACIC SURGERY (CARDIOTHORACIC VASCULAR SURGERY)

## 2021-02-20 PROCEDURE — 37799 UNLISTED PX VASCULAR SURGERY: CPT

## 2021-02-20 PROCEDURE — 80048 BASIC METABOLIC PNL TOTAL CA: CPT

## 2021-02-20 PROCEDURE — 2100000000 HC CCU R&B

## 2021-02-20 PROCEDURE — 97166 OT EVAL MOD COMPLEX 45 MIN: CPT

## 2021-02-20 RX ORDER — ACETAMINOPHEN 500 MG
1000 TABLET ORAL EVERY 6 HOURS SCHEDULED
Status: DISCONTINUED | OUTPATIENT
Start: 2021-02-20 | End: 2021-02-22

## 2021-02-20 RX ADMIN — CEFAZOLIN SODIUM 2000 MG: 10 INJECTION, POWDER, FOR SOLUTION INTRAVENOUS at 20:27

## 2021-02-20 RX ADMIN — OXYCODONE 10 MG: 5 TABLET ORAL at 11:08

## 2021-02-20 RX ADMIN — ASPIRIN 325 MG: 325 TABLET, COATED ORAL at 08:41

## 2021-02-20 RX ADMIN — SODIUM CHLORIDE, PRESERVATIVE FREE 10 ML: 5 INJECTION INTRAVENOUS at 08:41

## 2021-02-20 RX ADMIN — Medication 15 ML: at 20:19

## 2021-02-20 RX ADMIN — ACETAMINOPHEN 1000 MG: 500 TABLET ORAL at 18:44

## 2021-02-20 RX ADMIN — ACETAMINOPHEN 650 MG: 325 TABLET ORAL at 11:09

## 2021-02-20 RX ADMIN — MORPHINE SULFATE 4 MG: 4 INJECTION, SOLUTION INTRAMUSCULAR; INTRAVENOUS at 04:18

## 2021-02-20 RX ADMIN — METOPROLOL TARTRATE 12.5 MG: 25 TABLET, FILM COATED ORAL at 20:27

## 2021-02-20 RX ADMIN — FAMOTIDINE 20 MG: 20 TABLET ORAL at 20:27

## 2021-02-20 RX ADMIN — MAGNESIUM OXIDE TAB 400 MG (241.3 MG ELEMENTAL MG) 400 MG: 400 (241.3 MG) TAB at 20:27

## 2021-02-20 RX ADMIN — MORPHINE SULFATE 4 MG: 4 INJECTION, SOLUTION INTRAMUSCULAR; INTRAVENOUS at 08:41

## 2021-02-20 RX ADMIN — MUPIROCIN: 20 OINTMENT TOPICAL at 20:19

## 2021-02-20 RX ADMIN — BISACODYL 5 MG: 5 TABLET, COATED ORAL at 08:41

## 2021-02-20 RX ADMIN — CEFAZOLIN SODIUM 2000 MG: 10 INJECTION, POWDER, FOR SOLUTION INTRAVENOUS at 04:10

## 2021-02-20 RX ADMIN — FUROSEMIDE 40 MG: 10 INJECTION, SOLUTION INTRAMUSCULAR; INTRAVENOUS at 18:44

## 2021-02-20 RX ADMIN — MUPIROCIN: 20 OINTMENT TOPICAL at 08:42

## 2021-02-20 RX ADMIN — OXYCODONE 10 MG: 5 TABLET ORAL at 02:07

## 2021-02-20 RX ADMIN — Medication 15 ML: at 08:42

## 2021-02-20 RX ADMIN — ATORVASTATIN CALCIUM 40 MG: 40 TABLET, FILM COATED ORAL at 20:27

## 2021-02-20 RX ADMIN — FAMOTIDINE 20 MG: 20 TABLET ORAL at 08:41

## 2021-02-20 RX ADMIN — Medication 1500 MG: at 08:56

## 2021-02-20 RX ADMIN — ONDANSETRON 4 MG: 2 INJECTION INTRAMUSCULAR; INTRAVENOUS at 08:56

## 2021-02-20 RX ADMIN — MORPHINE SULFATE 4 MG: 4 INJECTION, SOLUTION INTRAMUSCULAR; INTRAVENOUS at 06:11

## 2021-02-20 RX ADMIN — OXYCODONE 10 MG: 5 TABLET ORAL at 06:47

## 2021-02-20 RX ADMIN — Medication 1500 MG: at 20:27

## 2021-02-20 RX ADMIN — ACETAMINOPHEN 650 MG: 325 TABLET ORAL at 06:47

## 2021-02-20 RX ADMIN — FUROSEMIDE 40 MG: 10 INJECTION, SOLUTION INTRAMUSCULAR; INTRAVENOUS at 08:41

## 2021-02-20 RX ADMIN — MORPHINE SULFATE 4 MG: 4 INJECTION, SOLUTION INTRAMUSCULAR; INTRAVENOUS at 00:10

## 2021-02-20 RX ADMIN — SODIUM CHLORIDE, PRESERVATIVE FREE 10 ML: 5 INJECTION INTRAVENOUS at 20:28

## 2021-02-20 RX ADMIN — MAGNESIUM OXIDE TAB 400 MG (241.3 MG ELEMENTAL MG) 400 MG: 400 (241.3 MG) TAB at 08:41

## 2021-02-20 RX ADMIN — CEFAZOLIN SODIUM 2000 MG: 10 INJECTION, POWDER, FOR SOLUTION INTRAVENOUS at 14:59

## 2021-02-20 RX ADMIN — INSULIN GLARGINE 16 UNITS: 100 INJECTION, SOLUTION SUBCUTANEOUS at 20:46

## 2021-02-20 ASSESSMENT — PAIN DESCRIPTION - PROGRESSION
CLINICAL_PROGRESSION: GRADUALLY IMPROVING
CLINICAL_PROGRESSION: NOT CHANGED
CLINICAL_PROGRESSION: GRADUALLY IMPROVING
CLINICAL_PROGRESSION: NOT CHANGED
CLINICAL_PROGRESSION: GRADUALLY IMPROVING
CLINICAL_PROGRESSION: GRADUALLY IMPROVING

## 2021-02-20 ASSESSMENT — PAIN SCALES - GENERAL
PAINLEVEL_OUTOF10: 8
PAINLEVEL_OUTOF10: 7
PAINLEVEL_OUTOF10: 8
PAINLEVEL_OUTOF10: 7
PAINLEVEL_OUTOF10: 8
PAINLEVEL_OUTOF10: 7
PAINLEVEL_OUTOF10: 5

## 2021-02-20 ASSESSMENT — PAIN DESCRIPTION - LOCATION
LOCATION: STERNUM
LOCATION: BUTTOCKS
LOCATION: STERNUM

## 2021-02-20 ASSESSMENT — PAIN - FUNCTIONAL ASSESSMENT: PAIN_FUNCTIONAL_ASSESSMENT: PREVENTS OR INTERFERES SOME ACTIVE ACTIVITIES AND ADLS

## 2021-02-20 ASSESSMENT — PAIN DESCRIPTION - PAIN TYPE: TYPE: SURGICAL PAIN

## 2021-02-20 ASSESSMENT — PAIN DESCRIPTION - FREQUENCY
FREQUENCY: CONTINUOUS
FREQUENCY: CONTINUOUS

## 2021-02-20 NOTE — PROGRESS NOTES
has a past surgical history that includes Vein Surgery and Coronary angioplasty with stent (1/14/2016 9/21/2020). Restrictions  Restrictions/Precautions  Restrictions/Precautions: General Precautions, Fall Risk  Position Activity Restriction  Sternal Precautions: No Pushing, No Pulling, 5# Lifting Restrictions  Sternal Precautions: No lifting greater than 5 lbs. Other position/activity restrictions: 2 chest tubes, appiah, 1 L O2, Elevate 30 Degrees. Progressive ambulation. POD #1 ambulate in room and halls, POD #2 Ambulate in halls TID and bathe with assistance, POD #3 & 4 Ambulate in halls TID with increasing time and distance. Steps with assistance as tolerated. Bathe with less assistance.     Subjective   General  Chart Reviewed: Yes  Patient assessed for rehabilitation services?: Yes  Family / Caregiver Present: No  Referring Practitioner: Briana Lux MD  Diagnosis: 2/19 CABG x 2 / ALEX clip  Subjective  Subjective: Pt agreeable to therapy  General Comment  Comments: RN approved therapy  Patient Currently in Pain: Yes  Pain Assessment  Pain Assessment: 0-10  Pain Level: 7  Patient's Stated Pain Goal: 3  Pain Type: Acute pain  Pain Location: Sternum  Pain Frequency: Continuous  Pain Onset: On-going  Clinical Progression: Gradually improving  Non-Pharmaceutical Pain Intervention(s): Repositioned  Vital Signs  Temp Source: Oral  Heart Rate Source: Monitor  Resp: 18  BP: 92/62  BP Location: Left upper arm  MAP (mmHg): 80  Patient Position: Up in chair  Level of Consciousness: Alert (0)  Patient Currently in Pain: Yes  Oxygen Therapy  SpO2: 97 %  Pulse Oximeter Device Mode: Continuous  Pulse Oximeter Device Location: Finger  O2 Device: None (Room air)     Social/Functional History  Social/Functional History  Lives With: Spouse(able to provide 24 hour)  Type of Home: House  Home Layout: One level  Home Access: Stairs to enter without rails  Entrance Stairs - Number of Steps: 2 Bathroom Shower/Tub: Walk-in shower  Bathroom Toilet: Standard  Bathroom Equipment: Built-in shower seat  ADL Assistance: Independent  Homemaking Assistance: Independent  Homemaking Responsibilities: Yes  Ambulation Assistance: Independent  Transfer Assistance: Independent  Active : Yes  Occupation: Full time employment  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       Objective   Vision: Impaired  Vision Exceptions: Wears glasses for reading  Hearing: Exceptions to [facility]  Hearing Exceptions: Hard of hearing/hearing concerns    Orientation  Overall Orientation Status: Within Functional Limits  Observation/Palpation  Posture: Fair  Body Mechanics: edema in BUE  Balance  Sitting Balance: Stand by assistance  Standing Balance: Contact guard assistance(4WW)  Standing Balance  Time: ~2 minutes  Activity: standing with 4WW  Functional Mobility  Functional Mobility Comments: deferred due to nausea  ADL  Grooming: Setup(seated)  LE Dressing: Dependent/Total  Toileting: Dependent/Total(appiah)  Tone RUE  RUE Tone: Normotonic  Tone LUE  LUE Tone: Normotonic  Coordination  Movements Are Fluid And Coordinated: Yes     Bed mobility  Supine to Sit: Unable to assess  Sit to Supine: Unable to assess  Scooting: Minimal assistance(cues for sternal precautions in chair)  Comment: up in chair at start/end of session  Transfers  Sit to stand: Contact guard assistance  Stand to sit: Minimal assistance  Transfer Comments: cues for hand placement     Cognition  Overall Cognitive Status: WFL        Sensation  Overall Sensation Status: WFL  Type of ROM/Therapeutic Exercise  Type of ROM/Therapeutic Exercise: AROM  Exercises  Elbow Flexion: x10  Elbow Extension: x10  Supination: x10  Pronation: x10  Wrist Flexion: x10  Wrist Extension: x10  Grasp/Release: x10     LUE AROM (degrees)  LUE AROM : WFL  RUE AROM (degrees)  RUE AROM : WFL  LUE Strength  Gross LUE Strength: WFL  LUE Strength Comment: not formally assessed  RUE Strength Gross RUE Strength: WFL  RUE Strength Comment: not formally assessed                   Plan   Plan  Times per week: 4-5x/wk    AM-PAC Score        AM-PAC Inpatient Daily Activity Raw Score: 14 (02/20/21 1210)  AM-PAC Inpatient ADL T-Scale Score : 33.39 (02/20/21 1210)  ADL Inpatient CMS 0-100% Score: 59.67 (02/20/21 1210)  ADL Inpatient CMS G-Code Modifier : CK (02/20/21 1210)    Goals  Short term goals  Time Frame for Short term goals: 1 week (2/27/21)  Short term goal 1: Pt will complete toilet transfer with SBA. Short term goal 2: Pt will complete LB dressing with SBA. Short term goal 3: Pt will complete 5 minutes standing for adls with CGa. Short term goal 4: Pt will 15 BUE reps for increased endurance. (2/24/21)  Patient Goals   Patient goals : \"to get better\"       Therapy Time   Individual Concurrent Group Co-treatment   Time In 0827         Time Out 0901         Minutes 34         Timed Code Treatment Minutes: 24 Minutes(10 minutes for evaluation)       Jojo Eugene OTR/L    If pt is unable to be seen after this session, please let this note serve as discharge summary. Please see case management note for discharge disposition. Thank you.

## 2021-02-20 NOTE — PROGRESS NOTES
Comprehensive Nutrition Assessment    Type and Reason for Visit:  Initial    Nutrition Recommendations/Plan:   Continue carb control, cardiac diet  Encourage po intakes   Monitor po intakes, nutrition adequacy, weights, pertinent labs, BMs    Nutrition Assessment:  LOS assessment. Pt admitted with c/o chest pain. S/p CABG x2 on 2/19. Currently on a cardiac, carb control diet with 1500 ml FR. PO intakes % per EMR. Weight Hx appears stable. Will continue current diet and monitor for nutrition needs s/p CABG. Malnutrition Assessment:  Malnutrition Status:  No malnutrition      Estimated Daily Nutrient Needs:  Energy (kcal):  1843-5273; Weight Used for Energy Requirements:  Ideal     Protein (g):  ; Weight Used for Protein Requirements:  Ideal        Fluid (ml/day):  1500 ml FR per MD    Nutrition Related Findings:  BM x2 on 2/18.  Chest tubes in place      Wounds:  Surgical Incision       Current Nutrition Therapies:    DIET CARDIAC; Carb Control: 4 carb choices (60 gms)/meal; Daily Fluid Restriction: 1500 ml    Anthropometric Measures:  · Height: 5' 10\" (177.8 cm)  · Current Body Weight: 253 lb 4.9 oz (114.9 kg)   · Ideal Body Weight: 166 lbs  · BMI: 36.3  · BMI Categories: Obese Class 2 (BMI 35.0 -39.9)       Nutrition Diagnosis:   · Increased nutrient needs related to acute injury/trauma(S/p CABG) as evidenced by wounds      Nutrition Interventions:   Food and/or Nutrient Delivery:  Continue Current Diet  Nutrition Education/Counseling:  No recommendation at this time   Coordination of Nutrition Care:  Continue to monitor while inpatient    Goals:  Pt will have po intakes 50% or greater this admission       Nutrition Monitoring and Evaluation:   Behavioral-Environmental Outcomes:  None Identified   Food/Nutrient Intake Outcomes:  Food and Nutrient Intake  Physical Signs/Symptoms Outcomes:  Biochemical Data     Discharge Planning:    Continue current diet Electronically signed by Tio Morales RD, GARRY on 2/20/21 at 3:11 PM EST    Contact: [phone]

## 2021-02-20 NOTE — PROGRESS NOTES
Patient met criteria per open heart protocol to transition to stepdown level of care. Procedures explained to patient. R brachial arterial line discontinued. Manual pressure held for 10 minutes and tegaderm on site. No hematoma, no oozing noted. Patient tolerated well.

## 2021-02-20 NOTE — PROGRESS NOTES
CC: 2/19 CABG x 2 / ALEX clip    Alert oriented x3, hemodynamically stable, in normal sinus rhythm. Only on insulin drip  On room air oxygen    Chest clear to auscultation bilateral  Abdomen soft nontender non distended    Creatinine 0.6, hemoglobin 9.5, platelets 843    Plan:  DC pacing wires  Incentive spirometer, will add Acapella  Keep on insulin drip until tomorrow morning  Start small dose of beta-blocker as tolerated first dose will be 6.25 mg twice daily  Hold home lisinopril and Imdur for now    Bowel regimen of choice    Increase p.o. Tylenol dose and increase p.o. oxycodone dose. Try to wean off IV morphine as tolerated for pain control    We will leave chest tubes and Gee 1 more day. We will plan on starting Plavix tomorrow.

## 2021-02-20 NOTE — PROGRESS NOTES
Pt placed back to bed at this time for pacer wire removal. Pt slightly hypotensive with SBP 80s. Pt continues to ask for PRN pain medications OTC. Writer again reinforced appropriate usage of PRN pain medications and side effects. Discussed with pt that at this time BP is too low to safely administer PRN pain medications. Understanding verbalized. Pt assisted to bed x 2 RN for safety for possible orthostatic hypotension. Upon return to bed pt . Pt O2 70-80s despite previously being 90s on RA. Pt noted to be with shallow breathing at all times. Again reinforced importance of IS/Acapella use. Pt reluctant to do so d/t associated pain. Encouraged deep breathing and applied 3lpm NC. Pt O2 sat 97%. Will continue to monitor ability to re-wean O2 and ability to safely administer PRN pain medication.

## 2021-02-20 NOTE — PROGRESS NOTES
Patient Education: Patient educated on his sternal precautions. Educated on importance of mobility and his exercises. Patient verbalized understanding but will need education reinforcement. Barriers to Learning: none  REQUIRES PT FOLLOW UP: Yes  Activity Tolerance  Activity Tolerance: Patient limited by fatigue;Patient limited by endurance;Treatment limited secondary to medical complications (free text)  Activity Tolerance: Pre activity: BP 97/62 O2 99 HR 84. Post stand 103/65 87 BPM. Mobility limited by patient's nausea, pain, dizziness with standing. Patient's RN present and aware of patient's needs. Patient Diagnosis(es): The encounter diagnosis was Chest pain, unspecified type. has a past medical history of Alcohol abuse, Coronary artery disease, Diabetic neuropathy associated with type 2 diabetes mellitus ([facility] [address] Hyperlipidemia, Hypertension, Non morbid obesity, GURDEEP (obstructive sleep apnea), Reactive airway disease with wheezing, moderate persistent, with acute exacerbation, and Type II or unspecified type diabetes mellitus without mention of complication, not stated as uncontrolled. has a past surgical history that includes Vein Surgery and Coronary angioplasty with stent (1/14/2016 9/21/2020). Restrictions  Restrictions/Precautions  Restrictions/Precautions: General Precautions, Fall Risk  Position Activity Restriction  Sternal Precautions: No Pushing, No Pulling, 5# Lifting Restrictions  Sternal Precautions: No lifting greater than 5 lbs. Other position/activity restrictions: 2 chest tubes, appiah, 1 L O2, Elevate 30 Degrees. Progressive ambulation. POD #1 ambulate in room and halls, POD #2 Ambulate in halls TID and bathe with assistance, POD #3 & 4 Ambulate in halls TID with increasing time and distance. Steps with assistance as tolerated. Bathe with less assistance.   Vision/Hearing  Vision: Impaired  Vision Exceptions: Wears glasses for reading  Hearing: Exceptions to [facility] Hearing Exceptions: Hard of hearing/hearing concerns     Subjective  General  Chart Reviewed: Yes  Patient assessed for rehabilitation services?: Yes  Additional Pertinent Hx: HPI per chart, \"61 year old with history of cad(multiple pci), htn, hld etoh use, dm2 p/w chest pain. \" CABG x 2 2/19  Response To Previous Treatment: Not applicable  Family / Caregiver Present: Yes(wife)  Referring Practitioner: Azra Kohli MD  Referral Date : 02/19/21  Follows Commands: Impaired  Other (Comment): increased time to follow commands  General Comment  Comments: Patient seated in chair upon entry of therapy staff. Cleared for therapy by RN  Subjective  Subjective: Patient agreed to participate. Pain Screening  Patient Currently in Pain: Yes  Pain Assessment  Pain Assessment: 0-10  Pain Level: 7  Pain Type: Surgical pain  Pain Location: Sternum  Functional Pain Assessment: Prevents or interferes some active activities and ADLs  Non-Pharmaceutical Pain Intervention(s): Ambulation/Increased Activity;Relaxation techniques;Repositioned; Rest  Response to Pain Intervention: Patient Satisfied  Vital Signs  Patient Currently in Pain: Yes  Pre Treatment Pain Screening  Intervention List: Patient able to continue with treatment;Nurse/physician notified  Comments / Details: patient received pain medication just prior to therapy. 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  Cognition   Cognition  Overall Cognitive Status: WFL    Objective     Observation/Palpation  Posture: Fair  Body Mechanics: edema in BUE    PROM RLE (degrees)  RLE PROM: WFL  AROM RLE (degrees)  RLE AROM: WFL  PROM LLE (degrees)  LLE PROM: WFL  AROM LLE (degrees)  LLE AROM : WFL  Strength RLE  Comment: grossly 4/5 strength  Strength LLE  Comment: grossly 4/5 strength        Bed mobility  Supine to Sit: Unable to assess  Sit to Supine: Unable to assess  Scooting: Minimal assistance(cueing for patient to maintain sternal precautions)  Comment: up in chair at start and end of session  Transfers  Sit to Stand: Contact guard assistance  Stand to sit: Minimal Assistance(min assist due to loss of balance, min assist for safe hand placement)  Bed to Chair: Unable to assess  Ambulation  Ambulation?: No(unable to ambulate today due to increased dizziness/nausea after standing)     Balance  Posture: Fair  Sitting - Static: Fair  Sitting - Dynamic: Fair  Standing - Static: Poor;+  Standing - Dynamic: Poor;+  Comments: with [id number]  Exercises  Hip Flexion: 1 x 15  Hip Abduction: 1 x 15  Knee Long Arc Quad: 1 x 15  Ankle Pumps: 1 x 15  Comments: cueing for sequencing and technique.   Other exercises  Other exercises?: No     Plan   Plan  Times per week: 5-7/week  Plan weeks: 1 week 2/27/21  Specific instructions for Next Treatment: progress mobility as tolerated  Current Treatment Recommendations: Strengthening, Transfer Training, Endurance Training, Patient/Caregiver Education & Training, ROM, ADL/Self-care Training, Equipment Evaluation, Education, & procurement, Balance Training, Home Exercise Program, Functional Mobility Training, Safety Education & Training, Gait Training, Pain Management  Safety Devices Type of devices: All fall risk precautions in place, Patient at risk for falls, Gait belt, Call light within reach, Nurse notified, Left in chair(patient's RN and patient's wife in room at end of session. )    AM-PAC Score     AM-PAC Inpatient Mobility without Stair Climbing Raw Score : 10 (02/20/21 1628)  AM-PAC Inpatient without Stair Climbing T-Scale Score : 34.07 (02/20/21 1628)  Mobility Inpatient CMS 0-100% Score: 71.66 (02/20/21 1628)  Mobility Inpatient without Stair CMS G-Code Modifier : CL (02/20/21 1628)       Goals  Short term goals  Time Frame for Short term goals: 1 week 2/27/21  Short term goal 1: Supine <> sit with mod assist.  Short term goal 2: Sit <> stand with CGA. Short term goal 3: Bed <> chair with mod assist.  Short term goal 4: Ambulate 50 feet with LRAD and mod assist.  Short term goal 5: Ascend 2 steps with rail and mod assist.  Patient Goals   Patient goals : To feel better.        Therapy Time   Individual Concurrent Group Co-treatment   Time In 0826         Time Out 0901         Minutes 35         Timed Code Treatment Minutes: 25 Minutes(10 minute evaluation)       Kasey Estrada, PT

## 2021-02-20 NOTE — OP NOTE
had  multiple PCIs with stents to the left anterior descending and left  circumflex artery. He presented on this admission with a non-STEMI with  recurrent occlusion of his left circumflex stent. Dr. Haroon Reis performed  a balloon angioplasty and managed him perioperatively on IV Cangrelor. I met the patient in the intensive care unit and was able to discuss  with both him and his wife the risks, benefits, and alternatives of this  procedure in which they both expressed understanding and agreed. Risks  including death, bleeding, MI, stroke, renal failure, pneumonia, and  atrial fibrillation. OPERATIVE PROCEDURE:  The patient was brought to the operating room and  placed on the operating table. Anesthesia Department performed  endotracheal intubation and hemodynamic invasive lines were all placed. Gee catheter was also placed. The patient's chest and legs were  prepped and draped in the usual sterile fashion. He was positioned as  well. Time-out was performed after everyone in agreement with the  correct patient as the patient and the correct procedure to be  performed, coronary artery bypass grafting. A midline sternotomy was  performed down the midline and chest wall hemostasis was obtained. The  left pleural space was entered carefully. The left endothoracic fascia  was carefully violated and the left internal mammary artery was taken  down in a retrograde fashion as a pedicle. This was dissected all the  way proximal and distal.  Papaverine was soaked on the adventitial layer  and the mammary was attached distally and left in situ. There was found  to be very brisk flow in the LIMA pedicle. This was then packed in a  papaverine-soaked sponge. Left internal mammary artery bed was then  sought for good surgical hemostasis. It should be noted simultaneously  endoscopic vein harvesting was performed in the right lower extremity.    A right 1 cm incision was made on the lower leg and the endoscope was  advanced proximally and distally and the greater saphenous vein was  dissected out bluntly and the branches were divided with Bovie  electrocautery. Once enough of the vein was dissected out, it was  divided proximally and distally and the back table preparation was  performed, and the side branches were doubly clipped. A sternal retractor was inserted and pericardium was divided down the  midline and transversely towards the diaphragmatic attachments. Typical  pericardial well was developed. Full-dose heparin was administered. ACT was found to be 450 and rising. Aortic cannulation was performed in  distal aorta x2. Aortic cannulation was then performed and deaired and  connected to bypass tubing. Attention was turned to the right atrial  appendage in which venous pursestring was applied. Venous cannulation  was then performed and this was connected to the bypass tubing. Antegrade catheter was also placed as well. Cardiopulmonary bypass was  commenced and full flows were obtained and the ventilation was halted. Aortic cross-clamp was applied and 1.5 L of antegrade cold cardioplegia  was administered down the root and cooling was 34 degrees. It should be  noted that henceforth myocardial protection was administered every 15  minutes in the case. Attention was first turned to the left  circumflexed area and obtuse marginal artery. It was easily felt the  stent that was placed before. Distal to this area, the obtuse marginal  artery was found to be intramyocardial and this was carefully dissected  out. This was found in this area of target and was found to be free of  disease. An arteriotomy was made and the vein was used to make an  end-to-side anastomosis by using Prolene suture. This distal target was  tested and was found to have good surgical hemostasis. At this point,  left atrial appendage clip of 40 was applied to the left atrial  appendage in a standard fashion.   The same was then routed towards the  proximal aorta and aortotomy was made and proximal anastomosis was  performed with Prolene suture. This vein was untwisted and non-kinked. Attention was then turned to the left anterior descending artery. It  should be noted that there were two stents beyond the distal stent. An  area free of disease was identified and arteriotomy was carefully made. This LAD target was probed proximally and distally with one probe very  easily. Then, LIMA pedicle was brought into the field, again found to  be brisk flow. An end-to-side anastomosis was performed using Prolene  suture. The pedicle was tacked down to the epicardium with a 5-0  Prolene and of course, a pericardial split was made in standard. The  patient was fully rewarmed. Hot shot was administered to warm  cardioplegia down in antegrade fashion. Flows were turned down. The  aortic cross-clamp was removed. The patient was found to be in  ventricular fibrillation, so one shock was administered. After this,  the patient returned to normal sinus rhythm. Flows were resumed and  ventilation was also resumed as well. Temporary epicardial pacing wires  were placed and chest tubes in the mediastinal left pleural spaces were  also placed as well. Venous cannula was removed after re-perfusion was  allowed for 5 to 10 minutes and the Robert was cinched down. Full-dose  protamine was given. Antegrade catheter was removed and this was tied  down. At the end of administration of protamine, ACTs were run and  found to be back towards baseline. Aortic cannula was then removed and  aortic pursestrings were tied down as standard. Then, the Robert and  sutures were then tucked down in the venous cannula site and was  oversewn with a 4-0 pledgeted Prolene. At this point, all cannulation  sites and proximals were identified and distal was identified for good  surgical hemostasis.   Chest wall hemostasis and mammary bed was once  again verified and confirmed. An incision was made after the patient  was in good hemodynamics with good ABGs. The patient's chest wall was  then reapproximated with figure-of-eight sutures, sternal wires x4. PDS  suture was used to close the muscle and fascia. Subcutaneous tissues  were closed with Vicryl suture. Monocryl was used to close the skin. The patient tolerated the procedure well. All instruments, sponges, and  needles were accounted for by the end of the case. DISPOSITION:  The patient was extubated in the operating room and was  stable. The patient was brought to the intensive care unit in stable  fashion.         Hawa Parker MD    D: 02/19/2021 13:54:28       T: 02/19/2021 21:26:40     [id number]  Job#: [id number]     Doc#: [id number]    CC:

## 2021-02-20 NOTE — PROGRESS NOTES
Bedside rounds complete with Dr. Rayshawn Figueroa. POC reviewed and updated. POC reviewed with pt and spouse at bedside. Understanding verbalized. Plan to increase scheduled Tylenol to Tylenol 1gm Q6H scheduled. Plan to D/C V wires. Leave Gee and CTx2 at this time.

## 2021-02-21 ENCOUNTER — APPOINTMENT (OUTPATIENT)
Dept: GENERAL RADIOLOGY | Age: 61
DRG: 231 | End: 2021-02-21
Payer: COMMERCIAL

## 2021-02-21 LAB
ANION GAP SERPL CALCULATED.3IONS-SCNC: 7 MMOL/L (ref 3–16)
BUN BLDV-MCNC: 12 MG/DL (ref 7–20)
CALCIUM SERPL-MCNC: 8.4 MG/DL (ref 8.3–10.6)
CHLORIDE BLD-SCNC: 104 MMOL/L (ref 99–110)
CO2: 28 MMOL/L (ref 21–32)
CREAT SERPL-MCNC: 0.6 MG/DL (ref 0.8–1.3)
GFR AFRICAN AMERICAN: >60
GFR NON-AFRICAN AMERICAN: >60
GLUCOSE BLD-MCNC: 104 MG/DL (ref 70–99)
GLUCOSE BLD-MCNC: 116 MG/DL (ref 70–99)
GLUCOSE BLD-MCNC: 130 MG/DL (ref 70–99)
GLUCOSE BLD-MCNC: 133 MG/DL (ref 70–99)
GLUCOSE BLD-MCNC: 135 MG/DL (ref 70–99)
GLUCOSE BLD-MCNC: 136 MG/DL (ref 70–99)
GLUCOSE BLD-MCNC: 145 MG/DL (ref 70–99)
GLUCOSE BLD-MCNC: 157 MG/DL (ref 70–99)
GLUCOSE BLD-MCNC: 157 MG/DL (ref 70–99)
GLUCOSE BLD-MCNC: 163 MG/DL (ref 70–99)
GLUCOSE BLD-MCNC: 169 MG/DL (ref 70–99)
GLUCOSE BLD-MCNC: 198 MG/DL (ref 70–99)
GLUCOSE BLD-MCNC: 199 MG/DL (ref 70–99)
GLUCOSE BLD-MCNC: 205 MG/DL (ref 70–99)
GLUCOSE BLD-MCNC: 212 MG/DL (ref 70–99)
GLUCOSE BLD-MCNC: 68 MG/DL (ref 70–99)
GLUCOSE BLD-MCNC: 70 MG/DL (ref 70–99)
GLUCOSE BLD-MCNC: 93 MG/DL (ref 70–99)
GLUCOSE BLD-MCNC: 96 MG/DL (ref 70–99)
HCT VFR BLD CALC: 28.4 % (ref 40.5–52.5)
HEMOGLOBIN: 9.5 G/DL (ref 13.5–17.5)
INR BLD: 1.29 (ref 0.86–1.14)
MAGNESIUM: 2.3 MG/DL (ref 1.8–2.4)
MCH RBC QN AUTO: 30.6 PG (ref 26–34)
MCHC RBC AUTO-ENTMCNC: 33.5 G/DL (ref 31–36)
MCV RBC AUTO: 91.3 FL (ref 80–100)
PDW BLD-RTO: 13.7 % (ref 12.4–15.4)
PERFORMED ON: ABNORMAL
PERFORMED ON: NORMAL
PLATELET # BLD: 155 K/UL (ref 135–450)
PMV BLD AUTO: 8.6 FL (ref 5–10.5)
POTASSIUM SERPL-SCNC: 3.9 MMOL/L (ref 3.5–5.1)
PROTHROMBIN TIME: 15 SEC (ref 10–13.2)
RBC # BLD: 3.11 M/UL (ref 4.2–5.9)
SODIUM BLD-SCNC: 139 MMOL/L (ref 136–145)
WBC # BLD: 14 K/UL (ref 4–11)

## 2021-02-21 PROCEDURE — 94761 N-INVAS EAR/PLS OXIMETRY MLT: CPT

## 2021-02-21 PROCEDURE — 2000000000 HC ICU R&B

## 2021-02-21 PROCEDURE — 2580000003 HC RX 258: Performed by: THORACIC SURGERY (CARDIOTHORACIC VASCULAR SURGERY)

## 2021-02-21 PROCEDURE — 85610 PROTHROMBIN TIME: CPT

## 2021-02-21 PROCEDURE — 2700000000 HC OXYGEN THERAPY PER DAY

## 2021-02-21 PROCEDURE — 80048 BASIC METABOLIC PNL TOTAL CA: CPT

## 2021-02-21 PROCEDURE — 6370000000 HC RX 637 (ALT 250 FOR IP): Performed by: THORACIC SURGERY (CARDIOTHORACIC VASCULAR SURGERY)

## 2021-02-21 PROCEDURE — 71045 X-RAY EXAM CHEST 1 VIEW: CPT

## 2021-02-21 PROCEDURE — 85027 COMPLETE CBC AUTOMATED: CPT

## 2021-02-21 PROCEDURE — 6360000002 HC RX W HCPCS: Performed by: THORACIC SURGERY (CARDIOTHORACIC VASCULAR SURGERY)

## 2021-02-21 PROCEDURE — 83735 ASSAY OF MAGNESIUM: CPT

## 2021-02-21 RX ADMIN — OXYCODONE 5 MG: 5 TABLET ORAL at 08:58

## 2021-02-21 RX ADMIN — ASPIRIN 325 MG: 325 TABLET, COATED ORAL at 08:58

## 2021-02-21 RX ADMIN — ACETAMINOPHEN 1000 MG: 500 TABLET ORAL at 12:30

## 2021-02-21 RX ADMIN — MUPIROCIN: 20 OINTMENT TOPICAL at 09:00

## 2021-02-21 RX ADMIN — ATORVASTATIN CALCIUM 40 MG: 40 TABLET, FILM COATED ORAL at 20:17

## 2021-02-21 RX ADMIN — METOPROLOL TARTRATE 12.5 MG: 25 TABLET, FILM COATED ORAL at 08:58

## 2021-02-21 RX ADMIN — Medication 15 ML: at 20:17

## 2021-02-21 RX ADMIN — ACETAMINOPHEN 1000 MG: 500 TABLET ORAL at 05:47

## 2021-02-21 RX ADMIN — CEFAZOLIN SODIUM 2000 MG: 10 INJECTION, POWDER, FOR SOLUTION INTRAVENOUS at 05:48

## 2021-02-21 RX ADMIN — SODIUM CHLORIDE, PRESERVATIVE FREE 10 ML: 5 INJECTION INTRAVENOUS at 09:00

## 2021-02-21 RX ADMIN — ACETAMINOPHEN 1000 MG: 500 TABLET ORAL at 17:24

## 2021-02-21 RX ADMIN — SODIUM CHLORIDE 3.04 UNITS/HR: 9 INJECTION, SOLUTION INTRAVENOUS at 19:24

## 2021-02-21 RX ADMIN — ACETAMINOPHEN 1000 MG: 500 TABLET ORAL at 00:29

## 2021-02-21 RX ADMIN — SODIUM CHLORIDE, PRESERVATIVE FREE 10 ML: 5 INJECTION INTRAVENOUS at 20:18

## 2021-02-21 RX ADMIN — MUPIROCIN: 20 OINTMENT TOPICAL at 20:17

## 2021-02-21 RX ADMIN — MAGNESIUM OXIDE TAB 400 MG (241.3 MG ELEMENTAL MG) 400 MG: 400 (241.3 MG) TAB at 08:59

## 2021-02-21 RX ADMIN — FUROSEMIDE 40 MG: 10 INJECTION, SOLUTION INTRAMUSCULAR; INTRAVENOUS at 08:59

## 2021-02-21 RX ADMIN — OXYCODONE 10 MG: 5 TABLET ORAL at 17:25

## 2021-02-21 RX ADMIN — FAMOTIDINE 20 MG: 20 TABLET ORAL at 20:17

## 2021-02-21 RX ADMIN — OXYCODONE 10 MG: 5 TABLET ORAL at 12:29

## 2021-02-21 RX ADMIN — Medication 15 ML: at 09:00

## 2021-02-21 RX ADMIN — ONDANSETRON 4 MG: 2 INJECTION INTRAMUSCULAR; INTRAVENOUS at 10:43

## 2021-02-21 RX ADMIN — CLOPIDOGREL BISULFATE 75 MG: 75 TABLET ORAL at 08:59

## 2021-02-21 RX ADMIN — FUROSEMIDE 40 MG: 10 INJECTION, SOLUTION INTRAMUSCULAR; INTRAVENOUS at 17:25

## 2021-02-21 RX ADMIN — METOPROLOL TARTRATE 12.5 MG: 25 TABLET, FILM COATED ORAL at 20:19

## 2021-02-21 RX ADMIN — BISACODYL 5 MG: 5 TABLET, COATED ORAL at 08:58

## 2021-02-21 RX ADMIN — OXYCODONE 10 MG: 5 TABLET ORAL at 23:45

## 2021-02-21 RX ADMIN — FAMOTIDINE 20 MG: 20 TABLET ORAL at 08:59

## 2021-02-21 ASSESSMENT — PAIN DESCRIPTION - PAIN TYPE
TYPE: SURGICAL PAIN
TYPE: SURGICAL PAIN

## 2021-02-21 ASSESSMENT — PAIN DESCRIPTION - DESCRIPTORS
DESCRIPTORS: ACHING
DESCRIPTORS: ACHING

## 2021-02-21 ASSESSMENT — PAIN SCALES - GENERAL
PAINLEVEL_OUTOF10: 7
PAINLEVEL_OUTOF10: 6
PAINLEVEL_OUTOF10: 7
PAINLEVEL_OUTOF10: 7
PAINLEVEL_OUTOF10: 4
PAINLEVEL_OUTOF10: 4
PAINLEVEL_OUTOF10: 6
PAINLEVEL_OUTOF10: 7

## 2021-02-21 ASSESSMENT — PAIN DESCRIPTION - FREQUENCY: FREQUENCY: CONTINUOUS

## 2021-02-21 ASSESSMENT — PAIN DESCRIPTION - PROGRESSION: CLINICAL_PROGRESSION: GRADUALLY IMPROVING

## 2021-02-21 ASSESSMENT — PAIN DESCRIPTION - ORIENTATION: ORIENTATION: MID

## 2021-02-21 ASSESSMENT — PAIN DESCRIPTION - LOCATION: LOCATION: INCISION

## 2021-02-21 NOTE — PLAN OF CARE
Patient with 7-9/10 complaints of pain last night, but due to his low blood pressures, 88/53 overnight - no narcotics were administered. The patient received 1g Tylenol PO q/6 hours without resolution of the patient's pain.

## 2021-02-21 NOTE — PROGRESS NOTES
cc: 2/19 CABG x 2 / ALEX clip    Alert oriented x3, hemodynamically stable, in normal sinus rhythm. Only on insulin drip  On room air oxygen    Chest clear to auscultation bilateral  Abdomen soft nontender non distended    Creatinine 0.6, hemoglobin 9.5, platelets 059    Plan: As per CC, progressing well  DC chest tubes  DC Gee  Incentive spirometer, will add Acapella  Keep on insulin drip until tomorrow morning  Cont small dose of beta-blocker 12.5mg twice daily  Hold home lisinopril and Imdur for now    Bowel regimen of choice, some flatus, no BM yet.       DC planning

## 2021-02-21 NOTE — PLAN OF CARE
The patient has been encouraged and educated on the importance and technique of the incentive spirometer exercises. Unfortunately, he is very reluctant to work with the IS d/t unresolved pain issues. He started doing some repetitions with the device, but his breaths are very shallow, reaching only 500 or so.

## 2021-02-21 NOTE — PROGRESS NOTES
Physical Therapy  Attempted to see pt at this time for PT tx, RN about to remove CT requiring pt to remain supine following procedure. Will re-attempt 2/22 as schedule permits. Thanks.   Quintin Cruz PT, DPT

## 2021-02-22 ENCOUNTER — APPOINTMENT (OUTPATIENT)
Dept: GENERAL RADIOLOGY | Age: 61
DRG: 231 | End: 2021-02-22
Payer: COMMERCIAL

## 2021-02-22 LAB
ANION GAP SERPL CALCULATED.3IONS-SCNC: 7 MMOL/L (ref 3–16)
ANION GAP SERPL CALCULATED.3IONS-SCNC: 9 MMOL/L (ref 3–16)
BLOOD BANK DISPENSE STATUS: NORMAL
BLOOD BANK DISPENSE STATUS: NORMAL
BLOOD BANK PRODUCT CODE: NORMAL
BLOOD BANK PRODUCT CODE: NORMAL
BPU ID: NORMAL
BPU ID: NORMAL
BUN BLDV-MCNC: 14 MG/DL (ref 7–20)
BUN BLDV-MCNC: 15 MG/DL (ref 7–20)
CALCIUM SERPL-MCNC: 8.3 MG/DL (ref 8.3–10.6)
CALCIUM SERPL-MCNC: 8.7 MG/DL (ref 8.3–10.6)
CHLORIDE BLD-SCNC: 101 MMOL/L (ref 99–110)
CHLORIDE BLD-SCNC: 95 MMOL/L (ref 99–110)
CO2: 25 MMOL/L (ref 21–32)
CO2: 30 MMOL/L (ref 21–32)
CREAT SERPL-MCNC: 0.6 MG/DL (ref 0.8–1.3)
CREAT SERPL-MCNC: 0.7 MG/DL (ref 0.8–1.3)
DESCRIPTION BLOOD BANK: NORMAL
DESCRIPTION BLOOD BANK: NORMAL
GFR AFRICAN AMERICAN: >60
GFR AFRICAN AMERICAN: >60
GFR NON-AFRICAN AMERICAN: >60
GFR NON-AFRICAN AMERICAN: >60
GLUCOSE BLD-MCNC: 123 MG/DL (ref 70–99)
GLUCOSE BLD-MCNC: 142 MG/DL (ref 70–99)
GLUCOSE BLD-MCNC: 226 MG/DL (ref 70–99)
GLUCOSE BLD-MCNC: 250 MG/DL (ref 70–99)
GLUCOSE BLD-MCNC: 267 MG/DL (ref 70–99)
GLUCOSE BLD-MCNC: 277 MG/DL (ref 70–99)
GLUCOSE BLD-MCNC: 71 MG/DL (ref 70–99)
GLUCOSE BLD-MCNC: 76 MG/DL (ref 70–99)
GLUCOSE BLD-MCNC: 91 MG/DL (ref 70–99)
HCT VFR BLD CALC: 25.2 % (ref 40.5–52.5)
HEMOGLOBIN: 8.6 G/DL (ref 13.5–17.5)
INR BLD: 1.25 (ref 0.86–1.14)
MAGNESIUM: 2.3 MG/DL (ref 1.8–2.4)
MCH RBC QN AUTO: 30.7 PG (ref 26–34)
MCHC RBC AUTO-ENTMCNC: 33.9 G/DL (ref 31–36)
MCV RBC AUTO: 90.5 FL (ref 80–100)
PDW BLD-RTO: 13.7 % (ref 12.4–15.4)
PERFORMED ON: ABNORMAL
PERFORMED ON: NORMAL
PLATELET # BLD: 177 K/UL (ref 135–450)
PMV BLD AUTO: 8.5 FL (ref 5–10.5)
POTASSIUM REFLEX MAGNESIUM: 4.4 MMOL/L (ref 3.5–5.1)
POTASSIUM SERPL-SCNC: 3.4 MMOL/L (ref 3.5–5.1)
PROTHROMBIN TIME: 14.5 SEC (ref 10–13.2)
RBC # BLD: 2.79 M/UL (ref 4.2–5.9)
SODIUM BLD-SCNC: 129 MMOL/L (ref 136–145)
SODIUM BLD-SCNC: 138 MMOL/L (ref 136–145)
WBC # BLD: 12.6 K/UL (ref 4–11)

## 2021-02-22 PROCEDURE — 83735 ASSAY OF MAGNESIUM: CPT

## 2021-02-22 PROCEDURE — 6360000002 HC RX W HCPCS: Performed by: THORACIC SURGERY (CARDIOTHORACIC VASCULAR SURGERY)

## 2021-02-22 PROCEDURE — 99024 POSTOP FOLLOW-UP VISIT: CPT | Performed by: NURSE PRACTITIONER

## 2021-02-22 PROCEDURE — 85027 COMPLETE CBC AUTOMATED: CPT

## 2021-02-22 PROCEDURE — 2000000000 HC ICU R&B

## 2021-02-22 PROCEDURE — 6370000000 HC RX 637 (ALT 250 FOR IP): Performed by: THORACIC SURGERY (CARDIOTHORACIC VASCULAR SURGERY)

## 2021-02-22 PROCEDURE — 71045 X-RAY EXAM CHEST 1 VIEW: CPT

## 2021-02-22 PROCEDURE — 97110 THERAPEUTIC EXERCISES: CPT

## 2021-02-22 PROCEDURE — 80048 BASIC METABOLIC PNL TOTAL CA: CPT

## 2021-02-22 PROCEDURE — 97116 GAIT TRAINING THERAPY: CPT

## 2021-02-22 PROCEDURE — 85610 PROTHROMBIN TIME: CPT

## 2021-02-22 PROCEDURE — 2580000003 HC RX 258: Performed by: THORACIC SURGERY (CARDIOTHORACIC VASCULAR SURGERY)

## 2021-02-22 PROCEDURE — 97530 THERAPEUTIC ACTIVITIES: CPT

## 2021-02-22 RX ORDER — POTASSIUM CHLORIDE 750 MG/1
10 TABLET, EXTENDED RELEASE ORAL 3 TIMES DAILY
Status: DISCONTINUED | OUTPATIENT
Start: 2021-02-22 | End: 2021-02-23 | Stop reason: HOSPADM

## 2021-02-22 RX ADMIN — INSULIN LISPRO 3 UNITS: 100 INJECTION, SOLUTION INTRAVENOUS; SUBCUTANEOUS at 19:59

## 2021-02-22 RX ADMIN — INSULIN LISPRO 2 UNITS: 100 INJECTION, SOLUTION INTRAVENOUS; SUBCUTANEOUS at 08:00

## 2021-02-22 RX ADMIN — METOPROLOL TARTRATE 25 MG: 25 TABLET, FILM COATED ORAL at 08:17

## 2021-02-22 RX ADMIN — Medication 15 ML: at 08:18

## 2021-02-22 RX ADMIN — FUROSEMIDE 40 MG: 10 INJECTION, SOLUTION INTRAMUSCULAR; INTRAVENOUS at 16:24

## 2021-02-22 RX ADMIN — MUPIROCIN: 20 OINTMENT TOPICAL at 08:18

## 2021-02-22 RX ADMIN — MUPIROCIN: 20 OINTMENT TOPICAL at 19:52

## 2021-02-22 RX ADMIN — METOPROLOL TARTRATE 25 MG: 25 TABLET, FILM COATED ORAL at 19:54

## 2021-02-22 RX ADMIN — FAMOTIDINE 20 MG: 20 TABLET ORAL at 19:54

## 2021-02-22 RX ADMIN — FUROSEMIDE 40 MG: 10 INJECTION, SOLUTION INTRAMUSCULAR; INTRAVENOUS at 08:17

## 2021-02-22 RX ADMIN — CLOPIDOGREL BISULFATE 75 MG: 75 TABLET ORAL at 08:17

## 2021-02-22 RX ADMIN — FAMOTIDINE 20 MG: 20 TABLET ORAL at 08:18

## 2021-02-22 RX ADMIN — ATORVASTATIN CALCIUM 40 MG: 40 TABLET, FILM COATED ORAL at 19:54

## 2021-02-22 RX ADMIN — SODIUM CHLORIDE, PRESERVATIVE FREE 10 ML: 5 INJECTION INTRAVENOUS at 19:52

## 2021-02-22 RX ADMIN — OXYCODONE 10 MG: 5 TABLET ORAL at 08:00

## 2021-02-22 RX ADMIN — BISACODYL 5 MG: 5 TABLET, COATED ORAL at 08:18

## 2021-02-22 RX ADMIN — POTASSIUM CHLORIDE 20 MEQ: 29.8 INJECTION, SOLUTION INTRAVENOUS at 05:14

## 2021-02-22 RX ADMIN — POTASSIUM CHLORIDE 10 MEQ: 750 TABLET, EXTENDED RELEASE ORAL at 19:54

## 2021-02-22 RX ADMIN — OXYCODONE 10 MG: 5 TABLET ORAL at 20:31

## 2021-02-22 RX ADMIN — POTASSIUM CHLORIDE 10 MEQ: 750 TABLET, EXTENDED RELEASE ORAL at 08:18

## 2021-02-22 RX ADMIN — SODIUM CHLORIDE, PRESERVATIVE FREE 10 ML: 5 INJECTION INTRAVENOUS at 08:24

## 2021-02-22 RX ADMIN — INSULIN LISPRO 6 UNITS: 100 INJECTION, SOLUTION INTRAVENOUS; SUBCUTANEOUS at 12:30

## 2021-02-22 RX ADMIN — POTASSIUM CHLORIDE 10 MEQ: 750 TABLET, EXTENDED RELEASE ORAL at 16:25

## 2021-02-22 RX ADMIN — OXYCODONE 10 MG: 5 TABLET ORAL at 16:25

## 2021-02-22 RX ADMIN — Medication 15 ML: at 19:52

## 2021-02-22 RX ADMIN — INSULIN LISPRO 6 UNITS: 100 INJECTION, SOLUTION INTRAVENOUS; SUBCUTANEOUS at 16:30

## 2021-02-22 RX ADMIN — POTASSIUM CHLORIDE 20 MEQ: 29.8 INJECTION, SOLUTION INTRAVENOUS at 07:28

## 2021-02-22 RX ADMIN — ASPIRIN 81 MG: 81 TABLET, COATED ORAL at 08:18

## 2021-02-22 RX ADMIN — POTASSIUM CHLORIDE 20 MEQ: 29.8 INJECTION, SOLUTION INTRAVENOUS at 06:14

## 2021-02-22 RX ADMIN — MAGNESIUM OXIDE TAB 400 MG (241.3 MG ELEMENTAL MG) 400 MG: 400 (241.3 MG) TAB at 08:24

## 2021-02-22 ASSESSMENT — PAIN DESCRIPTION - LOCATION
LOCATION: INCISION
LOCATION: INCISION

## 2021-02-22 ASSESSMENT — PAIN SCALES - GENERAL
PAINLEVEL_OUTOF10: 7
PAINLEVEL_OUTOF10: 6
PAINLEVEL_OUTOF10: 8
PAINLEVEL_OUTOF10: 5
PAINLEVEL_OUTOF10: 4
PAINLEVEL_OUTOF10: 8
PAINLEVEL_OUTOF10: 5

## 2021-02-22 ASSESSMENT — PAIN DESCRIPTION - DESCRIPTORS: DESCRIPTORS: ACHING

## 2021-02-22 ASSESSMENT — PAIN DESCRIPTION - PAIN TYPE
TYPE: SURGICAL PAIN

## 2021-02-22 ASSESSMENT — PAIN DESCRIPTION - ORIENTATION: ORIENTATION: MID

## 2021-02-22 ASSESSMENT — PAIN DESCRIPTION - FREQUENCY: FREQUENCY: CONTINUOUS

## 2021-02-22 NOTE — CARE COORDINATION
Chart review completed. Patient a 61year old male, admitted for unstable angina. Hospital day 10, currently in ICU level of care following CABG. Per initial assessment IPTA. Spoke to patient via phone. Wife able to provide 24 hour care. Plans remain the same to return home with her and Deysi Bailey at discharge.  Shayna Watters RN

## 2021-02-22 NOTE — PROGRESS NOTES
Physical Therapy  Facility/Department: [facility] C2 CARD TELEMETRY  Daily Treatment Note  NAME: Mabel De La Rosa  : 1960  MRN: [id number]    Date of Service: 2021    Discharge Recommendations:  24 hour supervision or assist   PT Equipment Recommendations  Equipment Needed: No    Assessment   Body structures, Functions, Activity limitations: Decreased functional mobility ; Decreased ADL status; Decreased strength;Decreased safe awareness;Decreased endurance;Decreased balance;Decreased high-level IADLs; Increased pain;Decreased posture  Assessment: Pt tolerated therapy well today. Progressing to CGA to SBA for mobility without device. Cues to maintain sternal precautions intermittently throughout session. Limited d/t MARK with gait training. Pt would benefit from continued skilled therapy to address deficits. Recommend home with 24-hr sup at d/c. Treatment Diagnosis: decreased independence with functional mobility. Specific instructions for Next Treatment: progress mobility as tolerated  Prognosis: Good  Decision Making: High Complexity  PT Education: Goals;Precautions; Functional Mobility Training;PT Role;Transfer Training;Pressure Relief;Plan of Care;Home Exercise Program;General Safety; Disease Specific Education; Injury Prevention; Energy Conservation;Equipment  Patient Education: Patient educated on his sternal precautions. Educated on importance of mobility and his exercises. Patient verbalized understanding  Barriers to Learning: none  REQUIRES PT FOLLOW UP: Yes  Activity Tolerance  Activity Tolerance: Patient Tolerated treatment well;Patient limited by endurance  Activity Tolerance: /70, HR 99; SPO2 95% on RA post gait training seated in chair     Patient Diagnosis(es): The encounter diagnosis was Chest pain, unspecified type. has a past medical history of Alcohol abuse, Coronary artery disease, Diabetic neuropathy associated with type 2 diabetes mellitus ([facility] [address] Hyperlipidemia, Hypertension, Non morbid obesity, GURDEEP (obstructive sleep apnea), Reactive airway disease with wheezing, moderate persistent, with acute exacerbation, and Type II or unspecified type diabetes mellitus without mention of complication, not stated as uncontrolled. has a past surgical history that includes Vein Surgery; Coronary angioplasty with stent (1/14/2016 9/21/2020); and Coronary artery bypass graft (02/19/2021). Restrictions  Restrictions/Precautions  Restrictions/Precautions: General Precautions, Fall Risk  Position Activity Restriction  Sternal Precautions: No Pushing, No Pulling, 5# Lifting Restrictions  Sternal Precautions: No lifting greater than 5 lbs. Other position/activity restrictions: Progressive ambulation. POD #1 ambulate in room and halls, POD #2 Ambulate in halls TID and bathe with assistance, POD #3 & 4 Ambulate in halls TID with increasing time and distance. Steps with assistance as tolerated. Bathe with less assistance. Subjective   General  Chart Reviewed: Yes  Additional Pertinent Hx: HPI per chart, \"61 year old with history of cad(multiple pci), htn, hld etoh use, dm2 p/w chest pain. \" CABG x 2 2/19  Response To Previous Treatment: Patient with no complaints from previous session. Family / Caregiver Present: Yes(wife)  Referring Practitioner: Justa Mullen MD  Subjective  Subjective: Patient agreed to participate. General Comment  Comments: Patient seated in chair upon entry of therapy staff.  Cleared for therapy by RN  Pain Screening  Patient Currently in Pain: Yes  Pain Assessment  Pain Assessment: Faces  Beltrán-Baker Pain Rating: Hurts a little bit  Pain Type: Chronic pain  Pain Location: Sternum  Non-Pharmaceutical Pain Intervention(s): Ambulation/Increased Activity;Repositioned  Response to Pain Intervention: Patient Satisfied Objective   Bed mobility  Supine to Sit: Unable to assess  Sit to Supine: Unable to assess  Comment: pt up in chair at start and end of session     Transfers  Sit to Stand: Stand by assistance(Cues for hand placement)  Stand to sit: Stand by assistance     Ambulation  Ambulation?: Yes  Ambulation 1  Surface: level tile  Device: No Device  Assistance: Contact guard assistance  Quality of Gait: Cues for posture, Wide PARVEEN with limited step length and foot clearance. No LOB. Limited d/t MARK  Gait Deviations: Shuffles; Increased PARVEEN; Decreased step length;Decreased step height  Distance: 150 ft with one standing rest break        Exercises  Hip Flexion: Seated marches x 15 BLE  Hip Abduction: Seated x 15 BLE  Knee Long Arc Quad: x 15 BLE  Ankle Pumps: x 15 BLE  Comments: cueing for sequencing and technique. AM-PAC Score     AM-PAC Inpatient Mobility without Stair Climbing Raw Score : 16 (02/22/21 1034)  AM-PAC Inpatient without Stair Climbing T-Scale Score : 45.54 (02/22/21 1034)  Mobility Inpatient CMS 0-100% Score: 40.64 (02/22/21 1034)  Mobility Inpatient without Stair CMS G-Code Modifier : CK (02/22/21 1034)       Goals  Short term goals  Time Frame for Short term goals: 1 week 2/27/21  Short term goal 1: Supine <> sit with mod assist. -- not addressed 2/22, pt plans to sleep in recliner at home  Short term goal 2: Sit <> stand with CGA. -- GOAL MET 2/22; updated: Pt will be supervision for transfers. Short term goal 3: Bed <> chair with mod assist. -- GOAL MET 2/22  Short term goal 4: Ambulate 50 feet with LRAD and mod assist. -- GOAL MET 2/22; updated: Pt will ambulate 100 ft with supervision. Short term goal 5: Ascend 2 steps with rail and mod assist. -- not addressed 2/22, educated on stair technique  Patient Goals   Patient goals : To feel better.     Plan    Plan  Times per week: 5-7/week  Plan weeks: 1 week 2/27/21  Specific instructions for Next Treatment: progress mobility as tolerated Current Treatment Recommendations: Strengthening, Transfer Training, Endurance Training, Patient/Caregiver Education & Training, ROM, ADL/Self-care Training, Equipment Evaluation, Education, & procurement, Balance Training, Home Exercise Program, Functional Mobility Training, Safety Education & Training, Gait Training, Pain Management, Neuromuscular Re-education  Safety Devices  Type of devices: All fall risk precautions in place, Call light within reach, Gait belt, Nurse notified, Left in chair(no chair alarm on approach; wife present at end of session)     Therapy Time   Individual Concurrent Group Co-treatment   Time In 0909         Time Out 0932         Minutes 23         Timed Code Treatment Minutes: [address] Easton Cervantes, PT, DPT  If pt is unable to be seen after this session, please let this note serve as discharge summary. Please see case management note for discharge disposition. Thank you.

## 2021-02-22 NOTE — PROGRESS NOTES
CVTS Cardiothoracic Progress Note:                                CC:  Post op follow up     Surgery: 2/19  Coronary artery bypass grafting x2. Total cardiopulmonary bypass. Endoscopic vein harvesting of right lower extremity. Reverse saphenous vein grafting to obtuse marginal artery. Left internal mammary artery to left anterior descending artery. Left atrial appendage clip, 40. Bilateral intercostal nerve block. Independent interpretation of transesophageal echocardiogram.      Hospital course:   2/22 Up in chair, appears comfortable, remains SR.      Subjective:   Dietary Intake: good   no Nausea   Pain Control: controlled  Complaints: mild post op pain  Bowels: have moved 2/22    Past Medical History:   Diagnosis Date    Alcohol abuse 12/03/2012    Coronary artery disease     Diabetic neuropathy associated with type 2 diabetes mellitus ([facility] [address]     Hyperlipidemia     Hypertension     Non morbid obesity     GURDEEP (obstructive sleep apnea)     Reactive airway disease with wheezing, moderate persistent, with acute exacerbation 03/03/2020    Type II or unspecified type diabetes mellitus without mention of complication, not stated as uncontrolled         Past Surgical History:   Procedure Laterality Date    CORONARY ANGIOPLASTY WITH STENT PLACEMENT  1/14/2016 9/21/2020    1 [employer] Premier SAMUEL 2.25x16    CORONARY ARTERY BYPASS GRAFT  02/19/2021    CABG x2 & ALEX-Dr. Thomas Cintron    VEIN SURGERY          Allergies as of 02/12/2021 - Review Complete 02/12/2021   Allergen Reaction Noted    Vitamin d analogs Hives 12/23/2014        Patient Active Problem List   Diagnosis    Type II or unspecified type diabetes mellitus without mention of complication, not stated as uncontrolled    Diabetes mellitus    History of ETOH abuse    Patient overweight    Patient overweight    Dietary noncompliance    Alcohol abuse    Flank pain    Prostatism    Low serum testosterone level    Arm pain, left    Shoulder pain    Precordial pain    Lung nodule    Acute angina (HCC)    Abnormal stress test    Acute coronary syndrome (HCC)    Unstable angina ([facility] [address]    Coronary artery disease involving native coronary artery of native heart with unstable angina pectoris (HCC)    Other chest pain    S/P drug eluting coronary stent placement    Essential hypertension    Obesity    Abnormal chest x-ray    Angina effort    Skin lesion    Angina at rest [facility])    Hyperlipidemia    Angina pectoris ([facility] [address]    NSTEMI (non-ST elevated myocardial infarction) ([facility] [address]    Controlled type 2 diabetes mellitus without complication, without long-term current use of insulin ([facility] [address]    ASHD (arteriosclerotic heart disease)    Mass of right foot    Overweight    History of angina pectoris    Reactive airway disease    Bronchitis    Reactive airway disease with wheezing, moderate persistent, with acute exacerbation    Need for prophylactic vaccination and inoculation against varicella    Need for prophylactic vaccination against diphtheria-tetanus-pertussis (DTP)    Chronic right-sided thoracic back pain    CAD in native artery    S/P PTCA (percutaneous transluminal coronary angioplasty)    Chest pain    Ischemic cardiomyopathy    GURDEEP (obstructive sleep apnea)        Vital Signs: /66   Pulse 93   Temp 98.3 °F (36.8 °C) (Oral)   Resp 22   Ht 5' 10\" (1.778 m)   Wt 249 lb 5.4 oz (113.1 kg)   SpO2 96%   BMI 35.78 kg/m²  O2 Flow Rate (L/min): 2 L/min     Admission Weight: Weight: 244 lb (110.7 kg)    Weight on 2/19 (108.5 kg) Pre-op   Weight on 2/20 (114.9 kg)  2/21  114.5 kg  2/22  113.1 kg    Intake/Output:     Intake/Output Summary (Last 24 hours) at 2/22/2021 1256  Last data filed at 2/22/2021 1100  Gross per 24 hour   Intake 551.9 ml   Output 1260 ml   Net -708.1 ml      Extubation Time: 2/19 @ 13:03  Transition Time: 2/20 @ 07:19     LABORATORY DATA:   CBC:   Recent Labs     02/20/21  0410 02/21/21  0515 02/22/21  0400   WBC 11.7* 14.0* 12.6*   HGB 9.5* 9.5* 8.6*   HCT 27.7* 28.4* 25.2*   MCV 89.6 91.3 90.5    155 177     BMP:   Recent Labs     02/21/21  0515 02/22/21  0400 02/22/21  1009    138 129*   K 3.9 3.4* 4.4    101 95*   CO2 28 30 25   BUN 12 14 15   CREATININE 0.6* 0.6* 0.7*     MG:    Recent Labs     02/20/21  0410 02/21/21  0515 02/22/21  0400   MG 2.00 2.30 2.30      PT/INR:   Recent Labs     02/20/21  0410 02/21/21  0515 02/22/21  0400   PROTIME 14.4* 15.0* 14.5*   INR 1.24* 1.29* 1.25*     APTT:   Recent Labs     02/19/21  1320   APTT 30.4     CXR: 2/22/21   Impression   Removal of the left chest tube with a small left apical pneumothorax. Atelectasis in the left lung is stable.   ______________________________________________________    Objective:   General appearance: awake, alert in NAD  Lungs: CTAB  Heart: S1S2 RRR; SR/ST on monitor  Chest: symmetrical expansion with inspiration and expirations; no rocking of sternum noted   Abdomen: round, soft, non-tender  Bowel sounds: normoactive   Kidneys: -400-800= 1960 ml in 24 hrs; Cr 0.6  Wound/Incisions: Midsternal incision with dressing CDI; RLE incisions with dressings CDI; Pacing wires removed 2/20   Extremities: BLE pulses palpable; 1+ swelling noted in BLE's  Neurological: intact, non focal exam   Chest tubes/Drains: Chest tubes removed 2/21      Assessment:   Post-op: 3 days. Condition: In stable condition. Plan:   1. Cardiovascular: s/p CABG- BB, ASA, Statin  Tachycardia: will increase BB today. Home BP meds on hold (relative hypotension)    2. Pulmonary: needs expansion- IS, acapella, OOBTC, PT/OT and ambulation     3. Neurology: analgesia as needed    4. Nephrology: diurese as tolerated; continue IV lasix 40 mg BID. Still up 4.6 kg from pre-op weight. Will change to PO tomorrow. 5. Endocrinology: BG stable, continue SSI. 6. Hematology: acute blood loss anemia; H&H stable    7. Microbiology: nothing at this time    8. Nutrition: Cardiac diet, appetite continues to improve. 9. Labs: monitor    10. Post-op Drains/Wires: all out    11. D/C Goals: DCP following, likely home with wife and [facility] tomorrow. 12. Continue post-op care of patient in the ICU    Meds:    The patient is on a beta-blocker   The patient is not on an ace-i/ARB- 2/2 relative hypotension   The patient is on a statin   The patient is on oral antiplatelet therapy   ______________________________________________________    Diamond Garay, CNP  2/22/2021  12:56 PM     _______________________________________________________  Note reviewed, events of last 24 hours reviewed along with vital signs and I/Os and patient examined. Images reviewed. Assessment & Plans  Patient clinically doing extremely well. Needs more diuresis. PT/OT working with him. Updated wife at bedside.      No Peres MD  Cardiothoracic Surgery

## 2021-02-22 NOTE — PROGRESS NOTES
Occupational Therapy  Facility/Department: [facility] C2 CARD TELEMETRY  Daily Treatment Note  NAME: Julita Levi  : 1960  MRN: [id number]    Date of Service: 2021    Discharge Recommendations:  24 hour supervision or assist  OT Equipment Recommendations  Other: CTA for shower chair    Assessment   Performance deficits / Impairments: Decreased functional mobility ; Decreased endurance;Decreased ADL status; Decreased balance;Decreased high-level IADLs  Assessment: Pt seen by Ot with PT initial co-treat given pt's nausea limiting mobility in prior session. Pt requires CGA for >150 ft functional mobility with no device, progressing towards baseline. Pt and his wife verbalized understanding of the HEP and pt demo'd exercises. Pt verbalized understanding of ADL adaptive strategies for sternal precautions. Pt has strong 24/ support at home, anticipate no significant concerns for d/c home regarding ADLs. Cont skilled OT 1x to physically demonstrate and problem-solve ADLs in [facility] maintaining sternal precautions. Prognosis: Good  Decision Making: Low Complexity  OT Education: OT Role;Plan of Care;Transfer Training;Energy Conservation;Precautions; ADL Adaptive Strategies; Family Education  Patient Education: disease specific ed: ADL adaptive strategies for precautions, HEP, pt verbalized understanding  REQUIRES OT FOLLOW UP: Yes  Activity Tolerance  Activity Tolerance: Patient Tolerated treatment well  Activity Tolerance: /70, HR 99; SPO2 95% on RA post gait training seated in chair  Safety Devices  Safety Devices in place: Yes  Type of devices: Call light within reach;Gait belt;Nurse notified; Left in chair       Patient Diagnosis(es): The encounter diagnosis was Chest pain, unspecified type. has a past medical history of Alcohol abuse, Coronary artery disease, Diabetic neuropathy associated with type 2 diabetes mellitus ([facility] [address] Hyperlipidemia, Hypertension, Non morbid obesity, GURDEEP (obstructive sleep apnea), Reactive airway disease with wheezing, moderate persistent, with acute exacerbation, and Type II or unspecified type diabetes mellitus without mention of complication, not stated as uncontrolled. has a past surgical history that includes Vein Surgery; Coronary angioplasty with stent (1/14/2016 9/21/2020); and Coronary artery bypass graft (02/19/2021). Restrictions  Restrictions/Precautions  Restrictions/Precautions: General Precautions, Fall Risk  Position Activity Restriction  Sternal Precautions: No Pushing, No Pulling, 5# Lifting Restrictions  Sternal Precautions: No lifting greater than 5 lbs. Other position/activity restrictions: Progressive ambulation. POD #1 ambulate in room and halls, POD #2 Ambulate in halls TID and bathe with assistance, POD #3 & 4 Ambulate in halls TID with increasing time and distance. Steps with assistance as tolerated. Bathe with less assistance. Subjective   General  Chart Reviewed: Yes  Patient assessed for rehabilitation services?: Yes  Family / Caregiver Present: Yes  Referring Practitioner: Adarsh Watts MD  Diagnosis: 2/19 CABG x 2 / ALEX clip  Subjective  Subjective: Pt in chair upon entry, pleasantly agreeable to therapy. General Comment  Comments: RN approved  Pain Assessment  Beltrán-Fernandez Pain Rating: Hurts a little bit  Response to Pain Intervention: Patient Satisfied  Vital Signs  Patient Currently in Pain: Yes   Orientation  Orientation  Overall Orientation Status: Within Functional Limits  Objective    ADL  Feeding: Setup; Beverage management     Balance  Sitting Balance: Supervision  Standing Balance: Contact guard assistance  Standing Balance  Time: >5 min  Activity: hallway back to chair, >150 ft  Functional Mobility Functional - Mobility Device: No device  Activity: Other(hallway)  Assist Level: Contact guard assistance     Transfers  Stand Step Transfers: Contact guard assistance  Sit to stand: Contact guard assistance  Stand to sit: Contact guard assistance     Cognition  Overall Cognitive Status: WFL     Type of ROM/Therapeutic Exercise  Type of ROM/Therapeutic Exercise: AROM  Comment: Modified to maintain sternal precautions. Pt and wife provided with HEP, demo'd understanding. Exercises  Shoulder Elevation: 1x  Shoulder Flexion: 1x to 90*  Horizontal ADduction: 1x  Elbow Flexion: 1x  Elbow Extension: 1x  Supination: 1x  Pronation: 1x  Wrist Flexion: 1x  Wrist Extension: 1x  Finger Flexion: 1x  Finger Extension: 1x     Plan   Plan  Times per week: 1x  Current Treatment Recommendations: Self-Care / ADL, Functional Mobility Training    AM-PAC Score  AM-[facility] Inpatient Daily Activity Raw Score: 18 (02/22/21 1134)  AM-PAC Inpatient ADL T-Scale Score : 38.66 (02/22/21 1134)  ADL Inpatient CMS 0-100% Score: 46.65 (02/22/21 1134)  ADL Inpatient CMS G-Code Modifier : CK (02/22/21 1134)    Goals  Short term goals  Time Frame for Short term goals: 1 week (2/27/21)  Short term goal 1: Pt will complete toilet transfer with SBA. - ongoing  Short term goal 2: Pt will complete LB dressing with SBA. - ongoing  Short term goal 3: Pt will complete 5 minutes standing for adls with CGA. - anticipate MET 2/22  Short term goal 4: Pt will 15 BUE reps for increased endurance. (2/24/21) - anticipate MET 2/22  Patient Goals   Patient goals : \"to get better\"     Therapy Time   Individual Concurrent Group Co-treatment   Time In       0909   Time Out       0940   Minutes       31   Timed Code Treatment Minutes: 32 Minutes     Should the patient be discharged before next OT session, this progress note will serve as the discharge summary.     Judith Ramirez, S/OT

## 2021-02-22 NOTE — PROGRESS NOTES
Nutrition Education    · Pt and wife in room at time of visit. Reviewed heart healthy diet with both of them. Went over what to shop for and what foods are best. Pt's wife asked thorough and specific questions about carbohydrates, what's best to eat, etc. Patient had no questions but said he was listening to discussion. Left shopping tips and lifestyle tips packets at bedside for patient and wife to review together. 10 minutes. · Verbally reviewed information with Patient and Family  · Educated on Heart Healthy Diet  · Written educational materials provided. · Contact name and number provided. · Refer to Patient Education activity for more details.     Electronically signed by Edwina Hoff Dietetic Intern on 2/22/21 at 2:31 PM EST    Contact: [phone]

## 2021-02-22 NOTE — PROGRESS NOTES
When receiving bedside handoff pt's chest tube site noted to be bleeding bright red blood. Dressing changed and bleeding still noted. Placed call to Dr. Iwona Booker. Instructed to hold pressure for 10 minutes and re-assess. Bleeding noted to have stopped at this time. Re-dressed CT site with vasoline gauze, sterile 4x4, and large tegaderm. All of pt's vital signs have remained stable and pt tolerated well. Will continue to monitor.

## 2021-02-22 NOTE — PLAN OF CARE
Problem: Pain:  Goal: Pain level will decrease  Description: Pain level will decrease  Outcome: Ongoing  Goal: Control of acute pain  Description: Control of acute pain  Outcome: Ongoing  Goal: Control of chronic pain  Description: Control of chronic pain  Outcome: Ongoing     Problem:  Activity:  Goal: Ability to tolerate increased activity will improve  Description: Ability to tolerate increased activity will improve  Outcome: Ongoing     Problem: Cardiac:  Goal: Ability to achieve and maintain adequate cardiopulmonary perfusion will improve  Description: Ability to achieve and maintain adequate cardiopulmonary perfusion will improve  Outcome: Ongoing  Goal: Hemodynamic stability will improve  Description: Hemodynamic stability will improve  Outcome: Ongoing  Goal: Will show no evidence of cardiac arrhythmias  Description: Will show no evidence of cardiac arrhythmias  Outcome: Ongoing  Goal: Satisfaction with pain management regimen will improve  Description: Satisfaction with pain management regimen will improve  Outcome: Ongoing  Goal: Diagnostic test results will improve  Description: Diagnostic test results will improve  Outcome: Ongoing  Goal: Complications related to the disease process, condition or treatment will be avoided or minimized  Description: Complications related to the disease process, condition or treatment will be avoided or minimized  Outcome: Ongoing     Problem: Coping:  Goal: Level of anxiety will decrease  Description: Level of anxiety will decrease  Outcome: Ongoing  Goal: Ability to cope will improve  Description: Ability to cope will improve  Outcome: Ongoing  Goal: Understanding of sexual limitations or changes related to disease process or condition will improve  Description: Understanding of sexual limitations or changes related to disease process or condition will improve  Outcome: Ongoing     Problem: Fluid Volume:  Goal: Risk for excess fluid volume will decrease Description: Risk for excess fluid volume will decrease  Outcome: Ongoing     Problem: Health Behavior:  Goal: Identification of resources available to assist in meeting health care needs will improve  Description: Identification of resources available to assist in meeting health care needs will improve  Outcome: Ongoing     Problem: Nutritional:  Goal: Ability to identify appropriate dietary choices will improve  Description: Ability to identify appropriate dietary choices will improve  Outcome: Ongoing     Problem: Respiratory:  Goal: Levels of oxygenation will improve  Description: Levels of oxygenation will improve  Outcome: Ongoing     Problem: Safety:  Goal: Ability to remain free from injury will improve  Description: Ability to remain free from injury will improve  Outcome: Ongoing     Problem: Preoperative Routine:  Goal: Will comply with preparation for surgery or procedure  Description: Will comply with preparation for surgery or procedure  Outcome: Ongoing     Problem: Discharge Planning:  Goal: Discharged to appropriate level of care  Description: Discharged to appropriate level of care  Outcome: Ongoing     Problem:  Activity Intolerance:  Goal: Ability to tolerate increased activity will improve  Description: Ability to tolerate increased activity will improve  Outcome: Ongoing  Goal: Able to perform prescribed physical activity  Description: Able to perform prescribed physical activity  Outcome: Ongoing     Problem: Anxiety:  Goal: Level of anxiety will decrease  Description: Level of anxiety will decrease  Outcome: Ongoing     Problem: Cardiac Output - Decreased:  Goal: Hemodynamic stability will improve  Description: Hemodynamic stability will improve  Outcome: Ongoing  Goal: Cardiac output within specified parameters  Description: Cardiac output within specified parameters  Outcome: Ongoing     Problem: Fluid Volume - Imbalance:  Goal: Ability to achieve a balanced intake and output will improve Description: Ability to achieve a balanced intake and output will improve  Outcome: Ongoing  Goal: Chest tube drainage is within specified parameters  Description: Chest tube drainage is within specified parameters  Outcome: Ongoing     Problem: Gas Exchange - Impaired:  Goal: Levels of oxygenation will improve  Description: Levels of oxygenation will improve  Outcome: Ongoing  Goal: Ability to maintain adequate ventilation will improve  Description: Ability to maintain adequate ventilation will improve  Outcome: Ongoing     Problem: Pain:  Goal: Pain level will decrease  Description: Pain level will decrease  Outcome: Ongoing  Goal: Control of acute pain  Description: Control of acute pain  Outcome: Ongoing  Goal: Control of chronic pain  Description: Control of chronic pain  Outcome: Ongoing     Problem: Tissue Perfusion - Cardiopulmonary, Altered:  Goal: Hemodynamic stability will improve  Description: Hemodynamic stability will improve  Outcome: Ongoing  Goal: Will show no evidence of cardiac arrhythmias  Description: Will show no evidence of cardiac arrhythmias  Outcome: Ongoing  Goal: Absence of angina  Description: Absence of angina  Outcome: Ongoing     Problem: Tobacco Use:  Goal: Will participate in inpatient tobacco-use cessation counseling  Description: Will participate in inpatient tobacco-use cessation counseling  Outcome: Ongoing

## 2021-02-23 ENCOUNTER — APPOINTMENT (OUTPATIENT)
Dept: GENERAL RADIOLOGY | Age: 61
DRG: 231 | End: 2021-02-23
Payer: COMMERCIAL

## 2021-02-23 VITALS
TEMPERATURE: 98.1 F | WEIGHT: 245.37 LBS | HEIGHT: 70 IN | DIASTOLIC BLOOD PRESSURE: 70 MMHG | BODY MASS INDEX: 35.13 KG/M2 | RESPIRATION RATE: 18 BRPM | HEART RATE: 91 BPM | SYSTOLIC BLOOD PRESSURE: 132 MMHG | OXYGEN SATURATION: 94 %

## 2021-02-23 LAB
ANION GAP SERPL CALCULATED.3IONS-SCNC: 9 MMOL/L (ref 3–16)
BUN BLDV-MCNC: 15 MG/DL (ref 7–20)
CALCIUM SERPL-MCNC: 8.3 MG/DL (ref 8.3–10.6)
CHLORIDE BLD-SCNC: 95 MMOL/L (ref 99–110)
CO2: 27 MMOL/L (ref 21–32)
CREAT SERPL-MCNC: 0.6 MG/DL (ref 0.8–1.3)
GFR AFRICAN AMERICAN: >60
GFR NON-AFRICAN AMERICAN: >60
GLUCOSE BLD-MCNC: 247 MG/DL (ref 70–99)
HCT VFR BLD CALC: 25.8 % (ref 40.5–52.5)
HEMOGLOBIN: 8.7 G/DL (ref 13.5–17.5)
INR BLD: 1.32 (ref 0.86–1.14)
MAGNESIUM: 2.2 MG/DL (ref 1.8–2.4)
MCH RBC QN AUTO: 30.6 PG (ref 26–34)
MCHC RBC AUTO-ENTMCNC: 33.9 G/DL (ref 31–36)
MCV RBC AUTO: 90.3 FL (ref 80–100)
PDW BLD-RTO: 13.4 % (ref 12.4–15.4)
PLATELET # BLD: 230 K/UL (ref 135–450)
PMV BLD AUTO: 8 FL (ref 5–10.5)
POTASSIUM SERPL-SCNC: 4.2 MMOL/L (ref 3.5–5.1)
PROTHROMBIN TIME: 15.3 SEC (ref 10–13.2)
RBC # BLD: 2.86 M/UL (ref 4.2–5.9)
SODIUM BLD-SCNC: 131 MMOL/L (ref 136–145)
WBC # BLD: 10.5 K/UL (ref 4–11)

## 2021-02-23 PROCEDURE — 97535 SELF CARE MNGMENT TRAINING: CPT

## 2021-02-23 PROCEDURE — 83735 ASSAY OF MAGNESIUM: CPT

## 2021-02-23 PROCEDURE — 80048 BASIC METABOLIC PNL TOTAL CA: CPT

## 2021-02-23 PROCEDURE — 85610 PROTHROMBIN TIME: CPT

## 2021-02-23 PROCEDURE — 6370000000 HC RX 637 (ALT 250 FOR IP): Performed by: THORACIC SURGERY (CARDIOTHORACIC VASCULAR SURGERY)

## 2021-02-23 PROCEDURE — 6360000002 HC RX W HCPCS: Performed by: THORACIC SURGERY (CARDIOTHORACIC VASCULAR SURGERY)

## 2021-02-23 PROCEDURE — 97530 THERAPEUTIC ACTIVITIES: CPT

## 2021-02-23 PROCEDURE — 71045 X-RAY EXAM CHEST 1 VIEW: CPT

## 2021-02-23 PROCEDURE — 2580000003 HC RX 258: Performed by: THORACIC SURGERY (CARDIOTHORACIC VASCULAR SURGERY)

## 2021-02-23 PROCEDURE — 99024 POSTOP FOLLOW-UP VISIT: CPT | Performed by: NURSE PRACTITIONER

## 2021-02-23 PROCEDURE — 85027 COMPLETE CBC AUTOMATED: CPT

## 2021-02-23 RX ORDER — POTASSIUM CHLORIDE 750 MG/1
10 TABLET, EXTENDED RELEASE ORAL 2 TIMES DAILY WITH MEALS
Qty: 20 TABLET | Refills: 0 | Status: SHIPPED | OUTPATIENT
Start: 2021-02-23 | End: 2021-03-08

## 2021-02-23 RX ORDER — FUROSEMIDE 40 MG/1
40 TABLET ORAL DAILY
Qty: 10 TABLET | Refills: 0 | Status: SHIPPED | OUTPATIENT
Start: 2021-02-24 | End: 2021-03-08

## 2021-02-23 RX ORDER — OXYCODONE HYDROCHLORIDE 5 MG/1
5 TABLET ORAL EVERY 6 HOURS PRN
Qty: 28 TABLET | Refills: 0 | Status: SHIPPED | OUTPATIENT
Start: 2021-02-23 | End: 2021-03-02 | Stop reason: SDUPTHER

## 2021-02-23 RX ORDER — FUROSEMIDE 40 MG/1
40 TABLET ORAL DAILY
Status: DISCONTINUED | OUTPATIENT
Start: 2021-02-24 | End: 2021-02-23 | Stop reason: HOSPADM

## 2021-02-23 RX ORDER — FAMOTIDINE 20 MG/1
20 TABLET, FILM COATED ORAL DAILY
Qty: 30 TABLET | Refills: 0 | Status: SHIPPED | OUTPATIENT
Start: 2021-02-23 | End: 2022-01-25 | Stop reason: CLARIF

## 2021-02-23 RX ADMIN — MUPIROCIN: 20 OINTMENT TOPICAL at 09:35

## 2021-02-23 RX ADMIN — Medication 15 ML: at 09:35

## 2021-02-23 RX ADMIN — POTASSIUM CHLORIDE 10 MEQ: 750 TABLET, EXTENDED RELEASE ORAL at 09:34

## 2021-02-23 RX ADMIN — ASPIRIN 81 MG: 81 TABLET, COATED ORAL at 09:34

## 2021-02-23 RX ADMIN — FAMOTIDINE 20 MG: 20 TABLET ORAL at 09:34

## 2021-02-23 RX ADMIN — FUROSEMIDE 40 MG: 10 INJECTION, SOLUTION INTRAMUSCULAR; INTRAVENOUS at 09:34

## 2021-02-23 RX ADMIN — OXYCODONE 10 MG: 5 TABLET ORAL at 14:47

## 2021-02-23 RX ADMIN — CLOPIDOGREL BISULFATE 75 MG: 75 TABLET ORAL at 09:34

## 2021-02-23 RX ADMIN — OXYCODONE 10 MG: 5 TABLET ORAL at 09:33

## 2021-02-23 RX ADMIN — INSULIN LISPRO 4 UNITS: 100 INJECTION, SOLUTION INTRAVENOUS; SUBCUTANEOUS at 09:35

## 2021-02-23 RX ADMIN — METOPROLOL TARTRATE 25 MG: 25 TABLET, FILM COATED ORAL at 09:34

## 2021-02-23 RX ADMIN — OXYCODONE 5 MG: 5 TABLET ORAL at 04:26

## 2021-02-23 RX ADMIN — SODIUM CHLORIDE, PRESERVATIVE FREE 10 ML: 5 INJECTION INTRAVENOUS at 09:35

## 2021-02-23 RX ADMIN — MAGNESIUM OXIDE TAB 400 MG (241.3 MG ELEMENTAL MG) 400 MG: 400 (241.3 MG) TAB at 09:48

## 2021-02-23 ASSESSMENT — PAIN SCALES - GENERAL
PAINLEVEL_OUTOF10: 7
PAINLEVEL_OUTOF10: 6

## 2021-02-23 ASSESSMENT — PAIN SCALES - WONG BAKER: WONGBAKER_NUMERICALRESPONSE: 2

## 2021-02-23 NOTE — PROGRESS NOTES
CVTS Cardiothoracic Progress Note:                                CC:  Post op follow up     Surgery: 2/19  Coronary artery bypass grafting x2. Total cardiopulmonary bypass. Endoscopic vein harvesting of right lower extremity. Reverse saphenous vein grafting to obtuse marginal artery. Left internal mammary artery to left anterior descending artery. Left atrial appendage clip, 40. Bilateral intercostal nerve block. Independent interpretation of transesophageal echocardiogram.      Hospital course:   2/22 Up in chair, appears comfortable, remains SR.   2/23 up in chair, wife at bedside.      Subjective:   Dietary Intake: good   no Nausea   Pain Control: controlled  Complaints: mild post op pain  Bowels: have moved 2/22    Past Medical History:   Diagnosis Date    Alcohol abuse 12/03/2012    Coronary artery disease     Diabetic neuropathy associated with type 2 diabetes mellitus ([facility] [facility]     Hyperlipidemia     Hypertension     Non morbid obesity     GURDEEP (obstructive sleep apnea)     Reactive airway disease with wheezing, moderate persistent, with acute exacerbation 03/03/2020    Type II or unspecified type diabetes mellitus without mention of complication, not stated as uncontrolled         Past Surgical History:   Procedure Laterality Date    CORONARY ANGIOPLASTY WITH STENT PLACEMENT  1/14/2016 9/21/2020    1 Promus Premier SAMUEL 2.25x16    CORONARY ARTERY BYPASS GRAFT  02/19/2021    CABG x2 & ALEX-Dr. Sofia Graves    VEIN SURGERY          Allergies as of 02/12/2021 - Review Complete 02/12/2021   Allergen Reaction Noted    Vitamin d analogs Hives 12/23/2014        Patient Active Problem List   Diagnosis    Type II or unspecified type diabetes mellitus without mention of complication, not stated as uncontrolled    Diabetes mellitus    History of ETOH abuse    Patient overweight    Patient overweight    Dietary noncompliance    Alcohol abuse    Flank pain    Prostatism    Low serum testosterone level    Arm pain, left    Shoulder pain    Precordial pain    Lung nodule    Acute angina (HCC)    Abnormal stress test    Acute coronary syndrome (HCC)    Unstable angina ([facility] [address]    Coronary artery disease involving native coronary artery of native heart with unstable angina pectoris (HCC)    Other chest pain    S/P drug eluting coronary stent placement    Essential hypertension    Obesity    Abnormal chest x-ray    Angina effort    Skin lesion    Angina at rest [facility])    Hyperlipidemia    Angina pectoris ([facility] [address]    NSTEMI (non-ST elevated myocardial infarction) ([facility] [address] [facility]    Controlled type 2 diabetes mellitus without complication, without long-term current use of insulin ([facility] [address]    ASHD (arteriosclerotic heart disease)    Mass of right foot    Overweight    History of angina pectoris    Reactive airway disease    Bronchitis    Reactive airway disease with wheezing, moderate persistent, with acute exacerbation    Need for prophylactic vaccination and inoculation against varicella    Need for prophylactic vaccination against diphtheria-tetanus-pertussis (DTP)    Chronic right-sided thoracic back pain    CAD in native artery    S/P PTCA (percutaneous transluminal coronary angioplasty)    Chest pain    Ischemic cardiomyopathy    GURDEEP (obstructive sleep apnea)        Vital Signs: /68   Pulse 96   Temp 98.3 °F (36.8 °C) (Oral)   Resp 18   Ht 5' 10\" (1.778 m)   Wt 245 lb 6 oz (111.3 kg)   SpO2 93%   BMI 35.21 kg/m²  O2 Flow Rate (L/min): 2 L/min     Admission Weight: Weight: 244 lb (110.7 kg)    Weight on 2/19 (108.5 kg) Pre-op   Weight on 2/20 (114.9 kg)  2/21  114.5 kg  2/22  113.1 kg  2/23  111.3 kg     Intake/Output:     Intake/Output Summary (Last 24 hours) at 2/23/2021 1235  Last data filed at 2/23/2021 0600  Gross per 24 hour   Intake 600 ml   Output    Net 600 ml      Extubation Time: 2/19 @ 13:03  Transition Time: 2/20 @ 07:19     LABORATORY DATA:   CBC:   Recent Labs 02/21/21  0515 02/22/21  0400 02/23/21  0422   WBC 14.0* 12.6* 10.5   HGB 9.5* 8.6* 8.7*   HCT 28.4* 25.2* 25.8*   MCV 91.3 90.5 90.3    177 230     BMP:   Recent Labs     02/22/21  0400 02/22/21  1009 02/23/21  0422    129* 131*   K 3.4* 4.4 4.2    95* 95*   CO2 30 25 27   BUN 14 15 15   CREATININE 0.6* 0.7* 0.6*     MG:    Recent Labs     02/21/21  0515 02/22/21  0400 02/23/21  0422   MG 2.30 2.30 2.20      PT/INR:   Recent Labs     02/21/21  0515 02/22/21  0400 02/23/21  0422   PROTIME 15.0* 14.5* 15.3*   INR 1.29* 1.25* 1.32*     CXR: 2/22/21   Impression   Removal of the left chest tube with a small left apical pneumothorax. Atelectasis in the left lung is stable.   ______________________________________________________    Objective:   General appearance: awake, alert in NAD  Lungs: CTAB  Heart: S1S2 RRR; SR on monitor, HR 80-90's  Chest: symmetrical expansion with inspiration and expirations; no rocking of sternum noted   Abdomen: round, soft, non-tender  Bowel sounds: normoactive   Kidneys: UOP satisfactory in 24 hrs; Cr 0.6  Wound/Incisions: Midsternal incision with dressing CDI; RLE incisions with dressings CDI; Pacing wires removed 2/20   Extremities: BLE pulses palpable; 1+ swelling noted in BLE's  Neurological: intact, non focal exam   Chest tubes/Drains: Chest tubes removed 2/21      Assessment:   Post-op: 4 days. Condition: In stable condition. Plan:   1. Cardiovascular: s/p CABG- BB, ASA, Statin  Tachycardia: controlled. Continue BB at current dose. Home BP meds on hold (relative hypotension). Will discuss possible restart at his follow up appointment (ACEI/ARB as well). 2. Pulmonary: needs expansion- IS, acapella, OOBTC, PT/OT and ambulation     3. Neurology: analgesia as needed    4. Nephrology: diurese as tolerated; continue PO lasix 40 mg daily    5. Endocrinology: BG stable, will restart his home meds.  Discussed need for patient to monitor his blood sugar when he is at home. He will follow up with his PCP for further diabetic management. 6. Hematology: acute blood loss anemia; H&H stable    7. Microbiology: nothing at this time    8. Nutrition: Cardiac diet. 9. Labs: monitor    10. Post-op Drains/Wires: all out    11. D/C Goals: DCP following, home with HHC and wife today.     Meds:    The patient is on a beta-blocker   The patient is not on an ace-i/ARB- 2/2 relative hypotension   The patient is on a statin   The patient is on oral antiplatelet therapy   ______________________________________________________    Oanh Osuna, CNP  2/23/2021  12:35 PM     ______________________________________________________

## 2021-02-23 NOTE — FLOWSHEET NOTE
02/22/21 1950   Assessment   Charting Type Shift assessment   Neurological   Neuro (WDL) WDL   Orientation Level Oriented X4   Cognition Follows commands   Language Clear   HEENT   HEENT (WDL) WDL   Respiratory   Respiratory (WDL) WDL   Respiratory Pattern Regular   Respiratory Depth Normal   Respiratory Quality/Effort Unlabored   Chest Assessment Chest expansion symmetrical   L Breath Sounds Diminished   R Breath Sounds Diminished   Breath Sounds   Right Upper Lobe Diminished   Right Middle Lobe Diminished   Right Lower Lobe Diminished   Left Upper Lobe Diminished   Left Lower Lobe Diminished   Cardiac   Cardiac (WDL) X  (CABG)   Cardiac Rhythm NSR;ST   Cardiac Monitor   Telemetry Monitor On Yes   Telemetry Audible Yes   Telemetry Alarms Set Yes   Telemetry Box Number ICU Monitor   Pacemaker   Pacemaker No   Gastrointestinal   Abdominal (WDL) X   Abdomen Inspection Rotund   RUQ Bowel Sounds Active   LUQ Bowel Sounds Active   RLQ Bowel Sounds Active   LLQ Bowel Sounds Active   Peripheral Vascular   Peripheral Vascular (WDL) X   Edema Generalized   Edema Generalized Trace   Skin Color/Condition   Skin Color/Condition (WDL) WDL   Skin Color Appropriate for ethnicity   Skin Condition/Temp Warm   Skin Integrity   Skin Integrity (WDL) X   Skin Integrity Other (Comment)  (surgical incisions)   Location sternum, RLE   Musculoskeletal   Musculoskeletal (WDL) WDL   Genitourinary   Genitourinary (WDL) WDL   Urine Assessment   Incontinence No   Anus/Rectum   Anus/Rectum (WDL) WDL   Incision 02/19/21 Sternum   Date First Assessed/Time First Assessed: 02/19/21 0834   Primary Wound Type: Surgical Type  Location: Sternum   Dressing/Treatment Open to air   Closure Surgical glue   Margins Approximated   Incision Assessment Dry   Drainage Amount None   Odor None   Felicia-incision Assessment Intact   Incision 02/19/21 Leg Right Date First Assessed/Time First Assessed: 02/19/21 0103   Primary Wound Type: Surgical Type  Location: Leg  Wound Location Orientation: Right   Dressing Status Clean;Dry; Intact   Dressing/Treatment Dry dressing   Drainage Amount None   Odor None   Psychosocial   Psychosocial (WDL) WDL

## 2021-02-23 NOTE — CARE COORDINATION
CASE MANAGEMENT DISCHARGE SUMMARY      Discharge to: home with [address] ordered/agency: none    Transportation: private   Family/car: wife at bedside      Confirmed discharge plan with:   Patient: yes   Family, name and contact number: at bedside and aware of discharge plans. Facility/Agency, name:  [facility] faxed to [facility] by liakallie Raza. RN, name: Julia EATON aware of the above.  Eliot Fabry, RN

## 2021-02-23 NOTE — PROGRESS NOTES
Late entry- pt seen last night      PT doing well, no CP.  Feels well ambulating  Ok for d/chome, f/u in office 1-2 months  Outpt Cardiac Rehab phase II  Cont ASA,lipitor, plavix (1yr), lopressor    Will sign off

## 2021-02-23 NOTE — DISCHARGE SUMMARY
Cardiac, Vascular & Thoracic Surgery  Discharge Summary    Patient:  Gurjit Chavis 1960 [id number]   Admission Date:  2/12/2021  5:30 PM  Discharge Date:  2/23/21     Principle Diagnosis:  Unstable angina [facility])    Secondary Diagnosis:  Principal Problem (Resolved):    Unstable angina ([facility] [address]  Active Problems:    Coronary artery disease involving native coronary artery of native heart with unstable angina pectoris ([facility] [address]    Diabetes mellitus    Hyperlipidemia    CAD in native artery    Ischemic cardiomyopathy    GURDEEP (obstructive sleep apnea)    LHC: 2/15/21 with Dr. Regis Bernard   Left Heart Cath:    Findings   LVEDP 14   LVEF 40%   LV wall motion Mild anterior HK   Gradient across AV none   Mitral regurg No signficant      Coronary Angiogram:  Artery Findings/Result   LM luminals   LAD prox 70% (OCT 3.74mm2) patent prox stent (no restenosis) and patent separate mid stent with mild restenosis <15%   LCx prox stent restenosis with 95% (Guerline-2 flow)         RCA Dominant, prox 40%         Results of intervention      Lesion 1: prox LAD  OCT showed <90deg arc of thick calcium. MLA 3.74. disease extends to left main (LM with minimal disease)     Lesion 2: Om11  NC balloon and Angiosculpt  Pre:   95% stenosis, GUERLINE 2 flow  Post: 60% stenosis, GUERLINE 3 flow      Assessment/Plan  1. Severe restenosis of OM1 treated for palliation with NC and angiosculpt  2. Severe stenosis of Prox LAD  3. CT surgery eval for definitive revascularization                - cont cangrelor  Med Rec:            Recommendation Indicated? Not Given Due To: Note   DAPT  Y HOLD FOR CABG  on cangrelor   STATIN - HIGH DOSE Y       BETA BLOCKER Y       ACE/ARB/ARNI Y  Hypotension     ALDACTONE           Procedure:  2/19  Coronary artery bypass grafting x2.  Total cardiopulmonary bypass. Endoscopic vein harvesting of right lower extremity. Reverse saphenous vein grafting to obtuse marginal artery. Left internal mammary artery to left anterior descending artery. Left daily             fluticasone-salmeterol (ADVAIR DISKUS) 250-50 MCG/DOSE AEPB  Inhale 1 puff into the lungs every 12 hours             furosemide (LASIX) 40 MG tablet  Take 1 tablet by mouth daily for 10 days             glipiZIDE (GLUCOTROL) 10 MG tablet  TAKE ONE TABLET BY MOUTH EVERY MORNING FOR DIABETES             magnesium oxide (MAG-OX) 400 (241.3 Mg) MG TABS tablet  Take 1 tablet by mouth daily for 10 days             metFORMIN (GLUCOPHAGE) 1000 MG tablet  TAKE ONE AND ONE HALF TABLETS DAILY. metoprolol tartrate (LOPRESSOR) 25 MG tablet  TAKE ONE TABLET BY MOUTH TWICE A DAY             oxyCODONE (ROXICODONE) 5 MG immediate release tablet  Take 1 tablet by mouth every 6 hours as needed (acute post op pain) for up to 7 days. potassium chloride (KLOR-CON M) 10 MEQ extended release tablet  Take 1 tablet by mouth 2 times daily (with meals) for 10 days             Spacer/Aero-Holding Chambers (E-Z SPACER) REAL  1 Device by Does not apply route daily as needed (wheezing)                 Patient Instructions: Activity: DO NOT LIFT, PUSH, OR PULL ANYTHING OVER 5 POUNDS FOR 6 WEEK from the day of surgery  Diet:  cardiac diet and diabetic diet  Wound Care:  [address] YOUR INCISIONS DAILY WITH A CLEAN WASHCLOTH AND ANTIBACTERIAL SOAP. Do not wash your incisions after you have cleansed other parts of your body    Follow up with Cardiothoracic Surgeon, Dr. Janice Kraft on 3/2/21. Follow up with Cardiologist, Dr. Eric Marcano in 1-2 weeks.       Jany Gomez, KORIN-CNP

## 2021-02-23 NOTE — DISCHARGE INSTR - COC
Continuity of Care Form    Patient Name: Jax Melton   :  1960  MRN:  [id number]    Admit date:  2021  Discharge date:  ***    Code Status Order: Full Code   Advance Directives:   Advance Care Flowsheet Documentation       Date/Time Healthcare Directive Type of Healthcare Directive Copy in [address] Agent's Name Healthcare Agent's Phone Number    21 [phone]  No, patient does not have an advance directive for healthcare treatment -- -- -- -- --            Admitting Physician:  Gonzalo Ellis MD  PCP: Kian Sanchez DO    Discharging Nurse: [facility] Unit/Room#: 0225/0225-01  Discharging Unit Phone Number: ***    Emergency Contact:   Extended Emergency Contact Information  Primary Emergency Contact: Brittny Chin  Address: [address] Phone: [phone]  Work Phone: [phone]  Mobile Phone: [phone]  Relation: Spouse  Secondary Emergency Contact: Lisa Moya  Address: [address] Phone: [phone]  Mobile Phone: [phone]  Relation: Brother/Sister  Preferred language: English    Past Surgical History:  Past Surgical History:   Procedure Laterality Date    CORONARY ANGIOPLASTY WITH STENT PLACEMENT  2016    1 Promus Premier SAMUEL 2.25x16    CORONARY ARTERY BYPASS GRAFT  2021    CABG x2 & ALEX-Dr. Sebastian Dark    VEIN SURGERY         Immunization History:   Immunization History   Administered Date(s) Administered    Influenza Vaccine, unspecified formulation 10/18/2018    Influenza Virus Vaccine 2016, 2019    Influenza, Quadv, IM, PF (6 mo and older Fluzone, Flulaval, Fluarix, and 3 yrs and older Afluria) 2020    Pneumococcal Polysaccharide ([id number]) 2016       Active Problems:  Patient Active Problem List   Diagnosis Code  Type II or unspecified type diabetes mellitus without mention of complication, not stated as uncontrolled E11.9    Diabetes mellitus E11.9    History of ETOH abuse F10.11    Patient overweight E66.3    Patient overweight E66.3    Dietary noncompliance Z91.11    Alcohol abuse F10.10    Flank pain R10.9    Prostatism N40.0    Low serum testosterone level R79.89    Arm pain, left M79.602    Shoulder pain M25.519    Precordial pain R07.2    Lung nodule R91.1    Acute angina (HCC) I20.9    Abnormal stress test R94.39    Acute coronary syndrome (HCC) I24.9    Unstable angina (HCC) I20.0    Coronary artery disease involving native coronary artery of native heart with unstable angina pectoris (HCC) I25.110    Other chest pain R07.89    S/P drug eluting coronary stent placement Z95.5    Essential hypertension I10    Obesity E66.9    Abnormal chest x-ray R93.89    Angina effort I20.8    Skin lesion L98.9    Angina at rest ([address] I20.8    Hyperlipidemia E78.5    Angina pectoris (HCC) I20.9    NSTEMI (non-ST elevated myocardial infarction) ([address] I21.4    Controlled type 2 diabetes mellitus without complication, without long-term current use of insulin (HCC) E11.9    ASHD (arteriosclerotic heart disease) I25.10    Mass of right foot R22.41    Overweight E66.3    History of angina pectoris Z86.79    Reactive airway disease J45.909    Bronchitis J40    Reactive airway disease with wheezing, moderate persistent, with acute exacerbation J45.41    Need for prophylactic vaccination and inoculation against varicella Z23    Need for prophylactic vaccination against diphtheria-tetanus-pertussis (DTP) Z23    Chronic right-sided thoracic back pain M54.6, G89.29    CAD in native artery I25.10    S/P PTCA (percutaneous transluminal coronary angioplasty) Z98.61    Chest pain R07.9    Ischemic cardiomyopathy I25.5    GURDEEP (obstructive sleep apnea) G47.33       Isolation/Infection:   Isolation No Isolation          Patient Infection Status       None to display            Nurse Assessment:  Last Vital Signs: /68   Pulse 96   Temp 98.3 °F (36.8 °C) (Oral)   Resp 18   Ht 5' 10\" (1.778 m)   Wt 245 lb 6 oz (111.3 kg)   SpO2 93%   BMI 35.21 kg/m²     Last documented pain score (0-10 scale): Pain Level: 7  Last Weight:   Wt Readings from Last 1 Encounters:   02/23/21 245 lb 6 oz (111.3 kg)     Mental Status:  {IP PT MENTAL STATUS:20030:::0}    IV Access:  { NAOMIE IV ACCESS:091336689:::0}    Nursing Mobility/ADLs:  Walking   {CHP DME ADLs:891172699:::0}  Transfer  {CHP DME ADLs:566987887:::0}  Bathing  {CHP DME ADLs:520972970:::0}  Dressing  {CHP DME ADLs:765542953:::0}  Toileting  {CHP DME ADLs:802401759:::0}  Feeding  {CHP DME ADLs:889764627:::0}  Med Admin  {P DME ADLs:297751115:::0}  Med Delivery   { NAOMIE MED Delivery:337729355:::0}    Wound Care Documentation and Therapy:        Elimination:  Continence:   · Bowel: {YES / QN:02394}  · Bladder: {YES / SC:09843}  Urinary Catheter: {Urinary Catheter:078317210:::0}   Colostomy/Ileostomy/Ileal Conduit: {YES / :13590}       Date of Last BM: ***    Intake/Output Summary (Last 24 hours) at 2/23/2021 1411  Last data filed at 2/23/2021 0600  Gross per 24 hour   Intake 600 ml   Output    Net 600 ml     I/O last 3 completed shifts: In: 1012.5 [P.O.:840;  I.V.:172.5]  Out: -     Safety Concerns:     [address] Safety Concerns:372870628:::0}    Impairments/Disabilities:      [address] Impairments/Disabilities:338816730:::0}    Nutrition Therapy:  Current Nutrition Therapy:   [address] Diet List:021415801:::0}    Routes of Feeding: {CHP DME Other Feedings:123169985:::0}  Liquids: {Slp liquid thickness:71540}  Daily Fluid Restriction: {CHP DME Yes amt example:869999949:::0}  Last Modified Barium Swallow with Video (Video Swallowing Test): {Done Not Done OQJT:617453945:::4}    Treatments at the Time of Hospital Discharge:   Respiratory Treatments: *** Oxygen Therapy:  {Therapy; copd oxygen:48950:::0}  Ventilator:    { CC Vent List:747557458:::0}    Rehab Therapies: Nurse  Weight Bearing Status/Restrictions: 50[address] CC Weight Bearin:::0}  Other Medical Equipment (for information only, NOT a DME order):  {EQUIPMENT:111577261}  Other Treatments: [facility]: LEVEL 3 [address] to establish plan of care for patient over 60 day period   Nursing  Initial home SN evaluation visit to occur within 24-48 hours for:  1)  medication management  2)  VS and clinical assessment  3)  S&S chronic disease exacerbation education + when to contact MD/NP  4)  care coordination  Medication Reconciliation during 1st SN visit  PT/OT/Speech   Evaluations in home within 24-48 hours of discharge to include DME and home safety   Frontload therapy 5 days, then 3x a week   OT to evaluate if patient has [address] needs for personal care    evaluation within 24-48 hours to evaluate resources & insurance for potential AL, [place], LTC, and Medicaid options   Palliative Care referral within 5 days of hospital discharge   PCP Visit scheduled within 3 - 7 days of hospital discharge [facility] (If patient is agreeable and meets guidelines)      Patient's personal belongings (please select all that are sent with patient):  {CHP DME Belongings:560702485:::0}    RN SIGNATURE:  {Esignature:347168819:::0}    CASE MANAGEMENT/SOCIAL WORK SECTION    Inpatient Status Date: 21    Readmission Risk Assessment Score:  Readmission Risk              Risk of Unplanned Readmission:        13           Discharging to Facility/ Agency   Name:  [facility] care    Address: [address] [place] [address], [address] [place] [address]  Phone: [phone]  Fax: [phone]      Dialysis Facility (if applicable)   · Name:  · Address:  · Dialysis Schedule:  · Phone:  · Fax: / signature: Electronically signed by Hope Gosselin, RN on 2/23/21 at 2:44 PM EST    PHYSICIAN SECTION    Prognosis: Good    Condition at Discharge: Stable    Rehab Potential (if transferring to Rehab): Good    Recommended Labs or Other Treatments After Discharge: f/u with me in office    Physician Certification: I certify the above information and transfer of Juan Bynum  is necessary for the continuing treatment of the diagnosis listed and that he requires [address] for less 30 days.      Update Admission H&P: No change in H&P    PHYSICIAN SIGNATURE:  Electronically signed by Kinga Ramirez MD on 2/23/21 at 2:14 PM EST

## 2021-02-23 NOTE — PROGRESS NOTES
has a past medical history of Alcohol abuse, Coronary artery disease, Diabetic neuropathy associated with type 2 diabetes mellitus ([facility] [address] Hyperlipidemia, Hypertension, Non morbid obesity, GURDEEP (obstructive sleep apnea), Reactive airway disease with wheezing, moderate persistent, with acute exacerbation, and Type II or unspecified type diabetes mellitus without mention of complication, not stated as uncontrolled. has a past surgical history that includes Vein Surgery; Coronary angioplasty with stent (1/14/2016 9/21/2020); and Coronary artery bypass graft (02/19/2021). Restrictions  Restrictions/Precautions  Restrictions/Precautions: General Precautions, Fall Risk  Position Activity Restriction  Sternal Precautions: No Pushing, No Pulling, 5# Lifting Restrictions  Sternal Precautions: No lifting greater than 5 lbs. Other position/activity restrictions: Progressive ambulation. POD #1 ambulate in room and halls, POD #2 Ambulate in halls TID and bathe with assistance, POD #3 & 4 Ambulate in halls TID with increasing time and distance. Steps with assistance as tolerated. Bathe with less assistance. Subjective   General  Chart Reviewed: Yes  Patient assessed for rehabilitation services?: Yes  Family / Caregiver Present: Yes  Referring Practitioner: Adarsh Watts MD  Diagnosis: 2/19 CABG x 2 / ALEX clip  Subjective  Subjective: Pt in bed upon entry, pleasantly agreeable to therapy. General Comment  Comments: RN approved  Pain Assessment  Pain Assessment: Faces  Beltrán-Baker Pain Rating: Hurts a little bit  Non-Pharmaceutical Pain Intervention(s): Ambulation/Increased Activity;Repositioned; Rest  Response to Pain Intervention: Patient Satisfied  Pre Treatment Pain Screening  Intervention List: Patient able to continue with treatment;Nurse/Physician notified  Vital Signs  Patient Currently in Pain: Yes   Orientation  Orientation  Overall Orientation Status: Within Functional Limits  Objective    ADL Feeding: Setup; Beverage management  Grooming: Setup(wash hands at sink, brush teeth seated)  Toileting: Supervision     Balance  Sitting Balance: Supervision  Standing Balance: Supervision  Standing Balance  Time: <5 min  Activity: bathroom, sink ADL  Functional Mobility  Functional - Mobility Device: No device  Activity: To/from bathroom  Assist Level: Stand by assistance  Toilet Transfers  Toilet - Technique: Ambulating  Equipment Used: Standard toilet  Toilet Transfer: Stand by assistance  Bed mobility  Supine to Sit: Minimal assistance(HOB elevated)  Sit to Supine: Unable to assess(pt left in chair)  Transfers  Stand Step Transfers: Stand by assistance  Sit to stand: Stand by assistance  Stand to sit: Stand by assistance     Coordination  Gross Motor: intact: dynamic stand to reach for sink objects  Fine Motor: Intact: managed containers for oral care     Cognition  Overall Cognitive Status: WFL     Type of ROM/Therapeutic Exercise  Type of ROM/Therapeutic Exercise: AROM  Comment: Modified to maintain sternal precautions. Pt and wife provided with HEP, verbalized and demo'd understanding. Plan   Plan  Times per week: 1x  Current Treatment Recommendations: Self-Care / ADL, Functional Mobility Training    AM-[facility] Score  AM-[facility] Inpatient Daily Activity Raw Score: 19 (02/23/21 1155)  AM-[facility] Inpatient ADL T-Scale Score : 40.22 (02/23/21 1155)  ADL Inpatient CMS 0-100% Score: 42.8 (02/23/21 1155)  ADL Inpatient CMS G-Code Modifier : CK (02/23/21 1155)    Goals  Short term goals  Time Frame for Short term goals: 1 week (2/27/21)  Short term goal 1: Pt will complete toilet transfer with SBA. - GOAL MET 2/23  Short term goal 2: Pt will complete LB dressing with SBA. - anticipate MET 2/23  Short term goal 3: Pt will complete 5 minutes standing for adls with CGA. - anticipate MET 2/22  Short term goal 4: Pt will 15 BUE reps for increased endurance.  (2/24/21) - anticipate MET 2/22  Patient Goals

## 2021-02-24 ENCOUNTER — HOSPITAL ENCOUNTER (OUTPATIENT)
Dept: GENERAL RADIOLOGY | Age: 61
Discharge: HOME | End: 2021-02-24
Payer: COMMERCIAL

## 2021-02-24 ENCOUNTER — HOSPITAL ENCOUNTER (OUTPATIENT)
Age: 61
Discharge: HOME | End: 2021-02-24
Payer: COMMERCIAL

## 2021-02-24 ENCOUNTER — TELEPHONE (OUTPATIENT)
Dept: PULMONOLOGY | Age: 61
End: 2021-02-24

## 2021-02-24 ENCOUNTER — VIRTUAL VISIT (OUTPATIENT)
Dept: PULMONOLOGY | Age: 61
End: 2021-02-24
Payer: COMMERCIAL

## 2021-02-24 ENCOUNTER — TELEPHONE (OUTPATIENT)
Dept: CARDIOTHORACIC SURGERY | Age: 61
End: 2021-02-24

## 2021-02-24 ENCOUNTER — TELEPHONE (OUTPATIENT)
Dept: CARDIOLOGY CLINIC | Age: 61
End: 2021-02-24

## 2021-02-24 DIAGNOSIS — J95.811 POSTPROCEDURAL PNEUMOTHORAX: ICD-10-CM

## 2021-02-24 DIAGNOSIS — J95.811 POSTPROCEDURAL PNEUMOTHORAX: Primary | ICD-10-CM

## 2021-02-24 DIAGNOSIS — J45.901 EXACERBATION OF ASTHMA, UNSPECIFIED ASTHMA SEVERITY, UNSPECIFIED WHETHER PERSISTENT: ICD-10-CM

## 2021-02-24 PROCEDURE — 99442 PR PHYS/QHP TELEPHONE EVALUATION 11-20 MIN: CPT | Performed by: INTERNAL MEDICINE

## 2021-02-24 PROCEDURE — 71046 X-RAY EXAM CHEST 2 VIEWS: CPT

## 2021-02-24 RX ORDER — DOXYCYCLINE HYCLATE 100 MG
100 TABLET ORAL 2 TIMES DAILY
Qty: 14 TABLET | Refills: 0 | Status: SHIPPED | OUTPATIENT
Start: 2021-02-24 | End: 2021-03-03

## 2021-02-24 NOTE — TELEPHONE ENCOUNTER
limited to the following:    Your Provider(s) may not able to provide medical treatment for your particular condition and you may be required to seek alternative healthcare or emergency care services.  The electronic systems or other security protocols or safeguards used in the Service could fail, causing a breach of privacy of your medical or other information.  Given regulatory requirements in certain jurisdictions, your Provider(s) diagnosis and/or treatment options, especially pertaining to certain prescriptions, may be limited. Acceptance   1. You understand that Services will be provided via Telehealth. This process involves the use of HIPAA compliant and secure, real-time audio-visual interfacing with a qualified and appropriately trained provider located at [facility]. 2. You understand that, under no circumstances, will this session be recorded. 3. You understand that the Provider(s) at [facility] and other clinical participants will be party to the information obtained during the Telehealth session in accordance with best medical practices. 4. You understand that the information obtained during the Telehealth session will be used to help determine the most appropriate treatment options. 5. You understand that You have the right to revoke this consent at any point in time. 6. You understand that Telehealth is voluntary, and that continued treatment is not dependent upon consent. 7. You understand that, in the event of non-consent to Telehealth services and/or technical difficulties, you will obtain services as typically provided in the absence of Telehealth technology. 8. You understand that this consent will be kept in Your medical record. 9. No potential benefits from the use of Telehealth or specific results can be guaranteed. Your condition may not be cured or improved and, in some cases, may get worse.    10. There are limitations in the terms described in the Terms of Service and this Telehealth Consent. The patient was read the following statement and has consented to the visit as of 2/24/21. The patient has been scheduled for their first telehealth visit on 2/24/21 with .

## 2021-02-24 NOTE — ANESTHESIA POSTPROCEDURE EVALUATION
Department of Anesthesiology  Postprocedure Note    Patient: Klarissa Brewer  MRN: [id number]  YOB: 1960  Date of evaluation: 2/24/2021  Time:  11:27 AM     Procedure Summary     Date: 02/19/21 Room / Location: [address]    Anesthesia Start: 8333 Anesthesia Stop: 4341    Procedure: CORONARY ARTERY BYPASS GRAFTING X2, INTERNAL MAMMARY ARTERY, SAPHENOUS VEIN GRAFT, ON PUMP WITH LEFT ATRIAL APPENDAGE CLIP, BILATERAL INTERCOSTAL NERVE BLOCK (N/A Chest) Diagnosis: (-)    Surgeons: Una Candelario MD Responsible Provider: Tanner Boeck, MD    Anesthesia Type: general ASA Status: 4          Anesthesia Type: general    Una Phase I:      Una Phase II:      Last vitals: Reviewed and per EMR flowsheets. Anesthesia Post Evaluation    Level of consciousness: awake and alert  Airway patency: patent  Nausea & Vomiting: no nausea and no vomiting  Complications: no  Cardiovascular status: blood pressure returned to baseline  Respiratory status: acceptable  Hydration status: euvolemic  Comments: Pt discharged prior to post op visit. Chart review. VSS on transfer to phase 2 recovery. No anesthetic complications. Pt discharged safely from facility.

## 2021-02-24 NOTE — TELEPHONE ENCOUNTER
Pt wife called regarding medication clarification. States has Amlodipine at home but has not started it. Thought it may have come from [facility]. Unsure if to start or not.  Deferred to Dr. Carlos Santamaria. Homero Amaral

## 2021-02-24 NOTE — TELEPHONE ENCOUNTER
Patient's wife calling again requesting a return call from [facility].  Patient's wife states patient is now having SOB

## 2021-02-25 NOTE — TELEPHONE ENCOUNTER
Patient. Apparently he reached out to Dr. Chloe Crisostomo. Chest x-ray shows resolving pulmonary edema and no significant pneumothorax following CABG procedure. He was started on antibiotics and steroids and states he is feeling much better this evening. We will follow no change for now.

## 2021-03-02 ENCOUNTER — OFFICE VISIT (OUTPATIENT)
Dept: CARDIOTHORACIC SURGERY | Age: 61
End: 2021-03-02

## 2021-03-02 VITALS
DIASTOLIC BLOOD PRESSURE: 62 MMHG | TEMPERATURE: 96.8 F | WEIGHT: 231 LBS | HEART RATE: 80 BPM | OXYGEN SATURATION: 98 % | BODY MASS INDEX: 33.07 KG/M2 | HEIGHT: 70 IN | SYSTOLIC BLOOD PRESSURE: 102 MMHG

## 2021-03-02 DIAGNOSIS — G89.18 ACUTE POST-OPERATIVE PAIN: ICD-10-CM

## 2021-03-02 DIAGNOSIS — R06.02 SHORTNESS OF BREATH: Primary | ICD-10-CM

## 2021-03-02 PROCEDURE — 99024 POSTOP FOLLOW-UP VISIT: CPT | Performed by: THORACIC SURGERY (CARDIOTHORACIC VASCULAR SURGERY)

## 2021-03-02 RX ORDER — OXYCODONE HYDROCHLORIDE 5 MG/1
5 TABLET ORAL EVERY 6 HOURS PRN
Qty: 28 TABLET | Refills: 0 | Status: SHIPPED | OUTPATIENT
Start: 2021-03-02 | End: 2021-03-08

## 2021-03-02 NOTE — PROGRESS NOTES
Cardiac, Vascular and Thoracic Surgeons  Clinic Note     3/2/2021 10:48 AM  Surgeon:  Fercho Gallardo     S/P : CABG x2 & ALEX clip on 2/19/21 w/ NEGRO    Chief complaint : 1st post op  Subjective:  Mr. Jany Young is doing well post operatively. MSI and chest tube sites are healing nicely. Right leg vein harvest site healing nicely. No redness, drainage, or swelling. Well approximated. Does report that pulmonologist Dr. Chris Zabala has started him on prednisone and doxycycline. Does report shortness of breath however improving since pulmonary treatment. Blood sugars have been elevated since starting prednisone. Vital Signs: /62 (Site: Left Upper Arm, Position: Sitting, Cuff Size: Medium Adult)   Pulse 80   Temp 96.8 °F (36 °C) (Temporal)   Ht 5' 10\" (1.778 m)   Wt 231 lb (104.8 kg)   SpO2 98%   BMI 33.15 kg/m²      I/O:  No intake or output data in the 24 hours ending 03/02/21 1048    Exam: No acute distress  Cardiovascular: S1-S2 is present  Pulmonary: Good air entry bilaterally    Lower extremity: No swelling  Incision: Sternal wound healing, clean and dry and intact    Chest X-Ray:  2/24/21  Decreased heart size with resolving central pulmonary congestion.       Status post removal of the right IJ catheter.       Resolving left apical pneumothorax which is not as well visualized.       Resolving left basilar infiltrate or atelectasis and resolving discoid   atelectasis along the left upper lobe.       Increasing right basilar atelectasis.       Tiny pleural effusions which are more prominent         Labs:   CBC: No results for input(s): WBC, HGB, HCT, MCV, PLT in the last 72 hours. BMP: No results for input(s): NA, K, CL, CO2, PHOS, BUN, CREATININE in the last 72 hours. Invalid input(s): CA  PT/INR: No results for input(s): PROTIME, INR in the last 72 hours. APTT: No results for input(s): APTT in the last 72 hours.     Scheduled Meds:     Patient Active Problem List   Diagnosis    Type II or unspecified type needed. His shortness of breath has improved since being placed on this treatment. Transient hyperglycemia with his steroid therapy  3. Recommended to stop his Lasix, magnesium and potassium. 4. He has a follow-up appointment with Dr. Warren Ledesma in cardiology. 5. Counseled him regarding sternal precautions and remaining medically compliant. In particular the wife has asked me when he can get inside a pool, I informed her and patient after 3 months. 6. Dietary counseling performed. 7. All questions were answered with his wife present they thanked me for my care. 8. He can follow-up as needed with CT surgery.     Kamron Nagy MD   Cardiothoracic surgery

## 2021-03-04 ENCOUNTER — TELEPHONE (OUTPATIENT)
Dept: CARDIOLOGY CLINIC | Age: 61
End: 2021-03-04

## 2021-03-04 NOTE — TELEPHONE ENCOUNTER
Pt had double bipass Feb 19th came home on 2/23 And has been on Oxycodone 5mg 6x's daily and OTC Tylenol in between. Pt has been extremely constipation and had to strain so hard yesterday to just go that he was crying in pain and his chest was all purple. Pt is better today, but has taken all the meds to help him have a bowel movement but nothing is working. Please call pt's wife and let her know if he still needs to take the Oxycodone and how long and what else will help w/his constipation.

## 2021-03-08 ENCOUNTER — OFFICE VISIT (OUTPATIENT)
Dept: FAMILY MEDICINE CLINIC | Age: 61
End: 2021-03-08
Payer: COMMERCIAL

## 2021-03-08 VITALS
BODY MASS INDEX: 33.21 KG/M2 | SYSTOLIC BLOOD PRESSURE: 100 MMHG | WEIGHT: 232 LBS | DIASTOLIC BLOOD PRESSURE: 60 MMHG | HEIGHT: 70 IN | HEART RATE: 67 BPM | TEMPERATURE: 96.7 F | OXYGEN SATURATION: 99 %

## 2021-03-08 DIAGNOSIS — Z98.61 STATUS POST PERCUTANEOUS TRANSLUMINAL CORONARY ANGIOPLASTY: ICD-10-CM

## 2021-03-08 DIAGNOSIS — Z95.1 STATUS POST AORTO-CORONARY ARTERY BYPASS GRAFT: Primary | ICD-10-CM

## 2021-03-08 DIAGNOSIS — E11.65 UNCONTROLLED TYPE 2 DIABETES MELLITUS WITH HYPERGLYCEMIA (HCC): ICD-10-CM

## 2021-03-08 DIAGNOSIS — E66.3 OVERWEIGHT: ICD-10-CM

## 2021-03-08 PROCEDURE — 99214 OFFICE O/P EST MOD 30 MIN: CPT | Performed by: FAMILY MEDICINE

## 2021-03-08 PROCEDURE — 3052F HG A1C>EQUAL 8.0%<EQUAL 9.0%: CPT | Performed by: FAMILY MEDICINE

## 2021-03-08 ASSESSMENT — PATIENT HEALTH QUESTIONNAIRE - PHQ9
SUM OF ALL RESPONSES TO PHQ QUESTIONS 1-9: 0
2. FEELING DOWN, DEPRESSED OR HOPELESS: 0

## 2021-03-09 ASSESSMENT — ENCOUNTER SYMPTOMS
COUGH: 0
WHEEZING: 0
CHEST TIGHTNESS: 0
ABDOMINAL PAIN: 0
BACK PAIN: 0
CHOKING: 0
STRIDOR: 0
SHORTNESS OF BREATH: 0

## 2021-03-09 NOTE — PROGRESS NOTES
Subjective:      Patient ID: Tamela Overton is a 61 y.o. male. [facility] Marybel Brantley is here for follow-up after coronary backrest surgery. Overall he is doing well. His glucose numbers have been very high recently because he has been on steroids. He remains overweight but is losing. He had a failed PTCA which led to his coronary bypass. Review of Systems   Constitutional: Negative for activity change, appetite change, chills, diaphoresis, fatigue, fever and unexpected weight change. Respiratory: Negative for cough, choking, chest tightness, shortness of breath, wheezing and stridor. Cardiovascular: Negative for chest pain, palpitations and leg swelling. Gastrointestinal: Negative for abdominal pain. Genitourinary: Negative for difficulty urinating. Musculoskeletal: Negative for arthralgias and back pain. Neurological: Negative for dizziness. All other systems reviewed and are negative. Objective:   Physical Exam  Vitals signs and nursing note reviewed. Constitutional:       Appearance: He is well-developed. HENT:      Head: Normocephalic and atraumatic. Right Ear: External ear normal.      Left Ear: External ear normal.      Nose: Nose normal.   Eyes:      Conjunctiva/sclera: Conjunctivae normal.      Pupils: Pupils are equal, round, and reactive to light. Neck:      Musculoskeletal: Normal range of motion and neck supple. Thyroid: No thyromegaly. Vascular: No JVD. Trachea: No tracheal deviation. Cardiovascular:      Rate and Rhythm: Normal rate and regular rhythm. Heart sounds: Normal heart sounds. No murmur. No friction rub. No gallop. Pulmonary:      Effort: Pulmonary effort is normal. No respiratory distress. Breath sounds: Normal breath sounds. No stridor. No wheezing or rales. Chest:      Chest wall: No tenderness. Abdominal:      General: Bowel sounds are normal. There is no distension. Palpations: Abdomen is soft. There is no mass.       Tenderness: There is no abdominal tenderness. There is no guarding or rebound. Musculoskeletal: Normal range of motion. General: Tenderness (healing cabg wound) present. Lymphadenopathy:      Cervical: No cervical adenopathy. Skin:     General: Skin is warm and dry. Coloration: Skin is not pale. Findings: No erythema or rash. Neurological:      Mental Status: He is alert and oriented to person, place, and time. Cranial Nerves: No cranial nerve deficit. Motor: No abnormal muscle tone. Coordination: Coordination normal.      Deep Tendon Reflexes: Reflexes are normal and symmetric. Reflexes normal.         Assessment / Plan:       1. Status post aorto-coronary artery bypass graft-doing well. Continue with cardiologist and thoracic surgeon      2. Uncontrolled type 2 diabetes mellitus with hyperglycemia (HCC)-discussed at length. Limit high carb foods      3. Overweight-discussed at length. Increasing exercise and decreasing calories      4.  Status post percutaneous transluminal coronary angioplasty-discussed

## 2021-03-30 DIAGNOSIS — E11.9 TYPE 2 DIABETES MELLITUS WITHOUT COMPLICATION, WITHOUT LONG-TERM CURRENT USE OF INSULIN (HCC): ICD-10-CM

## 2021-04-08 ENCOUNTER — TELEPHONE (OUTPATIENT)
Dept: FAMILY MEDICINE CLINIC | Age: 61
End: 2021-04-08

## 2021-04-08 ENCOUNTER — OFFICE VISIT (OUTPATIENT)
Dept: FAMILY MEDICINE CLINIC | Age: 61
End: 2021-04-08
Payer: COMMERCIAL

## 2021-04-08 VITALS
HEIGHT: 70 IN | SYSTOLIC BLOOD PRESSURE: 112 MMHG | RESPIRATION RATE: 16 BRPM | DIASTOLIC BLOOD PRESSURE: 72 MMHG | OXYGEN SATURATION: 98 % | HEART RATE: 90 BPM | TEMPERATURE: 97.1 F | BODY MASS INDEX: 33.73 KG/M2 | WEIGHT: 235.6 LBS

## 2021-04-08 DIAGNOSIS — Z95.1 STATUS POST AORTO-CORONARY ARTERY BYPASS GRAFT: Primary | ICD-10-CM

## 2021-04-08 DIAGNOSIS — N99.114 POSTPROCEDURAL MALE URETHRAL STRICTURE: ICD-10-CM

## 2021-04-08 DIAGNOSIS — J45.40 MODERATE PERSISTENT REACTIVE AIRWAY DISEASE WITHOUT COMPLICATION: ICD-10-CM

## 2021-04-08 DIAGNOSIS — I10 ESSENTIAL HYPERTENSION: ICD-10-CM

## 2021-04-08 DIAGNOSIS — I25.10 ASHD (ARTERIOSCLEROTIC HEART DISEASE): ICD-10-CM

## 2021-04-08 PROCEDURE — 99214 OFFICE O/P EST MOD 30 MIN: CPT | Performed by: FAMILY MEDICINE

## 2021-04-08 RX ORDER — CLOPIDOGREL BISULFATE 75 MG/1
TABLET ORAL
Qty: 90 TABLET | Refills: 3 | Status: SHIPPED | OUTPATIENT
Start: 2021-04-08 | End: 2022-03-21

## 2021-04-08 ASSESSMENT — ENCOUNTER SYMPTOMS
SHORTNESS OF BREATH: 0
BACK PAIN: 0
CHEST TIGHTNESS: 0
STRIDOR: 0
WHEEZING: 0
ABDOMINAL PAIN: 0
CHOKING: 0
COUGH: 0

## 2021-04-08 NOTE — PROGRESS NOTES
Subjective:      Patient ID: Elba Bennett is a 61 y.o. male. ARMINDA EDWARDS is here for follow-up after his recent coronary bypass. He has returned to work. And is basically doing light duty. Overall he seems to be doing well without significant shortness of breath and without any chest pain. He is following along with his cardiologist in regard to his ASHD. His reactive airway disease seems to be stable. His blood pressure is well controlled. He tells me that when he urinates the urine sprays in multiple directions. Review of Systems   Constitutional: Negative for activity change, appetite change, chills, diaphoresis, fatigue, fever and unexpected weight change. Respiratory: Negative for cough, choking, chest tightness, shortness of breath, wheezing and stridor. Cardiovascular: Negative for chest pain, palpitations and leg swelling. Gastrointestinal: Negative for abdominal pain. Genitourinary: Positive for difficulty urinating. Musculoskeletal: Negative for arthralgias and back pain. Neurological: Negative for dizziness. All other systems reviewed and are negative. Objective:   Physical Exam  Vitals signs and nursing note reviewed. Constitutional:       Appearance: He is well-developed. HENT:      Head: Normocephalic and atraumatic. Right Ear: External ear normal.      Left Ear: External ear normal.      Nose: Nose normal.   Eyes:      Conjunctiva/sclera: Conjunctivae normal.      Pupils: Pupils are equal, round, and reactive to light. Neck:      Musculoskeletal: Normal range of motion and neck supple. Thyroid: No thyromegaly. Vascular: No JVD. Trachea: No tracheal deviation. Cardiovascular:      Rate and Rhythm: Normal rate and regular rhythm. Heart sounds: Normal heart sounds. No murmur. No friction rub. No gallop. Pulmonary:      Effort: Pulmonary effort is normal. No respiratory distress. Breath sounds: Normal breath sounds. No stridor.  No wheezing or rales.   Chest:      Chest wall: No tenderness. Abdominal:      General: Bowel sounds are normal. There is no distension. Palpations: Abdomen is soft. There is no mass. Tenderness: There is no abdominal tenderness. There is no guarding or rebound. Musculoskeletal: Normal range of motion. General: No tenderness. Lymphadenopathy:      Cervical: No cervical adenopathy. Skin:     General: Skin is warm and dry. Coloration: Skin is not pale. Findings: No erythema or rash. Neurological:      Mental Status: He is alert and oriented to person, place, and time. Cranial Nerves: No cranial nerve deficit. Motor: No abnormal muscle tone. Coordination: Coordination normal.      Deep Tendon Reflexes: Reflexes are normal and symmetric. Reflexes normal.     His urethral meatus is tiny    Assessment / Plan:         1. Postprocedural male urethral stricture    - AFL - Ramandeep Woods MD, Urology, [facility]    2. ASHD (arteriosclerotic heart disease)    - TSH without Reflex; Future  - Lipid Panel; Future  - Hepatic Function Panel; Future  - Comprehensive Metabolic Panel; Future  - Hemoglobin A1C; Future  - PSA screening; Future    3. Status post aorto-coronary artery bypass graft    - TSH without Reflex; Future  - Lipid Panel; Future  - Hepatic Function Panel; Future  - Comprehensive Metabolic Panel; Future  - Hemoglobin A1C; Future  - PSA screening; Future    4. Moderate persistent reactive airway disease without complication    - TSH without Reflex; Future  - Lipid Panel; Future  - Hepatic Function Panel; Future  - Comprehensive Metabolic Panel; Future  - Hemoglobin A1C; Future  - PSA screening; Future    5.  Essential hypertension

## 2021-04-08 NOTE — PROGRESS NOTES
Subjective:      Patient ID: Angel Bolden is a 61 y.o. male. ARMINDA EDWARDS is here for follow-up on his status post recent CABG surgery. He is doing well and is not complaining of shortness of breath or chest pain. His blood pressure is well controlled. His asthma is stable. He complains of spraying of his urine stream since he was recently catheterized during his heart procedure. I ordered annual labs and hemoglobin A1c. Review of Systems   Constitutional: Negative for activity change, appetite change, chills, diaphoresis, fatigue, fever and unexpected weight change. Respiratory: Negative for cough, choking, chest tightness, shortness of breath, wheezing and stridor. Cardiovascular: Negative for chest pain, palpitations and leg swelling. Gastrointestinal: Negative for abdominal pain. Genitourinary: Positive for difficulty urinating. Musculoskeletal: Negative for arthralgias and back pain. Neurological: Negative for dizziness. All other systems reviewed and are negative. Objective:   Physical Exam  Vitals signs and nursing note reviewed. Constitutional:       Appearance: He is well-developed. HENT:      Head: Normocephalic and atraumatic. Right Ear: External ear normal.      Left Ear: External ear normal.      Nose: Nose normal.   Eyes:      Conjunctiva/sclera: Conjunctivae normal.      Pupils: Pupils are equal, round, and reactive to light. Neck:      Musculoskeletal: Normal range of motion and neck supple. Thyroid: No thyromegaly. Vascular: No JVD. Trachea: No tracheal deviation. Cardiovascular:      Rate and Rhythm: Normal rate and regular rhythm. Heart sounds: Normal heart sounds. No murmur. No friction rub. No gallop. Pulmonary:      Effort: Pulmonary effort is normal. No respiratory distress. Breath sounds: Normal breath sounds. No stridor. No wheezing or rales. Chest:      Chest wall: No tenderness.    Abdominal:      General: Bowel sounds are

## 2021-04-08 NOTE — TELEPHONE ENCOUNTER
Please call J and tell him that he is due for some labs. Please help him get the labs done which I will order.

## 2021-04-14 NOTE — PROGRESS NOTES
[address] [facility]   Cardiovascular Evaluation    PATIENT: Ash Sidhu  DATE: 4/15/2021  MRN: [id number]  CSN: [id number]  : 1960      Primary Care Doctor: Noris Rojas DO  Reason for evaluation:   Follow-up, Coronary Artery Disease, Hyperlipidemia, Cardiomyopathy, and Hypertension      Subjective:   History of present illness on initial date of evaluation:   Ash Sidhu is a 61 y.o. patient who initially presented for follow up. He has a PMH DM, HTN, CAD, s/p PCI, alcohol abuse, nonsmoker, + FH. Was admitted to hospital in 2016 with [place] and had abnormal stress test. Underwent PCI of OM as well as [id number]-[id number] LAD and was d/c'd/ Returned to hospital and noted to have mild troponin elevation and CP relieved with NTG SL. He underwent LHC on 16 and a PCI-D-1 with SAMUEL was done. Stents in prox-mid LAD, distal LAD, proximal-mid OM-1 were found patent. He was switched from Plavix to Brilinta. Imdur was added at office visit in 2016 due to complaints of exertional chest pain. He was admitted to the hospital in late May 2016 with recurrent chest pain and transient troponin elevation. LHC was showed patent stents. He was started on Ranexa. He underwent 17 rotablade guided PCI of OM1 recurrent instent restenosis. Last OV 20 he presented for follow up. He had an elective LHC on 2020 that demonstrated restenosis of mid LAD. Treated successfully with laser atherectomy and aggressive pre dilatation using cutting balloons. He has not noticed any improvement with the Renexa. He stated he continues to have chest pain. He stated he has been taking 2 SL nitro's daily and this does help. He stated the pain will occur in the AM and when he takes a SL nitro the pain stops. He continued to drink a good amount of bourbon nightly. He was positive for COVID in October. Last OV 21 he presented for follow up s/p LHC. On 12/15/20 LHC performed.  Severe and progressive in-stent restenosis of OM1. Heavily fibrotic. Multiple runs of noncompliant balloon inflations were performed but there was balloon slippage. Procedure aborted due to HTN and severe chest pain. He reported he feels well overall. He denies CP, SOB, dizziness or syncope. He had not needed NTG. He has been trying to control his carb intake. He has stopped alcohol consumption. His BP at home has been controlled but is elevated on exam.   He underwent CABG x2 and left atrial clip on 2/19/2021. Today, he reports doing well. He feels stronger everyday. He is limited by not knowing how much he is allowed to lift. He has been having people help him with heavy lifting or pulling. 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 Panel weighs about a 100 lbs. He is walking and going up and down ladders. He doesn't feel that he needs cardiac rehab as he is very active. He has a small piece of suture poking out at the bottom of his chest surgical site.         Patient Active Problem List   Diagnosis    Type II or unspecified type diabetes mellitus without mention of complication, not stated as uncontrolled    Diabetes mellitus    History of ETOH abuse    Patient overweight    Patient overweight    Dietary noncompliance    Alcohol abuse    Flank pain    Prostatism    Low serum testosterone level    Arm pain, left    Shoulder pain    Precordial pain    Lung nodule    Acute angina (HCC)    Abnormal stress test    Acute coronary syndrome ([address]    Coronary artery disease involving native coronary artery of native heart with unstable angina pectoris (HCC)    Other chest pain    S/P drug eluting coronary stent placement    Essential hypertension    Obesity    Abnormal chest x-ray    Angina effort    Skin lesion    Angina at rest [facility])    Hyperlipidemia    Angina pectoris ([address]    NSTEMI (non-ST elevated myocardial infarction) ([address]    Controlled type 2 diabetes mellitus without complication, without long-term current use of insulin ([facility]    ASHD (arteriosclerotic heart disease)    Mass of right foot    Overweight    History of angina pectoris    Reactive airway disease    Bronchitis    Reactive airway disease with wheezing, moderate persistent, with acute exacerbation    Need for prophylactic vaccination and inoculation against varicella    Need for prophylactic vaccination against diphtheria-tetanus-pertussis (DTP)    Chronic right-sided thoracic back pain    CAD in native artery    S/P PTCA (percutaneous transluminal coronary angioplasty)    Chest pain    Ischemic cardiomyopathy    GURDEEP (obstructive sleep apnea)         Past Medical History:   has a past medical history of Alcohol abuse, Coronary artery disease, Diabetic neuropathy associated with type 2 diabetes mellitus ([facility] Hyperlipidemia, Hypertension, Non morbid obesity, GURDEEP (obstructive sleep apnea), Reactive airway disease with wheezing, moderate persistent, with acute exacerbation, and Type II or unspecified type diabetes mellitus without mention of complication, not stated as uncontrolled. Surgical History:   has a past surgical history that includes Vein Surgery; Coronary angioplasty with stent (1/14/2016 9/21/2020); Coronary artery bypass graft (02/19/2021); Coronary artery bypass graft (N/A, 2/19/2021); and Bypass Graft (02/19/2021). Social History:   reports that he has never smoked. He has never used smokeless tobacco. He reports previous alcohol use of about 1.0 standard drinks of alcohol per week. He reports that he does not use drugs. Family History:  No evidence for sudden cardiac death or premature CAD    Home Medications:  Reviewed and are listed in nursing record.  and/or listed below  Current Outpatient Medications   Medication Sig Dispense Refill    clopidogrel (PLAVIX) 75 MG tablet TAKE ONE TABLET BY MOUTH DAILY 90 tablet 3    metFORMIN (GLUCOPHAGE) 1000 MG tablet TAKE ONE TABLET BY MOUTH TWICE A DAY 60 tablet 1    atorvastatin (LIPITOR) 40 MG tablet TAKE ONE TABLET BY MOUTH EVERY EVENING 90 tablet 3    glipiZIDE (GLUCOTROL) 10 MG tablet TAKE ONE TABLET BY MOUTH EVERY MORNING FOR DIABETES 30 tablet 2    metoprolol tartrate (LOPRESSOR) 25 MG tablet TAKE ONE TABLET BY MOUTH TWICE A DAY 60 tablet 5    fluticasone-salmeterol (ADVAIR DISKUS) 250-50 MCG/DOSE AEPB Inhale 1 puff into the lungs every 12 hours 60 each 3    aspirin 81 MG chewable tablet CHEW ONE TABLET BY MOUTH DAILY 30 tablet 1    famotidine (PEPCID) 20 MG tablet Take 1 tablet by mouth daily 30 tablet 0     No current facility-administered medications for this visit. Allergies:  Patient has no known allergies. Review of Systems:   A 14 point review of symptoms completed. Pertinent positives identified in the HPI, all other review of symptoms negative as below. Objective:   PHYSICAL EXAM:    Vitals:    04/15/21 1002   BP: (!) 145/84   Pulse: 79   SpO2: 98    Weight: 237 lb 9.6 oz (107.8 kg)     Wt Readings from Last 3 Encounters:   04/15/21 237 lb 9.6 oz (107.8 kg)   04/08/21 235 lb 9.6 oz (106.9 kg)   03/08/21 232 lb (105.2 kg)         General Appearance:  Alert, cooperative, no distress, appears stated age   Head:  Normocephalic, atraumatic   Eyes:  PERRL, conjunctiva/corneas clear   Nose: Nares normal, no drainage or sinus tenderness   Throat: Lips, mucosa, and tongue normal   Neck: Supple, symmetrical, trachea midline, NL thyroid no carotid bruit or JVD   Lungs:   CTAB, respirations unlabored   Chest Wall:  No tenderness or deformity. Well-healed surgical scar.  Appears to be an embedded suture in the inferior sternal wound site   Heart:  Regular rhythm and normal rate; S1, S2 are normal;   no murmur noted; no rub or gallop   Abdomen:   Soft, non-tender, +BS x 4, no masses, no organomegaly   Extremities: Extremities normal, atraumatic, no cyanosis or edema   Pulses: 2+ and symmetric   Skin: Skin color, texture, turgor normal, no pressures. The right atrium is mildly enlarged. STRESS TEST 12/2/2020  Summary Normal LVEF 59%  Normal wall motion  Diaphragmatic attenuation artifact  Apical scar with small amount of inferoapical gifty-infarct ischemia   Overall, this would be considered an abnormal, intermediate to high risk,  study     STRESS TEST: 1/13/2016   1. Inferolateral basilar reversible stress defect compatible with stress-induced ischemia. 2. Ejection fraction of 56%   3. No focal wall motion abnormality. CARDIAC CATH  LEFT HEART CATH: 12/15/20  Artery Findings/Result   LM  luminal regularities   LAD  patent stent in proximal LAD with no significant restenosis. ,  GUERLINE-3 flow  Diagonal 1. Patent stent with normal significant restenosis   Cx  luminal irregularities  OM1-proximal 70%. Proximal stent with 50% restenosis. Distal stent edge with 90% restenosis  OM2proximal 40%         RCA  dominant. Mid 30%   LVEDP  16   LVG  55%. Normal wall motion  No aortic stenosis, 1+ mitral regurgitation      Results of intervention      Lesion 1: OM1  Multiple noncompliant balloons used up to 2.75 mm. Angio sculpt also used. In addition to several runs of laser atherectomy. Pre:   90% stenosis, GUERLINE 2 flow  Post: 40% stenosis, GUERLINE 3 flow  Assessment/Plan  1. Severe and progressive in-stent restenosis of OM1. Heavily fibrotic. Multiple runs of noncompliant balloon inflations were performed but there was balloon slippage. Angioscope and multiple runs of laser atherectomy was performed. Unfortunately patient was no longer able to tolerate the procedure was severe chest pain and hypertension therefore we aborted the procedure. Residual stenosis was 40% of the distal stent edge but is GUERLINE-3 flow.                -Symptoms return we will consider again laser atherectomy versus rotoblade versus shockwave lithotripsy therapy  2.   Continue aspirin and plavix       LEFT HEART CATH 9/21/2020  PCI of mid LAD  Status post predilation using laser arthrectomy, angioscope and NC balloon dilatation. Stent resolute [place] 3.5 mm x 15 mm, postdilated to 3.6 mm. Assessment  1. Interval restenosis of mid LAD in-stent 80% lesion, GUERLINE II flow. Treated successfully with laser atherectomy and aggressive predilatation using cutting balloons                           -0% residual, GUERLINE-3 flow. CATH: 9/6/17  Successful PCI of OM-1 recurrent in-stent restenosis with Rotablator-assisted PCI with placement of Xience Alpine drug-eluting stent. Cath 7/11/17  Intervention:  90% prox OM1 in-stent restenosis, s/p PTCA with 2.5-mm NC as well as 2.5-mm Flexitome cutting balloon     Cath 8/18/17  LEFT HEART CATH  PCI of mid OM1 90% restenosis  Ballooned with 2.25 x as NC trek up to 18atm  Angiosculpt 2.5 x 15mm up to 14atm- multiple passes  90% GUERLINE-2 flow--> 15% GUERLINE-3 flow  Assessment  1. Restenosis of the OM1 stent following recent POBA  2. Aggressive cutting balloon and NC balloon but still with residual stenosis and concerned about ability to completely deploy stent. If reoccurs or Cp returns, would suggest laser arthrectomy and PCI. CATH: 01/21/2016  1. A 70% proximal medium-caliber first diagonal branch status post 2.25 x  12-mm Xience Alpine drug-eluting stent. 2.  Patent drug-eluting stent in proximal-mid left anterior descending. 3.  Patent drug-eluting stent in the distal left anterior descending. 4.  Patent drug-eluting stent in the [id number] first obtuse marginal  branch. RECOMMENDATIONS:  Aspirin indefinitely. Brilinta at least for 12 months. Aggressive medical therapy and risk factors modification for coronary disease. CATH: 01/15/2016  IMPRESSION:   1. An 80% diffuse mid left anterior descending with a fractional flow  reserve of 0.79 (significant), status post 2.75 x 28-mm XIENCE  Alpine drug-eluting stent postdilated with a 3.5-mm noncompliant balloon.    2. A 60% distal left anterior descending lesion with a fractional flow   reserve of 0.5 (significant), status post 2.25 x 23-mm XIENCE Alpine   drug-eluting stent postdilated with 2.5-mm noncompliant balloon. 3. Patent stent in proximal to mid 1st obtuse marginal branch. CATH: 01/14/2016  1. A 90% large first obtuse marginal branch status post 2.25 x 16 mm Promus  Premier drug-eluting stent. 2. An 80% diffuse mid and 60% distal left anterior descending. 3. An 80% proximal medium caliber first diagonal branch. 4. Normal left ventricular systolic function with an ejection fraction of  55%. 5. Elevated left ventricular end diastolic pressure 19 mmHg. 2/19/21-Dr. Osito Barahona  PREOPERATIVE DIAGNOSES:  1.  Status post balloon angioplasty of the left circumflex on this  admission. 2.  Non-STEMI. 3.  Status post multiple PCI with stents in LAD and also left  circumflex. 4.  Hypertension. 5.  Hyperlipidemia. 6.  Obesity. OPERATIONS PERFORMED:  1. Coronary artery bypass grafting x2.  2.  Total cardiopulmonary bypass. 3.  Endoscopic vein harvesting of right lower extremity. 4.  Reverse saphenous vein grafting to obtuse marginal artery. 5.  Left internal mammary artery to left anterior descending artery. 6.  Left atrial appendage clip, 40.  7.  Bilateral intercostal nerve block. 8.  Independent interpretation of transesophageal echocardiogram.    Assessment and Plan   Harleyvalentino Coley is a 61 y.o. male who presents today for the following problems:      1. CAD   - 2/19/2021. Status post CABG (LIMA to LAD, SVG to OM)   - 12/15/2020 POBA/Laser to OM1 (40% residual) case aborted due to intollerance    -9/21/2020 Laser/cutter and PCI to mid LAD   - 9/2017 Rotablade and PCI to [address]   - 8/2017 PCI to m/d LAD, diag 1  2. HTN: Slightly high on office visits but controlled by home log  3. HLD: controlled  4. HX of likely COVID +: Patient has an excellent story for COVID symptomatology but unfortunately testing was too late and was negative  5. EtOH abuse: Has quit! -Previous several bourbon drinks a night  6. Retained surgical suture    MD Plan:  1. States he is feeling amazing following bypass surgery. No chest pain or pressure  Patient states all chest pain has resolved following angioplasty  2. He has a suture that is in the inferior aspect of his surgical wound. I was unable to pull this out and did not feel comfortable. I asked him to speak to Dr. Nikia Norton to have this removed especially if he is going down to [place] in about a month, prior to sitting in the ocean or pools or hot tubs. -Watch carefully for any signs or symptoms of infection and call immediately if occur  3. Continue alcohol cessation  4. Cont ASA for life, Plavix for 1 yr   - ok to hold Plavix as needed for procedures. 5. Continue Lipitor, Lopressor  6.  He declines cardiac rehab           Patient Active Problem List   Diagnosis    Type II or unspecified type diabetes mellitus without mention of complication, not stated as uncontrolled    Diabetes mellitus    History of ETOH abuse    Patient overweight    Patient overweight    Dietary noncompliance    Alcohol abuse    Flank pain    Prostatism    Low serum testosterone level    Arm pain, left    Shoulder pain    Precordial pain    Lung nodule    Acute angina (HCC)    Abnormal stress test    Acute coronary syndrome ([address]    Coronary artery disease involving native coronary artery of native heart with unstable angina pectoris (HCC)    Other chest pain    S/P drug eluting coronary stent placement    Essential hypertension    Obesity    Abnormal chest x-ray    Angina effort    Skin lesion    Angina at rest [facility])    Hyperlipidemia    Angina pectoris ([address]    NSTEMI (non-ST elevated myocardial infarction) ([address]    Controlled type 2 diabetes mellitus without complication, without long-term current use of insulin (HCC)    ASHD (arteriosclerotic heart disease)    Mass of right foot    Overweight    History of angina pectoris    Reactive airway disease    Bronchitis    Reactive airway disease with wheezing, moderate persistent, with acute exacerbation    Need for prophylactic vaccination and inoculation against varicella    Need for prophylactic vaccination against diphtheria-tetanus-pertussis (DTP)    Chronic right-sided thoracic back pain    CAD in native artery    S/P PTCA (percutaneous transluminal coronary angioplasty)    Chest pain    Ischemic cardiomyopathy    GURDEEP (obstructive sleep apnea)       Patient Plan:  1. Takes the body about 6 months to totally heal  2. Keep site clean with soap and water. Give Dr. Florence Soler a call and get in to see him. 3. Continue current course. Continue clopidogrel for a year following procedure. It's okay to hold for a week for procedure. 4. Follow up 3 months    This note was scribed in the presence of Grace Swan MD by Keron Fowler RN.    Bharat To MD, personally performed the services described in this documentation as scribed by the above signed scribe in my presence, and it is both accurate and complete to the best of our ability and knowledge. I agree with the details independently gathered by my clinical support staff, while the remaining scribed note accurately describes my personal service to the patient. The above RN is working as a scribe for and in the presence of myself . Working as a scribe, the RN may have prepopulated components of this note with my historical intellectual property under my direct supervision. Any additions to this intellectual property were performed at my direction. Furthermore, the content and accuracy of this note have been reviewed by me to the best of my ability.        Grace Swan MD, [facility] Cardiologist  [address]  [phone] [facility]  [phone] [address] [place] [address]  4/15/2021  10:26 AM

## 2021-04-15 ENCOUNTER — OFFICE VISIT (OUTPATIENT)
Dept: CARDIOLOGY CLINIC | Age: 61
End: 2021-04-15
Payer: COMMERCIAL

## 2021-04-15 VITALS
WEIGHT: 237.6 LBS | HEART RATE: 79 BPM | HEIGHT: 70 IN | SYSTOLIC BLOOD PRESSURE: 145 MMHG | OXYGEN SATURATION: 98 % | DIASTOLIC BLOOD PRESSURE: 84 MMHG | BODY MASS INDEX: 34.01 KG/M2

## 2021-04-15 DIAGNOSIS — I25.10 CORONARY ARTERY DISEASE INVOLVING NATIVE CORONARY ARTERY OF NATIVE HEART WITHOUT ANGINA PECTORIS: Primary | ICD-10-CM

## 2021-04-15 DIAGNOSIS — I10 ESSENTIAL HYPERTENSION: ICD-10-CM

## 2021-04-15 DIAGNOSIS — F10.10 ETOH ABUSE: ICD-10-CM

## 2021-04-15 DIAGNOSIS — E78.2 MIXED HYPERLIPIDEMIA: ICD-10-CM

## 2021-04-15 PROCEDURE — 99214 OFFICE O/P EST MOD 30 MIN: CPT | Performed by: INTERNAL MEDICINE

## 2021-04-15 NOTE — LETTER
[address] [facility]   Cardiovascular Evaluation    PATIENT: Tierney Caro  DATE: 4/15/2021  MRN: [id number]  CSN: [id number]  : 1960      Primary Care Doctor: Mihir Gallardo DO  Reason for evaluation:   Follow-up, Coronary Artery Disease, Hyperlipidemia, Cardiomyopathy, and Hypertension      Subjective:   History of present illness on initial date of evaluation:   Tierney Caro is a 61 y.o. patient who initially presented for follow up. He has a PMH DM, HTN, CAD, s/p PCI, alcohol abuse, nonsmoker, + FH. Was admitted to hospital in 2016 with [place] and had abnormal stress test. Underwent PCI of OM as well as [id number] LAD and was d/c'd/ Returned to hospital and noted to have mild troponin elevation and CP relieved with NTG SL. He underwent LHC on 16 and a PCI-D-1 with SAMUEL was done. Stents in prox-mid LAD, distal LAD, proximal-mid OM-1 were found patent. He was switched from Plavix to Brilinta. Imdur was added at office visit in 2016 due to complaints of exertional chest pain. He was admitted to the hospital in late May 2016 with recurrent chest pain and transient troponin elevation. LHC was showed patent stents. He was started on Ranexa. He underwent 17 rotablade guided PCI of OM1 recurrent instent restenosis. Last OV 20 he presented for follow up. He had an elective LHC on 2020 that demonstrated restenosis of mid LAD. Treated successfully with laser atherectomy and aggressive pre dilatation using cutting balloons. He has not noticed any improvement with the Renexa. He stated he continues to have chest pain. He stated he has been taking 2 SL nitro's daily and this does help. He stated the pain will occur in the AM and when he takes a SL nitro the pain stops. He continued to drink a good amount of bourbon nightly. He was positive for COVID in October. Last OV 21 he presented for follow up s/p LHC. On 12/15/20 LHC performed.  Severe and complication, without long-term current use of insulin ([facility] [address]    ASHD (arteriosclerotic heart disease)    Mass of right foot    Overweight    History of angina pectoris    Reactive airway disease    Bronchitis    Reactive airway disease with wheezing, moderate persistent, with acute exacerbation    Need for prophylactic vaccination and inoculation against varicella    Need for prophylactic vaccination against diphtheria-tetanus-pertussis (DTP)    Chronic right-sided thoracic back pain    CAD in native artery    S/P PTCA (percutaneous transluminal coronary angioplasty)    Chest pain    Ischemic cardiomyopathy    GURDEEP (obstructive sleep apnea)         Past Medical History:   has a past medical history of Alcohol abuse, Coronary artery disease, Diabetic neuropathy associated with type 2 diabetes mellitus ([facility] [address] Hyperlipidemia, Hypertension, Non morbid obesity, GURDEEP (obstructive sleep apnea), Reactive airway disease with wheezing, moderate persistent, with acute exacerbation, and Type II or unspecified type diabetes mellitus without mention of complication, not stated as uncontrolled. Surgical History:   has a past surgical history that includes Vein Surgery; Coronary angioplasty with stent (1/14/2016 9/21/2020); Coronary artery bypass graft (02/19/2021); Coronary artery bypass graft (N/A, 2/19/2021); and Bypass Graft (02/19/2021). Social History:   reports that he has never smoked. He has never used smokeless tobacco. He reports previous alcohol use of about 1.0 standard drinks of alcohol per week. He reports that he does not use drugs. Family History:  No evidence for sudden cardiac death or premature CAD    Home Medications:  Reviewed and are listed in nursing record.  and/or listed below  Current Outpatient Medications   Medication Sig Dispense Refill    clopidogrel (PLAVIX) 75 MG tablet TAKE ONE TABLET BY MOUTH DAILY 90 tablet 3    metFORMIN (GLUCOPHAGE) 1000 MG tablet TAKE ONE TABLET BY turgor normal, no rashes or lesions   Pysch: Normal mood and affect   Neurologic: Normal gross motor and sensory exam.         LABS   CBC:      Lab Results   Component Value Date    WBC 10.5 2021    RBC 2.86 2021    HGB 8.7 2021    HCT 25.8 2021    MCV 90.3 2021    RDW 13.4 2021     2021     CMP:  Lab Results   Component Value Date     2021    K 4.2 2021    K 4.4 2021    CL 95 2021    CO2 27 2021    BUN 15 2021    CREATININE 0.6 2021    GFRAA >60 2021    AGRATIO 1.8 2021    LABGLOM >60 2021    GLUCOSE 247 2021    PROT 6.1 2021    CALCIUM 8.3 2021    BILITOT 0.8 2021    ALKPHOS 79 2021    AST 24 2021    ALT 59 2021     PT/INR:   No results found for: PTINR  Liver:  No components found for: CHLPL  Lab Results   Component Value Date    ALT 59 (H) 2021    AST 24 2021    ALKPHOS 79 2021    BILITOT 0.8 2021     Lab Results   Component Value Date    LABA1C 8.4 2021     Lipids:         Lab Results   Component Value Date    TRIG 97 2021    TRIG 96 2021    TRIG 80 12/15/2020            Lab Results   Component Value Date    HDL 38 (L) 2021    HDL 44 2021    HDL 76 (H) 12/15/2020            Lab Results   Component Value Date    LDLCALC 47 2021    LDLCALC 43 2021    LDLCALC 71 12/15/2020            Lab Results   Component Value Date    LABVLDL 19 2021    LABVLDL 19 2021    LABVLDL 16 12/15/2020         CARDIAC DATA   EK/15/2020 NSR    ECHO 3/3/2020   Technically difficult examination due to body habitus. Definity® used for   myocardial border enhancement. Normal left ventricle size and systolic function with a visually estimated   ejection fraction of 55%. Mild concentric left ventricular hypertrophy. No regional wall motion abnormalities are seen.    Grade I diastolic dysfunction with normal LV filling pressures. The right atrium is mildly enlarged. STRESS TEST 12/2/2020  Summary Normal LVEF 59%  Normal wall motion  Diaphragmatic attenuation artifact  Apical scar with small amount of inferoapical gifty-infarct ischemia   Overall, this would be considered an abnormal, intermediate to high risk,  study     STRESS TEST: 1/13/2016   1. Inferolateral basilar reversible stress defect compatible with stress-induced ischemia. 2. Ejection fraction of 56%   3. No focal wall motion abnormality. CARDIAC CATH  LEFT HEART CATH: 12/15/20  Artery Findings/Result   LM  luminal regularities   LAD  patent stent in proximal LAD with no significant restenosis. ,  GUERLINE-3 flow  Diagonal 1. Patent stent with normal significant restenosis   Cx  luminal irregularities  OM1-proximal 70%. Proximal stent with 50% restenosis. Distal stent edge with 90% restenosis  OM2proximal 40%         RCA  dominant. Mid 30%   LVEDP  16   LVG  55%. Normal wall motion  No aortic stenosis, 1+ mitral regurgitation      Results of intervention      Lesion 1: OM1  Multiple noncompliant balloons used up to 2.75 mm. Angio sculpt also used. In addition to several runs of laser atherectomy. Pre:   90% stenosis, GUERLINE 2 flow  Post: 40% stenosis, GUERLINE 3 flow  Assessment/Plan  1. Severe and progressive in-stent restenosis of OM1. Heavily fibrotic. Multiple runs of noncompliant balloon inflations were performed but there was balloon slippage. Angioscope and multiple runs of laser atherectomy was performed. Unfortunately patient was no longer able to tolerate the procedure was severe chest pain and hypertension therefore we aborted the procedure. Residual stenosis was 40% of the distal stent edge but is GUERLINE-3 flow.                -Symptoms return we will consider again laser atherectomy versus rotoblade versus shockwave lithotripsy therapy  2.   Continue aspirin and plavix       LEFT HEART CATH 9/21/2020  PCI of mid LAD  Status post predilation using laser arthrectomy, angioscope and NC balloon dilatation. Stent resolute Lyman 3.5 mm x 15 mm, postdilated to 3.6 mm. Assessment  1. Interval restenosis of mid LAD in-stent 80% lesion, GUERLINE II flow. Treated successfully with laser atherectomy and aggressive predilatation using cutting balloons                           -0% residual, GUERLINE-3 flow. CATH: 9/6/17  Successful PCI of OM-1 recurrent in-stent restenosis with Rotablator-assisted PCI with placement of Xience Alpine drug-eluting stent. Cath 7/11/17  Intervention:  90% prox OM1 in-stent restenosis, s/p PTCA with 2.5-mm NC as well as 2.5-mm Flexitome cutting balloon     Cath 8/18/17  LEFT HEART CATH  PCI of mid OM1 90% restenosis  Ballooned with 2.25 x as NC trek up to 18atm  Angiosculpt 2.5 x 15mm up to 14atm- multiple passes  90% GUERLINE-2 flow--> 15% GUERLINE-3 flow  Assessment  1. Restenosis of the OM1 stent following recent POBA  2. Aggressive cutting balloon and NC balloon but still with residual stenosis and concerned about ability to completely deploy stent. If reoccurs or Cp returns, would suggest laser arthrectomy and PCI. CATH: 01/21/2016  1. A 70% proximal medium-caliber first diagonal branch status post 2.25 x  12-mm Xience Alpine drug-eluting stent. 2.  Patent drug-eluting stent in proximal-mid left anterior descending. 3.  Patent drug-eluting stent in the distal left anterior descending. 4.  Patent drug-eluting stent in the [id number]-[id number]-kzl first obtuse marginal  branch. RECOMMENDATIONS:  Aspirin indefinitely. Brilinta at least for 12 months. Aggressive medical therapy and risk factors modification for coronary disease. CATH: 01/15/2016  IMPRESSION:   1. An 80% diffuse mid left anterior descending with a fractional flow  reserve of 0.79 (significant), status post 2.75 x 28-mm XIENCE  Alpine drug-eluting stent postdilated with a 3.5-mm noncompliant balloon.    2. A 60% distal left anterior descending lesion with a fractional flow   reserve of 0.5 (significant), status post 2.25 x 23-mm XIENCE Alpine   drug-eluting stent postdilated with 2.5-mm noncompliant balloon. 3. Patent stent in proximal to mid 1st obtuse marginal branch. CATH: 01/14/2016  1. A 90% large first obtuse marginal branch status post 2.25 x 16 mm Promus  Premier drug-eluting stent. 2. An 80% diffuse mid and 60% distal left anterior descending. 3. An 80% proximal medium caliber first diagonal branch. 4. Normal left ventricular systolic function with an ejection fraction of  55%. 5. Elevated left ventricular end diastolic pressure 19 mmHg. 2/19/21-Dr. Francois Grewal  PREOPERATIVE DIAGNOSES:  1.  Status post balloon angioplasty of the left circumflex on this  admission. 2.  Non-STEMI. 3.  Status post multiple PCI with stents in LAD and also left  circumflex. 4.  Hypertension. 5.  Hyperlipidemia. 6.  Obesity. OPERATIONS PERFORMED:  1. Coronary artery bypass grafting x2.  2.  Total cardiopulmonary bypass. 3.  Endoscopic vein harvesting of right lower extremity. 4.  Reverse saphenous vein grafting to obtuse marginal artery. 5.  Left internal mammary artery to left anterior descending artery. 6.  Left atrial appendage clip, 40.  7.  Bilateral intercostal nerve block. 8.  Independent interpretation of transesophageal echocardiogram.    Assessment and Plan   Sarah Michaels is a 61 y.o. male who presents today for the following problems:      1. CAD   - 2/19/2021. Status post CABG (LIMA to LAD, SVG to OM)   - 12/15/2020 POBA/Laser to OM1 (40% residual) case aborted due to intollerance    -9/21/2020 Laser/cutter and PCI to mid LAD   - 9/2017 Rotablade and PCI to [address]   - 8/2017 PCI to m/d LAD, diag 1  2. HTN: Slightly high on office visits but controlled by home log  3. HLD: controlled  4. HX of likely COVID +: Patient has an excellent story for COVID symptomatology but unfortunately testing was too late and was negative  5. EtOH abuse:  Has quit!    -Previous several bourbon drinks a night  6. Retained surgical suture    MD Plan:  1. States he is feeling amazing following bypass surgery. No chest pain or pressure  Patient states all chest pain has resolved following angioplasty  2. He has a suture that is in the inferior aspect of his surgical wound. I was unable to pull this out and did not feel comfortable. I asked him to speak to Dr. Jas Frias to have this removed especially if he is going down to [place] in about a month, prior to sitting in the ocean or pools or hot tubs. -Watch carefully for any signs or symptoms of infection and call immediately if occur  3. Continue alcohol cessation  4. Cont ASA for life, Plavix for 1 yr   - ok to hold Plavix as needed for procedures. 5. Continue Lipitor, Lopressor  6.  He declines cardiac rehab           Patient Active Problem List   Diagnosis    Type II or unspecified type diabetes mellitus without mention of complication, not stated as uncontrolled    Diabetes mellitus    History of ETOH abuse    Patient overweight    Patient overweight    Dietary noncompliance    Alcohol abuse    Flank pain    Prostatism    Low serum testosterone level    Arm pain, left    Shoulder pain    Precordial pain    Lung nodule    Acute angina (HCC)    Abnormal stress test    Acute coronary syndrome ([address]    Coronary artery disease involving native coronary artery of native heart with unstable angina pectoris (HCC)    Other chest pain    S/P drug eluting coronary stent placement    Essential hypertension    Obesity    Abnormal chest x-ray    Angina effort    Skin lesion    Angina at rest [facility])    Hyperlipidemia    Angina pectoris ([address]    NSTEMI (non-ST elevated myocardial infarction) ([address]    Controlled type 2 diabetes mellitus without complication, without long-term current use of insulin (HCC)    ASHD (arteriosclerotic heart disease)    Mass of right foot    Overweight    History of angina pectoris    Reactive airway disease    Bronchitis    Reactive airway disease with wheezing, moderate persistent, with acute exacerbation    Need for prophylactic vaccination and inoculation against varicella    Need for prophylactic vaccination against diphtheria-tetanus-pertussis (DTP)    Chronic right-sided thoracic back pain    CAD in native artery    S/P PTCA (percutaneous transluminal coronary angioplasty)    Chest pain    Ischemic cardiomyopathy    GURDEEP (obstructive sleep apnea)       Patient Plan:  1. Takes the body about 6 months to totally heal  2. Keep site clean with soap and water. Give Dr. Nicholas Regalado a call and get in to see him. 3. Continue current course. Continue clopidogrel for a year following procedure. It's okay to hold for a week for procedure. 4. Follow up 3 months    This note was scribed in the presence of Anthony Pate MD by Arielle Spence, ANGELITO.    Bradley Askew MD, personally performed the services described in this documentation as scribed by the above signed scribe in my presence, and it is both accurate and complete to the best of our ability and knowledge. I agree with the details independently gathered by my clinical support staff, while the remaining scribed note accurately describes my personal service to the patient. The above RN is working as a scribe for and in the presence of myself . Working as a scribe, the RN may have prepopulated components of this note with my historical intellectual property under my direct supervision. Any additions to this intellectual property were performed at my direction. Furthermore, the content and accuracy of this note have been reviewed by me to the best of my ability.        Anthony Pate MD, [address] [facility] Cardiologist  [facility]  [phone] [facility]  [phone] [address] [place] [address]  4/15/2021  10:26 AM

## 2021-04-15 NOTE — PATIENT INSTRUCTIONS
1. Takes the body about 6 months to totally heal  2. Keep site clean with soap and water. Give Dr. Saundra Kelly a call and get in to see him. 3. Continue current course. Continue clopidogrel for a year following procedure. It's okay to hold for a week for procedure.    4. Follow up 3 months

## 2021-04-19 DIAGNOSIS — J45.40 MODERATE PERSISTENT REACTIVE AIRWAY DISEASE WITHOUT COMPLICATION: ICD-10-CM

## 2021-04-19 DIAGNOSIS — Z95.1 STATUS POST AORTO-CORONARY ARTERY BYPASS GRAFT: ICD-10-CM

## 2021-04-19 DIAGNOSIS — I25.10 ASHD (ARTERIOSCLEROTIC HEART DISEASE): ICD-10-CM

## 2021-04-19 LAB
A/G RATIO: 2 (ref 1.1–2.2)
ALBUMIN SERPL-MCNC: 4.6 G/DL (ref 3.4–5)
ALP BLD-CCNC: 99 U/L (ref 40–129)
ALT SERPL-CCNC: 13 U/L (ref 10–40)
ANION GAP SERPL CALCULATED.3IONS-SCNC: 12 MMOL/L (ref 3–16)
AST SERPL-CCNC: 15 U/L (ref 15–37)
BILIRUB SERPL-MCNC: 0.3 MG/DL (ref 0–1)
BILIRUBIN DIRECT: <0.2 MG/DL (ref 0–0.3)
BILIRUBIN, INDIRECT: NORMAL MG/DL (ref 0–1)
BUN BLDV-MCNC: 8 MG/DL (ref 7–20)
CALCIUM SERPL-MCNC: 9.5 MG/DL (ref 8.3–10.6)
CHLORIDE BLD-SCNC: 105 MMOL/L (ref 99–110)
CHOLESTEROL, TOTAL: 133 MG/DL (ref 0–199)
CO2: 26 MMOL/L (ref 21–32)
CREAT SERPL-MCNC: 0.6 MG/DL (ref 0.8–1.3)
GFR AFRICAN AMERICAN: >60
GFR NON-AFRICAN AMERICAN: >60
GLOBULIN: 2.3 G/DL
GLUCOSE BLD-MCNC: 146 MG/DL (ref 70–99)
HDLC SERPL-MCNC: 53 MG/DL (ref 40–60)
LDL CHOLESTEROL CALCULATED: 61 MG/DL
POTASSIUM SERPL-SCNC: 4.5 MMOL/L (ref 3.5–5.1)
PROSTATE SPECIFIC ANTIGEN: 0.98 NG/ML (ref 0–4)
SODIUM BLD-SCNC: 143 MMOL/L (ref 136–145)
TOTAL PROTEIN: 6.9 G/DL (ref 6.4–8.2)
TRIGL SERPL-MCNC: 97 MG/DL (ref 0–150)
TSH SERPL DL<=0.05 MIU/L-ACNC: 1.23 UIU/ML (ref 0.27–4.2)
VLDLC SERPL CALC-MCNC: 19 MG/DL

## 2021-04-20 ENCOUNTER — OFFICE VISIT (OUTPATIENT)
Dept: CARDIOTHORACIC SURGERY | Age: 61
End: 2021-04-20

## 2021-04-20 VITALS
BODY MASS INDEX: 34.07 KG/M2 | HEIGHT: 70 IN | HEART RATE: 77 BPM | WEIGHT: 238 LBS | TEMPERATURE: 98.1 F | OXYGEN SATURATION: 98 % | SYSTOLIC BLOOD PRESSURE: 118 MMHG | DIASTOLIC BLOOD PRESSURE: 70 MMHG

## 2021-04-20 DIAGNOSIS — Z48.89 ENCOUNTER FOR POST SURGICAL WOUND CHECK: Primary | ICD-10-CM

## 2021-04-20 PROCEDURE — 99024 POSTOP FOLLOW-UP VISIT: CPT | Performed by: THORACIC SURGERY (CARDIOTHORACIC VASCULAR SURGERY)

## 2021-04-20 NOTE — PROGRESS NOTES
Cardiac, Vascular and Thoracic Surgeons  Clinic Note     4/20/2021 9:27 AM  Surgeon:  Al Yeboah     S/P :  CABG x2 & ALEX clip on 2/19/21    Chief complaint : Possible stitch protruding from MSI   Subjective:  Mr. Jada Corley is here today to have midsternal incision evaluated. Patient noticed a stitch protruding from skin. Cardiologist recommended that patient be seen by our office. No drainage or purulence. No pain or discomfort. Afebrile. Vital Signs: /70 (Site: Left Upper Arm, Position: Sitting, Cuff Size: Medium Adult)   Pulse 77   Temp 98.1 °F (36.7 °C) (Temporal)   Ht 5' 10\" (1.778 m)   Wt 238 lb (108 kg)   SpO2 98%   BMI 34.15 kg/m²      I/O:  No intake or output data in the 24 hours ending 04/20/21 3456    Exam: No acute distress  Cardiovascular: S1-S2 is present  Pulmonary: Good air entry bilaterally    Lower extremity: No swelling  Incision: Incisions well-healed, intact, with no signs of infection    Chest X-Ray:   Not applicable    Labs:   CBC: No results for input(s): WBC, HGB, HCT, MCV, PLT in the last 72 hours. BMP:   Recent Labs     04/19/21  0856      K 4.5      CO2 26   BUN 8   CREATININE 0.6*     PT/INR: No results for input(s): PROTIME, INR in the last 72 hours. APTT: No results for input(s): APTT in the last 72 hours.     Scheduled Meds:     Patient Active Problem List   Diagnosis    Type II or unspecified type diabetes mellitus without mention of complication, not stated as uncontrolled    Diabetes mellitus    History of ETOH abuse    Patient overweight    Patient overweight    Dietary noncompliance    Alcohol abuse    Flank pain    Prostatism    Low serum testosterone level    Arm pain, left    Shoulder pain    Precordial pain    Lung nodule    Acute angina (HCC)    Abnormal stress test    Acute coronary syndrome ([address]    Coronary artery disease involving native coronary artery of native heart with unstable angina pectoris ([address]    Other chest pain  S/P drug eluting coronary stent placement    Essential hypertension    Obesity    Abnormal chest x-ray    Angina effort    Skin lesion    Angina at rest [facility])    Hyperlipidemia    Angina pectoris (HCC)    NSTEMI (non-ST elevated myocardial infarction) ([facility] [address]    Controlled type 2 diabetes mellitus without complication, without long-term current use of insulin (HCC)    ASHD (arteriosclerotic heart disease)    Mass of right foot    Overweight    History of angina pectoris    Reactive airway disease    Bronchitis    Reactive airway disease with wheezing, moderate persistent, with acute exacerbation    Need for prophylactic vaccination and inoculation against varicella    Need for prophylactic vaccination against diphtheria-tetanus-pertussis (DTP)    Chronic right-sided thoracic back pain    CAD in native artery    S/P PTCA (percutaneous transluminal coronary angioplasty)    Chest pain    Ischemic cardiomyopathy    GURDEEP (obstructive sleep apnea)       Assessment/Plan:   1. This is Mr. Tisha Castanon well-known to me underwent a CABG x2 left atrial appendage clip in February. Patient denies any cardiovascular or pulmonary complaints. Patient has returned to his normal lifestyle feels great. Upon a recent visit with Dr. Lizette Guallpa it was noticed in the inferior portion of his wound a very small area approximately 1mm of opening where a subcuticular suture was found. He is here today for a wound check and removal of suture. The suture was removed easily here in clinic and local wound care applied. No clinical signs of infection He thanked me for my care. 2. He will follow-up with Dr. Lizette Guallpa henceforth from a cardiology standpoint.     Fritz Banegas MD   Cardiothoracic surgery

## 2021-04-21 ENCOUNTER — APPOINTMENT (OUTPATIENT)
Dept: CT IMAGING | Age: 61
End: 2021-04-21
Payer: COMMERCIAL

## 2021-04-21 ENCOUNTER — HOSPITAL ENCOUNTER (EMERGENCY)
Age: 61
Discharge: HOME | End: 2021-04-21
Attending: EMERGENCY MEDICINE
Payer: COMMERCIAL

## 2021-04-21 ENCOUNTER — TELEPHONE (OUTPATIENT)
Dept: PULMONOLOGY | Age: 61
End: 2021-04-21

## 2021-04-21 ENCOUNTER — APPOINTMENT (OUTPATIENT)
Dept: GENERAL RADIOLOGY | Age: 61
End: 2021-04-21
Payer: COMMERCIAL

## 2021-04-21 ENCOUNTER — TELEPHONE (OUTPATIENT)
Dept: CARDIOLOGY CLINIC | Age: 61
End: 2021-04-21

## 2021-04-21 VITALS
BODY MASS INDEX: 32.93 KG/M2 | SYSTOLIC BLOOD PRESSURE: 142 MMHG | TEMPERATURE: 99.8 F | OXYGEN SATURATION: 94 % | RESPIRATION RATE: 18 BRPM | HEART RATE: 99 BPM | WEIGHT: 230 LBS | DIASTOLIC BLOOD PRESSURE: 79 MMHG | HEIGHT: 70 IN

## 2021-04-21 DIAGNOSIS — R07.89 CHEST WALL PAIN: Primary | ICD-10-CM

## 2021-04-21 LAB
A/G RATIO: 1.4 (ref 1.1–2.2)
ALBUMIN SERPL-MCNC: 4.3 G/DL (ref 3.4–5)
ALP BLD-CCNC: 109 U/L (ref 40–129)
ALT SERPL-CCNC: 12 U/L (ref 10–40)
ANION GAP SERPL CALCULATED.3IONS-SCNC: 10 MMOL/L (ref 3–16)
AST SERPL-CCNC: 13 U/L (ref 15–37)
BASOPHILS ABSOLUTE: 0 K/UL (ref 0–0.2)
BASOPHILS RELATIVE PERCENT: 0.3 %
BILIRUB SERPL-MCNC: 0.6 MG/DL (ref 0–1)
BILIRUBIN URINE: NEGATIVE
BLOOD, URINE: NEGATIVE
BUN BLDV-MCNC: 7 MG/DL (ref 7–20)
CALCIUM SERPL-MCNC: 9.4 MG/DL (ref 8.3–10.6)
CHLORIDE BLD-SCNC: 97 MMOL/L (ref 99–110)
CLARITY: CLEAR
CO2: 27 MMOL/L (ref 21–32)
COLOR: YELLOW
CREAT SERPL-MCNC: <0.5 MG/DL (ref 0.8–1.3)
EKG ATRIAL RATE: 93 BPM
EKG DIAGNOSIS: NORMAL
EKG P AXIS: 59 DEGREES
EKG P-R INTERVAL: 168 MS
EKG Q-T INTERVAL: 334 MS
EKG QRS DURATION: 82 MS
EKG QTC CALCULATION (BAZETT): 415 MS
EKG R AXIS: 12 DEGREES
EKG T AXIS: 146 DEGREES
EKG VENTRICULAR RATE: 93 BPM
EOSINOPHILS ABSOLUTE: 0 K/UL (ref 0–0.6)
EOSINOPHILS RELATIVE PERCENT: 0.1 %
GFR AFRICAN AMERICAN: >60
GFR NON-AFRICAN AMERICAN: >60
GLOBULIN: 3 G/DL
GLUCOSE BLD-MCNC: 251 MG/DL (ref 70–99)
GLUCOSE URINE: 500 MG/DL
HCT VFR BLD CALC: 39.6 % (ref 40.5–52.5)
HEMOGLOBIN: 12.7 G/DL (ref 13.5–17.5)
KETONES, URINE: 15 MG/DL
LEUKOCYTE ESTERASE, URINE: NEGATIVE
LYMPHOCYTES ABSOLUTE: 1.4 K/UL (ref 1–5.1)
LYMPHOCYTES RELATIVE PERCENT: 10.6 %
MCH RBC QN AUTO: 28.8 PG (ref 26–34)
MCHC RBC AUTO-ENTMCNC: 32.2 G/DL (ref 31–36)
MCV RBC AUTO: 89.4 FL (ref 80–100)
MICROSCOPIC EXAMINATION: ABNORMAL
MONOCYTES ABSOLUTE: 0.9 K/UL (ref 0–1.3)
MONOCYTES RELATIVE PERCENT: 6.4 %
NEUTROPHILS ABSOLUTE: 11 K/UL (ref 1.7–7.7)
NEUTROPHILS RELATIVE PERCENT: 82.6 %
NITRITE, URINE: NEGATIVE
PDW BLD-RTO: 14.9 % (ref 12.4–15.4)
PH UA: 6 (ref 5–8)
PLATELET # BLD: 262 K/UL (ref 135–450)
PMV BLD AUTO: 8.4 FL (ref 5–10.5)
POTASSIUM REFLEX MAGNESIUM: 4.4 MMOL/L (ref 3.5–5.1)
PROTEIN UA: NEGATIVE MG/DL
RBC # BLD: 4.43 M/UL (ref 4.2–5.9)
SEDIMENTATION RATE, ERYTHROCYTE: 28 MM/HR (ref 0–20)
SODIUM BLD-SCNC: 134 MMOL/L (ref 136–145)
SPECIFIC GRAVITY UA: 1.02 (ref 1–1.03)
TOTAL PROTEIN: 7.3 G/DL (ref 6.4–8.2)
TROPONIN: <0.01 NG/ML
TROPONIN: <0.01 NG/ML
URINE REFLEX TO CULTURE: ABNORMAL
URINE TYPE: ABNORMAL
UROBILINOGEN, URINE: 1 E.U./DL
WBC # BLD: 13.4 K/UL (ref 4–11)

## 2021-04-21 PROCEDURE — 93010 ELECTROCARDIOGRAM REPORT: CPT | Performed by: INTERNAL MEDICINE

## 2021-04-21 PROCEDURE — 6370000000 HC RX 637 (ALT 250 FOR IP): Performed by: EMERGENCY MEDICINE

## 2021-04-21 PROCEDURE — 80053 COMPREHEN METABOLIC PANEL: CPT

## 2021-04-21 PROCEDURE — 71250 CT THORAX DX C-: CPT

## 2021-04-21 PROCEDURE — 6360000002 HC RX W HCPCS: Performed by: EMERGENCY MEDICINE

## 2021-04-21 PROCEDURE — 93005 ELECTROCARDIOGRAM TRACING: CPT

## 2021-04-21 PROCEDURE — 96374 THER/PROPH/DIAG INJ IV PUSH: CPT

## 2021-04-21 PROCEDURE — 71045 X-RAY EXAM CHEST 1 VIEW: CPT

## 2021-04-21 PROCEDURE — 84484 ASSAY OF TROPONIN QUANT: CPT

## 2021-04-21 PROCEDURE — 85025 COMPLETE CBC W/AUTO DIFF WBC: CPT

## 2021-04-21 PROCEDURE — 6360000004 HC RX CONTRAST MEDICATION: Performed by: EMERGENCY MEDICINE

## 2021-04-21 PROCEDURE — 85652 RBC SED RATE AUTOMATED: CPT

## 2021-04-21 PROCEDURE — 71275 CT ANGIOGRAPHY CHEST: CPT

## 2021-04-21 PROCEDURE — 96376 TX/PRO/DX INJ SAME DRUG ADON: CPT

## 2021-04-21 PROCEDURE — 86140 C-REACTIVE PROTEIN: CPT

## 2021-04-21 PROCEDURE — 81003 URINALYSIS AUTO W/O SCOPE: CPT

## 2021-04-21 PROCEDURE — 2580000003 HC RX 258: Performed by: EMERGENCY MEDICINE

## 2021-04-21 PROCEDURE — 99285 EMERGENCY DEPT VISIT HI MDM: CPT

## 2021-04-21 RX ORDER — MORPHINE SULFATE 4 MG/ML
4 INJECTION, SOLUTION INTRAMUSCULAR; INTRAVENOUS ONCE
Status: COMPLETED | OUTPATIENT
Start: 2021-04-21 | End: 2021-04-21

## 2021-04-21 RX ORDER — 0.9 % SODIUM CHLORIDE 0.9 %
500 INTRAVENOUS SOLUTION INTRAVENOUS ONCE
Status: COMPLETED | OUTPATIENT
Start: 2021-04-21 | End: 2021-04-21

## 2021-04-21 RX ORDER — GLIPIZIDE 5 MG/1
10 TABLET ORAL
Status: DISCONTINUED | OUTPATIENT
Start: 2021-04-22 | End: 2021-04-21 | Stop reason: HOSPADM

## 2021-04-21 RX ORDER — ASPIRIN 81 MG/1
81 TABLET, CHEWABLE ORAL ONCE
Status: COMPLETED | OUTPATIENT
Start: 2021-04-21 | End: 2021-04-21

## 2021-04-21 RX ORDER — OXYCODONE HYDROCHLORIDE 5 MG/1
5 TABLET ORAL EVERY 6 HOURS PRN
Qty: 8 TABLET | Refills: 0 | Status: ON HOLD | OUTPATIENT
Start: 2021-04-21 | End: 2021-04-29 | Stop reason: HOSPADM

## 2021-04-21 RX ORDER — SODIUM CHLORIDE 9 MG/ML
INJECTION, SOLUTION INTRAVENOUS
Status: DISCONTINUED
Start: 2021-04-21 | End: 2021-04-21 | Stop reason: HOSPADM

## 2021-04-21 RX ORDER — MORPHINE SULFATE 2 MG/ML
2 INJECTION, SOLUTION INTRAMUSCULAR; INTRAVENOUS ONCE
Status: COMPLETED | OUTPATIENT
Start: 2021-04-21 | End: 2021-04-21

## 2021-04-21 RX ADMIN — MORPHINE SULFATE 4 MG: 4 INJECTION, SOLUTION INTRAMUSCULAR; INTRAVENOUS at 11:20

## 2021-04-21 RX ADMIN — IOPAMIDOL 85 ML: 755 INJECTION, SOLUTION INTRAVENOUS at 13:13

## 2021-04-21 RX ADMIN — METFORMIN HYDROCHLORIDE 500 MG: 500 TABLET ORAL at 15:06

## 2021-04-21 RX ADMIN — MORPHINE SULFATE 2 MG: 2 INJECTION, SOLUTION INTRAMUSCULAR; INTRAVENOUS at 15:06

## 2021-04-21 RX ADMIN — SODIUM CHLORIDE 500 ML: 9 INJECTION, SOLUTION INTRAVENOUS at 16:04

## 2021-04-21 RX ADMIN — METOPROLOL TARTRATE 25 MG: 25 TABLET, FILM COATED ORAL at 15:07

## 2021-04-21 RX ADMIN — ASPIRIN 81 MG: 81 TABLET, CHEWABLE ORAL at 15:06

## 2021-04-21 ASSESSMENT — PAIN DESCRIPTION - FREQUENCY: FREQUENCY: CONTINUOUS

## 2021-04-21 ASSESSMENT — PAIN DESCRIPTION - ONSET
ONSET: ON-GOING
ONSET: GRADUAL

## 2021-04-21 ASSESSMENT — PAIN SCALES - GENERAL
PAINLEVEL_OUTOF10: 8
PAINLEVEL_OUTOF10: 10
PAINLEVEL_OUTOF10: 8

## 2021-04-21 ASSESSMENT — PAIN DESCRIPTION - LOCATION
LOCATION: CHEST

## 2021-04-21 ASSESSMENT — PAIN DESCRIPTION - DESCRIPTORS
DESCRIPTORS: STABBING
DESCRIPTORS: ACHING

## 2021-04-21 ASSESSMENT — PAIN DESCRIPTION - PAIN TYPE: TYPE: ACUTE PAIN

## 2021-04-21 NOTE — ED NOTES
Pt. Has small incision on chest, new dressing applied. Small amount of blood noted. Pt. Incision 2 inches below R. knee. Leg incision is scabbed.      Josie Morgan  04/21/21 [id number]

## 2021-04-21 NOTE — TELEPHONE ENCOUNTER
Patient did not show for 4-6 week f/u appointment  with Dr. Tamayo  on 4/21/21    Same Day Cancellation: Yes. Pt is going to ER per spouse, as pt has chest pain after shoveling snow and had heart surgery 4-6 weeks ago. Patient rescheduled:  No.  Spouse will call back to r/s after ER visit. New appointment:     Patient was also no show on: n/a    VV 2/24/21:    Assessment:  · Moderate persistent asthma with 3 flares in 1st half of 2020. Very steroid responsive. ? Now with acute exacerbation   · Recent hospitalization with CABG X 2 on 2/19/21  ?  Known post procedural PTX     Not addressed today  · Diabetes mellitus   · CAD s/p PCI & CABG      Plan:   · Prednisone taper and doxycycline  · ED with any worsening  · Advair 250/50 BID without exception   · Albuterol neb/MDI PRN; use prior to exposure to cold/dry air  · PA LAT CXR, f/u pneumothorax - I placed order, please schedule for MTO now  · See me in 4-6 weeks

## 2021-04-21 NOTE — TELEPHONE ENCOUNTER
Patient's wife called to inform zunilda that her  is currently in the ER @ [facility].  Patient's wife Bella Escamilla is requesting a return call

## 2021-04-21 NOTE — ED PROVIDER NOTES
CHIEF COMPLAINT  Chest Pain (c/o breast bone pain and movement 8 weeks post op from open heart surgery)      HISTORY OF PRESENT ILLNESS  Elba Bennett is a 61 y.o. male with a history of alcohol abuse; CAD; status post CABG; type 2 diabetes; hypertension; hyperlipidemia; obesity; obstructive sleep apnea; who presents to the ED complaining of right-sided anterior chest wall pain in 1 certain area which hurts him when he moves or change positions or is touched in that area. He said the pain began acutely when he was pushing a broom and suddenly felt a pop in his right chest wall. Directly over the fourth rib in the lateral third of the clavicle. He thought he broke a rib or that the rib had become detached from his sternum. This happened this morning when he was using a broom to sweep up an area. He also failed to take all his morning medications. No other complaints, modifying factors or associated symptoms. I have reviewed the following from the nursing documentation.     Past Medical History:   Diagnosis Date    Alcohol abuse 12/03/2012    Coronary artery disease     Diabetic neuropathy associated with type 2 diabetes mellitus ([facility] [address]     Hyperlipidemia     Hypertension     Non morbid obesity     GURDEEP (obstructive sleep apnea)     Reactive airway disease with wheezing, moderate persistent, with acute exacerbation 03/03/2020    Type II or unspecified type diabetes mellitus without mention of complication, not stated as uncontrolled      Past Surgical History:   Procedure Laterality Date    BYPASS GRAFT  02/19/2021    CORONARY ANGIOPLASTY WITH STENT PLACEMENT  1/14/2016 9/21/2020    1 [employer] SAMUEL 2.25x16    CORONARY ARTERY BYPASS GRAFT  02/19/2021    CABG x2 & ALEX-Dr. Latoya Givens    CORONARY ARTERY BYPASS GRAFT N/A 2/19/2021    CORONARY ARTERY BYPASS GRAFTING X2, INTERNAL MAMMARY ARTERY, SAPHENOUS VEIN GRAFT, ON PUMP WITH LEFT ATRIAL APPENDAGE CLIP, BILATERAL INTERCOSTAL NERVE BLOCK performed by Misael Perez MD at [address]       Family History   Problem Relation Age of Onset    Heart Attack Mother     Heart Disease Mother     Heart Attack Father     Cancer Father     Hearing Loss Father     Diabetes Father     Heart Disease Father     Heart Disease Paternal Uncle         bypass     Social History     Socioeconomic History    Marital status:      Spouse name: Not on file    Number of children: Not on file    Years of education: Not on file    Highest education level: Not on file   Occupational History    Not on file   Social Needs    Financial resource strain: Not on file    Food insecurity     Worry: Not on file     Inability: Not on file    Transportation needs     Medical: Not on file     Non-medical: Not on file   Tobacco Use    Smoking status: Never Smoker    Smokeless tobacco: Never Used   Substance and Sexual Activity    Alcohol use: Not Currently     Alcohol/week: 1.0 standard drinks     Types: 1 Shots of liquor per week     Comment: no alcohol in 2 months.  Drug use: No    Sexual activity: Yes     Partners: Female   Lifestyle    Physical activity     Days per week: Not on file     Minutes per session: Not on file    Stress: Not on file   Relationships    Social connections     Talks on phone: Not on file     Gets together: Not on file     Attends Orthodox service: Not on file     Active member of club or organization: Not on file     Attends meetings of clubs or organizations: Not on file     Relationship status: Not on file    Intimate partner violence     Fear of current or ex partner: Not on file     Emotionally abused: Not on file     Physically abused: Not on file     Forced sexual activity: Not on file   Other Topics Concern    Not on file   Social History Narrative    Not on file     No current facility-administered medications for this encounter.       Current Outpatient Medications   Medication Sig Dispense Refill    oxyCODONE (ROXICODONE) 5 MG immediate release tablet Take 1 tablet by mouth every 6 hours as needed for Pain for up to 8 doses. Intended supply: 3 days. Take lowest dose possible to manage pain 8 tablet 0    clopidogrel (PLAVIX) 75 MG tablet TAKE ONE TABLET BY MOUTH DAILY 90 tablet 3    metFORMIN (GLUCOPHAGE) 1000 MG tablet TAKE ONE TABLET BY MOUTH TWICE A DAY 60 tablet 1    atorvastatin (LIPITOR) 40 MG tablet TAKE ONE TABLET BY MOUTH EVERY EVENING 90 tablet 3    famotidine (PEPCID) 20 MG tablet Take 1 tablet by mouth daily 30 tablet 0    glipiZIDE (GLUCOTROL) 10 MG tablet TAKE ONE TABLET BY MOUTH EVERY MORNING FOR DIABETES 30 tablet 2    metoprolol tartrate (LOPRESSOR) 25 MG tablet TAKE ONE TABLET BY MOUTH TWICE A DAY 60 tablet 5    fluticasone-salmeterol (ADVAIR DISKUS) 250-50 MCG/DOSE AEPB Inhale 1 puff into the lungs every 12 hours 60 each 3    aspirin 81 MG chewable tablet CHEW ONE TABLET BY MOUTH DAILY 30 tablet 1     No Known Allergies    REVIEW OF SYSTEMS  10 systems reviewed, pertinent positives per HPI otherwise noted to be negative. PHYSICAL EXAM  BP (!) 142/79   Pulse 99   Temp 99.8 °F (37.7 °C) (Oral)   Resp 18   Ht 5' 10\" (1.778 m)   Wt 230 lb (104.3 kg)   SpO2 94%   BMI 33.00 kg/m²   GENERAL APPEARANCE: Awake and alert. Cooperative. No acute distress. HEAD: Normocephalic. Atraumatic. EYES: PERRL. EOM's grossly intact. Test of Skew:  ENT: Mucous membranes are moist.   NECK: Supple, trachea midline. HEART: RRR. Normal S1S2, no rubs, gallops, or murmurs noted; tenderness in 1 specific spot about 2 x 2 centimeters on the right side of his fourth rib and the lateral third of the midclavicular line; there is no gross deformity; there is no warmth or redness in the area;   LUNGS: Respirations unlabored. CTAB. Good air exchange. No wheezes, rales, or rhonchi. Speaking comfortably in full sentences. No pleuritic pain  ABDOMEN: Morbidly obese soft. Non-distended. Non-tender.  No guarding or rebound. Normal bowel sounds. EXTREMITIES: No peripheral edema. MAEE. No acute deformities. SKIN: Warm and dry. No acute rashes. NEUROLOGICAL: Alert and oriented X 3. CN II-XII intact. No gross facial drooping. Strength 5/5, sensation intact. Normal coordination. No pronator drift. No truncal instability; Gait normal.   PSYCHIATRIC: Normal mood and affect. LABS  I have reviewed all labs for this visit.    Results for orders placed or performed during the hospital encounter of 04/21/21   CBC Auto Differential   Result Value Ref Range    WBC 13.4 (H) 4.0 - 11.0 K/uL    RBC 4.43 4.20 - 5.90 M/uL    Hemoglobin 12.7 (L) 13.5 - 17.5 g/dL    Hematocrit 39.6 (L) 40.5 - 52.5 %    MCV 89.4 80.0 - 100.0 fL    MCH 28.8 26.0 - 34.0 pg    MCHC 32.2 31.0 - 36.0 g/dL    RDW 14.9 12.4 - 15.4 %    Platelets 143 280 - 879 K/uL    MPV 8.4 5.0 - 10.5 fL    Neutrophils % 82.6 %    Lymphocytes % 10.6 %    Monocytes % 6.4 %    Eosinophils % 0.1 %    Basophils % 0.3 %    Neutrophils Absolute 11.0 (H) 1.7 - 7.7 K/uL    Lymphocytes Absolute 1.4 1.0 - 5.1 K/uL    Monocytes Absolute 0.9 0.0 - 1.3 K/uL    Eosinophils Absolute 0.0 0.0 - 0.6 K/uL    Basophils Absolute 0.0 0.0 - 0.2 K/uL   Comprehensive Metabolic Panel w/ Reflex to MG   Result Value Ref Range    Sodium 134 (L) 136 - 145 mmol/L    Potassium reflex Magnesium 4.4 3.5 - 5.1 mmol/L    Chloride 97 (L) 99 - 110 mmol/L    CO2 27 21 - 32 mmol/L    Anion Gap 10 3 - 16    Glucose 251 (H) 70 - 99 mg/dL    BUN 7 7 - 20 mg/dL    CREATININE <0.5 (L) 0.8 - 1.3 mg/dL    GFR Non-African American >60 >60    GFR African American >60 >60    Calcium 9.4 8.3 - 10.6 mg/dL    Total Protein 7.3 6.4 - 8.2 g/dL    Albumin 4.3 3.4 - 5.0 g/dL    Albumin/Globulin Ratio 1.4 1.1 - 2.2    Total Bilirubin 0.6 0.0 - 1.0 mg/dL    Alkaline Phosphatase 109 40 - 129 U/L    ALT 12 10 - 40 U/L    AST 13 (L) 15 - 37 U/L    Globulin 3.0 g/dL   Troponin   Result Value Ref Range    Troponin <0.01 <0.01 ng/mL   Troponin pulmonary embolic disease. 2. Small left pleural effusion with dependent atelectasis. No acute   pulmonary infiltrate. 3. No unexpected findings following recent median sternotomy and open heart   surgery. CT CHEST WO CONTRAST   Final Result   1. Intact sternotomy, with expected postoperative changes in the mediastinum   and healing chest wall incision   2. Small left pleural effusion         XR CHEST PORTABLE   Final Result   No active cardiopulmonary disease                    Rechecks: Physical assessment performed. Pain improved in the emergency department      ED COURSE/MDM  Patient seen and evaluated. Old records reviewed. Labs and imaging reviewed and results discussed with patient. Patient was given morphine; IV fluid ; some of his home medications in the ED with good symptomatic relief. Patient was reassessed as noted above . Spoke with the patient's cardiologist who think he may have Dressler syndrome and suggested I draw a CRP and sed rate prior to discharge and that he would follow-up closely with the patient rather than admit him. Plan of care discussed with patient and family. Patient and family in agreement with plan. Patient was given scripts for the following medications. I counseled patient how to take these medications. Discharge Medication List as of 4/21/2021  3:40 PM          CLINICAL IMPRESSION  1. Chest wall pain        Blood pressure (!) 142/79, pulse 99, temperature 99.8 °F (37.7 °C), temperature source Oral, resp. rate 18, height 5' 10\" (1.778 m), weight 230 lb (104.3 kg), SpO2 94 %. DISPOSITION  Major Dew was discharged to home in stable condition.        Zachary Sargent MD  04/23/21 3517

## 2021-04-21 NOTE — TELEPHONE ENCOUNTER
I spoke to his wife and the ER doctor. Patient ruled out for MI by him concerned about EKG changes that were seen did ask him to check a sed rate and CRP to evaluate for Dressler syndrome I asked the patient to call me on Friday for results.   Also I will ask CT surgery to evaluate his recent CT of the chest for sternal wire removal

## 2021-04-21 NOTE — ED NOTES
Bed: 16  Expected date:   Expected time:   Means of arrival:   Comments:     Elsi Szymanski RN  04/21/21 [id number]

## 2021-04-22 ENCOUNTER — TELEPHONE (OUTPATIENT)
Dept: CARDIOTHORACIC SURGERY | Age: 61
End: 2021-04-22

## 2021-04-22 LAB — C-REACTIVE PROTEIN: 30 MG/L (ref 0–5.1)

## 2021-04-22 NOTE — TELEPHONE ENCOUNTER
Pt wife called to report that pt was seen in the ER yesterday for evaluation of chest discomfort which developed after repetitive activity of using a broom. He is s/p sternotomy 2/2021. He was released with pain medication but is having breakthrough pain. Recommended that he take ES Tylenol between doses of Oxycodone. Stated this worked for him after his surgery.  He has an appt to see Dr. Naye Magdaleno on Mon 4/26/21 at the request of Dr. Jesus Nelson.  Ines Saint

## 2021-04-23 ENCOUNTER — TELEPHONE (OUTPATIENT)
Dept: CARDIOLOGY CLINIC | Age: 61
End: 2021-04-23

## 2021-04-23 ENCOUNTER — TELEPHONE (OUTPATIENT)
Dept: FAMILY MEDICINE CLINIC | Age: 61
End: 2021-04-23

## 2021-04-23 ENCOUNTER — APPOINTMENT (OUTPATIENT)
Dept: GENERAL RADIOLOGY | Age: 61
DRG: 856 | End: 2021-04-23
Payer: COMMERCIAL

## 2021-04-23 ENCOUNTER — HOSPITAL ENCOUNTER (INPATIENT)
Age: 61
LOS: 6 days | Discharge: HOME HEALTH | DRG: 856 | End: 2021-04-29
Attending: EMERGENCY MEDICINE | Admitting: INTERNAL MEDICINE
Payer: COMMERCIAL

## 2021-04-23 DIAGNOSIS — R70.0 ELEVATED SED RATE: ICD-10-CM

## 2021-04-23 DIAGNOSIS — L03.313 CELLULITIS OF CHEST WALL: ICD-10-CM

## 2021-04-23 DIAGNOSIS — R07.9 CHEST PAIN, UNSPECIFIED TYPE: Primary | ICD-10-CM

## 2021-04-23 DIAGNOSIS — E87.1 HYPONATREMIA: ICD-10-CM

## 2021-04-23 DIAGNOSIS — R73.9 HYPERGLYCEMIA: ICD-10-CM

## 2021-04-23 DIAGNOSIS — R07.89 CHEST WALL PAIN: ICD-10-CM

## 2021-04-23 DIAGNOSIS — L08.9 STERNAL WOUND INFECTION: ICD-10-CM

## 2021-04-23 DIAGNOSIS — S21.101A STERNAL WOUND INFECTION: ICD-10-CM

## 2021-04-23 PROBLEM — L03.90 CELLULITIS: Status: ACTIVE | Noted: 2021-04-23

## 2021-04-23 LAB
A/G RATIO: 1 (ref 1.1–2.2)
ALBUMIN SERPL-MCNC: 3.8 G/DL (ref 3.4–5)
ALP BLD-CCNC: 85 U/L (ref 40–129)
ALT SERPL-CCNC: 11 U/L (ref 10–40)
ANION GAP SERPL CALCULATED.3IONS-SCNC: 13 MMOL/L (ref 3–16)
AST SERPL-CCNC: 30 U/L (ref 15–37)
BASOPHILS ABSOLUTE: 0 K/UL (ref 0–0.2)
BASOPHILS RELATIVE PERCENT: 0.2 %
BILIRUB SERPL-MCNC: 0.8 MG/DL (ref 0–1)
BUN BLDV-MCNC: 10 MG/DL (ref 7–20)
C-REACTIVE PROTEIN: 256.3 MG/L (ref 0–5.1)
CALCIUM SERPL-MCNC: 9.7 MG/DL (ref 8.3–10.6)
CHLORIDE BLD-SCNC: 92 MMOL/L (ref 99–110)
CO2: 22 MMOL/L (ref 21–32)
CREAT SERPL-MCNC: 0.6 MG/DL (ref 0.8–1.3)
EKG ATRIAL RATE: 107 BPM
EKG DIAGNOSIS: NORMAL
EKG P AXIS: 55 DEGREES
EKG P-R INTERVAL: 160 MS
EKG Q-T INTERVAL: 336 MS
EKG QRS DURATION: 88 MS
EKG QTC CALCULATION (BAZETT): 448 MS
EKG R AXIS: 23 DEGREES
EKG T AXIS: 248 DEGREES
EKG VENTRICULAR RATE: 107 BPM
EOSINOPHILS ABSOLUTE: 0 K/UL (ref 0–0.6)
EOSINOPHILS RELATIVE PERCENT: 0 %
GFR AFRICAN AMERICAN: >60
GFR NON-AFRICAN AMERICAN: >60
GLOBULIN: 4 G/DL
GLUCOSE BLD-MCNC: 199 MG/DL (ref 70–99)
GLUCOSE BLD-MCNC: 217 MG/DL (ref 70–99)
GLUCOSE BLD-MCNC: 270 MG/DL (ref 70–99)
HCT VFR BLD CALC: 40.9 % (ref 40.5–52.5)
HEMOGLOBIN: 13.3 G/DL (ref 13.5–17.5)
LACTIC ACID: 1.8 MMOL/L (ref 0.4–2)
LYMPHOCYTES ABSOLUTE: 1.3 K/UL (ref 1–5.1)
LYMPHOCYTES RELATIVE PERCENT: 7.1 %
MCH RBC QN AUTO: 28.7 PG (ref 26–34)
MCHC RBC AUTO-ENTMCNC: 32.5 G/DL (ref 31–36)
MCV RBC AUTO: 88.1 FL (ref 80–100)
MONOCYTES ABSOLUTE: 1 K/UL (ref 0–1.3)
MONOCYTES RELATIVE PERCENT: 5.3 %
NEUTROPHILS ABSOLUTE: 16.4 K/UL (ref 1.7–7.7)
NEUTROPHILS RELATIVE PERCENT: 87.4 %
PDW BLD-RTO: 14.7 % (ref 12.4–15.4)
PERFORMED ON: ABNORMAL
PERFORMED ON: ABNORMAL
PLATELET # BLD: 305 K/UL (ref 135–450)
PMV BLD AUTO: 8.4 FL (ref 5–10.5)
POTASSIUM REFLEX MAGNESIUM: 5 MMOL/L (ref 3.5–5.1)
PRO-BNP: 641 PG/ML (ref 0–124)
RBC # BLD: 4.64 M/UL (ref 4.2–5.9)
SEDIMENTATION RATE, ERYTHROCYTE: 89 MM/HR (ref 0–20)
SODIUM BLD-SCNC: 127 MMOL/L (ref 136–145)
TOTAL PROTEIN: 7.8 G/DL (ref 6.4–8.2)
TROPONIN: <0.01 NG/ML
WBC # BLD: 18.7 K/UL (ref 4–11)

## 2021-04-23 PROCEDURE — 86140 C-REACTIVE PROTEIN: CPT

## 2021-04-23 PROCEDURE — 36415 COLL VENOUS BLD VENIPUNCTURE: CPT

## 2021-04-23 PROCEDURE — 85652 RBC SED RATE AUTOMATED: CPT

## 2021-04-23 PROCEDURE — 87040 BLOOD CULTURE FOR BACTERIA: CPT

## 2021-04-23 PROCEDURE — 87150 DNA/RNA AMPLIFIED PROBE: CPT

## 2021-04-23 PROCEDURE — 96375 TX/PRO/DX INJ NEW DRUG ADDON: CPT

## 2021-04-23 PROCEDURE — 6370000000 HC RX 637 (ALT 250 FOR IP): Performed by: REGISTERED NURSE

## 2021-04-23 PROCEDURE — 6360000002 HC RX W HCPCS: Performed by: PHYSICIAN ASSISTANT

## 2021-04-23 PROCEDURE — 96365 THER/PROPH/DIAG IV INF INIT: CPT

## 2021-04-23 PROCEDURE — 84484 ASSAY OF TROPONIN QUANT: CPT

## 2021-04-23 PROCEDURE — 2580000003 HC RX 258: Performed by: PHYSICIAN ASSISTANT

## 2021-04-23 PROCEDURE — 94640 AIRWAY INHALATION TREATMENT: CPT

## 2021-04-23 PROCEDURE — 6360000002 HC RX W HCPCS: Performed by: REGISTERED NURSE

## 2021-04-23 PROCEDURE — 80053 COMPREHEN METABOLIC PANEL: CPT

## 2021-04-23 PROCEDURE — 87186 SC STD MICRODIL/AGAR DIL: CPT

## 2021-04-23 PROCEDURE — 99284 EMERGENCY DEPT VISIT MOD MDM: CPT

## 2021-04-23 PROCEDURE — 2580000003 HC RX 258: Performed by: REGISTERED NURSE

## 2021-04-23 PROCEDURE — 85025 COMPLETE CBC W/AUTO DIFF WBC: CPT

## 2021-04-23 PROCEDURE — 93010 ELECTROCARDIOGRAM REPORT: CPT | Performed by: INTERNAL MEDICINE

## 2021-04-23 PROCEDURE — 1200000000 HC SEMI PRIVATE

## 2021-04-23 PROCEDURE — 93005 ELECTROCARDIOGRAM TRACING: CPT | Performed by: EMERGENCY MEDICINE

## 2021-04-23 PROCEDURE — 83880 ASSAY OF NATRIURETIC PEPTIDE: CPT

## 2021-04-23 PROCEDURE — 83605 ASSAY OF LACTIC ACID: CPT

## 2021-04-23 PROCEDURE — 71045 X-RAY EXAM CHEST 1 VIEW: CPT

## 2021-04-23 RX ORDER — MORPHINE SULFATE 4 MG/ML
4 INJECTION, SOLUTION INTRAMUSCULAR; INTRAVENOUS ONCE
Status: COMPLETED | OUTPATIENT
Start: 2021-04-23 | End: 2021-04-23

## 2021-04-23 RX ORDER — SENNA AND DOCUSATE SODIUM 50; 8.6 MG/1; MG/1
2 TABLET, FILM COATED ORAL 2 TIMES DAILY
Status: DISCONTINUED | OUTPATIENT
Start: 2021-04-23 | End: 2021-04-29 | Stop reason: HOSPADM

## 2021-04-23 RX ORDER — ONDANSETRON 2 MG/ML
4 INJECTION INTRAMUSCULAR; INTRAVENOUS ONCE
Status: COMPLETED | OUTPATIENT
Start: 2021-04-23 | End: 2021-04-23

## 2021-04-23 RX ORDER — OXYCODONE HYDROCHLORIDE 5 MG/1
5 TABLET ORAL EVERY 6 HOURS PRN
Status: DISCONTINUED | OUTPATIENT
Start: 2021-04-23 | End: 2021-04-25

## 2021-04-23 RX ORDER — DEXTROSE MONOHYDRATE 50 MG/ML
100 INJECTION, SOLUTION INTRAVENOUS PRN
Status: DISCONTINUED | OUTPATIENT
Start: 2021-04-23 | End: 2021-04-29 | Stop reason: HOSPADM

## 2021-04-23 RX ORDER — ONDANSETRON 2 MG/ML
4 INJECTION INTRAMUSCULAR; INTRAVENOUS EVERY 6 HOURS PRN
Status: DISCONTINUED | OUTPATIENT
Start: 2021-04-23 | End: 2021-04-29 | Stop reason: HOSPADM

## 2021-04-23 RX ORDER — NICOTINE POLACRILEX 4 MG
15 LOZENGE BUCCAL PRN
Status: DISCONTINUED | OUTPATIENT
Start: 2021-04-23 | End: 2021-04-29 | Stop reason: HOSPADM

## 2021-04-23 RX ORDER — ASPIRIN 81 MG/1
81 TABLET, CHEWABLE ORAL DAILY
Status: DISCONTINUED | OUTPATIENT
Start: 2021-04-23 | End: 2021-04-29

## 2021-04-23 RX ORDER — MORPHINE SULFATE 2 MG/ML
2 INJECTION, SOLUTION INTRAMUSCULAR; INTRAVENOUS EVERY 4 HOURS PRN
Status: DISCONTINUED | OUTPATIENT
Start: 2021-04-23 | End: 2021-04-29 | Stop reason: HOSPADM

## 2021-04-23 RX ORDER — SODIUM CHLORIDE 0.9 % (FLUSH) 0.9 %
5-40 SYRINGE (ML) INJECTION PRN
Status: DISCONTINUED | OUTPATIENT
Start: 2021-04-23 | End: 2021-04-29 | Stop reason: HOSPADM

## 2021-04-23 RX ORDER — BUDESONIDE AND FORMOTEROL FUMARATE DIHYDRATE 160; 4.5 UG/1; UG/1
2 AEROSOL RESPIRATORY (INHALATION) 2 TIMES DAILY
Status: DISCONTINUED | OUTPATIENT
Start: 2021-04-23 | End: 2021-04-29 | Stop reason: HOSPADM

## 2021-04-23 RX ORDER — SODIUM CHLORIDE 0.9 % (FLUSH) 0.9 %
5-40 SYRINGE (ML) INJECTION EVERY 12 HOURS SCHEDULED
Status: DISCONTINUED | OUTPATIENT
Start: 2021-04-23 | End: 2021-04-29 | Stop reason: HOSPADM

## 2021-04-23 RX ORDER — ATORVASTATIN CALCIUM 40 MG/1
40 TABLET, FILM COATED ORAL EVERY EVENING
Status: DISCONTINUED | OUTPATIENT
Start: 2021-04-23 | End: 2021-04-29 | Stop reason: HOSPADM

## 2021-04-23 RX ORDER — CLOPIDOGREL BISULFATE 75 MG/1
75 TABLET ORAL DAILY
Status: DISCONTINUED | OUTPATIENT
Start: 2021-04-23 | End: 2021-04-25

## 2021-04-23 RX ORDER — ACETAMINOPHEN 325 MG/1
650 TABLET ORAL EVERY 6 HOURS PRN
Status: DISCONTINUED | OUTPATIENT
Start: 2021-04-23 | End: 2021-04-29 | Stop reason: HOSPADM

## 2021-04-23 RX ORDER — DEXTROSE MONOHYDRATE 25 G/50ML
12.5 INJECTION, SOLUTION INTRAVENOUS PRN
Status: DISCONTINUED | OUTPATIENT
Start: 2021-04-23 | End: 2021-04-29 | Stop reason: HOSPADM

## 2021-04-23 RX ORDER — ACETAMINOPHEN 650 MG/1
650 SUPPOSITORY RECTAL EVERY 6 HOURS PRN
Status: DISCONTINUED | OUTPATIENT
Start: 2021-04-23 | End: 2021-04-29 | Stop reason: HOSPADM

## 2021-04-23 RX ORDER — POLYETHYLENE GLYCOL 3350 17 G/17G
17 POWDER, FOR SOLUTION ORAL DAILY PRN
Status: DISCONTINUED | OUTPATIENT
Start: 2021-04-23 | End: 2021-04-29 | Stop reason: HOSPADM

## 2021-04-23 RX ORDER — PROMETHAZINE HYDROCHLORIDE 25 MG/1
12.5 TABLET ORAL EVERY 6 HOURS PRN
Status: DISCONTINUED | OUTPATIENT
Start: 2021-04-23 | End: 2021-04-29 | Stop reason: HOSPADM

## 2021-04-23 RX ORDER — SODIUM CHLORIDE 9 MG/ML
INJECTION, SOLUTION INTRAVENOUS CONTINUOUS
Status: DISCONTINUED | OUTPATIENT
Start: 2021-04-23 | End: 2021-04-25

## 2021-04-23 RX ORDER — SODIUM CHLORIDE 9 MG/ML
25 INJECTION, SOLUTION INTRAVENOUS PRN
Status: DISCONTINUED | OUTPATIENT
Start: 2021-04-23 | End: 2021-04-29 | Stop reason: HOSPADM

## 2021-04-23 RX ORDER — MORPHINE SULFATE 4 MG/ML
4 INJECTION, SOLUTION INTRAMUSCULAR; INTRAVENOUS EVERY 4 HOURS PRN
Status: DISCONTINUED | OUTPATIENT
Start: 2021-04-23 | End: 2021-04-29 | Stop reason: HOSPADM

## 2021-04-23 RX ADMIN — DOCUSATE SODIUM 50 MG AND SENNOSIDES 8.6 MG 2 TABLET: 8.6; 5 TABLET, FILM COATED ORAL at 20:01

## 2021-04-23 RX ADMIN — MORPHINE SULFATE 4 MG: 4 INJECTION, SOLUTION INTRAMUSCULAR; INTRAVENOUS at 12:50

## 2021-04-23 RX ADMIN — CEFEPIME HYDROCHLORIDE 2000 MG: 2 INJECTION, POWDER, FOR SOLUTION INTRAVENOUS at 12:56

## 2021-04-23 RX ADMIN — MORPHINE SULFATE 4 MG: 4 INJECTION, SOLUTION INTRAMUSCULAR; INTRAVENOUS at 21:03

## 2021-04-23 RX ADMIN — OXYCODONE 5 MG: 5 TABLET ORAL at 14:45

## 2021-04-23 RX ADMIN — OXYCODONE 5 MG: 5 TABLET ORAL at 23:58

## 2021-04-23 RX ADMIN — SODIUM CHLORIDE: 9 INJECTION, SOLUTION INTRAVENOUS at 15:46

## 2021-04-23 RX ADMIN — ACETAMINOPHEN 650 MG: 325 TABLET ORAL at 19:52

## 2021-04-23 RX ADMIN — INSULIN LISPRO 2 UNITS: 100 INJECTION, SOLUTION INTRAVENOUS; SUBCUTANEOUS at 17:18

## 2021-04-23 RX ADMIN — METOPROLOL TARTRATE 25 MG: 25 TABLET, FILM COATED ORAL at 20:01

## 2021-04-23 RX ADMIN — INSULIN LISPRO 3 UNITS: 100 INJECTION, SOLUTION INTRAVENOUS; SUBCUTANEOUS at 20:01

## 2021-04-23 RX ADMIN — VANCOMYCIN HYDROCHLORIDE 1500 MG: 10 INJECTION, POWDER, LYOPHILIZED, FOR SOLUTION INTRAVENOUS at 13:46

## 2021-04-23 RX ADMIN — ONDANSETRON 4 MG: 2 INJECTION INTRAMUSCULAR; INTRAVENOUS at 12:50

## 2021-04-23 RX ADMIN — Medication 2 PUFF: at 20:16

## 2021-04-23 RX ADMIN — MORPHINE SULFATE 4 MG: 4 INJECTION, SOLUTION INTRAMUSCULAR; INTRAVENOUS at 16:55

## 2021-04-23 RX ADMIN — Medication 10 ML: at 20:02

## 2021-04-23 RX ADMIN — ATORVASTATIN CALCIUM 40 MG: 40 TABLET, FILM COATED ORAL at 17:18

## 2021-04-23 ASSESSMENT — PAIN DESCRIPTION - PROGRESSION
CLINICAL_PROGRESSION: GRADUALLY WORSENING

## 2021-04-23 ASSESSMENT — PAIN DESCRIPTION - PAIN TYPE
TYPE: ACUTE PAIN

## 2021-04-23 ASSESSMENT — PAIN DESCRIPTION - ORIENTATION
ORIENTATION: LEFT
ORIENTATION: RIGHT

## 2021-04-23 ASSESSMENT — PAIN SCALES - GENERAL
PAINLEVEL_OUTOF10: 9
PAINLEVEL_OUTOF10: 7
PAINLEVEL_OUTOF10: 6
PAINLEVEL_OUTOF10: 8
PAINLEVEL_OUTOF10: 8
PAINLEVEL_OUTOF10: 10
PAINLEVEL_OUTOF10: 5
PAINLEVEL_OUTOF10: 6
PAINLEVEL_OUTOF10: 7

## 2021-04-23 ASSESSMENT — ENCOUNTER SYMPTOMS
COUGH: 0
VOMITING: 0
EYES NEGATIVE: 1
COLOR CHANGE: 1
SHORTNESS OF BREATH: 0
NAUSEA: 0
ABDOMINAL PAIN: 0
BACK PAIN: 0

## 2021-04-23 ASSESSMENT — PAIN DESCRIPTION - DESCRIPTORS
DESCRIPTORS: STABBING
DESCRIPTORS: STABBING

## 2021-04-23 ASSESSMENT — PAIN DESCRIPTION - LOCATION
LOCATION: CHEST

## 2021-04-23 ASSESSMENT — PAIN DESCRIPTION - ONSET
ONSET: ON-GOING
ONSET: ON-GOING

## 2021-04-23 ASSESSMENT — PAIN - FUNCTIONAL ASSESSMENT
PAIN_FUNCTIONAL_ASSESSMENT: ACTIVITIES ARE NOT PREVENTED
PAIN_FUNCTIONAL_ASSESSMENT: ACTIVITIES ARE NOT PREVENTED

## 2021-04-23 ASSESSMENT — PAIN DESCRIPTION - FREQUENCY
FREQUENCY: CONTINUOUS
FREQUENCY: CONTINUOUS

## 2021-04-23 NOTE — PROGRESS NOTES
End of shift report given to Ceferino Redd RN at bedside. Patient alert and oriented. Bed in lowest position with wheels locked. Call light within reach. No further needs at this time.

## 2021-04-23 NOTE — CONSULTS
Pharmacy Note  Vancomycin Consult    Marjan Whitlock is a 61 y.o. male started on Vancomycin for skin /soft tissue infection; [facility]. Also receiving the following antibiotics: cefepime (1 dose completed). Allergies:  Patient has no known allergies. Recent Labs     04/21/21  0922 04/23/21  1127   CREATININE <0.5* 0.6*       Recent Labs     04/21/21  0922 04/23/21  1158   WBC 13.4* 18.7*       Estimated Creatinine Clearance: 159 mL/min (A) (based on SCr of 0.6 mg/dL (L)). Intake/Output Summary (Last 24 hours) at 4/23/2021 1530  Last data filed at 4/23/2021 1337  Gross per 24 hour   Intake 50 ml   Output --   Net 50 ml       Wt Readings from Last 1 Encounters:   04/23/21 232 lb 3.2 oz (105.3 kg)         Body mass index is 33.32 kg/m². Culture Date      Source                       Results  N/A    Loading dose (critically ill or in ICU, require dialysis or renal replacement therapy): Vancomycin 25 mg/kg IVPB x 1 (maximum 3000 mg). Maintenance dose: 15 mg/kg (maximum: 2000 mg/dose and 4500 mg/day) starting at the next dosing interval determined by renal function    Assessment/Plan:  Will initiate Vancomycin with a one time loading dose of 1500 mg  x1 (already completed before consult started), followed by 1750 mg IV every 12 hours. Calculated . Vancomycin trough ordered for 4/25 0100. Timing of trough level will be determined based on culture results, renal function, and clinical response. Thank you for the consult. Annalisa Sandoval PharmD 04/23/21 3:33 PM    4/25  Vanc trough = 11.4 mcg/mL at 0042. Continue current dose and frequency of vancomycin. Will monitor and adjust dose accordingly.   Louis Duque, PharmD  4/25/2021 1:20 AM

## 2021-04-23 NOTE — TELEPHONE ENCOUNTER
Called patient. He was already in the emergency room and concern for sternal wound infection.   CT surgery has seen him and has been admitted for IV antibiotics

## 2021-04-23 NOTE — PROGRESS NOTES
4 Eyes Skin Assessment     The patient is being assess for  Admission    I agree that 2 RN's have performed a thorough Head to Toe Skin Assessment on the patient. ALL assessment sites listed below have been assessed. Areas assessed by both nurses:   [x]   Head, Face, and Ears   [x]   Shoulders, Back, and Chest  [x]   Arms, Elbows, and Hands   [x]   Coccyx, Sacrum, and Ischum  [x]   Legs, Feet, and Heels        Does the Patient have Skin Breakdown?   Yes a wound was noted on the Admission Assessment and an WOUND LDA was Initiated documentation include the Felicia-wound, Wound Assessment, Measurements, Dressing Treatment, Drainage, and Color\", wound on abdomen surgical incision      Octavio Prevention initiated:  No   Wound Care Orders initiated:  No      WOC nurse consulted for Pressure Injury (Stage 3,4, Unstageable, DTI, NWPT, and Complex wounds):  No      Nurse 1 eSignature: Electronically signed by Kaden Tidwell RN on 4/23/21 at 5:52 PM EDT    **SHARE this note so that the co-signing nurse is able to place an eSignature**    Nurse 2 eSignature: Electronically signed by Bart Vizcarra RN on 4/27/21 at 3:58 PM EDT

## 2021-04-23 NOTE — ED PROVIDER NOTES
[facility]  ED  EMERGENCY DEPARTMENT ENCOUNTER        Pt Name: Letty Velasquez  MRN: [id number]  [place] 1960  Date of evaluation: 4/23/2021  Provider: Virgilio Navarro PA-C  PCP: Ramu Varela DO  ED Attending: Jr Joe MD      This patient was seen by the attending provider Jr Joe MD    History provided by the patient    CHIEF COMPLAINT:     Chief Complaint   Patient presents with    Chest Pain     s/p CABG on 2/19/21 per Dr Saundra Kelly at [facility]. pt reports \"sweeping driveway\" on 8/52/33 and c/o chest pain since. Pain worse with movement. non radiating. Seen for same on 4/21/21       HISTORY OF PRESENT ILLNESS:      Letty Velasquez is a 61 y.o. male who arrives to the ED by private vehicle with chest pain. Patient status post CABG on 2/19/2021 by Dr. Marie Harrell. Patient reports he was sweeping his driveway on 6/67/4010 and since then has been experiencing increased chest pain. He was seen in the emergency department at [facility] on 4/21/2021. He had an extensive work-up with normal EKG, labs but chest CT with and without contrast.  Cardiology was consulted. They requested inflammatory markers (sed rate and CRP) and plan to follow-up with patient. Patient and his wife are in touch with cardiology today reporting increased pain to the chest wall with associated erythema. Patient has a small opening to the very inferior portion of his midline CABG incision. Patient reports he had a suture cut out by his CT surgeon. He has a small piece of packing and additionally gauze taped over his chest wall. Patient has not had a fever. He reports progressive pain just in the last 2 days. Pain is exacerbated with any movement and he has slight alleviation at rest but pain never resolves. Cardiology told the patient to come to the ED at [facility] today.     Nursing Notes were reviewed     REVIEW OF SYSTEMS:     Review of Systems   Constitutional: Positive for activity TABLET BY MOUTH DAILY    FAMOTIDINE (PEPCID) 20 MG TABLET    Take 1 tablet by mouth daily    FLUTICASONE-SALMETEROL (ADVAIR DISKUS) 250-50 MCG/DOSE AEPB    Inhale 1 puff into the lungs every 12 hours    GLIPIZIDE (GLUCOTROL) 10 MG TABLET    TAKE ONE TABLET BY MOUTH EVERY MORNING FOR DIABETES    METFORMIN (GLUCOPHAGE) 1000 MG TABLET    TAKE ONE TABLET BY MOUTH TWICE A DAY    METOPROLOL TARTRATE (LOPRESSOR) 25 MG TABLET    TAKE ONE TABLET BY MOUTH TWICE A DAY    OXYCODONE (ROXICODONE) 5 MG IMMEDIATE RELEASE TABLET    Take 1 tablet by mouth every 6 hours as needed for Pain for up to 8 doses. Intended supply: 3 days. Take lowest dose possible to manage pain         ALLERGIES:    Patient has no known allergies. FAMILY HISTORY:       Family History   Problem Relation Age of Onset    Heart Attack Mother     Heart Disease Mother     Heart Attack Father     Cancer Father     Hearing Loss Father     Diabetes Father     Heart Disease Father     Heart Disease Paternal Uncle         bypass          SOCIAL HISTORY:       Social History     Socioeconomic History    Marital status:      Spouse name: None    Number of children: None    Years of education: None    Highest education level: None   Occupational History    None   Social Needs    Financial resource strain: None    Food insecurity     Worry: None     Inability: None    Transportation needs     Medical: None     Non-medical: None   Tobacco Use    Smoking status: Never Smoker    Smokeless tobacco: Never Used   Substance and Sexual Activity    Alcohol use: Not Currently     Alcohol/week: 1.0 standard drinks     Types: 1 Shots of liquor per week     Comment: no alcohol in 2 months.      Drug use: No    Sexual activity: Yes     Partners: Female   Lifestyle    Physical activity     Days per week: None     Minutes per session: None    Stress: None   Relationships    Social connections     Talks on phone: None     Gets together: None     Attends Worship service: None     Active member of club or organization: None     Attends meetings of clubs or organizations: None     Relationship status: None    Intimate partner violence     Fear of current or ex partner: None     Emotionally abused: None     Physically abused: None     Forced sexual activity: None   Other Topics Concern    None   Social History Narrative    None       SCREENINGS:    Sree Coma Scale  Eye Opening: Spontaneous  Best Verbal Response: Oriented  Best Motor Response: Obeys commands  Sree Coma Scale Score: 15        PHYSICAL EXAM:       ED Triage Vitals [04/23/21 1120]   BP Temp Temp Source Pulse Resp SpO2 Height Weight   (!) 141/95 98.1 °F (36.7 °C) Axillary 106 22 98 % 5' 10\" (1.778 m) 230 lb (104.3 kg)       Physical Exam    CONSTITUTIONAL: Awake and alert. Cooperative. Well-developed. Well-nourished. Non-toxic. No acute distress. HENT: Normocephalic. Atraumatic. External ears normal, without discharge. No nasal discharge. Oropharynx clear. Mucous membranes moist.  EYES: Conjunctiva non-injected. No scleral icterus. PERRL. EOM's grossly intact. NECK: Supple. Normal ROM. CARDIOVASCULAR: RRR. No Murmer. Intact distal pulses. PULMONARY/CHEST WALL: Vertical incision located midline to the chest wall. There is an opening to the inferior aspect, just under 1 cm in diameter. Initially there is a piece of packing at this which is removed and has overlying purulent material.  Chest wall is tender to palpate and warm along with erythematous. No crepitus. Effort normal. No tachypnea. Lungs clear to ausculation. ABDOMEN: Normal BS. Soft. Nondistended. No tenderness to palpate. No guarding. /ANORECTAL: Not assessed  MUSKULOSKELETAL: Normal ROM. No acute deformities. No edema. No tenderness to palpate. SKIN: Warm and dry. No rash. NEUROLOGICAL: Alert and oriented x 3. GCS 15. CN II-XII grossly intact. Strength is 5/5 in all extremities and sensation is intact. Normal gait. PSYCHIATRIC: Normal affect        DIAGNOSTICRESULTS:     LABS:    Results for orders placed or performed during the hospital encounter of 04/23/21   Comprehensive Metabolic Panel w/ Reflex to MG   Result Value Ref Range    Sodium 127 (L) 136 - 145 mmol/L    Potassium reflex Magnesium 5.0 3.5 - 5.1 mmol/L    Chloride 92 (L) 99 - 110 mmol/L    CO2 22 21 - 32 mmol/L    Anion Gap 13 3 - 16    Glucose 217 (H) 70 - 99 mg/dL    BUN 10 7 - 20 mg/dL    CREATININE 0.6 (L) 0.8 - 1.3 mg/dL    GFR Non-African American >60 >60    GFR African American >60 >60    Calcium 9.7 8.3 - 10.6 mg/dL    Total Protein 7.8 6.4 - 8.2 g/dL    Albumin 3.8 3.4 - 5.0 g/dL    Albumin/Globulin Ratio 1.0 (L) 1.1 - 2.2    Total Bilirubin 0.8 0.0 - 1.0 mg/dL    Alkaline Phosphatase 85 40 - 129 U/L    ALT 11 10 - 40 U/L    AST 30 15 - 37 U/L    Globulin 4.0 g/dL   Troponin   Result Value Ref Range    Troponin <0.01 <0.01 ng/mL   Sedimentation Rate   Result Value Ref Range    Sed Rate 89 (H) 0 - 20 mm/Hr   Lactic acid, plasma   Result Value Ref Range    Lactic Acid 1.8 0.4 - 2.0 mmol/L   Brain Natriuretic Peptide   Result Value Ref Range    Pro- (H) 0 - 124 pg/mL   CBC Auto Differential   Result Value Ref Range    WBC 18.7 (H) 4.0 - 11.0 K/uL    RBC 4.64 4.20 - 5.90 M/uL    Hemoglobin 13.3 (L) 13.5 - 17.5 g/dL    Hematocrit 40.9 40.5 - 52.5 %    MCV 88.1 80.0 - 100.0 fL    MCH 28.7 26.0 - 34.0 pg    MCHC 32.5 31.0 - 36.0 g/dL    RDW 14.7 12.4 - 15.4 %    Platelets 333 560 - 256 K/uL    MPV 8.4 5.0 - 10.5 fL    Neutrophils % 87.4 %    Lymphocytes % 7.1 %    Monocytes % 5.3 %    Eosinophils % 0.0 %    Basophils % 0.2 %    Neutrophils Absolute 16.4 (H) 1.7 - 7.7 K/uL    Lymphocytes Absolute 1.3 1.0 - 5.1 K/uL    Monocytes Absolute 1.0 0.0 - 1.3 K/uL    Eosinophils Absolute 0.0 0.0 - 0.6 K/uL    Basophils Absolute 0.0 0.0 - 0.2 K/uL   EKG 12 Lead   Result Value Ref Range    Ventricular Rate 107 BPM    Atrial Rate 107 BPM    P-R Interval 160 ms QRS Duration 88 ms    Q-T Interval 336 ms    QTc Calculation (Bazett) 448 ms    P Axis 55 degrees    R Axis 23 degrees    T Axis 248 degrees    Diagnosis       Sinus tachycardiaPossible Left atrial enlargementT wave abnormality, consider inferior ischemiaAbnormal ECGWhen compared with ECG of 21-APR-2021 09:07,Criteria for Septal infarct are no longer Present         RADIOLOGY:  All x-ray studies areviewed/reviewed by me. Formal interpretations per the radiologist are as follows:      Ct Chest Wo Contrast    Result Date: 4/21/2021  EXAMINATION: CT OF THE CHEST WITHOUT CONTRAST 4/21/2021 11:31 am TECHNIQUE: CT of the chest was performed without the administration of intravenous contrast. Multiplanar reformatted images are provided for review. Dose modulation, iterative reconstruction, and/or weight based adjustment of the mA/kV was utilized to reduce the radiation dose to as low as reasonably achievable. COMPARISON: None. HISTORY: ORDERING SYSTEM PROVIDED HISTORY: assess sternum and right sided ribs; s/p CABG TECHNOLOGIST PROVIDED HISTORY: Reason for exam:->assess sternum and right sided ribs; s/p CABG Decision Support Exception->Emergency Medical Condition (MA) Reason for Exam: s/p open heart 8wks ago. outside activities over exerted himself and now having chest/rib/bone pain. Acuity: Acute Type of Exam: Initial FINDINGS: Mediastinum: Status post coronary bypass. Expected postoperative stranding in the retrosternal soft tissues with no significant hematoma. Trace pericardial effusion. Thoracic aorta normal in caliber Lungs/pleura: Small left pleural effusion with dependent atelectasis in the left lower lobe. Minimal atelectasis in the anterior left upper lobe. No suspicious infiltrate or edema Upper Abdomen: Unremarkable Soft Tissues/Bones: Status post sternotomy. Sternal wires intact. Sternal segments are well opposed. No rib fracture is demonstrated.   Midline incision intact with no abnormal fluid collection     1. Intact sternotomy, with expected postoperative changes in the mediastinum and healing chest wall incision 2. Small left pleural effusion     Xr Chest Portable    Result Date: 4/23/2021  EXAMINATION: ONE XRAY VIEW OF THE CHEST 4/23/2021 8:27 am COMPARISON: 04/21/2021 HISTORY: ORDERING SYSTEM PROVIDED HISTORY: chest pain TECHNOLOGIST PROVIDED HISTORY: Reason for exam:->chest pain Reason for Exam: Chest Pain Acuity: Acute Type of Exam: Initial FINDINGS: Cardial pericardial silhouette is enlarged but stable. Midline sternotomy wires are unchanged in configuration. Left ventricular appendage clip is noted. No focal infiltrates are identified. No pneumothorax. No free air. No acute bony abnormality detected. No acute abnormality identified. Cta Pulmonary W Contrast    Result Date: 4/22/2021  EXAMINATION: CTA OF THE CHEST 4/21/2021 1:09 pm TECHNIQUE: CTA of the chest was performed after the administration of intravenous contrast.  Multiplanar reformatted images are provided for review. MIP images are provided for review. Dose modulation, iterative reconstruction, and/or weight based adjustment of the mA/kV was utilized to reduce the radiation dose to as low as reasonably achievable. COMPARISON: Noncontrast CT 04/21/2021. Remote CT 02/12/2016 HISTORY: ORDERING SYSTEM PROVIDED HISTORY: concern for PE; chest wall pain/ shortness of breath; recent cabg TECHNOLOGIST PROVIDED HISTORY: Reason for exam:->concern for PE; chest wall pain/ shortness of breath; recent cabg Decision Support Exception->Emergency Medical Condition (MA) Reason for Exam: chest wall pain/sob. c/p open heart sx.      ct chest w/o in ED today prior to this scan Acuity: Acute Type of Exam: Subsequent/Follow-up FINDINGS: Pulmonary Arteries: Pulmonary arteries are adequately opacified for evaluation. No evidence of intraluminal filling defect to suggest pulmonary embolism. Main pulmonary artery is normal in caliber. Mediastinum: The thoracic aorta is normal in course and caliber. Post sternotomy changes are noted. Coronary vascular calcification with no pericardial effusion. A left atrial appendage clamp is in place. No mediastinal hematoma or significant mediastinal adenopathy. Mild infiltration of the retrosternal soft tissues with no focal collection. Lungs/pleura: Small left pleural effusion. Mild dependent left lower lobe atelectasis. The lungs otherwise are clear. No acute infiltrate or pneumothorax. The central airways are patent. Upper Abdomen: Limited images of the upper abdomen are unremarkable. Soft Tissues/Bones: Healing midline sternotomy incision with no focal subcutaneous collection. No other focal soft tissue abnormality. Osseous structures are unremarkable with mild osteoarthritic spurring in the thoracic spine. 1. No evidence of pulmonary embolic disease. 2. Small left pleural effusion with dependent atelectasis. No acute pulmonary infiltrate. 3. No unexpected findings following recent median sternotomy and open heart surgery. EKG: The Ekg interpreted by me in the absence of a cardiologist shows. sinus tachycardia, colk=[id number]     See also interpretation by Alyse Dakin, MD.      PROCEDURES:   N/A    CRITICAL CARE TIME:       Due to the immediate potential for life-threatening deterioration due to initial tachycardia, leukocytosis with concerns for cellulitis/possible osteomyelitis and even sepsis involving the chest/chest wall/sternum, I spent 32 minutes providing critical care. This time is excluding time spent performing procedures.       CONSULTS:  IP CONSULT TO HOSPITALIST      EMERGENCY DEPARTMENT COURSE and DIFFERENTIAL DIAGNOSIS/MDM:   Vitals:    Vitals:    04/23/21 1120 04/23/21 1302   BP: (!) 141/95 (!) 130/59   Pulse: 106 102   Resp: 22 12   Temp: 98.1 °F (36.7 °C)    TempSrc: Axillary    SpO2: 98% 99%   Weight: 230 lb (104.3 kg)    Height: 5' 10\" (1.778 m) Patient was given the following medications:  Medications   vancomycin 1500 mg in dextrose 5% 300 mL IVPB (has no administration in time range)   cefepime (MAXIPIME) 2000 mg IVPB minibag (2,000 mg Intravenous New Bag 4/23/21 1256)   morphine (PF) injection 4 mg (4 mg Intravenous Given 4/23/21 1250)   ondansetron (ZOFRAN) injection 4 mg (4 mg Intravenous Given 4/23/21 1250)         Patient was evaluated by both myself and Mei York MD.   Old records were reviewed. This patient is back in the ED today with chest/chest wall pain. He was seen in the ED 2 days ago and reports the day before that developing pain after sweeping with a broom in his driveway. He is about 8 weeks postop CABG. Based on clinical picture and based on review of records I initiated a work-up. EKG shows sinus tachycardia with rate 107 bpm  CBC white count elevated 18.7 with H&H 13.3 and 40.9  Lactate 1.8  CMP reveals hyponatremia 127 and chloride 92. Glucose elevated at 217. BUN 10 and creatinine 0.6  Troponin <0.01  proBNP 641  Sed rate 89  Portable chest x-ray shows no acute abnormality  I consulted CT surgery who was down in the ED regarding this patient given the fact that she is 8 weeks postop. CT surgery agreed that the patient's chest wall appears infected and that he warrants hospitalization. Blood cultures x2 were drawn. I ordered cefepime and vancomycin on this patient. He has remained hemodynamically stable here in the ED but certainly warrants hospitalization with further monitoring. I spoke with Dr. Francis Escobar and [facility] hospitalists. We thoroughly discussed the history, physical exam, laboratory and imaging studies, as well as, emergency department course. Based upon that discussion, we've decided to admit Sarah Michaels for further observation and evaluation of Edelmira Chin's chest pain and chest wall cellulitis/infection.   As I have deemed necessary from their history, physical, and studies, I have considered and

## 2021-04-23 NOTE — TELEPHONE ENCOUNTER
FYI: Patient's wife calling stating patient has a fever, he has severe pain in his chest, back is hot to the touch. CABG 8 weeks ago. He went to [facility] ED several days ago and they did a CT scan and xray and sent patient home. Advised patient to go to [facility] ED.

## 2021-04-23 NOTE — CONSULTS
CVTS Thoracic Progress Note:          CC: Chest wall cellulitis    Pt known to service: 2/19/21 CABG x2, ALEX Clip with Dr. [employer]. Uneventful post-op period with pt discharging home on 2/23/21. He most recently followed up in the office on 4/20 with a suture that was protruding from his skin (subsequently removed). He had no clinical signs of infection at that time. Subj: awake, sitting up on stretcher. C/o pain \"all around his incision\". He is otherwise in no distress, breathing with ease. Obj:    Blood pressure (!) 141/95, pulse 106, temperature 98.1 °F (36.7 °C), temperature source Axillary, resp. rate 22, height 5' 10\" (1.778 m), weight 230 lb (104.3 kg), SpO2 98 %. Lungs CTAB   Abdomen round, soft, non-tender. Pt reports no BM in 4 or 5 days. Denies nausea but says his appetite is limited. +BS     Midsternal Incision with good approximation, erythema surrounding incision site. Area inferior to MSI where suture removed remains open and without any drainage. Diagnostics:   Recent Labs     04/21/21  0922   WBC 13.4*   HGB 12.7*   HCT 39.6*                                                                     Recent Labs     04/21/21  0922   *   K 4.4   CL 97*   CO2 27   BUN 7   CREATININE <0.5*   GLUCOSE 251*     Recent Labs     04/21/21  0922 04/21/21  1402   TROPONINI <0.01 <0.01     CT Chest: 4/21/21  Impression   1. Intact sternotomy, with expected postoperative changes in the mediastinum   and healing chest wall incision   2. Small left pleural effusion     CXR: 4/23/21 no acute abnormality identified    Assess/Plan:   Care per primary team and ID    Chest wall cellulitis:   Pt admitted for IV Abx   Consulting Infectious disease MD  Hoping to avoid having to take pt to OR for sternal debridement but pt, and spouse, aware that it still may be necessary. We will continue to follow.       Constipation: would recommend bowel

## 2021-04-23 NOTE — TELEPHONE ENCOUNTER
FYI Wife called.    I advised patient to go back to ED or call cardiologist  100.5 temp has not resolved since he was at the ED, also in severe pain  He had a Bypass about 6 weeks ago

## 2021-04-23 NOTE — H&P
Hospital Medicine History & Physical      PCP: Bibi Jackson DO    Date of Admission: 4/23/2021    Date of Service: Pt seen/examined on 4/23/2021 and Admitted to Inpatient with expected LOS greater than two midnights due to medical therapy. Chief Complaint:  Chest pain     History Of Present Illness:      61 y.o. male with PMH of CAD s/p CABG, HLD, HTN, DM2, GURDEEP and obesity who presented to [facility] with complaints of chest pain. Pt is status post CABG x2 on 2/19/2021. Had uneventful postop course. This past Monday 4/19 pt was seen in the [address] [place] [address] office due to suture protruding from his skin which was removed. At that time there was no evidence of infection. He developed chest pain on Tuesday and presented to [facility] ED where workup was negative, he was then discharged. The pt developed low grade fevers on Thursday and associated chills/rigors. He reports chest pain mainly in and around his surgical site. No dyspnea. No N/V/D. +constipation. No abdominal pain. ED course: WBC elevated at 18 and sodium of 127. Admitted diagnosis chest wall cellulitis of his surgical incision site.        Past Medical History:          Diagnosis Date    Alcohol abuse 12/03/2012    Coronary artery disease     Diabetic neuropathy associated with type 2 diabetes mellitus ([address]     Hyperlipidemia     Hypertension     Non morbid obesity     GURDEEP (obstructive sleep apnea)     Reactive airway disease with wheezing, moderate persistent, with acute exacerbation 03/03/2020    Type II or unspecified type diabetes mellitus without mention of complication, not stated as uncontrolled        Past Surgical History:          Procedure Laterality Date    BYPASS GRAFT  02/19/2021    CORONARY ANGIOPLASTY WITH STENT PLACEMENT  1/14/2016 9/21/2020    1 [employer] SAMUEL 2.25x16    CORONARY ARTERY BYPASS GRAFT  02/19/2021    CABG x2 & ALEX-Dr. John Perez    CORONARY ARTERY BYPASS GRAFT N/A 2/19/2021    CORONARY ARTERY BYPASS K 4.4 5.0   CL 97* 92*   CO2 27 22   BUN 7 10   CREATININE <0.5* 0.6*   CALCIUM 9.4 9.7     Recent Labs     04/21/21  0922 04/23/21  1127   AST 13* 30   ALT 12 11   BILITOT 0.6 0.8   ALKPHOS 109 85     Recent Labs     04/21/21  0922 04/21/21  1402 04/23/21  1127   TROPONINI <0.01 <0.01 <0.01       Urinalysis:      Lab Results   Component Value Date    NITRU Negative 04/21/2021    BLOODU Negative 04/21/2021    SPECGRAV 1.025 04/21/2021    GLUCOSEU 500 04/21/2021       Radiology:       XR CHEST PORTABLE   Final Result   No acute abnormality identified. ASSESSMENT:    Active Hospital Problems    Diagnosis Date Noted    Cellulitis [L03.90] 04/23/2021       Cellulitis of sternotomy incision   - CT chest on 4/21 showed healing midline sternotomy incision with no focal subcutaneous collection.   - Pt started on IV vancomycin (pharmacy consulted for dosing) and cefepime in the ED -- will continue. Blood cultures sent in the ED. ID consulted for further assistance -- appreciate.   - CT surgery consulted -- appreciate. CAD status post CABG in Feb 2021  - EKG non-acute. Troponin negative. - Continue aspirin, plavix, statin, metoprolol.  - Monitor pt on telemetry. Diabetes mellitus type 2:  - Uncontrolled, recent A1C was 8.4.  - Hold home regimen. SSI ACHS meanwhile. - Carb-control diet. Acute hyponatremia, mild:  - Likely secondary to dehydration.   - Start gentle IV fluids.   - Repeat BMP in am.     Hypertension:  - Controlled. Continue his home medications. Monitor. Hyperlipidemia:  - Continue statin. Constipation:  - Start bowel regimen. Obesity:  - With Body mass index is 33.32 kg/m². Complicating assessment and treatment. Placing patient at risk for multiple co-morbidities as well as early death and contributing to the patient's presentation. Counseled on weight loss.        DVT Prophylaxis: Lovenox   Diet: DIET CARDIAC;  Code Status: Full Code    PT/OT Eval Status: Not indicated Dispo - > 2 days        Cele Thomas, APRN - CNP    Thank you Tim Mahmood DO for the opportunity to be involved in this patient's care. If you have any questions or concerns please feel free to contact me at [phone].

## 2021-04-23 NOTE — PROGRESS NOTES
Patient admitted to room 201 from ED. Patient oriented to room, bed and side rails, bathroom, how to use the call light, and instructed how to order meals with prescribed diet. Bed is locked in lowest position, side rails up 2/4, call light within reach. Patient denies any further needs at this time. Patient instructed about the schedule of the day including: vital sign frequency, lab draws, possible tests, frequency of MD and staff rounds, including RN/MD rounding together at bedside, daily weights, and I &O's. Will continue to monitor.

## 2021-04-23 NOTE — PROGRESS NOTES
Pt up to bathroom HR sustaining in 140's. Pt is SOB feels he has been SOB with pain.   Pt back in bed HR now sustaining 120's and SOB has decreased

## 2021-04-24 LAB
A/G RATIO: 0.9 (ref 1.1–2.2)
ALBUMIN SERPL-MCNC: 3.2 G/DL (ref 3.4–5)
ALP BLD-CCNC: 79 U/L (ref 40–129)
ALT SERPL-CCNC: 6 U/L (ref 10–40)
ANION GAP SERPL CALCULATED.3IONS-SCNC: 11 MMOL/L (ref 3–16)
AST SERPL-CCNC: 9 U/L (ref 15–37)
BASOPHILS ABSOLUTE: 0 K/UL (ref 0–0.2)
BASOPHILS RELATIVE PERCENT: 0.2 %
BILIRUB SERPL-MCNC: 0.7 MG/DL (ref 0–1)
BUN BLDV-MCNC: 7 MG/DL (ref 7–20)
CALCIUM SERPL-MCNC: 9.3 MG/DL (ref 8.3–10.6)
CHLORIDE BLD-SCNC: 92 MMOL/L (ref 99–110)
CO2: 26 MMOL/L (ref 21–32)
CREAT SERPL-MCNC: 0.6 MG/DL (ref 0.8–1.3)
EOSINOPHILS ABSOLUTE: 0 K/UL (ref 0–0.6)
EOSINOPHILS RELATIVE PERCENT: 0.1 %
GFR AFRICAN AMERICAN: >60
GFR NON-AFRICAN AMERICAN: >60
GLOBULIN: 3.5 G/DL
GLUCOSE BLD-MCNC: 231 MG/DL (ref 70–99)
GLUCOSE BLD-MCNC: 272 MG/DL (ref 70–99)
GLUCOSE BLD-MCNC: 287 MG/DL (ref 70–99)
GLUCOSE BLD-MCNC: 288 MG/DL (ref 70–99)
GLUCOSE BLD-MCNC: 309 MG/DL (ref 70–99)
HCT VFR BLD CALC: 34.9 % (ref 40.5–52.5)
HEMOGLOBIN: 11.5 G/DL (ref 13.5–17.5)
LYMPHOCYTES ABSOLUTE: 0.9 K/UL (ref 1–5.1)
LYMPHOCYTES RELATIVE PERCENT: 5.7 %
MCH RBC QN AUTO: 29.1 PG (ref 26–34)
MCHC RBC AUTO-ENTMCNC: 32.9 G/DL (ref 31–36)
MCV RBC AUTO: 88.6 FL (ref 80–100)
MONOCYTES ABSOLUTE: 1 K/UL (ref 0–1.3)
MONOCYTES RELATIVE PERCENT: 6.4 %
NEUTROPHILS ABSOLUTE: 13.3 K/UL (ref 1.7–7.7)
NEUTROPHILS RELATIVE PERCENT: 87.6 %
PDW BLD-RTO: 14.7 % (ref 12.4–15.4)
PERFORMED ON: ABNORMAL
PLATELET # BLD: 249 K/UL (ref 135–450)
PMV BLD AUTO: 8.2 FL (ref 5–10.5)
POTASSIUM REFLEX MAGNESIUM: 4.3 MMOL/L (ref 3.5–5.1)
RBC # BLD: 3.93 M/UL (ref 4.2–5.9)
REPORT: NORMAL
SODIUM BLD-SCNC: 129 MMOL/L (ref 136–145)
TOTAL PROTEIN: 6.7 G/DL (ref 6.4–8.2)
WBC # BLD: 15.2 K/UL (ref 4–11)

## 2021-04-24 PROCEDURE — 85025 COMPLETE CBC W/AUTO DIFF WBC: CPT

## 2021-04-24 PROCEDURE — 94640 AIRWAY INHALATION TREATMENT: CPT

## 2021-04-24 PROCEDURE — 2580000003 HC RX 258: Performed by: REGISTERED NURSE

## 2021-04-24 PROCEDURE — 6360000002 HC RX W HCPCS: Performed by: REGISTERED NURSE

## 2021-04-24 PROCEDURE — 6370000000 HC RX 637 (ALT 250 FOR IP): Performed by: REGISTERED NURSE

## 2021-04-24 PROCEDURE — 80053 COMPREHEN METABOLIC PANEL: CPT

## 2021-04-24 PROCEDURE — 36415 COLL VENOUS BLD VENIPUNCTURE: CPT

## 2021-04-24 PROCEDURE — 1200000000 HC SEMI PRIVATE

## 2021-04-24 RX ORDER — INSULIN GLARGINE 100 [IU]/ML
10 INJECTION, SOLUTION SUBCUTANEOUS NIGHTLY
Status: DISCONTINUED | OUTPATIENT
Start: 2021-04-24 | End: 2021-04-25

## 2021-04-24 RX ADMIN — MORPHINE SULFATE 4 MG: 4 INJECTION, SOLUTION INTRAMUSCULAR; INTRAVENOUS at 10:33

## 2021-04-24 RX ADMIN — INSULIN LISPRO 6 UNITS: 100 INJECTION, SOLUTION INTRAVENOUS; SUBCUTANEOUS at 21:57

## 2021-04-24 RX ADMIN — CLOPIDOGREL BISULFATE 75 MG: 75 TABLET ORAL at 08:53

## 2021-04-24 RX ADMIN — VANCOMYCIN HYDROCHLORIDE 1750 MG: 1 INJECTION, POWDER, LYOPHILIZED, FOR SOLUTION INTRAVENOUS at 01:54

## 2021-04-24 RX ADMIN — Medication 2 PUFF: at 08:24

## 2021-04-24 RX ADMIN — MORPHINE SULFATE 2 MG: 2 INJECTION, SOLUTION INTRAMUSCULAR; INTRAVENOUS at 06:36

## 2021-04-24 RX ADMIN — INSULIN LISPRO 9 UNITS: 100 INJECTION, SOLUTION INTRAVENOUS; SUBCUTANEOUS at 12:05

## 2021-04-24 RX ADMIN — DOCUSATE SODIUM 50 MG AND SENNOSIDES 8.6 MG 2 TABLET: 8.6; 5 TABLET, FILM COATED ORAL at 21:58

## 2021-04-24 RX ADMIN — MORPHINE SULFATE 4 MG: 4 INJECTION, SOLUTION INTRAMUSCULAR; INTRAVENOUS at 14:30

## 2021-04-24 RX ADMIN — INSULIN LISPRO 9 UNITS: 100 INJECTION, SOLUTION INTRAVENOUS; SUBCUTANEOUS at 17:31

## 2021-04-24 RX ADMIN — CEFEPIME HYDROCHLORIDE 1000 MG: 1 INJECTION, POWDER, FOR SOLUTION INTRAMUSCULAR; INTRAVENOUS at 01:54

## 2021-04-24 RX ADMIN — Medication 2 PUFF: at 19:09

## 2021-04-24 RX ADMIN — METOPROLOL TARTRATE 25 MG: 25 TABLET, FILM COATED ORAL at 08:53

## 2021-04-24 RX ADMIN — ACETAMINOPHEN 650 MG: 325 TABLET ORAL at 16:10

## 2021-04-24 RX ADMIN — ASPIRIN 81 MG: 81 TABLET, CHEWABLE ORAL at 08:53

## 2021-04-24 RX ADMIN — SODIUM CHLORIDE: 9 INJECTION, SOLUTION INTRAVENOUS at 19:33

## 2021-04-24 RX ADMIN — DOCUSATE SODIUM 50 MG AND SENNOSIDES 8.6 MG 2 TABLET: 8.6; 5 TABLET, FILM COATED ORAL at 08:53

## 2021-04-24 RX ADMIN — INSULIN LISPRO 4 UNITS: 100 INJECTION, SOLUTION INTRAVENOUS; SUBCUTANEOUS at 08:54

## 2021-04-24 RX ADMIN — OXYCODONE 5 MG: 5 TABLET ORAL at 19:33

## 2021-04-24 RX ADMIN — CEFEPIME HYDROCHLORIDE 1000 MG: 1 INJECTION, POWDER, FOR SOLUTION INTRAMUSCULAR; INTRAVENOUS at 13:07

## 2021-04-24 RX ADMIN — VANCOMYCIN HYDROCHLORIDE 1750 MG: 1 INJECTION, POWDER, LYOPHILIZED, FOR SOLUTION INTRAVENOUS at 14:22

## 2021-04-24 RX ADMIN — ATORVASTATIN CALCIUM 40 MG: 40 TABLET, FILM COATED ORAL at 17:39

## 2021-04-24 RX ADMIN — POLYETHYLENE GLYCOL 3350 17 G: 17 POWDER, FOR SOLUTION ORAL at 08:54

## 2021-04-24 RX ADMIN — SODIUM CHLORIDE: 9 INJECTION, SOLUTION INTRAVENOUS at 06:36

## 2021-04-24 RX ADMIN — MORPHINE SULFATE 4 MG: 4 INJECTION, SOLUTION INTRAMUSCULAR; INTRAVENOUS at 22:38

## 2021-04-24 RX ADMIN — MORPHINE SULFATE 4 MG: 4 INJECTION, SOLUTION INTRAMUSCULAR; INTRAVENOUS at 01:53

## 2021-04-24 RX ADMIN — INSULIN GLARGINE 10 UNITS: 100 INJECTION, SOLUTION SUBCUTANEOUS at 21:57

## 2021-04-24 RX ADMIN — METOPROLOL TARTRATE 25 MG: 25 TABLET, FILM COATED ORAL at 21:58

## 2021-04-24 RX ADMIN — MORPHINE SULFATE 4 MG: 4 INJECTION, SOLUTION INTRAMUSCULAR; INTRAVENOUS at 18:36

## 2021-04-24 RX ADMIN — OXYCODONE 5 MG: 5 TABLET ORAL at 05:42

## 2021-04-24 RX ADMIN — OXYCODONE 5 MG: 5 TABLET ORAL at 12:04

## 2021-04-24 RX ADMIN — ACETAMINOPHEN 650 MG: 325 TABLET ORAL at 08:53

## 2021-04-24 RX ADMIN — ENOXAPARIN SODIUM 40 MG: 40 INJECTION SUBCUTANEOUS at 08:53

## 2021-04-24 ASSESSMENT — PAIN DESCRIPTION - ONSET
ONSET: ON-GOING

## 2021-04-24 ASSESSMENT — PAIN SCALES - GENERAL
PAINLEVEL_OUTOF10: 7
PAINLEVEL_OUTOF10: 5
PAINLEVEL_OUTOF10: 8
PAINLEVEL_OUTOF10: 7
PAINLEVEL_OUTOF10: 9
PAINLEVEL_OUTOF10: 8
PAINLEVEL_OUTOF10: 6
PAINLEVEL_OUTOF10: 7
PAINLEVEL_OUTOF10: 8

## 2021-04-24 ASSESSMENT — PAIN DESCRIPTION - PAIN TYPE
TYPE: ACUTE PAIN

## 2021-04-24 ASSESSMENT — PAIN DESCRIPTION - FREQUENCY
FREQUENCY: CONTINUOUS

## 2021-04-24 ASSESSMENT — PAIN DESCRIPTION - PROGRESSION
CLINICAL_PROGRESSION: GRADUALLY WORSENING

## 2021-04-24 ASSESSMENT — PAIN - FUNCTIONAL ASSESSMENT: PAIN_FUNCTIONAL_ASSESSMENT: ACTIVITIES ARE NOT PREVENTED

## 2021-04-24 ASSESSMENT — PAIN DESCRIPTION - LOCATION
LOCATION: CHEST

## 2021-04-24 ASSESSMENT — PAIN DESCRIPTION - DESCRIPTORS
DESCRIPTORS: STABBING

## 2021-04-24 ASSESSMENT — PAIN DESCRIPTION - ORIENTATION
ORIENTATION: LEFT

## 2021-04-24 NOTE — PROGRESS NOTES
Comprehensive Nutrition Assessment    Type and Reason for Visit:  Initial, Positive Nutrition Screen(MST=2: weight loss, decreased appetite, wound)    Nutrition Recommendations/Plan:   1. Continue cardiac, carb control diet   2. Add Glucerna BID - monitor acceptance   3. Encourage PO and protein intakes for healing   4. Monitor nutrition adequacy, pertinent labs, bowel habits, wt changes, and clinical progress    Nutrition Assessment:  Pt admitted with cellulitis at chest wall surgical site. S/p CABG in February, hx of DM, HLD and HTN. Pt nutritionally compromised AEB decreased appetite and weight loss. Pt reports poor appetite x1 week PTA 2/2 pain and not feeling well. Noted 5.3% weight loss in 2 months, not significant. Pt rpeorts drinking ONS at home to increase nutrition, will add. No further nutrition questions or concerns at this time. Malnutrition Assessment:  Malnutrition Status: At risk for malnutrition (Comment)    Context:  Acute Illness     Findings of the 6 clinical characteristics of malnutrition:  Energy Intake:  1 - 75% or less of estimated energy requirements for 7 or more days  Weight Loss:  No significant weight loss       Estimated Daily Nutrient Needs:  Energy (kcal):  3684-5000 kcal; Weight Used for Energy Requirements:  Ideal(75 kg)     Protein (g):   g; Weight Used for Protein Requirements:  Ideal(1.2-1.5 g/kg)        Fluid (ml/day):   ; Method Used for Fluid Requirements:  1 ml/kcal      Nutrition Related Findings:  BG elevated. Wounds:  Surgical Incision       Current Nutrition Therapies:    DIET CARDIAC; Carb Control: 5 carb choices (75 gms)/meal    Anthropometric Measures:  · Height: 5' 10\" (177.8 cm)  · Current Body Weight: 232 lb (105.2 kg)   · Usual Body Weight: 245 lb (111.1 kg)(standing scale 2/12/21)     · Ideal Body Weight: 166 lbs; % Ideal Body Weight 139.8 %   · BMI: 33.3  · BMI Categories: Obese Class 1 (BMI 30.0-34. 9)       Nutrition Diagnosis:   · Inadequate oral intake related to inadequate protein-energy intake, pain as evidenced by poor intake prior to admission, wounds, weight loss      Nutrition Interventions:   Food and/or Nutrient Delivery:  Continue Current Diet, Start Oral Nutrition Supplement  Nutrition Education/Counseling:  No recommendation at this time   Coordination of Nutrition Care:  Continue to monitor while inpatient    Goals:  Pt will consume greater than 50% of meals and ONS this admission       Nutrition Monitoring and Evaluation:   Behavioral-Environmental Outcomes:  None Identified   Food/Nutrient Intake Outcomes:  Food and Nutrient Intake, Supplement Intake  Physical Signs/Symptoms Outcomes:  Biochemical Data, Nutrition Focused Physical Findings, Weight     Discharge Planning:    Continue current diet, Continue Oral Nutrition Supplement     Electronically signed by Li Fuentes MS, RD, LD on 4/24/21 at 11:06 AM EDT    Contact: [phone]

## 2021-04-24 NOTE — PROGRESS NOTES
Hospitalist Progress Note      PCP: Christ Page DO    Date of Admission: 4/23/2021    Chief Complaint: Chest pain     Hospital Course:   61 y.o. male with PMH of CAD s/p CABG, HLD, HTN, DM2, GURDEEP and obesity who presented to [facility] with complaints of chest pain. Pt is status post CABG x2 on 2/19/2021. Had uneventful postop course. This past Monday 4/19 pt was seen in the [facility] office due to suture protruding from his skin which was removed. At that time there was no evidence of infection. He developed chest pain on Tuesday and presented to [facility] ED where workup was negative, he was then discharged. The pt developed low grade fevers on Thursday and associated chills/rigors. He reports chest pain mainly in and around his surgical site. No dyspnea. No N/V/D. +constipation. No abdominal pain. ED course: WBC elevated at 18 and sodium of 127. Admitted diagnosis chest wall cellulitis of his surgical incision site. Subjective:   Pt is on RA. Tmax 102.1 overnight. Currently afebrile. VSS. Chest pain is better controlled today. No dyspnea. No N/V/D. Pt's wife is at bedside.      Medications:  Reviewed    Infusion Medications    sodium chloride      sodium chloride 75 mL/hr at 04/24/21 0636    dextrose       Scheduled Medications    insulin glargine  10 Units Subcutaneous Nightly    insulin lispro  0-18 Units Subcutaneous TID WC    insulin lispro  0-9 Units Subcutaneous Nightly    aspirin  81 mg Oral Daily    atorvastatin  40 mg Oral QPM    clopidogrel  75 mg Oral Daily    budesonide-formoterol  2 puff Inhalation BID    metoprolol tartrate  25 mg Oral BID    sodium chloride flush  5-40 mL Intravenous 2 times per day    enoxaparin  40 mg Subcutaneous Daily    cefepime  1,000 mg Intravenous Q12H    vancomycin  1,750 mg Intravenous Q12H    vancomycin (VANCOCIN) intermittent dosing (placeholder)   Other RX Placeholder    sennosides-docusate sodium  2 tablet Oral BID     PRN Meds: oxyCODONE, sodium chloride flush, sodium chloride, promethazine **OR** ondansetron, polyethylene glycol, acetaminophen **OR** acetaminophen, morphine **OR** morphine, glucose, dextrose, glucagon (rDNA), dextrose      Intake/Output Summary (Last 24 hours) at 4/24/2021 1228  Last data filed at 4/24/2021 8191  Gross per 24 hour   Intake 2413.75 ml   Output 1200 ml   Net 1213.75 ml       Physical Exam Performed:    /72   Pulse 94   Temp 98.1 °F (36.7 °C) (Oral)   Resp 16   Ht 5' 10\" (1.778 m)   Wt 232 lb 3.2 oz (105.3 kg)   SpO2 95%   BMI 33.32 kg/m²     General appearance:  No apparent distress, appears stated age and cooperative. HEENT:  Normal cephalic, atraumatic without obvious deformity. Pupils equal, round, and reactive to light. Extra ocular muscles intact. Conjunctivae/corneas clear. Neck: Supple, with full range of motion. No jugular venous distention. Trachea midline. Respiratory:  Normal respiratory effort. Clear to auscultation, bilaterally without Rales/Wheezes/Rhonchi. Cardiovascular:  Regular rate and rhythm with normal S1/S2 without murmurs, rubs or gallops. Sternal incision with erythema with open wound at bottom of incision. Abdomen: Soft, non-tender, non-distended with normal bowel sounds. Musculoskeletal:  No clubbing, cyanosis or edema bilaterally. Full range of motion without deformity. Skin: Skin color, texture, turgor normal.  No rashes or lesions. Neurologic:  Neurovascularly intact without any focal sensory/motor deficits.  Cranial nerves: II-XII intact, grossly non-focal.  Psychiatric:  Alert and oriented, thought content appropriate, normal insight  Capillary Refill: Brisk,3 seconds, normal  Peripheral Pulses: +2 palpable, equal bilaterally       Labs:   Recent Labs     04/23/21  1158 04/24/21  0700   WBC 18.7* 15.2*   HGB 13.3* 11.5*   HCT 40.9 34.9*    249     Recent Labs     04/23/21  1127 04/24/21  0700   * 129*   K 5.0 4.3   CL 92* 92*   CO2 22 26 BUN 10 7   CREATININE 0.6* 0.6*   CALCIUM 9.7 9.3     Recent Labs     04/23/21  1127 04/24/21  0700   AST 30 9*   ALT 11 6*   BILITOT 0.8 0.7   ALKPHOS 85 79     Recent Labs     04/21/21  1402 04/23/21  1127   TROPONINI <0.01 <0.01       Urinalysis:      Lab Results   Component Value Date    NITRU Negative 04/21/2021    BLOODU Negative 04/21/2021    SPECGRAV 1.025 04/21/2021    GLUCOSEU 500 04/21/2021       Radiology:  XR CHEST PORTABLE   Final Result   No acute abnormality identified. Assessment/Plan:    Active Hospital Problems    Diagnosis    Cellulitis [L03.90]       Sepsis due to cellulitis of sternotomy incision   - Met SIRS criteria with fever, leukocytosis, tachycardia. No lactic acidosis. - CT chest on 4/21 showed healing midline sternotomy incision with no focal subcutaneous collection.   - Continue IV vancomycin (pharmacy consulted for dosing) and cefepime (s 4/23). - Blood cultures grew Staph (mecA gene not detected). - ID consulted for further assistance -- appreciate.   - CT surgery consulted/following -- appreciate.      CAD status post CABG in Feb 2021  - EKG non-acute. Troponin negative. - Continue aspirin, plavix, statin, metoprolol.  - Monitor pt on telemetry.      Diabetes mellitus type 2:  - Uncontrolled, recent A1C was 8.4.  - Hold home regimen. Increase to high dose SSI ACHS and start Lantus QHS -- monitor and adjust accordingly. - Carb-control diet.      Acute hyponatremia, mild:  - Likely secondary to dehydration.   - Continue gentle IV fluids.   - Repeat BMP in am.     Asthma:  - Stable. No wheezing on exam. Continue Symbicort.      Hypertension:  - Controlled. Continue his home medications. Monitor.      Hyperlipidemia:  - Continue statin.      Constipation:  - Continue bowel regimen.      Obesity:  - With Body mass index is 33.32 kg/m². Complicating assessment and treatment.  Placing patient at risk for multiple co-morbidities as well as early death and contributing to the patient's presentation. Counseled on weight loss.        DVT Prophylaxis: Lovenox   Diet: DIET CARDIAC; Carb Control: 5 carb choices (75 gms)/meal  Dietary Nutrition Supplements: Diabetic Oral Supplement  Code Status: Full Code    PT/OT Eval Status: not indicated     Dispo - uncertain, pending clinical course     Keerthi Lantigua, APRN - CNP

## 2021-04-24 NOTE — PROGRESS NOTES
Physician Progress Note      Jimbo Cole  LUAN #:                  [id number]  :                       1960  ADMIT DATE:       2021 11:14 AM  DISCH DATE:  RESPONDING  PROVIDER #:        Pina Moore CNP          QUERY TEXT:    Pt admitted with cellulitis. Pt noted to have elevated white count and   tachycardia. If possible, please document in the progress notes and discharge   summary if you are evaluating and /or treating any of the following: The medical record reflects the following:  Risk Factors: recent surgery, hospitalization, cellulitis of surgical incision  Clinical Indicators: on arrival WBC 18.7, tachycardic HR >100  Treatment: IV abx, labs, cultures, supportive care and monitoring    Thank you,  Brad Wallace RN CDS  [phone]  Options provided:  -- Sepsis present on admission  -- No Sepsis, localized infection only  -- Other - I will add my own diagnosis  -- Disagree - Not applicable / Not valid  -- Disagree - Clinically unable to determine / Unknown  -- Refer to Clinical Documentation Reviewer    PROVIDER RESPONSE TEXT:    This patient has sepsis which was present on admission. Query created by:  Shadi Benson on 2021 5:48 PM      Electronically signed by:  Pina Moore CNP 2021 9:43 AM

## 2021-04-24 NOTE — PROGRESS NOTES
Patient up in the chair. Upper chest small opening is draining white fluid and serosanguinous fluid. Mepilex applied to area for drainage. Mid chest small opening draining serosanguinous fluid also. Mepilex applied for drainage also.  AMY,NN

## 2021-04-24 NOTE — PROGRESS NOTES
Increased temperature and erythema at the end of vancomycin infusion. Possibly related to rapid vancomycin infusion. Should not preclude continuation of vancomycin at next dose with attention to infusion rate.       Christi Martel MD  [facility]

## 2021-04-25 LAB
ANION GAP SERPL CALCULATED.3IONS-SCNC: 10 MMOL/L (ref 3–16)
BUN BLDV-MCNC: 8 MG/DL (ref 7–20)
CALCIUM SERPL-MCNC: 8.7 MG/DL (ref 8.3–10.6)
CHLORIDE BLD-SCNC: 94 MMOL/L (ref 99–110)
CO2: 24 MMOL/L (ref 21–32)
CREAT SERPL-MCNC: 0.6 MG/DL (ref 0.8–1.3)
GFR AFRICAN AMERICAN: >60
GFR NON-AFRICAN AMERICAN: >60
GLUCOSE BLD-MCNC: 182 MG/DL (ref 70–99)
GLUCOSE BLD-MCNC: 252 MG/DL (ref 70–99)
GLUCOSE BLD-MCNC: 263 MG/DL (ref 70–99)
GLUCOSE BLD-MCNC: 267 MG/DL (ref 70–99)
GLUCOSE BLD-MCNC: 273 MG/DL (ref 70–99)
HCT VFR BLD CALC: 34.1 % (ref 40.5–52.5)
HEMOGLOBIN: 11.1 G/DL (ref 13.5–17.5)
MCH RBC QN AUTO: 28.7 PG (ref 26–34)
MCHC RBC AUTO-ENTMCNC: 32.6 G/DL (ref 31–36)
MCV RBC AUTO: 88.1 FL (ref 80–100)
PDW BLD-RTO: 14.6 % (ref 12.4–15.4)
PERFORMED ON: ABNORMAL
PLATELET # BLD: 279 K/UL (ref 135–450)
PMV BLD AUTO: 8.2 FL (ref 5–10.5)
POTASSIUM REFLEX MAGNESIUM: 4 MMOL/L (ref 3.5–5.1)
RBC # BLD: 3.87 M/UL (ref 4.2–5.9)
SODIUM BLD-SCNC: 128 MMOL/L (ref 136–145)
VANCOMYCIN TROUGH: 11.4 UG/ML (ref 10–20)
WBC # BLD: 13.5 K/UL (ref 4–11)

## 2021-04-25 PROCEDURE — 2580000003 HC RX 258: Performed by: REGISTERED NURSE

## 2021-04-25 PROCEDURE — 99024 POSTOP FOLLOW-UP VISIT: CPT | Performed by: THORACIC SURGERY (CARDIOTHORACIC VASCULAR SURGERY)

## 2021-04-25 PROCEDURE — 6370000000 HC RX 637 (ALT 250 FOR IP): Performed by: INTERNAL MEDICINE

## 2021-04-25 PROCEDURE — 6370000000 HC RX 637 (ALT 250 FOR IP): Performed by: REGISTERED NURSE

## 2021-04-25 PROCEDURE — 6360000002 HC RX W HCPCS: Performed by: INTERNAL MEDICINE

## 2021-04-25 PROCEDURE — 80202 ASSAY OF VANCOMYCIN: CPT

## 2021-04-25 PROCEDURE — 1200000000 HC SEMI PRIVATE

## 2021-04-25 PROCEDURE — 80048 BASIC METABOLIC PNL TOTAL CA: CPT

## 2021-04-25 PROCEDURE — 87040 BLOOD CULTURE FOR BACTERIA: CPT

## 2021-04-25 PROCEDURE — 6360000002 HC RX W HCPCS: Performed by: REGISTERED NURSE

## 2021-04-25 PROCEDURE — 6360000002 HC RX W HCPCS: Performed by: THORACIC SURGERY (CARDIOTHORACIC VASCULAR SURGERY)

## 2021-04-25 PROCEDURE — 99255 IP/OBS CONSLTJ NEW/EST HI 80: CPT | Performed by: INTERNAL MEDICINE

## 2021-04-25 PROCEDURE — 94640 AIRWAY INHALATION TREATMENT: CPT

## 2021-04-25 PROCEDURE — 36415 COLL VENOUS BLD VENIPUNCTURE: CPT

## 2021-04-25 PROCEDURE — 85027 COMPLETE CBC AUTOMATED: CPT

## 2021-04-25 RX ORDER — HYDROCODONE BITARTRATE AND ACETAMINOPHEN 7.5; 325 MG/1; MG/1
1 TABLET ORAL EVERY 4 HOURS PRN
Status: DISCONTINUED | OUTPATIENT
Start: 2021-04-25 | End: 2021-04-29 | Stop reason: HOSPADM

## 2021-04-25 RX ORDER — MORPHINE SULFATE 4 MG/ML
4 INJECTION, SOLUTION INTRAMUSCULAR; INTRAVENOUS ONCE
Status: COMPLETED | OUTPATIENT
Start: 2021-04-25 | End: 2021-04-25

## 2021-04-25 RX ORDER — INSULIN GLARGINE 100 [IU]/ML
20 INJECTION, SOLUTION SUBCUTANEOUS NIGHTLY
Status: DISCONTINUED | OUTPATIENT
Start: 2021-04-25 | End: 2021-04-29 | Stop reason: HOSPADM

## 2021-04-25 RX ORDER — TAMSULOSIN HYDROCHLORIDE 0.4 MG/1
0.4 CAPSULE ORAL DAILY
Status: DISCONTINUED | OUTPATIENT
Start: 2021-04-25 | End: 2021-04-29 | Stop reason: HOSPADM

## 2021-04-25 RX ORDER — BISACODYL 10 MG
10 SUPPOSITORY, RECTAL RECTAL DAILY PRN
Status: DISCONTINUED | OUTPATIENT
Start: 2021-04-25 | End: 2021-04-26

## 2021-04-25 RX ORDER — HYDROCODONE BITARTRATE AND ACETAMINOPHEN 7.5; 325 MG/1; MG/1
1 TABLET ORAL ONCE
Status: COMPLETED | OUTPATIENT
Start: 2021-04-25 | End: 2021-04-25

## 2021-04-25 RX ORDER — POLYETHYLENE GLYCOL 3350 17 G/17G
17 POWDER, FOR SOLUTION ORAL DAILY
Status: DISCONTINUED | OUTPATIENT
Start: 2021-04-25 | End: 2021-04-29 | Stop reason: HOSPADM

## 2021-04-25 RX ADMIN — POLYETHYLENE GLYCOL 3350 17 G: 17 POWDER, FOR SOLUTION ORAL at 13:24

## 2021-04-25 RX ADMIN — MORPHINE SULFATE 4 MG: 4 INJECTION, SOLUTION INTRAMUSCULAR; INTRAVENOUS at 23:45

## 2021-04-25 RX ADMIN — INSULIN LISPRO 5 UNITS: 100 INJECTION, SOLUTION INTRAVENOUS; SUBCUTANEOUS at 17:33

## 2021-04-25 RX ADMIN — MORPHINE SULFATE 4 MG: 4 INJECTION, SOLUTION INTRAMUSCULAR; INTRAVENOUS at 14:50

## 2021-04-25 RX ADMIN — MORPHINE SULFATE 2 MG: 2 INJECTION, SOLUTION INTRAMUSCULAR; INTRAVENOUS at 03:36

## 2021-04-25 RX ADMIN — INSULIN LISPRO 9 UNITS: 100 INJECTION, SOLUTION INTRAVENOUS; SUBCUTANEOUS at 12:33

## 2021-04-25 RX ADMIN — CEFEPIME HYDROCHLORIDE 1000 MG: 1 INJECTION, POWDER, FOR SOLUTION INTRAMUSCULAR; INTRAVENOUS at 13:15

## 2021-04-25 RX ADMIN — TAMSULOSIN HYDROCHLORIDE 0.4 MG: 0.4 CAPSULE ORAL at 16:09

## 2021-04-25 RX ADMIN — ACETAMINOPHEN 650 MG: 325 TABLET ORAL at 20:54

## 2021-04-25 RX ADMIN — Medication 2 PUFF: at 20:23

## 2021-04-25 RX ADMIN — Medication 10 ML: at 19:37

## 2021-04-25 RX ADMIN — Medication 10 ML: at 08:19

## 2021-04-25 RX ADMIN — ASPIRIN 81 MG: 81 TABLET, CHEWABLE ORAL at 08:18

## 2021-04-25 RX ADMIN — MORPHINE SULFATE 4 MG: 4 INJECTION, SOLUTION INTRAMUSCULAR; INTRAVENOUS at 19:36

## 2021-04-25 RX ADMIN — INSULIN LISPRO 5 UNITS: 100 INJECTION, SOLUTION INTRAVENOUS; SUBCUTANEOUS at 12:33

## 2021-04-25 RX ADMIN — METOPROLOL TARTRATE 25 MG: 25 TABLET, FILM COATED ORAL at 08:18

## 2021-04-25 RX ADMIN — ENOXAPARIN SODIUM 40 MG: 40 INJECTION SUBCUTANEOUS at 08:18

## 2021-04-25 RX ADMIN — MORPHINE SULFATE 4 MG: 4 INJECTION, SOLUTION INTRAMUSCULAR; INTRAVENOUS at 09:17

## 2021-04-25 RX ADMIN — HYDROCODONE BITARTRATE AND ACETAMINOPHEN 1 TABLET: 7.5; 325 TABLET ORAL at 12:37

## 2021-04-25 RX ADMIN — VANCOMYCIN HYDROCHLORIDE 1750 MG: 1 INJECTION, POWDER, LYOPHILIZED, FOR SOLUTION INTRAVENOUS at 02:02

## 2021-04-25 RX ADMIN — INSULIN LISPRO 9 UNITS: 100 INJECTION, SOLUTION INTRAVENOUS; SUBCUTANEOUS at 08:12

## 2021-04-25 RX ADMIN — HYDROCODONE BITARTRATE AND ACETAMINOPHEN 1 TABLET: 7.5; 325 TABLET ORAL at 20:49

## 2021-04-25 RX ADMIN — METOPROLOL TARTRATE 25 MG: 25 TABLET, FILM COATED ORAL at 20:49

## 2021-04-25 RX ADMIN — INSULIN GLARGINE 20 UNITS: 100 INJECTION, SOLUTION SUBCUTANEOUS at 20:47

## 2021-04-25 RX ADMIN — INSULIN LISPRO 3 UNITS: 100 INJECTION, SOLUTION INTRAVENOUS; SUBCUTANEOUS at 17:33

## 2021-04-25 RX ADMIN — ACETAMINOPHEN 650 MG: 325 TABLET ORAL at 07:43

## 2021-04-25 RX ADMIN — OXYCODONE 5 MG: 5 TABLET ORAL at 00:41

## 2021-04-25 RX ADMIN — HYDROCODONE BITARTRATE AND ACETAMINOPHEN 1 TABLET: 7.5; 325 TABLET ORAL at 16:35

## 2021-04-25 RX ADMIN — ATORVASTATIN CALCIUM 40 MG: 40 TABLET, FILM COATED ORAL at 17:33

## 2021-04-25 RX ADMIN — CEFEPIME HYDROCHLORIDE 1000 MG: 1 INJECTION, POWDER, FOR SOLUTION INTRAMUSCULAR; INTRAVENOUS at 00:41

## 2021-04-25 RX ADMIN — HYDROCODONE BITARTRATE AND ACETAMINOPHEN 1 TABLET: 7.5; 325 TABLET ORAL at 06:34

## 2021-04-25 RX ADMIN — DOCUSATE SODIUM 50 MG AND SENNOSIDES 8.6 MG 2 TABLET: 8.6; 5 TABLET, FILM COATED ORAL at 20:49

## 2021-04-25 RX ADMIN — Medication 2 PUFF: at 08:05

## 2021-04-25 RX ADMIN — CEFAZOLIN 2000 MG: 10 INJECTION, POWDER, FOR SOLUTION INTRAVENOUS at 16:11

## 2021-04-25 RX ADMIN — MORPHINE SULFATE 4 MG: 4 INJECTION, SOLUTION INTRAMUSCULAR; INTRAVENOUS at 07:44

## 2021-04-25 RX ADMIN — CLOPIDOGREL BISULFATE 75 MG: 75 TABLET ORAL at 08:18

## 2021-04-25 RX ADMIN — DOCUSATE SODIUM 50 MG AND SENNOSIDES 8.6 MG 2 TABLET: 8.6; 5 TABLET, FILM COATED ORAL at 08:18

## 2021-04-25 RX ADMIN — INSULIN LISPRO 5 UNITS: 100 INJECTION, SOLUTION INTRAVENOUS; SUBCUTANEOUS at 20:47

## 2021-04-25 ASSESSMENT — PAIN SCALES - GENERAL
PAINLEVEL_OUTOF10: 8
PAINLEVEL_OUTOF10: 7
PAINLEVEL_OUTOF10: 8
PAINLEVEL_OUTOF10: 8
PAINLEVEL_OUTOF10: 10
PAINLEVEL_OUTOF10: 8

## 2021-04-25 ASSESSMENT — ENCOUNTER SYMPTOMS
DIARRHEA: 0
NAUSEA: 0
ABDOMINAL PAIN: 0
SORE THROAT: 0
EYE REDNESS: 0
RHINORRHEA: 0
WHEEZING: 0
COUGH: 0
TROUBLE SWALLOWING: 0
SHORTNESS OF BREATH: 0
CONSTIPATION: 0
BACK PAIN: 0
EYE DISCHARGE: 0

## 2021-04-25 ASSESSMENT — PAIN DESCRIPTION - LOCATION
LOCATION: CHEST
LOCATION: CHEST

## 2021-04-25 ASSESSMENT — PAIN DESCRIPTION - PROGRESSION
CLINICAL_PROGRESSION: GRADUALLY WORSENING
CLINICAL_PROGRESSION: GRADUALLY WORSENING
CLINICAL_PROGRESSION: GRADUALLY IMPROVING
CLINICAL_PROGRESSION: GRADUALLY WORSENING
CLINICAL_PROGRESSION: NOT CHANGED
CLINICAL_PROGRESSION: GRADUALLY WORSENING
CLINICAL_PROGRESSION: GRADUALLY WORSENING

## 2021-04-25 ASSESSMENT — PAIN DESCRIPTION - ONSET
ONSET: ON-GOING
ONSET: ON-GOING

## 2021-04-25 ASSESSMENT — PAIN DESCRIPTION - PAIN TYPE
TYPE: ACUTE PAIN
TYPE: ACUTE PAIN

## 2021-04-25 ASSESSMENT — PAIN DESCRIPTION - FREQUENCY: FREQUENCY: CONTINUOUS

## 2021-04-25 ASSESSMENT — PAIN DESCRIPTION - ORIENTATION: ORIENTATION: LEFT

## 2021-04-25 ASSESSMENT — PAIN DESCRIPTION - DESCRIPTORS
DESCRIPTORS: STABBING
DESCRIPTORS: STABBING

## 2021-04-25 NOTE — PROGRESS NOTES
[facility]  Progress Note    4/25/2021 9:37 AM  Surgeon: Galileo Nava     2 months ago S/P : Coronary artery bypass    Chief complaint: Chest pain     Subjective: New onset significant amount of drainage yellow discoloration from the chest overnight     Hospital course:  April 25 tender, significant amount of drainage overnight, on antibiotics since admission    Vital Signs: BP (!) 130/91   Pulse 101   Temp 97.7 °F (36.5 °C) (Oral)   Resp 18   Ht 5' 10\" (1.778 m)   Wt 236 lb 11.2 oz (107.4 kg)   SpO2 97%   BMI 33.96 kg/m²        I/O:      Intake/Output Summary (Last 24 hours) at 4/25/2021 0937  Last data filed at 4/25/2021 0452  Gross per 24 hour   Intake 3918.75 ml   Output 1000 ml   Net 2918.75 ml       Exam:   Cardiovascular: S1 plus S2 +0 no additional sound  Pulmonary: Bilaterally clear no additional sound  Incision about 1 cm opened last night with quite a bit of drainage require dressing change x3      Labs:   CBC:   Recent Labs     04/23/21  1158 04/24/21  0700 04/25/21  0613   WBC 18.7* 15.2* 13.5*   HGB 13.3* 11.5* 11.1*   HCT 40.9 34.9* 34.1*   MCV 88.1 88.6 88.1    249 279     BMP:   Recent Labs     04/23/21  1127 04/24/21  0700 04/25/21  0613   * 129* 128*   K 5.0 4.3 4.0   CL 92* 92* 94*   CO2 22 26 24   BUN 10 7 8   CREATININE 0.6* 0.6* 0.6*     PT/INR: No results for input(s): PROTIME, INR in the last 72 hours. APTT: No results for input(s): APTT in the last 72 hours.     Scheduled Meds:    insulin glargine  20 Units Subcutaneous Nightly    insulin lispro  5 Units Subcutaneous TID WC    insulin lispro  0-18 Units Subcutaneous TID WC    insulin lispro  0-9 Units Subcutaneous Nightly    aspirin  81 mg Oral Daily    atorvastatin  40 mg Oral QPM    clopidogrel  75 mg Oral Daily    budesonide-formoterol  2 puff Inhalation BID    metoprolol tartrate  25 mg Oral BID    sodium chloride flush  5-40 mL Intravenous 2 times per day    enoxaparin  40 mg Subcutaneous Daily    cefepime  1,000 mg Intravenous Q12H    vancomycin  1,750 mg Intravenous Q12H    vancomycin (VANCOCIN) intermittent dosing (placeholder)   Other RX Placeholder    sennosides-docusate sodium  2 tablet Oral BID     Continuous Infusions:    sodium chloride      dextrose           Patient Active Problem List   Diagnosis    Type II or unspecified type diabetes mellitus without mention of complication, not stated as uncontrolled    Diabetes mellitus    History of ETOH abuse    Patient overweight    Patient overweight    Dietary noncompliance    Alcohol abuse    Flank pain    Prostatism    Low serum testosterone level    Arm pain, left    Shoulder pain    Precordial pain    Lung nodule    Acute angina (HCC)    Abnormal stress test    Acute coronary syndrome (HCC)    Coronary artery disease involving native coronary artery of native heart with unstable angina pectoris (HCC)    Other chest pain    S/P drug eluting coronary stent placement    Essential hypertension    Obesity    Abnormal chest x-ray    Angina effort    Skin lesion    Angina at rest [facility])    Hyperlipidemia    [facility]ina pectoris ([facility] [address]    NSTEMI (non-ST elevated myocardial infarction) ([facility] [address]    Controlled type 2 diabetes mellitus without complication, without long-term current use of insulin ([facility] [address]    ASHD (arteriosclerotic heart disease)    Mass of right foot    Overweight    History of angina pectoris    Reactive airway disease    Bronchitis    Reactive airway disease with wheezing, moderate persistent, with acute exacerbation    Need for prophylactic vaccination and inoculation against varicella    Need for prophylactic vaccination against diphtheria-tetanus-pertussis (DTP)    Chronic right-sided thoracic back pain    CAD in native artery    S/P PTCA (percutaneous transluminal coronary angioplasty)    Chest pain    Ischemic cardiomyopathy    GURDEEP (obstructive sleep apnea)    Cellulitis

## 2021-04-25 NOTE — CONSULTS
Infectious Diseases   Consult Note        Admission Date: 4/23/2021  Hospital Day: Hospital Day: 3   Attending: Seamus Rabago MD  Date of service: 4/25/21     Reason for admission: Cellulitis [L03.90]    Chief complaint/ Reason for consult: Sepsis, MSSA bacteremia    Microbiology:        I have reviewed allavailable micro lab data and cultures    · Blood culture (2/2) - collected on 4/23/2021: MSSA      Antibiotics and immunizations:       Current antibiotics: All antibiotics and their doses were reviewed by me    Recent Abx Admin                   cefepime (MAXIPIME) 1000 mg IVPB minibag (mg) 1,000 mg New Bag 04/25/21 1315     1,000 mg New Bag  0041    vancomycin (VANCOCIN) 1,750 mg in dextrose 5 % 500 mL IVPB (mg) 1,750 mg New Bag 04/25/21 0202                  Immunization History: All immunization history was reviewed by me today. Immunization History   Administered Date(s) Administered    Influenza Vaccine, unspecified formulation 10/18/2018    Influenza Virus Vaccine 01/18/2016, 12/16/2019    Influenza, Quadv, IM, PF (6 mo and older Fluzone, Flulaval, Fluarix, and 3 yrs and older Afluria) 11/12/2020    Pneumococcal Polysaccharide ([id number]) 01/18/2016       Known drug allergies: All allergies were reviewed and updated    No Known Allergies    Social history:     Social History:  All social andepidemiologic history was reviewed and updated by me today as needed. · Tobacco use:   reports that he has never smoked. He has never used smokeless tobacco.  · Alcohol use:   reports previous alcohol use of about 1.0 standard drinks of alcohol per week. · Currently lives in: [address]  ·  reports no history of drug use.        Assessment:     The patient is a 61 y.o. old male who  has a past medical history of Alcohol abuse (12/03/2012), Coronary artery disease, Diabetic neuropathy associated with type 2 diabetes mellitus ([facility] [address] Hyperlipidemia, Hypertension, Non morbid obesity, GURDEEP (obstructive sleep apnea), Reactive airway disease with wheezing, moderate persistent, with acute exacerbation (03/03/2020), and Type II or unspecified type diabetes mellitus without mention of complication, not stated as uncontrolled. with following problems:    · Sepsis on admission with high fever, bandemia  · Methicillin susceptible Staphylococcus aureus bacteremia  · Sternotomy site infection, likely MSSA  · Hyponatremia  · Elevated sed rate of 89  · Coronary artery disease s/p CABG  · Type II diabetes mellitus in obese  · Obstructive sleep apnea  · Obesity Class 1 due to excess calorie intake : Body mass index is 33.96 kg/m². Discussion:      The patient had a high fever of 102.1 on admission. White cell count was 18,700.  2 sets of blood cultures have grown MSSA    He has been on IV vancomycin and IV cefepime    Plan:     Diagnostic Workup:    · Will order 2 sets of repeat blood cultures today  · Follow-up on susceptibility of Staphylococcus isolated from the blood culture  · Will order *2D echo to rule out endocarditis  · Continue to follow fever curve, WBC count and blood cultures  · Follow up on liverand renal functions closely    Antimicrobials:    · Will stop IV vancomycin and cefepime today  · Will order IV cefazolin 2 g every 8 hours  · We will follow up on the culture results and clinical progress and will make further recommendations accordingly. Recommend holding off on PICC line until blood cultures clear for at least 48 and preferably 72 hours. · Midline or regular central venous line okay for now, if needed for IV access. · Anticipate 4 to 6-week of IV antibiotic course depending on 2D echo results  · No need of any contact isolation  · Continue close vitals monitoring. · Maintain good glycemic control. · Fall precautions. Aspiration precautions. · Continue to watch for new fever or diarrhea. · DVT prophylaxis. · Discussed all above with patient and RN.       Drug Monitoring:    · Continue serial monitoring for antibiotic toxicity as follows: CBC, CMP  · Continue to watch for following: new or worsening fever, hypotension, hives, lip swelling and redness or purulence at vascular access sites. I/v access Management:    · Continue to monitor i.v access sites for erythema, induration, discharge or tenderness. · As always, continue efforts to minimizetubes/lines/drains as clinically appropriate to reduce chances of line associated infections. Current isolation precautions: There are no current isolations documented for this patient. Diabetes mellitus education and counseling:    Patient was educated in detail about the importance of keeping diabetes under control to improve the cure rate of infection and prevent future infections. Patient was advised to check blood glucose level regularly and to stay compliant with the diabetes medications. Patient was advised to the trim the toe nails carefully, wear shoes or slippers at all times and check both feet everyday before going to bed to look for any cuts, blisters, swelling or redness. Importance of washing the feet everyday with soap and water and keeping them dry, and seeking prompt medical attention in case of a non-healing wound or ulcer on the feet was also highlighted. Weight loss counseling:    Extensive weight loss counseling was done. It is important to set a realistic weight loss goal. First goal should be to avoid gaining more weight and staying at current weight (or within 5 percent). People at high risk of developing diabetes who are able to lose 5 percent of their body weight and maintain this weight will reduce their risk of developing diabetes by about 50 percent and reduce their blood pressure. Losing more than 15 percent of  body weight and staying at this weight is an extremely good result, even if you never reach your \"dream\" or \"ideal\" weight.     Lifestyle changes including changing eating CABG incision site. Patient was also having fever. He was sent to the ER. In the ER, patient was noted to have white cell count of 18,700. He also spiked a fever of 102. 1. He was admitted. Blood cultures were sent. He was started on empiric IV vancomycin and cefepime. 2 sets of blood cultures have grown MSSA. I have been asked for my opinion for management for this patient. Past Medical History: All past medical history reviewed today. Past Medical History:   Diagnosis Date    Alcohol abuse 12/03/2012    Coronary artery disease     Diabetic neuropathy associated with type 2 diabetes mellitus ([facility] [address]     Hyperlipidemia     Hypertension     Non morbid obesity     GURDEEP (obstructive sleep apnea)     Reactive airway disease with wheezing, moderate persistent, with acute exacerbation 03/03/2020    Type II or unspecified type diabetes mellitus without mention of complication, not stated as uncontrolled          Past Surgical History: All pastsurgical history was reviewed today. Past Surgical History:   Procedure Laterality Date    BYPASS GRAFT  02/19/2021    CORONARY ANGIOPLASTY WITH STENT PLACEMENT  1/14/2016 9/21/2020    1 [employer] SAMUEL 2.25x16    CORONARY ARTERY BYPASS GRAFT  02/19/2021    CABG x2 & ALEX-Dr. Guadalupe Russell    CORONARY ARTERY BYPASS GRAFT N/A 2/19/2021    CORONARY ARTERY BYPASS GRAFTING X2, INTERNAL MAMMARY ARTERY, SAPHENOUS VEIN GRAFT, ON PUMP WITH LEFT ATRIAL APPENDAGE CLIP, BILATERAL INTERCOSTAL NERVE BLOCK performed by Lily Holm MD at [facility] [employer]           Family History: All family history was reviewed today. Problem Relation Age of Onset    Heart Attack Mother     Heart Disease Mother     Heart Attack Father     Cancer Father     Hearing Loss Father     Diabetes Father     Heart Disease Father     Heart Disease Paternal Uncle         bypass         Medications:  All current and past medications were Negative for dysuria, flank pain, frequency, hematuria and urgency. Musculoskeletal: Positive for myalgias. Negative for back pain. Pain redness at the sternotomy site   Skin: Negative for rash. Neurological: Negative for dizziness, seizures and headaches. Hematological: Does not bruise/bleed easily. Psychiatric/Behavioral: Negative for hallucinations and suicidal ideas. All other systems reviewed and are negative. Objective:       PHYSICAL EXAM:      Vitals:   Vitals:    04/25/21 0527 04/25/21 0748 04/25/21 0807 04/25/21 1221   BP:  (!) 130/91  118/77   Pulse:  101  87   Resp:  18  18   Temp:  97.7 °F (36.5 °C)  97.5 °F (36.4 °C)   TempSrc:  Oral  Oral   SpO2:  96% 97% 98%   Weight: 236 lb 11.2 oz (107.4 kg)      Height:           Physical Exam  Vitals signs and nursing note reviewed. Constitutional:       Appearance: Normal appearance. He is well-developed. HENT:      Head: Normocephalic and atraumatic. Right Ear: External ear normal.      Left Ear: External ear normal.      Nose: Nose normal. No congestion or rhinorrhea. Mouth/Throat:      Mouth: Mucous membranes are moist.      Pharynx: No oropharyngeal exudate or posterior oropharyngeal erythema. Eyes:      General: No scleral icterus. Right eye: No discharge. Left eye: No discharge. Conjunctiva/sclera: Conjunctivae normal.      Pupils: Pupils are equal, round, and reactive to light. Neck:      Musculoskeletal: Normal range of motion and neck supple. No neck rigidity or muscular tenderness. Cardiovascular:      Rate and Rhythm: Normal rate and regular rhythm. Pulses: Normal pulses. Heart sounds: No murmur. No friction rub. Pulmonary:      Effort: Pulmonary effort is normal. No respiratory distress. Breath sounds: Normal breath sounds. No stridor. No wheezing, rhonchi or rales. Abdominal:      General: Bowel sounds are normal.      Palpations: Abdomen is soft.       Tenderness: There is no abdominal tenderness. There is no right CVA tenderness, left CVA tenderness, guarding or rebound. Musculoskeletal: Normal range of motion. General: No swelling or tenderness. Comments: Sternotomy site wound dehiscence noted, hemorrhagic discharge noted. Surrounding redness and swelling noted   Lymphadenopathy:      Cervical: No cervical adenopathy. Skin:     General: Skin is warm and dry. Coloration: Skin is not jaundiced. Findings: No erythema or rash. Neurological:      General: No focal deficit present. Mental Status: He is alert and oriented to person, place, and time. Mental status is at baseline. Motor: No abnormal muscle tone. Psychiatric:         Mood and Affect: Mood normal.         Behavior: Behavior normal.         Thought Content: Thought content normal.           Lines: All vascular access sites are healthy with no local erythema, discharge or tenderness. Intake and output:     I/O last 3 completed shifts: In: 4158.8 [P.O.:1800; I.V.:1808.8; IV Piggyback:550]  Out: 1000 [Urine:1000]    Lab Data:   All available labs were reviewed by me today.      CBC:   Recent Labs     04/23/21  1158 04/24/21  0700 04/25/21  0613   WBC 18.7* 15.2* 13.5*   RBC 4.64 3.93* 3.87*   HGB 13.3* 11.5* 11.1*   HCT 40.9 34.9* 34.1*    249 279   MCV 88.1 88.6 88.1   MCH 28.7 29.1 28.7   MCHC 32.5 32.9 32.6   RDW 14.7 14.7 14.6        BMP:  Recent Labs     04/23/21  1127 04/24/21  0700 04/25/21  0613   * 129* 128*   K 5.0 4.3 4.0   CL 92* 92* 94*   CO2 22 26 24   BUN 10 7 8   CREATININE 0.6* 0.6* 0.6*   CALCIUM 9.7 9.3 8.7   GLUCOSE 217* 272* 267*        Hepatic FunctionPanel:   Lab Results   Component Value Date    ALKPHOS 79 04/24/2021    ALT 6 04/24/2021    AST 9 04/24/2021    PROT 6.7 04/24/2021    BILITOT 0.7 04/24/2021    BILIDIR <0.2 04/19/2021    IBILI see below 04/19/2021    LABALBU 3.2 04/24/2021       CPK: No results found for: CKTOTAL  ESR:   Lab Results   Component Value Date    SEDRATE 89 (H) 04/23/2021     CRP:   Lab Results   Component Value Date    .3 (H) 04/23/2021         Imaging: All pertinent images and reports for the current visit were reviewed by meduring this visit. XR CHEST PORTABLE   Final Result   No acute abnormality identified. Outside records:    Labs, Microbiology, Radiology and pertinent results from Care everywhere, if available, were reviewed as a part ofthe consultation.       Problem list:       Patient Active Problem List   Diagnosis Code    Type II or unspecified type diabetes mellitus without mention of complication, not stated as uncontrolled E11.9    Diabetes mellitus E11.9    History of ETOH abuse F10.11    Patient overweight E66.3    Patient overweight E66.3    Dietary noncompliance Z91.11    Alcohol abuse F10.10    Flank pain R10.9    Prostatism N40.0    Low serum testosterone level R79.89    Arm pain, left M79.602    Shoulder pain M25.519    Precordial pain R07.2    Lung nodule R91.1    Acute angina (HCC) I20.9    Abnormal stress test R94.39    Acute coronary syndrome (HCC) I24.9    Coronary artery disease involving native coronary artery of native heart with unstable angina pectoris (HCC) I25.110    Other chest pain R07.89    S/P drug eluting coronary stent placement Z95.5    Essential hypertension I10    Obesity E66.9    Abnormal chest x-ray R93.89    Angina effort I20.8    Skin lesion L98.9    Angina at rest ([address] I20.8    Hyperlipidemia E78.5    Angina pectoris ([facility]) I20.9    NSTEMI (non-ST elevated myocardial infarction) ([address] I21.4    Controlled type 2 diabetes mellitus without complication, without long-term current use of insulin (HCC) E11.9    ASHD (arteriosclerotic heart disease) I25.10    Mass of right foot R22.41    Overweight E66.3    History of angina pectoris Z86.79    Reactive airway disease J45.909    Bronchitis J40    Reactive airway disease with wheezing, moderate persistent, with acute exacerbation J45.41    Need for prophylactic vaccination and inoculation against varicella Z23    Need for prophylactic vaccination against diphtheria-tetanus-pertussis (DTP) Z23    Chronic right-sided thoracic back pain M54.6, G89.29    CAD in native artery I25.10    S/P PTCA (percutaneous transluminal coronary angioplasty) Z98.61    Chest pain R07.9    Ischemic cardiomyopathy I25.5    GURDEEP (obstructive sleep apnea) G47.33    Cellulitis L03.90    Sepsis due to methicillin susceptible Staphylococcus aureus (HCC) A41.01    Bacteremia due to methicillin susceptible Staphylococcus aureus (MSSA) R78.81, B95.61    Disruption or dehiscence of closure of sternum or sternotomy T81.32XA    Elevated sed rate R70.0    Type 2 diabetes mellitus with obesity (HCC) E11.69, E66.9         Please note that this chart was generated using Dragon dictation software. Although every effort was made to ensure the accuracy of this automated transcription, some errors in transcription may have occurred inadvertently. If you may need any clarification, please do not hesitate to contact me through EPIC or at the phone number provided below with my electronic signature. Any pictures or media included in this note were obtained after taking informed verbal consent from the patient and with their approval to include those in the patient's medical record.       Galo Casillas MD, MPH  4/25/21, 2:21 PM EDT   [facility] Infectious Disease   [address] [place] [address], [address] [place] [address]  Office: [phone]  Fax: [phone]  Clinic days:  Tuesday & Thursday

## 2021-04-25 NOTE — PROGRESS NOTES
Called Urology consult to [phone], Dr Boland Samples on call. 4/25/21 @8032. [facility]

## 2021-04-25 NOTE — PROGRESS NOTES
Hospitalist Progress Note      PCP: Kevin Schultz DO    Date of Admission: 4/23/2021    Chief Complaint: Chest pain     Hospital Course:   61 y.o. male with PMH of CAD s/p CABG, HLD, HTN, DM2, GURDEEP and obesity who presented to [facility] with complaints of chest pain. Pt is status post CABG x2 on 2/19/2021. Had uneventful postop course. This past Monday 4/19 pt was seen in the [facility] office due to suture protruding from his skin which was removed. At that time there was no evidence of infection. He developed chest pain on Tuesday and presented to [facility] ED where workup was negative, he was then discharged. The pt developed low grade fevers on Thursday and associated chills/rigors. He reports chest pain mainly in and around his surgical site. No dyspnea. No N/V/D. +constipation. No abdominal pain. ED course: WBC elevated at 18 and sodium of 127. Admitted diagnosis chest wall cellulitis of his surgical incision site. Subjective:   Pt is on RA. Fever curve improved. VSS. Pt reports surgical incisional pain. No dyspnea. No N/V/D. Still has not had a bowel movement.     Medications:  Reviewed    Infusion Medications    sodium chloride Stopped (04/25/21 0944)    dextrose       Scheduled Medications    insulin glargine  20 Units Subcutaneous Nightly    insulin lispro  5 Units Subcutaneous TID WC    insulin lispro  0-18 Units Subcutaneous TID WC    insulin lispro  0-9 Units Subcutaneous Nightly    aspirin  81 mg Oral Daily    atorvastatin  40 mg Oral QPM    budesonide-formoterol  2 puff Inhalation BID    metoprolol tartrate  25 mg Oral BID    sodium chloride flush  5-40 mL Intravenous 2 times per day    cefepime  1,000 mg Intravenous Q12H    vancomycin  1,750 mg Intravenous Q12H    sennosides-docusate sodium  2 tablet Oral BID     PRN Meds: HYDROcodone-acetaminophen, sodium chloride flush, sodium chloride, promethazine **OR** ondansetron, polyethylene glycol, acetaminophen **OR** acetaminophen, morphine **OR** morphine, glucose, dextrose, glucagon (rDNA), dextrose      Intake/Output Summary (Last 24 hours) at 4/25/2021 1241  Last data filed at 4/25/2021 1012  Gross per 24 hour   Intake 4323.75 ml   Output 1000 ml   Net 3323.75 ml       Physical Exam Performed:    /77   Pulse 87   Temp 97.5 °F (36.4 °C) (Oral)   Resp 18   Ht 5' 10\" (1.778 m)   Wt 236 lb 11.2 oz (107.4 kg)   SpO2 98%   BMI 33.96 kg/m²     General appearance:  No apparent distress, appears stated age and cooperative. HEENT:  Normal cephalic, atraumatic without obvious deformity. Pupils equal, round, and reactive to light. Extra ocular muscles intact. Conjunctivae/corneas clear. Neck: Supple, with full range of motion. No jugular venous distention. Trachea midline. Respiratory:  Normal respiratory effort. Clear to auscultation, bilaterally without Rales/Wheezes/Rhonchi. Cardiovascular:  Regular rate and rhythm with normal S1/S2 without murmurs, rubs or gallops. Sternal incision with erythema with open wound at top and bottom of incision with purulent drainage. Abdomen: Soft, non-tender, non-distended with normal bowel sounds. Musculoskeletal:  No clubbing, cyanosis or edema bilaterally. Full range of motion without deformity. Skin: Skin color, texture, turgor normal.  No rashes or lesions. Neurologic:  Neurovascularly intact without any focal sensory/motor deficits.  Cranial nerves: II-XII intact, grossly non-focal.  Psychiatric:  Alert and oriented, thought content appropriate, normal insight  Capillary Refill: Brisk,3 seconds, normal  Peripheral Pulses: +2 palpable, equal bilaterally       Labs:   Recent Labs     04/23/21  1158 04/24/21  0700 04/25/21  0613   WBC 18.7* 15.2* 13.5*   HGB 13.3* 11.5* 11.1*   HCT 40.9 34.9* 34.1*    249 279     Recent Labs     04/23/21  1127 04/24/21  0700 04/25/21  0613   * 129* 128*   K 5.0 4.3 4.0   CL 92* 92* 94*   CO2 22 26 24   BUN 10 7 8   CREATININE 0.6* 0.6* 0.6*   CALCIUM 9.7 9.3 8.7     Recent Labs     04/23/21  1127 04/24/21  0700   AST 30 9*   ALT 11 6*   BILITOT 0.8 0.7   ALKPHOS 85 79     Recent Labs     04/23/21  1127   TROPONINI <0.01       Urinalysis:      Lab Results   Component Value Date    NITRU Negative 04/21/2021    BLOODU Negative 04/21/2021    SPECGRAV 1.025 04/21/2021    GLUCOSEU 500 04/21/2021       Radiology:  XR CHEST PORTABLE   Final Result   No acute abnormality identified. Assessment/Plan:    Active Hospital Problems    Diagnosis    Cellulitis [L03.90]       Sepsis due to cellulitis of sternotomy incision   - Met SIRS criteria with fever, leukocytosis, tachycardia. No lactic acidosis. - CT chest on 4/21 showed healing midline sternotomy incision with no focal subcutaneous collection.   - Pt now with open wound at the top and bottom of the surgical incision with purulent drainage.   - Pt started on IV vancomycin and cefepime on 4/23. Blood cultures grew Staph (mecA gene not detected). Dc'd Vancomycin as likely MSSA. Follow-up sensitivities. - ID consulted for further assistance -- appreciate.   - CT surgery consulted/following. Potential plan for I&D on 4/26.     CAD status post CABG in Feb 2021  - EKG non-acute. Troponin negative. - Continue aspirin, statin, metoprolol (CTS dc'd plavix, last dose was on 4/25). - Monitor pt on telemetry.      Diabetes mellitus type 2:  - Uncontrolled, recent A1C was 8.4.  - Hold home regimen. Continue high dose SSI ACHS and Lantus QHS, add prandial insulin with meals TID -- monitor and adjust accordingly. - Carb-control diet.      Acute hyponatremia, mild:  - Likely secondary to dehydration and component of pseudohyponatremia (corrected Na is 132). - S/p gentle IV fluids.   - Monitor BMP closely. Asthma:  - Stable. No wheezing on exam. Continue Symbicort.      Hypertension:  - Controlled. Continue his home medications. Monitor.      Hyperlipidemia:  - Continue statin.    Constipation:  - Continue bowel regimen. Urinary frequency/hesitancy:  - Pt was to see Dr. Steven Armstrong on 4/27. Pt requesting consult while inpt. - UA negative. Check PVR. Start flomax.      Obesity:  - With Body mass index is 33.32 kg/m². Complicating assessment and treatment. Placing patient at risk for multiple co-morbidities as well as early death and contributing to the patient's presentation. Counseled on weight loss.        DVT Prophylaxis: SCDs (holding lovenox for I&D on 4/26)  Diet: DIET CARDIAC; Carb Control: 5 carb choices (75 gms)/meal  Dietary Nutrition Supplements: Diabetic Oral Supplement  Diet NPO, After Midnight  Code Status: Full Code    PT/OT Eval Status: not indicated     Dispo - uncertain, pending clinical course     KORIN Watts - CNP

## 2021-04-25 NOTE — PROGRESS NOTES
Dr. Paredes Never saw patient again and wound vac was not working due to blood clot in system. He removed wound vac and packed wound with kerlex and covered with ABD pad. Dr. Paredes Never stated to not change the dressing, only the ABD pad if saturated. Will continue to monitor.  [place]

## 2021-04-25 NOTE — PLAN OF CARE
Problem: Pain:  Goal: Pain level will decrease  Description: Pain level will decrease  Outcome: Ongoing     Problem: Falls - Risk of:  Goal: Will remain free from falls  Description: Will remain free from falls  Outcome: Ongoing     Problem: Nutrition  Goal: Optimal nutrition therapy  Outcome: Ongoing

## 2021-04-25 NOTE — PLAN OF CARE
Problem: Pain:  Goal: Pain level will decrease  Description: Pain level will decrease  4/24/2021 2205 by Avtar Tsang RN  Outcome: Ongoing    Problem: Falls - Risk of:  Goal: Will remain free from falls  Description: Will remain free from falls  4/24/2021 2205 by Avtar Tsang RN  Outcome: Ongoing

## 2021-04-26 ENCOUNTER — ANESTHESIA EVENT (OUTPATIENT)
Dept: OPERATING ROOM | Age: 61
DRG: 856 | End: 2021-04-26
Payer: COMMERCIAL

## 2021-04-26 ENCOUNTER — TELEPHONE (OUTPATIENT)
Dept: FAMILY MEDICINE CLINIC | Age: 61
End: 2021-04-26

## 2021-04-26 ENCOUNTER — ANESTHESIA (OUTPATIENT)
Dept: OPERATING ROOM | Age: 61
DRG: 856 | End: 2021-04-26
Payer: COMMERCIAL

## 2021-04-26 VITALS
OXYGEN SATURATION: 100 % | DIASTOLIC BLOOD PRESSURE: 102 MMHG | RESPIRATION RATE: 14 BRPM | SYSTOLIC BLOOD PRESSURE: 124 MMHG

## 2021-04-26 LAB
ANION GAP SERPL CALCULATED.3IONS-SCNC: 10 MMOL/L (ref 3–16)
BLOOD CULTURE, ROUTINE: ABNORMAL
BLOOD CULTURE, ROUTINE: ABNORMAL
BUN BLDV-MCNC: 8 MG/DL (ref 7–20)
CALCIUM SERPL-MCNC: 9 MG/DL (ref 8.3–10.6)
CHLORIDE BLD-SCNC: 96 MMOL/L (ref 99–110)
CO2: 28 MMOL/L (ref 21–32)
CREAT SERPL-MCNC: 0.6 MG/DL (ref 0.8–1.3)
CULTURE, BLOOD 2: ABNORMAL
GFR AFRICAN AMERICAN: >60
GFR NON-AFRICAN AMERICAN: >60
GLUCOSE BLD-MCNC: 196 MG/DL (ref 70–99)
GLUCOSE BLD-MCNC: 232 MG/DL (ref 70–99)
GLUCOSE BLD-MCNC: 240 MG/DL (ref 70–99)
GLUCOSE BLD-MCNC: 245 MG/DL (ref 70–99)
GLUCOSE BLD-MCNC: 246 MG/DL (ref 70–99)
GLUCOSE BLD-MCNC: 277 MG/DL (ref 70–99)
HCT VFR BLD CALC: 32.5 % (ref 40.5–52.5)
HEMOGLOBIN: 10.7 G/DL (ref 13.5–17.5)
MCH RBC QN AUTO: 28.8 PG (ref 26–34)
MCHC RBC AUTO-ENTMCNC: 32.8 G/DL (ref 31–36)
MCV RBC AUTO: 87.8 FL (ref 80–100)
ORGANISM: ABNORMAL
PDW BLD-RTO: 14.5 % (ref 12.4–15.4)
PERFORMED ON: ABNORMAL
PLATELET # BLD: 291 K/UL (ref 135–450)
PMV BLD AUTO: 7.9 FL (ref 5–10.5)
POTASSIUM REFLEX MAGNESIUM: 4.4 MMOL/L (ref 3.5–5.1)
RBC # BLD: 3.7 M/UL (ref 4.2–5.9)
SODIUM BLD-SCNC: 134 MMOL/L (ref 136–145)
WBC # BLD: 9.1 K/UL (ref 4–11)

## 2021-04-26 PROCEDURE — 6370000000 HC RX 637 (ALT 250 FOR IP): Performed by: INTERNAL MEDICINE

## 2021-04-26 PROCEDURE — 6360000002 HC RX W HCPCS: Performed by: ANESTHESIOLOGY

## 2021-04-26 PROCEDURE — 87075 CULTR BACTERIA EXCEPT BLOOD: CPT

## 2021-04-26 PROCEDURE — 3700000000 HC ANESTHESIA ATTENDED CARE: Performed by: THORACIC SURGERY (CARDIOTHORACIC VASCULAR SURGERY)

## 2021-04-26 PROCEDURE — 2709999900 HC NON-CHARGEABLE SUPPLY: Performed by: THORACIC SURGERY (CARDIOTHORACIC VASCULAR SURGERY)

## 2021-04-26 PROCEDURE — 87186 SC STD MICRODIL/AGAR DIL: CPT

## 2021-04-26 PROCEDURE — 6360000002 HC RX W HCPCS: Performed by: REGISTERED NURSE

## 2021-04-26 PROCEDURE — 7100000001 HC PACU RECOVERY - ADDTL 15 MIN: Performed by: THORACIC SURGERY (CARDIOTHORACIC VASCULAR SURGERY)

## 2021-04-26 PROCEDURE — 87205 SMEAR GRAM STAIN: CPT

## 2021-04-26 PROCEDURE — 2580000003 HC RX 258: Performed by: REGISTERED NURSE

## 2021-04-26 PROCEDURE — 87077 CULTURE AEROBIC IDENTIFY: CPT

## 2021-04-26 PROCEDURE — 3700000001 HC ADD 15 MINUTES (ANESTHESIA): Performed by: THORACIC SURGERY (CARDIOTHORACIC VASCULAR SURGERY)

## 2021-04-26 PROCEDURE — 3600000018 HC SURGERY OHS ADDTL 15MIN: Performed by: THORACIC SURGERY (CARDIOTHORACIC VASCULAR SURGERY)

## 2021-04-26 PROCEDURE — 0JB60ZZ EXCISION OF CHEST SUBCUTANEOUS TISSUE AND FASCIA, OPEN APPROACH: ICD-10-PCS | Performed by: THORACIC SURGERY (CARDIOTHORACIC VASCULAR SURGERY)

## 2021-04-26 PROCEDURE — 6360000002 HC RX W HCPCS: Performed by: INTERNAL MEDICINE

## 2021-04-26 PROCEDURE — 21627 STERNAL DEBRIDEMENT: CPT | Performed by: THORACIC SURGERY (CARDIOTHORACIC VASCULAR SURGERY)

## 2021-04-26 PROCEDURE — 2500000003 HC RX 250 WO HCPCS: Performed by: NURSE ANESTHETIST, CERTIFIED REGISTERED

## 2021-04-26 PROCEDURE — 86403 PARTICLE AGGLUT ANTBDY SCRN: CPT

## 2021-04-26 PROCEDURE — 3600000008 HC SURGERY OHS BASE: Performed by: THORACIC SURGERY (CARDIOTHORACIC VASCULAR SURGERY)

## 2021-04-26 PROCEDURE — 6360000002 HC RX W HCPCS: Performed by: NURSE ANESTHETIST, CERTIFIED REGISTERED

## 2021-04-26 PROCEDURE — 87070 CULTURE OTHR SPECIMN AEROBIC: CPT

## 2021-04-26 PROCEDURE — 6370000000 HC RX 637 (ALT 250 FOR IP): Performed by: REGISTERED NURSE

## 2021-04-26 PROCEDURE — 6370000000 HC RX 637 (ALT 250 FOR IP): Performed by: ANESTHESIOLOGY

## 2021-04-26 PROCEDURE — 7100000000 HC PACU RECOVERY - FIRST 15 MIN: Performed by: THORACIC SURGERY (CARDIOTHORACIC VASCULAR SURGERY)

## 2021-04-26 PROCEDURE — 80048 BASIC METABOLIC PNL TOTAL CA: CPT

## 2021-04-26 PROCEDURE — 1200000000 HC SEMI PRIVATE

## 2021-04-26 PROCEDURE — 2580000003 HC RX 258: Performed by: INTERNAL MEDICINE

## 2021-04-26 PROCEDURE — 36415 COLL VENOUS BLD VENIPUNCTURE: CPT

## 2021-04-26 PROCEDURE — 2580000003 HC RX 258: Performed by: NURSE ANESTHETIST, CERTIFIED REGISTERED

## 2021-04-26 PROCEDURE — 94640 AIRWAY INHALATION TREATMENT: CPT

## 2021-04-26 PROCEDURE — 85027 COMPLETE CBC AUTOMATED: CPT

## 2021-04-26 RX ORDER — PHENYLEPHRINE HCL IN 0.9% NACL 1 MG/10 ML
SYRINGE (ML) INTRAVENOUS PRN
Status: DISCONTINUED | OUTPATIENT
Start: 2021-04-26 | End: 2021-04-26 | Stop reason: SDUPTHER

## 2021-04-26 RX ORDER — PROPOFOL 10 MG/ML
INJECTION, EMULSION INTRAVENOUS PRN
Status: DISCONTINUED | OUTPATIENT
Start: 2021-04-26 | End: 2021-04-26 | Stop reason: SDUPTHER

## 2021-04-26 RX ORDER — HYDRALAZINE HYDROCHLORIDE 20 MG/ML
5 INJECTION INTRAMUSCULAR; INTRAVENOUS EVERY 10 MIN PRN
Status: DISCONTINUED | OUTPATIENT
Start: 2021-04-26 | End: 2021-04-28 | Stop reason: ALTCHOICE

## 2021-04-26 RX ORDER — ONDANSETRON 2 MG/ML
4 INJECTION INTRAMUSCULAR; INTRAVENOUS
Status: ACTIVE | OUTPATIENT
Start: 2021-04-26 | End: 2021-04-26

## 2021-04-26 RX ORDER — MEPERIDINE HYDROCHLORIDE 50 MG/ML
12.5 INJECTION INTRAMUSCULAR; INTRAVENOUS; SUBCUTANEOUS EVERY 5 MIN PRN
Status: DISCONTINUED | OUTPATIENT
Start: 2021-04-26 | End: 2021-04-28 | Stop reason: ALTCHOICE

## 2021-04-26 RX ORDER — SUCCINYLCHOLINE CHLORIDE 20 MG/ML
INJECTION INTRAMUSCULAR; INTRAVENOUS PRN
Status: DISCONTINUED | OUTPATIENT
Start: 2021-04-26 | End: 2021-04-26 | Stop reason: SDUPTHER

## 2021-04-26 RX ORDER — PROMETHAZINE HYDROCHLORIDE 25 MG/ML
6.25 INJECTION, SOLUTION INTRAMUSCULAR; INTRAVENOUS
Status: ACTIVE | OUTPATIENT
Start: 2021-04-26 | End: 2021-04-26

## 2021-04-26 RX ORDER — DEXAMETHASONE SODIUM PHOSPHATE 4 MG/ML
INJECTION, SOLUTION INTRA-ARTICULAR; INTRALESIONAL; INTRAMUSCULAR; INTRAVENOUS; SOFT TISSUE PRN
Status: DISCONTINUED | OUTPATIENT
Start: 2021-04-26 | End: 2021-04-26 | Stop reason: SDUPTHER

## 2021-04-26 RX ORDER — ROCURONIUM BROMIDE 10 MG/ML
INJECTION, SOLUTION INTRAVENOUS PRN
Status: DISCONTINUED | OUTPATIENT
Start: 2021-04-26 | End: 2021-04-26 | Stop reason: SDUPTHER

## 2021-04-26 RX ORDER — OXYCODONE HYDROCHLORIDE AND ACETAMINOPHEN 5; 325 MG/1; MG/1
2 TABLET ORAL PRN
Status: COMPLETED | OUTPATIENT
Start: 2021-04-26 | End: 2021-04-26

## 2021-04-26 RX ORDER — OXYCODONE HYDROCHLORIDE AND ACETAMINOPHEN 5; 325 MG/1; MG/1
1 TABLET ORAL PRN
Status: COMPLETED | OUTPATIENT
Start: 2021-04-26 | End: 2021-04-26

## 2021-04-26 RX ORDER — FENTANYL CITRATE 50 UG/ML
25 INJECTION, SOLUTION INTRAMUSCULAR; INTRAVENOUS EVERY 5 MIN PRN
Status: DISCONTINUED | OUTPATIENT
Start: 2021-04-26 | End: 2021-04-28 | Stop reason: ALTCHOICE

## 2021-04-26 RX ORDER — ONDANSETRON 2 MG/ML
INJECTION INTRAMUSCULAR; INTRAVENOUS PRN
Status: DISCONTINUED | OUTPATIENT
Start: 2021-04-26 | End: 2021-04-26 | Stop reason: SDUPTHER

## 2021-04-26 RX ORDER — LIDOCAINE HYDROCHLORIDE 20 MG/ML
INJECTION, SOLUTION EPIDURAL; INFILTRATION; INTRACAUDAL; PERINEURAL PRN
Status: DISCONTINUED | OUTPATIENT
Start: 2021-04-26 | End: 2021-04-26 | Stop reason: SDUPTHER

## 2021-04-26 RX ORDER — SODIUM CHLORIDE, SODIUM LACTATE, POTASSIUM CHLORIDE, CALCIUM CHLORIDE 600; 310; 30; 20 MG/100ML; MG/100ML; MG/100ML; MG/100ML
INJECTION, SOLUTION INTRAVENOUS CONTINUOUS PRN
Status: DISCONTINUED | OUTPATIENT
Start: 2021-04-26 | End: 2021-04-26 | Stop reason: SDUPTHER

## 2021-04-26 RX ORDER — FENTANYL CITRATE 50 UG/ML
INJECTION, SOLUTION INTRAMUSCULAR; INTRAVENOUS PRN
Status: DISCONTINUED | OUTPATIENT
Start: 2021-04-26 | End: 2021-04-26 | Stop reason: SDUPTHER

## 2021-04-26 RX ADMIN — HYDROMORPHONE HYDROCHLORIDE 0.5 MG: 1 INJECTION, SOLUTION INTRAMUSCULAR; INTRAVENOUS; SUBCUTANEOUS at 09:33

## 2021-04-26 RX ADMIN — MORPHINE SULFATE 2 MG: 2 INJECTION, SOLUTION INTRAMUSCULAR; INTRAVENOUS at 10:56

## 2021-04-26 RX ADMIN — Medication 100 MCG: at 09:04

## 2021-04-26 RX ADMIN — SUGAMMADEX 200 MG: 100 INJECTION, SOLUTION INTRAVENOUS at 09:13

## 2021-04-26 RX ADMIN — MORPHINE SULFATE 2 MG: 2 INJECTION, SOLUTION INTRAMUSCULAR; INTRAVENOUS at 12:22

## 2021-04-26 RX ADMIN — HYDROCODONE BITARTRATE AND ACETAMINOPHEN 1 TABLET: 7.5; 325 TABLET ORAL at 08:05

## 2021-04-26 RX ADMIN — SUCCINYLCHOLINE CHLORIDE 140 MG: 20 INJECTION, SOLUTION INTRAMUSCULAR; INTRAVENOUS at 08:39

## 2021-04-26 RX ADMIN — BISACODYL 10 MG: 5 TABLET, COATED ORAL at 16:40

## 2021-04-26 RX ADMIN — MORPHINE SULFATE 4 MG: 4 INJECTION, SOLUTION INTRAMUSCULAR; INTRAVENOUS at 05:51

## 2021-04-26 RX ADMIN — INSULIN GLARGINE 20 UNITS: 100 INJECTION, SOLUTION SUBCUTANEOUS at 20:35

## 2021-04-26 RX ADMIN — INSULIN LISPRO 2 UNITS: 100 INJECTION, SOLUTION INTRAVENOUS; SUBCUTANEOUS at 20:36

## 2021-04-26 RX ADMIN — HYDROMORPHONE HYDROCHLORIDE 0.5 MG: 1 INJECTION, SOLUTION INTRAMUSCULAR; INTRAVENOUS; SUBCUTANEOUS at 10:02

## 2021-04-26 RX ADMIN — Medication 10 ML: at 20:37

## 2021-04-26 RX ADMIN — ATORVASTATIN CALCIUM 40 MG: 40 TABLET, FILM COATED ORAL at 16:40

## 2021-04-26 RX ADMIN — INSULIN LISPRO 5 UNITS: 100 INJECTION, SOLUTION INTRAVENOUS; SUBCUTANEOUS at 16:42

## 2021-04-26 RX ADMIN — MORPHINE SULFATE 4 MG: 4 INJECTION, SOLUTION INTRAMUSCULAR; INTRAVENOUS at 16:27

## 2021-04-26 RX ADMIN — CEFAZOLIN 2000 MG: 10 INJECTION, POWDER, FOR SOLUTION INTRAVENOUS at 15:22

## 2021-04-26 RX ADMIN — ROCURONIUM BROMIDE 30 MG: 10 SOLUTION INTRAVENOUS at 08:52

## 2021-04-26 RX ADMIN — LIDOCAINE HYDROCHLORIDE 40 MG: 20 INJECTION, SOLUTION EPIDURAL; INFILTRATION; INTRACAUDAL; PERINEURAL at 08:39

## 2021-04-26 RX ADMIN — METOPROLOL TARTRATE 25 MG: 25 TABLET, FILM COATED ORAL at 20:35

## 2021-04-26 RX ADMIN — Medication 2 PUFF: at 19:43

## 2021-04-26 RX ADMIN — CEFAZOLIN 2000 MG: 10 INJECTION, POWDER, FOR SOLUTION INTRAVENOUS at 01:23

## 2021-04-26 RX ADMIN — SUGAMMADEX 200 MG: 100 INJECTION, SOLUTION INTRAVENOUS at 09:06

## 2021-04-26 RX ADMIN — FENTANYL CITRATE 50 MCG: 50 INJECTION INTRAMUSCULAR; INTRAVENOUS at 09:19

## 2021-04-26 RX ADMIN — DOCUSATE SODIUM 50 MG AND SENNOSIDES 8.6 MG 2 TABLET: 8.6; 5 TABLET, FILM COATED ORAL at 20:35

## 2021-04-26 RX ADMIN — PROPOFOL 200 MG: 10 INJECTION, EMULSION INTRAVENOUS at 08:39

## 2021-04-26 RX ADMIN — INSULIN LISPRO 5 UNITS: 100 INJECTION, SOLUTION INTRAVENOUS; SUBCUTANEOUS at 12:56

## 2021-04-26 RX ADMIN — ROCURONIUM BROMIDE 10 MG: 10 SOLUTION INTRAVENOUS at 08:39

## 2021-04-26 RX ADMIN — ONDANSETRON 4 MG: 2 INJECTION INTRAMUSCULAR; INTRAVENOUS at 08:55

## 2021-04-26 RX ADMIN — Medication 10 ML: at 12:24

## 2021-04-26 RX ADMIN — MORPHINE SULFATE 4 MG: 4 INJECTION, SOLUTION INTRAMUSCULAR; INTRAVENOUS at 20:35

## 2021-04-26 RX ADMIN — HYDROCODONE BITARTRATE AND ACETAMINOPHEN 1 TABLET: 7.5; 325 TABLET ORAL at 17:32

## 2021-04-26 RX ADMIN — INSULIN LISPRO 9 UNITS: 100 INJECTION, SOLUTION INTRAVENOUS; SUBCUTANEOUS at 16:41

## 2021-04-26 RX ADMIN — HYDROCODONE BITARTRATE AND ACETAMINOPHEN 1 TABLET: 7.5; 325 TABLET ORAL at 01:23

## 2021-04-26 RX ADMIN — OXYCODONE HYDROCHLORIDE AND ACETAMINOPHEN 2 TABLET: 5; 325 TABLET ORAL at 14:30

## 2021-04-26 RX ADMIN — CEFAZOLIN 2000 MG: 10 INJECTION, POWDER, FOR SOLUTION INTRAVENOUS at 08:06

## 2021-04-26 RX ADMIN — HYDROCODONE BITARTRATE AND ACETAMINOPHEN 1 TABLET: 7.5; 325 TABLET ORAL at 21:42

## 2021-04-26 RX ADMIN — SODIUM CHLORIDE, SODIUM LACTATE, POTASSIUM CHLORIDE, AND CALCIUM CHLORIDE: .6; .31; .03; .02 INJECTION, SOLUTION INTRAVENOUS at 08:21

## 2021-04-26 RX ADMIN — DEXAMETHASONE SODIUM PHOSPHATE 4 MG: 4 INJECTION, SOLUTION INTRAMUSCULAR; INTRAVENOUS at 08:55

## 2021-04-26 RX ADMIN — FENTANYL CITRATE 50 MCG: 50 INJECTION INTRAMUSCULAR; INTRAVENOUS at 08:39

## 2021-04-26 RX ADMIN — INSULIN LISPRO 6 UNITS: 100 INJECTION, SOLUTION INTRAVENOUS; SUBCUTANEOUS at 12:56

## 2021-04-26 ASSESSMENT — PULMONARY FUNCTION TESTS
PIF_VALUE: 15
PIF_VALUE: 24
PIF_VALUE: 25
PIF_VALUE: 21
PIF_VALUE: 26
PIF_VALUE: 2
PIF_VALUE: 0
PIF_VALUE: 1
PIF_VALUE: 25
PIF_VALUE: 3
PIF_VALUE: 27
PIF_VALUE: 31
PIF_VALUE: 0
PIF_VALUE: 26
PIF_VALUE: 26
PIF_VALUE: 24

## 2021-04-26 ASSESSMENT — PAIN SCALES - GENERAL
PAINLEVEL_OUTOF10: 7
PAINLEVEL_OUTOF10: 6
PAINLEVEL_OUTOF10: 8
PAINLEVEL_OUTOF10: 8
PAINLEVEL_OUTOF10: 7
PAINLEVEL_OUTOF10: 4
PAINLEVEL_OUTOF10: 4
PAINLEVEL_OUTOF10: 10
PAINLEVEL_OUTOF10: 8
PAINLEVEL_OUTOF10: 7
PAINLEVEL_OUTOF10: 8
PAINLEVEL_OUTOF10: 9
PAINLEVEL_OUTOF10: 8
PAINLEVEL_OUTOF10: 6

## 2021-04-26 ASSESSMENT — PAIN DESCRIPTION - PROGRESSION
CLINICAL_PROGRESSION: NOT CHANGED

## 2021-04-26 ASSESSMENT — PAIN DESCRIPTION - LOCATION: LOCATION: CHEST

## 2021-04-26 ASSESSMENT — PAIN DESCRIPTION - DESCRIPTORS: DESCRIPTORS: ACHING

## 2021-04-26 ASSESSMENT — PAIN DESCRIPTION - PAIN TYPE: TYPE: SURGICAL PAIN

## 2021-04-26 NOTE — PLAN OF CARE
Problem: Pain:  Goal: Pain level will decrease  Description: Pain level will decrease  4/25/2021 2051 by Stiven Hu RN  Outcome: Ongoing

## 2021-04-26 NOTE — TELEPHONE ENCOUNTER
Ame Baker his wife wants to know that if it is 7 weeks between vaccines, will it make the first one not count?

## 2021-04-26 NOTE — PROGRESS NOTES
[facility]  Dx: Superficial Sternal wound infection  Patient doing clinically well, up in chair, denies any acute symptoms. Plan for OR debridement and sternal wound vac placement  I explained to Mr. Elle Piper that he will likely require multiple debridements by our department. Continue IV antibiotic. Appreciate ID recs. WBC has normalized over the week.      Lacey Noble MD  Cardiothoracic Surgery

## 2021-04-26 NOTE — PROGRESS NOTES
Hospitalist Progress Note      PCP: Kizzy Elizondo DO    Date of Admission: 4/23/2021      Chief Complaint: Chest pain      Hospital Course:   61 y. o. male with PMH of CAD s/p CABG, HLD, HTN, DM2, GURDEEP and obesity who presented to [facility] with complaints of chest pain. Pt is status post CABG x2 on 2/19/2021. Had uneventful postop course. This past Monday 4/19 pt was seen in the [facility] office due to suture protruding from his skin which was removed. At that time there was no evidence of infection. He developed chest pain on Tuesday and presented to [facility] ED where workup was negative, he was then discharged. The pt developed low grade fevers on Thursday and associated chills/rigors. He reports chest pain mainly in and around his surgical site. No dyspnea. No N/V/D. +constipation. No abdominal pain. ED course: WBC elevated at 18 and sodium of 127. Admitted diagnosis chest wall cellulitis of his surgical incision site.       Subjective:   Pt is on RA. just returned from surgery, family at bedside, Fever curve improved. VSS. Pt reports surgical incisional pain. No dyspnea. No N/V/D.  Asking for removal of SCDs    Medications:  Reviewed    Infusion Medications    sodium chloride Stopped (04/25/21 0944)    dextrose       Scheduled Medications    insulin glargine  20 Units Subcutaneous Nightly    insulin lispro  5 Units Subcutaneous TID     polyethylene glycol  17 g Oral Daily    ceFAZolin  2,000 mg Intravenous Q8H    tamsulosin  0.4 mg Oral Daily    insulin lispro  0-18 Units Subcutaneous TID     insulin lispro  0-9 Units Subcutaneous Nightly    aspirin  81 mg Oral Daily    atorvastatin  40 mg Oral QPM    budesonide-formoterol  2 puff Inhalation BID    metoprolol tartrate  25 mg Oral BID    sodium chloride flush  5-40 mL Intravenous 2 times per day    sennosides-docusate sodium  2 tablet Oral BID     PRN Meds: meperidine, fentanNYL, HYDROmorphone, HYDROmorphone, HYDROmorphone, oxyCODONE-acetaminophen **OR** oxyCODONE-acetaminophen, ondansetron, promethazine, hydrALAZINE, HYDROcodone-acetaminophen, bisacodyl, sodium chloride flush, sodium chloride, promethazine **OR** ondansetron, polyethylene glycol, acetaminophen **OR** acetaminophen, morphine **OR** morphine, glucose, dextrose, glucagon (rDNA), dextrose      Intake/Output Summary (Last 24 hours) at 4/26/2021 1413  Last data filed at 4/26/2021 0918  Gross per 24 hour   Intake 840 ml   Output 600 ml   Net 240 ml       Physical Exam Performed:    /63   Pulse 95   Temp 97.2 °F (36.2 °C) (Temporal)   Resp 16   Ht 5' 10\" (1.778 m)   Wt 234 lb 8 oz (106.4 kg)   SpO2 92%   BMI 33.65 kg/m²   General appearance:  No apparent distress, appears stated age and cooperative. HEENT:  Normal cephalic, atraumatic without obvious deformity. Pupils equal, round, and reactive to light.  Extra ocular muscles intact. Conjunctivae/corneas clear. Neck: Supple, with full range of motion. No jugular venous distention. Trachea midline. Respiratory:  Normal respiratory effort. Clear to auscultation, bilaterally without Rales/Wheezes/Rhonchi. Cardiovascular:  Regular rate and rhythm with normal S1/S2 without murmurs, rubs or gallops. Sternal incision with erythema with open wound at top and bottom of incision with purulent drainage, now with postop dressing  Abdomen: Soft, non-tender, non-distended with normal bowel sounds. Musculoskeletal:  No clubbing, cyanosis or edema bilaterally.  Full range of motion without deformity. Skin: Skin color, texture, turgor normal.  No rashes or lesions. Neurologic:  Neurovascularly intact without any focal sensory/motor deficits.  Cranial nerves: II-XII intact, grossly non-focal.  Psychiatric:  Alert and oriented, thought content appropriate, normal insight  Capillary Refill: Brisk,3 seconds, normal  Peripheral Pulses: +2 palpable, equal bilaterally       Labs:   Recent Labs     04/24/21  0700 04/25/21  0314 04/26/21  0705   WBC 15.2* 13.5* 9.1   HGB 11.5* 11.1* 10.7*   HCT 34.9* 34.1* 32.5*    279 291     Recent Labs     04/24/21  0700 04/25/21  0613 04/26/21  0705   * 128* 134*   K 4.3 4.0 4.4   CL 92* 94* 96*   CO2 26 24 28   BUN 7 8 8   CREATININE 0.6* 0.6* 0.6*   CALCIUM 9.3 8.7 9.0     Recent Labs     04/24/21  0700   AST 9*   ALT 6*   BILITOT 0.7   ALKPHOS 79     No results for input(s): INR in the last 72 hours. No results for input(s): Luis Pippins in the last 72 hours. Urinalysis:      Lab Results   Component Value Date    NITRU Negative 04/21/2021    BLOODU Negative 04/21/2021    SPECGRAV 1.025 04/21/2021    GLUCOSEU 500 04/21/2021       Radiology:  XR CHEST PORTABLE   Final Result   No acute abnormality identified. Assessment/Plan:    Active Hospital Problems    Diagnosis    Sepsis due to methicillin susceptible Staphylococcus aureus ([address] [A41.01]    Bacteremia due to methicillin susceptible Staphylococcus aureus (MSSA) [R78.81, B95.61]    Disruption or dehiscence of closure of sternum or sternotomy [T81.32XA]    Elevated sed rate [R70.0]    Type 2 diabetes mellitus with obesity (HCC) [E11.69, E66.9]    Cellulitis [L03.90]       Sepsis due to cellulitis of sternotomy incision   - Met SIRS criteria with fever, leukocytosis, tachycardia. No lactic acidosis. - CT chest on 4/21 showed healing midline sternotomy incision with no focal subcutaneous collection.   - Pt now with open wound at the top and bottom of the surgical incision with purulent drainage.   - Pt started on IV vancomycin and cefepime on 4/23. Blood cultures grew Staph (mecA gene not detected). Dc'd Vancomycin as likely MSSA. Follow-up sensitivities. - ID consulted for further assistance -- appreciate mgmt.   - CT surgery consulted/following. I&D on 4/26.     CAD status post CABG in Feb 2021  - EKG non-acute. Troponin negative.    - Continued aspirin, statin, metoprolol (CTS dc'd plavix, last dose was on 4/25). - Monitored pt on telemetry.      Diabetes mellitus type 2:  - Uncontrolled, recent A1C was 8.4.  - Hold home regimen. Continue high dose SSI ACHS and Lantus QHS, add prandial insulin with meals TID -- monitor and adjust accordingly. - Carb-control diet.      Acute hyponatremia, mild:  - Likely secondary to dehydration and component of pseudohyponatremia (corrected Na is 132). - S/p gentle IV fluids.   - Monitor BMP closely.      Asthma:  - Stable. No wheezing on exam. Continue Symbicort.      Hypertension:  - Controlled. Continue his home medications. Monitor.      Hyperlipidemia:  - Continue statin.      Constipation:  - Continue bowel regimen.      Urinary frequency/hesitancy:  - Pt was to see Dr. Sherren Kennedy on 4/27. Pt requesting consult while inpt. - UA negative. Checked PVR. Start flomax.      Obesity:  - With Body mass index is 03.35 kg/m². Complicating assessment and treatment. Placing patient at risk for multiple co-morbidities as well as early death and contributing to the patient's presentation.  Counseled on weight loss.         DVT Prophylaxis: SCDs (holding lovenox for I&D on 4/26)  Diet: DIET CARDIAC; Carb Control: 5 carb choices (75 gms)/meal  Code Status: Full Code    PT/OT Eval Status: not needed    Dispo - pending CTsurg recs, ID recs, ?by Wed/thurs    Kaylyn Solorio MD

## 2021-04-26 NOTE — DISCHARGE INSTR - COC
Continuity of Care Form    Patient Name: Rock Voss   :  1960  MRN:  [id number]    Admit date:  2021  Discharge date:  ***    Code Status Order: Full Code   Advance Directives:      Admitting Physician:  Mira Ch MD  PCP: Christ Page DO    Discharging Nurse: [facility] Unit/Room#: 0201/0201-01  Discharging Unit Phone Number: ***    Emergency Contact:   Extended Emergency Contact Information  Primary Emergency Contact: Brittny Chin  Address: [address] Phone: [phone]  Work Phone: [phone]  Mobile Phone: [phone]  Relation: Spouse  Secondary Emergency Contact: Radha Orosco  Address: [address] Phone: [phone]  Mobile Phone: [phone]  Relation: Brother/Sister  Preferred language: English    Past Surgical History:  Past Surgical History:   Procedure Laterality Date    BYPASS GRAFT  2021    CORONARY ANGIOPLASTY WITH STENT PLACEMENT  2016    1 Promus Premier SAMUEL 2.25x16    CORONARY ARTERY BYPASS GRAFT  2021    CABG x2 & ALEX-Dr. Swati Sorensen    CORONARY ARTERY BYPASS GRAFT N/A 2021    CORONARY ARTERY BYPASS GRAFTING X2, INTERNAL MAMMARY ARTERY, SAPHENOUS VEIN GRAFT, ON PUMP WITH LEFT ATRIAL APPENDAGE CLIP, BILATERAL INTERCOSTAL NERVE BLOCK performed by Corey Ag MD at [address] [place] [address]         Immunization History:   Immunization History   Administered Date(s) Administered    Influenza Vaccine, unspecified formulation 10/18/2018    Influenza Virus Vaccine 2016, 2019    Influenza, Quadv, IM, PF (6 mo and older Fluzone, Flulaval, Fluarix, and 3 yrs and older Afluria) 2020    Pneumococcal Polysaccharide ([id number]) 2016       Active Problems:  Patient Active Problem List   Diagnosis Code    Type II or unspecified type diabetes mellitus without mention of Staphylococcus aureus (MSSA) R78.81, B95.61    Disruption or dehiscence of closure of sternum or sternotomy T81.32XA    Elevated sed rate R70.0    Type 2 diabetes mellitus with obesity (HCC) E11.69, E66.9       Isolation/Infection:   Isolation            No Isolation          Patient Infection Status       None to display            Nurse Assessment:  Last Vital Signs: /63   Pulse 95   Temp 97.2 °F (36.2 °C) (Temporal)   Resp 16   Ht 5' 10\" (1.778 m)   Wt 234 lb 8 oz (106.4 kg)   SpO2 92%   BMI 33.65 kg/m²     Last documented pain score (0-10 scale): Pain Level: 10  Last Weight:   Wt Readings from Last 1 Encounters:   04/26/21 234 lb 8 oz (106.4 kg)     Mental Status:  oriented and alert    IV Access:  - PICC - site  R Basilic, insertion date: 04/29/21    Nursing Mobility/ADLs:  Walking   Independent  Transfer  Independent  Bathing  Independent  Dressing  Independent  Toileting  Independent  Feeding  Independent  Med Admin  Independent  Med Delivery   whole    Wound Care Documentation and Therapy:  Negative Pressure Wound Therapy Sternum (Active)   Number of days: 0        Elimination:  Continence:   · Bowel: Yes  · Bladder: Yes  Urinary Catheter: None   Colostomy/Ileostomy/Ileal Conduit: No       Date of Last BM: 04/29/21    Intake/Output Summary (Last 24 hours) at 4/26/2021 1420  Last data filed at 4/26/2021 0918  Gross per 24 hour   Intake 840 ml   Output 600 ml   Net 240 ml     I/O last 3 completed shifts: In: 795 [P.O.:280; I.V.:365; IV Piggyback:150]  Out: 600 [Urine:600]    Safety Concerns: At Risk for Falls    Impairments/Disabilities:      Vision    Nutrition Therapy:  Current Nutrition Therapy:   - Oral Diet:  Carb Control 5 carbs/meal (2000kcals/day)    Routes of Feeding: Oral  Liquids:  Thin Liquids  Daily Fluid Restriction: no  Last Modified Barium Swallow with Video (Video Swallowing Test): not done    Treatments at the Time of Hospital Discharge:   Respiratory Treatments: ***  Oxygen Therapy:  is not on home oxygen therapy.   Ventilator:    - No ventilator support    Rehab Therapies: Nurse  Weight Bearing Status/Restrictions: No weight bearing restirctions  Other Medical Equipment (for information only, NOT a DME order):  {EQUIPMENT:[id number]}  Other Treatments: [facility]: LEVEL 3 [address] to establish plan of care for patient over 60 day period   Nursing  Initial home SN evaluation visit to occur within 24-48 hours for:  1)  medication management  2)  VS and clinical assessment  3)  S&S chronic disease exacerbation education + when to contact MD/NP  4)  care coordination  Medication Reconciliation during 1st SN visit  PT/OT/Speech   Evaluations in home within 24-48 hours of discharge to include DME and home safety   Frontload therapy 5 days, then 3x a week   OT to evaluate if patient has [address] needs for personal care    evaluation within 24-48 hours to evaluate resources & insurance for potential [place], [place], LTC, and Medicaid options   Palliative Care referral within 5 days of hospital discharge   PCP Visit scheduled within 3 - 7 days of hospital discharge [address] [place] [address] 190 (If patient is agreeable and meets guidelines)      Patient's personal belongings (please select all that are sent with patient):  {CHP DME Belongings:[id number]}    RN SIGNATURE:  Electronically signed by Amelia Delgadillo RN BSN on 4/29/21 at 2:11 PM EDT    CASE MANAGEMENT/SOCIAL WORK SECTION    Inpatient Status Date: 4/23/21    Readmission Risk Assessment Score:  Readmission Risk              Risk of Unplanned Readmission:        22           Discharging to Facility/ Agency   Name:  Critical access hospital care    Address: [address] [place] [address], [address] [place] [address]., [place] [address]  Phone: [phone]  Fax: [phone]  [facility]: [phone]    Dialysis Facility (if applicable) n/a   · Name:  · Address:  · Dialysis Schedule:  · Phone:  · Fax:    Case Manager/ signature: Electronically signed by Malaika Spicer RN on 4/29/21 at 3:26 PM EDT    PHYSICIAN SECTION    Prognosis: Good    Condition at Discharge: Stable    Rehab Potential (if transferring to Rehab): Good    Recommended Labs or Other Treatments After Discharge:  Please adjust Diabetes regimen to keep all sugars < 180(discuss with your PCP), Follow up with CT surgery as instructed    Physician Certification: I certify the above information and transfer of Renetta Millan  is necessary for the continuing treatment of the diagnosis listed and that he requires [address] for less 30 days.      Update Admission H&P: No change in H&P    Recommended Follow-up, Labs or Other Treatments After Discharge:      ID INFUSION ORDERS:  Cefazolin 2g IV q8 continued through 6/9/21  First dose in hospital   Weekly CBC diff, CMP, CRP faxed to Dr. Griselda Virk [phone]   Routine PICC care  Follow-up with Dr. Griselda Virk 3-4 weeks, call for appt time [phone]  Triny Pierre MD              PHYSICIAN SIGNATURE:  Electronically signed by Gildardo Herring MD on 4/29/21 at 1:28 PM EDT

## 2021-04-26 NOTE — TELEPHONE ENCOUNTER
Carloz Mtz called and stated that Jake Gilmore is in the hospital with heart issues and would like to know if it will be ok for him to get the 2nd vaccine on May 13th

## 2021-04-26 NOTE — CONSULTS
Urology Attending Consult Note      Reason for Consultation: 61 y.o.male urinary frequency, hesitancy    History: 60 y.o.male admitted to hospital for dehiscence of surgical wound after CABG. He is now s/p surgical debridement. We are consulted for urinary frequency, hesitancy. Patient states he has not been going more frequently, but having spraying of stream.  He was due to have an appointment with Dr. Meño Jones today. This was rescheduled for May--he will keep this.     Family History, Social History, Review of Systems:  Reviewed and agreed to as per chart    Vitals:  /78   Pulse 92   Temp 98.3 °F (36.8 °C) (Oral)   Resp 16   Ht 5' 10\" (1.778 m)   Wt 234 lb 8 oz (106.4 kg)   SpO2 98%   BMI 33.65 kg/m²   Temp  Av.1 °F (36.7 °C)  Min: 97.5 °F (36.4 °C)  Max: 99 °F (37.2 °C)    Intake/Output Summary (Last 24 hours) at 2021 0846  Last data filed at 2021 6207  Gross per 24 hour   Intake 795 ml   Output 600 ml   Net 195 ml         Physical:   Well developed, well nourished in no acute distress   Orientated to time and place   Neck is supple, trachea is midline   Respiratory effort is normal without distress   Abdomen soft, nontender, nondistended,    Skin warm and dry,  exposed skin shows no abnormal lesions, rashes   Musculoskeletal no digital cyanosis, head normocephalic   Psych shows normal mood and affect, alert and appropriately answers questions    Labs:  WBC:    Lab Results   Component Value Date    WBC 9.1 2021     Hemoglobin/Hematocrit:    Lab Results   Component Value Date    HGB 10.7 2021    HCT 32.5 2021     BMP:    Lab Results   Component Value Date     2021    K 4.4 2021    CL 96 2021    CO2 28 2021    BUN 8 2021    LABALBU 3.2 2021    CREATININE 0.6 2021    CALCIUM 9.0 2021    GFRAA >60 2021    LABGLOM >60 2021     PT/INR:    Lab Results   Component Value Date    PROTIME 15.3 02/23/2021    INR 1.32 02/23/2021     PTT:    Lab Results   Component Value Date    APTT 30.4 02/19/2021   [APTT      Blood Culture: Staphylococcus aureus    Antibiotic Therapy: Ancef    Impression/Plan: 60 y.o.male with urinary frequency, hesitancy. Patient is currently on Flomax.   He was due to have an appointment with Dr. Salome Terry today (04/27/21), wife rescheduled to early May.    -He will keep this follow up with Dr. Salome Terry in the office, all questions answered  -continue on Flomax until appointment  -signing off from  standpoint, call with questions    Dominic Saha PA-C

## 2021-04-26 NOTE — ANESTHESIA PRE PROCEDURE
Department of Anesthesiology  Preprocedure Note       Name:  Quintin Murcia   Age:  61 y.o.  :  1960                                          MRN:  [id number]         Date:  2021      Surgeon:  Dominic Conway MD    Procedure:  DEBRIDEMENT OF STERNAL WOUND WITH POSSIBLE WOUND VACUUM PLACEMENT    HPI:  61 y.o. male with PMH of CAD s/p CABG, HLD, HTN, DM2, GURDEEP and obesity who presented to [facility] with complaints of chest pain. Pt is status post CABG x2 on 2021. Had uneventful postop course. This past  pt was seen in the [address] [place] [address] office due to suture protruding from his skin which was removed. At that time there was no evidence of infection. He developed chest pain on Tuesday and presented to [facility] ED where workup was negative, he was then discharged. The pt developed low grade fevers on Thursday and associated chills/rigors. He reports chest pain mainly in and around his surgical site. No dyspnea. No N/V/D. +constipation. No abdominal pain. [id number]  Sinus tachycardia 109;  T wave abnormality, consider inferolateral ischemia; wWhen compared with ECG of 2021: Criteria for Septal infarct are no longer Present    Medications prior to admission:    clopidogrel (PLAVIX) 75 MG tablet TAKE ONE TABLET BY MOUTH DAILY   metFORMIN (GLUCOPHAGE) 1000 MG tablet TAKE ONE TABLET BY MOUTH TWICE A DAY   atorvastatin (LIPITOR) 40 MG tablet TAKE ONE TABLET BY MOUTH EVERY EVENING   glipiZIDE (GLUCOTROL) 10 MG tablet TAKE ONE TABLET BY MOUTH EVERY MORNING FOR DIABETES   metoprolol tartrate (LOPRESSOR) 25 MG tablet TAKE ONE TABLET BY MOUTH TWICE A DAY   fluticasone-salmeterol (ADVAIR DISKUS) 250-50 MCG/DOSE AEPB Inhale 1 puff into the lungs every 12 hours   aspirin 81 MG chewable tablet CHEW ONE TABLET BY MOUTH DAILY   famotidine (PEPCID) 20 MG tablet Take 1 tablet by mouth daily     Current medications:     insulin glargine (LANTUS) injection vial 20 Units  20 Units Subcutaneous Nightly  insulin lispro (HUMALOG) injection vial 5 Units  5 Units Subcutaneous TID WC    HYDROcodone-acetaminophen (NORCO) 7.5-325 MG  1 tablet Oral Q4H PRN    polyethylene glycol (GLYCOLAX) packet 17 g  17 g Oral Daily    bisacodyl (DULCOLAX) suppository 10 mg  10 mg Rectal Daily PRN    ceFAZolin (ANCEF) 2000 mg in dextrose 5 % 100 mL IVPB  2,000 mg Intravenous Q8H    tamsulosin (FLOMAX) capsule 0.4 mg  0.4 mg Oral Daily    insulin lispro (HUMALOG) injection vial 0-18 Units  0-18 Units Subcutaneous TID WC    insulin lispro (HUMALOG) injection vial 0-9 Units  0-9 Units Subcutaneous Nightly    aspirin chewable tablet 81 mg  81 mg Oral Daily    atorvastatin (LIPITOR) tablet 40 mg  40 mg Oral QPM    budesonide-formoterol (SYMBICORT) 160-4.5 MCG/ACT inhaler  2 puff Inhalation BID    metoprolol tartrate (LOPRESSOR) tablet 25 mg  25 mg Oral BID    promethazine (PHENERGAN) tablet 12.5 mg  12.5 mg Oral Q6H PRN       ondansetron (ZOFRAN) injection 4 mg  4 mg Intravenous Q6H PRN    polyethylene glycol (GLYCOLAX) packet 17 g  17 g Oral Daily PRN    acetaminophen (TYLENOL) tablet 650 mg  650 mg Oral Q6H PRN       acetaminophen (TYLENOL) suppository 650 mg  650 mg Rectal Q6H PRN    morphine (PF) injection 2 mg  2 mg Intravenous Q4H PRN       morphine (PF) injection 4 mg  4 mg Intravenous Q4H PRN    glucose (GLUTOSE) 40 % oral gel 15 g  15 g Oral PRN    dextrose 50 % IV solution  12.5 g Intravenous PRN    glucagon (rDNA) injection 1 mg  1 mg Intramuscular PRN    dextrose 5 % solution  100 mL/hr Intravenous PRN    sennosides-docusate sodium (SENOKOT-S) 8.6-50 MG   2 tablet Oral BID     Allergies:  No Known Allergies    Problem List:     Type II or unspecified type diabetes mellitus without mention of complication, not stated as uncontrolled    Diabetes mellitus    History of ETOH abuse    Patient overweight    Patient overweight    Dietary noncompliance    Alcohol abuse    Flank pain    Prostatism    Low serum testosterone level    Arm pain, left    Shoulder pain    Precordial pain    Lung nodule    Acute angina (HCC)    Abnormal stress test    Acute coronary syndrome (HCC)    Coronary artery disease involving native coronary artery of native heart with unstable angina pectoris (HCC)    Other chest pain    S/P drug eluting coronary stent placement    Essential hypertension    Obesity    Abnormal chest x-ray    Angina effort    Skin lesion    Angina at rest [facility])    Hyperlipidemia    Angina pectoris ([address]    NSTEMI (non-ST elevated myocardial infarction) ([address]    Controlled type 2 diabetes mellitus without complication, without long-term current use of insulin ([address]    ASHD (arteriosclerotic heart disease)    Mass of right foot    Overweight    History of angina pectoris    Reactive airway disease    Bronchitis    Reactive airway disease with wheezing, moderate persistent, with acute exacerbation    Need for prophylactic vaccination and inoculation against varicella    Need for prophylactic vaccination against diphtheria-tetanus-pertussis (DTP)    Chronic right-sided thoracic back pain    CAD in native artery    S/P PTCA (percutaneous transluminal coronary angioplasty)    Chest pain    Ischemic cardiomyopathy    GURDEEP (obstructive sleep apnea)    Cellulitis    Sepsis due to methicillin susceptible Staphylococcus aureus ([address]    Bacteremia due to methicillin susceptible Staphylococcus aureus (MSSA)    Disruption or dehiscence of closure of sternum or sternotomy    Elevated sed rate    Type 2 diabetes mellitus with obesity ([address]     Past Medical History:     Alcohol abuse 12/03/2012    Coronary artery disease     Diabetic neuropathy associated with type 2 diabetes mellitus (HCC)     Hyperlipidemia     Hypertension     Non morbid obesity     GURDEEP (obstructive sleep apnea)     Reactive airway disease with wheezing, moderate persistent, with acute exacerbation 03/03/2020  Type II or unspecified type diabetes mellitus without mention of complication, not stated as uncontrolled      Past Surgical History:      BYPASS GRAFT  02/19/2021    CORONARY ANGIOPLASTY WITH STENT PLACEMENT  1/14/2016 9/21/2020    1 [employer] Premier SAMUEL 2.25x16    CORONARY ARTERY BYPASS GRAFT  02/19/2021    CABG x2 & ALEX-Dr. Latoya Givens    CORONARY ARTERY BYPASS GRAFT N/A 2/19/2021    CORONARY ARTERY BYPASS GRAFTING X2, INTERNAL MAMMARY ARTERY, SAPHENOUS VEIN GRAFT, ON PUMP WITH LEFT ATRIAL APPENDAGE CLIP, BILATERAL INTERCOSTAL NERVE BLOCK performed by Neil Martniez MD at [facility] History:     Smoking status: Never Smoker    Smokeless tobacco: Never Used   Substance Use Topics    Alcohol use: Not Currently     Alcohol/week: 1.0 standard drinks     Types: 1 Shots of liquor per week     Comment: no alcohol in 2 months. Vital Signs (Current):    04/25/21 1936 04/25/21 2023 04/26/21 0019 04/26/21 0420   BP: 129/75  118/77 134/82   Pulse: 106  90 93   Resp: 18  18 18   Temp: 99 °F (37.2 °C)  98.2 °F (36.8 °C) 98.1 °F (36.7 °C)   TempSrc: Oral  Oral Oral   SpO2: 98% 97% 96% 98%             Height: 5' 10\" (1.778 m)  (04/24/21) Weight: 234 lb 8 oz (106.4 kg)  (04/26/21)   BMI: 33.65              BP Readings from Last 3 Encounters:   04/26/21 134/82   04/21/21 (!) 142/79   04/20/21 118/70       NPO Status: >8 hrs                       BMI:   Wt Readings from Last 3 Encounters:   04/26/21 234 lb 8 oz (106.4 kg)   04/21/21 230 lb (104.3 kg)   04/20/21 238 lb (108 kg)     Body mass index is 33.65 kg/m².     CBC:    WBC 9.1 04/26/2021    HGB 10.7 04/26/2021    HCT 32.5 04/26/2021     04/26/2021     CMP:     04/26/2021    K 4.4 04/26/2021    CL 96 04/26/2021    CO2 28 04/26/2021    BUN 8 04/26/2021    CREATININE 0.6 04/26/2021    GLUCOSE 245 04/26/2021    PROT 6.7 04/24/2021    CALCIUM 9.0 04/26/2021    BILITOT 0.7 04/24/2021    ALKPHOS 79 04/24/2021    AST 9 04/24/2021 ALT 6 04/24/2021     POC Tests:     04/26/21   0726   POCGLU 240*       Coags:    PROTIME 15.3 02/23/2021    INR 1.32 02/23/2021    APTT 30.4 02/19/2021     COVID-19 Screening (If Applicable): COVID19 NOT DETECTED 12/10/2020     Anesthesia Evaluation  Patient summary reviewed and Nursing notes reviewed  Airway: Mallampati: II  TM distance: >3 FB   Neck ROM: full  Comment: Thick neck girth  Mouth opening: > = 3 FB Dental:          Pulmonary: breath sounds clear to auscultation  (+) sleep apnea:  asthma:     (-) wheezes                           Cardiovascular:  Exercise tolerance: good (>4 METS),   (+) hypertension:, past MI:, CAD: obstructive, CABG/stent:,     (-)  angina and murmur      Rhythm: regular  Rate: normal                    Neuro/Psych:   (+) psychiatric history:   (-) seizures, TIA and CVA            ROS comment: H/O EtOH Abuse GI/Hepatic/Renal:   (+) morbid obesity     (-) GERD, hepatitis, liver disease and no renal disease       Endo/Other:    (+) DiabetesType II DM, using insulin, blood dyscrasia: anemia:., .                 Abdominal:   (+) obese,         Vascular:                                      Anesthesia Plan      general     ASA 3       Induction: intravenous. MIPS: Prophylactic antiemetics administered. Anesthetic plan and risks discussed with patient. Plan discussed with CRNA.             Patrice Ballesteros MD

## 2021-04-26 NOTE — CARE COORDINATION
CASE MANAGEMENT INITIAL ASSESSMENT      Reviewed chart and completed assessment at bedside with patient and wife  Explained Case Management role/services     Primary contact information:wikaryn Khan as below    Health Care Decision Maker :   Primary Decision Maker (Active): Brittny Chin - Spouse - [phone]    Secondary Decision Maker: Lupe Costa - Brother/Sister - [phone]          Can this person be reached and be able to respond quickly, such as within a few minutes or hours? Yes     Admit date/status:4/23/21 IPA    Diagnosis:Strenal wound infection    Is this a Readmission?: CABG 2/21 - now has sternal wound infection    Insurance:[employer] [place]    Precert required for SNF: Yes       3 night stay required: No    Living arrangements, Adls, care needs, prior to admission:Lives in private home with spouse. IPTA    Transportation:self/wife    Durable Medical Equipment at home:  Walker__Cane__RTS__ BSC__Shower Chair__  02__ HHN__ CPAP__  BiPap__  Hospital Bed__ W/C___ Other____none______    Services in the home and/or outpatient, prior to [facility] skilled     PT/OT recs:not ordered    Hospital Exemption Notification (HEN):needed if SNF    Barriers to discharge:none    Plan/comments:Plans dc home. [facility] already notified. Plans for wound Vac(not yet started). Will likely have PICC inserted for home IVABx.     ECOC on chart for MD signature  Yes     Andrew Collado RN

## 2021-04-27 LAB
ANION GAP SERPL CALCULATED.3IONS-SCNC: 12 MMOL/L (ref 3–16)
BUN BLDV-MCNC: 9 MG/DL (ref 7–20)
CALCIUM SERPL-MCNC: 8.9 MG/DL (ref 8.3–10.6)
CHLORIDE BLD-SCNC: 94 MMOL/L (ref 99–110)
CO2: 26 MMOL/L (ref 21–32)
CREAT SERPL-MCNC: 0.6 MG/DL (ref 0.8–1.3)
GFR AFRICAN AMERICAN: >60
GFR NON-AFRICAN AMERICAN: >60
GLUCOSE BLD-MCNC: 161 MG/DL (ref 70–99)
GLUCOSE BLD-MCNC: 177 MG/DL (ref 70–99)
GLUCOSE BLD-MCNC: 179 MG/DL (ref 70–99)
GLUCOSE BLD-MCNC: 230 MG/DL (ref 70–99)
GLUCOSE BLD-MCNC: 232 MG/DL (ref 70–99)
HCT VFR BLD CALC: 31.1 % (ref 40.5–52.5)
HEMOGLOBIN: 10.2 G/DL (ref 13.5–17.5)
MCH RBC QN AUTO: 28.4 PG (ref 26–34)
MCHC RBC AUTO-ENTMCNC: 32.7 G/DL (ref 31–36)
MCV RBC AUTO: 86.9 FL (ref 80–100)
PDW BLD-RTO: 15 % (ref 12.4–15.4)
PERFORMED ON: ABNORMAL
PLATELET # BLD: 344 K/UL (ref 135–450)
PMV BLD AUTO: 7.8 FL (ref 5–10.5)
POTASSIUM REFLEX MAGNESIUM: 3.9 MMOL/L (ref 3.5–5.1)
RBC # BLD: 3.58 M/UL (ref 4.2–5.9)
SODIUM BLD-SCNC: 132 MMOL/L (ref 136–145)
WBC # BLD: 8.1 K/UL (ref 4–11)

## 2021-04-27 PROCEDURE — 6370000000 HC RX 637 (ALT 250 FOR IP): Performed by: REGISTERED NURSE

## 2021-04-27 PROCEDURE — 94640 AIRWAY INHALATION TREATMENT: CPT

## 2021-04-27 PROCEDURE — 36415 COLL VENOUS BLD VENIPUNCTURE: CPT

## 2021-04-27 PROCEDURE — 99024 POSTOP FOLLOW-UP VISIT: CPT | Performed by: NURSE PRACTITIONER

## 2021-04-27 PROCEDURE — 6360000002 HC RX W HCPCS: Performed by: INTERNAL MEDICINE

## 2021-04-27 PROCEDURE — 80048 BASIC METABOLIC PNL TOTAL CA: CPT

## 2021-04-27 PROCEDURE — 97606 NEG PRS WND THER DME>50 SQCM: CPT

## 2021-04-27 PROCEDURE — 99232 SBSQ HOSP IP/OBS MODERATE 35: CPT | Performed by: INTERNAL MEDICINE

## 2021-04-27 PROCEDURE — 6360000002 HC RX W HCPCS: Performed by: REGISTERED NURSE

## 2021-04-27 PROCEDURE — 85027 COMPLETE CBC AUTOMATED: CPT

## 2021-04-27 PROCEDURE — 1200000000 HC SEMI PRIVATE

## 2021-04-27 PROCEDURE — 2580000003 HC RX 258: Performed by: INTERNAL MEDICINE

## 2021-04-27 PROCEDURE — 6370000000 HC RX 637 (ALT 250 FOR IP): Performed by: INTERNAL MEDICINE

## 2021-04-27 PROCEDURE — 2580000003 HC RX 258: Performed by: REGISTERED NURSE

## 2021-04-27 RX ADMIN — Medication 10 ML: at 08:51

## 2021-04-27 RX ADMIN — DOCUSATE SODIUM 50 MG AND SENNOSIDES 8.6 MG 2 TABLET: 8.6; 5 TABLET, FILM COATED ORAL at 20:46

## 2021-04-27 RX ADMIN — METOPROLOL TARTRATE 25 MG: 25 TABLET, FILM COATED ORAL at 20:47

## 2021-04-27 RX ADMIN — INSULIN LISPRO 6 UNITS: 100 INJECTION, SOLUTION INTRAVENOUS; SUBCUTANEOUS at 08:52

## 2021-04-27 RX ADMIN — Medication 2 PUFF: at 07:43

## 2021-04-27 RX ADMIN — INSULIN LISPRO 5 UNITS: 100 INJECTION, SOLUTION INTRAVENOUS; SUBCUTANEOUS at 17:23

## 2021-04-27 RX ADMIN — MORPHINE SULFATE 4 MG: 4 INJECTION, SOLUTION INTRAMUSCULAR; INTRAVENOUS at 04:40

## 2021-04-27 RX ADMIN — INSULIN LISPRO 3 UNITS: 100 INJECTION, SOLUTION INTRAVENOUS; SUBCUTANEOUS at 17:23

## 2021-04-27 RX ADMIN — POLYETHYLENE GLYCOL 3350 17 G: 17 POWDER, FOR SOLUTION ORAL at 08:51

## 2021-04-27 RX ADMIN — INSULIN GLARGINE 20 UNITS: 100 INJECTION, SOLUTION SUBCUTANEOUS at 20:46

## 2021-04-27 RX ADMIN — ASPIRIN 81 MG: 81 TABLET, CHEWABLE ORAL at 08:50

## 2021-04-27 RX ADMIN — MORPHINE SULFATE 4 MG: 4 INJECTION, SOLUTION INTRAMUSCULAR; INTRAVENOUS at 19:35

## 2021-04-27 RX ADMIN — MORPHINE SULFATE 4 MG: 4 INJECTION, SOLUTION INTRAMUSCULAR; INTRAVENOUS at 16:30

## 2021-04-27 RX ADMIN — MORPHINE SULFATE 4 MG: 4 INJECTION, SOLUTION INTRAMUSCULAR; INTRAVENOUS at 08:51

## 2021-04-27 RX ADMIN — INSULIN LISPRO 3 UNITS: 100 INJECTION, SOLUTION INTRAVENOUS; SUBCUTANEOUS at 13:08

## 2021-04-27 RX ADMIN — MORPHINE SULFATE 4 MG: 4 INJECTION, SOLUTION INTRAMUSCULAR; INTRAVENOUS at 23:34

## 2021-04-27 RX ADMIN — Medication 10 ML: at 20:47

## 2021-04-27 RX ADMIN — TAMSULOSIN HYDROCHLORIDE 0.4 MG: 0.4 CAPSULE ORAL at 08:50

## 2021-04-27 RX ADMIN — HYDROCODONE BITARTRATE AND ACETAMINOPHEN 1 TABLET: 7.5; 325 TABLET ORAL at 01:51

## 2021-04-27 RX ADMIN — INSULIN LISPRO 5 UNITS: 100 INJECTION, SOLUTION INTRAVENOUS; SUBCUTANEOUS at 08:52

## 2021-04-27 RX ADMIN — Medication 2 PUFF: at 18:56

## 2021-04-27 RX ADMIN — MORPHINE SULFATE 4 MG: 4 INJECTION, SOLUTION INTRAMUSCULAR; INTRAVENOUS at 13:07

## 2021-04-27 RX ADMIN — CEFAZOLIN 2000 MG: 10 INJECTION, POWDER, FOR SOLUTION INTRAVENOUS at 23:31

## 2021-04-27 RX ADMIN — CEFAZOLIN 2000 MG: 10 INJECTION, POWDER, FOR SOLUTION INTRAVENOUS at 15:43

## 2021-04-27 RX ADMIN — HYDROCODONE BITARTRATE AND ACETAMINOPHEN 1 TABLET: 7.5; 325 TABLET ORAL at 11:06

## 2021-04-27 RX ADMIN — ATORVASTATIN CALCIUM 40 MG: 40 TABLET, FILM COATED ORAL at 17:22

## 2021-04-27 RX ADMIN — HYDROCODONE BITARTRATE AND ACETAMINOPHEN 1 TABLET: 7.5; 325 TABLET ORAL at 15:14

## 2021-04-27 RX ADMIN — INSULIN LISPRO 5 UNITS: 100 INJECTION, SOLUTION INTRAVENOUS; SUBCUTANEOUS at 13:08

## 2021-04-27 RX ADMIN — DOCUSATE SODIUM 50 MG AND SENNOSIDES 8.6 MG 2 TABLET: 8.6; 5 TABLET, FILM COATED ORAL at 08:50

## 2021-04-27 RX ADMIN — INSULIN LISPRO 1 UNITS: 100 INJECTION, SOLUTION INTRAVENOUS; SUBCUTANEOUS at 20:46

## 2021-04-27 RX ADMIN — HYDROCODONE BITARTRATE AND ACETAMINOPHEN 1 TABLET: 7.5; 325 TABLET ORAL at 06:04

## 2021-04-27 RX ADMIN — MORPHINE SULFATE 4 MG: 4 INJECTION, SOLUTION INTRAMUSCULAR; INTRAVENOUS at 00:35

## 2021-04-27 RX ADMIN — HYDROCODONE BITARTRATE AND ACETAMINOPHEN 1 TABLET: 7.5; 325 TABLET ORAL at 20:46

## 2021-04-27 RX ADMIN — MAGNESIUM HYDROXIDE 30 ML: 2400 SUSPENSION ORAL at 16:30

## 2021-04-27 RX ADMIN — CEFAZOLIN 2000 MG: 10 INJECTION, POWDER, FOR SOLUTION INTRAVENOUS at 00:35

## 2021-04-27 RX ADMIN — CEFAZOLIN 2000 MG: 10 INJECTION, POWDER, FOR SOLUTION INTRAVENOUS at 07:31

## 2021-04-27 RX ADMIN — METOPROLOL TARTRATE 25 MG: 25 TABLET, FILM COATED ORAL at 08:50

## 2021-04-27 ASSESSMENT — PAIN SCALES - GENERAL
PAINLEVEL_OUTOF10: 7
PAINLEVEL_OUTOF10: 8
PAINLEVEL_OUTOF10: 7
PAINLEVEL_OUTOF10: 4
PAINLEVEL_OUTOF10: 9
PAINLEVEL_OUTOF10: 7
PAINLEVEL_OUTOF10: 4
PAINLEVEL_OUTOF10: 8
PAINLEVEL_OUTOF10: 7
PAINLEVEL_OUTOF10: 8
PAINLEVEL_OUTOF10: 4
PAINLEVEL_OUTOF10: 7
PAINLEVEL_OUTOF10: 4
PAINLEVEL_OUTOF10: 7
PAINLEVEL_OUTOF10: 4
PAINLEVEL_OUTOF10: 8
PAINLEVEL_OUTOF10: 4
PAINLEVEL_OUTOF10: 7

## 2021-04-27 ASSESSMENT — PAIN DESCRIPTION - PROGRESSION
CLINICAL_PROGRESSION: NOT CHANGED

## 2021-04-27 NOTE — CARE COORDINATION
[facility]    Referral received from CM to follow for home care services.      [facility] will staff soc for 4/30 IV/VAC    [employer] notified to run iv benefits    Call placed to patient regarding patient choice for [facility] d/t [facility] unable to staff    Emery Azevedo [phone] call w scheduling  States she wants called instead of patient  She's teachable for iv  She has no preference in which agency    Call placed to [address] at [address]  unable to staff  Call placed to [facility] unable to staff  Call placed to [facility]; unable to staff  Call placed to [facility] mt orab they are not doing ivs right now    Call placed to [facility] ; sent referral fax to [phone]; they are OON w that [facility] [phone] ; fax [phone] needs demos/payer faxed  They can only use [facility]     [facility] unable to staff      Unable to find alternate agency; VIRGINIE Daniels notified   [facility] will staff patient     Pancho Zamora RN, BSN CTN  [facility] [phone]

## 2021-04-27 NOTE — TELEPHONE ENCOUNTER
I talked to Arturo's wife Darinel Arita and recommended that Yury Micah proceed with his second Covid vaccine as soon as he can get it done. She agreed.

## 2021-04-27 NOTE — PROGRESS NOTES
This RN entered the room to find the patient's dressing was completely saturated with serosanguinous draining and blood dripping out of the bottom. Placed a call to Dr. Angela Lyons who talked writer through the changing of the pressure dressing. Dressing replaced. Patient now resting comfortably.

## 2021-04-27 NOTE — CARE COORDINATION
Per primary RN, pt does not currently have wound vac in place. CM placed [employer] wound vac order form on pt hard chart should he require prior to DC. This will need to be signed by CT sx and wound care prior to being faxed to [facility] for approval.     CM also spoke to [facility] with [facility]. She is now aware of pt admission and will follow for needs at [place]. She will also contact Elvi Diaz with [facility] for IV abx at DC. Will follow.      Tameka Sidhu RN

## 2021-04-27 NOTE — PROGRESS NOTES
Hospitalist Progress Note      PCP: Jad Fraser DO    Date of Admission: 4/23/2021    Chief Complaint: Chest pain      Hospital Course:   61 y. o. male with PMH of CAD s/p CABG, HLD, HTN, DM2, GURDEEP and obesity who presented to [facility] with complaints of chest pain. Pt is status post CABG x2 on 2/19/2021. Had uneventful postop course. This past Monday 4/19 pt was seen in the [facility] office due to suture protruding from his skin which was removed. At that time there was no evidence of infection. He developed chest pain on Tuesday and presented to [facility] ED where workup was negative, he was then discharged. The pt developed low grade fevers on Thursday and associated chills/rigors. He reports chest pain mainly in and around his surgical site. No dyspnea. No N/V/D. +constipation. No abdominal pain. ED course: WBC elevated at 18 and sodium of 127. Admitted diagnosis chest wall cellulitis of his surgical incision site.       Subjective:   Pt is on RA. Sitting in chair, had dressing change overnight, family not currently at bedside, Fever curve improved. VSS. Mady Escobar No dyspnea.  No N/V/D.          Medications:  Reviewed    Infusion Medications    sodium chloride Stopped (04/25/21 0944)    dextrose       Scheduled Medications    insulin glargine  20 Units Subcutaneous Nightly    insulin lispro  5 Units Subcutaneous TID     polyethylene glycol  17 g Oral Daily    ceFAZolin  2,000 mg Intravenous Q8H    tamsulosin  0.4 mg Oral Daily    insulin lispro  0-18 Units Subcutaneous TID     insulin lispro  0-9 Units Subcutaneous Nightly    aspirin  81 mg Oral Daily    atorvastatin  40 mg Oral QPM    budesonide-formoterol  2 puff Inhalation BID    metoprolol tartrate  25 mg Oral BID    sodium chloride flush  5-40 mL Intravenous 2 times per day    sennosides-docusate sodium  2 tablet Oral BID     PRN Meds: meperidine, fentanNYL, HYDROmorphone, HYDROmorphone, HYDROmorphone, hydrALAZINE, bisacodyl, * 134*   K 4.0 4.4   CL 94* 96*   CO2 24 28   BUN 8 8   CREATININE 0.6* 0.6*   CALCIUM 8.7 9.0     No results for input(s): AST, ALT, BILIDIR, BILITOT, ALKPHOS in the last 72 hours. No results for input(s): INR in the last 72 hours. No results for input(s): Joanna Rivas in the last 72 hours. Urinalysis:      Lab Results   Component Value Date    NITRU Negative 04/21/2021    BLOODU Negative 04/21/2021    SPECGRAV 1.025 04/21/2021    GLUCOSEU 500 04/21/2021       Radiology:  XR CHEST PORTABLE   Final Result   No acute abnormality identified. Assessment/Plan:    Active Hospital Problems    Diagnosis    Sepsis due to methicillin susceptible Staphylococcus aureus ([address] [A41.01]    Bacteremia due to methicillin susceptible Staphylococcus aureus (MSSA) [R78.81, B95.61]    Disruption or dehiscence of closure of sternum or sternotomy [T81.32XA]    Elevated sed rate [R70.0]    Type 2 diabetes mellitus with obesity (HCC) [E11.69, E66.9]    Cellulitis [L03.90]          Sepsis due to cellulitis of sternotomy incision   - Met SIRS criteria with fever, leukocytosis, tachycardia. No lactic acidosis. - CT chest on 4/21 showed healing midline sternotomy incision with no focal subcutaneous collection.   - Pt now with open wound at the top and bottom of the surgical incision with purulent drainage.   - Pt started on IV vancomycin and cefepime on 4/23. Blood cultures grew Staph (mecA gene not detected). Dc'd Vancomycin as likely MSSA. Follow-up sensitivities.   - ID consulted for further assistance -- appreciate mgmt.   - CT surgery consulted/following. I&D on 4/26. CAD status post CABG in Feb 2021  - EKG non-acute. Troponin negative. - Continued aspirin, statin, metoprolol (CTS dc'd plavix, last dose was on 4/25).   - Monitored pt on telemetry.      Diabetes mellitus type 2:  - Uncontrolled, recent A1C was 8.4.  - Hold home regimen. Continue high dose SSI ACHS and Lantus QHS, add prandial insulin with meals TID -- monitor and adjust accordingly. - Carb-control diet.      Acute hyponatremia, mild:  - Likely secondary to dehydration and component of pseudohyponatremia (corrected Na is 132). - S/p gentle IV fluids.   - Monitored BMP closely.      Asthma:  - Stable. No wheezing on exam. Continue Symbicort.      Hypertension:  - Controlled. Continue his home medications. Monitor.      Hyperlipidemia:  - Continue statin.      Constipation:  - Continue bowel regimen.      Urinary frequency/hesitancy:  - Pt was to see Dr. Zohaib Hayden on 4/27. Pt requesting consult while inpt. - UA negative. Checked PVR. Start flomax.      Obesity:  - With Body mass index is 49.56 kg/m². Complicating assessment and treatment. Placing patient at risk for multiple co-morbidities as well as early death and contributing to the patient's presentation.  Counseled on weight loss.         DVT Prophylaxis: SCDs (holding lovenox for I&D on 4/26)  Diet: DIET CARDIAC; Carb Control: 5 carb choices (75 gms)/meal  Code Status: Full Code    PT/OT Eval Status: not needed     Dispo -unclear, pending Jade Poon, ID recs,     Luda Talbert MD

## 2021-04-27 NOTE — PROGRESS NOTES
Infectious Disease Follow up Notes    CC :  Sternal wound infection and bacteremia      Antibiotics:   Ancef 2g q8    Admit Date:   4/23/2021  Hospital Day: 5    Subjective:   He remains afebrile   His pain is well controlled. No BM in 9d. No abd pain. Some pruritus from the narcotic, no rash. Objective:     Patient Vitals for the past 8 hrs:   BP Temp Temp src Pulse Resp SpO2 Weight   04/27/21 1140 128/79 97.6 °F (36.4 °C) Oral 83 18 97 % --   04/27/21 0903 -- -- -- -- -- -- 235 lb 3.2 oz (106.7 kg)   04/27/21 0802 (!) 142/89 97.6 °F (36.4 °C) -- 86 16 96 % --   04/27/21 0743 -- -- -- -- 16 98 % --       EXAM:  General:  Alert, oriented, NAD    HEENT:  NCAT, PERRL, sclera anicteric  No thrush  NECK:   supple      LUNGS:   CTA ky without W/R/R  CV:    RRR  Sternal wound VAC in place, sternum stable   ABD: Protuberant, soft, NT.   Bowel sounds present   EXT:  2+ pitting edema lower legs, no focal rash       LINE:  PIV in place, site with faint erythema  Phlebitis L AC fossa at old IV site          Scheduled Meds:   insulin glargine  20 Units Subcutaneous Nightly    insulin lispro  5 Units Subcutaneous TID WC    polyethylene glycol  17 g Oral Daily    ceFAZolin  2,000 mg Intravenous Q8H    tamsulosin  0.4 mg Oral Daily    insulin lispro  0-18 Units Subcutaneous TID WC    insulin lispro  0-9 Units Subcutaneous Nightly    aspirin  81 mg Oral Daily    atorvastatin  40 mg Oral QPM    budesonide-formoterol  2 puff Inhalation BID    metoprolol tartrate  25 mg Oral BID    sodium chloride flush  5-40 mL Intravenous 2 times per day    sennosides-docusate sodium  2 tablet Oral BID       Continuous Infusions:   sodium chloride Stopped (04/25/21 0944)    dextrose            Data Review:    Lab Results   Component Value Date    WBC 8.1 04/27/2021    HGB 10.2 (L) 04/27/2021    HCT 31.1 (L) 04/27/2021    MCV 86.9 04/27/2021    PLT 344 04/27/2021     Lab Results   Component Value Date    CREATININE 0.6 (L) 04/27/2021    BUN 9 04/27/2021     (L) 04/27/2021    K 3.9 04/27/2021    CL 94 (L) 04/27/2021    CO2 26 04/27/2021       Hepatic Function Panel:   Lab Results   Component Value Date    ALKPHOS 79 04/24/2021    ALT 6 04/24/2021    AST 9 04/24/2021    PROT 6.7 04/24/2021    BILITOT 0.7 04/24/2021    BILIDIR <0.2 04/19/2021    IBILI see below 04/19/2021    LABALBU 3.2 04/24/2021         MICRO:  4/23 BC x2 MSSA   Staphylococcus aureus  Antibiotic Interpretation BELLA Status    clindamycin Resistant       erythromycin Resistant >=8 mcg/mL     oxacillin Sensitive <=0.25 mcg/mL     tetracycline Sensitive <=1 mcg/mL     trimethoprim-sulfamethoxazole Sensitive <=10 mcg/mL       4/25 BC x2 NGTD  4/26 Surgical tissue culture sternum - rare growth S aureus       IMAGING:  CT chest 4/21/21  Impression   1. Intact sternotomy, with expected postoperative changes in the mediastinum   and healing chest wall incision   2. Small left pleural effusion     CT chest w contrast 4/21/21  Impression   1. No evidence of pulmonary embolic disease. 2. Small left pleural effusion with dependent atelectasis.  No acute   pulmonary infiltrate. 3. No unexpected findings following recent median sternotomy and open heart   surgery.        Assessment:     Patient Active Problem List    Diagnosis Date Noted    NSTEMI (non-ST elevated myocardial infarction) ([facility] 07/11/2017     Priority: High    Coronary artery disease involving native coronary artery of native heart with unstable angina pectoris ([address] 01/15/2016     Priority: High    Precordial pain 01/13/2016     Priority: High     Class: Acute    Sepsis due to methicillin susceptible Staphylococcus aureus (HCC)     Bacteremia due to methicillin susceptible Staphylococcus aureus (MSSA)     Disruption or dehiscence of closure of sternum or sternotomy     Elevated sed rate     Type 2 diabetes mellitus with obesity ([facility] [address] [facility]     Cellulitis 04/23/2021    GURDEEP (obstructive sleep apnea)     Ischemic cardiomyopathy     CAD in native artery 12/15/2020    S/P PTCA (percutaneous transluminal coronary angioplasty) 12/15/2020    Chest pain 12/15/2020    Chronic right-sided thoracic back pain 11/12/2020    Need for prophylactic vaccination and inoculation against varicella 08/13/2020    Need for prophylactic vaccination against diphtheria-tetanus-pertussis (DTP) 08/13/2020    Reactive airway disease with wheezing, moderate persistent, with acute exacerbation 03/03/2020    Reactive airway disease 02/13/2020    Bronchitis 02/13/2020    History of angina pectoris 06/20/2019    Mass of right foot 10/19/2017    Overweight 10/19/2017    Controlled type 2 diabetes mellitus without complication, without long-term current use of insulin ([address] 07/18/2017    ASHD (arteriosclerotic heart disease) 07/18/2017    Angina pectoris ([facility] [address] 10/17/2016    Angina at rest [facility]) 05/24/2016    Hyperlipidemia 05/24/2016    Angina effort 04/14/2016     Overview Note:     Replacing deprecated diagnoses      Skin lesion 04/14/2016    Abnormal chest x-ray 01/28/2016    S/P drug eluting coronary stent placement     Essential hypertension     Obesity     Other chest pain 01/18/2016    Acute coronary syndrome ([address]     Lung nodule     Acute angina (HCC)     Abnormal stress test     Shoulder pain 07/06/2015    Low serum testosterone level 06/18/2015    Arm pain, left 06/18/2015    Flank pain 05/21/2015    Prostatism 05/21/2015    Patient overweight 12/23/2014     Overview Note:     Updating deleted diagnoses      Dietary noncompliance 12/23/2014    Alcohol abuse 12/23/2014    Patient overweight 04/29/2013     Overview Note:     Updating deleted diagnoses      Diabetes mellitus 12/03/2012    History of ETOH abuse 12/03/2012    Type II or unspecified type diabetes mellitus without mention of complication, not stated as uncontrolled

## 2021-04-27 NOTE — CONSULTS
Aniceto Collar Wound Ostomy Continence Nurse  Consult Note       NAME:  Richard Berry  MEDICAL RECORD NUMBER:  [id number]  AGE: 61 y.o. GENDER: male  : 1960  TODAY'S DATE:  2021    Subjective   Reason for WOCN Evaluation and Assessment: Sternum - debridement      Richard Berry is a 61 y.o. male referred by:   [x] Physician  [] Nursing  [] Other:     Wound Identification:  Wound Type: non-healing surgical  Contributing Factors: diabetes, obesity, arterial insufficiency, anticoagulation therapy and CABG x 2     Wound History: 61 y.o. male with PMH of CAD s/p CABG, HLD, HTN, DM2, GURDEEP and obesity who presented to [facility] with complaints of chest pain. Pt is status post CABG x2 on 2021. Had uneventful postop course. This past  pt was seen in the [address] [place] [address] office due to suture protruding from his skin which was removed. At that time there was no evidence of infection. He developed chest pain on Tuesday and presented to [facility] ED where workup was negative, he was then discharged. The pt developed low grade fevers on Thursday and associated chills/rigors. He reports chest pain mainly in and around his surgical site. No dyspnea. No N/V/D. +constipation. No abdominal pain. ED course: WBC elevated at 18 and sodium of 127.  Admitted diagnosis chest wall cellulitis of his surgical incision site  Current Wound Care Treatment: Sternum - VAC dressing     Patient Goal of Care:  [x] Wound Healing  [] Odor Control  [] Palliative Care  [] Pain Control   [] Other:         PAST MEDICAL HISTORY        Diagnosis Date    Alcohol abuse 2012    Coronary artery disease     Diabetic neuropathy associated with type 2 diabetes mellitus ([facility] [address]     Hyperlipidemia     Hypertension     Non morbid obesity     GURDEEP (obstructive sleep apnea)     Reactive airway disease with wheezing, moderate persistent, with acute exacerbation 2020    Type II or unspecified type diabetes mellitus without mention of (LOPRESSOR) 25 MG tablet TAKE ONE TABLET BY MOUTH TWICE A DAY 60 tablet 5    fluticasone-salmeterol (ADVAIR DISKUS) 250-50 MCG/DOSE AEPB Inhale 1 puff into the lungs every 12 hours 60 each 3    aspirin 81 MG chewable tablet CHEW ONE TABLET BY MOUTH DAILY 30 tablet 1    famotidine (PEPCID) 20 MG tablet Take 1 tablet by mouth daily 30 tablet 0       Objective: A/O; sitting up in chair; Dr. Naye Magdaleno and Isabel Sands NP at bedside to assess incision. /79   Pulse 83   Temp 97.6 °F (36.4 °C) (Oral)   Resp 18   Ht 5' 10\" (1.778 m)   Wt 235 lb 3.2 oz (106.7 kg)   SpO2 97%   BMI 33.75 kg/m²     LABS:  WBC:    Lab Results   Component Value Date    WBC 8.1 04/27/2021     H/H:    Lab Results   Component Value Date    HGB 10.2 04/27/2021    HCT 31.1 04/27/2021     PTT:    Lab Results   Component Value Date    APTT 30.4 02/19/2021   [APTT}  PT/INR:    Lab Results   Component Value Date    PROTIME 15.3 02/23/2021    INR 1.32 02/23/2021     HgBA1c:    Lab Results   Component Value Date    LABA1C 8.4 02/19/2021       Assessment: Sternum Incision - black foam in place unable to visualize wound.     Octavio Risk Score: Octavio Scale Score: 20    Patient Active Problem List   Diagnosis Code    Type II or unspecified type diabetes mellitus without mention of complication, not stated as uncontrolled E11.9    Diabetes mellitus E11.9    History of ETOH abuse F10.11    Patient overweight E66.3    Patient overweight E66.3    Dietary noncompliance Z91.11    Alcohol abuse F10.10    Flank pain R10.9    Prostatism N40.0    Low serum testosterone level R79.89    Arm pain, left M79.602    Shoulder pain M25.519    Precordial pain R07.2    Lung nodule R91.1    Acute angina (HCC) I20.9    Abnormal stress test R94.39    Acute coronary syndrome (HCC) I24.9    Coronary artery disease involving native coronary artery of native heart with unstable angina pectoris (HCC) I25.110    Other chest pain R07.89    S/P drug Dressing Changed Changed/New 04/27/21 1223   Drainage Amount Moderate 04/27/21 1223   Drainage Description Sanguinous 04/27/21 1223   Dressing Change Due 04/30/21 04/27/21 1223   Wound Assessment Other (Comment) 04/27/21 1223   Felicia-wound Assessment Dry 04/27/21 1223   Shape linear 04/27/21 1223   Odor None 04/27/21 1223   Number of days: 1     Incision 02/19/21 Leg Right (Active)   Number of days: 67       Incision 04/26/21 Sternum (Active)   Wound Image   04/27/21 1223   Dressing Status New drainage noted;New dressing applied 04/27/21 1223   Dressing Change Due 04/30/21 04/27/21 1223   Incision Cleansed Cleansed with saline 04/27/21 1223   Dressing/Treatment Negative pressure wound therapy 04/27/21 1223   Incision Length (cm) 12.5 04/27/21 1223   Incision Width (cm) 2 cm 04/27/21 1223   Incision Depth (cm) 0 cm 04/27/21 1223   Drainage Amount Moderate 04/27/21 1223   Drainage Description Sanguinous 04/27/21 1223   Odor None 04/27/21 1223   Felicia-incision Assessment Dry/flaky 04/27/21 1223   Number of days: 1   Sternum:      Response to treatment:  Well tolerated by patient. Pain Assessment:  Severity:  0 / 10  Quality of pain: N/A  Wound Pain Timing/Severity: none  Premedicated: No    Plan   Plan of Care:     Recommendation: Sternum - clean with NS; pat dry; frame incision with drape; 3 black foam; cover with drape; attach tracker pad and seal achieved. Change cannister once a week or when full.     If suction fails or patient is discharged:  - remove vac dressing  - cleanse wound with normal saline   - pack wound with saline moistened gauze and change every 12 hours  Call Wound Care for issues [phone]    Specialty Bed Required : No   [] Low Air Loss   [] Pressure Redistribution  [] Fluid Immersion  [] Bariatric  [] Total Pressure Relief  [] Other:     Current Diet: DIET CARDIAC; Carb Control: 5 carb choices (75 gms)/meal  Diet NPO, After Midnight  Dietician consult:  Yes    Discharge Plan:  Placement for patient upon discharge: home with support    Patient appropriate for Outpatient [address]: Follow up with Dr. Jas Frias    Referrals:  [x]   [] [facility]  [] Supplies  [] Other    Patient/Caregiver Teaching:  Level of patient/caregiver understanding able to:   [x] Indicates understanding       [] Needs reinforcement  [] Unsuccessful      [] Verbal Understanding  [] Demonstrated understanding       [] No evidence of learning  [] Refused teaching         [] N/A       Electronically signed by Allyson Ross RN, Dane Duke on 4/27/2021 at 12:28 PM

## 2021-04-27 NOTE — CARE COORDINATION
Referral received to check IV Benefits.  Patients benefit information is as follows:       Patient covered at 100%    Electronically signed by Georgia Cabrera on 4/27/2021 at 1:51 PM   Cell Ph# [phone], Office # [phone]

## 2021-04-27 NOTE — PROGRESS NOTES
[facility]  Progress Note    4/27/2021 1:19 PM  Surgeon: Nikhil Torres     2 months ago S/P CABG x2 2/19/21    Chief complaint: Chest wall cellulitis     Subjective: awake, sitting up in chair, family at bedside. Hospital course:  4/25 tender, significant amount of drainage overnight, on antibiotics since admission   4/26 taken to OR to clean out sternal wound. 4/27 Plan to go back to OR tomorrow for further debridement and wound vacuum change. Vital Signs: /79   Pulse 83   Temp 97.6 °F (36.4 °C) (Oral)   Resp 18   Ht 5' 10\" (1.778 m)   Wt 235 lb 3.2 oz (106.7 kg)   SpO2 97%   BMI 33.75 kg/m²        I/O:      Intake/Output Summary (Last 24 hours) at 4/27/2021 1319  Last data filed at 4/27/2021 0904  Gross per 24 hour   Intake 240 ml   Output 775 ml   Net -535 ml     Exam:   Cardiovascular: S1 plus S2 +0 no additional sound  Pulmonary: Bilaterally clear no additional sound  Incision with moderate amount of bloody drainage overnight. Wound vacuum placed today.      Labs:   CBC:   Recent Labs     04/25/21  0613 04/26/21  0705 04/27/21  0753   WBC 13.5* 9.1 8.1   HGB 11.1* 10.7* 10.2*   HCT 34.1* 32.5* 31.1*   MCV 88.1 87.8 86.9    291 344     BMP:   Recent Labs     04/25/21  0613 04/26/21  0705 04/27/21  0753   * 134* 132*   K 4.0 4.4 3.9   CL 94* 96* 94*   CO2 24 28 26   BUN 8 8 9   CREATININE 0.6* 0.6* 0.6*     Scheduled Meds:    insulin glargine  20 Units Subcutaneous Nightly    insulin lispro  5 Units Subcutaneous TID WC    polyethylene glycol  17 g Oral Daily    ceFAZolin  2,000 mg Intravenous Q8H    tamsulosin  0.4 mg Oral Daily    insulin lispro  0-18 Units Subcutaneous TID WC    insulin lispro  0-9 Units Subcutaneous Nightly    aspirin  81 mg Oral Daily    atorvastatin  40 mg Oral QPM    budesonide-formoterol  2 puff Inhalation BID    metoprolol tartrate  25 mg Oral BID    sodium chloride flush  5-40 mL Intravenous 2 times per day  sennosides-docusate sodium  2 tablet Oral BID     Continuous Infusions:    sodium chloride Stopped (04/25/21 0944)    dextrose           Patient Active Problem List   Diagnosis    Type II or unspecified type diabetes mellitus without mention of complication, not stated as uncontrolled    Diabetes mellitus    History of ETOH abuse    Patient overweight    Patient overweight    Dietary noncompliance    Alcohol abuse    Flank pain    Prostatism    Low serum testosterone level    Arm pain, left    Shoulder pain    Precordial pain    Lung nodule    Acute angina (HCC)    Abnormal stress test    Acute coronary syndrome ([address]    Coronary artery disease involving native coronary artery of native heart with unstable angina pectoris (HCC)    Other chest pain    S/P drug eluting coronary stent placement    Essential hypertension    Obesity    Abnormal chest x-ray    Angina effort    Skin lesion    Angina at rest [facility])    Hyperlipidemia    Angina pectoris ([address]    NSTEMI (non-ST elevated myocardial infarction) ([address]    Controlled type 2 diabetes mellitus without complication, without long-term current use of insulin ([address]    ASHD (arteriosclerotic heart disease)    Mass of right foot    Overweight    History of angina pectoris    Reactive airway disease    Bronchitis    Reactive airway disease with wheezing, moderate persistent, with acute exacerbation    Need for prophylactic vaccination and inoculation against varicella    Need for prophylactic vaccination against diphtheria-tetanus-pertussis (DTP)    Chronic right-sided thoracic back pain    CAD in native artery    S/P PTCA (percutaneous transluminal coronary angioplasty)    Chest pain    Ischemic cardiomyopathy    GURDEEP (obstructive sleep apnea)    Cellulitis    Sepsis due to methicillin susceptible Staphylococcus aureus ([address]    Bacteremia due to methicillin susceptible Staphylococcus aureus (MSSA)    Disruption or dehiscence of closure of sternum or sternotomy    Elevated sed rate    Type 2 diabetes mellitus with obesity (HCC)     Assessment  Status post CABG 2 months ago sternal wound dehiscence possible infection  Plan:   Continue IV antibiotics  Discussed plan with team. Will take pt back to the OR tomorrow for further debridement and wound vac change. That will put him back on the MWF schedule.    NPO at midnight.   ____________________________________________________    Lizzy Sorensen, YANA  4/27/21   1:25 pm

## 2021-04-28 ENCOUNTER — ANESTHESIA (OUTPATIENT)
Dept: OPERATING ROOM | Age: 61
DRG: 856 | End: 2021-04-28
Payer: COMMERCIAL

## 2021-04-28 ENCOUNTER — ANESTHESIA EVENT (OUTPATIENT)
Dept: OPERATING ROOM | Age: 61
DRG: 856 | End: 2021-04-28
Payer: COMMERCIAL

## 2021-04-28 VITALS
SYSTOLIC BLOOD PRESSURE: 74 MMHG | TEMPERATURE: 98.2 F | RESPIRATION RATE: 8 BRPM | OXYGEN SATURATION: 100 % | DIASTOLIC BLOOD PRESSURE: 57 MMHG

## 2021-04-28 LAB
ANION GAP SERPL CALCULATED.3IONS-SCNC: 12 MMOL/L (ref 3–16)
BUN BLDV-MCNC: 8 MG/DL (ref 7–20)
CALCIUM SERPL-MCNC: 9.3 MG/DL (ref 8.3–10.6)
CHLORIDE BLD-SCNC: 96 MMOL/L (ref 99–110)
CO2: 28 MMOL/L (ref 21–32)
CREAT SERPL-MCNC: <0.5 MG/DL (ref 0.8–1.3)
GFR AFRICAN AMERICAN: >60
GFR NON-AFRICAN AMERICAN: >60
GLUCOSE BLD-MCNC: 172 MG/DL (ref 70–99)
GLUCOSE BLD-MCNC: 185 MG/DL (ref 70–99)
GLUCOSE BLD-MCNC: 205 MG/DL (ref 70–99)
GLUCOSE BLD-MCNC: 235 MG/DL (ref 70–99)
GLUCOSE BLD-MCNC: 249 MG/DL (ref 70–99)
GLUCOSE BLD-MCNC: 363 MG/DL (ref 70–99)
HCT VFR BLD CALC: 34 % (ref 40.5–52.5)
HEMOGLOBIN: 11 G/DL (ref 13.5–17.5)
INR BLD: 1.17 (ref 0.86–1.14)
MCH RBC QN AUTO: 28.2 PG (ref 26–34)
MCHC RBC AUTO-ENTMCNC: 32.4 G/DL (ref 31–36)
MCV RBC AUTO: 87.1 FL (ref 80–100)
PDW BLD-RTO: 14.7 % (ref 12.4–15.4)
PERFORMED ON: ABNORMAL
PLATELET # BLD: 440 K/UL (ref 135–450)
PMV BLD AUTO: 7.7 FL (ref 5–10.5)
POTASSIUM REFLEX MAGNESIUM: 4.4 MMOL/L (ref 3.5–5.1)
PROTHROMBIN TIME: 13.6 SEC (ref 10–13.2)
RBC # BLD: 3.9 M/UL (ref 4.2–5.9)
SODIUM BLD-SCNC: 136 MMOL/L (ref 136–145)
WBC # BLD: 7.6 K/UL (ref 4–11)

## 2021-04-28 PROCEDURE — 0W980ZZ DRAINAGE OF CHEST WALL, OPEN APPROACH: ICD-10-PCS | Performed by: THORACIC SURGERY (CARDIOTHORACIC VASCULAR SURGERY)

## 2021-04-28 PROCEDURE — 2709999900 HC NON-CHARGEABLE SUPPLY: Performed by: THORACIC SURGERY (CARDIOTHORACIC VASCULAR SURGERY)

## 2021-04-28 PROCEDURE — 1200000000 HC SEMI PRIVATE

## 2021-04-28 PROCEDURE — 6360000002 HC RX W HCPCS

## 2021-04-28 PROCEDURE — 7100000001 HC PACU RECOVERY - ADDTL 15 MIN: Performed by: THORACIC SURGERY (CARDIOTHORACIC VASCULAR SURGERY)

## 2021-04-28 PROCEDURE — 6360000002 HC RX W HCPCS: Performed by: INTERNAL MEDICINE

## 2021-04-28 PROCEDURE — 99232 SBSQ HOSP IP/OBS MODERATE 35: CPT | Performed by: INTERNAL MEDICINE

## 2021-04-28 PROCEDURE — 94761 N-INVAS EAR/PLS OXIMETRY MLT: CPT

## 2021-04-28 PROCEDURE — 3700000001 HC ADD 15 MINUTES (ANESTHESIA): Performed by: THORACIC SURGERY (CARDIOTHORACIC VASCULAR SURGERY)

## 2021-04-28 PROCEDURE — 97606 NEG PRS WND THER DME>50 SQCM: CPT

## 2021-04-28 PROCEDURE — 6370000000 HC RX 637 (ALT 250 FOR IP): Performed by: THORACIC SURGERY (CARDIOTHORACIC VASCULAR SURGERY)

## 2021-04-28 PROCEDURE — 7100000000 HC PACU RECOVERY - FIRST 15 MIN: Performed by: THORACIC SURGERY (CARDIOTHORACIC VASCULAR SURGERY)

## 2021-04-28 PROCEDURE — 2580000003 HC RX 258: Performed by: THORACIC SURGERY (CARDIOTHORACIC VASCULAR SURGERY)

## 2021-04-28 PROCEDURE — 88300 SURGICAL PATH GROSS: CPT

## 2021-04-28 PROCEDURE — 6360000002 HC RX W HCPCS: Performed by: THORACIC SURGERY (CARDIOTHORACIC VASCULAR SURGERY)

## 2021-04-28 PROCEDURE — 2580000003 HC RX 258: Performed by: INTERNAL MEDICINE

## 2021-04-28 PROCEDURE — 6370000000 HC RX 637 (ALT 250 FOR IP): Performed by: REGISTERED NURSE

## 2021-04-28 PROCEDURE — 94640 AIRWAY INHALATION TREATMENT: CPT

## 2021-04-28 PROCEDURE — 6370000000 HC RX 637 (ALT 250 FOR IP): Performed by: INTERNAL MEDICINE

## 2021-04-28 PROCEDURE — 3600000018 HC SURGERY OHS ADDTL 15MIN: Performed by: THORACIC SURGERY (CARDIOTHORACIC VASCULAR SURGERY)

## 2021-04-28 PROCEDURE — 85027 COMPLETE CBC AUTOMATED: CPT

## 2021-04-28 PROCEDURE — 2500000003 HC RX 250 WO HCPCS: Performed by: NURSE ANESTHETIST, CERTIFIED REGISTERED

## 2021-04-28 PROCEDURE — 2580000003 HC RX 258: Performed by: NURSE ANESTHETIST, CERTIFIED REGISTERED

## 2021-04-28 PROCEDURE — 85610 PROTHROMBIN TIME: CPT

## 2021-04-28 PROCEDURE — 0PC00ZZ EXTIRPATION OF MATTER FROM STERNUM, OPEN APPROACH: ICD-10-PCS | Performed by: THORACIC SURGERY (CARDIOTHORACIC VASCULAR SURGERY)

## 2021-04-28 PROCEDURE — 3600000008 HC SURGERY OHS BASE: Performed by: THORACIC SURGERY (CARDIOTHORACIC VASCULAR SURGERY)

## 2021-04-28 PROCEDURE — 80048 BASIC METABOLIC PNL TOTAL CA: CPT

## 2021-04-28 PROCEDURE — 2700000000 HC OXYGEN THERAPY PER DAY

## 2021-04-28 PROCEDURE — 3700000000 HC ANESTHESIA ATTENDED CARE: Performed by: THORACIC SURGERY (CARDIOTHORACIC VASCULAR SURGERY)

## 2021-04-28 PROCEDURE — 6360000002 HC RX W HCPCS: Performed by: REGISTERED NURSE

## 2021-04-28 PROCEDURE — 6370000000 HC RX 637 (ALT 250 FOR IP): Performed by: NURSE PRACTITIONER

## 2021-04-28 PROCEDURE — 6360000002 HC RX W HCPCS: Performed by: ANESTHESIOLOGY

## 2021-04-28 PROCEDURE — 97607 NEG PRS WND THR NDME<=50SQCM: CPT | Performed by: THORACIC SURGERY (CARDIOTHORACIC VASCULAR SURGERY)

## 2021-04-28 PROCEDURE — 6360000002 HC RX W HCPCS: Performed by: NURSE ANESTHETIST, CERTIFIED REGISTERED

## 2021-04-28 PROCEDURE — 21627 STERNAL DEBRIDEMENT: CPT | Performed by: THORACIC SURGERY (CARDIOTHORACIC VASCULAR SURGERY)

## 2021-04-28 PROCEDURE — 99024 POSTOP FOLLOW-UP VISIT: CPT | Performed by: NURSE PRACTITIONER

## 2021-04-28 PROCEDURE — 36415 COLL VENOUS BLD VENIPUNCTURE: CPT

## 2021-04-28 RX ORDER — SODIUM CHLORIDE, SODIUM LACTATE, POTASSIUM CHLORIDE, CALCIUM CHLORIDE 600; 310; 30; 20 MG/100ML; MG/100ML; MG/100ML; MG/100ML
INJECTION, SOLUTION INTRAVENOUS CONTINUOUS PRN
Status: DISCONTINUED | OUTPATIENT
Start: 2021-04-28 | End: 2021-04-28 | Stop reason: SDUPTHER

## 2021-04-28 RX ORDER — DEXAMETHASONE SODIUM PHOSPHATE 10 MG/ML
INJECTION INTRAMUSCULAR; INTRAVENOUS PRN
Status: DISCONTINUED | OUTPATIENT
Start: 2021-04-28 | End: 2021-04-28 | Stop reason: SDUPTHER

## 2021-04-28 RX ORDER — LACTULOSE 10 G/15ML
20 SOLUTION ORAL DAILY
Status: COMPLETED | OUTPATIENT
Start: 2021-04-28 | End: 2021-04-29

## 2021-04-28 RX ORDER — LIDOCAINE HYDROCHLORIDE 20 MG/ML
INJECTION, SOLUTION INFILTRATION; PERINEURAL PRN
Status: DISCONTINUED | OUTPATIENT
Start: 2021-04-28 | End: 2021-04-28 | Stop reason: SDUPTHER

## 2021-04-28 RX ORDER — ROCURONIUM BROMIDE 10 MG/ML
INJECTION, SOLUTION INTRAVENOUS PRN
Status: DISCONTINUED | OUTPATIENT
Start: 2021-04-28 | End: 2021-04-28 | Stop reason: SDUPTHER

## 2021-04-28 RX ORDER — PROPOFOL 10 MG/ML
INJECTION, EMULSION INTRAVENOUS PRN
Status: DISCONTINUED | OUTPATIENT
Start: 2021-04-28 | End: 2021-04-28 | Stop reason: SDUPTHER

## 2021-04-28 RX ORDER — BISACODYL 10 MG
20 SUPPOSITORY, RECTAL RECTAL ONCE
Status: COMPLETED | OUTPATIENT
Start: 2021-04-28 | End: 2021-04-28

## 2021-04-28 RX ORDER — ONDANSETRON 2 MG/ML
INJECTION INTRAMUSCULAR; INTRAVENOUS PRN
Status: DISCONTINUED | OUTPATIENT
Start: 2021-04-28 | End: 2021-04-28 | Stop reason: SDUPTHER

## 2021-04-28 RX ORDER — FENTANYL CITRATE 50 UG/ML
INJECTION, SOLUTION INTRAMUSCULAR; INTRAVENOUS PRN
Status: DISCONTINUED | OUTPATIENT
Start: 2021-04-28 | End: 2021-04-28 | Stop reason: SDUPTHER

## 2021-04-28 RX ADMIN — HYDROMORPHONE HYDROCHLORIDE 0.5 MG: 1 INJECTION, SOLUTION INTRAMUSCULAR; INTRAVENOUS; SUBCUTANEOUS at 14:56

## 2021-04-28 RX ADMIN — INSULIN LISPRO 7 UNITS: 100 INJECTION, SOLUTION INTRAVENOUS; SUBCUTANEOUS at 20:23

## 2021-04-28 RX ADMIN — MORPHINE SULFATE 4 MG: 4 INJECTION, SOLUTION INTRAMUSCULAR; INTRAVENOUS at 07:43

## 2021-04-28 RX ADMIN — ROCURONIUM BROMIDE 50 MG: 10 SOLUTION INTRAVENOUS at 13:40

## 2021-04-28 RX ADMIN — MAGNESIUM HYDROXIDE 30 ML: 2400 SUSPENSION ORAL at 15:48

## 2021-04-28 RX ADMIN — SODIUM CHLORIDE, SODIUM LACTATE, POTASSIUM CHLORIDE, AND CALCIUM CHLORIDE: .6; .31; .03; .02 INJECTION, SOLUTION INTRAVENOUS at 13:15

## 2021-04-28 RX ADMIN — DOCUSATE SODIUM 50 MG AND SENNOSIDES 8.6 MG 2 TABLET: 8.6; 5 TABLET, FILM COATED ORAL at 20:13

## 2021-04-28 RX ADMIN — DOCUSATE SODIUM 50 MG AND SENNOSIDES 8.6 MG 2 TABLET: 8.6; 5 TABLET, FILM COATED ORAL at 11:59

## 2021-04-28 RX ADMIN — HYDROMORPHONE HYDROCHLORIDE 0.5 MG: 1 INJECTION, SOLUTION INTRAMUSCULAR; INTRAVENOUS; SUBCUTANEOUS at 14:22

## 2021-04-28 RX ADMIN — Medication 2 PUFF: at 08:02

## 2021-04-28 RX ADMIN — CEFAZOLIN 2000 MG: 10 INJECTION, POWDER, FOR SOLUTION INTRAVENOUS at 23:32

## 2021-04-28 RX ADMIN — HYDROMORPHONE HYDROCHLORIDE 0.5 MG: 1 INJECTION, SOLUTION INTRAMUSCULAR; INTRAVENOUS; SUBCUTANEOUS at 15:36

## 2021-04-28 RX ADMIN — HYDROCODONE BITARTRATE AND ACETAMINOPHEN 1 TABLET: 7.5; 325 TABLET ORAL at 09:06

## 2021-04-28 RX ADMIN — Medication 2 PUFF: at 19:14

## 2021-04-28 RX ADMIN — BISACODYL 10 MG: 5 TABLET, COATED ORAL at 17:22

## 2021-04-28 RX ADMIN — MORPHINE SULFATE 4 MG: 4 INJECTION, SOLUTION INTRAMUSCULAR; INTRAVENOUS at 12:01

## 2021-04-28 RX ADMIN — MORPHINE SULFATE 4 MG: 4 INJECTION, SOLUTION INTRAMUSCULAR; INTRAVENOUS at 20:17

## 2021-04-28 RX ADMIN — HYDROCODONE BITARTRATE AND ACETAMINOPHEN 1 TABLET: 7.5; 325 TABLET ORAL at 04:50

## 2021-04-28 RX ADMIN — BISACODYL 20 MG: 10 SUPPOSITORY RECTAL at 11:16

## 2021-04-28 RX ADMIN — CEFAZOLIN 2000 MG: 10 INJECTION, POWDER, FOR SOLUTION INTRAVENOUS at 07:43

## 2021-04-28 RX ADMIN — PROPOFOL 200 MG: 10 INJECTION, EMULSION INTRAVENOUS at 13:40

## 2021-04-28 RX ADMIN — TAMSULOSIN HYDROCHLORIDE 0.4 MG: 0.4 CAPSULE ORAL at 11:58

## 2021-04-28 RX ADMIN — POLYETHYLENE GLYCOL 3350 17 G: 17 POWDER, FOR SOLUTION ORAL at 15:49

## 2021-04-28 RX ADMIN — MORPHINE SULFATE 4 MG: 4 INJECTION, SOLUTION INTRAMUSCULAR; INTRAVENOUS at 03:43

## 2021-04-28 RX ADMIN — HYDROMORPHONE HYDROCHLORIDE 0.5 MG: 1 INJECTION, SOLUTION INTRAMUSCULAR; INTRAVENOUS; SUBCUTANEOUS at 14:28

## 2021-04-28 RX ADMIN — HYDROCODONE BITARTRATE AND ACETAMINOPHEN 1 TABLET: 7.5; 325 TABLET ORAL at 22:31

## 2021-04-28 RX ADMIN — INSULIN LISPRO 5 UNITS: 100 INJECTION, SOLUTION INTRAVENOUS; SUBCUTANEOUS at 17:23

## 2021-04-28 RX ADMIN — DEXAMETHASONE SODIUM PHOSPHATE 10 MG: 10 INJECTION INTRAMUSCULAR; INTRAVENOUS at 13:43

## 2021-04-28 RX ADMIN — LIDOCAINE HYDROCHLORIDE 80 MG: 20 INJECTION, SOLUTION INFILTRATION; PERINEURAL at 13:40

## 2021-04-28 RX ADMIN — HYDROMORPHONE HYDROCHLORIDE 0.5 MG: 1 INJECTION, SOLUTION INTRAMUSCULAR; INTRAVENOUS; SUBCUTANEOUS at 14:38

## 2021-04-28 RX ADMIN — ATORVASTATIN CALCIUM 40 MG: 40 TABLET, FILM COATED ORAL at 17:22

## 2021-04-28 RX ADMIN — CEFAZOLIN 2000 MG: 10 INJECTION, POWDER, FOR SOLUTION INTRAVENOUS at 15:51

## 2021-04-28 RX ADMIN — FENTANYL CITRATE 100 MCG: 50 INJECTION INTRAMUSCULAR; INTRAVENOUS at 13:40

## 2021-04-28 RX ADMIN — ONDANSETRON 4 MG: 2 INJECTION INTRAMUSCULAR; INTRAVENOUS at 13:43

## 2021-04-28 RX ADMIN — METOPROLOL TARTRATE 25 MG: 25 TABLET, FILM COATED ORAL at 20:15

## 2021-04-28 RX ADMIN — LACTULOSE 20 G: 20 SOLUTION ORAL at 17:22

## 2021-04-28 RX ADMIN — Medication 10 ML: at 20:24

## 2021-04-28 RX ADMIN — METOPROLOL TARTRATE 25 MG: 25 TABLET, FILM COATED ORAL at 15:48

## 2021-04-28 RX ADMIN — HYDROCODONE BITARTRATE AND ACETAMINOPHEN 1 TABLET: 7.5; 325 TABLET ORAL at 00:46

## 2021-04-28 RX ADMIN — INSULIN LISPRO 6 UNITS: 100 INJECTION, SOLUTION INTRAVENOUS; SUBCUTANEOUS at 17:23

## 2021-04-28 RX ADMIN — SUGAMMADEX 200 MG: 100 INJECTION, SOLUTION INTRAVENOUS at 14:02

## 2021-04-28 RX ADMIN — INSULIN GLARGINE 20 UNITS: 100 INJECTION, SOLUTION SUBCUTANEOUS at 20:22

## 2021-04-28 ASSESSMENT — PAIN DESCRIPTION - LOCATION
LOCATION: CHEST
LOCATION: CHEST

## 2021-04-28 ASSESSMENT — PULMONARY FUNCTION TESTS
PIF_VALUE: 27
PIF_VALUE: 28
PIF_VALUE: 1
PIF_VALUE: 29
PIF_VALUE: 4
PIF_VALUE: 1
PIF_VALUE: 28
PIF_VALUE: 9
PIF_VALUE: 28
PIF_VALUE: 28
PIF_VALUE: 20
PIF_VALUE: 29
PIF_VALUE: 9
PIF_VALUE: 27
PIF_VALUE: 29
PIF_VALUE: 28
PIF_VALUE: 18
PIF_VALUE: 17
PIF_VALUE: 26
PIF_VALUE: 22
PIF_VALUE: 23

## 2021-04-28 ASSESSMENT — PAIN DESCRIPTION - PROGRESSION
CLINICAL_PROGRESSION: NOT CHANGED

## 2021-04-28 ASSESSMENT — PAIN SCALES - GENERAL
PAINLEVEL_OUTOF10: 9
PAINLEVEL_OUTOF10: 9
PAINLEVEL_OUTOF10: 8
PAINLEVEL_OUTOF10: 10
PAINLEVEL_OUTOF10: 7
PAINLEVEL_OUTOF10: 8
PAINLEVEL_OUTOF10: 10
PAINLEVEL_OUTOF10: 9
PAINLEVEL_OUTOF10: 7
PAINLEVEL_OUTOF10: 10

## 2021-04-28 ASSESSMENT — PAIN DESCRIPTION - PAIN TYPE: TYPE: ACUTE PAIN

## 2021-04-28 NOTE — PROGRESS NOTES
Pt unavailable for PICC line placement, will order PT/INR for when pt returns from surgery, please call DIT for PICC line placement after pt is back in room and labs have resulted.

## 2021-04-28 NOTE — CARE COORDINATION
CM faxed completed [employer] paperwork and chart documents to Fatou Muro [phone] per request. Fatou Muro will pull any other documentation from [facility] that is needed. Wound Vac needed by tomorrow AM in event pt is ready for DC; DC pending Blood Cx and Picc placement. Skilled nursing services have been set up with [facility]. IV abx provided by [facility] and covered at 100% per [facility] in intake. Pt is currently in OR for I&D. CM did update pt wife Marie Weinberg per phone. Provided direct number for further questions; she is aware that CM will f/u with pt prior to Dc to confirm above.      Becky Long RN

## 2021-04-28 NOTE — OP NOTE
[facility]                                OPERATIVE REPORT    PATIENT NAME: Anival Peralta                           :        1960  MED REC NO:   [id number]                          ROOM:       0201  ACCOUNT NO:   [de-identified]                           ADMIT DATE: 2021  PROVIDER:     Ronnie Zaidi MD    DATE OF PROCEDURE:  2021    LOCATION:  CARDIOTHORACIC SURGERY DEPARTMENT    PREOPERATIVE DIAGNOSES:  1. Sternal wound dehiscence with infection. 2.  Uncontrolled diabetes mellitus. 3.  MSSA bacteremia. 4.  Obesity    POSTOPERATIVE DIAGNOSES:  1. Sternal wound dehiscence with infection. 2.  Uncontrolled diabetes mellitus. 3.  MSSA bacteremia. 4.  Obesity    SURGEON:  Ronnie Zaidi MD    ASSISTANT:  Gillian Matthew surgical assistant    COMPLICATION:  No complications. ESTIMATED BLOOD LOSS:  Minimal.    FINDINGS:  Dictated. INDICATIONS:  This is a very pleasant gentleman. The patient is known  to me as I performed coronary artery bypass grafting in 2021. He now  presents with chest wall redness and uncontrolled diet regimen with  chest wall redness. He was placed in IV antibiotics, and a infectious disease workup was conducted, along with the ID dept MD consulted. Prior to surgery, his CBC has normalized with the IV antibiotics and he remains afebrile. He previously saw Cardiology and was ruled out for any cardiac symptoms. Risks, benefits and alternatives were explained to the  patient who agreed. OPERATIVE PROCEDURE:  The patient was brought to the operating room. Part of the mid incision had already dehisced and the patient's chest  was prepped and draped in the usual sterile fashion. A time-out was  performed with everyone in agreement. The incision was opened up even  further and debrided and copiously irrigated with 2L of fluid.   There was no rommel pus or  purulence that was noted. Soft tissue debridement was carried out as  standard and there was found to be very viable tissue. No overt  necrosis was present. White foam and black foam wound VAC therapy was  placed after surgical hemostasis was well obtained. The patient  tolerated the procedure well. DISPOSITION:  The patient was extubated in the operating room in stable  fashion and transferred to Recovery.         Wes Cheng MD    D: 04/27/2021 19:38:19       T: 04/27/2021 22:48:26     [id number]  Job#: [id number]     Doc#: [id number]    CC:

## 2021-04-28 NOTE — PROGRESS NOTES
Hospitalist Progress Note      PCP: Bibi Jackson DO    Date of Admission: 4/23/2021    Chief Complaint: Chest pain      Hospital Course:   61 y. o. male with PMH of CAD s/p CABG, HLD, HTN, DM2, GURDEEP and obesity who presented to [facility] with complaints of chest pain. Pt is status post CABG x2 on 2/19/2021. Had uneventful postop course. This past Monday 4/19 pt was seen in the [facility] office due to suture protruding from his skin which was removed. At that time there was no evidence of infection. He developed chest pain on Tuesday and presented to [facility] ED where workup was negative, he was then discharged. The pt developed low grade fevers on Thursday and associated chills/rigors. He reports chest pain mainly in and around his surgical site. No dyspnea. No N/V/D. +constipation. No abdominal pain. ED course: WBC elevated at 18 and sodium of 127. Admitted diagnosis chest wall cellulitis of his surgical incision site.       Subjective:   Pt is on RA. resting in bed, family currently at bedside, Fever curve improved. VSS. [place] No dyspnea.  No N/V/D. returning to OR today      Medications:  Reviewed    Infusion Medications    sodium chloride Stopped (04/25/21 0944)    dextrose       Scheduled Medications    insulin glargine  20 Units Subcutaneous Nightly    insulin lispro  5 Units Subcutaneous TID     polyethylene glycol  17 g Oral Daily    ceFAZolin  2,000 mg Intravenous Q8H    tamsulosin  0.4 mg Oral Daily    insulin lispro  0-18 Units Subcutaneous TID     insulin lispro  0-9 Units Subcutaneous Nightly    aspirin  81 mg Oral Daily    atorvastatin  40 mg Oral QPM    budesonide-formoterol  2 puff Inhalation BID    metoprolol tartrate  25 mg Oral BID    sodium chloride flush  5-40 mL Intravenous 2 times per day    sennosides-docusate sodium  2 tablet Oral BID     PRN Meds: magnesium hydroxide, meperidine, fentanNYL, HYDROmorphone, HYDROmorphone, HYDROmorphone, hydrALAZINE, bisacodyl, HYDROcodone-acetaminophen, sodium chloride flush, sodium chloride, promethazine **OR** ondansetron, polyethylene glycol, acetaminophen **OR** acetaminophen, morphine **OR** morphine, glucose, dextrose, glucagon (rDNA), dextrose      Intake/Output Summary (Last 24 hours) at 4/28/2021 0815  Last data filed at 4/28/2021 0747  Gross per 24 hour   Intake 240 ml   Output 775 ml   Net -535 ml       Physical Exam Performed:    /85   Pulse 89   Temp 98.4 °F (36.9 °C) (Oral)   Resp 16   Ht 5' 10\" (1.778 m)   Wt 231 lb 1.6 oz (104.8 kg)   SpO2 96%   BMI 33.16 kg/m²     General appearance:  No apparent distress, appears stated age and cooperative. HEENT:  Normal cephalic, atraumatic without obvious deformity. Pupils equal, round, and reactive to light.  Extra ocular muscles intact. Conjunctivae/corneas clear. Neck: Supple, with full range of motion. No jugular venous distention. Trachea midline. Respiratory:  Normal respiratory effort. Clear to auscultation, bilaterally without Rales/Wheezes/Rhonchi. Cardiovascular:  Regular rate and rhythm with normal S1/S2 without murmurs, rubs or gallops. Sternal incision with erythema with open wound at top and bottom of incision with purulent drainage, now with postop dressing  Abdomen: Soft, non-tender, non-distended with normal bowel sounds. Musculoskeletal:  No clubbing, cyanosis or edema bilaterally.  Full range of motion without deformity. Skin: Skin color, texture, turgor normal.  No rashes or lesions. Neurologic:  Neurovascularly intact without any focal sensory/motor deficits.  Cranial nerves: II-XII intact, grossly non-focal.  Psychiatric:  Alert and oriented, thought content appropriate, normal insight  Capillary Refill: Brisk,3 seconds, normal  Peripheral Pulses: +2 palpable, equal bilaterally        Labs:   Recent Labs     04/26/21  0705 04/27/21  0753 04/28/21  0654   WBC 9.1 8.1 7.6   HGB 10.7* 10.2* 11.0*   HCT 32.5* 31.1* 34.0*    344 440 mellitus type 2:  - Uncontrolled, recent A1C was 8.4.  - Hold home regimen. Continue high dose SSI ACHS and Lantus QHS, add prandial insulin with meals TID -- monitor and adjust accordingly. - Carb-control diet.      Acute hyponatremia, mild:  - Likely secondary to dehydration and component of pseudohyponatremia (corrected Na is 132). - S/p gentle IV fluids.   - Monitored BMP closely.      Asthma:  - Stable. No wheezing on exam. Continue Symbicort.      Hypertension:  - Controlled. Continue his home medications. Monitor.      Hyperlipidemia:  - Continue statin.      Constipation:  - Continue bowel regimen.      Urinary frequency/hesitancy:  - Pt was to see Dr. Joyce Snyder on 4/27. Pt requesting consult while inpt. - UA negative. Checked PVR. Start flomax.      Obesity:  - With Body mass index is 63.20 kg/m². Complicating assessment and treatment. Placing patient at risk for multiple co-morbidities as well as early death and contributing to the patient's presentation.  Counseled on weight loss.         DVT Prophylaxis: SCDs (holding lovenox for I&D on 4/26)  Diet: Diet NPO, After Midnight  Code Status: Full Code    PT/OT Eval Status: not needed     Dispo -unclear, pending CTsurg recs, ID recs, final surgical cx results     Alek Daniels MD

## 2021-04-28 NOTE — ANESTHESIA POSTPROCEDURE EVALUATION
Department of Anesthesiology  Postprocedure Note    Patient: Richard Berry  MRN: [id number]  YOB: 1960  Date of evaluation: 4/28/2021    Procedure Summary     Date: 04/28/21 Room / Location: [facility] / [facility]    Anesthesia Start: 3091 Anesthesia Stop: 1513    Procedure: WOUND VAC AND DRESSING CHANGE WITH REMOVAL OF 4 STERNAL WIRES, DRAINAGE OF 2 INTERCOSTAL SPACE ABCESS (N/A ) Diagnosis:       Sternal wound infection      (STERNAL WOUND INFECTION)    Surgeons: Devi Delgado MD Responsible Provider: Hua Kelley MD    Anesthesia Type: general ASA Status: 3        Anesthesia Type: general    Una Phase I: Una Score: 9    Una Phase II:      Last vitals: Reviewed and per EMR flowsheets.      Anesthesia Post Evaluation   Anesthetic Problems: no   Cardiovascular System Stable: yes  Respiratory Function: Airway Patent yes  ETT no  Ventilator no  Level of consciousness: awake, alert and oriented  Post-op pain: adequate analgesia  Hydration Adequate: yes  Nausea/Vomiting:no  Other Issues:     Carol Freitas MD

## 2021-04-28 NOTE — PROGRESS NOTES
Bedside report given to [facility]. Call light and bedside table within reach. No needs voiced at this time. Bed in lowest position, brakes locked.

## 2021-04-28 NOTE — CARE COORDINATION
[facility]    [facility]. services planned Friday    [address] RN, BSN CTN  [facility] [phone]

## 2021-04-28 NOTE — PROGRESS NOTES
Infectious Disease Follow up Notes    CC :  Sternal wound infection and bacteremia      Antibiotics:   Ancef 2g q8    Admit Date:   4/23/2021  Hospital Day: 6    Subjective:   He remains afebrile   Feels ok today. No BM yet. +Flatus. No abd pain or nausea. Objective:     Patient Vitals for the past 8 hrs:   BP Temp Temp src Pulse Resp SpO2   04/28/21 1425 (!) 171/83 -- -- 93 16 94 %   04/28/21 1206 123/83 97.6 °F (36.4 °C) Oral 96 20 97 %   04/28/21 0743 135/85 98.4 °F (36.9 °C) Oral 89 16 96 %       EXAM:  General:  Alert, oriented, NAD    HEENT:  NCAT, PERRL, sclera anicteric  No thrush  NECK:   supple      LUNGS:   CTA ky without W/R/R  CV:    RRR  Sternal wound VAC in place, sternum stable   ABD: Protuberant, soft, NT.   Bowel sounds present   EXT:  2+ pitting edema lower legs, no focal rash       LINE:  PIV in place          Scheduled Meds:   insulin glargine  20 Units Subcutaneous Nightly    insulin lispro  5 Units Subcutaneous TID WC    polyethylene glycol  17 g Oral Daily    ceFAZolin  2,000 mg Intravenous Q8H    tamsulosin  0.4 mg Oral Daily    insulin lispro  0-18 Units Subcutaneous TID WC    insulin lispro  0-9 Units Subcutaneous Nightly    aspirin  81 mg Oral Daily    atorvastatin  40 mg Oral QPM    budesonide-formoterol  2 puff Inhalation BID    metoprolol tartrate  25 mg Oral BID    sodium chloride flush  5-40 mL Intravenous 2 times per day    sennosides-docusate sodium  2 tablet Oral BID       Continuous Infusions:   sodium chloride Stopped (04/25/21 0944)    dextrose            Data Review:    Lab Results   Component Value Date    WBC 7.6 04/28/2021    HGB 11.0 (L) 04/28/2021    HCT 34.0 (L) 04/28/2021    MCV 87.1 04/28/2021     04/28/2021     Lab Results   Component Value Date    CREATININE <0.5 (L) 04/28/2021    BUN 8 04/28/2021     04/28/2021    K 4.4 04/28/2021    CL 96 (L) 04/28/2021    CO2 28 04/28/2021       Hepatic Function Panel:   Lab Results   Component Value Date    ALKPHOS 79 04/24/2021    ALT 6 04/24/2021    AST 9 04/24/2021    PROT 6.7 04/24/2021    BILITOT 0.7 04/24/2021    BILIDIR <0.2 04/19/2021    IBILI see below 04/19/2021    LABALBU 3.2 04/24/2021         MICRO:  4/23 BC x2 MSSA   Staphylococcus aureus  Antibiotic Interpretation BELLA Status    clindamycin Resistant       erythromycin Resistant >=8 mcg/mL     oxacillin Sensitive <=0.25 mcg/mL     tetracycline Sensitive <=1 mcg/mL     trimethoprim-sulfamethoxazole Sensitive <=10 mcg/mL       4/25 BC x2 NGTD  4/26 Surgical tissue culture sternum - rare growth S aureus       IMAGING:  CT chest 4/21/21  Impression   1. Intact sternotomy, with expected postoperative changes in the mediastinum   and healing chest wall incision   2. Small left pleural effusion     CT chest w contrast 4/21/21  Impression   1. No evidence of pulmonary embolic disease. 2. Small left pleural effusion with dependent atelectasis.  No acute   pulmonary infiltrate. 3. No unexpected findings following recent median sternotomy and open heart   surgery.        Assessment:     Patient Active Problem List    Diagnosis Date Noted    NSTEMI (non-ST elevated myocardial infarction) ([facility] [address] 07/11/2017     Priority: High    Coronary artery disease involving native coronary artery of native heart with unstable angina pectoris ([address] 01/15/2016     Priority: High    Precordial pain 01/13/2016     Priority: High     Class: Acute    Sepsis due to methicillin susceptible Staphylococcus aureus (HCC)     Bacteremia due to methicillin susceptible Staphylococcus aureus (MSSA)     Disruption or dehiscence of closure of sternum or sternotomy     Elevated sed rate     Type 2 diabetes mellitus with obesity ([address]     Cellulitis 04/23/2021    GURDEEP (obstructive sleep apnea)     Ischemic cardiomyopathy     CAD in native artery 12/15/2020    S/P PTCA (percutaneous

## 2021-04-28 NOTE — PROGRESS NOTES
Cardiac, Vascular [facility] Thoracic Surgeons  Progress Note    4/28/2021 10:56 AM  Surgeon: Lynn Ahumada     2 months ago S/P CABG x2 2/19/21    Chief complaint: Chest wall cellulitis     Subjective: awake, sitting up in bed, wife at bedside. Hospital course:  4/25 tender, significant amount of drainage overnight, on antibiotics since admission   4/26 taken to OR to clean out sternal wound. 4/27 Plan to go back to OR tomorrow for further debridement and wound vacuum change. 4/28 sitting up in bed in NAD. Vital Signs: /85   Pulse 89   Temp 98.4 °F (36.9 °C) (Oral)   Resp 16   Ht 5' 10\" (1.778 m)   Wt 231 lb 1.6 oz (104.8 kg)   SpO2 96%   BMI 33.16 kg/m²        I/O:      Intake/Output Summary (Last 24 hours) at 4/28/2021 1056  Last data filed at 4/28/2021 1005  Gross per 24 hour   Intake 0 ml   Output 0 ml   Net 0 ml     Exam:   Cardiovascular: S1 plus S2 +0 no additional sound  Pulmonary: Bilaterally clear no additional sound  Incision with moderate amount of bloody drainage overnight. Wound vacuum placed today.    Abdomen: rounded, soft, NT. +BS (hypoactive)    Labs:   CBC:   Recent Labs     04/26/21  0705 04/27/21  0753 04/28/21  0654   WBC 9.1 8.1 7.6   HGB 10.7* 10.2* 11.0*   HCT 32.5* 31.1* 34.0*   MCV 87.8 86.9 87.1    344 440     BMP:   Recent Labs     04/26/21  0705 04/27/21  0753 04/28/21  0654   * 132* 136   K 4.4 3.9 4.4   CL 96* 94* 96*   CO2 28 26 28   BUN 8 9 8   CREATININE 0.6* 0.6* <0.5*     Scheduled Meds:    bisacodyl  20 mg Rectal Once    insulin glargine  20 Units Subcutaneous Nightly    insulin lispro  5 Units Subcutaneous TID WC    polyethylene glycol  17 g Oral Daily    ceFAZolin  2,000 mg Intravenous Q8H    tamsulosin  0.4 mg Oral Daily    insulin lispro  0-18 Units Subcutaneous TID WC    insulin lispro  0-9 Units Subcutaneous Nightly    aspirin  81 mg Oral Daily    atorvastatin  40 mg Oral QPM    budesonide-formoterol  2 puff Inhalation BID    metoprolol tartrate  25 mg Oral BID    sodium chloride flush  5-40 mL Intravenous 2 times per day    sennosides-docusate sodium  2 tablet Oral BID     Continuous Infusions:    sodium chloride Stopped (04/25/21 0944)    dextrose           Patient Active Problem List   Diagnosis    Type II or unspecified type diabetes mellitus without mention of complication, not stated as uncontrolled    Diabetes mellitus    History of ETOH abuse    Patient overweight    Patient overweight    Dietary noncompliance    Alcohol abuse    Flank pain    Prostatism    Low serum testosterone level    Arm pain, left    Shoulder pain    Precordial pain    Lung nodule    Acute angina (HCC)    Abnormal stress test    Acute coronary syndrome ([address]    Coronary artery disease involving native coronary artery of native heart with unstable angina pectoris (HCC)    Other chest pain    S/P drug eluting coronary stent placement    Essential hypertension    Obesity    Abnormal chest x-ray    Angina effort    Skin lesion    Angina at rest [facility])    Hyperlipidemia    Angina pectoris ([address]    NSTEMI (non-ST elevated myocardial infarction) ([address]    Controlled type 2 diabetes mellitus without complication, without long-term current use of insulin ([address]    ASHD (arteriosclerotic heart disease)    Mass of right foot    Overweight    History of angina pectoris    Reactive airway disease    Bronchitis    Reactive airway disease with wheezing, moderate persistent, with acute exacerbation    Need for prophylactic vaccination and inoculation against varicella    Need for prophylactic vaccination against diphtheria-tetanus-pertussis (DTP)    Chronic right-sided thoracic back pain    CAD in native artery    S/P PTCA (percutaneous transluminal coronary angioplasty)    Chest pain    Ischemic cardiomyopathy    GURDEEP (obstructive sleep apnea)    Cellulitis    Sepsis due to methicillin susceptible Staphylococcus aureus ([address]

## 2021-04-28 NOTE — ANESTHESIA PRE PROCEDURE
Department of Anesthesiology  Preprocedure Note       Name:  Deyanira Echols   Age:  61 y.o.  :  1960                                          MRN:  [id number]         Date:  2021      Surgeon:  Jaz De Leon MD    Procedure:  WOUND VAC AND DRESSING CHANGE WITH STERNAL WIRE HARDWARE REMOVAL    HPI:  61 y.o. male with PMH of CAD s/p CABG, HLD, HTN, DM2, GURDEEP and obesity who presented to [facility] with complaints of chest pain. Pt is status post CABG x2 on 2021. Had uneventful postop course. This past  pt was seen in the [address] [place] [address] office due to suture protruding from his skin which was removed. At that time there was no evidence of infection. He developed chest pain on Tuesday and presented to [facility] ED where workup was negative, he was then discharged. The pt developed low grade fevers on Thursday and associated chills/rigors. He reports chest pain mainly in and around his surgical site. No dyspnea. No N/V/D. +constipation. No abdominal pain. [id number]  Sinus tachycardia 109;  T wave abnormality, consider inferolateral ischemia; wWhen compared with ECG of 2021: Criteria for Septal infarct are no longer Present    Medications prior to admission:    clopidogrel (PLAVIX) 75 MG tablet TAKE ONE TABLET BY MOUTH DAILY   metFORMIN (GLUCOPHAGE) 1000 MG tablet TAKE ONE TABLET BY MOUTH TWICE A DAY   atorvastatin (LIPITOR) 40 MG tablet TAKE ONE TABLET BY MOUTH EVERY EVENING   glipiZIDE (GLUCOTROL) 10 MG tablet TAKE ONE TABLET BY MOUTH EVERY MORNING FOR DIABETES   metoprolol tartrate (LOPRESSOR) 25 MG tablet TAKE ONE TABLET BY MOUTH TWICE A DAY   fluticasone-salmeterol (ADVAIR DISKUS) 250-50 MCG/DOSE AEPB Inhale 1 puff into the lungs every 12 hours   aspirin 81 MG chewable tablet CHEW ONE TABLET BY MOUTH DAILY   famotidine (PEPCID) 20 MG tablet Take 1 tablet by mouth daily     Current medications:     magnesium hydroxide (MILK OF MAGNESIA) 400 MG/5ML susp  30 mL Oral Daily PRN    meperidine (DEMEROL) injection 12.5 mg  12.5 mg Intravenous Q5 Min PRN    fentaNYL (SUBLIMAZE) injection 25 mcg  25 mcg Intravenous Q5 Min PRN    HYDROmorphone (DILAUDID) injection 0.5 mg  0.5 mg Intravenous Q5 Min PRN    HYDROmorphone (DILAUDID) injection 0.25 mg  0.25 mg Intravenous Q5 Min PRN    HYDROmorphone (DILAUDID) injection 0.5 mg  0.5 mg Intravenous Q5 Min PRN    hydrALAZINE (APRESOLINE) injection 5 mg  5 mg Intravenous Q10 Min PRN    bisacodyl (DULCOLAX) EC tablet 10 mg  10 mg Oral Daily PRN    insulin glargine (LANTUS) injection vial 20 Units  20 Units Subcutaneous Nightly    insulin lispro (HUMALOG) injection vial 5 Units  5 Units Subcutaneous TID     HYDROcodone-acetaminophen (NORCO) 7.5-325 MG  1 tablet Oral Q4H PRN    polyethylene glycol (GLYCOLAX) packet 17 g  17 g Oral Daily    ceFAZolin (ANCEF) 2000 mg in dextrose 5 % 100 mL IVPB  2,000 mg Intravenous Q8H    tamsulosin (FLOMAX) capsule 0.4 mg  0.4 mg Oral Daily    insulin lispro (HUMALOG) injection vial 0-18 Units  0-18 Units Subcutaneous TID     insulin lispro (HUMALOG) injection vial 0-9 Units  0-9 Units Subcutaneous Nightly    aspirin chewable tablet 81 mg  81 mg Oral Daily    atorvastatin (LIPITOR) tablet 40 mg  40 mg Oral QPM    budesonide-formoterol (SYMBICORT) 160-4.5 MCG/ACT inhaler   2 puff Inhalation BID    metoprolol tartrate (LOPRESSOR) tablet 25 mg  25 mg Oral BID    promethazine (PHENERGAN) tablet 12.5 mg  12.5 mg Oral Q6H PRN       ondansetron (ZOFRAN) injection 4 mg  4 mg Intravenous Q6H PRN    polyethylene glycol (GLYCOLAX) packet 17 g  17 g Oral Daily PRN    acetaminophen (TYLENOL) tablet 650 mg  650 mg Oral Q6H PRN       acetaminophen (TYLENOL) suppository 650 mg  650 mg Rectal Q6H PRN    morphine (PF) injection 2 mg  2 mg Intravenous Q4H PRN       morphine (PF) injection 4 mg  4 mg Intravenous Q4H PRN    glucose (GLUTOSE) 40 % oral gel 15 g  15 g Oral PRN    dextrose 50 % IV solution  12.5 g Intravenous PRN    glucagon (rDNA) injection 1 mg  1 mg Intramuscular PRN    dextrose 5 % solution  100 mL/hr Intravenous PRN    sennosides-docusate sodium (SENOKOT-S) 8.6-50 MG   2 tablet Oral BID     Allergies:  No Known Allergies     Problem List:     Type II or unspecified type diabetes mellitus without mention of complication, not stated as uncontrolled    Diabetes mellitus    History of ETOH abuse    Patient overweight    Patient overweight    Dietary noncompliance    Alcohol abuse    Flank pain    Prostatism    Low serum testosterone level    Arm pain, left    Shoulder pain    Precordial pain    Lung nodule    Acute angina (HCC)    Abnormal stress test    Acute coronary syndrome (HCC)    Coronary artery disease involving native coronary artery of native heart with unstable angina pectoris (HCC)    Other chest pain    S/P drug eluting coronary stent placement    Essential hypertension    Obesity    Abnormal chest x-ray    Angina effort    Skin lesion    Angina at rest [facility])    Hyperlipidemia    Angina pectoris ([address]    NSTEMI (non-ST elevated myocardial infarction) ([address]    Controlled type 2 diabetes mellitus without complication, without long-term current use of insulin ([address]    ASHD (arteriosclerotic heart disease)    Mass of right foot    Overweight    History of angina pectoris    Reactive airway disease    Bronchitis    Reactive airway disease with wheezing, moderate persistent, with acute exacerbation    Need for prophylactic vaccination and inoculation against varicella    Need for prophylactic vaccination against diphtheria-tetanus-pertussis (DTP)    Chronic right-sided thoracic back pain    CAD in native artery    S/P PTCA (percutaneous transluminal coronary angioplasty)    Chest pain    Ischemic cardiomyopathy    GURDEEP (obstructive sleep apnea)    Cellulitis    Sepsis due to methicillin susceptible Staphylococcus aureus ([address]    Bacteremia due to methicillin susceptible Staphylococcus aureus (MSSA)    Disruption or dehiscence of closure of sternum or sternotomy    Elevated sed rate    Type 2 diabetes mellitus with obesity (HCC)      Past Medical History:     Alcohol abuse 12/03/2012    Coronary artery disease      Diabetic neuropathy associated with type 2 diabetes mellitus ([facility]      Hyperlipidemia      Hypertension      Non morbid obesity      GURDEEP (obstructive sleep apnea)      Reactive airway disease with wheezing, moderate persistent, with acute exacerbation 03/03/2020    Type II or unspecified type diabetes mellitus without mention of complication, not stated as uncontrolled        Past Surgical History:      BYPASS GRAFT   02/19/2021    CORONARY ANGIOPLASTY WITH STENT PLACEMENT   1/14/2016 9/21/2020     1 [employer] SAMUEL 2.25x16    CORONARY ARTERY BYPASS GRAFT   02/19/2021     CABG x2 & ALEX-Dr. Tristan Swan    CORONARY ARTERY BYPASS GRAFT N/A 2/19/2021     CORONARY ARTERY BYPASS GRAFTING X2, INTERNAL MAMMARY ARTERY, SAPHENOUS VEIN GRAFT, ON PUMP WITH LEFT ATRIAL APPENDAGE CLIP, BILATERAL INTERCOSTAL NERVE BLOCK performed by Clive Loaiza MD at [facility] History:     Smoking status: Never Smoker    Smokeless tobacco: Never Used   Substance Use Topics    Alcohol use: Not Currently       Alcohol/week: 1.0 standard drinks       Types: 1 Shots of liquor per week       Comment: no alcohol in 2 months.        Vital Signs (Current):   BP: 123/83 Pulse: 96   Resp: 20 SpO2: 97   Temp: 97.6 °F (36.4 °C)   Height: 5' 10\" (1.778 m)  (04/24/21) Weight: 231 lb 1.6 oz (104.8 kg)  (04/28/21)   BMI: 33.16             04/25/21 1936 04/26/21 0019 04/26/21 0420   BP: 129/75 118/77 134/82   Pulse: 106 90 93   Resp: 18 18 18   Temp: 99 °F (37.2 °C) 98.2 °F (36.8 °C) 98.1 °F (36.7 °C)   TempSrc: Oral Oral Oral   SpO2: 98% 96% 98%             BP Readings from Last 3 Encounters:   04/26/21 134/82   04/21/21 (!) 142/79 04/20/21 118/70      NPO Status: >8 hrs                       CBC:    WBC 7.6 04/28/2021    HGB 11.0 04/28/2021    HCT 34.0 04/28/2021     04/28/2021     CMP:     04/28/2021    K 4.4 04/28/2021    CL 96 04/28/2021    CO2 28 04/28/2021    BUN 8 04/28/2021    CREATININE <0.5 04/28/2021    GLUCOSE 185 04/28/2021    PROT 6.7 04/24/2021    CALCIUM 9.3 04/28/2021    BILITOT 0.7 04/24/2021    ALKPHOS 79 04/24/2021    AST 9 04/24/2021    ALT 6 04/24/2021     POC Tests:     04/28/21   0742   POCGLU 172*     Coags:    PROTIME 15.3 02/23/2021    INR 1.32 02/23/2021    APTT 30.4 02/19/2021     COVID-19 Screening (If Applicable): COVID19 NOT DETECTED 12/10/2020     Anesthesia Evaluation  Patient summary reviewed and Nursing notes reviewed  Airway: Mallampati: II  TM distance: >3 FB   Neck ROM: full  Mouth opening: > = 3 FB Dental:          Pulmonary: breath sounds clear to auscultation  (+) sleep apnea:  asthma:     (-) wheezes                           Cardiovascular:  Exercise tolerance: good (>4 METS),   (+) hypertension:, angina:, past MI: < 1 month, CAD: obstructive, CABG/stent: no interval change,     (-) murmur    ECG reviewed  Rhythm: regular  Rate: normal                    Neuro/Psych:               GI/Hepatic/Renal:   (+) morbid obesity          Endo/Other:    (+) DiabetesType II DM, , .                 Abdominal:   (+) obese,         Vascular:                                        Anesthesia Plan      general     ASA 3       Induction: intravenous. MIPS: Prophylactic antiemetics administered. Anesthetic plan and risks discussed with patient. Plan discussed with CRNA.             Jewel Barney MD

## 2021-04-28 NOTE — BRIEF OP NOTE
Brief Postoperative Note      Patient: Sudha Berumen  YOB: 1960  MRN: [id number]    Date of Procedure: 4/28/2021    Pre-Op Diagnosis: STERNAL WOUND INFECTION    Post-Op Diagnosis: Same       Procedure(s):  WOUND VAC AND DRESSING CHANGE WITH REMOVAL OF 4 STERNAL WIRES, DRAINAGE OF 2 INTERCOSTAL SPACE ABCESS    Surgeon(s):  Nain Baumann MD    Assistant:  Surgical Assistant: Simi Pino    Anesthesia: General    Estimated Blood Loss (mL): less than 50     Complications: None    Specimens:   ID Type Source Tests Collected by Time Destination   A : STERNAL WIRES Hardware Hardware SURGICAL PATHOLOGY Nain Baumann MD 4/28/2021 1352        Implants:  * No implants in log *      Drains:   Negative Pressure Wound Therapy Sternum (Active)   $ Standard NPWT >50 sq cm PER TX $ Yes 04/27/21 1223   Wound Type Surgical 04/28/21 0743   Unit Type KCI VAC 04/27/21 1223   Dressing Type Black foam 04/28/21 0743   Number of pieces used 3 04/27/21 1223   Cycle Continuous 04/28/21 0743   Target Pressure (mmHg) 125 04/27/21 1223   Intensity 1 04/27/21 1223   Canister changed? Yes 04/27/21 1223   Dressing Status Clean;Dry; Intact 04/28/21 0743   Dressing Changed Changed/New 04/27/21 1223   Drainage Amount Moderate 04/28/21 0743   Drainage Description Sanguinous 04/27/21 1223   Dressing Change Due 04/30/21 04/27/21 1223   Wound Assessment Other (Comment) 04/27/21 1223   Felicia-wound Assessment Dry 04/27/21 1223   Shape linear 04/27/21 1223   Odor None 04/27/21 1223       Findings:     Electronically signed by Nain Baumann MD on 4/28/2021 at 2:05 PM

## 2021-04-29 ENCOUNTER — APPOINTMENT (OUTPATIENT)
Dept: INTERVENTIONAL RADIOLOGY/VASCULAR | Age: 61
DRG: 856 | End: 2021-04-29
Payer: COMMERCIAL

## 2021-04-29 VITALS
DIASTOLIC BLOOD PRESSURE: 76 MMHG | TEMPERATURE: 97.5 F | OXYGEN SATURATION: 99 % | HEIGHT: 70 IN | BODY MASS INDEX: 33.09 KG/M2 | WEIGHT: 231.1 LBS | SYSTOLIC BLOOD PRESSURE: 127 MMHG | RESPIRATION RATE: 16 BRPM | HEART RATE: 80 BPM

## 2021-04-29 LAB
ANION GAP SERPL CALCULATED.3IONS-SCNC: 9 MMOL/L (ref 3–16)
BLOOD CULTURE, ROUTINE: NORMAL
BUN BLDV-MCNC: 10 MG/DL (ref 7–20)
CALCIUM SERPL-MCNC: 8.7 MG/DL (ref 8.3–10.6)
CHLORIDE BLD-SCNC: 99 MMOL/L (ref 99–110)
CO2: 27 MMOL/L (ref 21–32)
CREAT SERPL-MCNC: <0.5 MG/DL (ref 0.8–1.3)
CULTURE, BLOOD 2: NORMAL
GFR AFRICAN AMERICAN: >60
GFR NON-AFRICAN AMERICAN: >60
GLUCOSE BLD-MCNC: 245 MG/DL (ref 70–99)
GLUCOSE BLD-MCNC: 258 MG/DL (ref 70–99)
GLUCOSE BLD-MCNC: 344 MG/DL (ref 70–99)
HCT VFR BLD CALC: 31.1 % (ref 40.5–52.5)
HEMOGLOBIN: 10 G/DL (ref 13.5–17.5)
MCH RBC QN AUTO: 28.2 PG (ref 26–34)
MCHC RBC AUTO-ENTMCNC: 32.3 G/DL (ref 31–36)
MCV RBC AUTO: 87.2 FL (ref 80–100)
PDW BLD-RTO: 14.7 % (ref 12.4–15.4)
PERFORMED ON: ABNORMAL
PERFORMED ON: ABNORMAL
PLATELET # BLD: 417 K/UL (ref 135–450)
PMV BLD AUTO: 7.7 FL (ref 5–10.5)
POTASSIUM REFLEX MAGNESIUM: 4.5 MMOL/L (ref 3.5–5.1)
RBC # BLD: 3.56 M/UL (ref 4.2–5.9)
SODIUM BLD-SCNC: 135 MMOL/L (ref 136–145)
WBC # BLD: 9.6 K/UL (ref 4–11)

## 2021-04-29 PROCEDURE — 36573 INSJ PICC RS&I 5 YR+: CPT

## 2021-04-29 PROCEDURE — C1769 GUIDE WIRE: HCPCS

## 2021-04-29 PROCEDURE — 80048 BASIC METABOLIC PNL TOTAL CA: CPT

## 2021-04-29 PROCEDURE — 6370000000 HC RX 637 (ALT 250 FOR IP): Performed by: THORACIC SURGERY (CARDIOTHORACIC VASCULAR SURGERY)

## 2021-04-29 PROCEDURE — 2580000003 HC RX 258: Performed by: THORACIC SURGERY (CARDIOTHORACIC VASCULAR SURGERY)

## 2021-04-29 PROCEDURE — 85027 COMPLETE CBC AUTOMATED: CPT

## 2021-04-29 PROCEDURE — 6360000002 HC RX W HCPCS: Performed by: THORACIC SURGERY (CARDIOTHORACIC VASCULAR SURGERY)

## 2021-04-29 PROCEDURE — 36415 COLL VENOUS BLD VENIPUNCTURE: CPT

## 2021-04-29 PROCEDURE — 6370000000 HC RX 637 (ALT 250 FOR IP): Performed by: INTERNAL MEDICINE

## 2021-04-29 PROCEDURE — 02HV33Z INSERTION OF INFUSION DEVICE INTO SUPERIOR VENA CAVA, PERCUTANEOUS APPROACH: ICD-10-PCS | Performed by: RADIOLOGY

## 2021-04-29 PROCEDURE — 99024 POSTOP FOLLOW-UP VISIT: CPT | Performed by: NURSE PRACTITIONER

## 2021-04-29 PROCEDURE — 99231 SBSQ HOSP IP/OBS SF/LOW 25: CPT | Performed by: INTERNAL MEDICINE

## 2021-04-29 PROCEDURE — 94640 AIRWAY INHALATION TREATMENT: CPT

## 2021-04-29 RX ORDER — INSULIN GLARGINE 100 [IU]/ML
20 INJECTION, SOLUTION SUBCUTANEOUS NIGHTLY
Qty: 5 PEN | Refills: 0 | Status: SHIPPED
Start: 2021-04-29 | End: 2021-05-10

## 2021-04-29 RX ORDER — TAMSULOSIN HYDROCHLORIDE 0.4 MG/1
0.4 CAPSULE ORAL DAILY
Qty: 30 CAPSULE | Refills: 1 | Status: SHIPPED
Start: 2021-04-30 | End: 2022-01-25 | Stop reason: CLARIF

## 2021-04-29 RX ORDER — HYDROCODONE BITARTRATE AND ACETAMINOPHEN 7.5; 325 MG/1; MG/1
1 TABLET ORAL EVERY 4 HOURS PRN
Qty: 10 TABLET | Refills: 0 | Status: SHIPPED | OUTPATIENT
Start: 2021-04-29 | End: 2021-05-02

## 2021-04-29 RX ADMIN — CEFAZOLIN 2000 MG: 10 INJECTION, POWDER, FOR SOLUTION INTRAVENOUS at 08:01

## 2021-04-29 RX ADMIN — HYDROCODONE BITARTRATE AND ACETAMINOPHEN 1 TABLET: 7.5; 325 TABLET ORAL at 02:51

## 2021-04-29 RX ADMIN — MORPHINE SULFATE 2 MG: 2 INJECTION, SOLUTION INTRAMUSCULAR; INTRAVENOUS at 00:39

## 2021-04-29 RX ADMIN — INSULIN LISPRO 5 UNITS: 100 INJECTION, SOLUTION INTRAVENOUS; SUBCUTANEOUS at 08:55

## 2021-04-29 RX ADMIN — METOPROLOL TARTRATE 25 MG: 25 TABLET, FILM COATED ORAL at 08:01

## 2021-04-29 RX ADMIN — POLYETHYLENE GLYCOL 3350 17 G: 17 POWDER, FOR SOLUTION ORAL at 08:00

## 2021-04-29 RX ADMIN — DOCUSATE SODIUM 50 MG AND SENNOSIDES 8.6 MG 2 TABLET: 8.6; 5 TABLET, FILM COATED ORAL at 08:01

## 2021-04-29 RX ADMIN — TAMSULOSIN HYDROCHLORIDE 0.4 MG: 0.4 CAPSULE ORAL at 08:01

## 2021-04-29 RX ADMIN — LACTULOSE 20 G: 20 SOLUTION ORAL at 08:00

## 2021-04-29 RX ADMIN — INSULIN LISPRO 12 UNITS: 100 INJECTION, SOLUTION INTRAVENOUS; SUBCUTANEOUS at 12:44

## 2021-04-29 RX ADMIN — HYDROCODONE BITARTRATE AND ACETAMINOPHEN 1 TABLET: 7.5; 325 TABLET ORAL at 14:52

## 2021-04-29 RX ADMIN — Medication 10 ML: at 08:01

## 2021-04-29 RX ADMIN — INSULIN LISPRO 6 UNITS: 100 INJECTION, SOLUTION INTRAVENOUS; SUBCUTANEOUS at 08:56

## 2021-04-29 RX ADMIN — Medication 2 PUFF: at 08:14

## 2021-04-29 RX ADMIN — CEFAZOLIN 2000 MG: 10 INJECTION, POWDER, FOR SOLUTION INTRAVENOUS at 14:55

## 2021-04-29 RX ADMIN — ASPIRIN 81 MG: 81 TABLET, CHEWABLE ORAL at 08:01

## 2021-04-29 RX ADMIN — INSULIN LISPRO 5 UNITS: 100 INJECTION, SOLUTION INTRAVENOUS; SUBCUTANEOUS at 12:44

## 2021-04-29 RX ADMIN — MORPHINE SULFATE 2 MG: 2 INJECTION, SOLUTION INTRAMUSCULAR; INTRAVENOUS at 11:51

## 2021-04-29 ASSESSMENT — PAIN SCALES - GENERAL
PAINLEVEL_OUTOF10: 4
PAINLEVEL_OUTOF10: 8
PAINLEVEL_OUTOF10: 6
PAINLEVEL_OUTOF10: 0
PAINLEVEL_OUTOF10: 7
PAINLEVEL_OUTOF10: 7

## 2021-04-29 ASSESSMENT — PAIN DESCRIPTION - PROGRESSION
CLINICAL_PROGRESSION: NOT CHANGED

## 2021-04-29 NOTE — PROGRESS NOTES
lispro  0-9 Units Subcutaneous Nightly    atorvastatin  40 mg Oral QPM    budesonide-formoterol  2 puff Inhalation BID    metoprolol tartrate  25 mg Oral BID    sodium chloride flush  5-40 mL Intravenous 2 times per day    sennosides-docusate sodium  2 tablet Oral BID     Continuous Infusions:    sodium chloride Stopped (04/25/21 9952)    dextrose         Patient Active Problem List   Diagnosis    Type II or unspecified type diabetes mellitus without mention of complication, not stated as uncontrolled    Diabetes mellitus    History of ETOH abuse    Patient overweight    Patient overweight    Dietary noncompliance    Alcohol abuse    Flank pain    Prostatism    Low serum testosterone level    Arm pain, left    Shoulder pain    Precordial pain    Lung nodule    Acute angina (HCC)    Abnormal stress test    Acute coronary syndrome ([address]    Coronary artery disease involving native coronary artery of native heart with unstable angina pectoris (HCC)    Other chest pain    S/P drug eluting coronary stent placement    Essential hypertension    Obesity    Abnormal chest x-ray    Angina effort    Skin lesion    Angina at rest [facility])    Hyperlipidemia    Angina pectoris ([address]    NSTEMI (non-ST elevated myocardial infarction) ([address]    Controlled type 2 diabetes mellitus without complication, without long-term current use of insulin ([address]    ASHD (arteriosclerotic heart disease)    Mass of right foot    Overweight    History of angina pectoris    Reactive airway disease    Bronchitis    Reactive airway disease with wheezing, moderate persistent, with acute exacerbation    Need for prophylactic vaccination and inoculation against varicella    Need for prophylactic vaccination against diphtheria-tetanus-pertussis (DTP)    Chronic right-sided thoracic back pain    CAD in native artery    S/P PTCA (percutaneous transluminal coronary angioplasty)    Chest pain    Ischemic cardiomyopathy    GURDEEP (obstructive sleep apnea)    Cellulitis    Sepsis due to methicillin susceptible Staphylococcus aureus (HCC)    Bacteremia due to methicillin susceptible Staphylococcus aureus (MSSA)    Disruption or dehiscence of closure of sternum or sternotomy    Elevated sed rate    Type 2 diabetes mellitus with obesity (HCC)     Assessment:  S/P CABG 2 months ago -  sternal wound dehiscence/infection and bacteremia - ID following   Plan:   -Taken to OR 4/28 for debridement of sternal wound infection, with sternal wire removal X4. Wound vacuum replaced. He will need HHC to change his wound vac M,W, and Friday's. He will not need HHC for a dressing change on Monday 5/10/21 as he will see Dr. Michael Cantu in the office that day (and bring a sponge with him for dressing change). -Continue IV antibiotics per ID. Anticipate 6 weeks total.   PICC to be placed once BC's finalize. Constipation: resolving, +BM 4/29  Continue more aggressive bowel regimen (especially while he is requiring PRN pain medication). Dispo: OK from our standpoint if pt discharges home today (after PICC line placement for outpatient antibiotic therapy).   ____________________________________________________    Monica Marin CNP  4/29/21   08:40 am

## 2021-04-29 NOTE — DISCHARGE SUMMARY
with open wound at the top and bottom of the surgical incision with purulent drainage.   - Pt started on IV vancomycin and cefepime on 4/23. Blood cultures grew Staph (mecA gene not detected). Dc'd Vancomycin as noted to be MSSA. Follow-up sensitivities.   - ID consulted for further assistance -- appreciate mgmt. rec 6 weeks total of Cefazolin, end on 6/9/21, PICC placed on dc, f/u as outpt in 3-4 weeks  - CT surgery consulted/following. I&D on 4/26and 4/28, wound vac placed on dc     CAD status post CABG in Feb 2021  - EKG non-acute. Troponin negative. - Continued aspirin, statin, metoprolol (CTS dc'd plavix, last dose was on 4/25). - Monitored pt on telemetry.      Diabetes mellitus type 2:  - Uncontrolled, recent A1C was 8.4.  - Hold home regimen. Continue high dose SSI ACHS and Lantus QHS, add prandial insulin with meals TID -- monitor and adjust accordingly. - Carb-control diet.      Acute hyponatremia, mild:  - Likely secondary to dehydration and component of pseudohyponatremia (corrected Na is 132). - S/p gentle IV fluids.   - Monitored BMP closely.      Asthma:  - Stable. No wheezing on exam. Continue Symbicort.      Hypertension:  - Controlled. Continue his home medications. Monitor.      Hyperlipidemia:  - Continue statin.      Constipation:  - Continue bowel regimen.      Urinary frequency/hesitancy:  - Pt was to see Dr. Marty Cm on 4/27. Pt requesting consult while inpt. - UA negative. Checked PVR. Start flomax.      Obesity:  - With Body mass index is 46.00 kg/m². Complicating assessment and treatment. Placing patient at risk for multiple co-morbidities as well as early death and contributing to the patient's presentation.  Counseled on weight loss.        Physical Exam Performed:     /76   Pulse 80   Temp 97.5 °F (36.4 °C) (Oral)   Resp 16   Ht 5' 10\" (1.778 m)   Wt 231 lb 1.6 oz (104.8 kg)   SpO2 99%   BMI 33.16 kg/m²       General appearance:  No apparent distress, appears stated age and cooperative. HEENT:  Normal cephalic, atraumatic without obvious deformity. Pupils equal, round, and reactive to light. Extra ocular muscles intact. Conjunctivae/corneas clear. Neck: Supple, with full range of motion. No jugular venous distention. Trachea midline. Respiratory:  Normal respiratory effort. Clear to auscultation, bilaterally without Rales/Wheezes/Rhonchi. Cardiovascular:  Regular rate and rhythm with normal S1/S2 without murmurs, rubs or gallops. Abdomen: Soft, non-tender, non-distended with normal bowel sounds. Musculoskeletal:  No clubbing, cyanosis or edema bilaterally. Full range of motion without deformity. Skin: Skin color, texture, turgor normal.  No rashes or lesions. Neurologic:  Neurovascularly intact without any focal sensory/motor deficits. Cranial nerves: II-XII intact, grossly non-focal.  Psychiatric:  Alert and oriented, thought content appropriate, normal insight  Capillary Refill: Brisk,< 3 seconds   Peripheral Pulses: +2 palpable, equal bilaterally       Labs: For convenience and continuity at follow-up the following most recent labs are provided:      CBC:    Lab Results   Component Value Date    WBC 9.6 04/29/2021    HGB 10.0 04/29/2021    HCT 31.1 04/29/2021     04/29/2021       Renal:    Lab Results   Component Value Date     04/29/2021    K 4.5 04/29/2021    CL 99 04/29/2021    CO2 27 04/29/2021    BUN 10 04/29/2021    CREATININE <0.5 04/29/2021    CALCIUM 8.7 04/29/2021    PHOS 3.7 07/09/2017         Significant Diagnostic Studies    Radiology:   IR PICC WO SQ PORT/PUMP > 5 YEARS   Preliminary Result   Successful placement of PICC line. XR CHEST PORTABLE   Final Result   No acute abnormality identified.                 Consults:     IP CONSULT TO HOSPITALIST  IP CONSULT TO PHARMACY  IP CONSULT TO INFECTIOUS DISEASES  IP CONSULT TO INFECTIOUS DISEASES  IP CONSULT TO UROLOGY  IP CONSULT TO CASE MANAGEMENT  IP CONSULT TO HOME CARE NEEDS    Disposition:  home     Condition at Discharge: Stable    Discharge Instructions/Follow-up:  CT surg as outpt, ID as outpt    Code Status:  FULL    Activity: activity as tolerated    Diet: cardiac diet      Discharge Medications:     Discharge Medication List as of 4/29/2021  3:25 PM           Details   tamsulosin (FLOMAX) 0.4 MG capsule Take 1 capsule by mouth daily, Disp-30 capsule, R-1Normal      insulin glargine (LANTUS SOLOSTAR) 100 UNIT/ML injection pen Inject 20 Units into the skin nightly Adjust dose to keep fasting AM sugars between , Disp-5 pen, R-0Normal      HYDROcodone-acetaminophen (NORCO) 7.5-325 MG per tablet Take 1 tablet by mouth every 4 hours as needed for Pain (severe pain only, caution as this is addictive) for up to 3 days. , Disp-10 tablet, R-0Print              Details   clopidogrel (PLAVIX) 75 MG tablet TAKE ONE TABLET BY MOUTH DAILY, Disp-90 tablet, R-3Normal      metFORMIN (GLUCOPHAGE) 1000 MG tablet TAKE ONE TABLET BY MOUTH TWICE A DAY, Disp-60 tablet, R-1Normal      atorvastatin (LIPITOR) 40 MG tablet TAKE ONE TABLET BY MOUTH EVERY EVENING, Disp-90 tablet, R-3Normal      famotidine (PEPCID) 20 MG tablet Take 1 tablet by mouth daily, Disp-30 tablet, R-0Print      glipiZIDE (GLUCOTROL) 10 MG tablet TAKE ONE TABLET BY MOUTH EVERY MORNING FOR DIABETES, Disp-30 tablet, R-2Normal      metoprolol tartrate (LOPRESSOR) 25 MG tablet TAKE ONE TABLET BY MOUTH TWICE A DAY, Disp-60 tablet, R-5Normal      fluticasone-salmeterol (ADVAIR DISKUS) 250-50 MCG/DOSE AEPB Inhale 1 puff into the lungs every 12 hours, Disp-60 each,R-3Normal      aspirin 81 MG chewable tablet CHEW ONE TABLET BY MOUTH DAILY, Disp-30 tablet, R-1             Time Spent on discharge is more than 30 minutes in the examination, evaluation, counseling and review of medications and discharge plan.       Signed:    Isaac Mccray MD   4/30/2021      Thank you Bibi Jackson DO for the opportunity to be involved in this

## 2021-04-29 NOTE — PROGRESS NOTES
Infectious Disease Follow up Notes    CC :  Sternal wound infection and bacteremia      Antibiotics:   Ancef 2g q8    Admit Date:   4/23/2021  Hospital Day: 7    Subjective:   He remains afebrile   Had BM. Pain controlled. Objective:     Patient Vitals for the past 8 hrs:   BP Temp Temp src Pulse Resp SpO2   04/29/21 1227 127/76 97.5 °F (36.4 °C) Oral 80 16 99 %   04/29/21 0816 -- -- -- -- -- 100 %   04/29/21 0755 127/69 97.9 °F (36.6 °C) Oral 88 18 99 %       EXAM:  General:  Alert, oriented, NAD   Sternal wound VAC in place,   ABD: Protuberant, soft, NT.   Bowel sounds present   EXT:  1+ pitting edema lower legs, no focal rash       LINE:  PIV in place          Scheduled Meds:   [START ON 4/30/2021] aspirin  325 mg Oral Daily    insulin glargine  20 Units Subcutaneous Nightly    insulin lispro  5 Units Subcutaneous TID WC    polyethylene glycol  17 g Oral Daily    ceFAZolin  2,000 mg Intravenous Q8H    tamsulosin  0.4 mg Oral Daily    insulin lispro  0-18 Units Subcutaneous TID WC    insulin lispro  0-9 Units Subcutaneous Nightly    atorvastatin  40 mg Oral QPM    budesonide-formoterol  2 puff Inhalation BID    metoprolol tartrate  25 mg Oral BID    sodium chloride flush  5-40 mL Intravenous 2 times per day    sennosides-docusate sodium  2 tablet Oral BID       Continuous Infusions:   sodium chloride Stopped (04/25/21 0944)    dextrose            Data Review:    Lab Results   Component Value Date    WBC 9.6 04/29/2021    HGB 10.0 (L) 04/29/2021    HCT 31.1 (L) 04/29/2021    MCV 87.2 04/29/2021     04/29/2021     Lab Results   Component Value Date    CREATININE <0.5 (L) 04/29/2021    BUN 10 04/29/2021     (L) 04/29/2021    K 4.5 04/29/2021    CL 99 04/29/2021    CO2 27 04/29/2021       Hepatic Function Panel:   Lab Results   Component Value Date    ALKPHOS 79 04/24/2021    ALT 6 04/24/2021    AST 9 04/24/2021    PROT 6.7 04/24/2021    BILITOT 0.7 04/24/2021    BILIDIR <0.2 04/19/2021    IBILI see below 04/19/2021    LABALBU 3.2 04/24/2021         MICRO:  4/23 BC x2 MSSA   Staphylococcus aureus  Antibiotic Interpretation BELLA Status    clindamycin Resistant       erythromycin Resistant >=8 mcg/mL     oxacillin Sensitive <=0.25 mcg/mL     tetracycline Sensitive <=1 mcg/mL     trimethoprim-sulfamethoxazole Sensitive <=10 mcg/mL       4/25 BC x2 NGTD  4/26 Surgical tissue culture sternum - rare growth S aureus       IMAGING:  CT chest 4/21/21  Impression   1. Intact sternotomy, with expected postoperative changes in the mediastinum   and healing chest wall incision   2. Small left pleural effusion     CT chest w contrast 4/21/21  Impression   1. No evidence of pulmonary embolic disease. 2. Small left pleural effusion with dependent atelectasis.  No acute   pulmonary infiltrate. 3. No unexpected findings following recent median sternotomy and open heart   surgery.        Assessment:     Patient Active Problem List    Diagnosis Date Noted    NSTEMI (non-ST elevated myocardial infarction) ([facility] 07/11/2017     Priority: High    Coronary artery disease involving native coronary artery of native heart with unstable angina pectoris ([address] 01/15/2016     Priority: High    Precordial pain 01/13/2016     Priority: High     Class: Acute    Sepsis due to methicillin susceptible Staphylococcus aureus (HCC)     Bacteremia due to methicillin susceptible Staphylococcus aureus (MSSA)     Disruption or dehiscence of closure of sternum or sternotomy     Elevated sed rate     Type 2 diabetes mellitus with obesity ([facility] [address]     Cellulitis 04/23/2021    GURDEEP (obstructive sleep apnea)     Ischemic cardiomyopathy     CAD in native artery 12/15/2020    S/P PTCA (percutaneous transluminal coronary angioplasty) 12/15/2020    Chest pain 12/15/2020    Chronic right-sided thoracic back pain 11/12/2020    Need for prophylactic vaccination and inoculation against varicella 08/13/2020    Need for prophylactic vaccination against diphtheria-tetanus-pertussis (DTP) 08/13/2020    Reactive airway disease with wheezing, moderate persistent, with acute exacerbation 03/03/2020    Reactive airway disease 02/13/2020    Bronchitis 02/13/2020    History of angina pectoris 06/20/2019    Mass of right foot 10/19/2017    Overweight 10/19/2017    Controlled type 2 diabetes mellitus without complication, without long-term current use of insulin ([facility] [address] 07/18/2017    ASHD (arteriosclerotic heart disease) 07/18/2017    Angina pectoris ([facility] [address] 10/17/2016    Angina at rest [facility]) 05/24/2016    Hyperlipidemia 05/24/2016    Angina effort 04/14/2016     Overview Note:     Replacing deprecated diagnoses      Skin lesion 04/14/2016    Abnormal chest x-ray 01/28/2016    S/P drug eluting coronary stent placement     Essential hypertension     Obesity     Other chest pain 01/18/2016    Acute coronary syndrome ([facility] [address]     Lung nodule     Acute angina (HCC)     Abnormal stress test     Shoulder pain 07/06/2015    Low serum testosterone level 06/18/2015    Arm pain, left 06/18/2015    Flank pain 05/21/2015    Prostatism 05/21/2015    Patient overweight 12/23/2014     Overview Note:     Updating deleted diagnoses      Dietary noncompliance 12/23/2014    Alcohol abuse 12/23/2014    Patient overweight 04/29/2013     Overview Note:     Updating deleted diagnoses      Diabetes mellitus 12/03/2012    History of ETOH abuse 12/03/2012    Type II or unspecified type diabetes mellitus without mention of complication, not stated as uncontrolled        CAD s/p CABG 2/19/21    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 Corey Figueroa [phone]   Routine PICC care  Follow-up with Dr. Corey Figueroa 3-4 weeks, call for appt time [phone]  MD Isaak Antony MD  Phone: [phone]   Fax : [phone]

## 2021-04-29 NOTE — PROGRESS NOTES
Physician Progress Note      PATIENTGorman Cabot  CSN #:                  [id number]  :                       1960  ADMIT DATE:       2021 11:14 AM  DISCH DATE:        2021 4:00 PM  RESPONDING  PROVIDER #:        Irish Simpson MD          QUERY TEXT:    Patient admitted with sternal wound dehiscence. Per Op note dated 21   documentation of debridement. To accurately reflect the procedure performed   please document if debridement was excisional or nonexcisional and the deepest   depth of tissue removed as down to and including: The medical record reflects the following:  Risk Factors: wound dehiscence  Clinical Indicators: debridement note: \"The incision was opened up even  further and debrided and copiously irrigated with 2L of fluid. There was no   rommel pus or  purulence that was noted. Soft tissue debridement was carried out as  standard and there was found to be very viable tissue. \"  Treatment: I/D w/ wound vac, ID consult, abx, supportive care and monitoring    Thank you,  Palak Davis RN [facility]  [phone]  Options provided:  -- Excisional debridement of subcutaneous tissue  -- Excisional debridement of fascia  -- Excisional debridement of muscle  -- Nonexcisional debridement of subcutaneous tissue  -- Nonexcisional debridement of fascia  -- Nonexcisional debridement of muscle  -- Other - I will add my own diagnosis  -- Disagree - Not applicable / Not valid  -- Disagree - Clinically unable to determine / Unknown  -- Refer to Clinical Documentation Reviewer    PROVIDER RESPONSE TEXT:    Excisional debridement of subcutaneous tissue of sternotomy site was performed   during procedure on 21. Query created by:  Jose A Jones on 2021 10:00 AM      Electronically signed by:  Irish Simpson MD 2021 5:14 PM

## 2021-04-29 NOTE — CARE COORDINATION
Case Management  Discharge Summary      DISCHARGE TO: Home with [address]: [facility]     NEW DURABLE MEDICAL EQUIPMENT ORDERED: [employer] wound Vac, [employer] for IV antibiotics. TRANSPORTATION: Family     ORDERS FAXED TO:  [employer], [employer] AND [facility] per liaisons. They are aware pt is getting 3:30 dose of IV antibiotic and that they will need to start care first thing in the morning. HENS: N-A    PRE-CERTIFICATION OBTAINED: N-A    NURSE RESPONSIBLE FOR COMPLETION OF PAGE ONE OF CONTINUITY OF CARE (NAOMIE) FORM, RECONCILIATION OF MEDICATIONS, AND TO PLACE A COPY OF FORMS IN PATIENT'S HARD CHART. NURSE TO ENSURE THAT ANY WRITTEN PRESCRIPTIONS ARE GIVEN TO PT UPON DISCHARGE. Patient aware of and agreeable to all arrangements and states no further discharge planning needs.

## 2021-04-29 NOTE — PROGRESS NOTES
End of shift report given to Zurdo Alfaro RN at bedside. Patient alert and oriented. Bed in lowest position with wheels locked. Call light within reach. No further needs at this time.

## 2021-04-29 NOTE — PROGRESS NOTES
Patient discharging to home with home care. PICC line placed, all home meds  amd discharge papers reviewed with patient and wife. Switched to portable home wound vac, peripheral IV removed. Patient medicated for pain, last IV Ancef administered at 3:00 pm. [facility] will come [facility]. Patient and wife verbalized understandings.  Patient discharged at 16:00 04/29/2021

## 2021-04-29 NOTE — PLAN OF CARE
04/29/21 1213   Negative Pressure Wound Therapy Sternum   Placement Date/Time: 04/26/21 0901   Inserted by: Maco Soriano MD  Location: (c) Sternum   Wound Type Surgical   Unit Type KCI VAC   Dressing Type Black foam   Number of pieces used 1   Cycle Continuous   Target Pressure (mmHg) 150   Intensity 1   Dressing Status Clean;Dry; Intact   Drainage Amount Moderate   Drainage Description Sanguinous   Dressing Change Due 04/30/21   Wound Assessment Other (Comment)  (CECILIO)   Felicia-wound Assessment Clean;Dry     Reviewed with patient and spouse [address] - how it connects to the current dressing; how to attach the canister; how to charge the device; how to return the device to [facility]. Questions answered; patient and spouse verbalized understanding. [employer] Home VAC currently charging. Patient is potential discharge today after PICC placement and recommendations from ID.

## 2021-04-29 NOTE — OP NOTE
[address] [address]                                OPERATIVE REPORT    PATIENT NAME: Dominik Lund                           :        1960  MED REC NO:   [id number]                          ROOM:       0201  ACCOUNT NO:   [de-identified]                           ADMIT DATE: 2021  PROVIDER:     Benny Astudillo MD    DATE OF PROCEDURE:  2021    PREOPERATIVE DIAGNOSIS:  Sternal wound infection. POSTOPERATIVE DIAGNOSIS:  Sternal wound infection. OPERATIONS PERFORMED:  1. Drainage and wound VAC change. 2.  Sternal wire removal x4.  3.  Abscess drain at the left second intercostal space. SURGEON:  Benny Astudillo MD    ESTIMATED BLOOD LOSS:  50 mL. COMPLICATIONS:  None. PROCEDURE DESCRIPTION:  The patient was taken to the operating room,  after informed written consent was obtained, lying supine, the  previously placed wound VAC was removed. The patient was intubated,  prepped and draped in standard fashion. Ports for the course were done  in standard manner. Clearly, we could see pus coming out of the second  intercostal space around the wire into the left side. The pocket was  opened, about 20 mL of purulent material was drained. I irrigated  significantly. The rest of the wire was removed and I could not  identify any pus on those. Curette was used to curette the granulation  tissue. Good granulation tissue was seen. Hemostasis was secured. No  active bleeding was seen. Sponge of medium size fashioned to fit the  incision and the suction was applied to 150 continuous. Counts, we were  told were correct. The patient was dispositioned to recovery room in  stable condition without any complication.         Parth Hewitt MD    D: 2021 14:29:42       T: 2021 16:51:52     [id number]  Job#: [id number]     Doc#: [id number]    CC:

## 2021-04-30 ENCOUNTER — TELEPHONE (OUTPATIENT)
Dept: FAMILY MEDICINE CLINIC | Age: 61
End: 2021-04-30

## 2021-04-30 ENCOUNTER — TELEPHONE (OUTPATIENT)
Dept: CARDIOTHORACIC SURGERY | Age: 61
End: 2021-04-30

## 2021-04-30 NOTE — TELEPHONE ENCOUNTER
[facility] Transitions Initial Follow Up Call    Outreach made within 2 business days of discharge: Yes    Patient: Dheeraj Cano Patient : 1960   MRN: [id number]  Reason for Admission: There are no discharge diagnoses documented for the most recent discharge. Discharge Date: 21       Spoke with: wife    Discharge department/facility: [facility]    [facility] Interactive Patient Contact:  Was patient able to fill all prescriptions: Yes  Was patient instructed to bring all medications to the follow-up visit: Yes  Is patient taking all medications as directed in the discharge summary?  Yes  Does patient understand their discharge instructions: Yes  Does patient have questions or concerns that need addressed prior to 7-14 day follow up office visit: no    Scheduled appointment with PCP within 7-14 days    Follow Up  Future Appointments   Date Time Provider Abdirahman Craft   5/10/2021 11:15 AM Plilo Macias MD [facility]   2021  1:00 PM Dyllan Olivarez MD AND INFCT DI [facility]   6/10/2021  9:30 [facility]   2021  2:30 PM DO CAYLA Bob [facility]   7/15/2021  9:15 AM [facility]       Naveed Valenzuela MA

## 2021-04-30 NOTE — TELEPHONE ENCOUNTER
If his asthma has not flared, he can f/u with infectious disease and thoracic surgery. He can see me anytime in next 1-2 months. If he is having more trouble, offer appt within 2-4 weeks.

## 2021-04-30 NOTE — TELEPHONE ENCOUNTER
Patient d/c from [facility] 4/29/21. When would you like patient to f/u. Please advise.

## 2021-05-01 LAB
ANAEROBIC CULTURE: ABNORMAL
CULTURE SURGICAL: ABNORMAL
GRAM STAIN RESULT: ABNORMAL
ORGANISM: ABNORMAL

## 2021-05-03 ENCOUNTER — HOSPITAL ENCOUNTER (OUTPATIENT)
Age: 61
Setting detail: SPECIMEN
Discharge: HOME | End: 2021-05-03
Payer: COMMERCIAL

## 2021-05-03 LAB
A/G RATIO: 1.2 (ref 1.1–2.2)
ALBUMIN SERPL-MCNC: 3.5 G/DL (ref 3.4–5)
ALP BLD-CCNC: 123 U/L (ref 40–129)
ALT SERPL-CCNC: 10 U/L (ref 10–40)
ANION GAP SERPL CALCULATED.3IONS-SCNC: 11 MMOL/L (ref 3–16)
AST SERPL-CCNC: 20 U/L (ref 15–37)
BASOPHILS ABSOLUTE: 0 K/UL (ref 0–0.2)
BASOPHILS RELATIVE PERCENT: 0.5 %
BILIRUB SERPL-MCNC: <0.2 MG/DL (ref 0–1)
BUN BLDV-MCNC: 8 MG/DL (ref 7–20)
CALCIUM SERPL-MCNC: 9.1 MG/DL (ref 8.3–10.6)
CHLORIDE BLD-SCNC: 101 MMOL/L (ref 99–110)
CO2: 25 MMOL/L (ref 21–32)
CREAT SERPL-MCNC: <0.5 MG/DL (ref 0.8–1.3)
EOSINOPHILS ABSOLUTE: 0.2 K/UL (ref 0–0.6)
EOSINOPHILS RELATIVE PERCENT: 2.1 %
GFR AFRICAN AMERICAN: >60
GFR NON-AFRICAN AMERICAN: >60
GLOBULIN: 2.9 G/DL
GLUCOSE BLD-MCNC: 240 MG/DL (ref 70–99)
HCT VFR BLD CALC: 34.4 % (ref 40.5–52.5)
HEMOGLOBIN: 11.2 G/DL (ref 13.5–17.5)
LYMPHOCYTES ABSOLUTE: 1.9 K/UL (ref 1–5.1)
LYMPHOCYTES RELATIVE PERCENT: 17.5 %
MCH RBC QN AUTO: 28.5 PG (ref 26–34)
MCHC RBC AUTO-ENTMCNC: 32.7 G/DL (ref 31–36)
MCV RBC AUTO: 87.3 FL (ref 80–100)
MONOCYTES ABSOLUTE: 0.5 K/UL (ref 0–1.3)
MONOCYTES RELATIVE PERCENT: 4.5 %
NEUTROPHILS ABSOLUTE: 8 K/UL (ref 1.7–7.7)
NEUTROPHILS RELATIVE PERCENT: 75.4 %
PDW BLD-RTO: 15 % (ref 12.4–15.4)
PLATELET # BLD: 529 K/UL (ref 135–450)
PMV BLD AUTO: 7.7 FL (ref 5–10.5)
POTASSIUM SERPL-SCNC: 4.7 MMOL/L (ref 3.5–5.1)
RBC # BLD: 3.94 M/UL (ref 4.2–5.9)
SODIUM BLD-SCNC: 137 MMOL/L (ref 136–145)
TOTAL PROTEIN: 6.4 G/DL (ref 6.4–8.2)
WBC # BLD: 10.7 K/UL (ref 4–11)

## 2021-05-03 PROCEDURE — 85025 COMPLETE CBC W/AUTO DIFF WBC: CPT

## 2021-05-03 PROCEDURE — 36415 COLL VENOUS BLD VENIPUNCTURE: CPT

## 2021-05-03 PROCEDURE — 80053 COMPREHEN METABOLIC PANEL: CPT

## 2021-05-03 PROCEDURE — 86140 C-REACTIVE PROTEIN: CPT

## 2021-05-04 LAB — C-REACTIVE PROTEIN: 6.5 MG/L (ref 0–5.1)

## 2021-05-10 ENCOUNTER — OFFICE VISIT (OUTPATIENT)
Dept: CARDIOTHORACIC SURGERY | Age: 61
End: 2021-05-10
Payer: COMMERCIAL

## 2021-05-10 ENCOUNTER — HOSPITAL ENCOUNTER (OUTPATIENT)
Age: 61
Setting detail: SPECIMEN
Discharge: HOME | End: 2021-05-10
Payer: COMMERCIAL

## 2021-05-10 VITALS
TEMPERATURE: 97.5 F | HEART RATE: 97 BPM | OXYGEN SATURATION: 98 % | BODY MASS INDEX: 31.78 KG/M2 | WEIGHT: 222 LBS | HEIGHT: 70 IN

## 2021-05-10 DIAGNOSIS — T81.32XS STERNAL WOUND DEHISCENCE, SEQUELA: Primary | ICD-10-CM

## 2021-05-10 LAB
A/G RATIO: 1.2 (ref 1.1–2.2)
ALBUMIN SERPL-MCNC: 3.6 G/DL (ref 3.4–5)
ALP BLD-CCNC: 111 U/L (ref 40–129)
ALT SERPL-CCNC: 9 U/L (ref 10–40)
ANION GAP SERPL CALCULATED.3IONS-SCNC: 13 MMOL/L (ref 3–16)
AST SERPL-CCNC: 17 U/L (ref 15–37)
BASOPHILS ABSOLUTE: 0.1 K/UL (ref 0–0.2)
BASOPHILS RELATIVE PERCENT: 1.2 %
BILIRUB SERPL-MCNC: <0.2 MG/DL (ref 0–1)
BUN BLDV-MCNC: 9 MG/DL (ref 7–20)
CALCIUM SERPL-MCNC: 9.4 MG/DL (ref 8.3–10.6)
CHLORIDE BLD-SCNC: 101 MMOL/L (ref 99–110)
CO2: 25 MMOL/L (ref 21–32)
CREAT SERPL-MCNC: 0.6 MG/DL (ref 0.8–1.3)
EOSINOPHILS ABSOLUTE: 0.2 K/UL (ref 0–0.6)
EOSINOPHILS RELATIVE PERCENT: 2.4 %
GFR AFRICAN AMERICAN: >60
GFR NON-AFRICAN AMERICAN: >60
GLOBULIN: 2.9 G/DL
GLUCOSE BLD-MCNC: 182 MG/DL (ref 70–99)
HCT VFR BLD CALC: 33.3 % (ref 40.5–52.5)
HEMOGLOBIN: 11.1 G/DL (ref 13.5–17.5)
LYMPHOCYTES ABSOLUTE: 1.9 K/UL (ref 1–5.1)
LYMPHOCYTES RELATIVE PERCENT: 29.1 %
MCH RBC QN AUTO: 29 PG (ref 26–34)
MCHC RBC AUTO-ENTMCNC: 33.2 G/DL (ref 31–36)
MCV RBC AUTO: 87.3 FL (ref 80–100)
MONOCYTES ABSOLUTE: 0.5 K/UL (ref 0–1.3)
MONOCYTES RELATIVE PERCENT: 7.6 %
NEUTROPHILS ABSOLUTE: 3.8 K/UL (ref 1.7–7.7)
NEUTROPHILS RELATIVE PERCENT: 59.7 %
PDW BLD-RTO: 15.3 % (ref 12.4–15.4)
PLATELET # BLD: 347 K/UL (ref 135–450)
PMV BLD AUTO: 7.9 FL (ref 5–10.5)
POTASSIUM SERPL-SCNC: 4.1 MMOL/L (ref 3.5–5.1)
RBC # BLD: 3.81 M/UL (ref 4.2–5.9)
SODIUM BLD-SCNC: 139 MMOL/L (ref 136–145)
TOTAL PROTEIN: 6.5 G/DL (ref 6.4–8.2)
WBC # BLD: 6.4 K/UL (ref 4–11)

## 2021-05-10 PROCEDURE — 80053 COMPREHEN METABOLIC PANEL: CPT

## 2021-05-10 PROCEDURE — 86140 C-REACTIVE PROTEIN: CPT

## 2021-05-10 PROCEDURE — 99212 OFFICE O/P EST SF 10 MIN: CPT | Performed by: THORACIC SURGERY (CARDIOTHORACIC VASCULAR SURGERY)

## 2021-05-10 PROCEDURE — 36415 COLL VENOUS BLD VENIPUNCTURE: CPT

## 2021-05-10 PROCEDURE — 85025 COMPLETE CBC W/AUTO DIFF WBC: CPT

## 2021-05-10 RX ORDER — HYDROCODONE BITARTRATE AND ACETAMINOPHEN 7.5; 325 MG/1; MG/1
1 TABLET ORAL EVERY 6 HOURS PRN
Qty: 28 TABLET | Refills: 0 | Status: SHIPPED | OUTPATIENT
Start: 2021-05-10 | End: 2021-05-17

## 2021-05-10 RX ORDER — HYDROCODONE BITARTRATE AND ACETAMINOPHEN 7.5; 325 MG/1; MG/1
1 TABLET ORAL EVERY 6 HOURS PRN
COMMUNITY
End: 2021-05-10 | Stop reason: SDUPTHER

## 2021-05-10 NOTE — PROGRESS NOTES
Cardiac, Vascular and Thoracic Surgeons  Clinic Note     5/10/2021 11:18 AM  Surgeon:  Michael Cantu     S/P : debridement of sternal wound w/ wound vac placement on 4/26/21. Back to OR on 4/28/21 for wound vac change, placement of abscess drain, sternal wire removal. Home with wound vac. Chief complaint : 1st post op  Subjective:  Mr. Marek iN is doing well post operatively. Home care doing wound vac changes Monday, Wed, Friday. Afebrile. Receiving IV abx through PICC line at home. Vital Signs: There were no vitals taken for this visit. I/O:  No intake or output data in the 24 hours ending 05/10/21 1118    Exam:   Cardiovascular: S1 plus S2 +0 no additional  Pulmonary: Bilaterally clear no additional sound      Incision: Dry and clean  Labs:   CBC: No results for input(s): WBC, HGB, HCT, MCV, PLT in the last 72 hours. BMP: No results for input(s): NA, K, CL, CO2, PHOS, BUN, CREATININE in the last 72 hours. Invalid input(s): CA  PT/INR: No results for input(s): PROTIME, INR in the last 72 hours. APTT: No results for input(s): APTT in the last 72 hours.     Scheduled Meds:     Patient Active Problem List   Diagnosis    Type II or unspecified type diabetes mellitus without mention of complication, not stated as uncontrolled    Diabetes mellitus    History of ETOH abuse    Patient overweight    Patient overweight    Dietary noncompliance    Alcohol abuse    Flank pain    Prostatism    Low serum testosterone level    Arm pain, left    Shoulder pain    Precordial pain    Lung nodule    Acute angina (HCC)    Abnormal stress test    Acute coronary syndrome ([address]    Coronary artery disease involving native coronary artery of native heart with unstable angina pectoris (HCC)    Other chest pain    S/P drug eluting coronary stent placement    Essential hypertension    Obesity    Abnormal chest x-ray    Angina effort    Skin lesion    Angina at rest [facility])    Hyperlipidemia    Angina pectoris ([facility] [address]    NSTEMI (non-ST elevated myocardial infarction) ([facility] [address]    Controlled type 2 diabetes mellitus without complication, without long-term current use of insulin ([address]    ASHD (arteriosclerotic heart disease)    Mass of right foot    Overweight    History of angina pectoris    Reactive airway disease    Bronchitis    Reactive airway disease with wheezing, moderate persistent, with acute exacerbation    Need for prophylactic vaccination and inoculation against varicella    Need for prophylactic vaccination against diphtheria-tetanus-pertussis (DTP)    Chronic right-sided thoracic back pain    CAD in native artery    S/P PTCA (percutaneous transluminal coronary angioplasty)    Chest pain    Ischemic cardiomyopathy    GURDEEP (obstructive sleep apnea)    Cellulitis    Sepsis due to methicillin susceptible Staphylococcus aureus ([facility] [address]    Bacteremia due to methicillin susceptible Staphylococcus aureus (MSSA)    Disruption or dehiscence of closure of sternum or sternotomy    Elevated sed rate    Type 2 diabetes mellitus with obesity ([facility] [address]       Assessment/Plan:   1. Granulation tissue is improving I could not see any pus or discharge coming out of the incision  2. PICC line in position site is clean and dry  3.  Wound VAC was changed in the clinic patient will be seen for the next 2 weeks    Susana Chinchilla MD FACS

## 2021-05-11 LAB — C-REACTIVE PROTEIN: 7 MG/L (ref 0–5.1)

## 2021-05-17 ENCOUNTER — OFFICE VISIT (OUTPATIENT)
Dept: CARDIOTHORACIC SURGERY | Age: 61
End: 2021-05-17
Payer: COMMERCIAL

## 2021-05-17 VITALS
HEART RATE: 84 BPM | OXYGEN SATURATION: 97 % | BODY MASS INDEX: 32.07 KG/M2 | HEIGHT: 70 IN | SYSTOLIC BLOOD PRESSURE: 124 MMHG | TEMPERATURE: 97.6 F | DIASTOLIC BLOOD PRESSURE: 78 MMHG | WEIGHT: 224 LBS

## 2021-05-17 DIAGNOSIS — T81.32XD DISRUPTION OR DEHISCENCE OF CLOSURE OF STERNUM OR STERNOTOMY, SUBSEQUENT ENCOUNTER: Primary | ICD-10-CM

## 2021-05-17 PROCEDURE — 99212 OFFICE O/P EST SF 10 MIN: CPT | Performed by: THORACIC SURGERY (CARDIOTHORACIC VASCULAR SURGERY)

## 2021-05-17 NOTE — PROGRESS NOTES
Peripheral Block    Patient location during procedure: pre-op  End time: 5/17/2021 7:35 AM  Staffing  Performed: anesthesiologist   Anesthesiologist: Dragan Guerra MD  Preanesthetic Checklist  Completed: patient identified, IV checked, site marked, risks and benefits discussed, surgical consent, monitors and equipment checked, pre-op evaluation, timeout performed, anesthesia consent given, oxygen available and patient being monitored  Peripheral Block  Patient position: supine  Prep: ChloraPrep  Patient monitoring: cardiac monitor, continuous pulse ox, frequent blood pressure checks and IV access  Block type: Femoral  Laterality: right  Injection technique: single-shot  Guidance: ultrasound guided  Local infiltration: lidocaine  Infiltration strength: 1 %  Dose: 3 mL  Adductor canal (Low Femoral)  Provider prep: mask and sterile gloves  Local infiltration: lidocaine  Needle  Needle gauge: 21 G  Needle length: 10 cm  Needle localization: ultrasound guidance  Assessment  Injection assessment: negative aspiration for heme, no paresthesia on injection and local visualized surrounding nerve on ultrasound  Paresthesia pain: none  Slow fractionated injection: yes  Hemodynamics: stable  Additional Notes  Immediately prior to procedure a \"time out\" was called to verify the correct patient, allergies, laterality, procedure and equipment. Time out performed with RN    Local Anesthetic: 0.25 %  Bupivacaine   Amount: 30 ml  in 5 ml increments after negative aspiration each time. Iliopsoas Muscle and Fascia Iliaca, Femoral artery (Deep artery to the thigh take off), Femoral Vein and Femoral Nerve are identified; the tip of the need and the spread of the local anesthetic around the Femoral nerve are visualized. The Femoral nerve appeared to be anatomically normal and there were no abnormal pathologically findings seen.          Reason for block: post-op pain management and at surgeon's request Pt wife concerned that pt appiah is not working properly. Pt reports feeling as if he has to urinate. Explained to pt that this is a normal sensation with a urinary catheter. Writer assessed appiah to find properly functioning appiah with urine return noted in tubing and urometer.

## 2021-05-17 NOTE — PROGRESS NOTES
Cardiac, Vascular and Thoracic Surgeons  Clinic Note     5/17/2021 10:44 AM  Surgeon:  Valente Seip     S/P :  debridement of sternal wound, wound vac placement on 4/26/21. Back to OR 4/28 for wound vac change, sternal wire removal w/ MOM. Chief complaint : 1 week follow up  Subjective:  Mr. Harley Hastings is doing well. He is here for his one week wound vac change. Currently getting IV abx 3 x day through PICC line. Afebrile. Pain well controlled. Vital Signs: There were no vitals taken for this visit. I/O:  No intake or output data in the 24 hours ending 05/17/21 1044    Exam:   Cardiovascular: 1+ S2 +0 no additional  Pulmonary: Bilaterally clear no additional sound    Labs:   CBC: No results for input(s): WBC, HGB, HCT, MCV, PLT in the last 72 hours. BMP: No results for input(s): NA, K, CL, CO2, PHOS, BUN, CREATININE in the last 72 hours. Invalid input(s): CA  PT/INR: No results for input(s): PROTIME, INR in the last 72 hours. APTT: No results for input(s): APTT in the last 72 hours.     Scheduled Meds:     Patient Active Problem List   Diagnosis    Type II or unspecified type diabetes mellitus without mention of complication, not stated as uncontrolled    Diabetes mellitus    History of ETOH abuse    Patient overweight    Patient overweight    Dietary noncompliance    Alcohol abuse    Flank pain    Prostatism    Low serum testosterone level    Arm pain, left    Shoulder pain    Precordial pain    Lung nodule    Acute angina (HCC)    Abnormal stress test    Acute coronary syndrome ([address]    Coronary artery disease involving native coronary artery of native heart with unstable angina pectoris (HCC)    Other chest pain    S/P drug eluting coronary stent placement    Essential hypertension    Obesity    Abnormal chest x-ray    Angina effort    Skin lesion    Angina at rest [facility])    Hyperlipidemia    Angina pectoris ([address]    NSTEMI (non-ST elevated myocardial infarction) ([address]   

## 2021-05-18 ENCOUNTER — HOSPITAL ENCOUNTER (OUTPATIENT)
Age: 61
Setting detail: SPECIMEN
Discharge: HOME | End: 2021-05-18
Payer: COMMERCIAL

## 2021-05-18 LAB
A/G RATIO: 1.6 (ref 1.1–2.2)
ALBUMIN SERPL-MCNC: 4.1 G/DL (ref 3.4–5)
ALP BLD-CCNC: 115 U/L (ref 40–129)
ALT SERPL-CCNC: 6 U/L (ref 10–40)
ANION GAP SERPL CALCULATED.3IONS-SCNC: 11 MMOL/L (ref 3–16)
AST SERPL-CCNC: 19 U/L (ref 15–37)
BASOPHILS ABSOLUTE: 0 K/UL (ref 0–0.2)
BASOPHILS RELATIVE PERCENT: 0.9 %
BILIRUB SERPL-MCNC: <0.2 MG/DL (ref 0–1)
BUN BLDV-MCNC: 8 MG/DL (ref 7–20)
C-REACTIVE PROTEIN: 5.3 MG/L (ref 0–5.1)
CALCIUM SERPL-MCNC: 9.5 MG/DL (ref 8.3–10.6)
CHLORIDE BLD-SCNC: 103 MMOL/L (ref 99–110)
CO2: 27 MMOL/L (ref 21–32)
CREAT SERPL-MCNC: <0.5 MG/DL (ref 0.8–1.3)
EOSINOPHILS ABSOLUTE: 0.3 K/UL (ref 0–0.6)
EOSINOPHILS RELATIVE PERCENT: 5.6 %
GFR AFRICAN AMERICAN: >60
GFR NON-AFRICAN AMERICAN: >60
GLOBULIN: 2.6 G/DL
GLUCOSE BLD-MCNC: 169 MG/DL (ref 70–99)
HCT VFR BLD CALC: 34 % (ref 40.5–52.5)
HEMOGLOBIN: 10.9 G/DL (ref 13.5–17.5)
LYMPHOCYTES ABSOLUTE: 1.8 K/UL (ref 1–5.1)
LYMPHOCYTES RELATIVE PERCENT: 34.5 %
MCH RBC QN AUTO: 27.6 PG (ref 26–34)
MCHC RBC AUTO-ENTMCNC: 32.1 G/DL (ref 31–36)
MCV RBC AUTO: 86 FL (ref 80–100)
MONOCYTES ABSOLUTE: 0.4 K/UL (ref 0–1.3)
MONOCYTES RELATIVE PERCENT: 8.1 %
NEUTROPHILS ABSOLUTE: 2.7 K/UL (ref 1.7–7.7)
NEUTROPHILS RELATIVE PERCENT: 50.9 %
PDW BLD-RTO: 15 % (ref 12.4–15.4)
PLATELET # BLD: 267 K/UL (ref 135–450)
PMV BLD AUTO: 8.2 FL (ref 5–10.5)
POTASSIUM SERPL-SCNC: 4 MMOL/L (ref 3.5–5.1)
RBC # BLD: 3.96 M/UL (ref 4.2–5.9)
SODIUM BLD-SCNC: 141 MMOL/L (ref 136–145)
TOTAL PROTEIN: 6.7 G/DL (ref 6.4–8.2)
WBC # BLD: 5.3 K/UL (ref 4–11)

## 2021-05-18 PROCEDURE — 80053 COMPREHEN METABOLIC PANEL: CPT

## 2021-05-18 PROCEDURE — 86140 C-REACTIVE PROTEIN: CPT

## 2021-05-18 PROCEDURE — 85025 COMPLETE CBC W/AUTO DIFF WBC: CPT

## 2021-05-18 PROCEDURE — 36415 COLL VENOUS BLD VENIPUNCTURE: CPT

## 2021-05-20 RX ORDER — GLIPIZIDE 10 MG/1
TABLET ORAL
Qty: 30 TABLET | Refills: 1 | Status: SHIPPED | OUTPATIENT
Start: 2021-05-20 | End: 2021-07-19

## 2021-05-24 ENCOUNTER — OFFICE VISIT (OUTPATIENT)
Dept: CARDIOTHORACIC SURGERY | Age: 61
End: 2021-05-24

## 2021-05-24 ENCOUNTER — HOSPITAL ENCOUNTER (OUTPATIENT)
Age: 61
Setting detail: SPECIMEN
Discharge: HOME | End: 2021-05-24
Payer: COMMERCIAL

## 2021-05-24 VITALS
TEMPERATURE: 98 F | DIASTOLIC BLOOD PRESSURE: 78 MMHG | OXYGEN SATURATION: 98 % | BODY MASS INDEX: 32.7 KG/M2 | WEIGHT: 228.4 LBS | SYSTOLIC BLOOD PRESSURE: 140 MMHG | HEART RATE: 91 BPM | HEIGHT: 70 IN

## 2021-05-24 DIAGNOSIS — I21.4 NSTEMI (NON-ST ELEVATED MYOCARDIAL INFARCTION) (HCC): Primary | ICD-10-CM

## 2021-05-24 LAB
A/G RATIO: 1.6 (ref 1.1–2.2)
ALBUMIN SERPL-MCNC: 4.2 G/DL (ref 3.4–5)
ALP BLD-CCNC: 102 U/L (ref 40–129)
ALT SERPL-CCNC: 6 U/L (ref 10–40)
ANION GAP SERPL CALCULATED.3IONS-SCNC: 12 MMOL/L (ref 3–16)
AST SERPL-CCNC: 18 U/L (ref 15–37)
BASOPHILS ABSOLUTE: 0 K/UL (ref 0–0.2)
BASOPHILS RELATIVE PERCENT: 0.8 %
BILIRUB SERPL-MCNC: <0.2 MG/DL (ref 0–1)
BUN BLDV-MCNC: 8 MG/DL (ref 7–20)
CALCIUM SERPL-MCNC: 9.4 MG/DL (ref 8.3–10.6)
CHLORIDE BLD-SCNC: 105 MMOL/L (ref 99–110)
CO2: 26 MMOL/L (ref 21–32)
CREAT SERPL-MCNC: <0.5 MG/DL (ref 0.8–1.3)
EOSINOPHILS ABSOLUTE: 0.2 K/UL (ref 0–0.6)
EOSINOPHILS RELATIVE PERCENT: 3.9 %
GFR AFRICAN AMERICAN: >60
GFR NON-AFRICAN AMERICAN: >60
GLOBULIN: 2.6 G/DL
GLUCOSE BLD-MCNC: 110 MG/DL (ref 70–99)
HCT VFR BLD CALC: 34.8 % (ref 40.5–52.5)
HEMOGLOBIN: 11.1 G/DL (ref 13.5–17.5)
LYMPHOCYTES ABSOLUTE: 1.8 K/UL (ref 1–5.1)
LYMPHOCYTES RELATIVE PERCENT: 35.5 %
MCH RBC QN AUTO: 27.7 PG (ref 26–34)
MCHC RBC AUTO-ENTMCNC: 32 G/DL (ref 31–36)
MCV RBC AUTO: 86.5 FL (ref 80–100)
MONOCYTES ABSOLUTE: 0.5 K/UL (ref 0–1.3)
MONOCYTES RELATIVE PERCENT: 9.6 %
NEUTROPHILS ABSOLUTE: 2.5 K/UL (ref 1.7–7.7)
NEUTROPHILS RELATIVE PERCENT: 50.2 %
PDW BLD-RTO: 14.7 % (ref 12.4–15.4)
PLATELET # BLD: 274 K/UL (ref 135–450)
PMV BLD AUTO: 8 FL (ref 5–10.5)
POTASSIUM SERPL-SCNC: 4.1 MMOL/L (ref 3.5–5.1)
RBC # BLD: 4.02 M/UL (ref 4.2–5.9)
SODIUM BLD-SCNC: 143 MMOL/L (ref 136–145)
TOTAL PROTEIN: 6.8 G/DL (ref 6.4–8.2)
WBC # BLD: 5 K/UL (ref 4–11)

## 2021-05-24 PROCEDURE — 80053 COMPREHEN METABOLIC PANEL: CPT

## 2021-05-24 PROCEDURE — 99024 POSTOP FOLLOW-UP VISIT: CPT | Performed by: THORACIC SURGERY (CARDIOTHORACIC VASCULAR SURGERY)

## 2021-05-24 PROCEDURE — 85025 COMPLETE CBC W/AUTO DIFF WBC: CPT

## 2021-05-24 PROCEDURE — 36415 COLL VENOUS BLD VENIPUNCTURE: CPT

## 2021-05-24 PROCEDURE — 86140 C-REACTIVE PROTEIN: CPT

## 2021-05-24 NOTE — PROGRESS NOTES
Angina pectoris ([facility] [address]    NSTEMI (non-ST elevated myocardial infarction) ([facility] [address]    Controlled type 2 diabetes mellitus without complication, without long-term current use of insulin ([address]    ASHD (arteriosclerotic heart disease)    Mass of right foot    Overweight    History of angina pectoris    Reactive airway disease    Bronchitis    Reactive airway disease with wheezing, moderate persistent, with acute exacerbation    Need for prophylactic vaccination and inoculation against varicella    Need for prophylactic vaccination against diphtheria-tetanus-pertussis (DTP)    Chronic right-sided thoracic back pain    CAD in native artery    S/P PTCA (percutaneous transluminal coronary angioplasty)    Chest pain    Ischemic cardiomyopathy    GURDEEP (obstructive sleep apnea)    Cellulitis    Sepsis due to methicillin susceptible Staphylococcus aureus ([facility] [address]    Bacteremia due to methicillin susceptible Staphylococcus aureus (MSSA)    Disruption or dehiscence of closure of sternum or sternotomy    Elevated sed rate    Type 2 diabetes mellitus with obesity ([address]       Assessment/Plan:   1. Follow-up with the next clinic hopefully the wound VAC will be done  2.  Wet-to-dry in the next change    Reyes Situ, MD FACS

## 2021-05-25 LAB — C-REACTIVE PROTEIN: 5 MG/L (ref 0–5.1)

## 2021-05-31 ENCOUNTER — HOSPITAL ENCOUNTER (OUTPATIENT)
Age: 61
Setting detail: SPECIMEN
Discharge: HOME | End: 2021-05-31
Payer: COMMERCIAL

## 2021-05-31 LAB
A/G RATIO: 1.7 (ref 1.1–2.2)
ALBUMIN SERPL-MCNC: 4.3 G/DL (ref 3.4–5)
ALP BLD-CCNC: 97 U/L (ref 40–129)
ALT SERPL-CCNC: 6 U/L (ref 10–40)
ANION GAP SERPL CALCULATED.3IONS-SCNC: 8 MMOL/L (ref 3–16)
AST SERPL-CCNC: 18 U/L (ref 15–37)
BASOPHILS ABSOLUTE: 0 K/UL (ref 0–0.2)
BASOPHILS RELATIVE PERCENT: 0.8 %
BILIRUB SERPL-MCNC: <0.2 MG/DL (ref 0–1)
BUN BLDV-MCNC: 8 MG/DL (ref 7–20)
CALCIUM SERPL-MCNC: 9.7 MG/DL (ref 8.3–10.6)
CHLORIDE BLD-SCNC: 103 MMOL/L (ref 99–110)
CO2: 27 MMOL/L (ref 21–32)
CREAT SERPL-MCNC: <0.5 MG/DL (ref 0.8–1.3)
EOSINOPHILS ABSOLUTE: 0.2 K/UL (ref 0–0.6)
EOSINOPHILS RELATIVE PERCENT: 2.6 %
GFR AFRICAN AMERICAN: >60
GFR NON-AFRICAN AMERICAN: >60
GLOBULIN: 2.5 G/DL
GLUCOSE BLD-MCNC: 156 MG/DL (ref 70–99)
HCT VFR BLD CALC: 36.8 % (ref 40.5–52.5)
HEMOGLOBIN: 11.8 G/DL (ref 13.5–17.5)
LYMPHOCYTES ABSOLUTE: 1.7 K/UL (ref 1–5.1)
LYMPHOCYTES RELATIVE PERCENT: 28.1 %
MCH RBC QN AUTO: 27.6 PG (ref 26–34)
MCHC RBC AUTO-ENTMCNC: 32 G/DL (ref 31–36)
MCV RBC AUTO: 86.4 FL (ref 80–100)
MONOCYTES ABSOLUTE: 0.5 K/UL (ref 0–1.3)
MONOCYTES RELATIVE PERCENT: 8.1 %
NEUTROPHILS ABSOLUTE: 3.6 K/UL (ref 1.7–7.7)
NEUTROPHILS RELATIVE PERCENT: 60.4 %
PDW BLD-RTO: 15.7 % (ref 12.4–15.4)
PLATELET # BLD: 289 K/UL (ref 135–450)
PMV BLD AUTO: 8 FL (ref 5–10.5)
POTASSIUM SERPL-SCNC: 4.5 MMOL/L (ref 3.5–5.1)
RBC # BLD: 4.26 M/UL (ref 4.2–5.9)
SODIUM BLD-SCNC: 138 MMOL/L (ref 136–145)
TOTAL PROTEIN: 6.8 G/DL (ref 6.4–8.2)
WBC # BLD: 6 K/UL (ref 4–11)

## 2021-05-31 PROCEDURE — 80053 COMPREHEN METABOLIC PANEL: CPT

## 2021-05-31 PROCEDURE — 86140 C-REACTIVE PROTEIN: CPT

## 2021-05-31 PROCEDURE — 36415 COLL VENOUS BLD VENIPUNCTURE: CPT

## 2021-05-31 PROCEDURE — 85025 COMPLETE CBC W/AUTO DIFF WBC: CPT

## 2021-06-01 ENCOUNTER — OFFICE VISIT (OUTPATIENT)
Dept: CARDIOTHORACIC SURGERY | Age: 61
End: 2021-06-01

## 2021-06-01 VITALS
WEIGHT: 227 LBS | SYSTOLIC BLOOD PRESSURE: 132 MMHG | HEART RATE: 88 BPM | BODY MASS INDEX: 32.5 KG/M2 | OXYGEN SATURATION: 99 % | DIASTOLIC BLOOD PRESSURE: 60 MMHG | TEMPERATURE: 97.6 F | HEIGHT: 70 IN

## 2021-06-01 DIAGNOSIS — T81.32XS STERNAL WOUND DEHISCENCE, SEQUELA: ICD-10-CM

## 2021-06-01 DIAGNOSIS — I25.10 CAD IN NATIVE ARTERY: ICD-10-CM

## 2021-06-01 DIAGNOSIS — T81.32XD DISRUPTION OR DEHISCENCE OF CLOSURE OF STERNUM OR STERNOTOMY, SUBSEQUENT ENCOUNTER: Primary | ICD-10-CM

## 2021-06-01 DIAGNOSIS — G89.18 ACUTE POST-OPERATIVE PAIN: ICD-10-CM

## 2021-06-01 DIAGNOSIS — I10 ESSENTIAL HYPERTENSION: ICD-10-CM

## 2021-06-01 LAB — C-REACTIVE PROTEIN: 3.3 MG/L (ref 0–5.1)

## 2021-06-01 PROCEDURE — 99024 POSTOP FOLLOW-UP VISIT: CPT | Performed by: THORACIC SURGERY (CARDIOTHORACIC VASCULAR SURGERY)

## 2021-06-01 NOTE — PROGRESS NOTES
Progress Note    CC:  Postoperative follow-up    S/P    CABG surgery followed by wound dehiscence. Subjective:    He feels great. His eating sleeping habits are normalizing. He has been dealing with his wound VAC without difficulties. Vital Signs:                                                 /60 (Site: Left Upper Arm, Position: Sitting, Cuff Size: Medium Adult)   Pulse 88   Temp 97.6 °F (36.4 °C) (Temporal)   Ht 5' 10\" (1.778 m)   Wt 227 lb (103 kg)   SpO2 99%   BMI 32.57 kg/m²        CV:   Regular rate and rhythm with no rubs or murmurs. Pulm: Clear lung fields with no rales or rhonchi. Incisions:    Sternal incision is clean, dry and intact. Wound is granulating in very nicely. Sternum is stable. Abd:  Soft  Ext: Leg incision is clean, dry and intact. No peripheral edema. Assessment/Plan:  Overall doing very well post ongoing wound care post CABG surgery. I discussed secondary risk prevention for cardiovascular disease with the patient. The patient has received a copy of my protocol for the secondary risk prevention of cardiovascular disease. The patient may increase activities as he feels comfortable doing so. Follow-up with his PCP, Dr. Marisol Farley and Dr. Juancarlos Sharp as prescribed. Follow-up with Dr. Kerline Castro in 1 week.     Natacha Brantley MD  6/1/2021  9:43 AM

## 2021-06-03 ENCOUNTER — VIRTUAL VISIT (OUTPATIENT)
Dept: INFECTIOUS DISEASES | Age: 61
End: 2021-06-03
Payer: COMMERCIAL

## 2021-06-03 DIAGNOSIS — S21.101A STERNAL WOUND INFECTION: Primary | ICD-10-CM

## 2021-06-03 DIAGNOSIS — R78.81 BACTEREMIA DUE TO METHICILLIN SUSCEPTIBLE STAPHYLOCOCCUS AUREUS (MSSA): ICD-10-CM

## 2021-06-03 DIAGNOSIS — E11.9 TYPE 2 DIABETES MELLITUS WITHOUT COMPLICATION, WITHOUT LONG-TERM CURRENT USE OF INSULIN (HCC): ICD-10-CM

## 2021-06-03 DIAGNOSIS — L08.9 STERNAL WOUND INFECTION: Primary | ICD-10-CM

## 2021-06-03 DIAGNOSIS — B95.61 BACTEREMIA DUE TO METHICILLIN SUSCEPTIBLE STAPHYLOCOCCUS AUREUS (MSSA): ICD-10-CM

## 2021-06-03 PROCEDURE — 99213 OFFICE O/P EST LOW 20 MIN: CPT | Performed by: INTERNAL MEDICINE

## 2021-06-03 NOTE — PROGRESS NOTES
Department of Internal Medicine  Infectious Diseases      Patient Name: Linzy Koyanagi      Date of visit: 6/3/2021  SUBJECTIVE:  Linzy Koyanagi  is a 61 y.o. male who returns today for hospital follow-up. 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 Also bacteremic with the same organism. Discharged on IV cefazolin    Today, doing well. No pain. No fever, chills  Seems to be tolerating the abx well so far. No N/V/D/rash. Saw CTS and was reassured. VAC dc 2d ago         REVIEW OF SYSTEMS:    CONSTITUTIONAL:  negative for fevers, chills, diaphoresis, appetite change, fatigue, night sweats and unexpected weight change. EYES:  negative for blurred vision, eye discharge, visual disturbance and icterus  HEENT:  negative for hearing loss, tinnitus, ear drainage, sinus pressure, nasal congestion, epistaxis and snoring  RESPIRATORY:  No cough or hemoptysis   CARDIOVASCULAR:  negative for palpitations, exertional chest pressure/discomfort, edema, syncope  GASTROINTESTINAL:  negative for nausea, vomiting, diarrhea, constipation, blood in stool and abdominal pain  GENITOURINARY:  negative for frequency, dysuria, urinary incontinence, decreased urine volume, and hematuria  HEMATOLOGIC/LYMPHATIC:  negative for easy bruising, bleeding and lymphadenopathy  ALLERGIC/IMMUNOLOGIC:  negative for recurrent infections, angioedema, anaphylaxis and drug reactions  ENDOCRINE:  negative for weight changes and diabetic symptoms including polyuria, polydipsia and polyphagia  MUSCULOSKELETAL:  negative for acute joint swelling, decreased range of motion and muscle weakness  NEUROLOGICAL:  negative for headaches, slurred speech, unilateral weakness  PSYCHIATRIC/BEHAVIORAL: negative for hallucinations, behavioral problems, confusion and agitation.        Medications:    Current Outpatient Medications   Medication Sig Dispense Refill    metFORMIN (GLUCOPHAGE) 1000 MG tablet TAKE ONE TABLET BY MOUTH TWICE A DAY 60 tablet 1    glipiZIDE (GLUCOTROL) 10 MG tablet TAKE ONE TABLET BY MOUTH EVERY MORNING FOR DIABETES 30 tablet 1    tamsulosin (FLOMAX) 0.4 MG capsule Take 1 capsule by mouth daily 30 capsule 1    clopidogrel (PLAVIX) 75 MG tablet TAKE ONE TABLET BY MOUTH DAILY 90 tablet 3    atorvastatin (LIPITOR) 40 MG tablet TAKE ONE TABLET BY MOUTH EVERY EVENING 90 tablet 3    famotidine (PEPCID) 20 MG tablet Take 1 tablet by mouth daily 30 tablet 0    metoprolol tartrate (LOPRESSOR) 25 MG tablet TAKE ONE TABLET BY MOUTH TWICE A DAY 60 tablet 5    fluticasone-salmeterol (ADVAIR DISKUS) 250-50 MCG/DOSE AEPB Inhale 1 puff into the lungs every 12 hours 60 each 3    aspirin 81 MG chewable tablet CHEW ONE TABLET BY MOUTH DAILY 30 tablet 1     No current facility-administered medications for this visit. Physical:  VITALS:  There were no vitals taken for this visit. Not performed in the context of phone visit       MICRO:  4/23     BC x2 MSSA   Staphylococcus aureus          Antibiotic Interpretation BELLA Status     clindamycin Resistant           erythromycin Resistant >=8 mcg/mL       oxacillin Sensitive <=0.25 mcg/mL       tetracycline Sensitive <=1 mcg/mL       trimethoprim-sulfamethoxazole Sensitive <=10 mcg/mL          4/25     BC x2 neg  4/26     Surgical tissue culture sternum - rare growth S aureus         IMAGING:  CT chest 4/21/21  Impression   1. Intact sternotomy, with expected postoperative changes in the mediastinum   and healing chest wall incision   2. Small left pleural effusion      CT chest w contrast 4/21/21  Impression   1. No evidence of pulmonary embolic disease. 2. Small left pleural effusion with dependent atelectasis.  No acute   pulmonary infiltrate.    3. No unexpected findings following recent median sternotomy and open heart   surgery.        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, sternal non-union    OR 4/26/21 - I&D, VAC  Repeat I&D, all wires removed 4/28/21   Operative culture with MSSA      Secondary bacteremia with MSSA that cleared quickly      NKDA       Plan      He is completing 6 week course of culture directed IV abx for sternal wound infection and OM  Clinically doing well  Labs are reassuring   Clinically doing well, VAC has been discontinued   Without signs of systemic infection     As long as he continues to do well, will plan to stop the IV abx next week as planned   Will give further consideration as to whether or not po abx will be indicated    All questions addressed         Americo Arambula MD      [facility], was evaluated through a synchronous (real-time) audio-video encounter. The patient (or guardian if applicable) is aware that this is a billable service. Verbal consent to proceed has been obtained within the past 12 months. The visit was conducted pursuant to the emergency declaration under the [address] [place] [address] authority and the [employer] General Act. Patient identification was verified, and a caregiver was present when appropriate. The patient was located in a state where the provider was credentialed to provide care. Total time spent for this encounter: Not billed by time    --Americo Arambula MD on 6/3/2021 at 1:56 PM    An electronic signature was used to authenticate this note.

## 2021-06-03 NOTE — TELEPHONE ENCOUNTER
Future Appointments   Date Time Provider Abdirahman Scotti   6/3/2021  1:40 PM Jim Sanchez MD [facility]   6/7/2021 11:45 [facility]   6/10/2021  9:30 AM Frederic Pena DO [facility]   6/22/2021 10:30 AM [facility]   7/15/2021  9:15 AM Gasper Delgado MD [facility] 4/8/2021

## 2021-06-04 ENCOUNTER — TELEPHONE (OUTPATIENT)
Dept: CARDIOTHORACIC SURGERY | Age: 61
End: 2021-06-04

## 2021-06-04 NOTE — TELEPHONE ENCOUNTER
Spoke with Bella Escamilla, pt wife. Picture of wound bed reviewed. Improved appearance. No purulence noted. Afebrile. Continue with NS wet to dry dressings twice daily and will be seen by Dr. Valente Seip on Mon 6/7/21.

## 2021-06-07 ENCOUNTER — HOSPITAL ENCOUNTER (OUTPATIENT)
Age: 61
Setting detail: SPECIMEN
Discharge: HOME | End: 2021-06-07
Payer: COMMERCIAL

## 2021-06-07 ENCOUNTER — OFFICE VISIT (OUTPATIENT)
Dept: CARDIOTHORACIC SURGERY | Age: 61
End: 2021-06-07

## 2021-06-07 VITALS
OXYGEN SATURATION: 98 % | TEMPERATURE: 97.6 F | WEIGHT: 226 LBS | BODY MASS INDEX: 32.35 KG/M2 | DIASTOLIC BLOOD PRESSURE: 84 MMHG | HEIGHT: 70 IN | HEART RATE: 90 BPM | SYSTOLIC BLOOD PRESSURE: 122 MMHG

## 2021-06-07 DIAGNOSIS — I21.4 NSTEMI (NON-ST ELEVATED MYOCARDIAL INFARCTION) (HCC): Primary | ICD-10-CM

## 2021-06-07 LAB
A/G RATIO: 1.6 (ref 1.1–2.2)
ALBUMIN SERPL-MCNC: 4.4 G/DL (ref 3.4–5)
ALP BLD-CCNC: 106 U/L (ref 40–129)
ALT SERPL-CCNC: <5 U/L (ref 10–40)
ANION GAP SERPL CALCULATED.3IONS-SCNC: 11 MMOL/L (ref 3–16)
AST SERPL-CCNC: 16 U/L (ref 15–37)
BASOPHILS ABSOLUTE: 0 K/UL (ref 0–0.2)
BASOPHILS RELATIVE PERCENT: 0.9 %
BILIRUB SERPL-MCNC: 0.3 MG/DL (ref 0–1)
BUN BLDV-MCNC: 12 MG/DL (ref 7–20)
CALCIUM SERPL-MCNC: 10 MG/DL (ref 8.3–10.6)
CHLORIDE BLD-SCNC: 102 MMOL/L (ref 99–110)
CO2: 27 MMOL/L (ref 21–32)
CREAT SERPL-MCNC: <0.5 MG/DL (ref 0.8–1.3)
EOSINOPHILS ABSOLUTE: 0.2 K/UL (ref 0–0.6)
EOSINOPHILS RELATIVE PERCENT: 3.4 %
GFR AFRICAN AMERICAN: >60
GFR NON-AFRICAN AMERICAN: >60
GLOBULIN: 2.7 G/DL
GLUCOSE BLD-MCNC: 151 MG/DL (ref 70–99)
HCT VFR BLD CALC: 37.5 % (ref 40.5–52.5)
HEMOGLOBIN: 12.3 G/DL (ref 13.5–17.5)
LYMPHOCYTES ABSOLUTE: 1.7 K/UL (ref 1–5.1)
LYMPHOCYTES RELATIVE PERCENT: 31.9 %
MCH RBC QN AUTO: 27.9 PG (ref 26–34)
MCHC RBC AUTO-ENTMCNC: 32.7 G/DL (ref 31–36)
MCV RBC AUTO: 85.3 FL (ref 80–100)
MONOCYTES ABSOLUTE: 0.4 K/UL (ref 0–1.3)
MONOCYTES RELATIVE PERCENT: 7.7 %
NEUTROPHILS ABSOLUTE: 3 K/UL (ref 1.7–7.7)
NEUTROPHILS RELATIVE PERCENT: 56.1 %
PDW BLD-RTO: 15.4 % (ref 12.4–15.4)
PLATELET # BLD: 289 K/UL (ref 135–450)
PMV BLD AUTO: 8 FL (ref 5–10.5)
POTASSIUM SERPL-SCNC: 4.3 MMOL/L (ref 3.5–5.1)
RBC # BLD: 4.39 M/UL (ref 4.2–5.9)
SODIUM BLD-SCNC: 140 MMOL/L (ref 136–145)
TOTAL PROTEIN: 7.1 G/DL (ref 6.4–8.2)
WBC # BLD: 5.4 K/UL (ref 4–11)

## 2021-06-07 PROCEDURE — 99024 POSTOP FOLLOW-UP VISIT: CPT | Performed by: THORACIC SURGERY (CARDIOTHORACIC VASCULAR SURGERY)

## 2021-06-07 PROCEDURE — 85025 COMPLETE CBC W/AUTO DIFF WBC: CPT

## 2021-06-07 PROCEDURE — 36415 COLL VENOUS BLD VENIPUNCTURE: CPT

## 2021-06-07 PROCEDURE — 86140 C-REACTIVE PROTEIN: CPT

## 2021-06-07 PROCEDURE — 80053 COMPREHEN METABOLIC PANEL: CPT

## 2021-06-07 RX ORDER — HYDROCODONE BITARTRATE AND ACETAMINOPHEN 7.5; 325 MG/1; MG/1
1 TABLET ORAL EVERY 6 HOURS PRN
COMMUNITY
End: 2021-06-10 | Stop reason: SDUPTHER

## 2021-06-07 RX ORDER — ACETAMINOPHEN 500 MG
500 TABLET ORAL EVERY 6 HOURS PRN
COMMUNITY

## 2021-06-07 NOTE — PROGRESS NOTES
[facility]  Clinic Note     6/7/2021 11:55 AM  Surgeon:  Heri Whitmore     S/P :  5th post op debridement of sternal wound, wound vac placement on 4/26/21 with NEGRO. Back to OR 4/28- wound vac change & sternal wire removal w/ MOM. Wound vac removed by DCB on 6/1/21. Wet to dry since. Chief complaint : wound check  Subjective:  Mr. Hedrick Pitch is c/o occasional 8/25 pain with certain movements, otherwise only mild 1-2/10 discomfort. Wound vac was removed 6/1/21 by DCB. Patient has been applying wet to dry dressings at home since wound vac removal. Mild redness on sternal wound bed, serosanguinous drainage. Vital Signs: /84 (Site: Left Upper Arm, Position: Sitting)   Pulse 90   Temp 97.6 °F (36.4 °C) (Temporal)   Ht 5' 10\" (1.778 m)   Wt 226 lb (102.5 kg)   SpO2 98%   BMI 32.43 kg/m²      I/O:  No intake or output data in the 24 hours ending 06/07/21 1155    Exam:   Cardiovascular: S1 plus S2 +0 no additional sound  Pulmonary: Bilateral clear no additional sound    Lower extremity: Dry and clean wound VAC was changed in the clinic decently well with good granulation tissue   Incision: Dry and clean    Labs:   CBC: No results for input(s): WBC, HGB, HCT, MCV, PLT in the last 72 hours. BMP: No results for input(s): NA, K, CL, CO2, PHOS, BUN, CREATININE in the last 72 hours. Invalid input(s): CA  PT/INR: No results for input(s): PROTIME, INR in the last 72 hours. APTT: No results for input(s): APTT in the last 72 hours.     Scheduled Meds:     Patient Active Problem List   Diagnosis    Type II or unspecified type diabetes mellitus without mention of complication, not stated as uncontrolled    Diabetes mellitus    History of ETOH abuse    Patient overweight    Patient overweight    Dietary noncompliance    Alcohol abuse    Flank pain    Prostatism    Low serum testosterone level    Arm pain, left    Shoulder pain    Precordial pain    Lung nodule    Acute angina (HCC)  Abnormal stress test    [facility] coronary syndrome ([facility]    Coronary artery disease involving native coronary artery of native heart with unstable angina pectoris (HCC)    Other chest pain    S/P drug eluting coronary stent placement    Essential hypertension    Obesity    Abnormal chest x-ray    Angina effort    Skin lesion    Angina at rest [facility])    Hyperlipidemia    Angina pectoris ([facility]    [facility]STEMI (non-ST elevated [facility] infarction) [facility]    Controlled type 2 diabetes mellitus without complication, without long-term current use of insulin (HCC)    ASHD (arteriosclerotic heart disease)    Mass of right foot    Overweight    History of angina pectoris    Reactive airway disease    Bronchitis    Reactive airway disease with wheezing, moderate persistent, with acute exacerbation    Need for prophylactic vaccination and inoculation against varicella    Need for prophylactic vaccination against diphtheria-tetanus-pertussis (DTP)    Chronic right-sided thoracic back pain    CAD in native artery    S/P PTCA (percutaneous transluminal coronary angioplasty)    Chest pain    Ischemic cardiomyopathy    GURDEEP (obstructive sleep apnea)    Cellulitis    Sepsis due to methicillin susceptible Staphylococcus aureus (HCC)    Bacteremia due to methicillin susceptible Staphylococcus aureus (MSSA)    Disruption or dehiscence of closure of sternum or sternotomy    Elevated sed rate    Type 2 diabetes mellitus with obesity (HCC)       Assessment/Plan:   1.  I think patients will come off wound VAC soon follow-up in 2 weeks    Devan Spaulding MD FACS

## 2021-06-08 ENCOUNTER — TELEPHONE (OUTPATIENT)
Dept: INFECTIOUS DISEASES | Age: 61
End: 2021-06-08

## 2021-06-08 LAB — C-REACTIVE PROTEIN: 21.4 MG/L (ref 0–5.1)

## 2021-06-08 NOTE — TELEPHONE ENCOUNTER
Charolette Heimlich called back, states pt is having some chest pain started 2 days ago. It has mostly resolved by now, he did reach down and may have injured himself.

## 2021-06-08 NOTE — TELEPHONE ENCOUNTER
Wife (Alia Dominguez) left voicemail asking to speak to you.  CRP was 21.4     Velasquez Aguirre [phone]

## 2021-06-09 NOTE — TELEPHONE ENCOUNTER
Chen at [facility] notified that patient can finish his IV antibiotics and have his picc line pulled. Spoke with Sharyn Pace, let her know that patient can end antibiotics and nurse call pull his line.

## 2021-06-09 NOTE — TELEPHONE ENCOUNTER
Called and spoke with Arlin Orta and Colletta Copping  He is concerned about increased CRP     Did a little more exertion this week, \"stooping over,\" increased pain rib cage  No point tenderness now, took a hydrocodone this morning   No fever, feels, well     Wound healing well, dry dressing in place     We discussed extending the abx and repeat labs VS stop and monitor expectantly     Will DC abx (dose tonight and in AM) and have PICC removed tomorrow  Call with any concerns   All questions addressed

## 2021-06-10 ENCOUNTER — OFFICE VISIT (OUTPATIENT)
Dept: FAMILY MEDICINE CLINIC | Age: 61
End: 2021-06-10
Payer: COMMERCIAL

## 2021-06-10 VITALS
TEMPERATURE: 97.5 F | HEART RATE: 85 BPM | BODY MASS INDEX: 32.5 KG/M2 | OXYGEN SATURATION: 98 % | DIASTOLIC BLOOD PRESSURE: 93 MMHG | SYSTOLIC BLOOD PRESSURE: 146 MMHG | HEIGHT: 70 IN | WEIGHT: 227 LBS

## 2021-06-10 DIAGNOSIS — I10 ESSENTIAL HYPERTENSION: ICD-10-CM

## 2021-06-10 DIAGNOSIS — E11.9 TYPE 2 DIABETES MELLITUS WITHOUT COMPLICATION, WITHOUT LONG-TERM CURRENT USE OF INSULIN (HCC): ICD-10-CM

## 2021-06-10 DIAGNOSIS — E78.5 HYPERLIPIDEMIA, UNSPECIFIED HYPERLIPIDEMIA TYPE: ICD-10-CM

## 2021-06-10 DIAGNOSIS — Z95.1 STATUS POST AORTO-CORONARY ARTERY BYPASS GRAFT: ICD-10-CM

## 2021-06-10 DIAGNOSIS — R07.89 CHEST WALL PAIN FOLLOWING SURGERY: Primary | ICD-10-CM

## 2021-06-10 DIAGNOSIS — Z86.19 HISTORY OF STAPH INFECTION: ICD-10-CM

## 2021-06-10 DIAGNOSIS — G89.18 CHEST WALL PAIN FOLLOWING SURGERY: Primary | ICD-10-CM

## 2021-06-10 PROCEDURE — 99214 OFFICE O/P EST MOD 30 MIN: CPT | Performed by: FAMILY MEDICINE

## 2021-06-10 PROCEDURE — 3052F HG A1C>EQUAL 8.0%<EQUAL 9.0%: CPT | Performed by: FAMILY MEDICINE

## 2021-06-10 RX ORDER — HYDROCODONE BITARTRATE AND ACETAMINOPHEN 7.5; 325 MG/1; MG/1
1 TABLET ORAL EVERY 6 HOURS PRN
Qty: 28 TABLET | Refills: 0 | Status: SHIPPED | OUTPATIENT
Start: 2021-06-10 | End: 2021-07-10

## 2021-06-10 ASSESSMENT — ENCOUNTER SYMPTOMS
WHEEZING: 0
CHOKING: 0
COUGH: 0
CHEST TIGHTNESS: 0
STRIDOR: 0
SHORTNESS OF BREATH: 0
ABDOMINAL PAIN: 0
BACK PAIN: 0

## 2021-06-22 ENCOUNTER — OFFICE VISIT (OUTPATIENT)
Dept: PULMONOLOGY | Age: 61
End: 2021-06-22
Payer: COMMERCIAL

## 2021-06-22 VITALS
HEART RATE: 86 BPM | TEMPERATURE: 94.5 F | BODY MASS INDEX: 33.21 KG/M2 | HEIGHT: 70 IN | WEIGHT: 232 LBS | DIASTOLIC BLOOD PRESSURE: 90 MMHG | SYSTOLIC BLOOD PRESSURE: 124 MMHG | OXYGEN SATURATION: 98 %

## 2021-06-22 DIAGNOSIS — J45.909 UNCOMPLICATED ASTHMA, UNSPECIFIED ASTHMA SEVERITY, UNSPECIFIED WHETHER PERSISTENT: Primary | ICD-10-CM

## 2021-06-22 PROCEDURE — 99213 OFFICE O/P EST LOW 20 MIN: CPT | Performed by: INTERNAL MEDICINE

## 2021-06-22 NOTE — PATIENT INSTRUCTIONS
Advair once daily is fine as long as doing well, if continues to do really well through August, then can drop Advair all together. If still doing well, stay off advair. Albuterol is rescue inhaler and can be used anytime for shortness of breath or wheezing. If you need albuterol more than 2 times per week, it means you need to back on a medicine like Advair. If things go well, can see Esther Deluna as needed. If having problems, call and scheduld appt.

## 2021-06-22 NOTE — PROGRESS NOTES
suspected asthma. Very steroid responsive.  Now markedly better & titrating meds to off  CAD s/p PCI & CABG 2 vessel on 2/19/21 - c/b sternal wound infection, healing now but with persistent pain     Plan:   Advair 250/50 daily for now without exception, albuterol PRN   Albuterol neb/MDI PRN; use prior to exposure to cold/dry air

## 2021-07-12 ENCOUNTER — OFFICE VISIT (OUTPATIENT)
Dept: FAMILY MEDICINE CLINIC | Age: 61
End: 2021-07-12
Payer: COMMERCIAL

## 2021-07-12 VITALS
WEIGHT: 231 LBS | HEIGHT: 70 IN | TEMPERATURE: 97.2 F | OXYGEN SATURATION: 99 % | BODY MASS INDEX: 33.07 KG/M2 | DIASTOLIC BLOOD PRESSURE: 73 MMHG | HEART RATE: 77 BPM | SYSTOLIC BLOOD PRESSURE: 138 MMHG

## 2021-07-12 DIAGNOSIS — E11.9 TYPE 2 DIABETES MELLITUS WITHOUT COMPLICATION, WITHOUT LONG-TERM CURRENT USE OF INSULIN (HCC): Primary | ICD-10-CM

## 2021-07-12 DIAGNOSIS — Z95.1 STATUS POST AORTO-CORONARY ARTERY BYPASS GRAFT: ICD-10-CM

## 2021-07-12 DIAGNOSIS — G63 POLYNEUROPATHY ASSOCIATED WITH UNDERLYING DISEASE (HCC): ICD-10-CM

## 2021-07-12 DIAGNOSIS — N40.2 PROSTATE NODULE: ICD-10-CM

## 2021-07-12 LAB — HBA1C MFR BLD: 7 %

## 2021-07-12 PROCEDURE — 83036 HEMOGLOBIN GLYCOSYLATED A1C: CPT | Performed by: FAMILY MEDICINE

## 2021-07-12 PROCEDURE — 99214 OFFICE O/P EST MOD 30 MIN: CPT | Performed by: FAMILY MEDICINE

## 2021-07-12 PROCEDURE — 3051F HG A1C>EQUAL 7.0%<8.0%: CPT | Performed by: FAMILY MEDICINE

## 2021-07-12 ASSESSMENT — ENCOUNTER SYMPTOMS
CHOKING: 0
ABDOMINAL PAIN: 0
BACK PAIN: 0
SHORTNESS OF BREATH: 0
COUGH: 0
STRIDOR: 0
WHEEZING: 0
CHEST TIGHTNESS: 0

## 2021-07-12 NOTE — PROGRESS NOTES
Subjective:      Patient ID: Denisse Crisostomo is a 61 y.o. male. ARMINDA EDWARDS is here for follow-up on type 2 diabetes whose control is good with a hemoglobin A1c of 7. He recently had a coronary bypass surgery and his wound has finally completely healed. He has a history of a prostate nodule and we discussed the next step regarding biopsy or surveillance. His diabetic foot exam is abnormal in that he has diminished sensation in both feet right worse than left. Review of Systems   Constitutional: Negative for activity change, appetite change, chills, diaphoresis, fatigue, fever and unexpected weight change. Respiratory: Negative for cough, choking, chest tightness, shortness of breath, wheezing and stridor. Cardiovascular: Negative for chest pain, palpitations and leg swelling. Gastrointestinal: Negative for abdominal pain. Genitourinary: Negative for difficulty urinating. Musculoskeletal: Negative for arthralgias and back pain. Neurological: Negative for dizziness. All other systems reviewed and are negative. Objective:   Physical Exam  Vitals and nursing note reviewed. Constitutional:       Appearance: He is well-developed. HENT:      Head: Normocephalic and atraumatic. Right Ear: External ear normal.      Left Ear: External ear normal.      Nose: Nose normal.   Eyes:      Conjunctiva/sclera: Conjunctivae normal.      Pupils: Pupils are equal, round, and reactive to light. Neck:      Thyroid: No thyromegaly. Vascular: No JVD. Trachea: No tracheal deviation. Cardiovascular:      Rate and Rhythm: Normal rate and regular rhythm. Heart sounds: Normal heart sounds. No murmur heard. No friction rub. No gallop. Pulmonary:      Effort: Pulmonary effort is normal. No respiratory distress. Breath sounds: Normal breath sounds. No stridor. No wheezing or rales. Chest:      Chest wall: No tenderness.    Abdominal:      General: Bowel sounds are normal. There is no distension. Palpations: Abdomen is soft. There is no mass. Tenderness: There is no abdominal tenderness. There is no guarding or rebound. Musculoskeletal:         General: No tenderness. Normal range of motion. Cervical back: Normal range of motion and neck supple. Lymphadenopathy:      Cervical: No cervical adenopathy. Skin:     General: Skin is warm and dry. Coloration: Skin is not pale. Findings: No erythema or rash. Neurological:      Mental Status: He is alert and oriented to person, place, and time. Cranial Nerves: No cranial nerve deficit. Motor: No abnormal muscle tone. Coordination: Coordination normal.      Deep Tendon Reflexes: Reflexes are normal and symmetric. Reflexes normal.         Assessment / Plan:       1. Type 2 diabetes mellitus without complication, without long-term current use of insulin ([facility])-very nicely improved control. Continue low-carb diet Metformin and glipizide    - POCT glycosylated hemoglobin (Hb A1C)  - POCT microalbumin    2. Status post aorto-coronary artery bypass graft-chest wound has finally healed after staphylococcal infection      3. Prostate nodule-discussed management alternatives at length. Continue with Dr. Rachelle Olea      4. Polyneuropathy associated with underlying disease (HCC)-discussed relationship of neuropathy with diabetes.

## 2021-07-14 NOTE — PROGRESS NOTES
[address] [facility]   Cardiovascular Evaluation    PATIENT: Sol Abraham  DATE: 7/15/2021  MRN: [id number]  CSN: [id number]  : 1960      Primary Care Doctor: Elliot Valdez,   Reason for evaluation:   3 Month Follow-Up      Subjective:   History of present illness on initial date of evaluation:   Sol Abraham is a 61 y.o. patient who initially presented for follow up. He has a PMH DM, HTN, CAD, s/p PCI, alcohol abuse, nonsmoker, + FH. Was admitted to hospital in 2016 with [place] and had abnormal stress test. Underwent PCI of OM as well as [id number]-[id number] LAD and was d/c'd/ Returned to hospital and noted to have mild troponin elevation and CP relieved with NTG SL. He underwent LHC on 16 and a PCI-D-1 with SAMUEL was done. Stents in prox-mid LAD, distal LAD, proximal-mid OM-1 were found patent. He was switched from Plavix to Brilinta. Imdur was added at office visit in 2016 due to complaints of exertional chest pain. He was admitted to the hospital in late May 2016 with recurrent chest pain and transient troponin elevation. LHC was showed patent stents. He was started on Ranexa. He underwent 17 rotablade guided PCI of OM1 recurrent instent restenosis. Last OV 20 he presented for follow up. He had an elective LHC on 2020 that demonstrated restenosis of mid LAD. Treated successfully with laser atherectomy and aggressive pre dilatation using cutting balloons. He has not noticed any improvement with the Renexa. He stated he continues to have chest pain. He stated he has been taking 2 SL nitro's daily and this does help. He stated the pain will occur in the AM and when he takes a SL nitro the pain stops. He continued to drink a good amount of bourbon nightly. He was positive for COVID in October. Last OV 21 he presented for follow up s/p LHC. On 12/15/20 LHC performed. Severe and progressive in-stent restenosis of OM1. Heavily fibrotic.   Multiple runs of noncompliant balloon inflations were performed but there was balloon slippage. Procedure aborted due to HTN and severe chest pain. He reported he feels well overall. He denies CP, SOB, dizziness or syncope. He had not needed NTG. He has been trying to control his carb intake. He has stopped alcohol consumption. His BP at home has been controlled but is elevated on exam.   He underwent CABG x2 and left atrial clip on 2/19/2021. Today, he reports doing well. Surgical site is healed well. He was recently diagnosed with prostate nodule. He has family history of prostate cancer.         Patient Active Problem List   Diagnosis    Type II or unspecified type diabetes mellitus without mention of complication, not stated as uncontrolled    Diabetes mellitus    History of ETOH abuse    Patient overweight    Patient overweight    Dietary noncompliance    Alcohol abuse    Flank pain    Prostatism    Low serum testosterone level    Arm pain, left    Shoulder pain    Precordial pain    Lung nodule    Acute angina (HCC)    Abnormal stress test    Acute coronary syndrome ([address]    Coronary artery disease involving native coronary artery of native heart with unstable angina pectoris (HCC)    Other chest pain    S/P drug eluting coronary stent placement    Essential hypertension    Obesity    Abnormal chest x-ray    Angina effort    Skin lesion    Angina at rest [facility])    Hyperlipidemia    Angina pectoris ([address]    NSTEMI (non-ST elevated myocardial infarction) ([address]    Controlled type 2 diabetes mellitus without complication, without long-term current use of insulin (HCC)    ASHD (arteriosclerotic heart disease)    Mass of right foot    Overweight    History of angina pectoris    Reactive airway disease    Bronchitis    Reactive airway disease with wheezing, moderate persistent, with acute exacerbation    Need for prophylactic vaccination and inoculation against varicella    Need for prophylactic vaccination against diphtheria-tetanus-pertussis (DTP)    Chronic right-sided thoracic back pain    CAD in native artery    S/P PTCA (percutaneous transluminal coronary angioplasty)    Chest pain    Ischemic cardiomyopathy    GURDEEP (obstructive sleep apnea)    Cellulitis    Sepsis due to methicillin susceptible Staphylococcus aureus ([facility]    Bacteremia due to methicillin susceptible Staphylococcus aureus (MSSA)    Disruption or dehiscence of closure of sternum or sternotomy    Elevated sed rate    Type 2 diabetes mellitus with obesity ([facility]         Past Medical History:   has a past medical history of Alcohol abuse, Coronary artery disease, Diabetic neuropathy associated with type 2 diabetes mellitus ([facility] [address] [facility] Hyperlipidemia, Hypertension, Non morbid obesity, GURDEEP (obstructive sleep apnea), Reactive airway disease with wheezing, moderate persistent, with acute exacerbation, and Type II or unspecified type diabetes mellitus without mention of complication, not stated as uncontrolled. Surgical History:   has a past surgical history that includes Vein Surgery; Coronary angioplasty with stent (1/14/2016 9/21/2020); Coronary artery bypass graft (02/19/2021); Coronary artery bypass graft (N/A, 2/19/2021); Bypass Graft (02/19/2021); Leg Surgery (N/A, 4/26/2021); and Hardware Removal (N/A, 4/28/2021). Social History:   reports that he has never smoked. He has never used smokeless tobacco. He reports previous alcohol use of about 1.0 standard drinks of alcohol per week. He reports that he does not use drugs. Family History:  No evidence for sudden cardiac death or premature CAD    Home Medications:  Reviewed and are listed in nursing record.  and/or listed below  Current Outpatient Medications   Medication Sig Dispense Refill    metFORMIN (GLUCOPHAGE) 1000 MG tablet TAKE ONE TABLET BY MOUTH TWICE A DAY 60 tablet 1    glipiZIDE (GLUCOTROL) 10 MG tablet TAKE ONE TABLET BY MOUTH EVERY MORNING FOR DIABETES 30 tablet 1    clopidogrel (PLAVIX) 75 MG tablet TAKE ONE TABLET BY MOUTH DAILY 90 tablet 3    atorvastatin (LIPITOR) 40 MG tablet TAKE ONE TABLET BY MOUTH EVERY EVENING 90 tablet 3    metoprolol tartrate (LOPRESSOR) 25 MG tablet TAKE ONE TABLET BY MOUTH TWICE A DAY 60 tablet 5    fluticasone-salmeterol (ADVAIR DISKUS) 250-50 MCG/DOSE AEPB Inhale 1 puff into the lungs every 12 hours 60 each 3    aspirin 81 MG chewable tablet CHEW ONE TABLET BY MOUTH DAILY 30 tablet 1    acetaminophen (TYLENOL) 500 MG tablet Take 500 mg by mouth every 8 hours (Patient not taking: Reported on 7/15/2021)      tamsulosin (FLOMAX) 0.4 MG capsule Take 1 capsule by mouth daily (Patient not taking: Reported on 7/15/2021) 30 capsule 1    famotidine (PEPCID) 20 MG tablet Take 1 tablet by mouth daily (Patient not taking: Reported on 7/15/2021) 30 tablet 0     No current facility-administered medications for this visit. Allergies:  Patient has no known allergies. Review of Systems:   A 14 point review of symptoms completed. Pertinent positives identified in the HPI, all other review of symptoms negative as below. Objective:   PHYSICAL EXAM:    Vitals:    07/15/21 0920   BP: 137/77   Pulse: 77   SpO2: 99%       Wt Readings from Last 3 Encounters:   07/15/21 233 lb 3.2 oz (105.8 kg)   07/12/21 231 lb (104.8 kg)   06/22/21 232 lb (105.2 kg)         General Appearance:  Alert, cooperative, no distress, appears stated age   Head:  Normocephalic, atraumatic   Eyes:  PERRL, conjunctiva/corneas clear   Nose: Nares normal, no drainage or sinus tenderness   Throat: Lips, mucosa, and tongue normal   Neck: Supple, symmetrical, trachea midline, NL thyroid no carotid bruit or JVD   Lungs:   CTAB, respirations unlabored   Chest Wall:  No tenderness or deformity. Well-healed surgical scar.  Appears to be an embedded suture in the inferior sternal wound site   Heart:  Regular rhythm and normal rate; S1, S2 are normal;   no murmur noted; no rub or gallop   Abdomen:   Soft, non-tender, +BS x 4, no masses, no organomegaly   Extremities: Extremities normal, atraumatic, no cyanosis or edema   Pulses: 2+ and symmetric   Skin: Skin color, texture, turgor normal, no rashes or lesions   Pysch: Normal mood and affect   Neurologic: Normal gross motor and sensory exam.         LABS   CBC:      Lab Results   Component Value Date    WBC 5.4 2021    RBC 4.39 2021    HGB 12.3 2021    HCT 37.5 2021    MCV 85.3 2021    RDW 15.4 2021     2021     CMP:  Lab Results   Component Value Date     2021    K 4.3 2021    K 4.5 2021     2021    CO2 27 2021    BUN 12 2021    CREATININE <0.5 2021    GFRAA >60 2021    AGRATIO 1.6 2021    LABGLOM >60 2021    GLUCOSE 151 2021    PROT 7.1 2021    CALCIUM 10.0 2021    BILITOT 0.3 2021    ALKPHOS 106 2021    AST 16 2021    ALT <5 2021     PT/INR:   No results found for: PTINR  Liver:  No components found for: CHLPL  Lab Results   Component Value Date    ALT <5 (L) 2021    AST 16 2021    ALKPHOS 106 2021    BILITOT 0.3 2021     Lab Results   Component Value Date    LABA1C 7.0 2021     Lipids:         Lab Results   Component Value Date    TRIG 97 2021    TRIG 97 2021    TRIG 96 2021            Lab Results   Component Value Date    HDL 53 2021    HDL 38 (L) 2021    HDL 44 2021            Lab Results   Component Value Date    LDLCALC 61 2021    LDLCALC 47 2021    LDLCALC 43 2021            Lab Results   Component Value Date    LABVLDL 19 2021    LABVLDL 19 2021    LABVLDL 19 2021         CARDIAC DATA   EK/15/2020 NSR    ECHO 3/3/2020   Technically difficult examination due to body habitus. Definity® used for   myocardial border enhancement.    Normal left ventricle size and systolic function with a visually estimated   ejection fraction of 55%. Mild concentric left ventricular hypertrophy. No regional wall motion abnormalities are seen. Grade I diastolic dysfunction with normal LV filling pressures. The right atrium is mildly enlarged. STRESS TEST 12/2/2020  Summary Normal LVEF 59%  Normal wall motion  Diaphragmatic attenuation artifact  Apical scar with small amount of inferoapical gifty-infarct ischemia   Overall, this would be considered an abnormal, intermediate to high risk,  study     STRESS TEST: 1/13/2016   1. Inferolateral basilar reversible stress defect compatible with stress-induced ischemia. 2. Ejection fraction of 56%   3. No focal wall motion abnormality. CARDIAC CATH  LEFT HEART CATH: 12/15/20  Artery Findings/Result   LM  luminal regularities   LAD  patent stent in proximal LAD with no significant restenosis. ,  GUERLINE-3 flow  Diagonal 1. Patent stent with normal significant restenosis   Cx  luminal irregularities  OM1-proximal 70%. Proximal stent with 50% restenosis. Distal stent edge with 90% restenosis  OM2-proximal 40%         RCA  dominant. Mid 30%   LVEDP  16   LVG  55%. Normal wall motion  No aortic stenosis, 1+ mitral regurgitation      Results of intervention      Lesion 1: OM1  Multiple noncompliant balloons used up to 2.75 mm. Angio sculpt also used. In addition to several runs of laser atherectomy. Pre:   90% stenosis, GUERLINE 2 flow  Post: 40% stenosis, GUERLINE 3 flow  Assessment/Plan  1. Severe and progressive in-stent restenosis of OM1. Heavily fibrotic. Multiple runs of noncompliant balloon inflations were performed but there was balloon slippage. Angioscope and multiple runs of laser atherectomy was performed. Unfortunately patient was no longer able to tolerate the procedure was severe chest pain and hypertension therefore we aborted the procedure. Residual stenosis was 40% of the distal stent edge but is GUERLINE-3 flow. descending with a fractional flow  reserve of 0.79 (significant), status post 2.75 x 28-mm XIENCE  Alpine drug-eluting stent postdilated with a 3.5-mm noncompliant balloon. 2. A 60% distal left anterior descending lesion with a fractional flow   reserve of 0.5 (significant), status post 2.25 x 23-mm XIENCE Alpine   drug-eluting stent postdilated with 2.5-mm noncompliant balloon. 3. Patent stent in proximal to mid 1st obtuse marginal branch. CATH: 01/14/2016  1. A 90% large first obtuse marginal branch status post 2.25 x 16 mm Promus  Premier drug-eluting stent. 2. An 80% diffuse mid and 60% distal left anterior descending. 3. An 80% proximal medium caliber first diagonal branch. 4. Normal left ventricular systolic function with an ejection fraction of  55%. 5. Elevated left ventricular end diastolic pressure 19 mmHg. 2/19/21-Dr. Allen Rahman  PREOPERATIVE DIAGNOSES:  1.  Status post balloon angioplasty of the left circumflex on this  admission. 2.  Non-STEMI. 3.  Status post multiple PCI with stents in LAD and also left  circumflex. 4.  Hypertension. 5.  Hyperlipidemia. 6.  Obesity. OPERATIONS PERFORMED:  1. Coronary artery bypass grafting x2.  2.  Total cardiopulmonary bypass. 3.  Endoscopic vein harvesting of right lower extremity. 4.  Reverse saphenous vein grafting to obtuse marginal artery. 5.  Left internal mammary artery to left anterior descending artery. 6.  Left atrial appendage clip, 40.  7.  Bilateral intercostal nerve block. 8.  Independent interpretation of transesophageal echocardiogram.    Assessment and Plan   Brionna Rosario is a 61 y.o. male who presents today for the following problems:      1. CAD   - 2/19/2021.  Status post CABG (LIMA to LAD, SVG to OM)    -Complicated by retained suture and sternal wound infection   - 12/15/2020 POBA/Laser to OM1 (40% residual) case aborted due to intollerance    -9/21/2020 Laser/cutter and PCI to mid LAD   - 9/2017 Rotablade and PCI to OM1   - 8/2017 PCI to m/d LAD, diag 1  2. HTN: better  3. HLD: controlled  4. HX of likely COVID +: Patient has an excellent story for COVID symptomatology but unfortunately testing was too late and was negative  5. EtOH abuse: Has quit!    -Previous several bourbon drinks a night  6. Retained surgical suture: resolved  7. Preop possible prostate biopsy    MD Plan:  1. Patient is doing well with no cardiac symptoms. He is now healed from his sternal wound infection status post multiple debridements and wound VAC placements.   -I will send a message to CT surgery to ask whether he is off sternal precautions and if able to go swimming  2. Continue alcohol cessation  3. Cont ASA for life, Plavix for 1 yr   - ok to hold Plavix as needed for procedures. 4. Continue Lipitor, Lopressor  5. Patient states he has a prostate nodule and they were thinking January for assessment given his cardiac condition. Patient's current condition is stable his EF is normal having no angina. Patient is okay for surgery from my standpoint at low cardiac risk. I will increase his metoprolol to 50 mg twice daily to improve resting heart rate.   Continue aspirin but okay to hold Plavix 1 week prior to any surgical procedure        Patient Active Problem List   Diagnosis    Type II or unspecified type diabetes mellitus without mention of complication, not stated as uncontrolled    Diabetes mellitus    History of ETOH abuse    Patient overweight    Patient overweight    Dietary noncompliance    Alcohol abuse    Flank pain    Prostatism    Low serum testosterone level    Arm pain, left    Shoulder pain    Precordial pain    Lung nodule    Acute angina (HCC)    Abnormal stress test    Acute coronary syndrome ([facility]    Coronary artery disease involving native coronary artery of native heart with unstable angina pectoris (HCC)    Other chest pain    S/P drug eluting coronary stent placement    Essential hypertension   

## 2021-07-14 NOTE — PROGRESS NOTES
runs of noncompliant balloon inflations were performed but there was balloon slippage. Procedure aborted due to HTN and severe chest pain. He reported he feels well overall. He denies CP, SOB, dizziness or syncope. He had not needed NTG. He has been trying to control his carb intake. He has stopped alcohol consumption. His BP at home has been controlled but is elevated on exam.   He underwent CABG x2 and left atrial clip on 2/19/2021.      Today,         Patient Active Problem List   Diagnosis    Type II or unspecified type diabetes mellitus without mention of complication, not stated as uncontrolled    Diabetes mellitus    History of ETOH abuse    Patient overweight    Patient overweight    Dietary noncompliance    Alcohol abuse    Flank pain    Prostatism    Low serum testosterone level    Arm pain, left    Shoulder pain    Precordial pain    Lung nodule    Acute angina (HCC)    Abnormal stress test    Acute coronary syndrome ([address]    Coronary artery disease involving native coronary artery of native heart with unstable angina pectoris (HCC)    Other chest pain    S/P drug eluting coronary stent placement    Essential hypertension    Obesity    Abnormal chest x-ray    Angina effort    Skin lesion    Angina at rest [facility])    Hyperlipidemia    Angina pectoris ([address]    NSTEMI (non-ST elevated myocardial infarction) ([address]    Controlled type 2 diabetes mellitus without complication, without long-term current use of insulin (HCC)    ASHD (arteriosclerotic heart disease)    Mass of right foot    Overweight    History of angina pectoris    Reactive airway disease    Bronchitis    Reactive airway disease with wheezing, moderate persistent, with acute exacerbation    Need for prophylactic vaccination and inoculation against varicella    Need for prophylactic vaccination against diphtheria-tetanus-pertussis (DTP)    Chronic right-sided thoracic back pain    CAD in native artery    S/P PTCA (percutaneous transluminal coronary angioplasty)    Chest pain    Ischemic cardiomyopathy    GURDEEP (obstructive sleep apnea)    Cellulitis    Sepsis due to methicillin susceptible Staphylococcus aureus (HCC)    Bacteremia due to methicillin susceptible Staphylococcus aureus (MSSA)    Disruption or dehiscence of closure of sternum or sternotomy    Elevated sed rate    Type 2 diabetes mellitus with obesity (HCC)         Past Medical History:   has a past medical history of Alcohol abuse, Coronary artery disease, Diabetic neuropathy associated with type 2 diabetes mellitus ([facility] Hyperlipidemia, Hypertension, Non morbid obesity, GURDEEP (obstructive sleep apnea), Reactive airway disease with wheezing, moderate persistent, with acute exacerbation, and Type II or unspecified type diabetes mellitus without mention of complication, not stated as uncontrolled. Surgical History:   has a past surgical history that includes Vein Surgery; Coronary angioplasty with stent (1/14/2016 9/21/2020); Coronary artery bypass graft (02/19/2021); Coronary artery bypass graft (N/A, 2/19/2021); Bypass Graft (02/19/2021); Leg Surgery (N/A, 4/26/2021); and Hardware Removal (N/A, 4/28/2021). Social History:   reports that he has never smoked. He has never used smokeless tobacco. He reports previous alcohol use of about 1.0 standard drinks of alcohol per week. He reports that he does not use drugs. Family History:  No evidence for sudden cardiac death or premature CAD    Home Medications:  Reviewed and are listed in nursing record.  and/or listed below  Current Outpatient Medications   Medication Sig Dispense Refill    acetaminophen (TYLENOL) 500 MG tablet Take 500 mg by mouth every 8 hours      metFORMIN (GLUCOPHAGE) 1000 MG tablet TAKE ONE TABLET BY MOUTH TWICE A DAY 60 tablet 1    glipiZIDE (GLUCOTROL) 10 MG tablet TAKE ONE TABLET BY MOUTH EVERY MORNING FOR DIABETES 30 tablet 1    tamsulosin (FLOMAX) 0.4 MG capsule Take 1 capsule by mouth daily 30 capsule 1    clopidogrel (PLAVIX) 75 MG tablet TAKE ONE TABLET BY MOUTH DAILY 90 tablet 3    atorvastatin (LIPITOR) 40 MG tablet TAKE ONE TABLET BY MOUTH EVERY EVENING 90 tablet 3    famotidine (PEPCID) 20 MG tablet Take 1 tablet by mouth daily (Patient not taking: Reported on 7/12/2021) 30 tablet 0    metoprolol tartrate (LOPRESSOR) 25 MG tablet TAKE ONE TABLET BY MOUTH TWICE A DAY 60 tablet 5    fluticasone-salmeterol (ADVAIR DISKUS) 250-50 MCG/DOSE AEPB Inhale 1 puff into the lungs every 12 hours 60 each 3    aspirin 81 MG chewable tablet CHEW ONE TABLET BY MOUTH DAILY 30 tablet 1     No current facility-administered medications for this visit. Allergies:  Patient has no known allergies. Review of Systems:   A 14 point review of symptoms completed. Pertinent positives identified in the HPI, all other review of symptoms negative as below. Objective:   PHYSICAL EXAM:    Vitals:    04/15/21 1002   BP: (!) 145/84   Pulse: 79   SpO2: 98          Wt Readings from Last 3 Encounters:   07/12/21 231 lb (104.8 kg)   06/22/21 232 lb (105.2 kg)   06/10/21 227 lb (103 kg)         General Appearance:  Alert, cooperative, no distress, appears stated age   Head:  Normocephalic, atraumatic   Eyes:  PERRL, conjunctiva/corneas clear   Nose: Nares normal, no drainage or sinus tenderness   Throat: Lips, mucosa, and tongue normal   Neck: Supple, symmetrical, trachea midline, NL thyroid no carotid bruit or JVD   Lungs:   CTAB, respirations unlabored   Chest Wall:  No tenderness or deformity. Well-healed surgical scar.  Appears to be an embedded suture in the inferior sternal wound site   Heart:  Regular rhythm and normal rate; S1, S2 are normal;   no murmur noted; no rub or gallop   Abdomen:   Soft, non-tender, +BS x 4, no masses, no organomegaly   Extremities: Extremities normal, atraumatic, no cyanosis or edema   Pulses: 2+ and symmetric   Skin: Skin color, texture, turgor normal, no rashes or lesions   Pysch: Normal mood and affect   Neurologic: Normal gross motor and sensory exam.         LABS   CBC:      Lab Results   Component Value Date    WBC 5.4 2021    RBC 4.39 2021    HGB 12.3 2021    HCT 37.5 2021    MCV 85.3 2021    RDW 15.4 2021     2021     CMP:  Lab Results   Component Value Date     2021    K 4.3 2021    K 4.5 2021     2021    CO2 27 2021    BUN 12 2021    CREATININE <0.5 2021    GFRAA >60 2021    AGRATIO 1.6 2021    LABGLOM >60 2021    GLUCOSE 151 2021    PROT 7.1 2021    CALCIUM 10.0 2021    BILITOT 0.3 2021    ALKPHOS 106 2021    AST 16 2021    ALT <5 2021     PT/INR:   No results found for: PTINR  Liver:  No components found for: CHLPL  Lab Results   Component Value Date    ALT <5 (L) 2021    AST 16 2021    ALKPHOS 106 2021    BILITOT 0.3 2021     Lab Results   Component Value Date    LABA1C 7.0 2021     Lipids:         Lab Results   Component Value Date    TRIG 97 2021    TRIG 97 2021    TRIG 96 2021            Lab Results   Component Value Date    HDL 53 2021    HDL 38 (L) 2021    HDL 44 2021            Lab Results   Component Value Date    LDLCALC 61 2021    LDLCALC 47 2021    LDLCALC 43 2021            Lab Results   Component Value Date    LABVLDL 19 2021    LABVLDL 19 2021    LABVLDL 19 2021         CARDIAC DATA   EK/15/2020 NSR    ECHO 3/3/2020   Technically difficult examination due to body habitus. Definity® used for   myocardial border enhancement. Normal left ventricle size and systolic function with a visually estimated   ejection fraction of 55%. Mild concentric left ventricular hypertrophy. No regional wall motion abnormalities are seen.    Grade I diastolic dysfunction with normal LV filling pressures. The right atrium is mildly enlarged. STRESS TEST 12/2/2020  Summary Normal LVEF 59%  Normal wall motion  Diaphragmatic attenuation artifact  Apical scar with small amount of inferoapical gifty-infarct ischemia   Overall, this would be considered an abnormal, intermediate to high risk,  study     STRESS TEST: 1/13/2016   1. Inferolateral basilar reversible stress defect compatible with stress-induced ischemia. 2. Ejection fraction of 56%   3. No focal wall motion abnormality. CARDIAC CATH  LEFT HEART CATH: 12/15/20  Artery Findings/Result   LM  luminal regularities   LAD  patent stent in proximal LAD with no significant restenosis. ,  GUERLINE-3 flow  Diagonal 1. Patent stent with normal significant restenosis   Cx  luminal irregularities  OM1-proximal 70%. Proximal stent with 50% restenosis. Distal stent edge with 90% restenosis  OM2proximal 40%         RCA  dominant. Mid 30%   LVEDP  16   LVG  55%. Normal wall motion  No aortic stenosis, 1+ mitral regurgitation      Results of intervention      Lesion 1: OM1  Multiple noncompliant balloons used up to 2.75 mm. Angio sculpt also used. In addition to several runs of laser atherectomy. Pre:   90% stenosis, GUERLINE 2 flow  Post: 40% stenosis, GUERLINE 3 flow  Assessment/Plan  1. Severe and progressive in-stent restenosis of OM1. Heavily fibrotic. Multiple runs of noncompliant balloon inflations were performed but there was balloon slippage. Angioscope and multiple runs of laser atherectomy was performed. Unfortunately patient was no longer able to tolerate the procedure was severe chest pain and hypertension therefore we aborted the procedure. Residual stenosis was 40% of the distal stent edge but is GUERLINE-3 flow.                -Symptoms return we will consider again laser atherectomy versus rotoblade versus shockwave lithotripsy therapy  2.   Continue aspirin and plavix       LEFT HEART CATH 9/21/2020  PCI of mid LAD  Status post predilation using laser arthrectomy, angioscope and NC balloon dilatation. Stent resolute Wilson 3.5 mm x 15 mm, postdilated to 3.6 mm. Assessment  1. Interval restenosis of mid LAD in-stent 80% lesion, GUERLINE II flow. Treated successfully with laser atherectomy and aggressive predilatation using cutting balloons                           -0% residual, GUERLINE-3 flow. CATH: 9/6/17  Successful PCI of OM-1 recurrent in-stent restenosis with Rotablator-assisted PCI with placement of Xience Alpine drug-eluting stent. Cath 7/11/17  Intervention:  90% prox OM1 in-stent restenosis, s/p PTCA with 2.5-mm NC as well as 2.5-mm Flexitome cutting balloon     Cath 8/18/17  LEFT HEART CATH  PCI of mid OM1 90% restenosis  Ballooned with 2.25 x as NC trek up to 18atm  Angiosculpt 2.5 x 15mm up to 14atm- multiple passes  90% GUERLINE-2 flow--> 15% GUERLINE-3 flow  Assessment  1. Restenosis of the OM1 stent following recent POBA  2. Aggressive cutting balloon and NC balloon but still with residual stenosis and concerned about ability to completely deploy stent. If reoccurs or Cp returns, would suggest laser arthrectomy and PCI. CATH: 01/21/2016  1. A 70% proximal medium-caliber first diagonal branch status post 2.25 x  12-mm Xience Alpine drug-eluting stent. 2.  Patent drug-eluting stent in proximal-mid left anterior descending. 3.  Patent drug-eluting stent in the distal left anterior descending. 4.  Patent drug-eluting stent in the [id number] first obtuse marginal  branch. RECOMMENDATIONS:  Aspirin indefinitely. Brilinta at least for 12 months. Aggressive medical therapy and risk factors modification for coronary disease. CATH: 01/15/2016  IMPRESSION:   1. An 80% diffuse mid left anterior descending with a fractional flow  reserve of 0.79 (significant), status post 2.75 x 28-mm XIENCE  Alpine drug-eluting stent postdilated with a 3.5-mm noncompliant balloon.    2. A 60% distal left anterior descending lesion with a fractional flow   reserve of 0.5 (significant), status post 2.25 x 23-mm XIENCE Alpine   drug-eluting stent postdilated with 2.5-mm noncompliant balloon. 3. Patent stent in proximal to mid 1st obtuse marginal branch. CATH: 01/14/2016  1. A 90% large first obtuse marginal branch status post 2.25 x 16 mm Promus  Premier drug-eluting stent. 2. An 80% diffuse mid and 60% distal left anterior descending. 3. An 80% proximal medium caliber first diagonal branch. 4. Normal left ventricular systolic function with an ejection fraction of  55%. 5. Elevated left ventricular end diastolic pressure 19 mmHg. 2/19/21-Dr. Harish Guaman  PREOPERATIVE DIAGNOSES:  1.  Status post balloon angioplasty of the left circumflex on this  admission. 2.  Non-STEMI. 3.  Status post multiple PCI with stents in LAD and also left  circumflex. 4.  Hypertension. 5.  Hyperlipidemia. 6.  Obesity. OPERATIONS PERFORMED:  1. Coronary artery bypass grafting x2.  2.  Total cardiopulmonary bypass. 3.  Endoscopic vein harvesting of right lower extremity. 4.  Reverse saphenous vein grafting to obtuse marginal artery. 5.  Left internal mammary artery to left anterior descending artery. 6.  Left atrial appendage clip, 40.  7.  Bilateral intercostal nerve block. 8.  Independent interpretation of transesophageal echocardiogram.    Assessment and Plan   Mary George is a 61 y.o. male who presents today for the following problems:      1. CAD   - 2/19/2021. Status post CABG (LIMA to LAD, SVG to OM)   - 12/15/2020 POBA/Laser to OM1 (40% residual) case aborted due to intollerance    -9/21/2020 Laser/cutter and PCI to mid LAD   - 9/2017 Rotablade and PCI to [address]   - 8/2017 PCI to m/d LAD, diag 1  2. HTN: Slightly high on office visits but controlled by home log  3. HLD: controlled  4. HX of likely COVID +: Patient has an excellent story for COVID symptomatology but unfortunately testing was too late and was negative  5. EtOH abuse: Has quit! -Previous several bourbon drinks a night  6. Retained surgical suture    MD Plan:  1. States he is feeling amazing following bypass surgery. No chest pain or pressure  Patient states all chest pain has resolved following angioplasty  2. He has a suture that is in the inferior aspect of his surgical wound. I was unable to pull this out and did not feel comfortable. I asked him to speak to Dr. Tremayne Chin to have this removed especially if he is going down to [place] in about a month, prior to sitting in the ocean or pools or hot tubs. -Watch carefully for any signs or symptoms of infection and call immediately if occur  3. Continue alcohol cessation  4. Cont ASA for life, Plavix for 1 yr   - ok to hold Plavix as needed for procedures. 5. Continue Lipitor, Lopressor  6.  He declines cardiac rehab           Patient Active Problem List   Diagnosis    Type II or unspecified type diabetes mellitus without mention of complication, not stated as uncontrolled    Diabetes mellitus    History of ETOH abuse    Patient overweight    Patient overweight    Dietary noncompliance    Alcohol abuse    Flank pain    Prostatism    Low serum testosterone level    Arm pain, left    Shoulder pain    Precordial pain    Lung nodule    Acute angina (HCC)    Abnormal stress test    Acute coronary syndrome ([address]    Coronary artery disease involving native coronary artery of native heart with unstable angina pectoris (HCC)    Other chest pain    S/P drug eluting coronary stent placement    Essential hypertension    Obesity    Abnormal chest x-ray    Angina effort    Skin lesion    Angina at rest [facility])    Hyperlipidemia    Angina pectoris ([address]    NSTEMI (non-ST elevated myocardial infarction) ([address]    Controlled type 2 diabetes mellitus without complication, without long-term current use of insulin (HCC)    ASHD (arteriosclerotic heart disease)    Mass of right foot    Overweight    History of angina pectoris    Reactive airway disease    Bronchitis    Reactive airway disease with wheezing, moderate persistent, with acute exacerbation    Need for prophylactic vaccination and inoculation against varicella    Need for prophylactic vaccination against diphtheria-tetanus-pertussis (DTP)    Chronic right-sided thoracic back pain    CAD in native artery    S/P PTCA (percutaneous transluminal coronary angioplasty)    Chest pain    Ischemic cardiomyopathy    GURDEEP (obstructive sleep apnea)    Cellulitis    Sepsis due to methicillin susceptible Staphylococcus aureus ([facility]    Bacteremia due to methicillin susceptible Staphylococcus aureus (MSSA)    Disruption or dehiscence of closure of sternum or sternotomy    Elevated sed rate    Type 2 diabetes mellitus with obesity ([facility]       Patient Plan:  1. Takes the body about 6 months to totally heal  2. Keep site clean with soap and water. Give Dr. Gonzalez Morales a call and get in to see him. 3. Continue current course. Continue clopidogrel for a year following procedure. It's okay to hold for a week for procedure.    4. Follow up 3 months      ***    Damon Figueroa MD, [address] [facility] Cardiologist  [address]  [phone] [facility]  [phone] [address] [place] [address]  7/14/2021  11:36 AM

## 2021-07-15 ENCOUNTER — OFFICE VISIT (OUTPATIENT)
Dept: CARDIOLOGY CLINIC | Age: 61
End: 2021-07-15
Payer: COMMERCIAL

## 2021-07-15 VITALS
DIASTOLIC BLOOD PRESSURE: 77 MMHG | HEART RATE: 77 BPM | WEIGHT: 233.2 LBS | OXYGEN SATURATION: 99 % | BODY MASS INDEX: 33.46 KG/M2 | SYSTOLIC BLOOD PRESSURE: 137 MMHG

## 2021-07-15 DIAGNOSIS — F10.10 ALCOHOL ABUSE: ICD-10-CM

## 2021-07-15 DIAGNOSIS — F10.11 HISTORY OF ALCOHOL ABUSE: ICD-10-CM

## 2021-07-15 DIAGNOSIS — I25.110 CORONARY ARTERY DISEASE INVOLVING NATIVE CORONARY ARTERY OF NATIVE HEART WITH UNSTABLE ANGINA PECTORIS (HCC): Primary | ICD-10-CM

## 2021-07-15 DIAGNOSIS — E78.5 HYPERLIPIDEMIA, UNSPECIFIED HYPERLIPIDEMIA TYPE: ICD-10-CM

## 2021-07-15 DIAGNOSIS — I25.5 ISCHEMIC CARDIOMYOPATHY: ICD-10-CM

## 2021-07-15 DIAGNOSIS — U07.1 COVID-19: ICD-10-CM

## 2021-07-15 DIAGNOSIS — I10 ESSENTIAL HYPERTENSION: ICD-10-CM

## 2021-07-15 DIAGNOSIS — Z86.16 HISTORY OF COVID-19: ICD-10-CM

## 2021-07-15 DIAGNOSIS — E78.2 MIXED HYPERLIPIDEMIA: ICD-10-CM

## 2021-07-15 PROCEDURE — 99214 OFFICE O/P EST MOD 30 MIN: CPT | Performed by: INTERNAL MEDICINE

## 2021-07-15 RX ORDER — METOPROLOL TARTRATE 50 MG/1
50 TABLET, FILM COATED ORAL 2 TIMES DAILY
Qty: 180 TABLET | Refills: 3 | Status: SHIPPED | OUTPATIENT
Start: 2021-07-15 | End: 2022-07-26

## 2021-07-15 NOTE — LETTER
[address] [facility]   Cardiovascular Evaluation    PATIENT: Melania Mandujano  DATE: 7/15/2021  MRN: [id number]  CSN: [id number]  : 1960      Primary Care Doctor: Mago Swan DO  Reason for evaluation:   3 Month Follow-Up      Subjective:   History of present illness on initial date of evaluation:   Melania Mandujano is a 61 y.o. patient who initially presented for follow up. He has a PMH DM, HTN, CAD, s/p PCI, alcohol abuse, nonsmoker, + FH. Was admitted to hospital in 2016 with [place] and had abnormal stress test. Underwent PCI of OM as well as [id number] LAD and was d/c'd/ Returned to hospital and noted to have mild troponin elevation and CP relieved with NTG SL. He underwent LHC on 16 and a PCI-D-1 with SAMUEL was done. Stents in prox-mid LAD, distal LAD, proximal-mid OM-1 were found patent. He was switched from Plavix to Brilinta. Imdur was added at office visit in 2016 due to complaints of exertional chest pain. He was admitted to the hospital in late May 2016 with recurrent chest pain and transient troponin elevation. LHC was showed patent stents. He was started on Ranexa. He underwent 17 rotablade guided PCI of OM1 recurrent instent restenosis. Last OV 20 he presented for follow up. He had an elective LHC on 2020 that demonstrated restenosis of mid LAD. Treated successfully with laser atherectomy and aggressive pre dilatation using cutting balloons. He has not noticed any improvement with the Renexa. He stated he continues to have chest pain. He stated he has been taking 2 SL nitro's daily and this does help. He stated the pain will occur in the AM and when he takes a SL nitro the pain stops. He continued to drink a good amount of bourbon nightly. He was positive for COVID in October. Last OV 21 he presented for follow up s/p LHC. On 12/15/20 LHC performed. Severe and progressive in-stent restenosis of OM1. Heavily fibrotic.   Multiple runs of noncompliant balloon inflations were performed but there was balloon slippage. Procedure aborted due to HTN and severe chest pain. He reported he feels well overall. He denies CP, SOB, dizziness or syncope. He had not needed NTG. He has been trying to control his carb intake. He has stopped alcohol consumption. His BP at home has been controlled but is elevated on exam.   He underwent CABG x2 and left atrial clip on 2/19/2021. Today, he reports doing well. Surgical site is healed well. He was recently diagnosed with prostate nodule. He has family history of prostate cancer.         Patient Active Problem List   Diagnosis    Type II or unspecified type diabetes mellitus without mention of complication, not stated as uncontrolled    Diabetes mellitus    History of ETOH abuse    Patient overweight    Patient overweight    Dietary noncompliance    Alcohol abuse    Flank pain    Prostatism    Low serum testosterone level    Arm pain, left    Shoulder pain    Precordial pain    Lung nodule    Acute angina (HCC)    Abnormal stress test    Acute coronary syndrome ([address]    Coronary artery disease involving native coronary artery of native heart with unstable angina pectoris (HCC)    Other chest pain    S/P drug eluting coronary stent placement    Essential hypertension    Obesity    Abnormal chest x-ray    Angina effort    Skin lesion    Angina at rest [facility])    Hyperlipidemia    Angina pectoris ([address]    NSTEMI (non-ST elevated myocardial infarction) ([address]    Controlled type 2 diabetes mellitus without complication, without long-term current use of insulin (HCC)    ASHD (arteriosclerotic heart disease)    Mass of right foot    Overweight    History of angina pectoris    Reactive airway disease    Bronchitis    Reactive airway disease with wheezing, moderate persistent, with acute exacerbation    Need for prophylactic vaccination and inoculation against varicella    Need for prophylactic vaccination against diphtheria-tetanus-pertussis (DTP)    Chronic right-sided thoracic back pain    CAD in native artery    S/P PTCA (percutaneous transluminal coronary angioplasty)    Chest pain    Ischemic cardiomyopathy    GURDEEP (obstructive sleep apnea)    Cellulitis    Sepsis due to methicillin susceptible Staphylococcus aureus ([facility]    Bacteremia due to methicillin susceptible Staphylococcus aureus (MSSA)    Disruption or dehiscence of closure of sternum or sternotomy    Elevated sed rate    Type 2 diabetes mellitus with obesity ([facility]         Past Medical History:   has a past medical history of Alcohol abuse, Coronary artery disease, Diabetic neuropathy associated with type 2 diabetes mellitus ([facility] Hyperlipidemia, Hypertension, Non morbid obesity, GURDEEP (obstructive sleep apnea), Reactive airway disease with wheezing, moderate persistent, with acute exacerbation, and Type II or unspecified type diabetes mellitus without mention of complication, not stated as uncontrolled. Surgical History:   has a past surgical history that includes Vein Surgery; Coronary angioplasty with stent (1/14/2016 9/21/2020); Coronary artery bypass graft (02/19/2021); Coronary artery bypass graft (N/A, 2/19/2021); Bypass Graft (02/19/2021); Leg Surgery (N/A, 4/26/2021); and Hardware Removal (N/A, 4/28/2021). Social History:   reports that he has never smoked. He has never used smokeless tobacco. He reports previous alcohol use of about 1.0 standard drinks of alcohol per week. He reports that he does not use drugs. Family History:  No evidence for sudden cardiac death or premature CAD    Home Medications:  Reviewed and are listed in nursing record.  and/or listed below  Current Outpatient Medications   Medication Sig Dispense Refill    metFORMIN (GLUCOPHAGE) 1000 MG tablet TAKE ONE TABLET BY MOUTH TWICE A DAY 60 tablet 1    glipiZIDE (GLUCOTROL) 10 MG tablet TAKE ONE TABLET BY MOUTH EVERY MORNING FOR DIABETES 30 tablet 1    clopidogrel (PLAVIX) 75 MG tablet TAKE ONE TABLET BY MOUTH DAILY 90 tablet 3    atorvastatin (LIPITOR) 40 MG tablet TAKE ONE TABLET BY MOUTH EVERY EVENING 90 tablet 3    metoprolol tartrate (LOPRESSOR) 25 MG tablet TAKE ONE TABLET BY MOUTH TWICE A DAY 60 tablet 5    fluticasone-salmeterol (ADVAIR DISKUS) 250-50 MCG/DOSE AEPB Inhale 1 puff into the lungs every 12 hours 60 each 3    aspirin 81 MG chewable tablet CHEW ONE TABLET BY MOUTH DAILY 30 tablet 1    acetaminophen (TYLENOL) 500 MG tablet Take 500 mg by mouth every 8 hours (Patient not taking: Reported on 7/15/2021)      tamsulosin (FLOMAX) 0.4 MG capsule Take 1 capsule by mouth daily (Patient not taking: Reported on 7/15/2021) 30 capsule 1    famotidine (PEPCID) 20 MG tablet Take 1 tablet by mouth daily (Patient not taking: Reported on 7/15/2021) 30 tablet 0     No current facility-administered medications for this visit. Allergies:  Patient has no known allergies. Review of Systems:   A 14 point review of symptoms completed. Pertinent positives identified in the HPI, all other review of symptoms negative as below. Objective:   PHYSICAL EXAM:    Vitals:    07/15/21 0920   BP: 137/77   Pulse: 77   SpO2: 99%       Wt Readings from Last 3 Encounters:   07/15/21 233 lb 3.2 oz (105.8 kg)   07/12/21 231 lb (104.8 kg)   06/22/21 232 lb (105.2 kg)         General Appearance:  Alert, cooperative, no distress, appears stated age   Head:  Normocephalic, atraumatic   Eyes:  PERRL, conjunctiva/corneas clear   Nose: Nares normal, no drainage or sinus tenderness   Throat: Lips, mucosa, and tongue normal   Neck: Supple, symmetrical, trachea midline, NL thyroid no carotid bruit or JVD   Lungs:   CTAB, respirations unlabored   Chest Wall:  No tenderness or deformity. Well-healed surgical scar.  Appears to be an embedded suture in the inferior sternal wound site   Heart:  Regular rhythm and normal rate; S1, S2 are normal;   no murmur noted; no rub or gallop   Abdomen:   Soft, non-tender, +BS x 4, no masses, no organomegaly   Extremities: Extremities normal, atraumatic, no cyanosis or edema   Pulses: 2+ and symmetric   Skin: Skin color, texture, turgor normal, no rashes or lesions   Pysch: Normal mood and affect   Neurologic: Normal gross motor and sensory exam.         LABS   CBC:      Lab Results   Component Value Date    WBC 5.4 2021    RBC 4.39 2021    HGB 12.3 2021    HCT 37.5 2021    MCV 85.3 2021    RDW 15.4 2021     2021     CMP:  Lab Results   Component Value Date     2021    K 4.3 2021    K 4.5 2021     2021    CO2 27 2021    BUN 12 2021    CREATININE <0.5 2021    GFRAA >60 2021    AGRATIO 1.6 2021    LABGLOM >60 2021    GLUCOSE 151 2021    PROT 7.1 2021    CALCIUM 10.0 2021    BILITOT 0.3 2021    ALKPHOS 106 2021    AST 16 2021    ALT <5 2021     PT/INR:   No results found for: PTINR  Liver:  No components found for: CHLPL  Lab Results   Component Value Date    ALT <5 (L) 2021    AST 16 2021    ALKPHOS 106 2021    BILITOT 0.3 2021     Lab Results   Component Value Date    LABA1C 7.0 2021     Lipids:         Lab Results   Component Value Date    TRIG 97 2021    TRIG 97 2021    TRIG 96 2021            Lab Results   Component Value Date    HDL 53 2021    HDL 38 (L) 2021    HDL 44 2021            Lab Results   Component Value Date    LDLCALC 61 2021    LDLCALC 47 2021    LDLCALC 43 2021            Lab Results   Component Value Date    LABVLDL 19 2021    LABVLDL 19 2021    LABVLDL 19 2021         CARDIAC DATA   EK/15/2020 NSR    ECHO 3/3/2020   Technically difficult examination due to body habitus. Definity® used for   myocardial border enhancement.    Normal left ventricle size and systolic function with a visually estimated   ejection fraction of 55%. Mild concentric left ventricular hypertrophy. No regional wall motion abnormalities are seen. Grade I diastolic dysfunction with normal LV filling pressures. The right atrium is mildly enlarged. STRESS TEST 12/2/2020  Summary Normal LVEF 59%  Normal wall motion  Diaphragmatic attenuation artifact  Apical scar with small amount of inferoapical gifty-infarct ischemia   Overall, this would be considered an abnormal, intermediate to high risk,  study     STRESS TEST: 1/13/2016   1. Inferolateral basilar reversible stress defect compatible with stress-induced ischemia. 2. Ejection fraction of 56%   3. No focal wall motion abnormality. CARDIAC CATH  LEFT HEART CATH: 12/15/20  Artery Findings/Result   LM  luminal regularities   LAD  patent stent in proximal LAD with no significant restenosis. ,  GUERLINE-3 flow  Diagonal 1. Patent stent with normal significant restenosis   Cx  luminal irregularities  OM1-proximal 70%. Proximal stent with 50% restenosis. Distal stent edge with 90% restenosis  OM2proximal 40%         RCA  dominant. Mid 30%   LVEDP  16   LVG  55%. Normal wall motion  No aortic stenosis, 1+ mitral regurgitation      Results of intervention      Lesion 1: OM1  Multiple noncompliant balloons used up to 2.75 mm. Angio sculpt also used. In addition to several runs of laser atherectomy. Pre:   90% stenosis, GUERLINE 2 flow  Post: 40% stenosis, GUERLINE 3 flow  Assessment/Plan  1. Severe and progressive in-stent restenosis of OM1. Heavily fibrotic. Multiple runs of noncompliant balloon inflations were performed but there was balloon slippage. Angioscope and multiple runs of laser atherectomy was performed. Unfortunately patient was no longer able to tolerate the procedure was severe chest pain and hypertension therefore we aborted the procedure. Residual stenosis was 40% of the distal stent edge but is GUERLINE-3 flow. -Symptoms return we will consider again laser atherectomy versus rotoblade versus shockwave lithotripsy therapy  2. Continue aspirin and plavix       LEFT HEART CATH 9/21/2020  PCI of mid LAD  Status post predilation using laser arthrectomy, angioscope and NC balloon dilatation. Stent resolute [place] 3.5 mm x 15 mm, postdilated to 3.6 mm. Assessment  1. Interval restenosis of mid LAD in-stent 80% lesion, GUERLINE II flow. Treated successfully with laser atherectomy and aggressive predilatation using cutting balloons                           -0% residual, GUERLINE-3 flow. CATH: 9/6/17  Successful PCI of OM-1 recurrent in-stent restenosis with Rotablator-assisted PCI with placement of Xience Alpine drug-eluting stent. Cath 7/11/17  Intervention:  90% prox OM1 in-stent restenosis, s/p PTCA with 2.5-mm NC as well as 2.5-mm Flexitome cutting balloon     Cath 8/18/17  LEFT HEART CATH  PCI of mid OM1 90% restenosis  Ballooned with 2.25 x as NC trek up to 18atm  Angiosculpt 2.5 x 15mm up to 14atm- multiple passes  90% GUERLINE-2 flow--> 15% GUERLINE-3 flow  Assessment  1. Restenosis of the OM1 stent following recent POBA  2. Aggressive cutting balloon and NC balloon but still with residual stenosis and concerned about ability to completely deploy stent. If reoccurs or Cp returns, would suggest laser arthrectomy and PCI. CATH: 01/21/2016  1. A 70% proximal medium-caliber first diagonal branch status post 2.25 x  12-mm Xience Alpine drug-eluting stent. 2.  Patent drug-eluting stent in proximal-mid left anterior descending. 3.  Patent drug-eluting stent in the distal left anterior descending. 4.  Patent drug-eluting stent in the [id number] first obtuse marginal  branch. RECOMMENDATIONS:  Aspirin indefinitely. Brilinta at least for 12 months. Aggressive medical therapy and risk factors modification for coronary disease. CATH: 01/15/2016  IMPRESSION:   1.  An 80% diffuse mid left anterior OM1   - 8/2017 PCI to m/d LAD, diag 1  2. HTN: better  3. HLD: controlled  4. HX of likely COVID +: Patient has an excellent story for COVID symptomatology but unfortunately testing was too late and was negative  5. EtOH abuse: Has quit!    -Previous several bourbon drinks a night  6. Retained surgical suture: resolved  7. Preop possible prostate biopsy    MD Plan:  1. Patient is doing well with no cardiac symptoms. He is now healed from his sternal wound infection status post multiple debridements and wound VAC placements.   -I will send a message to CT surgery to ask whether he is off sternal precautions and if able to go swimming  2. Continue alcohol cessation  3. Cont ASA for life, Plavix for 1 yr   - ok to hold Plavix as needed for procedures. 4. Continue Lipitor, Lopressor  5. Patient states he has a prostate nodule and they were thinking January for assessment given his cardiac condition. Patient's current condition is stable his EF is normal having no angina. Patient is okay for surgery from my standpoint at low cardiac risk. I will increase his metoprolol to 50 mg twice daily to improve resting heart rate.   Continue aspirin but okay to hold Plavix 1 week prior to any surgical procedure        Patient Active Problem List   Diagnosis    Type II or unspecified type diabetes mellitus without mention of complication, not stated as uncontrolled    Diabetes mellitus    History of ETOH abuse    Patient overweight    Patient overweight    Dietary noncompliance    Alcohol abuse    Flank pain    Prostatism    Low serum testosterone level    Arm pain, left    Shoulder pain    Precordial pain    Lung nodule    Acute angina (HCC)    Abnormal stress test    Acute coronary syndrome ([facility]    Coronary artery disease involving native coronary artery of native heart with unstable angina pectoris (HCC)    Other chest pain    S/P drug eluting coronary stent placement    Essential hypertension    Obesity    Abnormal chest x-ray    Angina effort    Skin lesion    Angina at rest [facility])    Hyperlipidemia    Angina pectoris (HCC)    NSTEMI (non-ST elevated myocardial infarction) ([facility]    Controlled type 2 diabetes mellitus without complication, without long-term current use of insulin (HCC)    ASHD (arteriosclerotic heart disease)    Mass of right foot    Overweight    History of angina pectoris    Reactive airway disease    Bronchitis    Reactive airway disease with wheezing, moderate persistent, with acute exacerbation    Need for prophylactic vaccination and inoculation against varicella    Need for prophylactic vaccination against diphtheria-tetanus-pertussis (DTP)    Chronic right-sided thoracic back pain    CAD in native artery    S/P PTCA (percutaneous transluminal coronary angioplasty)    Chest pain    Ischemic cardiomyopathy    GURDEEP (obstructive sleep apnea)    Cellulitis    Sepsis due to methicillin susceptible Staphylococcus aureus (HCC)    Bacteremia due to methicillin susceptible Staphylococcus aureus (MSSA)    Disruption or dehiscence of closure of sternum or sternotomy    Elevated sed rate    Type 2 diabetes mellitus with obesity ([facility]       Patient Plan:  1. Okay to swim  2. Call Dr. Diego Starr as to when you can return to full activities  3. Increase metoprolol to 50 mg twice a day  4. Okay to have prostate procedure and okay to hold plavix 1 week prior but continue aspirin  5. Follow up in 6 months    This note was scribed in the presence of Maribel Clay MD by Omaira Cortez RN.          James Santoyo MD, personally performed the services described in this documentation as scribed by the above signed scribe in my presence, and it is both accurate and complete to the best of our ability and knowledge. I agree with the details independently gathered by my clinical support staff, while the remaining scribed note accurately describes my personal service to the patient. The above RN is working as a scribe for and in the presence of myself . Working as a scribe, the RN may have prepopulated components of this note with my historical intellectual property under my direct supervision. Any additions to this intellectual property were performed at my direction. Furthermore, the content and accuracy of this note have been reviewed by me to the best of my ability.        Lorri Meade MD, [address] [facility] Cardiologist  [place]   [phone] [facility]  [phone] [address] [place] [address]  7/15/2021  9:26 AM

## 2021-07-15 NOTE — LETTER
[facility] Cardiology  [address] [facility] [place] [address] [place] [address]  Phone: [phone]  Fax: [phone]    Mc Goldsmiht MD        July 15, 2021    [facility] 1960 is at low cardiocvascular risk for prostate biopsy. Metoprolol was increased to 50 mg twice a day for low heart rate control. Continue aspirin and metoprolol throughout perioperative period  Okay to hold plavix 1 week prior to procedure.       Sincerely,        Mc Goldsmith MD

## 2021-08-03 ENCOUNTER — TELEPHONE (OUTPATIENT)
Dept: FAMILY MEDICINE CLINIC | Age: 61
End: 2021-08-03

## 2021-08-03 ENCOUNTER — OFFICE VISIT (OUTPATIENT)
Dept: FAMILY MEDICINE CLINIC | Age: 61
End: 2021-08-03
Payer: COMMERCIAL

## 2021-08-03 VITALS
HEART RATE: 67 BPM | WEIGHT: 238 LBS | BODY MASS INDEX: 34.07 KG/M2 | DIASTOLIC BLOOD PRESSURE: 77 MMHG | TEMPERATURE: 96.9 F | HEIGHT: 70 IN | SYSTOLIC BLOOD PRESSURE: 126 MMHG | OXYGEN SATURATION: 97 %

## 2021-08-03 DIAGNOSIS — S29.019A THORACIC MYOFASCIAL STRAIN, INITIAL ENCOUNTER: Primary | ICD-10-CM

## 2021-08-03 DIAGNOSIS — E11.9 TYPE 2 DIABETES MELLITUS WITHOUT COMPLICATION, WITHOUT LONG-TERM CURRENT USE OF INSULIN (HCC): ICD-10-CM

## 2021-08-03 DIAGNOSIS — E66.3 OVERWEIGHT: ICD-10-CM

## 2021-08-03 LAB
BILIRUBIN, POC: NORMAL
BLOOD URINE, POC: NORMAL
CLARITY, POC: NORMAL
COLOR, POC: NORMAL
GLUCOSE URINE, POC: 500
KETONES, POC: NORMAL
LEUKOCYTE EST, POC: NORMAL
NITRITE, POC: NORMAL
PH, POC: 5.5
PROTEIN, POC: NORMAL
SPECIFIC GRAVITY, POC: 1.02
UROBILINOGEN, POC: 1

## 2021-08-03 PROCEDURE — 81002 URINALYSIS NONAUTO W/O SCOPE: CPT | Performed by: FAMILY MEDICINE

## 2021-08-03 PROCEDURE — 99213 OFFICE O/P EST LOW 20 MIN: CPT | Performed by: FAMILY MEDICINE

## 2021-08-03 PROCEDURE — 3051F HG A1C>EQUAL 7.0%<8.0%: CPT | Performed by: FAMILY MEDICINE

## 2021-08-03 RX ORDER — METHYLPREDNISOLONE 4 MG/1
TABLET ORAL
Qty: 1 KIT | Refills: 0 | Status: SHIPPED | OUTPATIENT
Start: 2021-08-03 | End: 2021-08-09

## 2021-08-03 ASSESSMENT — ENCOUNTER SYMPTOMS
ABDOMINAL PAIN: 0
BACK PAIN: 1
CHOKING: 0
COUGH: 0
SHORTNESS OF BREATH: 0
CHEST TIGHTNESS: 0
STRIDOR: 0
WHEEZING: 0

## 2021-08-03 NOTE — TELEPHONE ENCOUNTER
----- Message from Brandy Moore sent at 8/3/2021 11:01 AM EDT -----  Subject: Appointment Request    Reason for Call: Urgent Back Neck Pain    QUESTIONS  Type of Appointment? Established Patient  Reason for appointment request? No appointments available during search  Additional Information for Provider? pt called wanting to schedule an   appointment for back pain and wants to be seen by Dr. Aaron Beaver on either   Thursday or Friday off this week.   ---------------------------------------------------------------------------  --------------  CALL BACK INFO  What is the best way for the office to contact you? OK to leave message on   voicemail  Preferred Call Back Phone Number? [phone]  ---------------------------------------------------------------------------  --------------  SCRIPT ANSWERS  Relationship to Patient? Self  Did you have an injury or trauma within the past 3 days? No  Are you having new problems with your bowel or bladder control? No  Are you having new onset numbness, tingling, or weakness in your arms   and/or legs with this pain? No  Did your pain begin within the past 14 days? Yes  Have you been diagnosed with, awaiting test results for, or told that you   are suspected of having COVID-19 (Coronavirus)? (If patient has tested   negative or was tested as a requirement for work, school, or travel and   not based on symptoms, answer no)? No  Do you currently have flu-like symptoms including fever or chills, cough,   shortness of breath, difficulty breathing, or new loss of taste or smell? No  Have you had close contact with someone with COVID-19 in the last 14 days? No  (Service Expert  click yes below to proceed with [facility] As Usual   Scheduling)?  Yes

## 2021-08-03 NOTE — PROGRESS NOTES
Subjective:      Patient ID: Italo Wadsworth is a 61 y.o. male. HPI Earnest Devyn c/o 5 days of mid back pain. He denies trauma or overuse. He has no bowel or bladder complaints. Review of Systems   Constitutional: Negative for activity change, appetite change, chills, diaphoresis, fatigue, fever and unexpected weight change. Respiratory: Negative for cough, choking, chest tightness, shortness of breath, wheezing and stridor. Cardiovascular: Negative for chest pain, palpitations and leg swelling. Gastrointestinal: Negative for abdominal pain. Genitourinary: Negative for difficulty urinating. Musculoskeletal: Positive for back pain (bilateral thoracic). Negative for arthralgias. Neurological: Negative for dizziness. All other systems reviewed and are negative. Objective:   Physical Exam  Vitals and nursing note reviewed. Constitutional:       Appearance: He is well-developed. HENT:      Head: Normocephalic and atraumatic. Right Ear: External ear normal.      Left Ear: External ear normal.      Nose: Nose normal.   Eyes:      Conjunctiva/sclera: Conjunctivae normal.      Pupils: Pupils are equal, round, and reactive to light. Neck:      Thyroid: No thyromegaly. Vascular: No JVD. Trachea: No tracheal deviation. Cardiovascular:      Rate and Rhythm: Normal rate and regular rhythm. Heart sounds: Normal heart sounds. No murmur heard. No friction rub. No gallop. Pulmonary:      Effort: Pulmonary effort is normal. No respiratory distress. Breath sounds: Normal breath sounds. No stridor. No wheezing or rales. Chest:      Chest wall: No tenderness. Abdominal:      General: Bowel sounds are normal. There is no distension. Palpations: Abdomen is soft. There is no mass. Tenderness: There is no abdominal tenderness. There is no guarding or rebound. Musculoskeletal:         General: No tenderness. Normal range of motion.       Cervical back: Normal range of motion and neck supple. Lymphadenopathy:      Cervical: No cervical adenopathy. Skin:     General: Skin is warm and dry. Coloration: Skin is not pale. Findings: No erythema or rash. Neurological:      Mental Status: He is alert and oriented to person, place, and time. Cranial Nerves: No cranial nerve deficit. Motor: No abnormal muscle tone. Coordination: Coordination normal.      Deep Tendon Reflexes: Reflexes are normal and symmetric. Reflexes normal.     His pain is reproduced and exacerbated when he rotates the trunk. He has a negative straight leg raising test bilaterally    Assessment / Plan:         1. Thoracic myofascial strain, initial encounter-prescribed a Medrol dose pack. Cautioned Awilda Arora that this medication will temporarily elevate his blood glucose      2. Overweight-decrease calories and increase exercise      3.  Type 2 diabetes mellitus without complication, without long-term current use of insulin ([facility])-discussed and caution Awilda Arora about the effect of the methylprednisolone on his glucose levels

## 2021-10-19 ENCOUNTER — OFFICE VISIT (OUTPATIENT)
Dept: FAMILY MEDICINE CLINIC | Age: 61
End: 2021-10-19
Payer: COMMERCIAL

## 2021-10-19 VITALS
WEIGHT: 253 LBS | OXYGEN SATURATION: 98 % | HEIGHT: 70 IN | DIASTOLIC BLOOD PRESSURE: 74 MMHG | TEMPERATURE: 97.9 F | HEART RATE: 81 BPM | SYSTOLIC BLOOD PRESSURE: 125 MMHG | BODY MASS INDEX: 36.22 KG/M2

## 2021-10-19 DIAGNOSIS — E11.9 TYPE 2 DIABETES MELLITUS WITHOUT COMPLICATION, WITHOUT LONG-TERM CURRENT USE OF INSULIN (HCC): ICD-10-CM

## 2021-10-19 DIAGNOSIS — Z01.818 PRE-OP EXAM: Primary | ICD-10-CM

## 2021-10-19 DIAGNOSIS — N40.2 PROSTATE NODULE: ICD-10-CM

## 2021-10-19 LAB — HBA1C MFR BLD: 7.3 %

## 2021-10-19 PROCEDURE — 83036 HEMOGLOBIN GLYCOSYLATED A1C: CPT | Performed by: FAMILY MEDICINE

## 2021-10-19 PROCEDURE — 82044 UR ALBUMIN SEMIQUANTITATIVE: CPT | Performed by: FAMILY MEDICINE

## 2021-10-19 PROCEDURE — 99242 OFF/OP CONSLTJ NEW/EST SF 20: CPT | Performed by: FAMILY MEDICINE

## 2021-10-19 SDOH — ECONOMIC STABILITY: FOOD INSECURITY: WITHIN THE PAST 12 MONTHS, THE FOOD YOU BOUGHT JUST DIDN'T LAST AND YOU DIDN'T HAVE MONEY TO GET MORE.: NEVER TRUE

## 2021-10-19 SDOH — ECONOMIC STABILITY: FOOD INSECURITY: WITHIN THE PAST 12 MONTHS, YOU WORRIED THAT YOUR FOOD WOULD RUN OUT BEFORE YOU GOT MONEY TO BUY MORE.: NEVER TRUE

## 2021-10-19 ASSESSMENT — ENCOUNTER SYMPTOMS
WHEEZING: 0
COUGH: 0
STRIDOR: 0
CHEST TIGHTNESS: 0
CHOKING: 0
BACK PAIN: 0
ABDOMINAL PAIN: 0
SHORTNESS OF BREATH: 0

## 2021-10-19 NOTE — PROGRESS NOTES
No cervical adenopathy. Skin:     General: Skin is warm and dry. Coloration: Skin is not pale. Findings: No erythema or rash. Neurological:      Mental Status: He is alert and oriented to person, place, and time. Cranial Nerves: No cranial nerve deficit. Motor: No abnormal muscle tone. Coordination: Coordination normal.      Deep Tendon Reflexes: Reflexes are normal and symmetric. Reflexes normal.         Assessment / Plan:       1. Type 2 diabetes mellitus without complication, without long-term current use of insulin (HCC)-controlled-HbA1C is 7.3    - POCT glycosylated hemoglobin (Hb A1C)  - POCT microalbumin    2. Pre-op exam-Arturo is cleared for the procedure pending cardiac clearance      3.  Prostate nodule- treatment per

## 2021-10-25 ENCOUNTER — TELEPHONE (OUTPATIENT)
Dept: FAMILY MEDICINE CLINIC | Age: 61
End: 2021-10-25

## 2021-10-25 NOTE — TELEPHONE ENCOUNTER
----- Message from Juan Carlos Juan sent at 10/22/2021  1:07 PM EDT -----  Subject: Message to Provider    QUESTIONS  Information for Provider? patient needs the pre opt results faxed to Kaity Salas   at [phone], and can you call patients wife Jina Cushing back as well   thank you   ---------------------------------------------------------------------------  --------------  CALL BACK INFO  What is the best way for the office to contact you? OK to leave message on   voicemail  Preferred Call Back Phone Number? [phone]  ---------------------------------------------------------------------------  --------------  SCRIPT ANSWERS  Relationship to Patient?  Self

## 2021-11-06 DIAGNOSIS — E11.9 TYPE 2 DIABETES MELLITUS WITHOUT COMPLICATION, WITHOUT LONG-TERM CURRENT USE OF INSULIN (HCC): ICD-10-CM

## 2021-11-16 ENCOUNTER — TELEPHONE (OUTPATIENT)
Dept: FAMILY MEDICINE CLINIC | Age: 61
End: 2021-11-16

## 2021-11-16 NOTE — TELEPHONE ENCOUNTER
Elena Haywood with [facility] asked for PSA result to be faxed to their office. I faxed it to [phone]. Received fax confirmation.

## 2021-12-18 LAB — PATHOLOGY/CYTOLOGY REPORT: NORMAL

## 2021-12-23 ENCOUNTER — TELEPHONE (OUTPATIENT)
Dept: CARDIOLOGY CLINIC | Age: 61
End: 2021-12-23

## 2021-12-23 NOTE — LETTER
[address] Cardiology - [address] [facility] [address] [facility]  Phone: [phone]  Fax: [phone]    Marian Baptiste MD        2021    Joanna Monson [facility] [address]      Dear [facility]:    Patient Joanna Donato  1960 is at acceptable (intermediate) cv risk for planned procedure/surgery. Ok to hold meds as requested, resume antiplt meds within 3-5 days after surgery. Thank you. If you have any questions or concerns, please don't hesitate to call.     Sincerely,        Marian Baptiste MD

## 2021-12-23 NOTE — TELEPHONE ENCOUNTER
[facility] requesting patient to hold plavix 5 days prior to procedure on 12/28/2021. Pt having \"markers\" placed in prostate for radiation. Patient neglected to mention he was on plavix, which is the reason for late request.    Last OV with NORM 07/2021.     TY

## 2021-12-23 NOTE — TELEPHONE ENCOUNTER
Patient is at acceptable (intermediate) cv risk for planned procedure/surgery. Ok to hold meds as requested, resume antiplt meds within 3-5 days after surgery. Thank you.

## 2021-12-23 NOTE — TELEPHONE ENCOUNTER
Luana Perez, Please Advise. Last OV JJP 07/2021 plan states prostate procedure ~ states okay to hold plavix one week and resume on aspirin.

## 2022-01-10 ENCOUNTER — TELEPHONE (OUTPATIENT)
Dept: FAMILY MEDICINE CLINIC | Age: 62
End: 2022-01-10

## 2022-01-10 NOTE — TELEPHONE ENCOUNTER
I talked to [facility] wife Braulio Gunn and told her that I recommended she tell Linda Garcia to go to the urgent care or one of the pharmacies and get another Covid test and if it is positive for her to bring the resulted here and we can sign J up for the COVID-19 monoclonal antibody infusion. She agreed.

## 2022-01-10 NOTE — TELEPHONE ENCOUNTER
Nathen Rosenbaum called for patient. She informed that patient took in home Covid test today and he is positive.

## 2022-01-20 ENCOUNTER — TELEPHONE (OUTPATIENT)
Dept: PULMONOLOGY | Age: 62
End: 2022-01-20

## 2022-01-20 RX ORDER — PREDNISONE 10 MG/1
TABLET ORAL
Qty: 30 TABLET | Refills: 0 | Status: SHIPPED | OUTPATIENT
Start: 2022-01-20 | End: 2022-03-16 | Stop reason: ALTCHOICE

## 2022-01-20 NOTE — TELEPHONE ENCOUNTER
I sent prednisone taper. antibiotics are not indicated.  Monitor oxygen saturations and proceed to ER if they start falling <90%

## 2022-01-20 NOTE — TELEPHONE ENCOUNTER
Spoke with pt and informed of [place], [place] response with verbal understanding. Pt also wanted me to mention that he is undergoing radiation for prostate cancer and is on 4th treatment of Cyberknife.

## 2022-01-20 NOTE — TELEPHONE ENCOUNTER
Pt called stating that he was diagnosed with covid on 1/7/22, but has had a lingering cough. Pt asking for abx and steroid to be sent into pharmacy. Offered VV today, but pt declines. Do you have the following symptoms? Shortness of Breath  yes  Wheezing  yes  Cough  Yes, worse over the last day  Cough Characteristics:  Productive    sometimes  Sputum Color    Unsure, doesn't look  Hemoptysis   no  Consistency of sputum   thick     Fever:    no    Temp:n/a  Chills/Sweats:  no  What other symptoms are you having?:  None noted    How long have you had these symptoms? approx 2 weeks, was diagnosed with covid 1/7/22     Pharmacy: [facility]    Have you been vaccinated for covid? Yes  Have you received a booster vaccine? No          Review medications and allergies: Allergies? No Known Allergies          Currently on Antibiotics? (Drug/Dose/Frequency and how long on?) none        Systemic Steroids? (Drug/Dose/Frequency and how long on?) none      Last sick call taken on n/a. Meds prescribed were n/a, by n/a. Last OV 6/22/21 with Dr. Suraj eDlgado   (pull in last visit note assessment/plan)   Assessment:  · RAD, suspected asthma. Very steroid responsive.  Now markedly better & titrating meds to off  · CAD s/p PCI & CABG 2 vessel on 2/19/21 - c/b sternal wound infection, healing now but with persistent pain      Plan:   · Advair 250/50 daily for now without exception, albuterol PRN   · Albuterol neb/MDI PRN; use prior to exposure to cold/dry air

## 2022-01-24 RX ORDER — GLIPIZIDE 10 MG/1
TABLET ORAL
Qty: 30 TABLET | Refills: 5 | Status: SHIPPED | OUTPATIENT
Start: 2022-01-24 | End: 2022-07-22

## 2022-01-24 NOTE — TELEPHONE ENCOUNTER
Last ov 10/19/2021   Future Appointments   Date Time Provider Abdirahman Craft   1/25/2022  9:00 AM DO CAYLA Rider

## 2022-01-25 ENCOUNTER — OFFICE VISIT (OUTPATIENT)
Dept: FAMILY MEDICINE CLINIC | Age: 62
End: 2022-01-25
Payer: COMMERCIAL

## 2022-01-25 VITALS
DIASTOLIC BLOOD PRESSURE: 80 MMHG | TEMPERATURE: 95.6 F | OXYGEN SATURATION: 97 % | WEIGHT: 256 LBS | BODY MASS INDEX: 36.65 KG/M2 | HEART RATE: 86 BPM | SYSTOLIC BLOOD PRESSURE: 136 MMHG | HEIGHT: 70 IN

## 2022-01-25 DIAGNOSIS — J45.40 MODERATE PERSISTENT REACTIVE AIRWAY DISEASE WITHOUT COMPLICATION: ICD-10-CM

## 2022-01-25 DIAGNOSIS — Z91.199 NONCOMPLIANCE: ICD-10-CM

## 2022-01-25 DIAGNOSIS — E66.3 OVERWEIGHT: ICD-10-CM

## 2022-01-25 DIAGNOSIS — I25.10 ASHD (ARTERIOSCLEROTIC HEART DISEASE): ICD-10-CM

## 2022-01-25 DIAGNOSIS — E11.9 TYPE 2 DIABETES MELLITUS WITHOUT COMPLICATION, WITHOUT LONG-TERM CURRENT USE OF INSULIN (HCC): Primary | ICD-10-CM

## 2022-01-25 LAB — HBA1C MFR BLD: 8.9 %

## 2022-01-25 PROCEDURE — 83036 HEMOGLOBIN GLYCOSYLATED A1C: CPT | Performed by: FAMILY MEDICINE

## 2022-01-25 PROCEDURE — 99214 OFFICE O/P EST MOD 30 MIN: CPT | Performed by: FAMILY MEDICINE

## 2022-01-25 PROCEDURE — 3052F HG A1C>EQUAL 8.0%<EQUAL 9.0%: CPT | Performed by: FAMILY MEDICINE

## 2022-01-25 SDOH — ECONOMIC STABILITY: FOOD INSECURITY: WITHIN THE PAST 12 MONTHS, THE FOOD YOU BOUGHT JUST DIDN'T LAST AND YOU DIDN'T HAVE MONEY TO GET MORE.: NEVER TRUE

## 2022-01-25 SDOH — ECONOMIC STABILITY: FOOD INSECURITY: WITHIN THE PAST 12 MONTHS, YOU WORRIED THAT YOUR FOOD WOULD RUN OUT BEFORE YOU GOT MONEY TO BUY MORE.: NEVER TRUE

## 2022-01-25 ASSESSMENT — ENCOUNTER SYMPTOMS
CHEST TIGHTNESS: 0
COUGH: 0
BACK PAIN: 0
STRIDOR: 0
CHOKING: 0
SHORTNESS OF BREATH: 0
ABDOMINAL PAIN: 0
WHEEZING: 0

## 2022-01-25 ASSESSMENT — SOCIAL DETERMINANTS OF HEALTH (SDOH): HOW HARD IS IT FOR YOU TO PAY FOR THE VERY BASICS LIKE FOOD, HOUSING, MEDICAL CARE, AND HEATING?: NOT VERY HARD

## 2022-01-25 NOTE — PROGRESS NOTES
Subjective:      Patient ID: Michaela Walters is a 64 y.o. male. ARMINDA Alfaro is here for follow-up on type 2 diabetes whose control is terrible. He has been undergoing radiation treatment for prostate cancer, CyberKnife. He has been emotionally upset about this and I think that this has affected his sugar control. We discussed low-carb diet. Due to his pulmonary and cardiac situation exercise is very difficult for him. He is regularly seeing his cardiologist and pulmonologist.    Review of Systems   Constitutional: Negative for activity change, appetite change, chills, diaphoresis, fatigue, fever and unexpected weight change. Respiratory: Negative for cough, choking, chest tightness, shortness of breath, wheezing and stridor. Cardiovascular: Negative for chest pain, palpitations and leg swelling. Gastrointestinal: Negative for abdominal pain. Genitourinary: Negative for difficulty urinating. Musculoskeletal: Negative for arthralgias and back pain. Neurological: Negative for dizziness. All other systems reviewed and are negative. Objective:   Physical Exam  Vitals and nursing note reviewed. Constitutional:       Appearance: He is well-developed. HENT:      Head: Normocephalic and atraumatic. Right Ear: External ear normal.      Left Ear: External ear normal.      Nose: Nose normal.   Eyes:      Conjunctiva/sclera: Conjunctivae normal.      Pupils: Pupils are equal, round, and reactive to light. Neck:      Thyroid: No thyromegaly. Vascular: No JVD. Trachea: No tracheal deviation. Cardiovascular:      Rate and Rhythm: Normal rate and regular rhythm. Heart sounds: Normal heart sounds. No murmur heard. No friction rub. No gallop. Pulmonary:      Effort: Pulmonary effort is normal. No respiratory distress. Breath sounds: Normal breath sounds. No stridor. No wheezing or rales. Chest:      Chest wall: No tenderness.    Abdominal:      General: Bowel sounds are normal.

## 2022-02-03 DIAGNOSIS — E11.9 TYPE 2 DIABETES MELLITUS WITHOUT COMPLICATION, WITHOUT LONG-TERM CURRENT USE OF INSULIN (HCC): ICD-10-CM

## 2022-02-03 NOTE — TELEPHONE ENCOUNTER
LOV 1/25/2022    Future Appointments   Date Time Provider Abdirahman Craft   4/26/2022  9:00  DO CAYLA Oliveira

## 2022-02-28 ENCOUNTER — TELEPHONE (OUTPATIENT)
Dept: FAMILY MEDICINE CLINIC | Age: 62
End: 2022-02-28

## 2022-02-28 DIAGNOSIS — Z12.5 SPECIAL SCREENING FOR MALIGNANT NEOPLASM OF PROSTATE: Primary | ICD-10-CM

## 2022-02-28 NOTE — TELEPHONE ENCOUNTER
----- Message from Era Marques sent at 2/28/2022 12:55 PM EST -----  Subject: Message to Provider    QUESTIONS  Information for Provider? Pt needs to have a PSA order placed for Urology. States needs done in April.   ---------------------------------------------------------------------------  --------------  CALL BACK INFO  What is the best way for the office to contact you? OK to leave message on   [place], OK to respond with electronic message via [employer] portal (only   for patients who have registered [employer] account)  Preferred Call Back Phone Number? [phone]  ---------------------------------------------------------------------------  --------------  SCRIPT ANSWERS  Relationship to Patient? Other  Representative Name? Austin Ramachandran  Is the Representative on the appropriate HIPAA document in Epic?  Yes

## 2022-03-08 RX ORDER — ATORVASTATIN CALCIUM 40 MG/1
TABLET, FILM COATED ORAL
Qty: 30 TABLET | Refills: 1 | Status: SHIPPED | OUTPATIENT
Start: 2022-03-08 | End: 2022-05-05

## 2022-03-08 NOTE — TELEPHONE ENCOUNTER
Future Appointments   Date Time Provider Abdirahman Craft   4/26/2022  9:00  Elyse Valdez, DO CAYLA Orellana - [facility] 1/25/2022

## 2022-03-16 ENCOUNTER — TELEPHONE (OUTPATIENT)
Dept: PULMONOLOGY | Age: 62
End: 2022-03-16

## 2022-03-16 RX ORDER — PREDNISONE 10 MG/1
TABLET ORAL
Qty: 30 TABLET | Refills: 0 | Status: SHIPPED | OUTPATIENT
Start: 2022-03-16 | End: 2022-03-28

## 2022-03-16 NOTE — TELEPHONE ENCOUNTER
Pt spouse called stating that pt started having shortness of breath and cough yesterday. Pt asking for Prednisone. Please advise. Do you have the following symptoms? Shortness of Breath  yes  Wheezing  yes  Cough  yes  Cough Characteristics:  Productive    no  Sputum Color    n/a  Hemoptysis   n/a  Consistency of sputum   n/a     Fever:    no    Temp:n/a  Chills/Sweats:  no  What other symptoms are you having?:  Diarrhea yesterday-possibly related to food he ate yesterday    How long have you had these symptoms? 1 day     Pharmacy: [facility]    Have you been vaccinated for covid? Yes  Have you received a booster vaccine? No          Review medications and allergies: Allergies? No Known Allergies          Currently on Antibiotics? (Drug/Dose/Frequency and how long on?) none        Systemic Steroids? (Drug/Dose/Frequency and how long on?) none      Last sick call taken on 1/20/22. Meds prescribed were Pred taper, by CMT. Last OV 6/22/21 with Dr. Kia Helton   (pull in last visit note assessment/plan)   Assessment:  · RAD, suspected asthma. Very steroid responsive.  Now markedly better & titrating meds to off  · CAD s/p PCI & CABG 2 vessel on 2/19/21 - c/b sternal wound infection, healing now but with persistent pain      Plan:   · Advair 250/50 daily for now without exception, albuterol PRN   · Albuterol neb/MDI PRN; use prior to exposure to cold/dry air

## 2022-03-21 RX ORDER — CLOPIDOGREL BISULFATE 75 MG/1
TABLET ORAL
Qty: 90 TABLET | Refills: 2 | Status: SHIPPED | OUTPATIENT
Start: 2022-03-21 | End: 2022-10-21 | Stop reason: SDUPTHER

## 2022-04-11 ENCOUNTER — HOSPITAL ENCOUNTER (OUTPATIENT)
Age: 62
Discharge: HOME | End: 2022-04-11
Payer: COMMERCIAL

## 2022-04-11 LAB — PROSTATE SPECIFIC ANTIGEN: 0.59 NG/ML (ref 0–4)

## 2022-04-11 PROCEDURE — 84153 ASSAY OF PSA TOTAL: CPT

## 2022-05-05 RX ORDER — ATORVASTATIN CALCIUM 40 MG/1
TABLET, FILM COATED ORAL
Qty: 30 TABLET | Refills: 4 | Status: SHIPPED | OUTPATIENT
Start: 2022-05-05 | End: 2022-10-10

## 2022-05-16 ENCOUNTER — OFFICE VISIT (OUTPATIENT)
Dept: FAMILY MEDICINE CLINIC | Age: 62
End: 2022-05-16
Payer: COMMERCIAL

## 2022-05-16 ENCOUNTER — HOSPITAL ENCOUNTER (OUTPATIENT)
Dept: WOUND CARE | Age: 62
Discharge: HOME | End: 2022-05-16
Payer: COMMERCIAL

## 2022-05-16 VITALS
WEIGHT: 253.4 LBS | HEIGHT: 70 IN | SYSTOLIC BLOOD PRESSURE: 150 MMHG | TEMPERATURE: 98.4 F | DIASTOLIC BLOOD PRESSURE: 53 MMHG | BODY MASS INDEX: 36.28 KG/M2 | RESPIRATION RATE: 18 BRPM | HEART RATE: 82 BPM

## 2022-05-16 DIAGNOSIS — L03.115 CELLULITIS OF RIGHT LOWER EXTREMITY: ICD-10-CM

## 2022-05-16 DIAGNOSIS — L97.512 DIABETIC ULCER OF OTHER PART OF RIGHT FOOT ASSOCIATED WITH TYPE 2 DIABETES MELLITUS, WITH FAT LAYER EXPOSED (HCC): Primary | ICD-10-CM

## 2022-05-16 DIAGNOSIS — L98.492 SKIN ULCER WITH FAT LAYER EXPOSED (HCC): Primary | ICD-10-CM

## 2022-05-16 DIAGNOSIS — I25.10 ASHD (ARTERIOSCLEROTIC HEART DISEASE): ICD-10-CM

## 2022-05-16 DIAGNOSIS — E11.621 DIABETIC ULCER OF OTHER PART OF RIGHT FOOT ASSOCIATED WITH TYPE 2 DIABETES MELLITUS, WITH FAT LAYER EXPOSED (HCC): Primary | ICD-10-CM

## 2022-05-16 DIAGNOSIS — E11.42 DIABETIC POLYNEUROPATHY ASSOCIATED WITH TYPE 2 DIABETES MELLITUS (HCC): ICD-10-CM

## 2022-05-16 LAB — HBA1C MFR BLD: 8.8 %

## 2022-05-16 PROCEDURE — 83036 HEMOGLOBIN GLYCOSYLATED A1C: CPT | Performed by: FAMILY MEDICINE

## 2022-05-16 PROCEDURE — 99214 OFFICE O/P EST MOD 30 MIN: CPT | Performed by: FAMILY MEDICINE

## 2022-05-16 PROCEDURE — 99213 OFFICE O/P EST LOW 20 MIN: CPT

## 2022-05-16 PROCEDURE — 3052F HG A1C>EQUAL 8.0%<EQUAL 9.0%: CPT | Performed by: FAMILY MEDICINE

## 2022-05-16 RX ORDER — BACITRACIN ZINC AND POLYMYXIN B SULFATE 500; 1000 [USP'U]/G; [USP'U]/G
OINTMENT TOPICAL ONCE
Status: CANCELLED | OUTPATIENT
Start: 2022-05-16 | End: 2022-05-16

## 2022-05-16 RX ORDER — AMOXICILLIN AND CLAVULANATE POTASSIUM 500; 125 MG/1; MG/1
1 TABLET, FILM COATED ORAL 2 TIMES DAILY
Qty: 28 TABLET | Refills: 0 | Status: SHIPPED | OUTPATIENT
Start: 2022-05-16 | End: 2022-05-30

## 2022-05-16 RX ORDER — LIDOCAINE HYDROCHLORIDE 40 MG/ML
SOLUTION TOPICAL ONCE
Status: CANCELLED | OUTPATIENT
Start: 2022-05-16 | End: 2022-05-16

## 2022-05-16 RX ORDER — LIDOCAINE 40 MG/G
CREAM TOPICAL ONCE
Status: CANCELLED | OUTPATIENT
Start: 2022-05-16 | End: 2022-05-16

## 2022-05-16 RX ORDER — LIDOCAINE 40 MG/G
CREAM TOPICAL ONCE
Status: DISCONTINUED | OUTPATIENT
Start: 2022-05-16 | End: 2022-05-17 | Stop reason: HOSPADM

## 2022-05-16 RX ORDER — LIDOCAINE 50 MG/G
OINTMENT TOPICAL ONCE
Status: CANCELLED | OUTPATIENT
Start: 2022-05-16 | End: 2022-05-16

## 2022-05-16 ASSESSMENT — ENCOUNTER SYMPTOMS
BACK PAIN: 0
ABDOMINAL PAIN: 0
STRIDOR: 0
SHORTNESS OF BREATH: 0
CHOKING: 0
CHEST TIGHTNESS: 0
WHEEZING: 0
COUGH: 0

## 2022-05-16 ASSESSMENT — PATIENT HEALTH QUESTIONNAIRE - PHQ9
SUM OF ALL RESPONSES TO PHQ QUESTIONS 1-9: 0
SUM OF ALL RESPONSES TO PHQ9 QUESTIONS 1 & 2: 0
2. FEELING DOWN, DEPRESSED OR HOPELESS: 0
SUM OF ALL RESPONSES TO PHQ QUESTIONS 1-9: 0
1. LITTLE INTEREST OR PLEASURE IN DOING THINGS: 0

## 2022-05-16 NOTE — PROGRESS NOTES
Subjective:      Patient ID: Jaleel Ornelas is a 64 y.o. male. ARMINDA EDWARDS is here with a 3-day history of ulcers on the plantar surface of both great toes. He also complains swelling of his toes feet and ankles and erythema of the skin of the feet and red streaks ascending both feet and ankles into the lower leg. He has longstanding diabetes and very little sensation in either foot. Over the weekend he was working in rubber boots for many hours and noticed these wounds when he was washing his feet. Review of Systems   Constitutional: Negative for activity change, appetite change, chills, diaphoresis, fatigue, fever and unexpected weight change. Respiratory: Negative for cough, choking, chest tightness, shortness of breath, wheezing and stridor. Cardiovascular: Negative for chest pain, palpitations and leg swelling. Gastrointestinal: Negative for abdominal pain. Genitourinary: Negative for difficulty urinating. Musculoskeletal: Negative for arthralgias and back pain. Skin: Positive for wound. Neurological: Negative for dizziness. All other systems reviewed and are negative. Objective:   Physical Exam  Vitals and nursing note reviewed. Constitutional:       Appearance: He is well-developed. HENT:      Head: Normocephalic and atraumatic. Right Ear: External ear normal.      Left Ear: External ear normal.      Nose: Nose normal.   Eyes:      Conjunctiva/sclera: Conjunctivae normal.      Pupils: Pupils are equal, round, and reactive to light. Neck:      Thyroid: No thyromegaly. Vascular: No JVD. Trachea: No tracheal deviation. Cardiovascular:      Rate and Rhythm: Normal rate and regular rhythm. Heart sounds: Normal heart sounds. No murmur heard. No friction rub. No gallop. Pulmonary:      Effort: Pulmonary effort is normal. No respiratory distress. Breath sounds: Normal breath sounds. No stridor. No wheezing or rales. Chest:      Chest wall: No tenderness. Abdominal:      General: Bowel sounds are normal. There is no distension. Palpations: Abdomen is soft. There is no mass. Tenderness: There is no abdominal tenderness. There is no guarding or rebound. Musculoskeletal:         General: No tenderness. Normal range of motion. Cervical back: Normal range of motion and neck supple. Feet:    Feet:      Comments: Foot ulcers plantar surface of both great toes. Erythema and edema as well as lymphangitis of both feet and ankles and lower legs  Lymphadenopathy:      Cervical: No cervical adenopathy. Skin:     General: Skin is warm and dry. Coloration: Skin is not pale. Findings: No erythema or rash. Neurological:      Mental Status: He is alert and oriented to person, place, and time. Cranial Nerves: No cranial nerve deficit. Motor: No abnormal muscle tone. Coordination: Coordination normal.      Deep Tendon Reflexes: Reflexes are normal and symmetric. Reflexes normal.         Assessment / Plan:           1. Skin ulcer with fat layer exposed (HCC)-I referred Elizabeth Pop to the wound care center at [facility] today at 12:30 PM    - POCT glycosylated hemoglobin (Hb A1C)  - [facility]    2. Cellulitis of right lower extremity-treatment per wound care center      3. Diabetic polyneuropathy associated with type 2 diabetes mellitus (HCC)-persistent      4. ASHD (arteriosclerotic heart disease)-stable    5. Uncontrolled type 2 diabetes without long-term insulin use. Continue metformin glipizide. More carefully follow low-carb diet. He is already exercising regularly.   Will consider adding basal insulin

## 2022-05-16 NOTE — PLAN OF CARE
Pt to the [facility] for initial appointment for toe wounds. Wounds not debrided today. Pt to place silvadene and dry dressing to wound and to start on oral antibiotics. Pt to follow up in the [facility] in 1 week. Discharge instructions reviewed with patient, all questions answered, copy given to patient. Dressings were applied to all wounds per M.D. Instructions at this visit.

## 2022-05-17 DIAGNOSIS — E11.9 TYPE 2 DIABETES MELLITUS WITHOUT COMPLICATION, WITHOUT LONG-TERM CURRENT USE OF INSULIN (HCC): ICD-10-CM

## 2022-05-17 NOTE — TELEPHONE ENCOUNTER
Last ov 05/16/2022   Future Appointments   Date Time Provider Abdirahman Craft   5/23/2022  1:30 PM ARGENIS Mullins   7/5/2022 11:00 AM DO CAYLA Quijano

## 2022-05-19 NOTE — PROGRESS NOTES
[facility] Progress Note      Tracy Jauregui     : 1960    DATE OF VISIT:  2022    Subjective:     Tracy Jauregui is a 64 y.o. male who has a chief complaint of a diabetic ulcer located on the bilateral foot. Wounds started after wearing a pair of boots and his feet rubbed. Did see PCP who referred to [facility]. States redness on the feet has not gotten any better over the last couple days. Was applying PSO to wounds. Mr. Shaye Mendez has a past medical history of Alcohol abuse, Cancer ([facility] Cellulitis, Coronary artery disease, COVID-19, Diabetic neuropathy associated with type 2 diabetes mellitus ([facility] Hyperlipidemia, Non morbid obesity, NSTEMI (non-ST elevated myocardial infarction) ([facility] GURDEEP (obstructive sleep apnea), Reactive airway disease with wheezing, moderate persistent, with acute exacerbation, Sepsis ([facility] and Type II or unspecified type diabetes mellitus without mention of complication, not stated as uncontrolled. He has a past surgical history that includes Vein Surgery; Coronary angioplasty with stent (2016); Coronary artery bypass graft (2021); Coronary artery bypass graft (N/A, 2021); Bypass Graft (2021); Leg Surgery (N/A, 2021); and Hardware Removal (N/A, 2021). His family history includes Cancer in his father; Diabetes in his father; Hearing Loss in his father; Heart Attack in his father and mother; Heart Disease in his father, mother, and paternal uncle. Mr. Shaye Mendez reports that he has never smoked. He has never used smokeless tobacco. He reports current alcohol use. He reports that he does not use drugs. His current medication list consists of acetaminophen, amoxicillin-clavulanate, aspirin, atorvastatin, clopidogrel, fluticasone-salmeterol, glipiZIDE, metFORMIN, metoprolol tartrate, and silver sulfADIAZINE. Allergies: Patient has no known allergies.     Pertinent items from the review of systems are discussed in the HPI; the remainder of the ROS was reviewed and is negative. Objective:     BP (!) 150/53   Pulse 82   Temp 98.4 °F (36.9 °C) (Oral)   Resp 18   Ht 5' 10\" (1.778 m)   Wt 253 lb 6.4 oz (114.9 kg)   BMI 36.36 kg/m²   General Appearance: alert and oriented to person, place and time, well developed and well- nourished, in no acute distress  Head: normocephalic and atraumatic  Eyes: pupils equal, round, and reactive to light, extraocular eye movements intact, conjunctivae normal  ENT: tympanic membrane, external ear and ear canal normal bilaterally, nose without deformity, nasal mucosa and turbinates normal without polyps  Neck: supple and non-tender without mass, no thyromegaly or thyroid nodules, no cervical lymphadenopathy  Pulmonary/Chest: clear to auscultation bilaterally- no wheezes, rales or rhonchi, normal air movement, no respiratory distress  Cardiovascular: normal rate, regular rhythm, normal S1 and S2, no murmurs, rubs, clicks, or gallops, distal pulses intact, no carotid bruits  Abdomen: soft, non-tender, non-distended, normal bowel sounds, no masses or organomegaly. Dorsalis pedis pulse left palpable  Posterior tibial pulse left palpable  Dorsalis pedis pulse right palpable  Posterior tibial pulse right palpable  Protective sensation decreased bilateral LE.        Ulcer on the plantar aspect right hallux with mild fibrotic tissue, red granulation tissue, mild serous drainage, no hyperkeratotic rim, no undermining, no tunneling, no purulence, no malodor, no eschar,  no periwound maceration, mild to moderate periwound erythema with dorsal streaking, mild edema, no crepitus, no increase in skin temperature, ulcer probes to soft tissue only    Ulcer on the plantar aspect left hallux with mild fibrotic tissue, red granulation tissue, mild serous drainage, no hyperkeratotic rim, no undermining, no tunneling, no purulence, no malodor, no eschar,  no periwound maceration, mild to moderate periwound erythema with dorsal streaking, mild edema, no crepitus, no increase in skin temperature, ulcer probes to soft tissue only    Today's ulcer measurements are in the wound documentation flowsheet.      Wound measurements:  Wound 05/16/22 #1, Right Great Toe, Diabetic Ulcer, Suazo 2, Onset 5/14/22-Wound Length (cm): 1 cm  Wound 05/16/22 #2, Left Great Toe, Diabetic Ulcer, Suazo 2, Onset 5/14/22-Wound Length (cm): 1 cm    Wound 05/16/22 #1, Right Great Toe, Diabetic Ulcer, Suazo 2, Onset 5/14/22-Wound Width (cm): 1.1 cm  Wound 05/16/22 #2, Left Great Toe, Diabetic Ulcer, Suazo 2, Onset 5/14/22-Wound Width (cm): 1 cm    Wound 05/16/22 #1, Right Great Toe, Diabetic Ulcer, Suazo 2, Onset 5/14/22-Wound Depth (cm): 0.1 cm  Wound 05/16/22 #2, Left Great Toe, Diabetic Ulcer, Suazo 2, Onset 5/14/22-Wound Depth (cm): 0.1 cm    LABS  Lab Results   Component Value Date    LABA1C 8.8 05/16/2022         Assessment:     Patient Active Problem List   Diagnosis Code    Type II or unspecified type diabetes mellitus without mention of complication, not stated as uncontrolled E11.9    Diabetes mellitus E11.9    History of ETOH abuse F10.11    Patient overweight E66.3    Patient overweight E66.3    Dietary noncompliance Z91.11    Alcohol abuse F10.10    Flank pain R10.9    Prostatism N40.0    Low serum testosterone level R79.89    Arm pain, left M79.602    Shoulder pain M25.519    Precordial pain R07.2    Lung nodule R91.1    Acute angina (HCC) I20.9    Abnormal stress test R94.39    Acute coronary syndrome (HCC) I24.9    Coronary artery disease involving native coronary artery of native heart with unstable angina pectoris (HCC) I25.110    Other chest pain R07.89    S/P drug eluting coronary stent placement Z95.5    Essential hypertension I10    Obesity E66.9    Abnormal chest x-ray R93.89    Angina effort I20.8    Skin lesion L98.9    Angina at rest ([address] I20.8    Hyperlipidemia E78.5    Angina Toe, Diabetic Ulcer, Suazo 2, Onset 5/14/22-Dressing/Treatment:  (PSO,gauze, tubular gauze). Home treatment: good handwashing before and after any dressing changes. Cleanse ulcer with saline or soap & water before dressing change. May use Vaseline (petrolatum), Aquaphor, Aveeno, CeraVe, Cetaphil, Eucerin, Lubriderm, etc for dry skin. Dressing type for home: Silvadene and dry dressing to bilateral hallux daily. Written discharge instructions given to patient. Offload ulcer(s) as directed. Elevate leg(s) as directed. Follow up in 1 week. Rx Augmentin 500mg one tab PO TID. If no impovement next week may require imaging. Reviewed pictures on wife's phone which shows wounds have improved recently.        Electronically signed by Dmitriy Page DPM on 5/19/2022 at 8:52 AM.

## 2022-05-23 ENCOUNTER — HOSPITAL ENCOUNTER (OUTPATIENT)
Dept: WOUND CARE | Age: 62
Discharge: HOME | End: 2022-05-23
Payer: COMMERCIAL

## 2022-05-23 VITALS
HEIGHT: 70 IN | HEART RATE: 75 BPM | BODY MASS INDEX: 36.79 KG/M2 | WEIGHT: 257 LBS | RESPIRATION RATE: 20 BRPM | TEMPERATURE: 97.2 F | SYSTOLIC BLOOD PRESSURE: 162 MMHG | DIASTOLIC BLOOD PRESSURE: 71 MMHG

## 2022-05-23 DIAGNOSIS — E11.621 DIABETIC ULCER OF OTHER PART OF RIGHT FOOT ASSOCIATED WITH TYPE 2 DIABETES MELLITUS, WITH FAT LAYER EXPOSED (HCC): Primary | ICD-10-CM

## 2022-05-23 DIAGNOSIS — L97.512 DIABETIC ULCER OF OTHER PART OF RIGHT FOOT ASSOCIATED WITH TYPE 2 DIABETES MELLITUS, WITH FAT LAYER EXPOSED (HCC): Primary | ICD-10-CM

## 2022-05-23 PROCEDURE — 99213 OFFICE O/P EST LOW 20 MIN: CPT

## 2022-05-23 RX ORDER — LIDOCAINE 50 MG/G
OINTMENT TOPICAL ONCE
Status: CANCELLED | OUTPATIENT
Start: 2022-05-23 | End: 2022-05-23

## 2022-05-23 RX ORDER — LIDOCAINE 40 MG/G
CREAM TOPICAL ONCE
Status: DISCONTINUED | OUTPATIENT
Start: 2022-05-23 | End: 2022-05-24 | Stop reason: HOSPADM

## 2022-05-23 RX ORDER — LIDOCAINE 40 MG/G
CREAM TOPICAL ONCE
Status: CANCELLED | OUTPATIENT
Start: 2022-05-23 | End: 2022-05-23

## 2022-05-23 RX ORDER — LIDOCAINE HYDROCHLORIDE 40 MG/ML
SOLUTION TOPICAL ONCE
Status: CANCELLED | OUTPATIENT
Start: 2022-05-23 | End: 2022-05-23

## 2022-05-23 RX ORDER — BACITRACIN ZINC AND POLYMYXIN B SULFATE 500; 1000 [USP'U]/G; [USP'U]/G
OINTMENT TOPICAL ONCE
Status: CANCELLED | OUTPATIENT
Start: 2022-05-23 | End: 2022-05-23

## 2022-05-23 NOTE — PLAN OF CARE
Pt to he [facility] for follow up appointment. Wound not debrided today. Pt to continue oral antibiotics and silvadene to wound. Discharge instructions reviewed with patient, all questions answered, copy given to patient. Dressings were applied to all wounds per M.D. Instructions at this visit. Pt to follow up in the [facility] in 2 weeks due to the holiday.

## 2022-06-02 NOTE — PROGRESS NOTES
[facility] Progress Note      Italo Wadsworth     : 1960    DATE OF VISIT:  2022    Subjective:     Italo Wadsworth is a 64 y.o. male who has a chief complaint of a diabetic ulcer located on the bilateral foot. Feeling well. Feet and wounds look better to him. No issues with antibiotics. Mr. Bijal Fontenot has a past medical history of Alcohol abuse, Cancer ([facility] Cellulitis, Coronary artery disease, COVID-19, Diabetic neuropathy associated with type 2 diabetes mellitus ([facility] Hyperlipidemia, Non morbid obesity, NSTEMI (non-ST elevated myocardial infarction) ([facility] GURDEEP (obstructive sleep apnea), Reactive airway disease with wheezing, moderate persistent, with acute exacerbation, Sepsis ([facility] and Type II or unspecified type diabetes mellitus without mention of complication, not stated as uncontrolled. He has a past surgical history that includes Vein Surgery; Coronary angioplasty with stent (2016); Coronary artery bypass graft (2021); Coronary artery bypass graft (N/A, 2021); Bypass Graft (2021); Leg Surgery (N/A, 2021); and Hardware Removal (N/A, 2021). His family history includes Cancer in his father; Diabetes in his father; Hearing Loss in his father; Heart Attack in his father and mother; Heart Disease in his father, mother, and paternal uncle. Mr. Bijal Fotnenot reports that he has never smoked. He has never used smokeless tobacco. He reports current alcohol use. He reports that he does not use drugs. His current medication list consists of acetaminophen, aspirin, atorvastatin, clopidogrel, glipiZIDE, metFORMIN, metoprolol tartrate, and silver sulfADIAZINE. Allergies: Patient has no known allergies. Pertinent items from the review of systems are discussed in the HPI; the remainder of the ROS was reviewed and is negative.        Objective:     BP (!) 162/71   Pulse 75   Temp 97.2 °F (36.2 °C) (Oral)   Resp 20   Ht 5' 10\" (1.778 m)   Wt 257 lb (116.6 kg)   BMI 36.88 kg/m²   General Appearance: alert and oriented to person, place and time, well developed and well- nourished, in no acute distress  Head: normocephalic and atraumatic  Eyes: pupils equal, round, and reactive to light, extraocular eye movements intact, conjunctivae normal  ENT: tympanic membrane, external ear and ear canal normal bilaterally, nose without deformity, nasal mucosa and turbinates normal without polyps  Neck: supple and non-tender without mass, no thyromegaly or thyroid nodules, no cervical lymphadenopathy  Pulmonary/Chest: clear to auscultation bilaterally- no wheezes, rales or rhonchi, normal air movement, no respiratory distress  Cardiovascular: normal rate, regular rhythm, normal S1 and S2, no murmurs, rubs, clicks, or gallops, distal pulses intact, no carotid bruits  Abdomen: soft, non-tender, non-distended, normal bowel sounds, no masses or organomegaly. Dorsalis pedis pulse left palpable  Posterior tibial pulse left palpable  Dorsalis pedis pulse right palpable  Posterior tibial pulse right palpable  Protective sensation decreased bilateral LE. Ulcer on the plantar aspect right hallux with mild fibrotic tissue, red granulation tissue, mild serous drainage, no hyperkeratotic rim, no undermining, no tunneling, no purulence, no malodor, no eschar, no periwound maceration, mild periwound erythema, mild edema, no crepitus, no increase in skin temperature, ulcer probes to soft tissue only    Ulcer on the plantar aspect left hallux with mild fibrotic tissue, red granulation tissue, mild serous drainage, no hyperkeratotic rim, no undermining, no tunneling, no purulence, no malodor, no eschar, no periwound maceration, mild periwound erythema, mild edema, no crepitus, no increase in skin temperature, ulcer probes to soft tissue only    Today's ulcer measurements are in the wound documentation flowsheet.      Wound measurements:  Wound 05/16/22 #2, Left Great Toe, Diabetic Ulcer, Suazo 2, Onset 5/14/22-Wound Length (cm): 0.7 cm  Wound 05/16/22 #1, Right Great Toe, Diabetic Ulcer, Suazo 2, Onset 5/14/22-Wound Length (cm): 0.6 cm    Wound 05/16/22 #2, Left Great Toe, Diabetic Ulcer, Suazo 2, Onset 5/14/22-Wound Width (cm): 0.3 cm  Wound 05/16/22 #1, Right Great Toe, Diabetic Ulcer, Suazo 2, Onset 5/14/22-Wound Width (cm): 1.1 cm    Wound 05/16/22 #2, Left Great Toe, Diabetic Ulcer, Suazo 2, Onset 5/14/22-Wound Depth (cm): 0.1 cm  Wound 05/16/22 #1, Right Great Toe, Diabetic Ulcer, Suazo 2, Onset 5/14/22-Wound Depth (cm): 0.1 cm    LABS  Lab Results   Component Value Date    LABA1C 8.8 05/16/2022         Assessment:     Patient Active Problem List   Diagnosis Code    Type II or unspecified type diabetes mellitus without mention of complication, not stated as uncontrolled E11.9    Diabetes mellitus E11.9    History of ETOH abuse F10.11    Patient overweight E66.3    Patient overweight E66.3    Dietary noncompliance Z91.11    Alcohol abuse F10.10    Flank pain R10.9    Prostatism N40.0    Low serum testosterone level R79.89    Arm pain, left M79.602    Shoulder pain M25.519    Precordial pain R07.2    Lung nodule R91.1    Acute angina ([facility]) I20.9    Abnormal stress test R94.39    Acute coronary syndrome (HCC) I24.9    Coronary artery disease involving native coronary artery of native heart with unstable angina pectoris (HCC) I25.110    Other chest pain R07.89    S/P drug eluting coronary stent placement Z95.5    Essential hypertension I10    Obesity E66.9    Abnormal chest x-ray R93.89    Angina effort I20.8    Skin lesion L98.9    Angina at rest ([address] I20.8    Hyperlipidemia E78.5    Angina pectoris ([facility]) I20.9    NSTEMI (non-ST elevated myocardial infarction) ([address] I21.4    Controlled type 2 diabetes mellitus without complication, without long-term current use of insulin (HCC) E11.9    ASHD (arteriosclerotic heart disease) I25.10    Mass of right foot R22.41    Overweight E66.3    History of angina pectoris Z86.79    Reactive airway disease J45.909    Bronchitis J40    Reactive airway disease with wheezing, moderate persistent, with acute exacerbation J45.41    Need for prophylactic vaccination and inoculation against varicella Z23    Need for prophylactic vaccination against diphtheria-tetanus-pertussis (DTP) Z23    Chronic right-sided thoracic back pain M54.6, G89.29    CAD in native artery I25.10    S/P PTCA (percutaneous transluminal coronary angioplasty) Z98.61    Chest pain R07.9    Ischemic cardiomyopathy I25.5    GURDEEP (obstructive sleep apnea) G47.33    Cellulitis L03.90    Sepsis due to methicillin susceptible Staphylococcus aureus (HCC) A41.01    Bacteremia due to methicillin susceptible Staphylococcus aureus (MSSA) R78.81, B95.61    Disruption or dehiscence of closure of sternum or sternotomy T81.32XA    Elevated sed rate R70.0    Type 2 diabetes mellitus with obesity (HCC) E11.69, E66.9    Diabetic ulcer of right foot associated with type 2 diabetes mellitus, with fat layer exposed ([facility] E11.621, L97.512       Assessment of today's active condition(s): cellulitis bilateral foot, diabetic foot ulcer plantar hallux bilateral, diabetes mellitus. Factors contributing to occurrence and/or persistence of the chronic ulcer include diabetes and chronic pressure. Sharp debridement is not indicated today, based upon the exam findings in the ulcer(s) above. Discharge plan:     Treatment in the wound care center today: Wound 05/16/22 #2, Left Great Toe, Diabetic Ulcer, Suazo 2, Onset 5/14/22-Dressing/Treatment: Other (comment) (PSO,bandaid)  Wound 05/16/22 #1, Right Great Toe, Diabetic Ulcer, Suazo 2, Onset 5/14/22-Dressing/Treatment: Other (comment) (PSO,bandaid). Home treatment: good handwashing before and after any dressing changes. Cleanse ulcer with saline or soap & water before dressing change.  May use Vaseline (petrolatum), Aquaphor, Aveeno, CeraVe, Cetaphil, Eucerin, Lubriderm, etc for dry skin. Dressing type for home: Silvadene and dry dressing to bilateral hallux daily. Written discharge instructions given to patient. Offload ulcer(s) as directed. Elevate leg(s) as directed. Follow up in 1 week. Wounds are improving.         Electronically signed by Arie Kent DPM on 6/2/2022 at 8:42 AM.

## 2022-06-06 ENCOUNTER — HOSPITAL ENCOUNTER (OUTPATIENT)
Dept: WOUND CARE | Age: 62
Discharge: HOME | End: 2022-06-06
Payer: COMMERCIAL

## 2022-06-06 VITALS
DIASTOLIC BLOOD PRESSURE: 74 MMHG | SYSTOLIC BLOOD PRESSURE: 161 MMHG | BODY MASS INDEX: 36.65 KG/M2 | HEART RATE: 81 BPM | HEIGHT: 70 IN | RESPIRATION RATE: 18 BRPM | WEIGHT: 256 LBS | TEMPERATURE: 97.9 F

## 2022-06-06 DIAGNOSIS — L97.512 DIABETIC ULCER OF OTHER PART OF RIGHT FOOT ASSOCIATED WITH TYPE 2 DIABETES MELLITUS, WITH FAT LAYER EXPOSED (HCC): Primary | ICD-10-CM

## 2022-06-06 DIAGNOSIS — E11.621 DIABETIC ULCER OF OTHER PART OF RIGHT FOOT ASSOCIATED WITH TYPE 2 DIABETES MELLITUS, WITH FAT LAYER EXPOSED (HCC): Primary | ICD-10-CM

## 2022-06-06 PROCEDURE — 99212 OFFICE O/P EST SF 10 MIN: CPT

## 2022-06-06 RX ORDER — LIDOCAINE 40 MG/G
CREAM TOPICAL ONCE
Status: DISCONTINUED | OUTPATIENT
Start: 2022-06-06 | End: 2022-06-07 | Stop reason: HOSPADM

## 2022-06-06 RX ORDER — BACITRACIN ZINC AND POLYMYXIN B SULFATE 500; 1000 [USP'U]/G; [USP'U]/G
OINTMENT TOPICAL ONCE
Status: CANCELLED | OUTPATIENT
Start: 2022-06-06 | End: 2022-06-06

## 2022-06-06 RX ORDER — LIDOCAINE 50 MG/G
OINTMENT TOPICAL ONCE
Status: CANCELLED | OUTPATIENT
Start: 2022-06-06 | End: 2022-06-06

## 2022-06-06 RX ORDER — LIDOCAINE HYDROCHLORIDE 40 MG/ML
SOLUTION TOPICAL ONCE
Status: CANCELLED | OUTPATIENT
Start: 2022-06-06 | End: 2022-06-06

## 2022-06-06 RX ORDER — LIDOCAINE 40 MG/G
CREAM TOPICAL ONCE
Status: CANCELLED | OUTPATIENT
Start: 2022-06-06 | End: 2022-06-06

## 2022-06-06 NOTE — PLAN OF CARE
Pt to the [address] [place] [address],[address] [place] [address] for follow up appointment. Wounds measuring smaller. Pt to continue with silvadene and dry dressing to wounds. Pt to follow up in the [address] [place] [address],[address] [place] [address] in 2 weeks. Discharge instructions reviewed with patient, all questions answered, copy given to patient. Dressings were applied to all wounds per M.D. Instructions at this visit.

## 2022-06-11 ENCOUNTER — APPOINTMENT (OUTPATIENT)
Dept: CT IMAGING | Age: 62
End: 2022-06-11
Payer: COMMERCIAL

## 2022-06-11 ENCOUNTER — HOSPITAL ENCOUNTER (EMERGENCY)
Age: 62
Discharge: HOME | End: 2022-06-11
Payer: COMMERCIAL

## 2022-06-11 VITALS
SYSTOLIC BLOOD PRESSURE: 161 MMHG | HEART RATE: 70 BPM | RESPIRATION RATE: 14 BRPM | HEIGHT: 70 IN | OXYGEN SATURATION: 100 % | BODY MASS INDEX: 35.79 KG/M2 | TEMPERATURE: 98 F | DIASTOLIC BLOOD PRESSURE: 86 MMHG | WEIGHT: 250 LBS

## 2022-06-11 DIAGNOSIS — K59.00 CONSTIPATION, UNSPECIFIED CONSTIPATION TYPE: Primary | ICD-10-CM

## 2022-06-11 LAB
A/G RATIO: 1.5 (ref 1.1–2.2)
ALBUMIN SERPL-MCNC: 4 G/DL (ref 3.4–5)
ALP BLD-CCNC: 87 U/L (ref 40–129)
ALT SERPL-CCNC: 15 U/L (ref 10–40)
ANION GAP SERPL CALCULATED.3IONS-SCNC: 11 MMOL/L (ref 3–16)
AST SERPL-CCNC: 15 U/L (ref 15–37)
BASOPHILS ABSOLUTE: 0.1 K/UL (ref 0–0.2)
BASOPHILS RELATIVE PERCENT: 0.9 %
BILIRUB SERPL-MCNC: 0.5 MG/DL (ref 0–1)
BILIRUBIN URINE: NEGATIVE
BLOOD, URINE: NEGATIVE
BUN BLDV-MCNC: 14 MG/DL (ref 7–20)
CALCIUM SERPL-MCNC: 9.1 MG/DL (ref 8.3–10.6)
CHLORIDE BLD-SCNC: 99 MMOL/L (ref 99–110)
CLARITY: CLEAR
CO2: 26 MMOL/L (ref 21–32)
COLOR: YELLOW
CREAT SERPL-MCNC: 0.7 MG/DL (ref 0.8–1.3)
EOSINOPHILS ABSOLUTE: 0.2 K/UL (ref 0–0.6)
EOSINOPHILS RELATIVE PERCENT: 2.4 %
GFR AFRICAN AMERICAN: >60
GFR NON-AFRICAN AMERICAN: >60
GLUCOSE BLD-MCNC: 194 MG/DL (ref 70–99)
GLUCOSE URINE: >=1000 MG/DL
HCT VFR BLD CALC: 39.8 % (ref 40.5–52.5)
HEMOGLOBIN: 13.3 G/DL (ref 13.5–17.5)
KETONES, URINE: NEGATIVE MG/DL
LEUKOCYTE ESTERASE, URINE: NEGATIVE
LYMPHOCYTES ABSOLUTE: 1.6 K/UL (ref 1–5.1)
LYMPHOCYTES RELATIVE PERCENT: 22.1 %
MCH RBC QN AUTO: 30.5 PG (ref 26–34)
MCHC RBC AUTO-ENTMCNC: 33.3 G/DL (ref 31–36)
MCV RBC AUTO: 91.5 FL (ref 80–100)
MICROSCOPIC EXAMINATION: ABNORMAL
MONOCYTES ABSOLUTE: 0.7 K/UL (ref 0–1.3)
MONOCYTES RELATIVE PERCENT: 9.9 %
NEUTROPHILS ABSOLUTE: 4.7 K/UL (ref 1.7–7.7)
NEUTROPHILS RELATIVE PERCENT: 64.7 %
NITRITE, URINE: NEGATIVE
PDW BLD-RTO: 14 % (ref 12.4–15.4)
PH UA: 6 (ref 5–8)
PLATELET # BLD: 233 K/UL (ref 135–450)
PMV BLD AUTO: 7.4 FL (ref 5–10.5)
POTASSIUM REFLEX MAGNESIUM: 4.7 MMOL/L (ref 3.5–5.1)
PROTEIN UA: NEGATIVE MG/DL
RBC # BLD: 4.35 M/UL (ref 4.2–5.9)
SODIUM BLD-SCNC: 136 MMOL/L (ref 136–145)
SPECIFIC GRAVITY UA: 1.02 (ref 1–1.03)
TOTAL PROTEIN: 6.7 G/DL (ref 6.4–8.2)
URINE REFLEX TO CULTURE: ABNORMAL
URINE TYPE: ABNORMAL
UROBILINOGEN, URINE: 0.2 E.U./DL
WBC # BLD: 7.2 K/UL (ref 4–11)

## 2022-06-11 PROCEDURE — 36415 COLL VENOUS BLD VENIPUNCTURE: CPT

## 2022-06-11 PROCEDURE — 51798 US URINE CAPACITY MEASURE: CPT

## 2022-06-11 PROCEDURE — 99285 EMERGENCY DEPT VISIT HI MDM: CPT

## 2022-06-11 PROCEDURE — 85025 COMPLETE CBC W/AUTO DIFF WBC: CPT

## 2022-06-11 PROCEDURE — 6360000004 HC RX CONTRAST MEDICATION: Performed by: PHYSICIAN ASSISTANT

## 2022-06-11 PROCEDURE — 80053 COMPREHEN METABOLIC PANEL: CPT

## 2022-06-11 PROCEDURE — 81003 URINALYSIS AUTO W/O SCOPE: CPT

## 2022-06-11 PROCEDURE — 96374 THER/PROPH/DIAG INJ IV PUSH: CPT

## 2022-06-11 PROCEDURE — 6360000002 HC RX W HCPCS: Performed by: PHYSICIAN ASSISTANT

## 2022-06-11 PROCEDURE — 74177 CT ABD & PELVIS W/CONTRAST: CPT

## 2022-06-11 RX ORDER — CALCIUM POLYCARBOPHIL 625 MG
625 TABLET ORAL DAILY
Qty: 14 TABLET | Refills: 0 | Status: SHIPPED | OUTPATIENT
Start: 2022-06-11 | End: 2022-10-12

## 2022-06-11 RX ORDER — SACCHAROMYCES BOULARDII 250 MG
250 CAPSULE ORAL 2 TIMES DAILY
Qty: 14 CAPSULE | Refills: 0 | Status: SHIPPED | OUTPATIENT
Start: 2022-06-11 | End: 2022-06-18

## 2022-06-11 RX ORDER — KETOROLAC TROMETHAMINE 30 MG/ML
15 INJECTION, SOLUTION INTRAMUSCULAR; INTRAVENOUS ONCE
Status: COMPLETED | OUTPATIENT
Start: 2022-06-11 | End: 2022-06-11

## 2022-06-11 RX ORDER — DOCUSATE SODIUM 100 MG/1
100 CAPSULE, LIQUID FILLED ORAL 2 TIMES DAILY
Qty: 60 CAPSULE | Refills: 0 | Status: SHIPPED | OUTPATIENT
Start: 2022-06-11 | End: 2022-07-11

## 2022-06-11 RX ORDER — POLYETHYLENE GLYCOL 3350 17 G/17G
17 POWDER, FOR SOLUTION ORAL 2 TIMES DAILY
Qty: 225 G | Refills: 0 | Status: SHIPPED | OUTPATIENT
Start: 2022-06-11 | End: 2022-06-18

## 2022-06-11 RX ADMIN — KETOROLAC TROMETHAMINE 15 MG: 30 INJECTION, SOLUTION INTRAMUSCULAR; INTRAVENOUS at 15:23

## 2022-06-11 RX ADMIN — IOPAMIDOL 75 ML: 755 INJECTION, SOLUTION INTRAVENOUS at 16:13

## 2022-06-11 ASSESSMENT — PAIN SCALES - GENERAL
PAINLEVEL_OUTOF10: 6

## 2022-06-11 ASSESSMENT — PAIN - FUNCTIONAL ASSESSMENT: PAIN_FUNCTIONAL_ASSESSMENT: 0-10

## 2022-06-11 ASSESSMENT — PAIN DESCRIPTION - LOCATION: LOCATION: RECTUM

## 2022-06-11 NOTE — ED NOTES
Patient reported he came in for potential constipation/bowel obstruction. Patient reported liquid stool the past day or so, after mag citrate. He reported having previous enemas without success as well.      Sarita Little RN  06/11/22 4642

## 2022-06-11 NOTE — ED PROVIDER NOTES
[address] ED  EMERGENCY DEPARTMENT ENCOUNTER        Pt Name: Michoacano Noonan  MRN: [id number]  [place] 1960  Date of evaluation: 6/11/2022  Provider: SILVERIO Bonilla  PCP: Abdirahman Naidu, DO    This patient was not seen and evaluated by the attending physician No att. providers found. I have evaluated this patient. My supervising physician was available for consultation. CHIEF COMPLAINT       Chief Complaint   Patient presents with    Constipation     Patient states he has not had a BM in 7-8 days, says he is normally a regular, going every day. Patient says he has passed some liquid. Patient says he is so full that he is also having trouble urinating. Saw urologist this past week and he told him to take mag citrate, but it still is not resolved. Also tried fleet enema. States he had prostate cancer and had a cyberknife procedure done 3-4 months ago and thought this could be a side effect from that. HISTORY OF PRESENT ILLNESS   (Location/Symptom, Timing/Onset, Context/Setting, Quality, Duration, Modifying Factors, Severity)  Note limiting factors. Michoacano Noonan is a 64 y.o. male with past medical history of coronary artery disease status post PCI, type 2 diabetes, history of alcohol abuse, hypertension, hyperlipidemia who presents via private vehicle from his home for evaluation of constipation. Patient notes he typically has a bowel movement once or twice every day. For the last 7 to 8 days he has not had a bowel movement in all. He has had some passage of liquid stool with attempt of trying mag citrate and Fleet enema however this is with a lot of effort on his part to have a bowel movement. He notes he is still passing gas. He notes that over the last day he has been having a hard time urinating because he feels that everything is just so blocked up. He endorses decreased appetite secondary to this but no nausea or emesis no upper abdominal pain. No fevers.   No hematuria frequency or urgency. He recently saw his urologist earlier this week for evaluation of this and as follow-up from his CyberKnife procedure 3 or 4 months ago his urologist advised starting the mag citrate and noted that it was not likely to be a complication from the CyberKnife as it was too remote. Patient denies chest pain shortness of breath or fevers. He has never had this happen to him before. Nursing Notes were all reviewed and agreed with or any disagreements were addressed  in the HPI. Pt was seen during the [place] 19 pandemic. Appropriate PPE worn by ME during patient encounters. Pt seen during a time with constrained hospital bed capacity and other potential inpatient and outpatient resources were constrained due to the viral pandemic. REVIEW OF SYSTEMS    (2-9 systems for level 4, 10 or more for level 5)     Review of Systems    Positives and Pertinent negatives as per HPI. Except as noted abovein the ROS, all other systems were reviewed and negative.        PAST MEDICAL HISTORY     Past Medical History:   Diagnosis Date    Alcohol abuse 12/03/2012    Cancer ([facility] 2022    Prostate    Cellulitis     sternum    Coronary artery disease     COVID-19     Diabetic neuropathy associated with type 2 diabetes mellitus (HCC)     Hyperlipidemia     Non morbid obesity     NSTEMI (non-ST elevated myocardial infarction) ([address]     GURDEEP (obstructive sleep apnea)     Reactive airway disease with wheezing, moderate persistent, with acute exacerbation 03/03/2020    Sepsis ([facility] 04/2021    Type II or unspecified type diabetes mellitus without mention of complication, not stated as uncontrolled          SURGICAL HISTORY     Past Surgical History:   Procedure Laterality Date    BYPASS GRAFT  02/19/2021    CORONARY ANGIOPLASTY WITH STENT PLACEMENT  1/14/2016 9/21/2020    1 [employer] Premier SAMUEL 2.25x16    CORONARY ARTERY BYPASS GRAFT  02/19/2021    CABG x2 & ALEX-Dr. Jorden Santos    CORONARY ARTERY BYPASS GRAFT N/A 2/19/2021    CORONARY ARTERY BYPASS GRAFTING X2, INTERNAL MAMMARY ARTERY, SAPHENOUS VEIN GRAFT, ON PUMP WITH LEFT ATRIAL APPENDAGE CLIP, BILATERAL INTERCOSTAL NERVE BLOCK performed by Lalo Mejia MD at [place] [address] N/A 4/28/2021    WOUND VAC AND DRESSING CHANGE WITH REMOVAL OF 4 STERNAL WIRES, DRAINAGE OF 2 INTERCOSTAL SPACE ABCESS performed by Brenda Broussard MD at [facility] N/A 4/26/2021    DEBRIDEMENT OF STERNAL WOUND WITH WOUND VACUUM PLACEMENT performed by Lalo Mejia MD at [address] [place] [address]       Previous Medications    ACETAMINOPHEN (TYLENOL) 500 MG TABLET    Take 500 mg by mouth every 8 hours     ASPIRIN 81 MG CHEWABLE TABLET    CHEW ONE TABLET BY MOUTH DAILY    ATORVASTATIN (LIPITOR) 40 MG TABLET    TAKE ONE TABLET BY MOUTH EVERY EVENING    CLOPIDOGREL (PLAVIX) 75 MG TABLET    TAKE ONE TABLET BY MOUTH DAILY    GLIPIZIDE (GLUCOTROL) 10 MG TABLET    TAKE ONE TABLET BY MOUTH EVERY MORNING FOR DIABETES    METFORMIN (GLUCOPHAGE) 1000 MG TABLET    TAKE ONE TABLET BY MOUTH TWICE A DAY    METOPROLOL TARTRATE (LOPRESSOR) 50 MG TABLET    Take 1 tablet by mouth 2 times daily    SILVER SULFADIAZINE (SILVADENE) 1 % CREAM    Apply topically to wounds daily. ALLERGIES     Patient has no known allergies.     FAMILYHISTORY       Family History   Problem Relation Age of Onset    Heart Attack Mother     Heart Disease Mother     Heart Attack Father     Cancer Father     Hearing Loss Father     Diabetes Father     Heart Disease Father     Heart Disease Paternal Uncle         bypass          SOCIAL HISTORY       Social History     Socioeconomic History    Marital status:      Spouse name: None    Number of children: None    Years of education: None    Highest education level: None   Occupational History    None   Tobacco Use    Smoking status: Never Smoker    Smokeless tobacco: Never Used   Vaping Use    Vaping Use: Never used   Substance and Sexual Activity    Alcohol use: Yes     Comment: whiskey  - weekly    Drug use: No    Sexual activity: Yes     Partners: Female   Other Topics Concern    None   Social History Narrative    None     Social Determinants of Health     Financial Resource Strain: Low Risk     Difficulty of Paying Living Expenses: Not very hard   Food Insecurity: No Food Insecurity    Worried About Running Out of Food in the Last Year: Never true    Jaye of Food in the Last Year: Never true   Transportation Needs:     Lack of Transportation (Medical): Not on file    Lack of Transportation (Non-Medical):  Not on file   Physical Activity:     Days of Exercise per Week: Not on file    Minutes of Exercise per Session: Not on file   Stress:     Feeling of Stress : Not on file   Social Connections:     Frequency of Communication with Friends and Family: Not on file    Frequency of Social Gatherings with Friends and Family: Not on file    Attends Hinduism Services: Not on file    Active Member of [address] [place] [address] or Organizations: Not on file    Attends Club or Organization Meetings: Not on file    Marital Status: Not on file   Intimate Partner Violence:     Fear of Current or Ex-Partner: Not on file    Emotionally Abused: Not on file    Physically Abused: Not on file    Sexually Abused: Not on file   Housing Stability:     Unable to Pay for Housing in the Last Year: Not on file    Number of [place] in the Last Year: Not on file    Unstable Housing in the Last Year: Not on file       SCREENINGS    Sree Coma Scale  Eye Opening: Spontaneous  Best Verbal Response: Oriented  Best Motor Response: Obeys commands  [place] Coma Scale Score: 15        PHYSICAL EXAM    (up to 7 for level 4, 8 or more for level 5)     ED Triage Vitals [06/11/22 1346]   BP Temp Temp Source Heart Rate Resp SpO2 Height Weight   (!) 178/78 98 °F (36.7 °C) Oral 75 20 100 % 5' 10\" (1.778 m) 250 lb (113.4 kg)       Physical Exam  PHYSICAL EXAM  BP (!) 178/78   Pulse 75   Temp 98 °F (36.7 °C) (Oral)   Resp 20   Ht 5' 10\" (1.778 m)   Wt 250 lb (113.4 kg)   SpO2 100%   BMI 35.87 kg/m²   GENERAL APPEARANCE: Awake and alert. Cooperative. Overweight adult male sitting upright in exam bed, he has nondiaphoretic, breathing comfortably on room air, showing no sign of acute respiratory distress. Seen and evaluated in room 16. HEAD: Normocephalic. Atraumatic. EYES: PERRL. EOM's grossly intact. ENT: Mucous membranes are moist.. NECK: Supple. HEART: RRR. No murmurs. Pulses 2+ symmetric DP pulses 2+ symmetric. LUNGS: Respirations unlabored. CTAB. Good air exchange. Speaking comfortably in full sentences. ABDOMEN: Soft. Protuberant but non-distended. Decreased but present abdominal sounds throughout. Non-tender. No masses. No organomegaly. No guarding or rebound. : Exam performed with nurse chaperone present. No external hemorrhoids or anal fissures. Sinker tone within normal limits. There is no stool in the rectal vault no palpable stool ball. No appreciated internal hemorrhoids or other masses. EXTREMITIES: No peripheral edema. Moves all extremities equally. All extremities neurovascularly intact. SKIN: Warm and dry. No acute rashes. NEUROLOGICAL: Alert and oriented. CN grossly intact. .  No gross facial drooping. Power intact to UE and LE, sensation intact x 4. No tremors or ataxia. PSYCHIATRIC: Normal mood and affect. DIAGNOSTIC RESULTS   LABS:    Labs Reviewed - No data to display    All other labs were within normal range or not returned as of this dictation. EKG: All EKG's are interpreted by the Emergency Department Physician who either signs orCo-signs this chart in the absence of a cardiologist.  Please see their note for interpretation of EKG.       RADIOLOGY:   Non-plain film images such as CT, Ultrasound and MRI are read by the radiologist. Plain radiographic images are visualized andpreliminarily interpreted by the  ED Provider with the below findings:        Interpretation perthe Radiologist below, if available at the time of this note:    No orders to display     No results found. PROCEDURES   Unless otherwise noted below, none     Procedures    CRITICAL CARE TIME   N/A    CONSULTS:  None      EMERGENCY DEPARTMENT COURSE and DIFFERENTIALDIAGNOSIS/MDM:   Vitals:    Vitals:    06/11/22 1346   BP: (!) 178/78   Pulse: 75   Resp: 20   Temp: 98 °F (36.7 °C)   TempSrc: Oral   SpO2: 100%   Weight: 250 lb (113.4 kg)   Height: 5' 10\" (1.778 m)       Patient was given thefollowing medications:  Medications - No data to display    PDMP Monitoring:    Last PDMP Kimberlyn Ala as Reviewed [facility]):  Review User Review Instant Review Result          Last Controlled Substance Monitoring Documentation      Orders Only from 6/10/2021 in [facility]   Periodic Controlled Substance Monitoring No signs of potential drug abuse or diversion identified. , Assessed functional status., Obtaining appropriate analgesic effect of treatment. filed at 06/10/2021 3224        Urine Drug Screenings (1 yr)    No resulted procedures found. Medication Contract and Consent for Opioid Use Documents Filed      No documents found                MDM:   Patient seen and evaluated. Old records reviewed. Diagnostic testing reviewed and results discussed. I have independently evaluated this patient based upon my scope of practice. Supervising physician was in the department for consultation as needed. 60-year-old male presents for evaluation of concern for constipation. Work-up in the department included blood work imaging. CBC reveals chronic appearing anemia at baseline. Metabolic panel is grossly unremarkable. Urinalysis reveals glucosuria, no sign of infection.   CT abdomen pelvis with IV contrast is negative for acute abnormality there is no evidence for bowel obstruction or significant stool burden. Appreciate typical bowel gas patterning throughout his CT. Patient had rectal exam which was reassuring. Patient was able to void urine without difficulty in the department, I have no concern for acute urinary retention. Patient is not complaining of new low back pain I have no concern for acute cauda equina or acute spinal emergency. I have no concern for acute toxicity or septicemia. Patient at this time is reasonable candidate for discharge home with continued bowel regimen and strict ER return precautions. Pt is in agreement with the current plan and all questions were addressed. Is this patient to be included in the SEP-1 Core Measure due to severe sepsis or septic shock? No   Exclusion criteria - the patient is NOT to be included for SEP-1 Core Measure due to: Infection is not suspected    Discharge Time out:  CC Reviewed Yes   Test Results Yes     Vitals:    06/11/22 1900   BP: (!) 161/86   Pulse: 70   Resp: 14   Temp:    SpO2: 100%        FINAL IMPRESSION      1. Constipation, unspecified constipation type          DISPOSITION/PLAN   DISPOSITION        PATIENT REFERREDTO:  No follow-up provider specified.     DISCHARGE MEDICATIONS:  New Prescriptions    No medications on file       DISCONTINUED MEDICATIONS:  Discontinued Medications    No medications on file              (Please note that portions ofthis note were completed with a voice recognition program.  Efforts were made to edit the dictations but occasionally words are mis-transcribed.)    Aayush Cole (electronically signed)        Aayush Cole  06/12/22 1640

## 2022-06-13 ENCOUNTER — OFFICE VISIT (OUTPATIENT)
Dept: FAMILY MEDICINE CLINIC | Age: 62
End: 2022-06-13
Payer: COMMERCIAL

## 2022-06-13 VITALS
BODY MASS INDEX: 35.5 KG/M2 | TEMPERATURE: 97.6 F | OXYGEN SATURATION: 98 % | HEART RATE: 87 BPM | WEIGHT: 248 LBS | DIASTOLIC BLOOD PRESSURE: 76 MMHG | HEIGHT: 70 IN | SYSTOLIC BLOOD PRESSURE: 151 MMHG

## 2022-06-13 DIAGNOSIS — R39.198 DIFFICULTY URINATING: ICD-10-CM

## 2022-06-13 DIAGNOSIS — K59.09 OTHER CONSTIPATION: Primary | ICD-10-CM

## 2022-06-13 DIAGNOSIS — F41.9 ANXIETY: ICD-10-CM

## 2022-06-13 DIAGNOSIS — Z85.46 HISTORY OF PROSTATE CANCER: ICD-10-CM

## 2022-06-13 PROCEDURE — 99214 OFFICE O/P EST MOD 30 MIN: CPT | Performed by: FAMILY MEDICINE

## 2022-06-13 ASSESSMENT — ENCOUNTER SYMPTOMS
STRIDOR: 0
WHEEZING: 0
BACK PAIN: 0
CONSTIPATION: 1
CHEST TIGHTNESS: 0
CHOKING: 0
ABDOMINAL PAIN: 0
COUGH: 0
SHORTNESS OF BREATH: 0

## 2022-06-13 NOTE — PROGRESS NOTES
Subjective:      Patient ID: Italo Wadsworth is a 64 y.o. male. ARMINDA EDWARDS is here after an emergency room visit for constipation. A CT of abdomen and pelvis done at the emergency room was normal without a large stool burden. He is also having difficulty urinating and saw his urologist last Wednesday who had no particular advice as to why Ulysses Shepard was having difficulty urinating. He is anxious. He has a history of prostate cancer treated by external radiation. Review of Systems   Constitutional: Negative for activity change, appetite change, chills, diaphoresis, fatigue, fever and unexpected weight change. Respiratory: Negative for cough, choking, chest tightness, shortness of breath, wheezing and stridor. Cardiovascular: Negative for chest pain, palpitations and leg swelling. Gastrointestinal: Positive for constipation. Negative for abdominal pain. Genitourinary: Positive for difficulty urinating. Musculoskeletal: Negative for arthralgias and back pain. Neurological: Negative for dizziness. All other systems reviewed and are negative. Objective:   Physical Exam  Vitals and nursing note reviewed. Constitutional:       Appearance: He is well-developed. HENT:      Head: Normocephalic and atraumatic. Right Ear: External ear normal.      Left Ear: External ear normal.      Nose: Nose normal.   Eyes:      Conjunctiva/sclera: Conjunctivae normal.      Pupils: Pupils are equal, round, and reactive to light. Neck:      Thyroid: No thyromegaly. Vascular: No JVD. Trachea: No tracheal deviation. Cardiovascular:      Rate and Rhythm: Normal rate and regular rhythm. Heart sounds: Normal heart sounds. No murmur heard. No friction rub. No gallop. Pulmonary:      Effort: Pulmonary effort is normal. No respiratory distress. Breath sounds: Normal breath sounds. No stridor. No wheezing or rales. Chest:      Chest wall: No tenderness.    Abdominal:      General: Bowel sounds are normal. There is no distension. Palpations: Abdomen is soft. There is no mass. Tenderness: There is no abdominal tenderness. There is no guarding or rebound. Comments: I performed a digital rectal exam and found no stool in the rectal vault. The stool on the glove was brown and normal in color. Musculoskeletal:         General: No tenderness. Normal range of motion. Cervical back: Normal range of motion and neck supple. Lymphadenopathy:      Cervical: No cervical adenopathy. Skin:     General: Skin is warm and dry. Coloration: Skin is not pale. Findings: No erythema or rash. Neurological:      Mental Status: He is alert and oriented to person, place, and time. Cranial Nerves: No cranial nerve deficit. Motor: No abnormal muscle tone. Coordination: Coordination normal.      Deep Tendon Reflexes: Reflexes are normal and symmetric. Reflexes normal.         Assessment / Plan:         1. Other constipation-digital rectal within normal limits. Abdomen is flat bowel sounds are present. There is no pain to palpation of the abdomen or deep palpation in the suprapubic area. I recommended he take several Fleet enemas and described how to do that comfortably. 2. Difficulty urinating-discussed at length. If this does not improve in 24 hours he will need to contact his urologist Dr. Margarito Welch      3. Anxiety-noted      4.  History of prostate cancer-noted

## 2022-06-20 ENCOUNTER — TELEPHONE (OUTPATIENT)
Dept: CARDIOLOGY CLINIC | Age: 62
End: 2022-06-20

## 2022-06-20 ENCOUNTER — HOSPITAL ENCOUNTER (OUTPATIENT)
Dept: WOUND CARE | Age: 62
Discharge: HOME | End: 2022-06-20
Payer: COMMERCIAL

## 2022-06-20 VITALS
SYSTOLIC BLOOD PRESSURE: 146 MMHG | BODY MASS INDEX: 36.65 KG/M2 | DIASTOLIC BLOOD PRESSURE: 69 MMHG | HEART RATE: 76 BPM | WEIGHT: 256 LBS | RESPIRATION RATE: 18 BRPM | HEIGHT: 70 IN | TEMPERATURE: 97.8 F

## 2022-06-20 DIAGNOSIS — E11.621 DIABETIC ULCER OF OTHER PART OF RIGHT FOOT ASSOCIATED WITH TYPE 2 DIABETES MELLITUS, WITH FAT LAYER EXPOSED (HCC): Primary | ICD-10-CM

## 2022-06-20 DIAGNOSIS — L97.512 DIABETIC ULCER OF OTHER PART OF RIGHT FOOT ASSOCIATED WITH TYPE 2 DIABETES MELLITUS, WITH FAT LAYER EXPOSED (HCC): Primary | ICD-10-CM

## 2022-06-20 PROCEDURE — 99212 OFFICE O/P EST SF 10 MIN: CPT

## 2022-06-20 RX ORDER — LIDOCAINE 40 MG/G
CREAM TOPICAL ONCE
Status: DISCONTINUED | OUTPATIENT
Start: 2022-06-20 | End: 2022-06-21 | Stop reason: HOSPADM

## 2022-06-20 RX ORDER — LIDOCAINE HYDROCHLORIDE 40 MG/ML
SOLUTION TOPICAL ONCE
Status: CANCELLED | OUTPATIENT
Start: 2022-06-20 | End: 2022-06-20

## 2022-06-20 RX ORDER — LIDOCAINE 50 MG/G
OINTMENT TOPICAL ONCE
Status: CANCELLED | OUTPATIENT
Start: 2022-06-20 | End: 2022-06-20

## 2022-06-20 RX ORDER — BACITRACIN ZINC AND POLYMYXIN B SULFATE 500; 1000 [USP'U]/G; [USP'U]/G
OINTMENT TOPICAL ONCE
Status: CANCELLED | OUTPATIENT
Start: 2022-06-20 | End: 2022-06-20

## 2022-06-20 RX ORDER — LIDOCAINE 40 MG/G
CREAM TOPICAL ONCE
Status: CANCELLED | OUTPATIENT
Start: 2022-06-20 | End: 2022-06-20

## 2022-06-20 NOTE — TELEPHONE ENCOUNTER
[facility] requesting cardiac clearance for unscheduled colonoscopy. Please advise cardiac risk and if pt can hold Plavix for 5 days prior and ASA the day of the procedure.      Last OV 7/15/21

## 2022-06-20 NOTE — PLAN OF CARE
Pt to the [facility] for follow up appointment. Wounds healed. Pt to continue to cover with dry dressing or band aid for the next 2-3 weeks for protection. Pt to follow up in the [facility] as need. Pt given Dr Dragan Diamond office number to call for roDischarge instructions reviewed with patient, all questions answered, copy given to patient. Dressings were applied to all wounds per M.D. Instructions at this visit. [facility]

## 2022-06-20 NOTE — TELEPHONE ENCOUNTER
Patient can proceed at low risk. Okay to hold Plavix 5 days prior.   Therapy prefer to aspirin be held throughout the procedure but holding the day of the procedure should be fine

## 2022-06-20 NOTE — LETTER
[address] Card[facility] - [address]  Phone: [phone]  Fax: [phone]    Yadira Alvarez MD        June 20, 2022    Nelida Riveradarshana   1960  [place] [address]      To whom it may concern:     Nelida Reddy may proceed with the upcoming colonoscopy at a low cardiac risk. The patient may hold the use of Plavix 5 days prior to this procedure. The patient may hold the use of Aspirin the day of this procedure. Please have the patient resume all medications as soon as it is medically safe to do so. If you have any questions or concerns, please don't hesitate to call.     Sincerely,        Yadira Alvarez MD

## 2022-06-21 NOTE — PROGRESS NOTES
[facility] Progress Note      Ramandeep Angulo     : 1960    DATE OF VISIT:  2022    Subjective:     Ramandeep Angulo is a 64 y.o. male who has a chief complaint of a diabetic ulcer located on the bilateral foot. Feeling well. Feet and wounds look good to him. No recent drainage. Mr. Felecia Bernard has a past medical history of Alcohol abuse, Cancer ([facility] Cellulitis, Coronary artery disease, COVID-19, Diabetic neuropathy associated with type 2 diabetes mellitus ([facility] Hyperlipidemia, Non morbid obesity, [facility]STEM[facility]non-ST elevated [facility] infarction) [facility] GURDEEP (obstructive sleep apnea), Reactive airway disease with wheezing, moderate persistent, with acute exacerbation, Sepsis ([facility] and Type II or unspecified type diabetes mellitus without mention of complication, not stated as uncontrolled. He has a past surgical history that includes Vein Surgery; Coronary angioplasty with stent (2016); Coronary artery bypass graft (2021); Coronary artery bypass graft (N/A, 2021); Bypass Graft (2021); Leg Surgery (N/A, 2021); and Hardware Removal (N/A, 2021). His family history includes Cancer in his father; Diabetes in his father; Hearing Loss in his father; Heart Attack in his father and mother; Heart Disease in his father, mother, and paternal uncle. Mr. Felecia Bernard reports that he has never smoked. He has never used smokeless tobacco. He reports current alcohol use. He reports that he does not use drugs. His current medication list consists of Fiber, Probiotic Product, acetaminophen, aspirin, atorvastatin, clopidogrel, docusate sodium, glipiZIDE, metFORMIN, metoprolol tartrate, and silver sulfADIAZINE. Allergies: Patient has no known allergies. Pertinent items from the review of systems are discussed in the HPI; the remainder of the ROS was reviewed and is negative.        Objective:     BP (!) 146/69   Pulse 76   Temp 97.8 °F (36.6 °C) (Oral) Resp 18   Ht 5' 10\" (1.778 m)   Wt 256 lb (116.1 kg)   BMI 36.73 kg/m²   General Appearance: alert and oriented to person, place and time, well developed and well- nourished, in no acute distress  Head: normocephalic and atraumatic  Eyes: pupils equal, round, and reactive to light, extraocular eye movements intact, conjunctivae normal  ENT: tympanic membrane, external ear and ear canal normal bilaterally, nose without deformity, nasal mucosa and turbinates normal without polyps  Neck: supple and non-tender without mass, no thyromegaly or thyroid nodules, no cervical lymphadenopathy  Pulmonary/Chest: clear to auscultation bilaterally- no wheezes, rales or rhonchi, normal air movement, no respiratory distress  Cardiovascular: normal rate, regular rhythm, normal S1 and S2, no murmurs, rubs, clicks, or gallops, distal pulses intact, no carotid bruits  Abdomen: soft, non-tender, non-distended, normal bowel sounds, no masses or organomegaly. Dorsalis pedis pulse left palpable  Posterior tibial pulse left palpable  Dorsalis pedis pulse right palpable  Posterior tibial pulse right palpable  Protective sensation decreased bilateral LE. Ulcer on the plantar aspect right hallux epithelialized    Ulcer on the plantar aspect left hallux epithelialized    Today's ulcer measurements are in the wound documentation flowsheet.      Wound measurements:  [REMOVED] Wound 05/16/22 #1, Right Great Toe, Diabetic Ulcer, Suazo 2, Onset 5/14/22-Wound Length (cm): 0 cm  [REMOVED] Wound 05/16/22 #2, Left Great Toe, Diabetic Ulcer, Suazo 2, Onset 5/14/22-Wound Length (cm): 0 cm    [REMOVED] Wound 05/16/22 #1, Right Great Toe, Diabetic Ulcer, Suazo 2, Onset 5/14/22-Wound Width (cm): 0 cm  [REMOVED] Wound 05/16/22 #2, Left Great Toe, Diabetic Ulcer, Suazo 2, Onset 5/14/22-Wound Width (cm): 0 cm    [REMOVED] Wound 05/16/22 #1, Right Great Toe, Diabetic Ulcer, Suazo 2, Onset 5/14/22-Wound Depth (cm): 0 cm  [REMOVED] Wound 05/16/22 #2, Left Great Toe, Diabetic Ulcer, Suazo 2, Onset 5/14/22-Wound Depth (cm): 0 cm    LABS  Lab Results   Component Value Date    LABA1C 8.8 05/16/2022         Assessment:     Patient Active Problem List   Diagnosis Code    Type II or unspecified type diabetes mellitus without mention of complication, not stated as uncontrolled E11.9    Diabetes mellitus E11.9    History of ETOH abuse F10.11    Patient overweight E66.3    Patient overweight E66.3    Dietary noncompliance Z91.11    Alcohol abuse F10.10    Flank pain R10.9    Prostatism N40.0    Low serum testosterone level R79.89    Arm pain, left M79.602    Shoulder pain M25.519    Precordial pain R07.2    Lung nodule R91.1    Acute angina (HCC) I20.9    Abnormal stress test R94.39    Acute coronary syndrome (HCC) I24.9    Coronary artery disease involving native coronary artery of native heart with unstable angina pectoris (HCC) I25.110    Other chest pain R07.89    S/P drug eluting coronary stent placement Z95.5    Essential hypertension I10    Obesity E66.9    Abnormal chest x-ray R93.89    Angina effort I20.8    Skin lesion L98.9    Angina at rest ([address] I20.8    Hyperlipidemia E78.5    Angina pectoris (HCC) I20.9    NSTEMI (non-ST elevated myocardial infarction) ([address] I21.4    Controlled type 2 diabetes mellitus without complication, without long-term current use of insulin (HCC) E11.9    ASHD (arteriosclerotic heart disease) I25.10    Mass of right foot R22.41    Overweight E66.3    History of angina pectoris Z86.79    Reactive airway disease J45.909    Bronchitis J40    Reactive airway disease with wheezing, moderate persistent, with acute exacerbation J45.41    Need for prophylactic vaccination and inoculation against varicella Z23    Need for prophylactic vaccination against diphtheria-tetanus-pertussis (DTP) Z23    Chronic right-sided thoracic back pain M54.6, G89.29    CAD in native artery I25.10    S/P PTCA (percutaneous transluminal coronary angioplasty) Z98.61    Chest pain R07.9    Ischemic cardiomyopathy I25.5    GURDEEP (obstructive sleep apnea) G47.33    Cellulitis L03.90    Sepsis due to methicillin susceptible Staphylococcus aureus (HCC) A41.01    Bacteremia due to methicillin susceptible Staphylococcus aureus (MSSA) R78.81, B95.61    Disruption or dehiscence of closure of sternum or sternotomy T81.32XA    Elevated sed rate R70.0    Type 2 diabetes mellitus with obesity (HCC) E11.69, E66.9    Diabetic ulcer of right foot associated with type 2 diabetes mellitus, with fat layer exposed ([facility] E11.621, L97.512       Assessment of today's active condition(s): diabetic foot ulcer plantar hallux bilateral, diabetes mellitus. Factors contributing to occurrence and/or persistence of the chronic ulcer include diabetes and chronic pressure. Sharp debridement is not indicated today, based upon the exam findings in the ulcer(s) above. Discharge plan:     Treatment in the wound care center today: [REMOVED] Wound 05/16/22 #1, Right Great Toe, Diabetic Ulcer, Suazo 2, Onset 5/14/22-Dressing/Treatment: Other (comment) (band aid)  [REMOVED] Wound 05/16/22 #2, Left Great Toe, Diabetic Ulcer, Suazo 2, Onset 5/14/22-Dressing/Treatment: Other (comment) (band aid). Home treatment: good handwashing before and after any dressing changes. Cleanse ulcer with saline or soap & water before dressing change. May use Vaseline (petrolatum), Aquaphor, Aveeno, CeraVe, Cetaphil, Eucerin, Lubriderm, etc for dry skin. Dressing type for home: Dry dressing to bilateral hallux daily for the next 1-2 weeks in order to allow the skin to strengthen. Written discharge instructions given to patient. Offload ulcer(s) as directed. Elevate leg(s) as directed. Follow up in [address] [place] [address],[address] as needed. Understands chance of recurrent wounds and will contact the office for reevaluation if new wound occur.        Electronically signed by Héctor Honeycutt Maximiliano, DPCK on 6/21/2022 at 5:17 PM.

## 2022-06-27 NOTE — PROGRESS NOTES

## 2022-06-27 NOTE — PROGRESS NOTES
What is a Surgical Site Infection or  (SSI)? A surgical site infection (SSI) is an infection that occurs after surgery in the part of the body where the surgery took place. Most patients who have surgery do not develop an infection. However, infections can develop in about 1-3 cases for every 100 patients who have had surgery. Our goal is for you to NOT experience any complications and be completely satisfied with your care! However, some signs or symptoms to look for and report immediately to your doctor are:   1. Fever above 101 degrees    2. Redness and increasing pain around the area  where you had surgery   3. Drainage of cloudy fluid or pus coming from the surgical area    Some of the things we/ you can do to prevent SSI's are:   1. Clean hands with soap and water or an alcohol-based hand rub before and after caring for the operative area. This occurs the day of surgery and for the next 2 weeks. 2.Sometimes you receive an appropriate antibiotic within 60 minutes before your surgery or take one for several days after surgery depending on your surgeon's instructions and/or the type of surgery you are having. 3. Family and/or friends who visit you should NOT touch the surgical wound or dressings until advised by your surgeon. 4. Be sure to elevate and decrease the swelling after your surgery to help prevent infection. 5. If you are a diabetic, you need to closely monitor your blood sugar levels and report any significant increases or changes to your surgeon to help promote the healing process.

## 2022-07-01 ENCOUNTER — ANESTHESIA EVENT (OUTPATIENT)
Dept: ENDOSCOPY | Age: 62
End: 2022-07-01
Payer: COMMERCIAL

## 2022-07-01 ENCOUNTER — HOSPITAL ENCOUNTER (OUTPATIENT)
Age: 62
Setting detail: OUTPATIENT SURGERY
Discharge: HOME | End: 2022-07-01
Attending: INTERNAL MEDICINE | Admitting: INTERNAL MEDICINE
Payer: COMMERCIAL

## 2022-07-01 ENCOUNTER — ANESTHESIA (OUTPATIENT)
Dept: ENDOSCOPY | Age: 62
End: 2022-07-01
Payer: COMMERCIAL

## 2022-07-01 VITALS
DIASTOLIC BLOOD PRESSURE: 67 MMHG | OXYGEN SATURATION: 99 % | RESPIRATION RATE: 16 BRPM | SYSTOLIC BLOOD PRESSURE: 134 MMHG | HEIGHT: 70 IN | WEIGHT: 249 LBS | HEART RATE: 65 BPM | BODY MASS INDEX: 35.65 KG/M2 | TEMPERATURE: 97 F

## 2022-07-01 DIAGNOSIS — Z12.11 COLON CANCER SCREENING: ICD-10-CM

## 2022-07-01 DIAGNOSIS — Z86.010 HISTORY OF COLONIC POLYPS: ICD-10-CM

## 2022-07-01 LAB
GLUCOSE BLD-MCNC: 174 MG/DL (ref 70–99)
PERFORMED ON: ABNORMAL

## 2022-07-01 PROCEDURE — 6360000002 HC RX W HCPCS: Performed by: NURSE ANESTHETIST, CERTIFIED REGISTERED

## 2022-07-01 PROCEDURE — 7100000011 HC PHASE II RECOVERY - ADDTL 15 MIN: Performed by: INTERNAL MEDICINE

## 2022-07-01 PROCEDURE — 3609010600 HC COLONOSCOPY POLYPECTOMY SNARE/COLD BIOPSY: Performed by: INTERNAL MEDICINE

## 2022-07-01 PROCEDURE — 2500000003 HC RX 250 WO HCPCS: Performed by: NURSE ANESTHETIST, CERTIFIED REGISTERED

## 2022-07-01 PROCEDURE — 7100000010 HC PHASE II RECOVERY - FIRST 15 MIN: Performed by: INTERNAL MEDICINE

## 2022-07-01 PROCEDURE — 88305 TISSUE EXAM BY PATHOLOGIST: CPT

## 2022-07-01 PROCEDURE — 3700000001 HC ADD 15 MINUTES (ANESTHESIA): Performed by: INTERNAL MEDICINE

## 2022-07-01 PROCEDURE — 2580000003 HC RX 258: Performed by: INTERNAL MEDICINE

## 2022-07-01 PROCEDURE — 2709999900 HC NON-CHARGEABLE SUPPLY: Performed by: INTERNAL MEDICINE

## 2022-07-01 PROCEDURE — 3700000000 HC ANESTHESIA ATTENDED CARE: Performed by: INTERNAL MEDICINE

## 2022-07-01 RX ORDER — PROPOFOL 10 MG/ML
INJECTION, EMULSION INTRAVENOUS PRN
Status: DISCONTINUED | OUTPATIENT
Start: 2022-07-01 | End: 2022-07-01 | Stop reason: SDUPTHER

## 2022-07-01 RX ORDER — LIDOCAINE HYDROCHLORIDE 10 MG/ML
INJECTION, SOLUTION INFILTRATION; PERINEURAL PRN
Status: DISCONTINUED | OUTPATIENT
Start: 2022-07-01 | End: 2022-07-01 | Stop reason: SDUPTHER

## 2022-07-01 RX ORDER — SODIUM CHLORIDE 0.9 % (FLUSH) 0.9 %
5-40 SYRINGE (ML) INJECTION PRN
Status: CANCELLED | OUTPATIENT
Start: 2022-07-01

## 2022-07-01 RX ORDER — OXYCODONE HYDROCHLORIDE 5 MG/1
10 TABLET ORAL PRN
Status: CANCELLED | OUTPATIENT
Start: 2022-07-01 | End: 2022-07-01

## 2022-07-01 RX ORDER — SODIUM CHLORIDE 0.9 % (FLUSH) 0.9 %
5-40 SYRINGE (ML) INJECTION EVERY 12 HOURS SCHEDULED
Status: CANCELLED | OUTPATIENT
Start: 2022-07-01

## 2022-07-01 RX ORDER — SODIUM CHLORIDE 9 MG/ML
INJECTION, SOLUTION INTRAVENOUS PRN
Status: CANCELLED | OUTPATIENT
Start: 2022-07-01

## 2022-07-01 RX ORDER — SODIUM CHLORIDE, SODIUM LACTATE, POTASSIUM CHLORIDE, CALCIUM CHLORIDE 600; 310; 30; 20 MG/100ML; MG/100ML; MG/100ML; MG/100ML
INJECTION, SOLUTION INTRAVENOUS CONTINUOUS
Status: DISCONTINUED | OUTPATIENT
Start: 2022-07-01 | End: 2022-07-01 | Stop reason: HOSPADM

## 2022-07-01 RX ORDER — ONDANSETRON 2 MG/ML
4 INJECTION INTRAMUSCULAR; INTRAVENOUS
Status: CANCELLED | OUTPATIENT
Start: 2022-07-01

## 2022-07-01 RX ORDER — MEPERIDINE HYDROCHLORIDE 50 MG/ML
12.5 INJECTION INTRAMUSCULAR; INTRAVENOUS; SUBCUTANEOUS EVERY 5 MIN PRN
Status: CANCELLED | OUTPATIENT
Start: 2022-07-01

## 2022-07-01 RX ORDER — DIPHENHYDRAMINE HYDROCHLORIDE 50 MG/ML
12.5 INJECTION INTRAMUSCULAR; INTRAVENOUS
Status: CANCELLED | OUTPATIENT
Start: 2022-07-01 | End: 2022-07-01

## 2022-07-01 RX ORDER — OXYCODONE HYDROCHLORIDE 5 MG/1
5 TABLET ORAL PRN
Status: CANCELLED | OUTPATIENT
Start: 2022-07-01 | End: 2022-07-01

## 2022-07-01 RX ORDER — LABETALOL HYDROCHLORIDE 5 MG/ML
5 INJECTION, SOLUTION INTRAVENOUS EVERY 10 MIN PRN
Status: CANCELLED | OUTPATIENT
Start: 2022-07-01

## 2022-07-01 RX ORDER — LIDOCAINE HYDROCHLORIDE 10 MG/ML
0.1 INJECTION, SOLUTION EPIDURAL; INFILTRATION; INTRACAUDAL; PERINEURAL ONCE
Status: DISCONTINUED | OUTPATIENT
Start: 2022-07-01 | End: 2022-07-01 | Stop reason: HOSPADM

## 2022-07-01 RX ADMIN — LIDOCAINE HYDROCHLORIDE 20 MG: 10 INJECTION, SOLUTION INFILTRATION; PERINEURAL at 12:29

## 2022-07-01 RX ADMIN — LIDOCAINE HYDROCHLORIDE 20 MG: 10 INJECTION, SOLUTION INFILTRATION; PERINEURAL at 12:39

## 2022-07-01 RX ADMIN — LIDOCAINE HYDROCHLORIDE 20 MG: 10 INJECTION, SOLUTION INFILTRATION; PERINEURAL at 12:41

## 2022-07-01 RX ADMIN — LIDOCAINE HYDROCHLORIDE 10 MG: 10 INJECTION, SOLUTION INFILTRATION; PERINEURAL at 12:30

## 2022-07-01 RX ADMIN — PROPOFOL 70 MG: 10 INJECTION, EMULSION INTRAVENOUS at 12:21

## 2022-07-01 RX ADMIN — LIDOCAINE HYDROCHLORIDE 20 MG: 10 INJECTION, SOLUTION INFILTRATION; PERINEURAL at 12:23

## 2022-07-01 RX ADMIN — LIDOCAINE HYDROCHLORIDE 20 MG: 10 INJECTION, SOLUTION INFILTRATION; PERINEURAL at 12:24

## 2022-07-01 RX ADMIN — LIDOCAINE HYDROCHLORIDE 10 MG: 10 INJECTION, SOLUTION INFILTRATION; PERINEURAL at 12:32

## 2022-07-01 RX ADMIN — LIDOCAINE HYDROCHLORIDE 10 MG: 10 INJECTION, SOLUTION INFILTRATION; PERINEURAL at 12:26

## 2022-07-01 RX ADMIN — LIDOCAINE HYDROCHLORIDE 20 MG: 10 INJECTION, SOLUTION INFILTRATION; PERINEURAL at 12:27

## 2022-07-01 RX ADMIN — LIDOCAINE HYDROCHLORIDE 20 MG: 10 INJECTION, SOLUTION INFILTRATION; PERINEURAL at 12:37

## 2022-07-01 RX ADMIN — LIDOCAINE HYDROCHLORIDE 50 MG: 10 INJECTION, SOLUTION INFILTRATION; PERINEURAL at 12:21

## 2022-07-01 RX ADMIN — LIDOCAINE HYDROCHLORIDE 30 MG: 10 INJECTION, SOLUTION INFILTRATION; PERINEURAL at 12:33

## 2022-07-01 RX ADMIN — LIDOCAINE HYDROCHLORIDE 30 MG: 10 INJECTION, SOLUTION INFILTRATION; PERINEURAL at 12:35

## 2022-07-01 RX ADMIN — LIDOCAINE HYDROCHLORIDE 10 MG: 10 INJECTION, SOLUTION INFILTRATION; PERINEURAL at 12:28

## 2022-07-01 RX ADMIN — LIDOCAINE HYDROCHLORIDE 20 MG: 10 INJECTION, SOLUTION INFILTRATION; PERINEURAL at 12:45

## 2022-07-01 RX ADMIN — SODIUM CHLORIDE, POTASSIUM CHLORIDE, SODIUM LACTATE AND CALCIUM CHLORIDE: 600; 310; 30; 20 INJECTION, SOLUTION INTRAVENOUS at 11:31

## 2022-07-01 RX ADMIN — LIDOCAINE HYDROCHLORIDE 30 MG: 10 INJECTION, SOLUTION INFILTRATION; PERINEURAL at 12:22

## 2022-07-01 RX ADMIN — LIDOCAINE HYDROCHLORIDE 20 MG: 10 INJECTION, SOLUTION INFILTRATION; PERINEURAL at 12:25

## 2022-07-01 RX ADMIN — LIDOCAINE HYDROCHLORIDE 20 MG: 10 INJECTION, SOLUTION INFILTRATION; PERINEURAL at 12:43

## 2022-07-01 RX ADMIN — LIDOCAINE HYDROCHLORIDE 20 MG: 10 INJECTION, SOLUTION INFILTRATION; PERINEURAL at 12:31

## 2022-07-01 ASSESSMENT — PAIN - FUNCTIONAL ASSESSMENT: PAIN_FUNCTIONAL_ASSESSMENT: 0-10

## 2022-07-01 NOTE — ANESTHESIA PRE PROCEDURE
Department of Anesthesiology  Preprocedure Note       Name:  Jesse Enriquez   Age:  64 y.o.  :  1960                                          MRN:  [id number]         Date:  2022      Surgeon: Margie Jorgensen):  Apoorva Kasper DO    Procedure: Procedure(s):  COLONOSCOPY    Medications prior to admission:   Prior to Admission medications    Medication Sig Start Date End Date Taking? Authorizing Provider   Probiotic Product (PROBIOTIC DIGESTIVE SUPP PO) Take by mouth daily    Historical Provider, MD   docusate sodium (COLACE) 100 mg capsule Take 1 capsule by mouth 2 times daily  Patient taking differently: Take 100 mg by mouth 2 times daily as needed for Constipation  22  SILVERIO Portillo   Calcium Polycarbophil (FIBER) 625 MG TABS Take 1 tablet by mouth daily 22   SILVERIO Portillo   metFORMIN (GLUCOPHAGE) 1000 MG tablet TAKE ONE TABLET BY MOUTH TWICE A DAY 22   Josefine Champagne Pena, DO   silver sulfADIAZINE (SILVADENE) 1 % cream Apply topically to wounds daily.  22   Uzma Santos DPM   atorvastatin (LIPITOR) 40 MG tablet TAKE ONE TABLET BY MOUTH EVERY EVENING 22   Josefine Champagne Pena, DO   clopidogrel (PLAVIX) 75 MG tablet TAKE ONE TABLET BY MOUTH DAILY 3/21/22   [facility], DO   glipiZIDE (GLUCOTROL) 10 MG tablet TAKE ONE TABLET BY MOUTH EVERY MORNING FOR DIABETES 22   Josefine Champagne Pena, DO   metoprolol tartrate (LOPRESSOR) 50 MG tablet Take 1 tablet by mouth 2 times daily 7/15/21   Aldean Blocker, MD   acetaminophen (TYLENOL) 500 MG tablet Take 500 mg by mouth every 6 hours as needed     Historical Provider, MD   aspirin 81 MG chewable tablet CHEW ONE TABLET BY MOUTH DAILY 16   Josefine Champagne Pena, DO       Current medications:    Current Facility-Administered Medications   Medication Dose Route Frequency Provider Last Rate Last Admin    lactated ringers infusion   IntraVENous Continuous Robel Campos DO        lidocaine PF 1 % injection 0.1 mL  0.1 mL IntraDERmal Once Nereida Bailey I25.5    GURDEEP (obstructive sleep apnea) G47.33    Cellulitis L03.90    Sepsis due to methicillin susceptible Staphylococcus aureus (HCC) A41.01    Bacteremia due to methicillin susceptible Staphylococcus aureus (MSSA) R78.81, B95.61    Disruption or dehiscence of closure of sternum or sternotomy T81.32XA    Elevated sed rate R70.0    Type 2 diabetes mellitus with obesity (HCC) E11.69, E66.9    Diabetic ulcer of right foot associated with type 2 diabetes mellitus, with fat layer exposed ([facility] W67.270, L97.512       Past Medical History:        Diagnosis Date    Alcohol abuse 12/03/2012    Cancer ([facility] 2022    Prostate    Cellulitis     sternum    Coronary artery disease     COVID-19     Diabetic neuropathy associated with type 2 diabetes mellitus (HCC)     Hyperlipidemia     Non morbid obesity     [facility]     GURDEEP (obstructive sleep apnea)     does not wear cpap machine    Reactive airway disease with wheezing, moderate persistent, with acute exacerbation 03/03/2020    Seps[facility] 04/2021    Type II or unspecified type diabetes mellitus without mention of complication, not stated as uncontrolled        Past Surgical History:        Procedure Laterality Date    BYPASS GRAFT  02/19/2021    COLONOSCOPY  2017    CORONARY ANGIOPLASTY WITH STENT PLACEMENT  1/14/2016 9/21/2020    1 [employer] Premier SAMUEL 2.25x16    CORONARY ARTERY BYPASS GRAFT  02/19/2021    CABG x2 & ALEX-Dr. Shahzad Mai    CORONARY ARTERY BYPASS GRAFT N/A 2/19/2021    CORONARY ARTERY BYPASS GRAFTING X2, INTERNAL MAMMARY ARTERY, SAPHENOUS VEIN GRAFT, ON PUMP WITH LEFT ATRIAL APPENDAGE CLIP, BILATERAL INTERCOSTAL NERVE BLOCK performed by Kimberlee Donato MD at [place] [address] N/A 4/28/2021    WOUND VAC AND DRESSING CHANGE WITH REMOVAL OF 4 STERNAL WIRES, DRAINAGE OF 2 INTERCOSTAL SPACE ABCESS performed by Dominic Lopez MD at [address] [place] [address] N/A 4/26/2021 DEBRIDEMENT OF STERNAL WOUND WITH WOUND VACUUM PLACEMENT performed by Han Joy MD at [facility] History:    Social History     Tobacco Use    Smoking status: Never Smoker    Smokeless tobacco: Never Used   Substance Use Topics    Alcohol use: Yes     Comment: whiskey  - weekly                                Counseling given: Not Answered      Vital Signs (Current):   Vitals:    06/27/22 1337 07/01/22 1114   BP:  (!) 162/91   Pulse:  73   Resp:  18   Temp:  97 °F (36.1 °C)   SpO2:  96%   Weight: 249 lb (112.9 kg) 249 lb (112.9 kg)   Height: 5' 10\" (1.778 m) 5' 10\" (1.778 m)                                              BP Readings from Last 3 Encounters:   07/01/22 (!) 162/91   06/20/22 (!) 146/69   06/13/22 (!) 151/76       NPO Status: Time of last liquid consumption: 0745                        Time of last solid consumption: 1800                        Date of last liquid consumption: 07/01/22                        Date of last solid food consumption: 06/28/22    BMI:   Wt Readings from Last 3 Encounters:   07/01/22 249 lb (112.9 kg)   06/20/22 256 lb (116.1 kg)   06/13/22 248 lb (112.5 kg)     Body mass index is 35.73 kg/m².     CBC:   Lab Results   Component Value Date/Time    WBC 7.2 06/11/2022 03:22 PM    RBC 4.35 06/11/2022 03:22 PM    HGB 13.3 06/11/2022 03:22 PM    HCT 39.8 06/11/2022 03:22 PM    MCV 91.5 06/11/2022 03:22 PM    RDW 14.0 06/11/2022 03:22 PM     06/11/2022 03:22 PM       CMP:   Lab Results   Component Value Date/Time     06/11/2022 03:22 PM    K 4.7 06/11/2022 03:22 PM    CL 99 06/11/2022 03:22 PM    CO2 26 06/11/2022 03:22 PM    BUN 14 06/11/2022 03:22 PM    CREATININE 0.7 06/11/2022 03:22 PM    GFRAA >60 06/11/2022 03:22 PM    AGRATIO 1.5 06/11/2022 03:22 PM    LABGLOM >60 06/11/2022 03:22 PM    GLUCOSE 194 06/11/2022 03:22 PM    PROT 6.7 06/11/2022 03:22 PM    CALCIUM 9.1 06/11/2022 03:22 PM    BILITOT 0.5 06/11/2022 03:22 PM    ALKPHOS 87 06/11/2022 03:22 PM    AST 15 06/11/2022 03:22 PM    ALT 15 06/11/2022 03:22 PM       POC Tests: No results for input(s): POCGLU, POCNA, POCK, POCCL, POCBUN, POCHEMO, POCHCT in the last 72 hours. Coags:   Lab Results   Component Value Date/Time    PROTIME 13.6 04/28/2021 05:15 PM    INR 1.17 04/28/2021 05:15 PM    APTT 30.4 02/19/2021 01:20 PM       HCG (If Applicable): No results found for: PREGTESTUR, PREGSERUM, HCG, HCGQUANT     ABGs:   Lab Results   Component Value Date/Time    PHART 7.319 02/19/2021 02:12 PM    PO2ART 108.5 02/19/2021 02:12 PM    CXT7XTP 42.4 02/19/2021 02:12 PM    ZZE0JYZ 21.8 02/19/2021 02:12 PM    BEART -4 02/19/2021 02:12 PM    W4VXMZRT 98 02/19/2021 02:12 PM        Type & Screen (If Applicable):  No results found for: LABABO, LABRH    Drug/Infectious Status (If Applicable):  No results found for: HIV, HEPCAB    COVID-19 Screening (If Applicable):   Lab Results   Component Value Date/Time    COVID19 NOT DETECTED 12/10/2020 11:49 AM           Anesthesia Evaluation  Patient summary reviewed no history of anesthetic complications:   Airway: Mallampati: II  TM distance: >3 FB   Neck ROM: full  Mouth opening: > = 3 FB   Dental: normal exam         Pulmonary:normal exam  breath sounds clear to auscultation  (+) sleep apnea:      (-) COPD and asthma                           Cardiovascular:  Exercise tolerance: good (>4 METS),   (+) hypertension:, angina:, past MI:, CAD: obstructive, CABG/stent: no interval change,     (-)  MARK      Rhythm: regular  Rate: normal                    Neuro/Psych:   (+) psychiatric history:   (-) seizures and TIA           GI/Hepatic/Renal:   (+) GERD: well controlled,      (-) liver disease and no renal disease       Endo/Other:    (+) Diabetes, . Abdominal:             Vascular: negative vascular ROS. Other Findings:        Pre-Operative Diagnosis: Colon cancer screening [Z12.11]; History of colonic polyps [Z86.010]    64 y.o.   BMI: Body mass index is 35.73 kg/m². Vitals:    06/27/22 1337 07/01/22 1114   BP:  (!) 162/91   Pulse:  73   Resp:  18   Temp:  97 °F (36.1 °C)   SpO2:  96%   Weight: 249 lb (112.9 kg) 249 lb (112.9 kg)   Height: 5' 10\" (1.778 m) 5' 10\" (1.778 m)       No Known Allergies    Social History     Tobacco Use    Smoking status: Never Smoker    Smokeless tobacco: Never Used   Substance Use Topics    Alcohol use: Yes     Comment: whiskey  - weekly       LABS:    CBC  Lab Results   Component Value Date/Time    WBC 7.2 06/11/2022 03:22 PM    HGB 13.3 (L) 06/11/2022 03:22 PM    HCT 39.8 (L) 06/11/2022 03:22 PM     06/11/2022 03:22 PM     RENAL  Lab Results   Component Value Date/Time     06/11/2022 03:22 PM    K 4.7 06/11/2022 03:22 PM    CL 99 06/11/2022 03:22 PM    CO2 26 06/11/2022 03:22 PM    BUN 14 06/11/2022 03:22 PM    CREATININE 0.7 (L) 06/11/2022 03:22 PM    GLUCOSE 194 (H) 06/11/2022 03:22 PM     COAGS  Lab Results   Component Value Date/Time    PROTIME 13.6 (H) 04/28/2021 05:15 PM    INR 1.17 (H) 04/28/2021 05:15 PM    APTT 30.4 02/19/2021 01:20 PM          Anesthesia Plan      MAC     ASA 3     (I discussed with the patient the risks and benefits of PIV, anesthesia, IV Narcotics, PACU. All questions were answered the patient agrees with the plan and wishes to proceed)  Induction: intravenous.                             Maria Esther Montejo MD   7/1/2022

## 2022-07-01 NOTE — H&P
[facility]  Outpatient Procedure H&P    Patient: Tatianna Jara MRN: [id number]     YOB: 1960  Age: 64 y.o. Sex: male    Unit: [facility] Room/Bed: [facility] Endo Pool/NONE Location: [address] [place] [address]     Procedure: Procedure(s):  COLONOSCOPY    Indication:CRCS  Referring  Physician:  Amna Bishop     Brief history: polyps 5 yrs ago    Nurses past medical history notes reviewed and agreed. Medications reviewed. Allergies: Patient has no known allergies.      Allergies noted: Yes     Past Medical History:   Past Medical History:   Diagnosis Date    Alcohol abuse 12/03/2012    Cancer ([facility] 2022    Prostate    Cellulitis     sternum    Coronary artery disease     COVID-19     Diabetic neuropathy associated with type 2 diabetes mellitus (HCC)     Hyperlipidemia     Non morbid obesity     NSTEMI (non-ST elevated myocardial infarction) ([facility]     GURDEEP (obstructive sleep apnea)     does not wear cpap machine    Reactive airway disease with wheezing, moderate persistent, with acute exacerbation 03/03/2020    Seps[facility] 04/2021    Type II or unspecified type diabetes mellitus without mention of complication, not stated as uncontrolled        Past Surgical History:   Past Surgical History:   Procedure Laterality Date    BYPASS GRAFT  02/19/2021    COLONOSCOPY  2017    CORONARY ANGIOPLASTY WITH STENT PLACEMENT  1/14/2016 9/21/2020    1 Promus Premier SAMUEL 2.25x16    CORONARY ARTERY BYPASS GRAFT  02/19/2021    CABG x2 & ALEX-Dr. Mclaughlin [facility]    CORONARY ARTERY BYPASS GRAFT N/A 2/19/2021    CORONARY ARTERY BYPASS GRAFTING X2, INTERNAL MAMMARY ARTERY, SAPHENOUS VEIN GRAFT, ON PUMP WITH LEFT ATRIAL APPENDAGE CLIP, BILATERAL INTERCOSTAL NERVE BLOCK performed by Sammy Layne MD at [place] [address] N/A 4/28/2021    WOUND VAC AND DRESSING CHANGE WITH REMOVAL OF 4 STERNAL WIRES, DRAINAGE OF 2 INTERCOSTAL SPACE ABCESS performed by Karan Yang MD at [address] [place] [address] N/A 4/26/2021    DEBRIDEMENT OF STERNAL WOUND WITH WOUND VACUUM PLACEMENT performed by Sammy Layne MD at [address] History:   Social History     Socioeconomic History    Marital status:      Spouse name: Not on file    Number of children: Not on file    Years of education: Not on file    Highest education level: Not on file   Occupational History    Not on file   Tobacco Use    Smoking status: Never Smoker    Smokeless tobacco: Never Used   Vaping Use    Vaping Use: Never used   Substance and Sexual Activity    Alcohol use: Yes     Comment: whiskey  - weekly    Drug use: No    Sexual activity: Yes     Partners: Female   Other Topics Concern    Not on file   Social History Narrative    Not on file     Social Determinants of Health     Financial Resource Strain: Low Risk     Difficulty of Paying Living Expenses: Not very hard   Food Insecurity: No Food Insecurity    Worried About Running Out of Food in the Last Year: Never true    Jaye of Food in the Last Year: Never true   Transportation Needs:     Lack of Transportation (Medical): Not on file    Lack of Transportation (Non-Medical):  Not on file   Physical Activity:     Days of Exercise per Week: Not on file    Minutes of Exercise per Session: Not on file   Stress:     Feeling of Stress : Not on file   Social Connections:     Frequency of Communication with Friends and Family: Not on file    Frequency of Social Gatherings with Friends and Family: Not on file    Attends Anabaptist Services: Not on file    Active Member of Clubs or Organizations: Not on file    Attends Club or Organization Meetings: Not on file    Marital Status: Not on file   Intimate Partner Violence:     Fear of Current or Ex-Partner: Not on file    Emotionally Abused: Not on file    Physically Abused: Not on file    Sexually Abused: Not on file   Housing Stability:     Unable to Pay for Housing in the Last Year: Not on [employer] 06/11/2022 >60  >60 Final    Calcium 06/11/2022 9.1  8.3 - 10.6 mg/dL Final    Total Protein 06/11/2022 6.7  6.4 - 8.2 g/dL Final    Albumin 06/11/2022 4.0  3.4 - 5.0 g/dL Final    Albumin/Globulin Ratio 06/11/2022 1.5  1.1 - 2.2 Final    Total Bilirubin 06/11/2022 0.5  0.0 - 1.0 mg/dL Final    Alkaline Phosphatase 06/11/2022 87  40 - 129 U/L Final    ALT 06/11/2022 15  10 - 40 U/L Final    AST 06/11/2022 15  15 - 37 U/L Final    Color, UA 06/11/2022 Yellow  Straw/Yellow Final    Clarity, UA 06/11/2022 Clear  Clear Final    Glucose, Ur 06/11/2022 >=1000* Negative mg/dL Final    Bilirubin Urine 06/11/2022 Negative  Negative Final    Ketones, Urine 06/11/2022 Negative  Negative mg/dL Final    Specific Gravity, UA 06/11/2022 1.025  1.005 - 1.030 Final    Blood, Urine 06/11/2022 Negative  Negative Final    pH, UA 06/11/2022 6.0  5.0 - 8.0 Final    Protein, UA 06/11/2022 Negative  Negative mg/dL Final    Urobilinogen, Urine 06/11/2022 0.2  <2.0 E.U./dL Final    Nitrite, Urine 06/11/2022 Negative  Negative Final    Leukocyte Esterase, Urine 06/11/2022 Negative  Negative Final    Microscopic Examination 06/11/2022 Not Indicated   Final    Urine Type 06/11/2022 NotGiven   Final    Urine Reflex to Culture 06/11/2022 Not Indicated   Final   Office Visit on 05/16/2022   Component Date Value Ref Range Status    Hemoglobin A1C 05/16/2022 8.8  % Final   Hospital Outpatient Visit on 04/11/2022   Component Date Value Ref Range Status    PSA 04/11/2022 0.59  0.00 - 4.00 ng/mL Final        Imaging:  No orders to display       ASA:3    Mallampati Score: III    Sedation planned:MAC    Patient in acceptable condition for procedure:Yes    12:15 PM 7/1/2022    Kayla Gutierrez, DO      Please note that some or all of this record was generated using voice recognition software.  If there are any questions about the content of this document, please contact the author as some errors in transcription may have occurred. The patient and I discussed that this is an elective procedure/surgery. We discussed the risks of the procedure/surgery, including but not limited to what is outlined in the signed informed consent. We also discussed the risk of terrell COVID 19 while in the facility. We discussed the increased risk of a bad outcome should the patient contract COVID 19 during the post-procedural/post-operative period, given the patients current health condition, chronic conditions, and the added risk of COVID 19 in light of these conditions. The patient and I also discussed the risk of further postponing the procedure/surgery and other treatment alternatives, including non-procedural/surgical treatments. Understanding all of the risks, benefits, and alternatives, the patient made an informed decision to proceed with the procedure/surgery.

## 2022-07-01 NOTE — ANESTHESIA POSTPROCEDURE EVALUATION
Department of Anesthesiology  Postprocedure Note    Patient: Tiffanie Beck  MRN: [id number]  YOB: 1960  Date of evaluation: 7/1/2022      Procedure Summary     Date: 07/01/22 Room / Location: [facility] [address] [facility] / [facility] [address] [facility]    Anesthesia Start: 2478 Anesthesia Stop: 6834    Procedure: COLONOSCOPY POLYPECTOMY SNARE/COLD BIOPSY (N/A ) Diagnosis:       Colon cancer screening      History of colonic polyps      (Colon cancer screening [Z12.11] History of colonic polyps [Z86.010])    Surgeons: Em Herndon DO Responsible Provider: Walt Braden MD    Anesthesia Type: MAC ASA Status: 3          Anesthesia Type: No value filed.     Una Phase I: Una Score: 10    Una Phase II: Una Score: 9      Anesthesia Post Evaluation    Comments: Postoperative Anesthesia Note    Name:    Tiffanie Beck  MRN:      [id number]    Patient Vitals in the past 12 hrs:  07/01/22 1251, BP:122/64, Pulse:72, Resp:16, SpO2:99 %  07/01/22 1114, BP:(!) 162/91, Temp:97 °F (36.1 °C), Pulse:73, Resp:18, SpO2:96 %, Height:5' 10\" (1.778 m), Weight:249 lb (112.9 kg)     LABS:    CBC  Lab Results       Component                Value               Date/Time                  WBC                      7.2                 06/11/2022 03:22 PM        HGB                      13.3 (L)            06/11/2022 03:22 PM        HCT                      39.8 (L)            06/11/2022 03:22 PM        PLT                      233                 06/11/2022 03:22 PM   RENAL  Lab Results       Component                Value               Date/Time                  NA                       136                 06/11/2022 03:22 PM        K                        4.7                 06/11/2022 03:22 PM        CL                       99                  06/11/2022 03:22 PM        CO2                      26                  06/11/2022 03:22 PM        BUN                      14                  06/11/2022 03:22 PM        CREATININE

## 2022-07-01 NOTE — PROCEDURES
COLONOSCOPY     Patient: Marvene Collet MRN: [id number]   YOB: 1960 Age: 64 y.o. Sex: male   Unit: [facility] Room/Bed: [facility] Endo Pool/NONE Location: [address]    Admitting Physician: Arti Quick     Primary Care Physician: Barrington Leventhal, DO      DATE OF PROCEDURE: 7/1/2022  PROCEDURE: Colonoscopy    PREOPERATIVE DIAGNOSIS: Colon cancer screening [Z12.11] History of colonic polyps [Z86.010]  HPI: This is a 64y.o. year old male who presents today with survallance. ENDOSCOPIST: Claude Corrigan Debo, DO    POSTOPERATIVE DIAGNOSIS:    4 mm and 5 mm sessile polyps in the hepatic flexure  Poor colon prep    PLAN:   Repeat in 5 years  Call for biopsies in 1 week  INFORMED CONSENT:  Informed consent for colonoscopy was obtained. The benefits and risks including adverse medicine reaction and perforation have been explained. The patient's questions were answered and the patient agreed to proceed. ASA: ASA 3 - Patient with moderate systemic disease with functional limitations     SEDATION: MAC    The patient's vital signs, cardiac status, pulmonary status, abdominal status and mental status were stable for the procedure. The patient's vital signs and respiratory function as monitored by oxygen saturation remained stable. COLON PREPARATION:  The patient was given a split colon preparation and the preparation was inadequate. Procedure Details: An anal exam was performed and this was unremarkable. A digital rectal exam was performed and no masses palpated. The Olympus videocolonoscope  was inserted in the rectum and carefully advanced to the cecum as identified by IC valve, crow's foot appearance and appendix. The cecum was photodocumented. The colonoscope was slowly withdrawn and retrograde examination of the colon was carefully performed with inspection around and between folds.  The ascending colon and cecum were intubated twice with repeat antegrade and retrograde examination. Retroflexion in the rectum was performed.    Cecum Intubated: Yes    Findings: 4 mm and 5 mm sessile polyps in the hepatic flexure  Poor colon prep        Estimated Blood Loss:  None  Complications: None    Signed By: Karla Lowry DO

## 2022-07-11 NOTE — RESULT ENCOUNTER NOTE
Polyps consistent with adenomatous polyps. Patient had a poor preparation of the colon. I would recommend repeating the colonoscopy in 5 years. -mom

## 2022-07-14 NOTE — PROGRESS NOTES
[address] Progress Note      Amarilys Briggs     : 1960    DATE OF VISIT:  2022    Subjective:     Amarilys Briggs is a 64 y.o. male who has a chief complaint of a diabetic ulcer located on the bilateral foot. Feeling well. Feet and wounds look better to him. Mr. Queen Gilmore has a past medical history of Alcohol abuse, Cancer ([facility] Cellulitis, Coronary artery disease, COVID-19, Diabetic neuropathy associated with type 2 diabetes mellitus ([facility] Hyperlipidemia, Non morbid obesity, NSTEMI (non-ST elevated myocardial infarction) ([facility] GURDEEP (obstructive sleep apnea), Reactive airway disease with wheezing, moderate persistent, with acute exacerbation, Sepsis ([facility] and Type II or unspecified type diabetes mellitus without mention of complication, not stated as uncontrolled. He has a past surgical history that includes Vein Surgery; Coronary angioplasty with stent (2016); Coronary artery bypass graft (2021); Coronary artery bypass graft (N/A, 2021); Bypass Graft (2021); Leg Surgery (N/A, 2021); Hardware Removal (N/A, 2021); Colonoscopy (); and Colonoscopy (N/A, 2022). His family history includes Cancer in his father; Diabetes in his father; Hearing Loss in his father; Heart Attack in his father and mother; Heart Disease in his father, mother, and paternal uncle. Mr. Queen Gilmore reports that he has never smoked. He has never used smokeless tobacco. He reports current alcohol use. He reports that he does not use drugs. His current medication list consists of Fiber, Probiotic Product, acetaminophen, aspirin, atorvastatin, clopidogrel, glipiZIDE, metFORMIN, metoprolol tartrate, and silver sulfADIAZINE. Allergies: Patient has no known allergies. Pertinent items from the review of systems are discussed in the HPI; the remainder of the ROS was reviewed and is negative.        Objective:     BP (!) 161/74   Pulse 81   Temp 97.9 °F (36.6 °C) (Oral)   Resp 18   Ht 5' 10\" (1.778 m)   Wt 256 lb (116.1 kg)   BMI 36.73 kg/m²   General Appearance: alert and oriented to person, place and time, well developed and well- nourished, in no acute distress  Head: normocephalic and atraumatic  Eyes: pupils equal, round, and reactive to light, extraocular eye movements intact, conjunctivae normal  ENT: tympanic membrane, external ear and ear canal normal bilaterally, nose without deformity, nasal mucosa and turbinates normal without polyps  Neck: supple and non-tender without mass, no thyromegaly or thyroid nodules, no cervical lymphadenopathy  Pulmonary/Chest: clear to auscultation bilaterally- no wheezes, rales or rhonchi, normal air movement, no respiratory distress  Cardiovascular: normal rate, regular rhythm, normal S1 and S2, no murmurs, rubs, clicks, or gallops, distal pulses intact, no carotid bruits  Abdomen: soft, non-tender, non-distended, normal bowel sounds, no masses or organomegaly. Dorsalis pedis pulse left palpable  Posterior tibial pulse left palpable  Dorsalis pedis pulse right palpable  Posterior tibial pulse right palpable  Protective sensation decreased bilateral LE. Ulcer on the plantar aspect right hallux with mild fibrotic tissue, red granulation tissue, mild serous drainage, no hyperkeratotic rim, no undermining, no tunneling, no purulence, no malodor, no eschar, no periwound maceration, mild periwound erythema, mild edema, no crepitus, no increase in skin temperature, ulcer probes to soft tissue only    Ulcer on the plantar aspect left hallux with mild fibrotic tissue, red granulation tissue, mild serous drainage, no hyperkeratotic rim, no undermining, no tunneling, no purulence, no malodor, no eschar, no periwound maceration, mild periwound erythema, mild edema, no crepitus, no increase in skin temperature, ulcer probes to soft tissue only    Today's ulcer measurements are in the wound documentation flowsheet.      Wound measurements:  [REMOVED] Wound 05/16/22 #1, Right Great Toe, Diabetic Ulcer, Suazo 2, Onset 5/14/22-Wound Length (cm): 0.4 cm  [REMOVED] Wound 05/16/22 #2, Left Great Toe, Diabetic Ulcer, Suazo 2, Onset 5/14/22-Wound Length (cm): 0.1 cm    [REMOVED] Wound 05/16/22 #1, Right Great Toe, Diabetic Ulcer, Suazo 2, Onset 5/14/22-Wound Width (cm): 0.3 cm  [REMOVED] Wound 05/16/22 #2, Left Great Toe, Diabetic Ulcer, Suazo 2, Onset 5/14/22-Wound Width (cm): 0.1 cm    [REMOVED] Wound 05/16/22 #1, Right Great Toe, Diabetic Ulcer, Suazo 2, Onset 5/14/22-Wound Depth (cm): 0.1 cm  [REMOVED] Wound 05/16/22 #2, Left Great Toe, Diabetic Ulcer, Suazo 2, Onset 5/14/22-Wound Depth (cm): 0.1 cm    LABS  Lab Results   Component Value Date    LABA1C 8.8 05/16/2022         Assessment:     Patient Active Problem List   Diagnosis Code    Type II or unspecified type diabetes mellitus without mention of complication, not stated as uncontrolled E11.9    Diabetes mellitus E11.9    History of ETOH abuse F10.11    Patient overweight E66.3    Patient overweight E66.3    Dietary noncompliance Z91.11    Alcohol abuse F10.10    Flank pain R10.9    Prostatism N40.0    Low serum testosterone level R79.89    Arm pain, left M79.602    Shoulder pain M25.519    Precordial pain R07.2    Lung nodule R91.1    Acute angina (HCC) I20.9    Abnormal stress test R94.39    Acute coronary syndrome (HCC) I24.9    Coronary artery disease involving native coronary artery of native heart with unstable angina pectoris (HCC) I25.110    Other chest pain R07.89    S/P drug eluting coronary stent placement Z95.5    Essential hypertension I10    Obesity E66.9    Abnormal chest x-ray R93.89    Angina effort I20.8    Skin lesion L98.9    Angina at rest ([facility] I20.8    Hyperlipidemia E78.5    Angina pectoris (HCC) I20.9    NSTEMI (non-ST elevated myocardial infarction) ([facility] I21.4    Controlled type 2 diabetes mellitus without complication, without long-term current use of insulin (HCC) E11.9    ASHD (arteriosclerotic heart disease) I25.10    Mass of right foot R22.41    Overweight E66.3    History of angina pectoris Z86.79    Reactive airway disease J45.909    Bronchitis J40    Reactive airway disease with wheezing, moderate persistent, with acute exacerbation J45.41    Need for prophylactic vaccination and inoculation against varicella Z23    Need for prophylactic vaccination against diphtheria-tetanus-pertussis (DTP) Z23    Chronic right-sided thoracic back pain M54.6, G89.29    CAD in native artery I25.10    S/P PTCA (percutaneous transluminal coronary angioplasty) Z98.61    Chest pain R07.9    Ischemic cardiomyopathy I25.5    GURDEEP (obstructive sleep apnea) G47.33    Cellulitis L03.90    Sepsis due to methicillin susceptible Staphylococcus aureus (HCC) A41.01    Bacteremia due to methicillin susceptible Staphylococcus aureus (MSSA) R78.81, B95.61    Disruption or dehiscence of closure of sternum or sternotomy T81.32XA    Elevated sed rate R70.0    Type 2 diabetes mellitus with obesity (HCC) E11.69, E66.9    Diabetic ulcer of right foot associated with type 2 diabetes mellitus, with fat layer exposed ([facility] E11.621, L97.512       Assessment of today's active condition(s): cellulitis bilateral foot, diabetic foot ulcer plantar hallux bilateral, diabetes mellitus. Factors contributing to occurrence and/or persistence of the chronic ulcer include diabetes and chronic pressure. Sharp debridement is not indicated today, based upon the exam findings in the ulcer(s) above.       Discharge plan:     Treatment in the wound care center today: [REMOVED] Wound 05/16/22 #1, Right Great Toe, Diabetic Ulcer, Suazo 2, Onset 5/14/22-Dressing/Treatment: Other (comment) (antibiotic ointment bandaid)  [REMOVED] Wound 05/16/22 #2, Left Great Toe, Diabetic Ulcer, Suazo 2, Onset 5/14/22-Dressing/Treatment: Other (comment) (antibiotic ointment bandaid). Home treatment: good handwashing before and after any dressing changes. Cleanse ulcer with saline or soap & water before dressing change. May use Vaseline (petrolatum), Aquaphor, Aveeno, CeraVe, Cetaphil, Eucerin, Lubriderm, etc for dry skin. Dressing type for home: Silvadene and dry dressing to bilateral hallux daily. Written discharge instructions given to patient. Offload ulcer(s) as directed. Elevate leg(s) as directed. Follow up in 2 weeks. Wounds are improving.         Electronically signed by Mayito Lyons DPM on 7/14/2022 at 8:37 AM.

## 2022-07-21 NOTE — CARE COORDINATION
Eron Franco Patient Age: 89 year old  MESSAGE:   Patient is scheduled for Exc on 8-2-2022. Please call patient to hold blood thinners. Please advise       WEIGHT AND HEIGHT:   Wt Readings from Last 1 Encounters:   06/06/22 76.2 kg (168 lb)     Ht Readings from Last 1 Encounters:   06/06/22 5' 5\" (1.651 m)     BMI Readings from Last 1 Encounters:   06/06/22 27.96 kg/m²       ALLERGIES:  Hydrochlorothiazide, Digoxin, Naproxen, Pantoprazole, Amoxicillin, Amoxicillin-pot clavulanate, Lansoprazole, Lovastatin, Protonix, and Simvastatin  Current Outpatient Medications   Medication Sig Dispense Refill   • metoPROLOL succinate (TOPROL-XL) 100 MG 24 hr tablet TAKE ONE TABLET BY MOUTH ONCE DAILY 30 tablet 5   • bumetanide (BUMEX) 1 MG tablet TAKE ONE TABLET BY MOUTH EVERY OTHER DAY 45 tablet 1   • famotidine (PEPCID) 40 MG tablet Take 1 tablet by mouth at bedtime. 90 tablet 1   • colchicine (COLCRYS) 0.6 MG tablet Take 1 tablet by mouth daily. 90 tablet 1   • clopidogrel (PLAVIX) 75 MG tablet TAKE ONE TABLET BY MOUTH once daily 90 tablet 0   • atorvastatin (LIPITOR) 20 MG tablet TAKE ONE TABLET BY MOUTH EVERY NIGHT AT BEDTIME WITH A 40 MG TABLET 30 tablet 5   • tamsulosin (FLOMAX) 0.4 MG Cap TAKE ONE CAPSULE BY MOUTH EVERY NIGHT AT BEDTIME 35 capsule 6   • atorvastatin (LIPITOR) 40 MG tablet TAKE ONE TABLET BY MOUTH EVERY NIGHT AT BEDTIME WITH A 20 MG TABLET 30 tablet 5   • apixaBAN (Eliquis) 2.5 MG Tab Take 1 tablet by mouth 2 times daily. 180 tablet 3   • Multiple Vitamin (Multi-Vitamin) tablet Take 1 tablet by mouth daily.      • fexofenadine (ALLEGRA) 180 MG tablet Take 180 mg by mouth as needed.      • vitamin E 100 units capsule Take 100 Units by mouth daily.      • Saw Palmetto 1000 MG Cap 1 tab daily       No current facility-administered medications for this visit.     [facility] - [place], [place] - [address] [place] [address].  [address] [place] [address].  [facility] [address]  Phone: [phone] Fax: [phone]  PHARMACY  [facility]    DC order noted, all docs needed have been faxed to [facility] for home care services.     Home care to see patient within 24-48 hrs    Cat Hampton RN, BSN CTN  [facility] [phone] to use:           Pharmacy preference(s) on file:   [facility] - [place], [place] - [address] [facility].  [address] [facility] [address]  Phone: [phone] Fax: [phone]      CALL BACK INFO: Ok to leave response (including medical information) with family member or on answering machine  ROUTING: Patient's physician/staff        PCP: Roman Dreyer, MD         INS: Payor: MEDICARE / Plan: PARTA AND B / Product Type: MEDICARE   PATIENT ADDRESS:  [address] [place] [id number]

## 2022-07-22 RX ORDER — GLIPIZIDE 10 MG/1
TABLET ORAL
Qty: 30 TABLET | Refills: 5 | Status: SHIPPED | OUTPATIENT
Start: 2022-07-22 | End: 2022-08-09 | Stop reason: SDUPTHER

## 2022-07-22 NOTE — TELEPHONE ENCOUNTER
Future Appointments   Date Time Provider Abdirahman Craft   8/9/2022  8:30  DO CAYLA Oliveira - [facility]   6/13/2022

## 2022-07-26 RX ORDER — METOPROLOL TARTRATE 50 MG/1
TABLET, FILM COATED ORAL
Qty: 180 TABLET | Refills: 0 | Status: SHIPPED | OUTPATIENT
Start: 2022-07-26 | End: 2022-10-21 | Stop reason: SDUPTHER

## 2022-07-26 NOTE — TELEPHONE ENCOUNTER
Please schedule first available OV with JJP or NP for med refills, then route back to board. Thank you!

## 2022-08-04 LAB — PSA, TOTAL: 0.42 NG/ML (ref 0–4)

## 2022-08-09 ENCOUNTER — OFFICE VISIT (OUTPATIENT)
Dept: FAMILY MEDICINE CLINIC | Age: 62
End: 2022-08-09
Payer: COMMERCIAL

## 2022-08-09 VITALS
HEIGHT: 70 IN | BODY MASS INDEX: 35.36 KG/M2 | DIASTOLIC BLOOD PRESSURE: 79 MMHG | HEART RATE: 78 BPM | TEMPERATURE: 96.8 F | OXYGEN SATURATION: 98 % | SYSTOLIC BLOOD PRESSURE: 137 MMHG | WEIGHT: 247 LBS

## 2022-08-09 DIAGNOSIS — I10 PRIMARY HYPERTENSION: ICD-10-CM

## 2022-08-09 DIAGNOSIS — E66.01 CLASS 3 SEVERE OBESITY DUE TO EXCESS CALORIES WITH SERIOUS COMORBIDITY AND BODY MASS INDEX (BMI) OF 45.0 TO 49.9 IN ADULT (HCC): ICD-10-CM

## 2022-08-09 DIAGNOSIS — E11.9 TYPE 2 DIABETES MELLITUS WITHOUT COMPLICATION, WITHOUT LONG-TERM CURRENT USE OF INSULIN (HCC): Primary | ICD-10-CM

## 2022-08-09 DIAGNOSIS — Z91.199 NONCOMPLIANCE: ICD-10-CM

## 2022-08-09 LAB — HBA1C MFR BLD: 8.6 %

## 2022-08-09 PROCEDURE — 99214 OFFICE O/P EST MOD 30 MIN: CPT | Performed by: FAMILY MEDICINE

## 2022-08-09 PROCEDURE — 3052F HG A1C>EQUAL 8.0%<EQUAL 9.0%: CPT | Performed by: FAMILY MEDICINE

## 2022-08-09 PROCEDURE — 83036 HEMOGLOBIN GLYCOSYLATED A1C: CPT | Performed by: FAMILY MEDICINE

## 2022-08-09 RX ORDER — GLIPIZIDE 10 MG/1
TABLET ORAL
Qty: 90 TABLET | Refills: 1 | Status: SHIPPED | OUTPATIENT
Start: 2022-08-09

## 2022-08-09 ASSESSMENT — ENCOUNTER SYMPTOMS
SHORTNESS OF BREATH: 0
CHOKING: 0
STRIDOR: 0
WHEEZING: 0
COUGH: 0
ABDOMINAL PAIN: 0
CHEST TIGHTNESS: 0
BACK PAIN: 0

## 2022-08-09 NOTE — PROGRESS NOTES
Subjective:      Patient ID: Halima Becerra is a 64 y.o. male. ARMINDA Tinoco is here for follow-up on type 2 diabetes whose control is better than last visit but still not good. He has not been checking his sugars at home. He remains obese. His blood pressure is well controlled. He has been working full-time and not had issues with shortness of breath or chest pain with exertion. Review of Systems   Constitutional:  Negative for activity change, appetite change, chills, diaphoresis, fatigue, fever and unexpected weight change. Respiratory:  Negative for cough, choking, chest tightness, shortness of breath, wheezing and stridor. Cardiovascular:  Negative for chest pain, palpitations and leg swelling. Gastrointestinal:  Negative for abdominal pain. Genitourinary:  Negative for difficulty urinating. Musculoskeletal:  Negative for arthralgias and back pain. Neurological:  Negative for dizziness. All other systems reviewed and are negative. Objective:   Physical Exam  Vitals and nursing note reviewed. Constitutional:       Appearance: He is well-developed. HENT:      Head: Normocephalic and atraumatic. Right Ear: External ear normal.      Left Ear: External ear normal.      Nose: Nose normal.   Eyes:      Conjunctiva/sclera: Conjunctivae normal.      Pupils: Pupils are equal, round, and reactive to light. Neck:      Thyroid: No thyromegaly. Vascular: No JVD. Trachea: No tracheal deviation. Cardiovascular:      Rate and Rhythm: Normal rate and regular rhythm. Heart sounds: Normal heart sounds. No murmur heard. No friction rub. No gallop. Pulmonary:      Effort: Pulmonary effort is normal. No respiratory distress. Breath sounds: Normal breath sounds. No stridor. No wheezing or rales. Chest:      Chest wall: No tenderness. Abdominal:      General: Bowel sounds are normal. There is no distension. Palpations: Abdomen is soft. There is no mass.       Tenderness: There is no abdominal tenderness. There is no guarding or rebound. Musculoskeletal:         General: No tenderness. Normal range of motion. Cervical back: Normal range of motion and neck supple. Lymphadenopathy:      Cervical: No cervical adenopathy. Skin:     General: Skin is warm and dry. Coloration: Skin is not pale. Findings: No erythema or rash. Neurological:      Mental Status: He is alert and oriented to person, place, and time. Cranial Nerves: No cranial nerve deficit. Motor: No abnormal muscle tone. Coordination: Coordination normal.      Deep Tendon Reflexes: Reflexes are normal and symmetric. Reflexes normal.       Assessment / Plan:       1. Type 2 diabetes mellitus without complication, without long-term current use of insulin ([facility])-discussed. Recommend increasing glipizide to 15 mg in the morning. Discussed being on the look out for episodes of hypoglycemia    - POCT glycosylated hemoglobin (Hb A1C)  -  DIABETES FOOT EXAM    2. Noncompliance-I strongly recommended he check his glucose at least once a day      3. Class 3 severe obesity due to excess calories with serious comorbidity and body mass index (BMI) of 45.0 to 49.9 in adult [facility])  Recommended he decrease calories in particular  calories in high carb foods    4. Primary hypertension-controlled.   Continue metoprolol

## 2022-08-14 DIAGNOSIS — E11.9 TYPE 2 DIABETES MELLITUS WITHOUT COMPLICATION, WITHOUT LONG-TERM CURRENT USE OF INSULIN (HCC): ICD-10-CM

## 2022-08-15 NOTE — TELEPHONE ENCOUNTER
Last ov 08/09/2022   Future Appointments   Date Time Provider Abdirahman Craft   10/21/2022  7:45 AM  MD Juana Belcher

## 2022-08-23 ENCOUNTER — HOSPITAL ENCOUNTER (OUTPATIENT)
Dept: GENERAL RADIOLOGY | Age: 62
Discharge: HOME | End: 2022-08-23
Payer: COMMERCIAL

## 2022-08-23 ENCOUNTER — HOSPITAL ENCOUNTER (OUTPATIENT)
Age: 62
Discharge: HOME | End: 2022-08-23
Payer: COMMERCIAL

## 2022-08-23 ENCOUNTER — TELEPHONE (OUTPATIENT)
Dept: FAMILY MEDICINE CLINIC | Age: 62
End: 2022-08-23

## 2022-08-23 DIAGNOSIS — L97.512 RIGHT FOOT ULCER, WITH FAT LAYER EXPOSED (HCC): ICD-10-CM

## 2022-08-23 PROCEDURE — 73630 X-RAY EXAM OF FOOT: CPT

## 2022-08-23 NOTE — TELEPHONE ENCOUNTER
Future Appointments   Date Time Provider Abdirahman Craft   10/21/2022  7:45 AM MD Giovani Steel     LOV 8/9/2022

## 2022-10-04 ENCOUNTER — HOSPITAL ENCOUNTER (OUTPATIENT)
Dept: GENERAL RADIOLOGY | Age: 62
Discharge: HOME | End: 2022-10-04
Payer: COMMERCIAL

## 2022-10-04 ENCOUNTER — HOSPITAL ENCOUNTER (OUTPATIENT)
Age: 62
Discharge: HOME | End: 2022-10-04
Payer: COMMERCIAL

## 2022-10-04 DIAGNOSIS — L97.512 RIGHT FOOT ULCER, WITH FAT LAYER EXPOSED (HCC): ICD-10-CM

## 2022-10-04 PROCEDURE — 73630 X-RAY EXAM OF FOOT: CPT

## 2022-10-10 RX ORDER — ATORVASTATIN CALCIUM 40 MG/1
TABLET, FILM COATED ORAL
Qty: 30 TABLET | Refills: 4 | Status: SHIPPED | OUTPATIENT
Start: 2022-10-10

## 2022-10-10 NOTE — TELEPHONE ENCOUNTER
Last ov 08/09/2022   Future Appointments   Date Time Provider Abdirahman Craft   10/21/2022  7:45 AM MD Timo Michael

## 2022-10-12 ENCOUNTER — HOSPITAL ENCOUNTER (INPATIENT)
Age: 62
LOS: 5 days | Discharge: HOME | DRG: 617 | End: 2022-10-17
Attending: EMERGENCY MEDICINE | Admitting: INTERNAL MEDICINE
Payer: COMMERCIAL

## 2022-10-12 ENCOUNTER — APPOINTMENT (OUTPATIENT)
Dept: GENERAL RADIOLOGY | Age: 62
DRG: 617 | End: 2022-10-12
Payer: COMMERCIAL

## 2022-10-12 ENCOUNTER — APPOINTMENT (OUTPATIENT)
Dept: CT IMAGING | Age: 62
DRG: 617 | End: 2022-10-12
Payer: COMMERCIAL

## 2022-10-12 DIAGNOSIS — M86.171 OTHER ACUTE OSTEOMYELITIS OF RIGHT FOOT (HCC): Primary | ICD-10-CM

## 2022-10-12 DIAGNOSIS — E66.9 CLASS 2 OBESITY WITH BODY MASS INDEX (BMI) OF 35.0 TO 35.9 IN ADULT, UNSPECIFIED OBESITY TYPE, UNSPECIFIED WHETHER SERIOUS COMORBIDITY PRESENT: ICD-10-CM

## 2022-10-12 DIAGNOSIS — M86.9 OSTEOMYELITIS OF RIGHT FOOT, UNSPECIFIED TYPE (HCC): ICD-10-CM

## 2022-10-12 DIAGNOSIS — E11.621 DIABETIC ULCER OF OTHER PART OF RIGHT FOOT ASSOCIATED WITH TYPE 2 DIABETES MELLITUS, WITH FAT LAYER EXPOSED (HCC): ICD-10-CM

## 2022-10-12 DIAGNOSIS — E08.621 DIABETIC ULCER OF RIGHT FOOT ASSOCIATED WITH DIABETES MELLITUS DUE TO UNDERLYING CONDITION, UNSPECIFIED PART OF FOOT, UNSPECIFIED ULCER STAGE (HCC): ICD-10-CM

## 2022-10-12 DIAGNOSIS — E11.622 TYPE 2 DIABETES MELLITUS WITH OTHER SKIN ULCER, UNSPECIFIED WHETHER LONG TERM INSULIN USE (HCC): ICD-10-CM

## 2022-10-12 DIAGNOSIS — L97.519 DIABETIC ULCER OF RIGHT FOOT ASSOCIATED WITH DIABETES MELLITUS DUE TO UNDERLYING CONDITION, UNSPECIFIED PART OF FOOT, UNSPECIFIED ULCER STAGE (HCC): ICD-10-CM

## 2022-10-12 DIAGNOSIS — L97.512 DIABETIC ULCER OF OTHER PART OF RIGHT FOOT ASSOCIATED WITH TYPE 2 DIABETES MELLITUS, WITH FAT LAYER EXPOSED (HCC): ICD-10-CM

## 2022-10-12 DIAGNOSIS — L98.499 TYPE 2 DIABETES MELLITUS WITH OTHER SKIN ULCER, UNSPECIFIED WHETHER LONG TERM INSULIN USE (HCC): ICD-10-CM

## 2022-10-12 LAB
A/G RATIO: 1.6 (ref 1.1–2.2)
ALBUMIN SERPL-MCNC: 4.2 G/DL (ref 3.4–5)
ALP BLD-CCNC: 96 U/L (ref 40–129)
ALT SERPL-CCNC: 18 U/L (ref 10–40)
ANION GAP SERPL CALCULATED.3IONS-SCNC: 13 MMOL/L (ref 3–16)
AST SERPL-CCNC: 15 U/L (ref 15–37)
BASOPHILS ABSOLUTE: 0.1 K/UL (ref 0–0.2)
BASOPHILS RELATIVE PERCENT: 0.7 %
BILIRUB SERPL-MCNC: 0.4 MG/DL (ref 0–1)
BUN BLDV-MCNC: 10 MG/DL (ref 7–20)
CALCIUM SERPL-MCNC: 9.3 MG/DL (ref 8.3–10.6)
CHLORIDE BLD-SCNC: 99 MMOL/L (ref 99–110)
CO2: 25 MMOL/L (ref 21–32)
CREAT SERPL-MCNC: 0.7 MG/DL (ref 0.8–1.3)
EKG ATRIAL RATE: 71 BPM
EKG DIAGNOSIS: NORMAL
EKG P AXIS: 46 DEGREES
EKG P-R INTERVAL: 180 MS
EKG Q-T INTERVAL: 398 MS
EKG QRS DURATION: 96 MS
EKG QTC CALCULATION (BAZETT): 432 MS
EKG R AXIS: 20 DEGREES
EKG T AXIS: 39 DEGREES
EKG VENTRICULAR RATE: 71 BPM
EOSINOPHILS ABSOLUTE: 0.2 K/UL (ref 0–0.6)
EOSINOPHILS RELATIVE PERCENT: 2.8 %
ETHANOL: NORMAL MG/DL (ref 0–0.08)
GFR AFRICAN AMERICAN: >60
GFR NON-AFRICAN AMERICAN: >60
GLUCOSE BLD-MCNC: 186 MG/DL (ref 70–99)
GLUCOSE BLD-MCNC: 208 MG/DL (ref 70–99)
HCT VFR BLD CALC: 41 % (ref 40.5–52.5)
HEMOGLOBIN: 13.5 G/DL (ref 13.5–17.5)
INFLUENZA A: NOT DETECTED
INFLUENZA B: NOT DETECTED
LACTIC ACID, SEPSIS: 1.2 MMOL/L (ref 0.4–1.9)
LACTIC ACID, SEPSIS: 2.9 MMOL/L (ref 0.4–1.9)
LYMPHOCYTES ABSOLUTE: 1.5 K/UL (ref 1–5.1)
LYMPHOCYTES RELATIVE PERCENT: 21.9 %
MCH RBC QN AUTO: 29.1 PG (ref 26–34)
MCHC RBC AUTO-ENTMCNC: 32.8 G/DL (ref 31–36)
MCV RBC AUTO: 88.6 FL (ref 80–100)
MONOCYTES ABSOLUTE: 0.6 K/UL (ref 0–1.3)
MONOCYTES RELATIVE PERCENT: 8.1 %
NEUTROPHILS ABSOLUTE: 4.5 K/UL (ref 1.7–7.7)
NEUTROPHILS RELATIVE PERCENT: 66.5 %
PDW BLD-RTO: 14 % (ref 12.4–15.4)
PERFORMED ON: ABNORMAL
PLATELET # BLD: 295 K/UL (ref 135–450)
PMV BLD AUTO: 7.3 FL (ref 5–10.5)
POTASSIUM REFLEX MAGNESIUM: 4.2 MMOL/L (ref 3.5–5.1)
PROCALCITONIN: 0.05 NG/ML (ref 0–0.15)
RBC # BLD: 4.63 M/UL (ref 4.2–5.9)
SARS-COV-2 RNA, RT PCR: NOT DETECTED
SODIUM BLD-SCNC: 137 MMOL/L (ref 136–145)
TOTAL PROTEIN: 6.9 G/DL (ref 6.4–8.2)
WBC # BLD: 6.8 K/UL (ref 4–11)

## 2022-10-12 PROCEDURE — 6370000000 HC RX 637 (ALT 250 FOR IP): Performed by: INTERNAL MEDICINE

## 2022-10-12 PROCEDURE — 1200000000 HC SEMI PRIVATE

## 2022-10-12 PROCEDURE — 83036 HEMOGLOBIN GLYCOSYLATED A1C: CPT

## 2022-10-12 PROCEDURE — 6360000002 HC RX W HCPCS: Performed by: INTERNAL MEDICINE

## 2022-10-12 PROCEDURE — 96376 TX/PRO/DX INJ SAME DRUG ADON: CPT

## 2022-10-12 PROCEDURE — 71045 X-RAY EXAM CHEST 1 VIEW: CPT

## 2022-10-12 PROCEDURE — 73701 CT LOWER EXTREMITY W/DYE: CPT

## 2022-10-12 PROCEDURE — 83605 ASSAY OF LACTIC ACID: CPT

## 2022-10-12 PROCEDURE — 6360000004 HC RX CONTRAST MEDICATION: Performed by: PHYSICIAN ASSISTANT

## 2022-10-12 PROCEDURE — 84145 PROCALCITONIN (PCT): CPT

## 2022-10-12 PROCEDURE — 6360000002 HC RX W HCPCS: Performed by: PHYSICIAN ASSISTANT

## 2022-10-12 PROCEDURE — 2580000003 HC RX 258: Performed by: PHYSICIAN ASSISTANT

## 2022-10-12 PROCEDURE — 85025 COMPLETE CBC W/AUTO DIFF WBC: CPT

## 2022-10-12 PROCEDURE — 96361 HYDRATE IV INFUSION ADD-ON: CPT

## 2022-10-12 PROCEDURE — 82077 ASSAY SPEC XCP UR&BREATH IA: CPT

## 2022-10-12 PROCEDURE — 96366 THER/PROPH/DIAG IV INF ADDON: CPT

## 2022-10-12 PROCEDURE — 96365 THER/PROPH/DIAG IV INF INIT: CPT

## 2022-10-12 PROCEDURE — 87040 BLOOD CULTURE FOR BACTERIA: CPT

## 2022-10-12 PROCEDURE — 99285 EMERGENCY DEPT VISIT HI MDM: CPT

## 2022-10-12 PROCEDURE — 96375 TX/PRO/DX INJ NEW DRUG ADDON: CPT

## 2022-10-12 PROCEDURE — 93010 ELECTROCARDIOGRAM REPORT: CPT | Performed by: INTERNAL MEDICINE

## 2022-10-12 PROCEDURE — 93005 ELECTROCARDIOGRAM TRACING: CPT | Performed by: PHYSICIAN ASSISTANT

## 2022-10-12 PROCEDURE — 87636 SARSCOV2 & INF A&B AMP PRB: CPT

## 2022-10-12 PROCEDURE — 80053 COMPREHEN METABOLIC PANEL: CPT

## 2022-10-12 PROCEDURE — 96367 TX/PROPH/DG ADDL SEQ IV INF: CPT

## 2022-10-12 PROCEDURE — 2580000003 HC RX 258: Performed by: INTERNAL MEDICINE

## 2022-10-12 RX ORDER — MORPHINE SULFATE 4 MG/ML
4 INJECTION, SOLUTION INTRAMUSCULAR; INTRAVENOUS ONCE
Status: COMPLETED | OUTPATIENT
Start: 2022-10-12 | End: 2022-10-12

## 2022-10-12 RX ORDER — ACETAMINOPHEN 650 MG/1
650 SUPPOSITORY RECTAL EVERY 6 HOURS PRN
Status: DISCONTINUED | OUTPATIENT
Start: 2022-10-12 | End: 2022-10-17 | Stop reason: HOSPADM

## 2022-10-12 RX ORDER — ACETAMINOPHEN 325 MG/1
650 TABLET ORAL EVERY 6 HOURS PRN
Status: DISCONTINUED | OUTPATIENT
Start: 2022-10-12 | End: 2022-10-17 | Stop reason: HOSPADM

## 2022-10-12 RX ORDER — SODIUM CHLORIDE 0.9 % (FLUSH) 0.9 %
5-40 SYRINGE (ML) INJECTION PRN
Status: DISCONTINUED | OUTPATIENT
Start: 2022-10-12 | End: 2022-10-17 | Stop reason: HOSPADM

## 2022-10-12 RX ORDER — ATORVASTATIN CALCIUM 40 MG/1
40 TABLET, FILM COATED ORAL EVERY EVENING
Status: DISCONTINUED | OUTPATIENT
Start: 2022-10-12 | End: 2022-10-17 | Stop reason: HOSPADM

## 2022-10-12 RX ORDER — METOPROLOL TARTRATE 50 MG/1
50 TABLET, FILM COATED ORAL 2 TIMES DAILY
Status: DISCONTINUED | OUTPATIENT
Start: 2022-10-12 | End: 2022-10-17 | Stop reason: HOSPADM

## 2022-10-12 RX ORDER — ONDANSETRON 2 MG/ML
4 INJECTION INTRAMUSCULAR; INTRAVENOUS ONCE
Status: COMPLETED | OUTPATIENT
Start: 2022-10-12 | End: 2022-10-12

## 2022-10-12 RX ORDER — POTASSIUM CHLORIDE 7.45 MG/ML
10 INJECTION INTRAVENOUS PRN
Status: DISCONTINUED | OUTPATIENT
Start: 2022-10-12 | End: 2022-10-17 | Stop reason: HOSPADM

## 2022-10-12 RX ORDER — INSULIN LISPRO 100 [IU]/ML
0-4 INJECTION, SOLUTION INTRAVENOUS; SUBCUTANEOUS EVERY 4 HOURS
Status: DISCONTINUED | OUTPATIENT
Start: 2022-10-12 | End: 2022-10-13

## 2022-10-12 RX ORDER — OXYCODONE HYDROCHLORIDE AND ACETAMINOPHEN 5; 325 MG/1; MG/1
2 TABLET ORAL EVERY 4 HOURS PRN
Status: DISCONTINUED | OUTPATIENT
Start: 2022-10-12 | End: 2022-10-13

## 2022-10-12 RX ORDER — ONDANSETRON 2 MG/ML
4 INJECTION INTRAMUSCULAR; INTRAVENOUS EVERY 6 HOURS PRN
Status: DISCONTINUED | OUTPATIENT
Start: 2022-10-12 | End: 2022-10-17 | Stop reason: HOSPADM

## 2022-10-12 RX ORDER — OXYCODONE HYDROCHLORIDE AND ACETAMINOPHEN 5; 325 MG/1; MG/1
1 TABLET ORAL EVERY 4 HOURS PRN
Status: DISCONTINUED | OUTPATIENT
Start: 2022-10-12 | End: 2022-10-13

## 2022-10-12 RX ORDER — SODIUM CHLORIDE 9 MG/ML
INJECTION, SOLUTION INTRAVENOUS CONTINUOUS
Status: DISCONTINUED | OUTPATIENT
Start: 2022-10-12 | End: 2022-10-13

## 2022-10-12 RX ORDER — DEXTROSE MONOHYDRATE 100 MG/ML
INJECTION, SOLUTION INTRAVENOUS CONTINUOUS PRN
Status: DISCONTINUED | OUTPATIENT
Start: 2022-10-12 | End: 2022-10-17 | Stop reason: HOSPADM

## 2022-10-12 RX ORDER — DOXYCYCLINE HYCLATE 100 MG
1 TABLET ORAL 2 TIMES DAILY
Status: ON HOLD | COMMUNITY
Start: 2022-09-22 | End: 2022-10-17 | Stop reason: HOSPADM

## 2022-10-12 RX ORDER — MAGNESIUM SULFATE IN WATER 40 MG/ML
2000 INJECTION, SOLUTION INTRAVENOUS PRN
Status: DISCONTINUED | OUTPATIENT
Start: 2022-10-12 | End: 2022-10-17 | Stop reason: HOSPADM

## 2022-10-12 RX ORDER — POLYETHYLENE GLYCOL 3350 17 G/17G
17 POWDER, FOR SOLUTION ORAL DAILY PRN
Status: DISCONTINUED | OUTPATIENT
Start: 2022-10-12 | End: 2022-10-17 | Stop reason: HOSPADM

## 2022-10-12 RX ORDER — 0.9 % SODIUM CHLORIDE 0.9 %
1000 INTRAVENOUS SOLUTION INTRAVENOUS ONCE
Status: COMPLETED | OUTPATIENT
Start: 2022-10-12 | End: 2022-10-12

## 2022-10-12 RX ORDER — ENOXAPARIN SODIUM 100 MG/ML
30 INJECTION SUBCUTANEOUS 2 TIMES DAILY
Status: DISCONTINUED | OUTPATIENT
Start: 2022-10-12 | End: 2022-10-17 | Stop reason: HOSPADM

## 2022-10-12 RX ORDER — POTASSIUM CHLORIDE 20 MEQ/1
40 TABLET, EXTENDED RELEASE ORAL PRN
Status: DISCONTINUED | OUTPATIENT
Start: 2022-10-12 | End: 2022-10-17 | Stop reason: HOSPADM

## 2022-10-12 RX ORDER — SODIUM CHLORIDE 0.9 % (FLUSH) 0.9 %
5-40 SYRINGE (ML) INJECTION EVERY 12 HOURS SCHEDULED
Status: DISCONTINUED | OUTPATIENT
Start: 2022-10-12 | End: 2022-10-17 | Stop reason: HOSPADM

## 2022-10-12 RX ORDER — SODIUM CHLORIDE 9 MG/ML
INJECTION, SOLUTION INTRAVENOUS PRN
Status: DISCONTINUED | OUTPATIENT
Start: 2022-10-12 | End: 2022-10-17 | Stop reason: HOSPADM

## 2022-10-12 RX ORDER — ONDANSETRON 4 MG/1
4 TABLET, ORALLY DISINTEGRATING ORAL EVERY 8 HOURS PRN
Status: DISCONTINUED | OUTPATIENT
Start: 2022-10-12 | End: 2022-10-17 | Stop reason: HOSPADM

## 2022-10-12 RX ADMIN — METOPROLOL TARTRATE 50 MG: 50 TABLET, FILM COATED ORAL at 21:46

## 2022-10-12 RX ADMIN — ATORVASTATIN CALCIUM 40 MG: 40 TABLET, FILM COATED ORAL at 21:45

## 2022-10-12 RX ADMIN — CEFEPIME 2000 MG: 2 INJECTION, POWDER, FOR SOLUTION INTRAVENOUS at 15:04

## 2022-10-12 RX ADMIN — MORPHINE SULFATE 4 MG: 4 INJECTION, SOLUTION INTRAMUSCULAR; INTRAVENOUS at 15:54

## 2022-10-12 RX ADMIN — VANCOMYCIN HYDROCHLORIDE 2000 MG: 10 INJECTION, POWDER, LYOPHILIZED, FOR SOLUTION INTRAVENOUS at 15:46

## 2022-10-12 RX ADMIN — INSULIN LISPRO 1 UNITS: 100 INJECTION, SOLUTION INTRAVENOUS; SUBCUTANEOUS at 21:55

## 2022-10-12 RX ADMIN — SODIUM CHLORIDE: 9 INJECTION, SOLUTION INTRAVENOUS at 21:54

## 2022-10-12 RX ADMIN — ENOXAPARIN SODIUM 30 MG: 100 INJECTION SUBCUTANEOUS at 21:46

## 2022-10-12 RX ADMIN — SODIUM CHLORIDE 1000 ML: 9 INJECTION, SOLUTION INTRAVENOUS at 14:58

## 2022-10-12 RX ADMIN — MORPHINE SULFATE 4 MG: 4 INJECTION, SOLUTION INTRAMUSCULAR; INTRAVENOUS at 18:53

## 2022-10-12 RX ADMIN — IOPAMIDOL 75 ML: 755 INJECTION, SOLUTION INTRAVENOUS at 16:27

## 2022-10-12 RX ADMIN — CEFEPIME 2000 MG: 2 INJECTION, POWDER, FOR SOLUTION INTRAVENOUS at 22:10

## 2022-10-12 RX ADMIN — OXYCODONE HYDROCHLORIDE AND ACETAMINOPHEN 2 TABLET: 5; 325 TABLET ORAL at 21:45

## 2022-10-12 RX ADMIN — ONDANSETRON HYDROCHLORIDE 4 MG: 2 INJECTION, SOLUTION INTRAMUSCULAR; INTRAVENOUS at 15:53

## 2022-10-12 ASSESSMENT — PAIN SCALES - GENERAL
PAINLEVEL_OUTOF10: 8
PAINLEVEL_OUTOF10: 8
PAINLEVEL_OUTOF10: 7
PAINLEVEL_OUTOF10: 7
PAINLEVEL_OUTOF10: 6
PAINLEVEL_OUTOF10: 8
PAINLEVEL_OUTOF10: 8

## 2022-10-12 ASSESSMENT — ENCOUNTER SYMPTOMS
SINUS PAIN: 0
COUGH: 0
CHEST TIGHTNESS: 0
CONSTIPATION: 0
RHINORRHEA: 0
DIARRHEA: 0
VOMITING: 0
EYE DISCHARGE: 0
ABDOMINAL PAIN: 0
SINUS PRESSURE: 0
SORE THROAT: 0
SHORTNESS OF BREATH: 0
NAUSEA: 0
EYE REDNESS: 0

## 2022-10-12 ASSESSMENT — PAIN DESCRIPTION - ORIENTATION: ORIENTATION: RIGHT

## 2022-10-12 ASSESSMENT — PAIN DESCRIPTION - LOCATION: LOCATION: TOE (COMMENT WHICH ONE)

## 2022-10-12 ASSESSMENT — LIFESTYLE VARIABLES: HOW OFTEN DO YOU HAVE A DRINK CONTAINING ALCOHOL: NEVER

## 2022-10-12 ASSESSMENT — PAIN DESCRIPTION - DESCRIPTORS: DESCRIPTORS: BURNING;STABBING;ACHING

## 2022-10-12 ASSESSMENT — PAIN - FUNCTIONAL ASSESSMENT
PAIN_FUNCTIONAL_ASSESSMENT: ACTIVITIES ARE NOT PREVENTED
PAIN_FUNCTIONAL_ASSESSMENT: 0-10
PAIN_FUNCTIONAL_ASSESSMENT: 0-10

## 2022-10-12 NOTE — H&P
Hospital Medicine History & Physical      PCP: Leisa Aviles DO    Date of Service: Pt seen/examined on 10/12/22 and admitted on 10/12/22 to Inpatient    Chief Complaint   Patient presents with    Wound Infection     Pt send down from dr baker office for infection to right foot, pt finished multiple rounds of abx and still not healed       History Of Present Illness: The patient is a 58 y.o. male with PMH below, presents with R great toe pain, infection and swelling. Pt was sent to ER by Dr. Stacy Bowman. Pt has completed multiple courses of PO ABX w/o improvement. He most recently finished Augmentin. He reports pain has worsened to be severe, exac by use. Dr. Stacy Bowman requested IV ABX and NPO aft MN for probable amputation tomorrow.       Past Medical History:        Diagnosis Date    Alcohol abuse 12/03/2012    Cancer ([facility] 2022    Prostate    Cellulitis     sternum    Coronary artery disease     COVID-19     Diabetic neuropathy associated with type 2 diabetes mellitus ([address]     Hyperlipidemia     Non morbid obesity     NSTEMI (non-ST elevated myocardial infarction) (HCC)     GURDEEP (obstructive sleep apnea)     does not wear cpap machine    Reactive airway disease with wheezing, moderate persistent, with acute exacerbation 03/03/2020    Sepsis ([address] 04/2021    Type II or unspecified type diabetes mellitus without mention of complication, not stated as uncontrolled        Past Surgical History:        Procedure Laterality Date    BYPASS GRAFT  02/19/2021    COLONOSCOPY  2017    COLONOSCOPY N/A 7/1/2022    COLONOSCOPY POLYPECTOMY SNARE/COLD BIOPSY performed by Jessica Strickland DO at [facility]  1/14/2016 9/21/2020    1 [employer] Premier SAMUEL 2.25x16    CORONARY ARTERY BYPASS GRAFT  02/19/2021    CABG x2 & ALEX-Dr. Mikhail Carter    CORONARY ARTERY BYPASS GRAFT N/A 2/19/2021    CORONARY ARTERY BYPASS GRAFTING X2, INTERNAL MAMMARY ARTERY, SAPHENOUS VEIN GRAFT, ON PUMP WITH LEFT ATRIAL APPENDAGE CLIP, BILATERAL INTERCOSTAL NERVE BLOCK performed by Trang Prieto MD at [address] N/A 4/28/2021    WOUND VAC AND DRESSING CHANGE WITH REMOVAL OF 4 STERNAL WIRES, DRAINAGE OF 2 INTERCOSTAL SPACE ABCESS performed by Margot Cameron MD at [address] [facility] 4/26/2021    DEBRIDEMENT OF STERNAL WOUND WITH WOUND VACUUM PLACEMENT performed by Trang Prieto MD at [facility]         Medications Prior to Admission:    Prior to Admission medications    Medication Sig Start Date End Date Taking? Authorizing Provider   doxycycline hyclate (VIBRA-TABS) 100 MG tablet Take 1 tablet by mouth in the morning and at bedtime 9/22/22   Historical Provider, MD   atorvastatin (LIPITOR) 40 MG tablet TAKE ONE TABLET BY MOUTH EVERY EVENING 10/10/22   [facility], DO   metFORMIN (GLUCOPHAGE) 1000 MG tablet TAKE ONE TABLET BY MOUTH TWICE A DAY 8/15/22   [facility], DO   glipiZIDE (GLUCOTROL) 10 MG tablet Take 11/2 daily in am. For diabetes. 8/9/22   [facility], DO   metoprolol tartrate (LOPRESSOR) 50 MG tablet TAKE ONE TABLET BY MOUTH TWICE A DAY 7/26/22   Leif Lyles MD   silver sulfADIAZINE (SILVADENE) 1 % cream Apply topically to wounds daily. 5/16/22   Fernandez Lucas DPM   clopidogrel (PLAVIX) 75 MG tablet TAKE ONE TABLET BY MOUTH DAILY 3/21/22   [facility], DO   acetaminophen (TYLENOL) 500 MG tablet Take 500 mg by mouth every 6 hours as needed     Historical Provider, MD   aspirin 81 MG chewable tablet CHEW ONE TABLET BY MOUTH DAILY 12/5/16   [facility], DO       Allergies:  Patient has no known allergies. Social History:    TOBACCO:   reports that he has never smoked. He has never used smokeless tobacco.  ETOH:   reports current alcohol use. Family History:  Reviewed in detail and negative for DM, Early CAD, Cancer (except as below).  Positive as follows:        Problem Relation Age of Onset    Heart Attack Mother     Heart Disease Mother Heart Attack Father     Cancer Father     Hearing Loss Father     Diabetes Father     Heart Disease Father     Heart Disease Paternal Uncle         bypass       REVIEW OF SYSTEMS:   Pertinent positives/negatives as follows: R great toe pain, infection and swelling, and as discussed in HPI, otherwise a complete ROS performed and all other systems are negative. PHYSICAL EXAM PERFORMED:  BP (!) 142/89   Pulse 75   Temp 98.6 °F (37 °C)   Resp 16   Ht 5' 10\" (1.778 m)   Wt 250 lb (113.4 kg)   SpO2 100%   BMI 35.87 kg/m²   GEN:  A&Ox3, NAD. HEENT:  NC/AT,EOMI, MMM, no erythema/exudates or visible masses. CVS:  Normal S1,S2. RRR. Without M/G/R.   LUNG:   CTA-B. No wheezes, rales or rhonchi. ABD:  Soft, ND/NT, BS+ x4. Without G/R.  EXT: 2+ pulses, no c/c/e. Brisk cap refill. PSY:  Thought process intact, affect appropriate. TANA:  CN III-XII grossly intact. Moves all 4 spontaneously. Sensory grossly intact. SKIN: Erythema, swelling, calor R great toe. Pics below courtesy of [facility]          Chart review shows recent radiographs:  XR FOOT RIGHT (MIN 3 VIEWS)    Result Date: 10/4/2022  EXAMINATION: 3 XRAY VIEWS OF THE RIGHT FOOT 10/4/2022 10:25 am COMPARISON: None. HISTORY: ORDERING SYSTEM PROVIDED HISTORY: Right foot ulcer, with fat layer exposed ([address] TECHNOLOGIST PROVIDED HISTORY: Reason for Exam: right first digit diabetic ulcer x4 months FINDINGS: There is mild joint space narrowing throughout the digits. No fracture or dislocation is seen. There is mild soft tissue swelling along the dorsum of the foot. Mild osteoarthritic changes throughout the digits and midfoot with no acute bony abnormality.  Mild soft tissue swelling along the dorsum of the foot distally     CT FOOT RIGHT W CONTRAST    Result Date: 10/12/2022  EXAMINATION: CT OF THE RIGHT FOOT WITH CONTRAST 10/12/2022 1:28 pm TECHNIQUE: CT of the right foot was performed with the administration of intravenous contrast. Multiplanar reformatted images are provided for review. Automated exposure control, iterative reconstruction, and/or weight based adjustment of the mA/kV was utilized to reduce the radiation dose to as low as reasonably achievable. COMPARISON: Radiograph, 10/04/2022 HISTORY ORDERING SYSTEM PROVIDED HISTORY: great toe infection TECHNOLOGIST PROVIDED HISTORY: Reason for exam:->great toe infection Decision Support Exception - unselect if not a suspected or confirmed emergency medical condition->Emergency Medical Condition (MA) Reason for Exam: infection great toe FINDINGS: There is soft tissue swelling involving the right great toe. A multiloculated rim enhancing fluid collection is seen surrounding the distal phalanx of the great toe, greatest dorsally, measuring roughly 1.7 x 1.0 x 1.1 cm. That is associated with erosion of the great toe distal phalanx, where a pathologic fracture is present. Periosteal new bone formation is detected as well. No soft tissue gas identified. Additional soft tissue swelling is seen along the dorsum of the foot, but no other additional areas of osteolysis or periosteal reaction are found. Although not well evaluated with CT technique. The flexor and extensor tendons are unremarkable appearance. No tenosynovial fluid is detected. Intrinsic musculature of the foot is atrophic which is likely neuropathic. Multilocular rim enhancing fluid collection surrounding the distal phalanx of the great toe, greatest dorsally, compatible with a small abscess as described above. That is associated with bony changes of osteomyelitis, including osteolysis, periosteal new bone formation, with a pathologic fracture at the base of the distal phalanx.  Soft tissue swelling is seen along the dorsum the foot, which is nonspecific and can be from systemic etiologies such as venous insufficiency or fluid overload, but cellulitis remains in the differential.     XR CHEST PORTABLE    Result Date: 10/12/2022  EXAMINATION: ONE XRAY VIEW OF THE CHEST 10/12/2022 12:04 pm COMPARISON: 04/23/2021 HISTORY: ORDERING SYSTEM PROVIDED HISTORY: Sepsis TECHNOLOGIST PROVIDED HISTORY: Reason for exam:->Sepsis Reason for Exam: Wound Infection (Pt send down from dr baker office for infection to right foot, pt finished multiple rounds of abx and still not healed) FINDINGS: Left atrial appendage clip is noted. Cardial pericardial silhouette otherwise unremarkable. Lungs are clear. No pneumothorax. No free air. No acute abnormality identified. EKG:    EKG 12 Lead [[id number]]    Collected: 10/12/22 1437    Updated: 10/12/22 1826     Ventricular Rate 71 BPM    Atrial Rate 71 BPM    P-R Interval 180 ms    QRS Duration 96 ms    Q-T Interval 398 ms    QTc Calculation (Bazett) 432 ms    P Axis 46 degrees    R Axis 20 degrees    T Axis 39 degrees    Diagnosis Normal sinus rhythmNormal ECGWhen compared with ECG of 23-APR-2021 11:21,Vent.  rate has decreased BY  36 BPMST abnormality improvedConfirmed by TERA Haas MD ([id number]) on 10/12/2022 6:26:41 PM       CBC:  Recent Labs     10/12/22  1438   WBC 6.8   HGB 13.5   HCT 41.0         RENAL  Recent Labs     10/12/22  1438      K 4.2   CL 99   CO2 25   BUN 10   CREATININE 0.7*   GLUCOSE 186*     Hemoglobin a1c:  Lab Results   Component Value Date    LABA1C 8.6 08/09/2022    LABA1C 8.8 05/16/2022    LABA1C 8.9 01/25/2022       LFT'S:  Recent Labs     10/12/22  1438   AST 15   ALT 18   BILITOT 0.4   ALKPHOS 96       CARDIAC ENZYMES:   Lab Results   Component Value Date    PROBNP 641 (H) 04/23/2021    PROBNP 85 05/06/2020    PROBNP 178 (H) 03/03/2020     LACTIC ACID:  Recent Labs     10/12/22  1438 10/12/22  1843   LACTSEPSIS 2.9* 1.2     PHYSICIAN CERTIFICATION  I certify that Kashmir Segundo is expected to be hospitalized for 2 midnights based on the following assessment and plan:    ASSESSMENT/PLAN:  Osteomyelitis w/ small abscess formation, pathologic Fx noted on CT, R great toe. IV vanc and cefepime. NPO aft MN. PRN percocet. Pt sent in by Dr. Anika Banks who is planning for OR tomorrow (probable amp), consulted. Does not meet SIRS. PCT nml. Lactic acidosis, mild at 2.9, 1.2 on repeat. Resolved. Hx ETOH, reports quit 1 mo ago. W/o wd Sx. JANEY non detected. DM2, hold oral Rx, chk A1c, add Low SSI q4h  CAD, hold ASA and Plavix for now. Cont BB  COVID/FLU PCR neg. DVT Prophylaxis: Lx  Diet: CC, NPO aft MN. Code Status: Full Code   PT/OT Eval Status: Will order if needed and as patient condition allows  Dispo - Admit to inpatient     Sebastian Bishop MD    Thank you Maria Isabel Hilario DO for the opportunity to be involved in this patient's care. If you have any questions or concerns please feel free to contact me via the [employer] at [phone]. This chart was generated using the [address] [place], [id number]  dictation system. I created this record but it may contain dictation errors given the limitations of this technology.

## 2022-10-12 NOTE — ED NOTES
1906- Perfectserve sent to Dr Ewa Parker  10/12/22 Ivelisse [place] Dr Antoinette Robison returned call and spoke with [facility] Srinivas  10/12/22 1930

## 2022-10-12 NOTE — ED NOTES
Chief Complaint   Patient presents with    Wound Infection     Pt send down from dr baker office for infection to right foot, pt finished multiple rounds of abx and still not healed        / Level of Consciousness: Alert (0)    Vitals:    10/12/22 1547 10/12/22 1600 10/12/22 1734 10/12/22 1832   BP: 136/70 118/60 (!) 168/72 (!) 142/89   Pulse: 73 74 78 75   Resp: 11 11  16   Temp:       SpO2:  100% 99% 100%   Weight:       Height:              No Known Allergies    No current facility-administered medications for this encounter. Current Outpatient Medications   Medication Sig Dispense Refill    doxycycline hyclate (VIBRA-TABS) 100 MG tablet Take 1 tablet by mouth in the morning and at bedtime      atorvastatin (LIPITOR) 40 MG tablet TAKE ONE TABLET BY MOUTH EVERY EVENING 30 tablet 4    metFORMIN (GLUCOPHAGE) 1000 MG tablet TAKE ONE TABLET BY MOUTH TWICE A DAY 60 tablet 2    glipiZIDE (GLUCOTROL) 10 MG tablet Take 11/2 daily in am. For diabetes. 90 tablet 1    metoprolol tartrate (LOPRESSOR) 50 MG tablet TAKE ONE TABLET BY MOUTH TWICE A  tablet 0    silver sulfADIAZINE (SILVADENE) 1 % cream Apply topically to wounds daily. 400 g 0    clopidogrel (PLAVIX) 75 MG tablet TAKE ONE TABLET BY MOUTH DAILY 90 tablet 2    acetaminophen (TYLENOL) 500 MG tablet Take 500 mg by mouth every 6 hours as needed       aspirin 81 MG chewable tablet CHEW ONE TABLET BY MOUTH DAILY 30 tablet 1      List of medications patient is currently taking is complete. Source of medications in list are the patient.         Patient Active Problem List   Diagnosis    Type II or unspecified type diabetes mellitus without mention of complication, not stated as uncontrolled    Diabetes mellitus    History of ETOH abuse    Patient overweight    Patient overweight    Dietary noncompliance    Alcohol abuse    Flank pain    Prostatism    Low serum testosterone level    Arm pain, left    Shoulder pain    Precordial pain    Lung nodule    Acute angina (HCC)    Abnormal stress test    Acute coronary syndrome [facility])    Coronary artery disease involving native coronary artery of native heart with unstable angina pectoris ([facility]    Other chest pain    S/P drug eluting coronary stent placement    Essential hypertension    Obesity    Abnormal chest x-ray    Angina effort    Skin lesion    Angina at rest [facility])    Hyperlipidemia    Angina pectoris (HCC)    NSTEMI (non-ST elevated myocardial infarction) ([facility]    Controlled type 2 diabetes mellitus without complication, without long-term current use of insulin ([facility])    ASHD (arteriosclerotic heart disease)    Mass of right foot    Overweight    History of angina pectoris    Reactive airway disease    Bronchitis    Reactive airway disease with wheezing, moderate persistent, with acute exacerbation    Need for prophylactic vaccination and inoculation against varicella    Need for prophylactic vaccination against diphtheria-tetanus-pertussis (DTP)    Chronic right-sided thoracic back pain    CAD in native artery    S/P PTCA (percutaneous transluminal coronary angioplasty)    Chest pain    Ischemic cardiomyopathy    GURDEEP (obstructive sleep apnea)    Cellulitis    Sepsis due to methicillin susceptible Staphylococcus aureus ([facility]    Bacteremia due to methicillin susceptible Staphylococcus aureus ([facility])    Disruption or dehiscence of closure of sternum or sternotomy    Elevated sed rate    Type 2 diabetes mellitus with obesity ([facility]    Diabetic ulcer of right foot associated with type 2 diabetes mellitus, with fat layer exposed ([facility]                     Vitals:    10/12/22 1547 10/12/22 1600 10/12/22 1734 10/12/22 1832   BP: 136/70 118/60 (!) 168/72 (!) 142/89   Pulse: 73 74 78 75   Resp: 11 11  16   Temp:       SpO2:  100% 99% 100%   Weight:       Height:              -----------------------------------------------------------------------------------------    Pt was updated about admission.   Yes              To be filled out after report is complete    Bed assignment:     Report called to Risa Gonzalez RN at 2030. Pertinent labs, allergies, medications and conditions were reviewed. Present Skin issues include . Patient belongings that will be going with the patient during admission include . Emergency contact  was notified of admission wife is here.      [address], RN  10/12/22 4 H [facility] Angie Gilmore RN  10/12/22 2031

## 2022-10-12 NOTE — ED NOTES
Barrett Ellis RN cecilia Blood Culture number 1 drawn from left AC at 4:45pm. Site cleaned with chlorhexidine swab for 30 seconds and allowed to dry for 30 seconds. Noted site was fully dry before cultures drawn. LAURA Lassiter [facility], cecilia Blood Culture number 2 drawn from right hand at 4:50PM. Site cleaned with chlorhexidine swab for 30 seconds and allowed to dry for 30 seconds. Noted site was fully dry before cultures drawn.       Brad Coy RN  10/12/22 [id number]

## 2022-10-12 NOTE — ED PROVIDER NOTES
[address] ED  EMERGENCY DEPARTMENT ENCOUNTER        Pt Name: Lisa Finnegan  MRN: [id number]  [place] 1960  Date of evaluation: 10/12/2022  Provider: Svitlana Bills PA-C  PCP: Elliot Valdez DO  ED Attending: Reina Ozuna      This patient was seen and evaluated by the attending physician   I have not independently evaluated this patient. CHIEF COMPLAINT       Chief Complaint   Patient presents with    Wound Infection     Pt send down from dr baker office for infection to right foot, pt finished multiple rounds of abx and still not healed       HISTORY OF PRESENT ILLNESS   (Location/Symptom, Timing/Onset, Context/Setting, Quality, Duration, Modifying Factors, Severity)  Note limiting factors. Lisa Finnegan is a 58 y.o. male with a past medical history of Etoh abuse, Cancer, Cellulitis, CAD, DM2, Diabetic neuropathy, Hyperlipidemia, Non morbid obesity, NSTEMI, GURDEEP, presents to the ED via private vehicle with a complaint of a wound to his right foot great toe. Patient has taken courses of doxycycline and Augmentin without any improvement in his wound. His foot began to swell with increase in pain last night 8/10 throbbing. Pain worse with ambulation. Associated drainage from the great toe. Patient saw his podiatrist wound specialist this morning who advised him to come to the ED to be set up for admission. Patient will need wound debridement and possible amputation. No fevers. Nursing Notes were all reviewed and agreed with or any disagreements were addressed  in the HPI. REVIEW OF SYSTEMS  (2-9 systems for level 4, 10 or more for level 5)     Review of Systems   Constitutional:  Negative for chills and fever. HENT: Negative. Negative for congestion, rhinorrhea, sinus pressure, sinus pain and sore throat. Eyes:  Negative for discharge, redness and visual disturbance. Respiratory:  Negative for cough, chest tightness and shortness of breath.     Cardiovascular:  Negative for chest pain and palpitations. Gastrointestinal:  Negative for abdominal pain, constipation, diarrhea, nausea and vomiting. Genitourinary:  Negative for difficulty urinating, dysuria and frequency. Musculoskeletal: Negative. Skin:  Positive for wound. Neurological: Negative. Negative for dizziness, weakness, numbness and headaches. Psychiatric/Behavioral: Negative. All other systems reviewed and are negative. Positivesand Pertinent negatives as per HPI. Except as noted above in the ROS, all other systems were reviewed and negative.        PAST MEDICAL HISTORY     Past Medical History:   Diagnosis Date    Alcohol abuse 12/03/2012    Cancer ([facility] 2022    Prostate    Cellulitis     sternum    Coronary artery disease     COVID-19     Diabetic neuropathy associated with type 2 diabetes mellitus ([facility]     Hyperlipidemia     Non morbid obesity     NSTEMI (non-ST elevated myocardial infarction) (HCC)     GURDEEP (obstructive sleep apnea)     does not wear cpap machine    Reactive airway disease with wheezing, moderate persistent, with acute exacerbation 03/03/2020    Sepsis ([facility] 04/2021    Type II or unspecified type diabetes mellitus without mention of complication, not stated as uncontrolled          SURGICAL HISTORY       Past Surgical History:   Procedure Laterality Date    BYPASS GRAFT  02/19/2021    COLONOSCOPY  2017    COLONOSCOPY N/A 7/1/2022    COLONOSCOPY POLYPECTOMY SNARE/COLD BIOPSY performed by Oksana Nuñez DO at [address]  1/14/2016 9/21/2020    1 [employer] SAMUEL 2.25x16    CORONARY ARTERY BYPASS GRAFT  02/19/2021    CABG x2 & ALEX-Dr. Leah Camarena    CORONARY ARTERY BYPASS GRAFT N/A 2/19/2021    CORONARY ARTERY BYPASS GRAFTING X2, INTERNAL MAMMARY ARTERY, SAPHENOUS VEIN GRAFT, ON PUMP WITH LEFT ATRIAL APPENDAGE CLIP, BILATERAL INTERCOSTAL NERVE BLOCK performed by Quoc Tucker MD at [address] [facility] 4/28/2021 WOUND VAC AND DRESSING CHANGE WITH REMOVAL OF 4 STERNAL WIRES, DRAINAGE OF 2 INTERCOSTAL SPACE ABCESS performed by Toya Tipton MD at [address] [facility] 4/26/2021    DEBRIDEMENT OF STERNAL WOUND WITH WOUND VACUUM PLACEMENT performed by Mandy Matos MD at [address] [facility]       Previous Medications    ACETAMINOPHEN (TYLENOL) 500 MG TABLET    Take 500 mg by mouth every 6 hours as needed     ASPIRIN 81 MG CHEWABLE TABLET    CHEW ONE TABLET BY MOUTH DAILY    ATORVASTATIN (LIPITOR) 40 MG TABLET    TAKE ONE TABLET BY MOUTH EVERY EVENING    CLOPIDOGREL (PLAVIX) 75 MG TABLET    TAKE ONE TABLET BY MOUTH DAILY    DOXYCYCLINE HYCLATE (VIBRA-TABS) 100 MG TABLET    Take 1 tablet by mouth in the morning and at bedtime    GLIPIZIDE (GLUCOTROL) 10 MG TABLET    Take 11/2 daily in am. For diabetes. METFORMIN (GLUCOPHAGE) 1000 MG TABLET    TAKE ONE TABLET BY MOUTH TWICE A DAY    METOPROLOL TARTRATE (LOPRESSOR) 50 MG TABLET    TAKE ONE TABLET BY MOUTH TWICE A DAY    SILVER SULFADIAZINE (SILVADENE) 1 % CREAM    Apply topically to wounds daily. ALLERGIES     Patient has no known allergies.     FAMILY HISTORY       Family History   Problem Relation Age of Onset    Heart Attack Mother     Heart Disease Mother     Heart Attack Father     Cancer Father     Hearing Loss Father     Diabetes Father     Heart Disease Father     Heart Disease Paternal Uncle         bypass         SOCIAL HISTORY       Social History     Socioeconomic History    Marital status:      Spouse name: None    Number of children: None    Years of education: None    Highest education level: None   Tobacco Use    Smoking status: Never    Smokeless tobacco: Never   Vaping Use    Vaping Use: Never used   Substance and Sexual Activity    Alcohol use: Yes     Comment: whiskey  - weekly    Drug use: No    Sexual activity: Yes     Partners: Female     Social Determinants of Health     Financial Resource Strain: Low Risk     Difficulty of Paying Living Expenses: Not very hard   Food Insecurity: No Food Insecurity    Worried About [address] in the Last Year: Never true    Ran Out of Food in the Last Year: Never true       SCREENINGS     NIH Score       Glascow [place] Coma Scale  Eye Opening: Spontaneous  Best Verbal Response: Oriented  Best Motor Response: Obeys commands  [place] Coma Scale Score: 15    [facility]     Heart Score         PHYSICAL EXAM    (up to 7 for level 4, 8 ormore for level 5)     ED Triage Vitals [10/12/22 1420]   BP Temp Temp src Heart Rate Resp SpO2 Height Weight   (!) 145/79 -- -- 77 18 100 % 5' 10\" (1.778 m) 250 lb (113.4 kg)       Physical Exam  Vitals and nursing note reviewed. Constitutional:       Appearance: Normal appearance. He is well-developed. He is obese. He is not diaphoretic. HENT:      Head: Normocephalic and atraumatic. Nose: Nose normal.      Mouth/Throat:      Mouth: Mucous membranes are moist.      Pharynx: No oropharyngeal exudate. Eyes:      General:         Right eye: No discharge. Left eye: No discharge. Extraocular Movements: Extraocular movements intact. Conjunctiva/sclera: Conjunctivae normal.      Pupils: Pupils are equal, round, and reactive to light. Cardiovascular:      Rate and Rhythm: Normal rate and regular rhythm. Heart sounds: Normal heart sounds. No murmur heard. No friction rub. No gallop. Pulmonary:      Effort: Pulmonary effort is normal. No respiratory distress. Breath sounds: Normal breath sounds. No wheezing. Abdominal:      General: Bowel sounds are normal. There is no distension. Palpations: Abdomen is soft. Tenderness: no abdominal tenderness   Musculoskeletal:         General: Normal range of motion. Cervical back: Normal range of motion. Skin:     General: Skin is warm and dry. Capillary Refill: Capillary refill takes less than 2 seconds.    Neurological: General: No focal deficit present. Mental Status: He is alert. Psychiatric:         Mood and Affect: Mood normal.         Behavior: Behavior normal.             DIAGNOSTIC RESULTS   LABS:    Labs Reviewed   COMPREHENSIVE METABOLIC PANEL W/ REFLEX TO MG FOR LOW K - Abnormal; Notable for the following components:       Result Value    Glucose 186 (*)     Creatinine 0.7 (*)     All other components within normal limits   LACTATE, SEPSIS - Abnormal; Notable for the following components:    Lactic Acid, Sepsis 2.9 (*)     All other components within normal limits   COVID-19 & INFLUENZA COMBO   CULTURE, BLOOD 1   CULTURE, BLOOD 2   CBC WITH AUTO DIFFERENTIAL   PROCALCITONIN   LACTATE, SEPSIS   LACTATE, SEPSIS   LACTATE, SEPSIS       All other labs were within normal range or notreturned as of this dictation. EKG: All EKG's are interpreted by the Emergency Department Physician who either signs or Co-signs this chart in the absence of a cardiologist.  Please see their note for interpretation of EKG. RADIOLOGY:         Interpretation per the Radiologist below, if available at the time of this note:    CT FOOT RIGHT W CONTRAST   Final Result   Multilocular rim enhancing fluid collection surrounding the distal phalanx of   the great toe, greatest dorsally, compatible with a small abscess as   described above. That is associated with bony changes of osteomyelitis, including osteolysis,   periosteal new bone formation, with a pathologic fracture at the base of the   distal phalanx. Soft tissue swelling is seen along the dorsum the foot, which is nonspecific   and can be from systemic etiologies such as venous insufficiency or fluid   overload, but cellulitis remains in the differential.         XR CHEST PORTABLE   Final Result   No acute abnormality identified. No results found. CRITICAL CARE TIME   Total critical care time today provided was 35 minutes.  This excludes seperately billable procedures and family discussion time. Provided for osteomyelitis requiring intervention with concern for potential decompensation. Is this patient to be included in the SEP-1 Core Measure due to severe sepsis or septic shock? No, sepsis, elevated lactic however no severe sepsis or septic shock. SEP-1 CORE MEASURE DATA      Sepsis Criteria   Severe Sepsis Criteria   Septic Shock Criteria     Must be confirmed or suspected to move forward with diagnosis of sepsis. Must meet 2:    [] Temperature > 100.9 F (38.3 C)        or < 96.8 F (36 C)  [] HR > 90  [] RR > 20  [] WBC > 12 or < 4 or 10% bands      AND:      [] Infection Confirmed or        Suspected. Must meet 1:    [] Lactate > 2       or   [] Signs of Organ Dysfunction:    - SBP < 90 or MAP < 65  - Altered mental status  - Creatinine > 2 or increased from      baseline  - Urine Output < 0.5 ml/kg/hr  - Bilirubin > 2  - INR > 1.5 (not anticoagulated)  - Platelets < 216,204  - Acute Respiratory Failure as     evidenced by new need for NIPPV     or mechanical ventilation      [] No criteria met for Severe Sepsis. Must meet 1:    [] Lactate > 4        or   [] SBP < 90 or MAP < 65 for at        least two readings in the first        hour after fluid bolus        administration      [] Vasopressors initiated (if hypotension persists after fluid resuscitation)        [] No criteria met for Septic Shock.    Patient Vitals for the past 6 hrs:   BP Temp Pulse Resp SpO2 Height Weight Percent Weight Change   10/12/22 1420 (!) 145/79 -- 77 18 100 % 5' 10\" (1.778 m) 250 lb (113.4 kg) 0   10/12/22 1437 -- 98.6 °F (37 °C) -- -- -- -- -- --   10/12/22 1547 136/70 -- 73 11 -- -- -- --   10/12/22 1600 118/60 -- 74 11 100 % -- -- --   10/12/22 1734 (!) 168/72 -- 78 -- 99 % -- -- --   10/12/22 1832 (!) 142/89 -- 75 16 100 % -- -- --      Recent Labs     10/12/22  1438   WBC 6.8   CREATININE 0.7*   BILITOT 0.4               CONSULTS: Hospitalist, EMERGENCY DEPARTMENT COURSE and DIFFERENTIAL DIAGNOSIS/MDM:   Vitals:    Vitals:    10/12/22 1547 10/12/22 1600 10/12/22 1734 10/12/22 1832   BP: 136/70 118/60 (!) 168/72 (!) 142/89   Pulse: 73 74 78 75   Resp: 11 11 16   Temp:       SpO2:  100% 99% 100%   Weight:       Height:           Patient was given the following medications:  Medications   0.9 % sodium chloride bolus (0 mLs IntraVENous Stopped 10/12/22 1737)   cefepime (MAXIPIME) 2000 mg IVPB minibag (0 mg IntraVENous Stopped 10/12/22 1548)   vancomycin (VANCOCIN) 2,000 mg in dextrose 5 % 500 mL IVPB (0 mg IntraVENous Stopped 10/12/22 1849)   morphine sulfate (PF) injection 4 mg (4 mg IntraVENous Given 10/12/22 1554)   ondansetron (ZOFRAN) injection 4 mg (4 mg IntraVENous Given 10/12/22 1553)   iopamidol (ISOVUE-370) 76 % injection 75 mL (75 mLs IntraVENous Given 10/12/22 1627)   morphine sulfate (PF) injection 4 mg (4 mg IntraVENous Given 10/12/22 1853)         Afebrile, stable, patient presents to the ED for evaluation. Seen in conjunction with attending ED provider who agrees with assessment and plan. Patients SPO2 is 100% on room air they are not hypoxic. Labs evaluated which indicate elevated lactic acid patient is provided with a small amount of IV fluids in addition to antibiotics. However we do not give the 30 mix per cake due to patient's cardiac history. Labs returned revealing hyperglycemia otherwise are grossly unremarkable. Chest x-ray shows no acute findings EKG is normal sinus rhythm. Patient is provided with morphine Zofran for pain control. Which helps improve his pain however he does require additional dosing. CT imaging confirms clinical suspicion of osteomyelitis. Consult to the hospitalist to admit the patient in stable condition. All questions are answered. Indications for return to the ED are discussed. Patient is advised if any new or worsening symptoms arise they should immediately return to the emergency room. Follow-up with primary care in 1-2 days. The patient tolerated their visit well. The patient and / or the family were informed of the results of any tests, a time was given to answer questions, a plan was proposed and they agreed Too Massing. FINAL IMPRESSION      1. Other acute osteomyelitis of right foot ([address]    2. Type 2 diabetes mellitus with other skin ulcer, unspecified whether long term insulin use (HCC)    3. Class 2 obesity with body mass index (BMI) of 35.0 to 35.9 in adult, unspecified obesity type, unspecified whether serious comorbidity present          DISPOSITION/PLAN   DISPOSITION Decision To Admit 10/12/2022 06:39:45 PM      PATIENT REFERRED TO:  No follow-up provider specified. DISCHARGE MEDICATIONS:  New Prescriptions    No medications on file       DISCONTINUED MEDICATIONS:  Discontinued Medications    CALCIUM POLYCARBOPHIL (FIBER) 625 MG TABS    Take 1 tablet by mouth daily    PROBIOTIC PRODUCT (PROBIOTIC DIGESTIVE SUPP PO)    Take by mouth daily              Pt was seen during the COVID 19 pandemic. Appropriate PPE worn by ME during patient encounters. Pt seen during a time with constrained hospital bed capacity and other potential inpatient and outpatient resources were constrained due to the viral pandemic.    Please note that portions of this note were completed with a voice recognition program.  Efforts were made to edit the dictations but occasionally words are mis-transcribed.)    Kandi Flores PA-C (electronically signed)        Kandi Flores PA-C  10/12/22 [address], [place]  10/13/22 0010

## 2022-10-13 ENCOUNTER — APPOINTMENT (OUTPATIENT)
Dept: VASCULAR LAB | Age: 62
DRG: 617 | End: 2022-10-13
Payer: COMMERCIAL

## 2022-10-13 ENCOUNTER — APPOINTMENT (OUTPATIENT)
Dept: GENERAL RADIOLOGY | Age: 62
DRG: 617 | End: 2022-10-13
Payer: COMMERCIAL

## 2022-10-13 ENCOUNTER — ANESTHESIA EVENT (OUTPATIENT)
Dept: OPERATING ROOM | Age: 62
DRG: 617 | End: 2022-10-13
Payer: COMMERCIAL

## 2022-10-13 ENCOUNTER — ANESTHESIA (OUTPATIENT)
Dept: OPERATING ROOM | Age: 62
DRG: 617 | End: 2022-10-13
Payer: COMMERCIAL

## 2022-10-13 LAB
A/G RATIO: 1.4 (ref 1.1–2.2)
ALBUMIN SERPL-MCNC: 3.4 G/DL (ref 3.4–5)
ALP BLD-CCNC: 83 U/L (ref 40–129)
ALT SERPL-CCNC: 13 U/L (ref 10–40)
ANION GAP SERPL CALCULATED.3IONS-SCNC: 12 MMOL/L (ref 3–16)
AST SERPL-CCNC: 11 U/L (ref 15–37)
BASOPHILS ABSOLUTE: 0.1 K/UL (ref 0–0.2)
BASOPHILS RELATIVE PERCENT: 1 %
BILIRUB SERPL-MCNC: 0.4 MG/DL (ref 0–1)
BUN BLDV-MCNC: 8 MG/DL (ref 7–20)
CALCIUM SERPL-MCNC: 8.1 MG/DL (ref 8.3–10.6)
CHLORIDE BLD-SCNC: 104 MMOL/L (ref 99–110)
CO2: 24 MMOL/L (ref 21–32)
CREAT SERPL-MCNC: 0.6 MG/DL (ref 0.8–1.3)
EOSINOPHILS ABSOLUTE: 0.2 K/UL (ref 0–0.6)
EOSINOPHILS RELATIVE PERCENT: 3.5 %
ESTIMATED AVERAGE GLUCOSE: 191.5 MG/DL
GFR AFRICAN AMERICAN: >60
GFR NON-AFRICAN AMERICAN: >60
GLUCOSE BLD-MCNC: 145 MG/DL (ref 70–99)
GLUCOSE BLD-MCNC: 167 MG/DL (ref 70–99)
GLUCOSE BLD-MCNC: 179 MG/DL (ref 70–99)
GLUCOSE BLD-MCNC: 183 MG/DL (ref 70–99)
GLUCOSE BLD-MCNC: 187 MG/DL (ref 70–99)
GLUCOSE BLD-MCNC: 194 MG/DL (ref 70–99)
GLUCOSE BLD-MCNC: 202 MG/DL (ref 70–99)
GLUCOSE BLD-MCNC: 216 MG/DL (ref 70–99)
HBA1C MFR BLD: 8.3 %
HCT VFR BLD CALC: 36.9 % (ref 40.5–52.5)
HEMOGLOBIN: 12.5 G/DL (ref 13.5–17.5)
INR BLD: 1.04 (ref 0.87–1.14)
LYMPHOCYTES ABSOLUTE: 1 K/UL (ref 1–5.1)
LYMPHOCYTES RELATIVE PERCENT: 18.8 %
MCH RBC QN AUTO: 29.8 PG (ref 26–34)
MCHC RBC AUTO-ENTMCNC: 33.9 G/DL (ref 31–36)
MCV RBC AUTO: 87.8 FL (ref 80–100)
MONOCYTES ABSOLUTE: 0.3 K/UL (ref 0–1.3)
MONOCYTES RELATIVE PERCENT: 6.5 %
NEUTROPHILS ABSOLUTE: 3.7 K/UL (ref 1.7–7.7)
NEUTROPHILS RELATIVE PERCENT: 70.2 %
PDW BLD-RTO: 13.9 % (ref 12.4–15.4)
PERFORMED ON: ABNORMAL
PLATELET # BLD: 250 K/UL (ref 135–450)
PMV BLD AUTO: 7.2 FL (ref 5–10.5)
POTASSIUM REFLEX MAGNESIUM: 4.2 MMOL/L (ref 3.5–5.1)
PROTHROMBIN TIME: 13.4 SEC (ref 11.7–14.5)
RBC # BLD: 4.2 M/UL (ref 4.2–5.9)
SODIUM BLD-SCNC: 140 MMOL/L (ref 136–145)
TOTAL PROTEIN: 5.8 G/DL (ref 6.4–8.2)
VANCOMYCIN TROUGH: 9 UG/ML (ref 10–20)
WBC # BLD: 5.3 K/UL (ref 4–11)

## 2022-10-13 PROCEDURE — 6360000002 HC RX W HCPCS

## 2022-10-13 PROCEDURE — C1889 IMPLANT/INSERT DEVICE, NOC: HCPCS | Performed by: PODIATRIST

## 2022-10-13 PROCEDURE — 3600000013 HC SURGERY LEVEL 3 ADDTL 15MIN: Performed by: PODIATRIST

## 2022-10-13 PROCEDURE — 2580000003 HC RX 258: Performed by: PODIATRIST

## 2022-10-13 PROCEDURE — 73620 X-RAY EXAM OF FOOT: CPT

## 2022-10-13 PROCEDURE — 6360000002 HC RX W HCPCS: Performed by: PODIATRIST

## 2022-10-13 PROCEDURE — 80053 COMPREHEN METABOLIC PANEL: CPT

## 2022-10-13 PROCEDURE — 7100000001 HC PACU RECOVERY - ADDTL 15 MIN: Performed by: PODIATRIST

## 2022-10-13 PROCEDURE — 99232 SBSQ HOSP IP/OBS MODERATE 35: CPT

## 2022-10-13 PROCEDURE — 3700000001 HC ADD 15 MINUTES (ANESTHESIA): Performed by: PODIATRIST

## 2022-10-13 PROCEDURE — 3700000000 HC ANESTHESIA ATTENDED CARE: Performed by: PODIATRIST

## 2022-10-13 PROCEDURE — 2709999900 HC NON-CHARGEABLE SUPPLY: Performed by: PODIATRIST

## 2022-10-13 PROCEDURE — 1200000000 HC SEMI PRIVATE

## 2022-10-13 PROCEDURE — 85025 COMPLETE CBC W/AUTO DIFF WBC: CPT

## 2022-10-13 PROCEDURE — 36415 COLL VENOUS BLD VENIPUNCTURE: CPT

## 2022-10-13 PROCEDURE — 6360000002 HC RX W HCPCS: Performed by: INTERNAL MEDICINE

## 2022-10-13 PROCEDURE — 87075 CULTR BACTERIA EXCEPT BLOOD: CPT

## 2022-10-13 PROCEDURE — 88311 DECALCIFY TISSUE: CPT

## 2022-10-13 PROCEDURE — 6370000000 HC RX 637 (ALT 250 FOR IP): Performed by: INTERNAL MEDICINE

## 2022-10-13 PROCEDURE — 85610 PROTHROMBIN TIME: CPT

## 2022-10-13 PROCEDURE — 87205 SMEAR GRAM STAIN: CPT

## 2022-10-13 PROCEDURE — 80202 ASSAY OF VANCOMYCIN: CPT

## 2022-10-13 PROCEDURE — A4217 STERILE WATER/SALINE, 500 ML: HCPCS | Performed by: PODIATRIST

## 2022-10-13 PROCEDURE — 87076 CULTURE ANAEROBE IDENT EACH: CPT

## 2022-10-13 PROCEDURE — 6370000000 HC RX 637 (ALT 250 FOR IP)

## 2022-10-13 PROCEDURE — 2500000003 HC RX 250 WO HCPCS: Performed by: PODIATRIST

## 2022-10-13 PROCEDURE — 87070 CULTURE OTHR SPECIMN AEROBIC: CPT

## 2022-10-13 PROCEDURE — 6370000000 HC RX 637 (ALT 250 FOR IP): Performed by: PODIATRIST

## 2022-10-13 PROCEDURE — 3600000003 HC SURGERY LEVEL 3 BASE: Performed by: PODIATRIST

## 2022-10-13 PROCEDURE — 0Y6P0Z0 DETACHMENT AT RIGHT 1ST TOE, COMPLETE, OPEN APPROACH: ICD-10-PCS | Performed by: PODIATRIST

## 2022-10-13 PROCEDURE — 2580000003 HC RX 258: Performed by: INTERNAL MEDICINE

## 2022-10-13 PROCEDURE — 93971 EXTREMITY STUDY: CPT

## 2022-10-13 PROCEDURE — 7100000000 HC PACU RECOVERY - FIRST 15 MIN: Performed by: PODIATRIST

## 2022-10-13 PROCEDURE — 2500000003 HC RX 250 WO HCPCS

## 2022-10-13 PROCEDURE — 88305 TISSUE EXAM BY PATHOLOGIST: CPT

## 2022-10-13 DEVICE — PATCH AMNION 2 LAYR PROTCT 4 X 4CM STERISHIELD II: Type: IMPLANTABLE DEVICE | Site: FOOT | Status: FUNCTIONAL

## 2022-10-13 RX ORDER — SODIUM CHLORIDE 0.9 % (FLUSH) 0.9 %
5-40 SYRINGE (ML) INJECTION PRN
Status: DISCONTINUED | OUTPATIENT
Start: 2022-10-13 | End: 2022-10-13 | Stop reason: SDUPTHER

## 2022-10-13 RX ORDER — ASPIRIN 81 MG/1
81 TABLET, CHEWABLE ORAL DAILY
Status: DISCONTINUED | OUTPATIENT
Start: 2022-10-13 | End: 2022-10-17 | Stop reason: HOSPADM

## 2022-10-13 RX ORDER — MORPHINE SULFATE 4 MG/ML
4 INJECTION, SOLUTION INTRAMUSCULAR; INTRAVENOUS EVERY 4 HOURS PRN
Status: DISCONTINUED | OUTPATIENT
Start: 2022-10-13 | End: 2022-10-13

## 2022-10-13 RX ORDER — MIDAZOLAM HYDROCHLORIDE 1 MG/ML
INJECTION INTRAMUSCULAR; INTRAVENOUS PRN
Status: DISCONTINUED | OUTPATIENT
Start: 2022-10-13 | End: 2022-10-13 | Stop reason: SDUPTHER

## 2022-10-13 RX ORDER — OXYCODONE HYDROCHLORIDE 5 MG/1
10 TABLET ORAL PRN
Status: DISCONTINUED | OUTPATIENT
Start: 2022-10-13 | End: 2022-10-13 | Stop reason: HOSPADM

## 2022-10-13 RX ORDER — PROPOFOL 10 MG/ML
INJECTION, EMULSION INTRAVENOUS PRN
Status: DISCONTINUED | OUTPATIENT
Start: 2022-10-13 | End: 2022-10-13 | Stop reason: SDUPTHER

## 2022-10-13 RX ORDER — MORPHINE SULFATE 2 MG/ML
2 INJECTION, SOLUTION INTRAMUSCULAR; INTRAVENOUS EVERY 4 HOURS PRN
Status: DISCONTINUED | OUTPATIENT
Start: 2022-10-13 | End: 2022-10-13

## 2022-10-13 RX ORDER — LIDOCAINE HYDROCHLORIDE 10 MG/ML
INJECTION, SOLUTION EPIDURAL; INFILTRATION; INTRACAUDAL; PERINEURAL PRN
Status: DISCONTINUED | OUTPATIENT
Start: 2022-10-13 | End: 2022-10-13 | Stop reason: SDUPTHER

## 2022-10-13 RX ORDER — MEPERIDINE HYDROCHLORIDE 25 MG/ML
12.5 INJECTION INTRAMUSCULAR; INTRAVENOUS; SUBCUTANEOUS EVERY 5 MIN PRN
Status: DISCONTINUED | OUTPATIENT
Start: 2022-10-13 | End: 2022-10-13 | Stop reason: HOSPADM

## 2022-10-13 RX ORDER — LABETALOL HYDROCHLORIDE 5 MG/ML
5 INJECTION, SOLUTION INTRAVENOUS EVERY 10 MIN PRN
Status: DISCONTINUED | OUTPATIENT
Start: 2022-10-13 | End: 2022-10-13 | Stop reason: HOSPADM

## 2022-10-13 RX ORDER — SODIUM CHLORIDE 0.9 % (FLUSH) 0.9 %
5-40 SYRINGE (ML) INJECTION EVERY 12 HOURS SCHEDULED
Status: DISCONTINUED | OUTPATIENT
Start: 2022-10-13 | End: 2022-10-13 | Stop reason: SDUPTHER

## 2022-10-13 RX ORDER — SODIUM CHLORIDE 9 MG/ML
INJECTION, SOLUTION INTRAVENOUS PRN
Status: DISCONTINUED | OUTPATIENT
Start: 2022-10-13 | End: 2022-10-13 | Stop reason: SDUPTHER

## 2022-10-13 RX ORDER — INSULIN LISPRO 100 [IU]/ML
0-4 INJECTION, SOLUTION INTRAVENOUS; SUBCUTANEOUS
Status: DISCONTINUED | OUTPATIENT
Start: 2022-10-13 | End: 2022-10-17 | Stop reason: HOSPADM

## 2022-10-13 RX ORDER — OXYCODONE HYDROCHLORIDE AND ACETAMINOPHEN 5; 325 MG/1; MG/1
1 TABLET ORAL EVERY 6 HOURS PRN
Status: DISCONTINUED | OUTPATIENT
Start: 2022-10-13 | End: 2022-10-14

## 2022-10-13 RX ORDER — DIPHENHYDRAMINE HYDROCHLORIDE 50 MG/ML
12.5 INJECTION INTRAMUSCULAR; INTRAVENOUS
Status: DISCONTINUED | OUTPATIENT
Start: 2022-10-13 | End: 2022-10-13 | Stop reason: HOSPADM

## 2022-10-13 RX ORDER — OXYCODONE HYDROCHLORIDE 5 MG/1
5 TABLET ORAL PRN
Status: DISCONTINUED | OUTPATIENT
Start: 2022-10-13 | End: 2022-10-13 | Stop reason: HOSPADM

## 2022-10-13 RX ORDER — MAGNESIUM HYDROXIDE 1200 MG/15ML
LIQUID ORAL CONTINUOUS PRN
Status: COMPLETED | OUTPATIENT
Start: 2022-10-13 | End: 2022-10-13

## 2022-10-13 RX ORDER — ONDANSETRON 2 MG/ML
4 INJECTION INTRAMUSCULAR; INTRAVENOUS
Status: DISCONTINUED | OUTPATIENT
Start: 2022-10-13 | End: 2022-10-13 | Stop reason: HOSPADM

## 2022-10-13 RX ORDER — INSULIN LISPRO 100 [IU]/ML
0-4 INJECTION, SOLUTION INTRAVENOUS; SUBCUTANEOUS NIGHTLY
Status: DISCONTINUED | OUTPATIENT
Start: 2022-10-13 | End: 2022-10-17 | Stop reason: HOSPADM

## 2022-10-13 RX ORDER — GLIPIZIDE 5 MG/1
5 TABLET ORAL
Status: DISCONTINUED | OUTPATIENT
Start: 2022-10-14 | End: 2022-10-17 | Stop reason: HOSPADM

## 2022-10-13 RX ORDER — MORPHINE SULFATE 2 MG/ML
2 INJECTION, SOLUTION INTRAMUSCULAR; INTRAVENOUS
Status: DISCONTINUED | OUTPATIENT
Start: 2022-10-13 | End: 2022-10-13

## 2022-10-13 RX ORDER — CLOPIDOGREL BISULFATE 75 MG/1
75 TABLET ORAL DAILY
Status: DISCONTINUED | OUTPATIENT
Start: 2022-10-13 | End: 2022-10-17 | Stop reason: HOSPADM

## 2022-10-13 RX ORDER — MORPHINE SULFATE 4 MG/ML
4 INJECTION, SOLUTION INTRAMUSCULAR; INTRAVENOUS
Status: DISCONTINUED | OUTPATIENT
Start: 2022-10-13 | End: 2022-10-13

## 2022-10-13 RX ORDER — PROPOFOL 10 MG/ML
INJECTION, EMULSION INTRAVENOUS CONTINUOUS PRN
Status: DISCONTINUED | OUTPATIENT
Start: 2022-10-13 | End: 2022-10-13 | Stop reason: SDUPTHER

## 2022-10-13 RX ADMIN — OXYCODONE HYDROCHLORIDE AND ACETAMINOPHEN 1 TABLET: 5; 325 TABLET ORAL at 23:12

## 2022-10-13 RX ADMIN — ASPIRIN 81 MG: 81 TABLET, CHEWABLE ORAL at 14:45

## 2022-10-13 RX ADMIN — PROPOFOL 125 MCG/KG/MIN: 10 INJECTION, EMULSION INTRAVENOUS at 10:18

## 2022-10-13 RX ADMIN — VANCOMYCIN HYDROCHLORIDE 1250 MG: 10 INJECTION, POWDER, LYOPHILIZED, FOR SOLUTION INTRAVENOUS at 03:29

## 2022-10-13 RX ADMIN — HYDROMORPHONE HYDROCHLORIDE 0.5 MG: 1 INJECTION, SOLUTION INTRAMUSCULAR; INTRAVENOUS; SUBCUTANEOUS at 17:03

## 2022-10-13 RX ADMIN — CEFEPIME 2000 MG: 2 INJECTION, POWDER, FOR SOLUTION INTRAVENOUS at 21:44

## 2022-10-13 RX ADMIN — PROPOFOL 50 MG: 10 INJECTION, EMULSION INTRAVENOUS at 10:18

## 2022-10-13 RX ADMIN — CEFEPIME 2000 MG: 2 INJECTION, POWDER, FOR SOLUTION INTRAVENOUS at 14:44

## 2022-10-13 RX ADMIN — OXYCODONE HYDROCHLORIDE AND ACETAMINOPHEN 2 TABLET: 5; 325 TABLET ORAL at 08:35

## 2022-10-13 RX ADMIN — MIDAZOLAM 2 MG: 1 INJECTION INTRAMUSCULAR; INTRAVENOUS at 10:16

## 2022-10-13 RX ADMIN — INSULIN LISPRO 1 UNITS: 100 INJECTION, SOLUTION INTRAVENOUS; SUBCUTANEOUS at 18:31

## 2022-10-13 RX ADMIN — MORPHINE SULFATE 4 MG: 4 INJECTION, SOLUTION INTRAMUSCULAR; INTRAVENOUS at 14:41

## 2022-10-13 RX ADMIN — OXYCODONE HYDROCHLORIDE AND ACETAMINOPHEN 2 TABLET: 5; 325 TABLET ORAL at 04:34

## 2022-10-13 RX ADMIN — VANCOMYCIN HYDROCHLORIDE 1000 MG: 1 INJECTION, POWDER, LYOPHILIZED, FOR SOLUTION INTRAVENOUS at 18:30

## 2022-10-13 RX ADMIN — SODIUM CHLORIDE: 9 INJECTION, SOLUTION INTRAVENOUS at 14:36

## 2022-10-13 RX ADMIN — METOPROLOL TARTRATE 50 MG: 50 TABLET, FILM COATED ORAL at 21:45

## 2022-10-13 RX ADMIN — HYDROMORPHONE HYDROCHLORIDE 0.5 MG: 1 INJECTION, SOLUTION INTRAMUSCULAR; INTRAVENOUS; SUBCUTANEOUS at 21:39

## 2022-10-13 RX ADMIN — OXYCODONE HYDROCHLORIDE AND ACETAMINOPHEN 1 TABLET: 5; 325 TABLET ORAL at 15:47

## 2022-10-13 RX ADMIN — ATORVASTATIN CALCIUM 40 MG: 40 TABLET, FILM COATED ORAL at 18:28

## 2022-10-13 RX ADMIN — CLOPIDOGREL BISULFATE 75 MG: 75 TABLET ORAL at 14:44

## 2022-10-13 RX ADMIN — METOPROLOL TARTRATE 50 MG: 50 TABLET, FILM COATED ORAL at 08:34

## 2022-10-13 RX ADMIN — CEFEPIME 2000 MG: 2 INJECTION, POWDER, FOR SOLUTION INTRAVENOUS at 06:01

## 2022-10-13 RX ADMIN — ENOXAPARIN SODIUM 30 MG: 100 INJECTION SUBCUTANEOUS at 21:42

## 2022-10-13 RX ADMIN — LIDOCAINE HYDROCHLORIDE 50 MG: 10 INJECTION, SOLUTION EPIDURAL; INFILTRATION; INTRACAUDAL; PERINEURAL at 10:18

## 2022-10-13 ASSESSMENT — PAIN DESCRIPTION - LOCATION
LOCATION: TOE (COMMENT WHICH ONE)
LOCATION: FOOT

## 2022-10-13 ASSESSMENT — PAIN DESCRIPTION - ORIENTATION
ORIENTATION: RIGHT

## 2022-10-13 ASSESSMENT — PAIN DESCRIPTION - DESCRIPTORS
DESCRIPTORS: THROBBING
DESCRIPTORS: ACHING

## 2022-10-13 ASSESSMENT — PAIN SCALES - GENERAL
PAINLEVEL_OUTOF10: 2
PAINLEVEL_OUTOF10: 0
PAINLEVEL_OUTOF10: 9
PAINLEVEL_OUTOF10: 10
PAINLEVEL_OUTOF10: 10
PAINLEVEL_OUTOF10: 4
PAINLEVEL_OUTOF10: 9
PAINLEVEL_OUTOF10: 8
PAINLEVEL_OUTOF10: 7
PAINLEVEL_OUTOF10: 8
PAINLEVEL_OUTOF10: 0
PAINLEVEL_OUTOF10: 8
PAINLEVEL_OUTOF10: 9
PAINLEVEL_OUTOF10: 10
PAINLEVEL_OUTOF10: 8

## 2022-10-13 ASSESSMENT — PAIN - FUNCTIONAL ASSESSMENT
PAIN_FUNCTIONAL_ASSESSMENT: PREVENTS OR INTERFERES SOME ACTIVE ACTIVITIES AND ADLS
PAIN_FUNCTIONAL_ASSESSMENT: ACTIVITIES ARE NOT PREVENTED
PAIN_FUNCTIONAL_ASSESSMENT: PREVENTS OR INTERFERES SOME ACTIVE ACTIVITIES AND ADLS

## 2022-10-13 NOTE — BRIEF OP NOTE
Brief Postoperative Note      Patient: Savi Krishnamurthy  YOB: 1960  MRN: [id number]    Date of Procedure: 10/13/2022    Pre-Op Diagnosis: Osteomyelitis of right foot, unspecified type ([facility] [M86.9]  Diabetic ulcer of right foot associated with diabetes mellitus due to underlying condition, unspecified part of foot, unspecified ulcer stage ([facility] [K98.453, L97.519]    Post-Op Diagnosis: Same       Procedure(s):  PARTIAL RIGHT FOOT  AMPUTATION    Surgeon(s):  Loly Romano DPM    Assistant:  Surgical Assistant: Richard Logan    Anesthesia: Monitor Anesthesia Care    Estimated Blood Loss (mL): less than 707     Complications: None    Specimens:   ID Type Source Tests Collected by Time Destination   1 : RIGHT GREAT TOE Specimen Foot CULTURE, SURGICAL Loly Romano DPM 10/13/2022 1028    A : RIGHT GREAT TOE Tissue Tissue SURGICAL PATHOLOGY Loly Romano DPM 10/13/2022 1030        Implants:  Implant Name Type Inv.  Item Serial No.  Lot No. LRB No. Used Action   PATCH AMNION 2 LAYR PROTCT 4 X 4CM STERISHIELD II - [id number]  PATCH AMNION 2 LAYR PROTCT 4 X 4CM STERISHIELD II [id number] [employer]  Right 1 Implanted         Drains: * No LDAs found *    Findings: ulcer right hallux with purulence    Electronically signed by Loly Romano DPM on 10/13/2022 at 10:58 AM

## 2022-10-13 NOTE — PROGRESS NOTES
Pt arrived from OR. Report from [facility] and CRNA. BP slightly low. Will monitor. Pt sleepy.

## 2022-10-13 NOTE — PLAN OF CARE
Problem: Discharge Planning  Goal: Discharge to home or other facility with appropriate resources  Outcome: Progressing     Problem: Chronic Conditions and Co-morbidities  Goal: Patient's chronic conditions and co-morbidity symptoms are monitored and maintained or improved  10/13/2022 1338 by Deanna Vela RN  Outcome: Progressing  10/12/2022 2352 by Jaleel Lewis RN  Outcome: Progressing     Problem: Pain  Goal: Verbalizes/displays adequate comfort level or baseline comfort level  10/13/2022 1338 by Deanna Vela RN  Outcome: Progressing  10/12/2022 2353 by Jaleel Lewis RN  Outcome: Progressing     Problem: Safety - Adult  Goal: Free from fall injury  10/13/2022 1338 by Deanna Vela RN  Outcome: Progressing  10/12/2022 2353 by Jaleel Lewis RN  Outcome: Progressing

## 2022-10-13 NOTE — CONSULTS
Pharmacy Note  Vancomycin Consult    Kourtney Deshpande is a 58 y.o. male started on Vancomycin for diabetic foot ulcer/bone and joint infection; consult received from Dr. Shantelle Gongora to manage therapy. Also receiving the following antibiotics: cefepime. Allergies:  Patient has no known allergies. Tmax: 98.6    Recent Labs     10/12/22  1438   CREATININE 0.7*       Recent Labs     10/12/22  1438   WBC 6.8       Estimated Creatinine Clearance: 138 mL/min (A) (based on SCr of 0.7 mg/dL (L)). No intake or output data in the 24 hours ending 10/13/22 0023    Wt Readings from Last 1 Encounters:   10/12/22 250 lb (113.4 kg)         Body mass index is 35.87 kg/m². Culture Date      Source                       Results      Loading dose (critically ill or in ICU, require dialysis or renal replacement therapy): Vancomycin 25 mg/kg IVPB x 1 (maximum 2500 mg). Maintenance dose: 15 mg/kg (maximum: 2000 mg/dose and 4500 mg/day) starting at the next dosing interval determined by renal function  Pulse dose: fluctuating renal function, FERNY, ESRD   Goal Vancomycin trough: 10-15 mcg/mL or 15-20 mcg/mL   Goal Vancomycin AUC: 400-600     Assessment/Plan:  Will initiate Vancomycin with a one time loading dose of 2000 mg x1, followed by 1250 mg IV every 12 hours. Calculated . Vancomycin level ordered for 10/13 @ 1430. Estimated value = 11 mcg/ml. Thank you for the consult.

## 2022-10-13 NOTE — ACP (ADVANCE CARE PLANNING)
Advance Care Planning     General Advance Care Planning (ACP) Conversation    Date of Conversation: 10/12/2022  Conducted with: Patient with Decision Making Capacity   Healthcare Decision Maker: Next of Kin by law (only applies in absence of above) (name) pt's wife Jules Nguyen 11:    Primary Decision Maker (Active): Brittny Chin - Spouse - [phone]  Click here to complete Healthcare Decision Makers including selection of the Healthcare Decision Maker Relationship (ie \"Primary\"). Today we documented Decision Maker(s) consistent with ACP documents on file.  See documents on 1/20/2016 under media tab    Content/Action Overview:    Reviewed DNR/DNI and patient elects Full Code (Attempt Resuscitation)  ventilation preferences  Pt stated he would want a Vent if medically necessary     Length of Voluntary ACP Conversation in minutes:  <16 minutes (Non-Billable)    Analia Watts MSW, NATALY-S

## 2022-10-13 NOTE — FLOWSHEET NOTE
AM assessment completed. See flowsheet. A/Ox4. LCTAB. Medications taken without difficulty. BS active x4. No c/o n/v/d. Cont of B&B. +2 pitting edema noted to RLE. Patient c/o R foot pain 8/10. PRN Percocet given per MAR. Patient up in recliner at this time. Call light in reach.      10/13/22 0712   Vital Signs   Temp 98 °F (36.7 °C)   Temp Source Oral   Heart Rate 74   Heart Rate Source Monitor   Resp 16   BP (!) 162/81   BP Location Right upper arm   BP Method Automatic   MAP (Calculated) 108   Patient Position Semi fowlers   Level of Consciousness 0   MEWS Score 1   Opioid-Induced Sedation   POSS Score 1   Oxygen Therapy   SpO2 98 %   O2 Device None (Room air)

## 2022-10-13 NOTE — ANESTHESIA PRE PROCEDURE
Department of Anesthesiology  Preprocedure Note       Name:  Kim Mcrae   Age:  58 y.o.  :  1960                                          MRN:  [id number]         Date:  10/13/2022      Surgeon: Kelly Aviles):  Renay Cortez DPM    Procedure: Procedure(s):  PARTIAL RIGHT FOOT  AMPUTATION    Medications prior to admission:   Prior to Admission medications    Medication Sig Start Date End Date Taking? Authorizing Provider   doxycycline hyclate (VIBRA-TABS) 100 MG tablet Take 1 tablet by mouth in the morning and at bedtime 22   Historical Provider, MD   atorvastatin (LIPITOR) 40 MG tablet TAKE ONE TABLET BY MOUTH EVERY EVENING 10/10/22   [facility], DO   metFORMIN (GLUCOPHAGE) 1000 MG tablet TAKE ONE TABLET BY MOUTH TWICE A DAY 8/15/22   [facility], DO   glipiZIDE (GLUCOTROL) 10 MG tablet Take 11/ daily in am. For diabetes. 22   [facility], DO   metoprolol tartrate (LOPRESSOR) 50 MG tablet TAKE ONE TABLET BY MOUTH TWICE A DAY 22   Brendon Bernard MD   silver sulfADIAZINE (SILVADENE) 1 % cream Apply topically to wounds daily.  22   Renay Cortez DPM   clopidogrel (PLAVIX) 75 MG tablet TAKE ONE TABLET BY MOUTH DAILY 3/21/22   [facility], DO   acetaminophen (TYLENOL) 500 MG tablet Take 500 mg by mouth every 6 hours as needed     Historical Provider, MD   aspirin 81 MG chewable tablet CHEW ONE TABLET BY MOUTH DAILY 16   [facility], DO       Current medications:    Current Facility-Administered Medications   Medication Dose Route Frequency Provider Last Rate Last Admin    oxyCODONE-acetaminophen (PERCOCET) 5-325 MG per tablet 1 tablet  1 tablet Oral Q4H PRN Eduard Rowe MD        Or    oxyCODONE-acetaminophen (PERCOCET) 5-325 MG per tablet 2 tablet  2 tablet Oral Q4H PRN Eduard Rowe MD   2 tablet at 10/13/22 0835    atorvastatin (LIPITOR) tablet 40 mg  40 mg Oral QPM Eduard Rowe MD   40 mg at 10/12/22 9984    metoprolol tartrate (LOPRESSOR) tablet 50 mg  50 mg Oral BID Derrick Mittal MD   50 mg at 10/13/22 3478    glucose chewable tablet 16 g  4 tablet Oral PRN Derrick Mittal MD        dextrose bolus 10% 125 mL  125 mL IntraVENous PRN Derrick Mittal MD        Or    dextrose bolus 10% 250 mL  250 mL IntraVENous PRN Derrick Mittal MD        glucagon (rDNA) injection 1 mg  1 mg SubCUTAneous PRN Derrick Mittal MD        dextrose 10 % infusion   IntraVENous Continuous PRN Derrick Mittal MD        0.9 % sodium chloride infusion   IntraVENous Continuous Derrick Mittal MD 75 mL/hr at 10/12/22 2154 New Bag at 10/12/22 2154    sodium chloride flush 0.9 % injection 5-40 mL  5-40 mL IntraVENous 2 times per day Derrick Mittal MD        sodium chloride flush 0.9 % injection 5-40 mL  5-40 mL IntraVENous PRN Derrick Mittal MD        0.9 % sodium chloride infusion   IntraVENous PRN Derrick Mittal MD        enoxaparin Sodium (LOVENOX) injection 30 mg  30 mg SubCUTAneous BID Derrick Mittal MD   30 mg at 10/12/22 2146    ondansetron (ZOFRAN-ODT) disintegrating tablet 4 mg  4 mg Oral Q8H PRN Derrick Mittal MD        Or    ondansetron [facility]) injection 4 mg  4 mg IntraVENous Q6H PRN Derrick Mittal MD        polyethylene glycol [employer]) packet 17 g  17 g Oral Daily PRN Derrick Mittal MD        acetaminophen (TYLENOL) tablet 650 mg  650 mg Oral Q6H PRN Derrick Mittal MD        Or    acetaminophen (TYLENOL) suppository 650 mg  650 mg Rectal Q6H PRN Derrick Mittal MD        potassium chloride (KLOR-CON M) extended release tablet 40 mEq  40 mEq Oral PRN Derrick Mittal MD        Or    potassium bicarb-citric acid (EFFER-K) effervescent tablet 40 mEq  40 mEq Oral PRN Derrick Mittal MD        Or    potassium chloride 10 mEq/100 mL IVPB (Peripheral Line)  10 mEq IntraVENous PRN Derrick Mittal MD        magnesium sulfate 2000 mg in 50 mL IVPB premix  2,000 mg IntraVENous PRN Derrick Mittal MD        insulin lispro (HUMALOG) injection vial 0-4 Units  0-4 Units SubCUTAneous Q4H Harvey Feldman MD   1 Units at 10/12/22 2155    cefepime (MAXIPIME) 2000 mg IVPB minibag  2,000 mg IntraVENous Q8H Harvey Feldman MD 12.5 mL/hr at 10/13/22 0601 2,000 mg at 10/13/22 0601    vancomycin (VANCOCIN) 1,250 mg in dextrose 5 % 250 mL IVPB  1,250 mg IntraVENous Q12H Harvey Feldman MD   Stopped at 10/13/22 4858       Allergies:  No Known Allergies    Problem List:    Patient Active Problem List   Diagnosis Code    Type II or unspecified type diabetes mellitus without mention of complication, not stated as uncontrolled E11.9    Diabetes mellitus E11.9    History of ETOH abuse F10.11    Patient overweight E66.3    Patient overweight E66.3    Dietary noncompliance Z91.119    Alcohol abuse F10.10    Flank pain R10.9    Prostatism N40.0    Low serum testosterone level R79.89    Arm pain, left M79.602    Shoulder pain M25.519    Precordial pain R07.2    Lung nodule R91.1    Acute angina (HCC) I20.9    Abnormal stress test R94.39    Acute coronary syndrome (HCC) I24.9    Coronary artery disease involving native coronary artery of native heart with unstable angina pectoris (HCC) I25.110    Other chest pain R07.89    S/P drug eluting coronary stent placement Z95.5    Essential hypertension I10    Obesity E66.9    Abnormal chest x-ray R93.89    Angina effort I20.8    Skin lesion L98.9    Angina at rest ([address] I20.8    Hyperlipidemia E78.5    Angina pectoris (HCC) I20.9    NSTEMI (non-ST elevated myocardial infarction) ([address] I21.4    Controlled type 2 diabetes mellitus without complication, without long-term current use of insulin (HCC) E11.9    ASHD (arteriosclerotic heart disease) I25.10    Mass of right foot R22.41    Overweight E66.3    History of angina pectoris Z86.79    Reactive airway disease J45.909    Bronchitis J40    Reactive airway disease with wheezing, moderate persistent, with acute exacerbation J45.41    Need for prophylactic vaccination and inoculation against varicella Z23    Need for prophylactic vaccination against diphtheria-tetanus-pertussis (DTP) Z23    Chronic right-sided thoracic back pain M54.6, G89.29    CAD in native artery I25.10    S/P PTCA (percutaneous transluminal coronary angioplasty) Z98.61    Chest pain R07.9    Ischemic cardiomyopathy I25.5    GURDEEP (obstructive sleep apnea) G47.33    Cellulitis L03.90    Sepsis due to methicillin susceptible Staphylococcus aureus (HCC) A41.01    Bacteremia due to methicillin susceptible Staphylococcus aureus (MSSA) R78.81, B95.61    Disruption or dehiscence of closure of sternum or sternotomy T81.32XA    Elevated sed rate R70.0    Type 2 diabetes mellitus with obesity (HCC) E11.69, E66.9    Diabetic ulcer of right foot associated with type 2 diabetes mellitus, with fat layer exposed ([address] E11.621, L97.512    Osteomyelitis of great toe of right foot ([address] M86.9       Past Medical History:        Diagnosis Date    Alcohol abuse 12/03/2012    Cancer ([address] 2022    Prostate    Cellulitis     sternum    Coronary artery disease     COVID-19     Diabetic neuropathy associated with type 2 diabetes mellitus (HCC)     Hyperlipidemia     Non morbid obesity     NSTEMI (non-ST elevated myocardial infarction) ([address]     GURDEEP (obstructive sleep apnea)     does not wear cpap machine    Reactive airway disease with wheezing, moderate persistent, with acute exacerbation 03/03/2020    Sepsis ([address] 04/2021    Type II or unspecified type diabetes mellitus without mention of complication, not stated as uncontrolled        Past Surgical History:        Procedure Laterality Date    BYPASS GRAFT  02/19/2021    COLONOSCOPY  2017    COLONOSCOPY N/A 7/1/2022    COLONOSCOPY POLYPECTOMY SNARE/COLD BIOPSY performed by Jessica Strickland DO at [address] [facility]  1/14/2016 9/21/2020    1 [employer] SAMUEL 2. 25x16    CORONARY ARTERY BYPASS GRAFT  02/19/2021    CABG x2 & ALEX-Dr. Rachelle Fernandez    CORONARY ARTERY BYPASS GRAFT N/A 2/19/2021    CORONARY ARTERY BYPASS GRAFTING X2, INTERNAL MAMMARY ARTERY, SAPHENOUS VEIN GRAFT, ON PUMP WITH LEFT ATRIAL APPENDAGE CLIP, BILATERAL INTERCOSTAL NERVE BLOCK performed by Noé Matamoros MD at [place] [address] N/A 4/28/2021    WOUND VAC AND DRESSING CHANGE WITH REMOVAL OF 4 STERNAL WIRES, DRAINAGE OF 2 INTERCOSTAL SPACE ABCESS performed by Grey Roblero MD at [facility] N/A 4/26/2021    DEBRIDEMENT OF STERNAL WOUND WITH WOUND VACUUM PLACEMENT performed by Noé Matamoros MD at [address] [place] [address] History:    Social History     Tobacco Use    Smoking status: Never    Smokeless tobacco: Never   Substance Use Topics    Alcohol use: Not Currently     Comment: whiskey  - weekly                                Counseling given: Not Answered      Vital Signs (Current):   Vitals:    10/13/22 0504 10/13/22 0712 10/13/22 0834 10/13/22 0835   BP:  (!) 162/81 (!) 159/71    Pulse:  74 75    Resp: 16 16  16   Temp:  98 °F (36.7 °C)     TempSrc:  Oral     SpO2:  98%     Weight:       Height:                                                  BP Readings from Last 3 Encounters:   10/13/22 (!) 159/71   08/09/22 137/79   07/01/22 134/67       NPO Status: Time of last liquid consumption: 1900                        Time of last solid consumption: 1900                        Date of last liquid consumption: 10/12/22                        Date of last solid food consumption: 10/12/22    BMI:   Wt Readings from Last 3 Encounters:   10/12/22 250 lb (113.4 kg)   08/09/22 247 lb (112 kg)   07/01/22 249 lb (112.9 kg)     Body mass index is 35.87 kg/m².     CBC:   Lab Results   Component Value Date/Time    WBC 5.3 10/13/2022 05:14 AM    RBC 4.20 10/13/2022 05:14 AM    HGB 12.5 10/13/2022 05:14 AM    HCT 36.9 10/13/2022 05:14 AM    MCV 87.8 10/13/2022 05:14 AM    RDW 13.9 10/13/2022 05:14 AM     10/13/2022 05:14 AM       CMP:   Lab Results   Component Value Date/Time     10/13/2022 05:14 AM    K 4.2 10/13/2022 05:14 AM     10/13/2022 05:14 AM    CO2 24 10/13/2022 05:14 AM    BUN 8 10/13/2022 05:14 AM    CREATININE 0.6 10/13/2022 05:14 AM    GFRAA >60 10/13/2022 05:14 AM    AGRATIO 1.4 10/13/2022 05:14 AM    LABGLOM >60 10/13/2022 05:14 AM    GLUCOSE 202 10/13/2022 05:14 AM    PROT 5.8 10/13/2022 05:14 AM    CALCIUM 8.1 10/13/2022 05:14 AM    BILITOT 0.4 10/13/2022 05:14 AM    ALKPHOS 83 10/13/2022 05:14 AM    AST 11 10/13/2022 05:14 AM    ALT 13 10/13/2022 05:14 AM       POC Tests:   Recent Labs     10/13/22  0708   POCGLU 187*       Coags:   Lab Results   Component Value Date/Time    PROTIME 13.4 10/13/2022 05:14 AM    INR 1.04 10/13/2022 05:14 AM    APTT 30.4 02/19/2021 01:20 PM       HCG (If Applicable): No results found for: PREGTESTUR, PREGSERUM, HCG, HCGQUANT     ABGs:   Lab Results   Component Value Date/Time    PHART 7.319 02/19/2021 02:12 PM    PO2ART 108.5 02/19/2021 02:12 PM    WDX4SIE 42.4 02/19/2021 02:12 PM    KGI5PQW 21.8 02/19/2021 02:12 PM    BEART -4 02/19/2021 02:12 PM    U0DLQILK 98 02/19/2021 02:12 PM        Type & Screen (If Applicable):  No results found for: LABABO, LABRH    Drug/Infectious Status (If Applicable):  No results found for: HIV, HEPCAB    COVID-19 Screening (If Applicable):   Lab Results   Component Value Date/Time    COVID19 NOT DETECTED 10/12/2022 02:38 PM    COVID19 NOT DETECTED 12/10/2020 11:49 AM           Anesthesia Evaluation  Patient summary reviewed and Nursing notes reviewed no history of anesthetic complications:   Airway: Mallampati: III     Neck ROM: full     Dental:          Pulmonary:Negative Pulmonary ROS and normal exam    (+) sleep apnea:  asthma:                            Cardiovascular:Negative CV ROS    (+) hypertension:, past MI:, CAD:, CABG/stent (CABG 2 years ago):, hyperlipidemia    (-) angina                Neuro/Psych:   Negative Neuro/Psych ROS              GI/Hepatic/Renal: Neg GI/Hepatic/Renal ROS       (-) hiatal hernia and GERD       Endo/Other: Negative Endo/Other ROS   (+) Diabetes, . Abdominal:             Vascular: Other Findings:           Anesthesia Plan      general     ASA 3     (I discussed with the patient the risks and benefits of PIV, general anesthesia, IV Narcotics, PACU. All questions were answered the patient agrees with the plan and wishes to proceed.  )  Induction: intravenous. Summary   Left ventricular systolic function is normal with ejection fraction   estimated at 55%. No regional wall motion abnormalities. There is mild concentric left ventricular hypertrophy. Grade I diastolic dysfunction with normal left ventricular filling pressure. Signature      ------------------------------------------------------------------   Electronically signed by Carlito Thomas MD, [address] [facility]   (Interpreting physician) on 02/16/2021 at 09:49 A    Normal sinus rhythmNormal ECGWhen compared with ECG of 23-APR-2021 11:21,Vent. rate has decreased BY  36 BPMST abnormality improvedConfirmed by Yessenia Estrada MD, [place] ([id number]) on 10/12/2022 6:26:41 PM    Pre-Operative Diagnosis: Other acute osteomyelitis of right foot ([facility] Patricia Cipriano; Class 2 obesity with body mass index (BMI) of 35.0 to 35.9 in adult, unspecified obesity type, unspecified whether serious comorbidity present [E66.9, Z68.35]; Type 2 diabetes mellitus with other skin ulcer, unspecified whether long term insulin use ([facility] [S21.493, L98.499]; Osteomyelitis of great toe of right foot ([facility] [M86.9]    58 y.o.   BMI:  Body mass index is 35.87 kg/m².      Vitals:    10/13/22 0504 10/13/22 0712 10/13/22 0834 10/13/22 0835   BP:  (!) 162/81 (!) 159/71    Pulse:  74 75    Resp: 16 16  16   Temp:  98 °F (36.7 °C)     TempSrc:  Oral     SpO2:  98%     Weight:       Height:           No Known Allergies    Social History     Tobacco Use    Smoking status: Never    Smokeless tobacco: Never   Substance Use Topics    Alcohol use: Not Currently     Comment: whiskey  - weekly       LABS:    CBC  Lab Results   Component Value Date/Time    WBC 5.3 10/13/2022 05:14 AM    HGB 12.5 (L) 10/13/2022 05:14 AM    HCT 36.9 (L) 10/13/2022 05:14 AM     10/13/2022 05:14 AM     RENAL  Lab Results   Component Value Date/Time     10/13/2022 05:14 AM    K 4.2 10/13/2022 05:14 AM     10/13/2022 05:14 AM    CO2 24 10/13/2022 05:14 AM    BUN 8 10/13/2022 05:14 AM    CREATININE 0.6 (L) 10/13/2022 05:14 AM    GLUCOSE 202 (H) 10/13/2022 05:14 AM     COAGS  Lab Results   Component Value Date/Time    PROTIME 13.4 10/13/2022 05:14 AM    INR 1.04 10/13/2022 05:14 AM    APTT 30.4 02/19/2021 01:20 PM       Guillermina Henley MD   10/13/2022

## 2022-10-13 NOTE — ANESTHESIA POSTPROCEDURE EVALUATION
Department of Anesthesiology  Postprocedure Note    Patient: Robert Hoskins  MRN: [id number]  YOB: 1960  Date of evaluation: 10/13/2022      Procedure Summary     Date: 10/13/22 Room / Location: [facility]    Anesthesia Start: 9629 Anesthesia Stop: 1103    Procedure: PARTIAL RIGHT FOOT  AMPUTATION (Right: Foot) Diagnosis:       Osteomyelitis of right foot, unspecified type ([address]      Diabetic ulcer of right foot associated with diabetes mellitus due to underlying condition, unspecified part of foot, unspecified ulcer stage ([address]      (Osteomyelitis of right foot, unspecified type ([address] [M86.9])      (Diabetic ulcer of right foot associated with diabetes mellitus due to underlying condition, unspecified part of foot, unspecified ulcer stage ([address] [W81.413, L97.519])    Surgeons: Juliana Silveira DPM Responsible Provider: Antonia Issa MD    Anesthesia Type: general ASA Status: 3          Anesthesia Type: No value filed.     Una Phase I: Una Score: 10    Una Phase II:        Anesthesia Post Evaluation    Comments: Postoperative Anesthesia Note    Name:    Robert Hoskins  MRN:      [id number]    Patient Vitals in the past 12 hrs:  10/13/22 1230, BP:(!) 169/73, Temp:97.2 °F (36.2 °C), Temp src:Oral, Pulse:58, Resp:16, SpO2:100 %  10/13/22 1207, Pulse:57  10/13/22 1200, BP:(!) 147/75, Temp:97.8 °F (36.6 °C), Temp src:Temporal, Pulse:58, SpO2:98 %  10/13/22 1145, BP:138/67, Temp:97.8 °F (36.6 °C), Temp src:Temporal, Pulse:58, SpO2:99 %  10/13/22 1130, BP:(!) 132/96, Temp:97.8 °F (36.6 °C), Temp src:Temporal, Pulse:63, Resp:16, SpO2:100 %  10/13/22 1115, BP:(!) 119/59, Pulse:65, Resp:10, SpO2:97 %  10/13/22 1113, BP:(!) 106/58, Pulse:65, Resp:10, SpO2:97 %  10/13/22 1101, BP:(!) 89/54, Temp:97.8 °F (36.6 °C), Temp src:Temporal, Pulse:66, Resp:16, SpO2:96 %  10/13/22 0905, Resp:16  10/13/22 0835, Resp:16  10/13/22 0834, BP:(!) 159/71, Pulse:75  10/13/22 0712, BP:(!) 162/81, Temp:98 °F (36.7 °C), Temp src:Oral, Pulse:74, Resp:16, SpO2:98 %  10/13/22 0504, Resp:16  10/13/22 0434, Resp:16  10/13/22 0216, BP:(!) 150/87, Temp:97.9 °F (36.6 °C), Temp src:Oral, Pulse:76, Resp:16, SpO2:97 %     LABS:    CBC  Lab Results       Component                Value               Date/Time                  WBC                      5.3                 10/13/2022 05:14 AM        HGB                      12.5 (L)            10/13/2022 05:14 AM        HCT                      36.9 (L)            10/13/2022 05:14 AM        PLT                      250                 10/13/2022 05:14 AM   RENAL  Lab Results       Component                Value               Date/Time                  NA                       140                 10/13/2022 05:14 AM        K                        4.2                 10/13/2022 05:14 AM        CL                       104                 10/13/2022 05:14 AM        CO2                      24                  10/13/2022 05:14 AM        BUN                      8                   10/13/2022 05:14 AM        CREATININE               0.6 (L)             10/13/2022 05:14 AM        GLUCOSE                  202 (H)             10/13/2022 05:14 AM   COAGS  Lab Results       Component                Value               Date/Time                  PROTIME                  13.4                10/13/2022 05:14 AM        INR                      1.04                10/13/2022 05:14 AM        APTT                     30.4                02/19/2021 01:20 PM     Intake & Output:  @70YTUJ@    Nausea & Vomiting:  No    Level of Consciousness:  Awake    Pain Assessment:  Adequate analgesia    Anesthesia Complications:  No apparent anesthetic complications    SUMMARY      Vital signs stable  OK to discharge from Stage I post anesthesia care.   Care transferred from Anesthesiology department on discharge from perioperative area

## 2022-10-13 NOTE — OP NOTE
[address]                 [address] [place] [address]                                OPERATIVE REPORT    PATIENT NAME: Enrigue Kussmaul                           :        1960  MED REC NO:   [id number]                          ROOM:       91  ACCOUNT NO:   [de-identified]                           ADMIT DATE: 10/12/2022  PROVIDER:     Yasmany Lee DPM    DATE OF PROCEDURE:  10/13/2022    PREOPERATIVE DIAGNOSES:  1. Diabetic foot ulceration, right foot. 2.  Cellulitis, right foot. 3.  Abscess, right foot. 4.  Osteomyelitis, right foot. 5.  Diabetes mellitus. POSTOPERATIVE DIAGNOSES:  1. Diabetic foot ulceration, right foot. 2.  Cellulitis, right foot. 3.  Abscess, right foot. 4.  Osteomyelitis, right foot. 5.  Diabetes mellitus. OPERATION PERFORMED:  Partial right foot amputation. SURGEON:  Yasmany Lee DPM    ANESTHESIA:  Local, MAC. HEMOSTASIS:  Ankle tourniquet to 250 mmHg. ESTIMATED BLOOD LOSS:  Less than 100 mL. REPORT OF OPERATION:  The patient was brought into the operating room  and placed on the operating table in the supine position. Under mild IV  sedation, the right first ray was anesthetized with 10 mL of a 1:1  mixture of 1% lidocaine plain and 0.5% Marcaine plain. The foot was  then scrubbed, prepped, and draped in the usual sterile manner. Esmarch  was then utilized to exsanguinate the right foot and ankle tourniquet  was then inflated to 250 mmHg. Attention was then directed to the dorsal aspect of the right foot with  multiple areas of drainage and purulence was noted from the ulceration  and pinpoint drainage spots. The entire hallux was noted to be  erythematous and edematous consistent with his preoperative diagnosis of  cellulitis or skin infection. Preoperative imaging did reveal  osteomyelitis or bone infection within the hallux itself.   At this time,  a small incision was then made overlying the dorsal distal aspect of the  hallux within a spongy area, which revealed a thick yellow purulence. Culture was obtained from this purulence. At this time, attention was  then directed back to the base of the digit where elliptical incision  was then made just distal to the base of the hallux. Dissection was  carried down in order to expose the extensor and flexor tendons. These  were then transected. Soft tissue attachments at the first  metatarsophalangeal joint were then transected. Hallux was then passed  off the operative field in toto. The bone at the base of the hallux as  well as the head of the first metatarsal did appear hard and healthy. The sesamoids were noted to be very prominent and creating excess  pressure on the plantar aspect of the 1st MH. Thus the decision was   made to excise the sesamoids. The rongeur was then utilized to excise  the medial and lateral sesamoids in total. No further signs of   necrotic tissue was noted in the surgical site  itself at this time. Surgical site was then copiously irrigated with  sterile saline via the pulsed lavage. All bleeders were cauterized as  necessary. At this time, a SteriShield, 4 x 4 graft was trimmed and  inserted into the wound bed in toto in order to stimulate wound healing  in a high-risk diabetic patient. Skin edges were then reapproximated  with 3-0 nylon in simple interrupted suture technique. Surgical site  was then covered with Xeroform gauze, fluffs, Kerlix, ABD, and Coban. Tourniquet was then deflated. The patient tolerated the procedure and anesthesia well and transferred  to the recovery room with vital signs stable and vascular status intact. There was evidence for a prompt hyperemic response to the remaining  digits of the right foot. Following a brief period of postoperative  monitoring, the patient will be transferred back to the floor under the  care of the Medical Service.         Molina Curiel DPM    D: 10/13/2022 14:11:11       T: 10/13/2022 15:26:28     [id number]  Job#: [id number]     Doc#: [id number]    CC:

## 2022-10-13 NOTE — PROGRESS NOTES
Progress Note    Admit Date:  10/12/2022    -pmhx of alcohol abuse, prostate cancer, CAD, DM, HLD, GURDEEP   -presented with right great toe pain, infection, swelling   -follows with Dr. Paul Castelan   -had partial amputation today    Subjective:  Mr. Mian Smart today is feeling fine, he had right great toe partial amputation today, is having some pain post op. Resting comfortably in bed     Objective:   Patient Vitals for the past 4 hrs:   BP Temp Temp src Pulse Resp SpO2   10/13/22 1230 (!) 169/73 97.2 °F (36.2 °C) Oral 58 16 100 %   10/13/22 1207 -- -- -- 57 -- --   10/13/22 1200 (!) 147/75 97.8 °F (36.6 °C) Temporal 58 -- 98 %   10/13/22 1145 138/67 97.8 °F (36.6 °C) Temporal 58 -- 99 %   10/13/22 1130 (!) 132/96 97.8 °F (36.6 °C) Temporal 63 16 100 %   10/13/22 1115 (!) 119/59 -- -- 65 10 97 %   10/13/22 1113 (!) 106/58 -- -- 65 10 97 %   10/13/22 1101 (!) 89/54 97.8 °F (36.6 °C) Temporal 66 16 96 %          Intake/Output Summary (Last 24 hours) at 10/13/2022 1359  Last data filed at 10/13/2022 1209  Gross per 24 hour   Intake 350 ml   Output --   Net 350 ml       Physical Exam:    Gen: No distress. Alert. Pleasant obese male   Eyes: PERRL. No sclera icterus. No conjunctival injection. Neck: No JVD. Trachea midline. Resp: No accessory muscle use. No crackles. No wheezes. No rhonchi. CV: Regular rate. Regular rhythm. No murmur. No rub. 1++right lower extremity pitting edema. Peripheral Pulses: +2 palpable, equal bilaterally   GI: Non-tender. Non-distended. Normal bowel sounds. Skin: Warm and dry. No nodule on exposed extremities. No rash on exposed extremities. +right foot wrapped in bandage, not examined but has 1+ pitting edema, erythema, and tenderness   M/S: No cyanosis. No joint deformity. No clubbing. Neuro: Awake. Grossly nonfocal    Psych: Oriented x 3. No anxiety or agitation.          Medications:  insulin lispro, 0-4 Units, TID WC  insulin lispro, 0-4 Units, Nightly  atorvastatin, 40 mg, QPM  metoprolol tartrate, 50 mg, BID  sodium chloride flush, 5-40 mL, 2 times per day  enoxaparin, 30 mg, BID  cefepime, 2,000 mg, Q8H  vancomycin, 1,250 mg, Q12H      PRN Medications:  oxyCODONE-acetaminophen, 1 tablet, Q4H PRN   Or  oxyCODONE-acetaminophen, 2 tablet, Q4H PRN  glucose, 4 tablet, PRN  dextrose bolus, 125 mL, PRN   Or  dextrose bolus, 250 mL, PRN  glucagon (rDNA), 1 mg, PRN  dextrose, , Continuous PRN  sodium chloride flush, 5-40 mL, PRN  sodium chloride, , PRN  ondansetron, 4 mg, Q8H PRN   Or  ondansetron, 4 mg, Q6H PRN  polyethylene glycol, 17 g, Daily PRN  acetaminophen, 650 mg, Q6H PRN   Or  acetaminophen, 650 mg, Q6H PRN  potassium chloride, 40 mEq, PRN   Or  potassium alternative oral replacement, 40 mEq, PRN   Or  potassium chloride, 10 mEq, PRN  magnesium sulfate, 2,000 mg, PRN          Data:  CBC:   Recent Labs     10/12/22  1438 10/13/22  0514   WBC 6.8 5.3   HGB 13.5 12.5*   HCT 41.0 36.9*   MCV 88.6 87.8    250     BMP:   Recent Labs     10/12/22  1438 10/13/22  0514    140   K 4.2 4.2   CL 99 104   CO2 25 24   BUN 10 8   CREATININE 0.7* 0.6*     LIVER PROFILE:   Recent Labs     10/12/22  1438 10/13/22  0514   AST 15 11*   ALT 18 13   BILITOT 0.4 0.4   ALKPHOS 96 83     PT/INR:   Recent Labs     10/13/22  0514   PROTIME 13.4   INR 1.04       CULTURES  COVID and Flu not detected   Blood cultures pending      RADIOLOGY  XR FOOT RIGHT (2 VIEWS)   Final Result   Postsurgical changes of interval amputation of the 1st ray and resection of   hallux sesamoids. CT FOOT RIGHT W CONTRAST   Final Result   Multilocular rim enhancing fluid collection surrounding the distal phalanx of   the great toe, greatest dorsally, compatible with a small abscess as   described above. That is associated with bony changes of osteomyelitis, including osteolysis,   periosteal new bone formation, with a pathologic fracture at the base of the   distal phalanx.       Soft tissue swelling is seen along the dorsum the foot, which is nonspecific   and can be from systemic etiologies such as venous insufficiency or fluid   overload, but cellulitis remains in the differential.         XR CHEST PORTABLE   Final Result   No acute abnormality identified. Assessment/Plan:  #Right great toe with abscess and osteomyelitis with pathological fracture at distal phalanx and surround cellulitis   -no leukocytosis   -blood cultures pending   -IVF-> stopped   -IV Vanc and cefepime   -prn percocet --> IV morphine prn for now, percocet not effective per patient   -podiatry consulted   -partial amputation performed today     #Right lower extremity pitting edema   -could be 2/2 to above   -doppler pending   -stopped IVF   -elevate extremity     #Lactic acidosis  -IVF   -repeat resolved   -stopped IVF     #DM type 2   -holding oral regimen --> resumed glipizide   -SSI   -continue to monitor BG     #CAD  -on ASA, statin, plavix     #HTN   -on lopressor   -continue to monitor     #GURDEEP   -home CPAP or BIPAP     DVT Prophylaxis: Lovenox   Diet: ADULT DIET;  Regular; 5 carb choices (75 gm/meal)  Code Status: Full Code    SILVERIO Blancas  10/13/22  3:11 PM

## 2022-10-13 NOTE — CONSULTS
CONSULT    Admit Date:  10/12/2022    Subjective:  58 y.o. male who is seen for evaluation of cellulitis and ulcer on the right great toe. Patient is well known to me. States feeling OK this morning. Foot is still painful. Was on outpatient antibiotics with no improvement.        Past Medical History:        Diagnosis Date    Alcohol abuse 12/03/2012    Cancer ([facility] [address] 2022    Prostate    Cellulitis     sternum    Coronary artery disease     COVID-19     Diabetic neuropathy associated with type 2 diabetes mellitus ([facility] [address]     Hyperlipidemia     Non morbid obesity     NSTEMI (non-ST elevated myocardial infarction) (HCC)     GURDEEP (obstructive sleep apnea)     does not wear cpap machine    Reactive airway disease with wheezing, moderate persistent, with acute exacerbation 03/03/2020    Sepsis ([facility] [address] 04/2021    Type II or unspecified type diabetes mellitus without mention of complication, not stated as uncontrolled        Past Surgical History:        Procedure Laterality Date    BYPASS GRAFT  02/19/2021    COLONOSCOPY  2017    COLONOSCOPY N/A 7/1/2022    COLONOSCOPY POLYPECTOMY SNARE/COLD BIOPSY performed by Deidre Harmon DO at [facility]  1/14/2016 9/21/2020    1 [employer] Premier SAMUEL 2.25x16    CORONARY ARTERY BYPASS GRAFT  02/19/2021    CABG x2 & ALEX-Dr. Isidro Comp    CORONARY ARTERY BYPASS GRAFT N/A 2/19/2021    CORONARY ARTERY BYPASS GRAFTING X2, INTERNAL MAMMARY ARTERY, SAPHENOUS VEIN GRAFT, ON PUMP WITH LEFT ATRIAL APPENDAGE CLIP, BILATERAL INTERCOSTAL NERVE BLOCK performed by Griselda Temple MD at [address]  N/A 4/28/2021    WOUND VAC AND DRESSING CHANGE WITH REMOVAL OF 4 STERNAL WIRES, DRAINAGE OF 2 INTERCOSTAL SPACE ABCESS performed by Cherry Aquino MD at [address] [facility] 4/26/2021    DEBRIDEMENT OF STERNAL WOUND WITH WOUND VACUUM PLACEMENT performed by Griselda Temple MD at [address] [facility]         Current Medications: atorvastatin  40 mg Oral QPM    metoprolol tartrate  50 mg Oral BID    sodium chloride flush  5-40 mL IntraVENous 2 times per day    enoxaparin  30 mg SubCUTAneous BID    insulin lispro  0-4 Units SubCUTAneous Q4H    cefepime  2,000 mg IntraVENous Q8H    vancomycin  1,250 mg IntraVENous Q12H       Allergies:  Patient has no known allergies.     Social History:    Social History     Tobacco Use    Smoking status: Never    Smokeless tobacco: Never   Vaping Use    Vaping Use: Never used   Substance Use Topics    Alcohol use: Not Currently     Comment: whiskey  - weekly    Drug use: No       Family History:       Problem Relation Age of Onset    Heart Attack Mother     Heart Disease Mother     Heart Attack Father     Cancer Father     Hearing Loss Father     Diabetes Father     Heart Disease Father     Heart Disease Paternal Uncle         bypass       Review of Systems    CONSTITUTIONAL:  negative  EYES:  negative  HEENT:  negative  RESPIRATORY:  negative  CARDIOVASCULAR:  negative  GASTROINTESTINAL:  negative  GENITOURINARY:  negative  INTEGUMENT/BREAST:  positive for ulcer  MUSCULOSKELETAL:  positive for  pain  NEUROLOGICAL:  positive for numbness      Objective:   BP (!) 162/81   Pulse 74   Temp 98 °F (36.7 °C) (Oral)   Resp 16   Ht 5' 10\" (1.778 m)   Wt 250 lb (113.4 kg)   SpO2 98%   BMI 35.87 kg/m²     Data:  CBC:   Recent Labs     10/12/22  1438 10/13/22  0514   WBC 6.8 5.3   HGB 13.5 12.5*   HCT 41.0 36.9*   MCV 88.6 87.8    250     BMP:   Recent Labs     10/12/22  1438 10/13/22  0514     --    K 4.2  --    CL 99  --    CO2 25 24   BUN 10 8   CREATININE 0.7* 0.6*     LIVER PROFILE:   Recent Labs     10/12/22  1438 10/13/22  0514   AST 15 11*   ALT 18 13   BILITOT 0.4 0.4   ALKPHOS 96 83     PT/INR:   Recent Labs     10/12/22  1438 10/13/22  0514   PROT 6.9 5.8*   INR  --  1.04     HgBA1c:  Lab Results   Component Value Date    LABA1C 8.6 08/09/2022     SARS-CoV-2 RNA, RT PCR NOT DETECTED Cultures:   Blood x2 - NGSF    Imaging:   CT right foot -   There is soft tissue swelling involving the right great toe. A   multiloculated rim enhancing fluid collection is seen surrounding the distal   phalanx of the great toe, greatest dorsally, measuring roughly 1.7 x 1.0 x   1.1 cm. That is associated with erosion of the great toe distal phalanx, where a   pathologic fracture is present. Periosteal new bone formation is detected as   well. No soft tissue gas identified. Additional soft tissue swelling is seen along the dorsum of the foot, but no   other additional areas of osteolysis or periosteal reaction are found. Although not well evaluated with CT technique. The flexor and extensor   tendons are unremarkable appearance. No tenosynovial fluid is detected. Intrinsic musculature of the foot is atrophic which is likely neuropathic. Impression:  Multilocular rim enhancing fluid collection surrounding the distal phalanx of   the great toe, greatest dorsally, compatible with a small abscess as   described above. That is associated with bony changes of osteomyelitis, including osteolysis,   periosteal new bone formation, with a pathologic fracture at the base of the   distal phalanx. Soft tissue swelling is seen along the dorsum the foot, which is nonspecific   and can be from systemic etiologies such as venous insufficiency or fluid   overload, but cellulitis remains in the differential.     Physical Exam:    General Appearance: alert and oriented to person, place and time, well developed and well- nourished, in no acute distress  Head: normocephalic and atraumatic  Eyes: pupils equal, round  Neck: trachea midline  Pulmonary/Chest: no wheezes  Cardiovascular: normal rate, regular rhythm  Abdomen: soft, non-tender, non-distended    DP/PT palpable bilateral  Erythema and edema of the right hallux and extending onto dorsal foot to the ankle.  Mild increase in skin temperature noted distally. + abscess palpated dorsal aspect of the hallux. Ulcer on the dorsal and plantar aspect of the hallux with seropurulent. Lysing skin on the dorsal aspect of the hallux. Muscle strength 5/5 bilateral LE.        Assessment:  Patient Active Problem List   Diagnosis Code    Type II or unspecified type diabetes mellitus without mention of complication, not stated as uncontrolled E11.9    Diabetes mellitus E11.9    History of ETOH abuse F10.11    Patient overweight E66.3    Patient overweight E66.3    Dietary noncompliance Z91.119    Alcohol abuse F10.10    Flank pain R10.9    Prostatism N40.0    Low serum testosterone level R79.89    Arm pain, left M79.602    Shoulder pain M25.519    Precordial pain R07.2    Lung nodule R91.1    Acute angina (HCC) I20.9    Abnormal stress test R94.39    Acute coronary syndrome (HCC) I24.9    Coronary artery disease involving native coronary artery of native heart with unstable angina pectoris (HCC) I25.110    Other chest pain R07.89    S/P drug eluting coronary stent placement Z95.5    Essential hypertension I10    Obesity E66.9    Abnormal chest x-ray R93.89    Angina effort I20.8    Skin lesion L98.9    Angina at rest [facility]) I20.8    Hyperlipidemia E78.5    Angina pectoris ([facility] [address] I20.9    NSTEMI (non-ST elevated myocardial infarction) ([facility] [address] I21.4    Controlled type 2 diabetes mellitus without complication, without long-term current use of insulin (HCC) E11.9    ASHD (arteriosclerotic heart disease) I25.10    Mass of right foot R22.41    Overweight E66.3    History of angina pectoris Z86.79    Reactive airway disease J45.909    Bronchitis J40    Reactive airway disease with wheezing, moderate persistent, with acute exacerbation J45.41    Need for prophylactic vaccination and inoculation against varicella Z23    Need for prophylactic vaccination against diphtheria-tetanus-pertussis (DTP) Z23    Chronic right-sided thoracic back pain M54.6, G89.29    CAD in native artery I25.10    S/P PTCA (percutaneous transluminal coronary angioplasty) Z98.61    Chest pain R07.9    Ischemic cardiomyopathy I25.5    GURDEEP (obstructive sleep apnea) G47.33    Cellulitis L03.90    Sepsis due to methicillin susceptible Staphylococcus aureus (HCC) A41.01    Bacteremia due to methicillin susceptible Staphylococcus aureus (MSSA) R78.81, B95.61    Disruption or dehiscence of closure of sternum or sternotomy T81.32XA    Elevated sed rate R70.0    Type 2 diabetes mellitus with obesity (HCC) E11.69, E66.9    Diabetic ulcer of right foot associated with type 2 diabetes mellitus, with fat layer exposed ([facility] [address] E11.621, L97.512    Osteomyelitis of great toe of right foot ([facility]) M86.9     diabetic foot ulcer right hallux secondary to peripheral neuropathy   Cellulitis/abscess right foot  Osteomyelitis right foot secondary to diabetes mellitus   diabetes mellitus with peripheral neuropathy         Plan  Patient examined. Reviewed labs and imaging with patient. Due to osteomyelitis, pathological fracture, abscess, lack of response to oral antibiotics I recommended proceeding with partial right foot amputation (hopefully just the hallux). Discussed risks, complications, alternatives and benefits with patient. Understands chance of nonhealing wound, infection, need for further surgery, loss of limb or life. Questions answered from patient and spouse. Has been NPO. Plan for surgery this morning. Continue IV antibiotics. Elevation of leg to decrease pain and swelling. Control glucose levels to prevent future complications. Thank you for allowing me to participate in the care of your patient.          Electronically signed by Joanna Diaz DPM on 10/13/2022 at 8:32 AM.

## 2022-10-13 NOTE — CARE COORDINATION
Case Management Assessment  Initial Evaluation      Patient Name: Phylicia Rojo  YOB: 1960  Diagnosis: Other acute osteomyelitis of right foot ([facility] [M86.171]  Class 2 obesity with body mass index (BMI) of 35.0 to 35.9 in adult, unspecified obesity type, unspecified whether serious comorbidity present [E66.9, Z68.35]  Type 2 diabetes mellitus with other skin ulcer, unspecified whether long term insulin use ([facility] [G91.607, L98.499]  Osteomyelitis of great toe of right foot ([facility] [M86.9]  Date / Time: 10/12/2022  2:19 PM    Admission status/Date:10/12/2022 Inpatient   Chart Reviewed: Yes      Patient Interviewed: Yes   Family Interviewed:  Yes - pt's wife Bernardo Heart       Hospitalization in the last 30 days:  No    Health Care Decision Maker :   Primary Decision Maker (Active): Brittny Chin - Spouse - [phone]    (CM - must 1st enter selection under Navigator - emergency contact- Health Care Decision Maker Relationship and pick relationship)   Who do you trust or have selected to make healthcare decisions for you    Met with: pt and wife at bedside  Interview conducted  (bedside/phone):bedside    Current PCP: Tj Mazariegos DO    [address] required for SNF : Y          3 night stay required - N    ADLS  Support Systems/Care Needs: Spouse/Significant Other  Transportation: self    Meal Preparation: self    Housing  Living Arrangements: pt lives at home with his wife who is with him 24/7.  Pt's wife stated she can provide assistance if needed  Steps: 0  Intent for return to present living arrangements: Yes  Identified Issues: [id number] [facility] with Home Health Care : No Agency:(Services)  Type of Home Care Services: None  Passport/Waiver : No  :                      Phone Number:    Passport/Waiver Services: n/a          Durable Medical Equiptment   DME Provider: n/a  Equipment: n/a    Home O2 Use :  No      Community Service Affiliation  Dialysis:  No Agency:  Location:  Dialysis Schedule:  Phone:   Fax: Other Community Services: n/a    DISCHARGE PLAN: Explained Case Management role/services. Chart review completed. Met with pt and his wife at bedside with pt's permission. Pt stated that he is independent at home with his ADL's and will return when discharged. He stated that he doesn't anticipate needing skilled home care for RN/PT/OT. He stated that he has used [address] [facility]) and [facility] for IV ABX in the past. He stated if needed, he will want to use them again but doesn't anticipate needing either services. Pt had a partial right foot amputation per MD note today. CM will follow. Please notify CM if needs or concerns arise.      VIRGINIA Escobar

## 2022-10-14 PROBLEM — E08.621 DIABETIC ULCER OF RIGHT FOOT ASSOCIATED WITH DIABETES MELLITUS DUE TO UNDERLYING CONDITION (HCC): Status: ACTIVE | Noted: 2022-10-14

## 2022-10-14 PROBLEM — L97.519 DIABETIC ULCER OF RIGHT FOOT ASSOCIATED WITH DIABETES MELLITUS DUE TO UNDERLYING CONDITION (HCC): Status: ACTIVE | Noted: 2022-10-14

## 2022-10-14 LAB
ANION GAP SERPL CALCULATED.3IONS-SCNC: 9 MMOL/L (ref 3–16)
BASOPHILS ABSOLUTE: 0 K/UL (ref 0–0.2)
BASOPHILS RELATIVE PERCENT: 0.7 %
BUN BLDV-MCNC: 7 MG/DL (ref 7–20)
CALCIUM SERPL-MCNC: 8.6 MG/DL (ref 8.3–10.6)
CHLORIDE BLD-SCNC: 100 MMOL/L (ref 99–110)
CO2: 26 MMOL/L (ref 21–32)
CREAT SERPL-MCNC: 0.6 MG/DL (ref 0.8–1.3)
EOSINOPHILS ABSOLUTE: 0.2 K/UL (ref 0–0.6)
EOSINOPHILS RELATIVE PERCENT: 3.8 %
GFR AFRICAN AMERICAN: >60
GFR NON-AFRICAN AMERICAN: >60
GLUCOSE BLD-MCNC: 169 MG/DL (ref 70–99)
GLUCOSE BLD-MCNC: 185 MG/DL (ref 70–99)
GLUCOSE BLD-MCNC: 229 MG/DL (ref 70–99)
GLUCOSE BLD-MCNC: 234 MG/DL (ref 70–99)
GLUCOSE BLD-MCNC: 258 MG/DL (ref 70–99)
HCT VFR BLD CALC: 38.5 % (ref 40.5–52.5)
HEMOGLOBIN: 12.7 G/DL (ref 13.5–17.5)
LYMPHOCYTES ABSOLUTE: 1 K/UL (ref 1–5.1)
LYMPHOCYTES RELATIVE PERCENT: 18.1 %
MCH RBC QN AUTO: 28.8 PG (ref 26–34)
MCHC RBC AUTO-ENTMCNC: 32.9 G/DL (ref 31–36)
MCV RBC AUTO: 87.6 FL (ref 80–100)
MONOCYTES ABSOLUTE: 0.5 K/UL (ref 0–1.3)
MONOCYTES RELATIVE PERCENT: 9.2 %
NEUTROPHILS ABSOLUTE: 3.8 K/UL (ref 1.7–7.7)
NEUTROPHILS RELATIVE PERCENT: 68.2 %
PDW BLD-RTO: 13.5 % (ref 12.4–15.4)
PERFORMED ON: ABNORMAL
PLATELET # BLD: 269 K/UL (ref 135–450)
PMV BLD AUTO: 7.4 FL (ref 5–10.5)
POTASSIUM REFLEX MAGNESIUM: 4.1 MMOL/L (ref 3.5–5.1)
RBC # BLD: 4.39 M/UL (ref 4.2–5.9)
SODIUM BLD-SCNC: 135 MMOL/L (ref 136–145)
VANCOMYCIN TROUGH: 12.1 UG/ML (ref 10–20)
WBC # BLD: 5.6 K/UL (ref 4–11)

## 2022-10-14 PROCEDURE — 2580000003 HC RX 258: Performed by: PODIATRIST

## 2022-10-14 PROCEDURE — 6370000000 HC RX 637 (ALT 250 FOR IP): Performed by: PODIATRIST

## 2022-10-14 PROCEDURE — 6360000002 HC RX W HCPCS: Performed by: INTERNAL MEDICINE

## 2022-10-14 PROCEDURE — 1200000000 HC SEMI PRIVATE

## 2022-10-14 PROCEDURE — 6360000002 HC RX W HCPCS: Performed by: PODIATRIST

## 2022-10-14 PROCEDURE — 36415 COLL VENOUS BLD VENIPUNCTURE: CPT

## 2022-10-14 PROCEDURE — 99232 SBSQ HOSP IP/OBS MODERATE 35: CPT | Performed by: INTERNAL MEDICINE

## 2022-10-14 PROCEDURE — 80048 BASIC METABOLIC PNL TOTAL CA: CPT

## 2022-10-14 PROCEDURE — 6370000000 HC RX 637 (ALT 250 FOR IP)

## 2022-10-14 PROCEDURE — 80202 ASSAY OF VANCOMYCIN: CPT

## 2022-10-14 PROCEDURE — 85025 COMPLETE CBC W/AUTO DIFF WBC: CPT

## 2022-10-14 PROCEDURE — 2580000003 HC RX 258: Performed by: INTERNAL MEDICINE

## 2022-10-14 PROCEDURE — 6360000002 HC RX W HCPCS

## 2022-10-14 RX ORDER — OXYCODONE HYDROCHLORIDE AND ACETAMINOPHEN 5; 325 MG/1; MG/1
2 TABLET ORAL EVERY 4 HOURS PRN
Status: DISCONTINUED | OUTPATIENT
Start: 2022-10-14 | End: 2022-10-17 | Stop reason: HOSPADM

## 2022-10-14 RX ORDER — OXYCODONE HYDROCHLORIDE AND ACETAMINOPHEN 5; 325 MG/1; MG/1
1 TABLET ORAL EVERY 4 HOURS PRN
Status: DISCONTINUED | OUTPATIENT
Start: 2022-10-14 | End: 2022-10-17 | Stop reason: HOSPADM

## 2022-10-14 RX ADMIN — CEFEPIME 2000 MG: 2 INJECTION, POWDER, FOR SOLUTION INTRAVENOUS at 23:28

## 2022-10-14 RX ADMIN — ASPIRIN 81 MG: 81 TABLET, CHEWABLE ORAL at 08:52

## 2022-10-14 RX ADMIN — HYDROMORPHONE HYDROCHLORIDE 1 MG: 1 INJECTION, SOLUTION INTRAMUSCULAR; INTRAVENOUS; SUBCUTANEOUS at 11:27

## 2022-10-14 RX ADMIN — OXYCODONE HYDROCHLORIDE AND ACETAMINOPHEN 2 TABLET: 5; 325 TABLET ORAL at 14:46

## 2022-10-14 RX ADMIN — VANCOMYCIN HYDROCHLORIDE 1000 MG: 1 INJECTION, POWDER, LYOPHILIZED, FOR SOLUTION INTRAVENOUS at 09:04

## 2022-10-14 RX ADMIN — METOPROLOL TARTRATE 50 MG: 50 TABLET, FILM COATED ORAL at 21:51

## 2022-10-14 RX ADMIN — VANCOMYCIN HYDROCHLORIDE 1000 MG: 1 INJECTION, POWDER, LYOPHILIZED, FOR SOLUTION INTRAVENOUS at 02:04

## 2022-10-14 RX ADMIN — ENOXAPARIN SODIUM 30 MG: 100 INJECTION SUBCUTANEOUS at 21:51

## 2022-10-14 RX ADMIN — CEFEPIME 2000 MG: 2 INJECTION, POWDER, FOR SOLUTION INTRAVENOUS at 06:06

## 2022-10-14 RX ADMIN — METOPROLOL TARTRATE 50 MG: 50 TABLET, FILM COATED ORAL at 08:56

## 2022-10-14 RX ADMIN — OXYCODONE HYDROCHLORIDE AND ACETAMINOPHEN 2 TABLET: 5; 325 TABLET ORAL at 19:25

## 2022-10-14 RX ADMIN — VANCOMYCIN HYDROCHLORIDE 1000 MG: 1 INJECTION, POWDER, LYOPHILIZED, FOR SOLUTION INTRAVENOUS at 17:23

## 2022-10-14 RX ADMIN — CEFEPIME 2000 MG: 2 INJECTION, POWDER, FOR SOLUTION INTRAVENOUS at 14:43

## 2022-10-14 RX ADMIN — INSULIN LISPRO 1 UNITS: 100 INJECTION, SOLUTION INTRAVENOUS; SUBCUTANEOUS at 11:29

## 2022-10-14 RX ADMIN — HYDROMORPHONE HYDROCHLORIDE 1 MG: 1 INJECTION, SOLUTION INTRAMUSCULAR; INTRAVENOUS; SUBCUTANEOUS at 21:51

## 2022-10-14 RX ADMIN — OXYCODONE HYDROCHLORIDE AND ACETAMINOPHEN 2 TABLET: 5; 325 TABLET ORAL at 08:51

## 2022-10-14 RX ADMIN — HYDROMORPHONE HYDROCHLORIDE 0.5 MG: 1 INJECTION, SOLUTION INTRAMUSCULAR; INTRAVENOUS; SUBCUTANEOUS at 06:04

## 2022-10-14 RX ADMIN — HYDROMORPHONE HYDROCHLORIDE 0.5 MG: 1 INJECTION, SOLUTION INTRAMUSCULAR; INTRAVENOUS; SUBCUTANEOUS at 02:01

## 2022-10-14 RX ADMIN — ATORVASTATIN CALCIUM 40 MG: 40 TABLET, FILM COATED ORAL at 17:14

## 2022-10-14 RX ADMIN — Medication 10 ML: at 21:59

## 2022-10-14 RX ADMIN — CLOPIDOGREL BISULFATE 75 MG: 75 TABLET ORAL at 08:52

## 2022-10-14 RX ADMIN — HYDROMORPHONE HYDROCHLORIDE 1 MG: 1 INJECTION, SOLUTION INTRAMUSCULAR; INTRAVENOUS; SUBCUTANEOUS at 17:15

## 2022-10-14 RX ADMIN — ENOXAPARIN SODIUM 30 MG: 100 INJECTION SUBCUTANEOUS at 08:52

## 2022-10-14 ASSESSMENT — PAIN DESCRIPTION - ORIENTATION
ORIENTATION: RIGHT

## 2022-10-14 ASSESSMENT — PAIN SCALES - GENERAL
PAINLEVEL_OUTOF10: 10
PAINLEVEL_OUTOF10: 7
PAINLEVEL_OUTOF10: 9
PAINLEVEL_OUTOF10: 9
PAINLEVEL_OUTOF10: 10
PAINLEVEL_OUTOF10: 8

## 2022-10-14 ASSESSMENT — PAIN DESCRIPTION - DESCRIPTORS
DESCRIPTORS: BURNING;THROBBING
DESCRIPTORS: STABBING
DESCRIPTORS: STABBING
DESCRIPTORS: ACHING
DESCRIPTORS: STABBING
DESCRIPTORS: STABBING
DESCRIPTORS: THROBBING

## 2022-10-14 ASSESSMENT — PAIN DESCRIPTION - LOCATION
LOCATION: FOOT

## 2022-10-14 ASSESSMENT — PAIN - FUNCTIONAL ASSESSMENT
PAIN_FUNCTIONAL_ASSESSMENT: ACTIVITIES ARE NOT PREVENTED

## 2022-10-14 NOTE — PROGRESS NOTES
Vanco day 2  Trough 12.1. Continue 1g IV Q8hrs. Good fit.   AL [facility] PharmD 10/14/2022 5:30 PM

## 2022-10-14 NOTE — PROGRESS NOTES
Progress Note    Admit Date:  10/12/2022    -pmhx of alcohol abuse, prostate cancer, CAD, DM, HLD, GURDEEP   -presented with right great toe pain, infection, swelling   -follows with Dr. Jennifer Delgado     S/P Right foot partial amputation on 10/13    Subjective:  Mr. Carol Duarte is feeling fine today. POD #1. Podiatrist to reevaluate wound in a.m. .   Foot pain is controlled, no fevers or chills, no nausea or vomiting       Objective:   Patient Vitals for the past 4 hrs:   BP Temp Temp src Pulse Resp SpO2   10/14/22 1157 -- -- -- -- 16 --   10/14/22 1127 -- -- -- -- 18 --   10/14/22 1112 135/69 98.2 °F (36.8 °C) Oral 70 18 97 %            Intake/Output Summary (Last 24 hours) at 10/14/2022 1442  Last data filed at 10/14/2022 2405  Gross per 24 hour   Intake 1215.27 ml   Output --   Net 1215.27 ml         Physical Exam:    Gen: No distress. Alert. Pleasant obese male   Eyes: PERRL. No sclera icterus. No conjunctival injection. Neck: No JVD. Trachea midline. Resp: No accessory muscle use. No crackles. No wheezes. No rhonchi. CV: Regular rate. Regular rhythm. No murmur. No rub. 1++right lower extremity pitting edema. Peripheral Pulses: +2 palpable, equal bilaterally   GI: Non-tender. Non-distended. Normal bowel sounds. Skin: M/S:  +right foot wrapped in bandage. Non tender. Left foot - non tender, no edema   Neuro: Awake. Grossly nonfocal    Psych: Oriented x 3. No anxiety or agitation.          Medications:  insulin lispro, 0-4 Units, TID WC  insulin lispro, 0-4 Units, Nightly  aspirin, 81 mg, Daily  clopidogrel, 75 mg, Daily  glipiZIDE, 5 mg, QAM AC  vancomycin, 1,000 mg, Q8H  atorvastatin, 40 mg, QPM  metoprolol tartrate, 50 mg, BID  sodium chloride flush, 5-40 mL, 2 times per day  enoxaparin, 30 mg, BID  cefepime, 2,000 mg, Q8H    PRN Medications:  HYDROmorphone, 1 mg, Q3H PRN   Or  HYDROmorphone, 0.5 mg, Q3H PRN  oxyCODONE-acetaminophen, 1 tablet, Q4H PRN   Or  oxyCODONE-acetaminophen, 2 tablet, Q4H PRN  glucose, 4 tablet, PRN  dextrose bolus, 125 mL, PRN   Or  dextrose bolus, 250 mL, PRN  glucagon (rDNA), 1 mg, PRN  dextrose, , Continuous PRN  sodium chloride flush, 5-40 mL, PRN  sodium chloride, , PRN  ondansetron, 4 mg, Q8H PRN   Or  ondansetron, 4 mg, Q6H PRN  polyethylene glycol, 17 g, Daily PRN  acetaminophen, 650 mg, Q6H PRN   Or  acetaminophen, 650 mg, Q6H PRN  potassium chloride, 40 mEq, PRN   Or  potassium alternative oral replacement, 40 mEq, PRN   Or  potassium chloride, 10 mEq, PRN  magnesium sulfate, 2,000 mg, PRN        Data:  CBC:   Recent Labs     10/12/22  1438 10/13/22  0514 10/14/22  0508   WBC 6.8 5.3 5.6   HGB 13.5 12.5* 12.7*   HCT 41.0 36.9* 38.5*   MCV 88.6 87.8 87.6    250 269       BMP:   Recent Labs     10/12/22  1438 10/13/22  0514 10/14/22  0508    140 135*   K 4.2 4.2 4.1   CL 99 104 100   CO2 25 24 26   BUN 10 8 7   CREATININE 0.7* 0.6* 0.6*       LIVER PROFILE:   Recent Labs     10/12/22  1438 10/13/22  0514   AST 15 11*   ALT 18 13   BILITOT 0.4 0.4   ALKPHOS 96 83       PT/INR:   Recent Labs     10/13/22  0514   PROTIME 13.4   INR 1.04         CULTURES  COVID and Flu not detected   Blood cultures pending      RADIOLOGY  VL Extremity Venous Right   Final Result      XR FOOT RIGHT (2 VIEWS)   Final Result   Postsurgical changes of interval amputation of the 1st ray and resection of   hallux sesamoids. CT FOOT RIGHT W CONTRAST   Final Result   Multilocular rim enhancing fluid collection surrounding the distal phalanx of   the great toe, greatest dorsally, compatible with a small abscess as   described above. That is associated with bony changes of osteomyelitis, including osteolysis,   periosteal new bone formation, with a pathologic fracture at the base of the   distal phalanx.       Soft tissue swelling is seen along the dorsum the foot, which is nonspecific   and can be from systemic etiologies such as venous insufficiency or fluid   overload, but cellulitis remains in the differential.         XR CHEST PORTABLE   Final Result   No acute abnormality identified. Assessment/Plan:    #Right great toe diabetic foot ulcer with abscess and osteomyelitis with pathological fracture at distal phalanx and surround cellulitis   -no leukocytosis   -blood cultures pending   -IVF-> stopped   -IV Vanc and cefepime   -prn percocet / IV morphine   -podiatry consulted   -S/P partial amputation right foot (right hallux amputated )on 10/13.  -Podiatry to reevaluate in a.Angelito Kwok Continue current antibiotics. Wound cultures pending    #Right lower extremity pitting edema   -could be 2/2 to above   -doppler pending   -stopped IVF   -elevate extremity     #Lactic acidosis  -IVF   -repeat resolved   -stopped IVF     #DM type 2   -holding oral regimen --> resumed glipizide   -SSI   -continue to monitor BG     #CAD  -on ASA, statin, plavix     #HTN   -on lopressor   -continue to monitor     #GURDEEP   -home CPAP or BIPAP     DVT Prophylaxis: Lovenox   Diet: ADULT DIET;  Regular; 5 carb choices (75 gm/meal)  Code Status: Full Code    Marvin Maguire MD  10/14/22  2:42 PM

## 2022-10-14 NOTE — PLAN OF CARE
Problem: Chronic Conditions and Co-morbidities  Goal: Patient's chronic conditions and co-morbidity symptoms are monitored and maintained or improved  10/14/2022 0300 by Author Susan RN  Outcome: Progressing  Flowsheets (Taken 10/13/2022 2149)  Care Plan - Patient's Chronic Conditions and Co-Morbidity Symptoms are Monitored and Maintained or Improved:   Monitor and assess patient's chronic conditions and comorbid symptoms for stability, deterioration, or improvement   Collaborate with multidisciplinary team to address chronic and comorbid conditions and prevent exacerbation or deterioration   Update acute care plan with appropriate goals if chronic or comorbid symptoms are exacerbated and prevent overall improvement and discharge     Problem: Pain  Goal: Verbalizes/displays adequate comfort level or baseline comfort level  10/14/2022 1006 by Mary Lou Brown RN  Outcome: Progressing  10/14/2022 0300 by Author Susan RN  Outcome: Not Progressing  Flowsheets (Taken 10/13/2022 2024)  Verbalizes/displays adequate comfort level or baseline comfort level:   Encourage patient to monitor pain and request assistance   Assess pain using appropriate pain scale   Administer analgesics based on type and severity of pain and evaluate response   Implement non-pharmacological measures as appropriate and evaluate response   Consider cultural and social influences on pain and pain management   Notify Licensed Independent Practitioner if interventions unsuccessful or patient reports new pain     Problem: Safety - Adult  Goal: Free from fall injury  10/14/2022 1006 by Mary Lou Brown RN  Outcome: Progressing  10/14/2022 0300 by Author Susan RN  Outcome: Progressing     Problem: Pain  Goal: Verbalizes/displays adequate comfort level or baseline comfort level  10/14/2022 1006 by Mary Lou Brown RN  Outcome: Progressing  10/14/2022 0300 by Author Susan RN  Outcome: Not Progressing  Flowsheets (Taken 10/13/2022 2024)  Verbalizes/displays adequate comfort level or baseline comfort level:   Encourage patient to monitor pain and request assistance   Assess pain using appropriate pain scale   Administer analgesics based on type and severity of pain and evaluate response   Implement non-pharmacological measures as appropriate and evaluate response   Consider cultural and social influences on pain and pain management   Notify Licensed Independent Practitioner if interventions unsuccessful or patient reports new pain

## 2022-10-14 NOTE — PROGRESS NOTES
Patient admitted to room __218_ER. Patient oriented to room, call light, bed rails, phone, lights and bathroom. Patient instructed about the schedule of the day including: vital sign frequency, lab draws, possible tests, frequency of MD and staff rounds, daily weights, I &O's and prescribed diet. Bed alarm deferred patient low fall risk and refuses alarm. Telemetry box in place, patient aware of placement and reason. Bed locked, in lowest position, side rails up 2/4, call light within reach. Recliner Assessment:     Patient is able to demonstrate the ability to move from a reclining position to an upright position within the recliner. Bedside Mobility Assessment Tool (BMAT):     Assessment Level 1- Sit and Shake    1. From a semi-reclined position, ask patient to sit up and rotate to a seated position at the side of the bed. Can use the bedrail. 2. Ask patient to reach out and grab your hand and shake making sure patient reaches across his/her midline. Pass- Patient is able to come to a seated position, maintain core strength. Maintains seated balance while reaching across midline. Move on to Assessment Level 2. Assessment Level 2- Stretch and Point   1. With patient in seated position at the side of the bed, have patient place both feet on the floor (or stool) with knees no higher than hips. 2. Ask patient to stretch one leg and straighten the knee, then bend the ankle/flex and point the toes. If appropriate, repeat with the other leg. Pass- Patient is able to demonstrate appropriate quad strength on intended weight bearing limb(s). Move onto Assessment Level 3. Assessment Level 3- Stand   1. Ask patient to elevate off the bed or chair (seated to standing) using an assistive device (cane, bedrail). 2. Patient should be able to raise buttocks off be and hold for a count of five. May repeat once.    Pass- Patient maintains standing stability for at least 5 seconds, proceed to assessment level 4. Assessment Level 4- Walk   1. Ask patient to march in place at bedside. 2. Then ask patient to advance step and return each foot. Some medical conditions may render a patient from stepping backwards, use your best clinical judgement. Pass- Patient demonstrates balance while shifting weight and ability to step, takes independent steps, does not use assistive device patient is MOBILITY LEVEL 4. Mobility Level- 4        4 Eyes Skin Assessment     The patient is being assess for   Admission    I agree that 2 RN's have performed a thorough Head to Toe Skin Assessment on the patient. ALL assessment sites listed below have been assessed. Areas assessed for pressure by both nurses:   [x]   Head, Face, and Ears   [x]   Shoulders, Back, and Chest, Abdomen  [x]   Arms, Elbows, and Hands   [x]   Coccyx, Sacrum, and Ischium  [x]   Legs, Feet, and Heels        Skin Assessed Under all Medical Devices by both nurses:  N/a               All Mepilex Borders were peeled back and area peeked at by both nurses:  No: n/a  Please list where Mepilex Borders are located:  n/a             **SHARE this note so that the co-signing nurse is able to place an eSignature**    Co-signer eSignature: Electronically signed by Sowmya Guillen RN on 10/14/22 at 6:45 AM EDT    Does the Patient have Skin Breakdown related to pressure?   No                    Octavio Prevention initiated:  NA   Wound Care Orders initiated:  No      [facility] nurse consulted for Pressure Injury (Stage 3,4, Unstageable, DTI, NWPT, Complex wounds)and New or Established Ostomies:  NA      Primary Nurse eSignature: Electronically signed by Joseph Perez RN on 10/14/22 at 12:07 AM EDT

## 2022-10-14 NOTE — PROGRESS NOTES
PROGRESS NOTE    Admit Date:  10/12/2022    Subjective:  58 y.o. male who is seen for evaluation of cellulitis and ulcer on the right great toe. S/P partial right foot amputation 10/13/22. States feeling OK other than foot pain. Wants to have pain medication timing adjusted.        Past Medical History:        Diagnosis Date    Alcohol abuse 12/03/2012    Cancer ([facility] 2022    Prostate    Cellulitis     sternum    Coronary artery disease     COVID-19     Diabetic neuropathy associated with type 2 diabetes mellitus ([facility]     Hyperlipidemia     Non morbid obesity     NSTEMI (non-ST elevated myocardial infarction) (HCC)     GURDEEP (obstructive sleep apnea)     does not wear cpap machine    Reactive airway disease with wheezing, moderate persistent, with acute exacerbation 03/03/2020    [facility] 04/2021    Type II or unspecified type diabetes mellitus without mention of complication, not stated as uncontrolled        Past Surgical History:        Procedure Laterality Date    BYPASS GRAFT  02/19/2021    COLONOSCOPY  2017    COLONOSCOPY N/A 7/1/2022    COLONOSCOPY POLYPECTOMY SNARE/COLD BIOPSY performed by Sasha Pollock DO at [facility]  1/14/2016 9/21/2020    1 Promus Premier SAMUEL 2.25x16    CORONARY ARTERY BYPASS GRAFT  02/19/2021    CABG x2 & ALEX-Dr. Po Vicente    CORONARY ARTERY BYPASS GRAFT N/A 2/19/2021    CORONARY ARTERY BYPASS GRAFTING X2, INTERNAL MAMMARY ARTERY, SAPHENOUS VEIN GRAFT, ON PUMP WITH LEFT ATRIAL APPENDAGE CLIP, BILATERAL INTERCOSTAL NERVE BLOCK performed by Karthik Dawson MD at [address] N/A 4/28/2021    WOUND VAC AND DRESSING CHANGE WITH REMOVAL OF 4 STERNAL WIRES, DRAINAGE OF 2 INTERCOSTAL SPACE ABCESS performed by Augustine Osuna MD at [address] [facility] 4/26/2021    DEBRIDEMENT OF STERNAL WOUND WITH WOUND VACUUM PLACEMENT performed by Karthik Dawson MD at [id number] [place] [address] Medications:     insulin lispro  0-4 Units SubCUTAneous TID WC    insulin lispro  0-4 Units SubCUTAneous Nightly    aspirin  81 mg Oral Daily    clopidogrel  75 mg Oral Daily    glipiZIDE  5 mg Oral QAM AC    vancomycin  1,000 mg IntraVENous Q8H    atorvastatin  40 mg Oral QPM    metoprolol tartrate  50 mg Oral BID    sodium chloride flush  5-40 mL IntraVENous 2 times per day    enoxaparin  30 mg SubCUTAneous BID    cefepime  2,000 mg IntraVENous Q8H       Allergies:  Patient has no known allergies.     Social History:    Social History     Tobacco Use    Smoking status: Never    Smokeless tobacco: Never   Vaping Use    Vaping Use: Never used   Substance Use Topics    Alcohol use: Not Currently     Comment: whiskey  - weekly    Drug use: No       Family History:       Problem Relation Age of Onset    Heart Attack Mother     Heart Disease Mother     Heart Attack Father     Cancer Father     Hearing Loss Father     Diabetes Father     Heart Disease Father     Heart Disease Paternal Uncle         bypass       Review of Systems    CONSTITUTIONAL:  negative  EYES:  negative  HEENT:  negative  RESPIRATORY:  negative  CARDIOVASCULAR:  negative  GASTROINTESTINAL:  negative  GENITOURINARY:  negative  INTEGUMENT/BREAST:  positive for ulcer  MUSCULOSKELETAL:  positive for pain  NEUROLOGICAL:  positive for numbness      Objective:   BP (!) 146/68   Pulse 80   Temp 98.5 °F (36.9 °C) (Oral)   Resp 16   Ht 5' 10\" (1.778 m)   Wt 250 lb (113.4 kg)   SpO2 98%   BMI 35.87 kg/m²     Data:  CBC:   Recent Labs     10/12/22  1438 10/13/22  0514 10/14/22  0508   WBC 6.8 5.3 5.6   HGB 13.5 12.5* 12.7*   HCT 41.0 36.9* 38.5*   MCV 88.6 87.8 87.6    250 269     BMP:   Recent Labs     10/12/22  1438 10/13/22  0514 10/14/22  0508    140 135*   K 4.2 4.2 4.1   CL 99 104 100   CO2 25 24 26   BUN 10 8 7   CREATININE 0.7* 0.6* 0.6*     LIVER PROFILE:   Recent Labs     10/12/22  1438 10/13/22  0514   AST 15 11*   ALT 18 13 BILITOT 0.4 0.4   ALKPHOS 96 83     PT/INR:   Recent Labs     10/12/22  1438 10/13/22  0514   PROT 6.9 5.8*   INR  --  1.04     HgBA1c:  Lab Results   Component Value Date    LABA1C 8.3 10/12/2022     SARS-CoV-2 RNA, RT PCR NOT DETECTED     Cultures:   Blood x2 - NGSF    Wound culture -   Gram Stain Result1+ RBC's   1+ WBC's (Mononuclear)   No organisms seen   1+ Epithelial Cells       Imaging:   Xray right foot -   The patient has undergone interval amputation of the 1st ray at the level of   the 1st metatarsal head. The patient also appears to have undergone interval   resection of hallux sesamoids with small bony fragment seen overlying the   medial aspect of the 1st metatarsal head. There are foci of gas density and   overlying soft tissue irregularity, presumably reflecting recent surgery. Mild osteoarthritis affects dorsal midfoot. There is redemonstration of 7 mm   ossicle density overlying the plantar soft tissues of foot near the plantar   fascial insertion. There is tiny posterior calcaneal spur. Vascular   calcification changes are seen. Impression:  Postsurgical changes of interval amputation of the 1st ray and resection of   hallux sesamoids. Physical Exam:    General Appearance: alert and oriented to person, place and time, well developed and well- nourished, in no acute distress  Head: normocephalic and atraumatic  Eyes: pupils equal, round  Neck: trachea midline  Pulmonary/Chest: no wheezes  Cardiovascular: normal rate, regular rhythm  Abdomen: soft, non-tender, non-distended    Dressing right foot - clean, dry and intact  No proximal erythema or edema noted. No malodor noted. No calf tenderness noted.        Assessment:  Patient Active Problem List   Diagnosis Code    Type II or unspecified type diabetes mellitus without mention of complication, not stated as uncontrolled E11.9    Diabetes mellitus E11.9    History of ETOH abuse F10.11    Patient overweight E66.3    Patient overweight E66.3    Dietary noncompliance Z91.119    Alcohol abuse F10.10    Flank pain R10.9    Prostatism N40.0    Low serum testosterone level R79.89    Arm pain, left M79.602    Shoulder pain M25.519    Precordial pain R07.2    Lung nodule R91.1    Acute angina (HCC) I20.9    Abnormal stress test R94.39    Acute coronary syndrome (HCC) I24.9    Coronary artery disease involving native coronary artery of native heart with unstable angina pectoris (HCC) I25.110    Other chest pain R07.89    S/P drug eluting coronary stent placement Z95.5    Essential hypertension I10    Obesity E66.9    Abnormal chest x-ray R93.89    Angina effort I20.8    Skin lesion L98.9    Angina at rest [facility]) I20.8    Hyperlipidemia E78.5    Angina pectoris ([facility]) I20.9    NSTEMI (non-ST elevated myocardial infarction) ([facility] I21.4    Controlled type 2 diabetes mellitus without complication, without long-term current use of insulin ([facility]) E11.9    ASHD (arteriosclerotic heart disease) I25.10    Mass of right foot R22.41    Overweight E66.3    History of angina pectoris Z86.79    Reactive airway disease J45.909    Bronchitis J40    Reactive airway disease with wheezing, moderate persistent, with acute exacerbation J45.41    Need for prophylactic vaccination and inoculation against varicella Z23    Need for prophylactic vaccination against diphtheria-tetanus-pertussis (DTP) Z23    Chronic right-sided thoracic back pain M54.6, G89.29    CAD in native artery I25.10    S/P PTCA (percutaneous transluminal coronary angioplasty) Z98.61    Chest pain R07.9    Ischemic cardiomyopathy I25.5    GURDEEP (obstructive sleep apnea) G47.33    Cellulitis L03.90    Sepsis due to methicillin susceptible Staphylococcus aureus (HCC) A41.01    Bacteremia due to methicillin susceptible Staphylococcus aureus (MSSA) R78.81, B95.61    Disruption or dehiscence of closure of sternum or sternotomy T81.32XA    Elevated sed rate R70.0    Type 2 diabetes mellitus with obesity ([facility] E11.69, E66.9    Diabetic ulcer of right foot associated with type 2 diabetes mellitus, with fat layer exposed ([facility] E11.621, L97.512    Pyogenic inflammation of bone (HCC) M86.9     diabetic foot ulcer right hallux secondary to peripheral neuropathy   Cellulitis/abscess right foot  Osteomyelitis right foot secondary to diabetes mellitus - S/P hallux amputation 10/13/22  diabetes mellitus with peripheral neuropathy         Plan  Patient examined. Reviewed labs and imaging. Continue IV antibiotics. Elevation of leg to decrease pain and swelling. Control glucose levels to prevent future complications. Will change dressing tomorrow. Awaiting culture results. Will reevaluate tomorrow.        Electronically signed by Juliana Silveira DPM on 10/14/2022 at 7:42 AM.

## 2022-10-14 NOTE — PLAN OF CARE
Problem: Pain  Goal: Verbalizes/displays adequate comfort level or baseline comfort level  10/14/2022 0300 by Chandler Desir RN  Outcome: Not Progressing  Flowsheets (Taken 10/13/2022 2024)  Verbalizes/displays adequate comfort level or baseline comfort level:   Encourage patient to monitor pain and request assistance   Assess pain using appropriate pain scale   Administer analgesics based on type and severity of pain and evaluate response   Implement non-pharmacological measures as appropriate and evaluate response   Consider cultural and social influences on pain and pain management   Notify Licensed Independent Practitioner if interventions unsuccessful or patient reports new pain  10/13/2022 1338 by Shell Dee RN  Outcome: Progressing     Problem: Pain  Goal: Verbalizes/displays adequate comfort level or baseline comfort level  10/14/2022 0300 by Chandler Desir RN  Outcome: Not Progressing  Flowsheets (Taken 10/13/2022 2024)  Verbalizes/displays adequate comfort level or baseline comfort level:   Encourage patient to monitor pain and request assistance   Assess pain using appropriate pain scale   Administer analgesics based on type and severity of pain and evaluate response   Implement non-pharmacological measures as appropriate and evaluate response   Consider cultural and social influences on pain and pain management   Notify Licensed Independent Practitioner if interventions unsuccessful or patient reports new pain  10/13/2022 1338 by Shell Dee RN  Outcome: Progressing

## 2022-10-14 NOTE — PROGRESS NOTES
Pt A&Ox4 sitting up in chair,pt has surgical drsg in place R foot,  pt c/o pain 10/10 in R foot, gave dilaudid 0.5 mg IV, vitals and shift assessment completed, scheduled medications given, tcall light within reach told pt to call for [place] needs.  Cristofer Gilmore RN

## 2022-10-14 NOTE — ED PROVIDER NOTES
Emergency Department Attending Provider Note  Location: [facility]  10/12/2022     Patient Identification  Juan C Hayward is a 58 y.o. male    Past Medical History:   Diagnosis Date    Alcohol abuse 12/03/2012    Cancer ([facility] [address] 2022    Prostate    Cellulitis     sternum    Coronary artery disease     COVID-19     Diabetic neuropathy associated with type 2 diabetes mellitus ([facility] [address]     Hyperlipidemia     Non morbid obesity     NSTEMI (non-ST elevated myocardial infarction) (HCC)     GURDEEP (obstructive sleep apnea)     does not wear cpap machine    Reactive airway disease with wheezing, moderate persistent, with acute exacerbation 03/03/2020    Sepsis ([facility] [address] 04/2021    Type II or unspecified type diabetes mellitus without mention of complication, not stated as uncontrolled        Juan C Hayward was evaluated in the Emergency Department for concerns of known right great toe infection, foot swelling, and concern for osteomyelitis. Sent in by podiatry for admission for likely surgical management. Otherwise, no medical concerns today in the emergency department. Hemodynamically stable and has no other concerns in the ED today. Denies any chest pain or shortness of breath. Able to ambulate, however the toe on my physical exam is draining, red, purulent, and appears quite infected. Although initial history and physical exam information was obtained by SILVERIO Sun (who also dictated a record of this visit), I personally saw the patient and performed a substantive portion of the visit including all aspects of the medical decision making. EKG Interpretation:  EKG obtained today in the emergency department shows a normal sinus rhythm with a ventricular to 71 bpm.  QTc 432. Intervals otherwise normal.  No ST segment elevation, ST segment depression, hyperacute T waves. Normal axis. Reassuring EKG.     VL Extremity Venous Right         XR FOOT RIGHT (2 VIEWS)   Final Result   Postsurgical changes of interval amputation of the 1st ray and resection of   hallux sesamoids. CT FOOT RIGHT W CONTRAST   Final Result   Multilocular rim enhancing fluid collection surrounding the distal phalanx of   the great toe, greatest dorsally, compatible with a small abscess as   described above. That is associated with bony changes of osteomyelitis, including osteolysis,   periosteal new bone formation, with a pathologic fracture at the base of the   distal phalanx. Soft tissue swelling is seen along the dorsum the foot, which is nonspecific   and can be from systemic etiologies such as venous insufficiency or fluid   overload, but cellulitis remains in the differential.         XR CHEST PORTABLE   Final Result   No acute abnormality identified.              Labs Reviewed   COMPREHENSIVE METABOLIC PANEL W/ REFLEX TO MG FOR LOW K - Abnormal; Notable for the following components:       Result Value    Glucose 186 (*)     Creatinine 0.7 (*)     All other components within normal limits   LACTATE, SEPSIS - Abnormal; Notable for the following components:    Lactic Acid, Sepsis 2.9 (*)     All other components within normal limits   CBC WITH AUTO DIFFERENTIAL - Abnormal; Notable for the following components:    Hemoglobin 12.5 (*)     Hematocrit 36.9 (*)     All other components within normal limits   COMPREHENSIVE METABOLIC PANEL W/ REFLEX TO MG FOR LOW K - Abnormal; Notable for the following components:    Glucose 202 (*)     Creatinine 0.6 (*)     Calcium 8.1 (*)     Total Protein 5.8 (*)     AST 11 (*)     All other components within normal limits   VANCOMYCIN LEVEL, TROUGH - Abnormal; Notable for the following components:    Vancomycin Tr 9.0 (*)     All other components within normal limits   CBC WITH AUTO DIFFERENTIAL - Abnormal; Notable for the following components:    Hemoglobin 12.7 (*)     Hematocrit 38.5 (*)     All other components within normal limits   BASIC METABOLIC PANEL W/ REFLEX TO MG FOR LOW K - Abnormal; ORDERED BY: MANDO AMADOR  SOURCE: Foot                               COLLECTED:  10/13/22 10:28  ANTIBIOTICS AT SERGEI.:                      RECEIVED :  10/13/22 13:57   CBC WITH AUTO DIFFERENTIAL   PROCALCITONIN   LACTATE, SEPSIS   ETHANOL   HEMOGLOBIN A1C   PROTIME-INR   SURGICAL PATHOLOGY   SURGICAL PATHOLOGY   VANCOMYCIN LEVEL, TROUGH   POCT GLUCOSE   POCT GLUCOSE   POCT GLUCOSE   POCT GLUCOSE   POCT GLUCOSE         PLAN:   -Patient seen and evaluated. Relevant records reviewed. Laboratory evaluation today with normal white blood cell count of 6.8. CBC reassuring. CMP unremarkable. Some hyperglycemia 186 and patient has diabetes. Initial lactate is 2.9, however decreasing with IV hydration to 1.2. CT of the right foot shows multilocular rim-enhancing fluid collections of the distal phalanx concerns for osteomyelitis, osteolysis, and a pathologic fracture at the distal phalanx. Plan is to admit for podiatry evaluation and management of this ulcerated right great toe. Antibiotics initiated. Stable for admission. Remains alert, conversational, no acute distress in the emergency department.     Medications   atorvastatin (LIPITOR) tablet 40 mg (40 mg Oral Given 10/13/22 1828)   metoprolol tartrate (LOPRESSOR) tablet 50 mg (50 mg Oral Given 10/13/22 2145)   glucose chewable tablet 16 g (has no administration in time range)   dextrose bolus 10% 125 mL (has no administration in time range)     Or   dextrose bolus 10% 250 mL (has no administration in time range)   glucagon (rDNA) injection 1 mg (has no administration in time range)   dextrose 10 % infusion (has no administration in time range)   sodium chloride flush 0.9 % injection 5-40 mL (5 mLs IntraVENous Not Given 10/13/22 2145)   sodium chloride flush 0.9 % injection 5-40 mL (has no administration in time range)   0.9 % sodium chloride infusion (has no administration in time range)   enoxaparin Sodium (LOVENOX) injection 30 mg (30 mg SubCUTAneous Given 10/13/22 2142)   ondansetron (ZOFRAN-ODT) disintegrating tablet 4 mg (has no administration in time range)     Or   ondansetron (ZOFRAN) injection 4 mg (has no administration in time range)   polyethylene glycol (GLYCOLAX) packet 17 g (has no administration in time range)   acetaminophen (TYLENOL) tablet 650 mg (has no administration in time range)     Or   acetaminophen (TYLENOL) suppository 650 mg (has no administration in time range)   potassium chloride (KLOR-CON M) extended release tablet 40 mEq (has no administration in time range)     Or   potassium bicarb-citric acid (EFFER-K) effervescent tablet 40 mEq (has no administration in time range)     Or   potassium chloride 10 mEq/100 mL IVPB (Peripheral Line) (has no administration in time range)   magnesium sulfate 2000 mg in 50 mL IVPB premix (has no administration in time range)   cefepime (MAXIPIME) 2000 mg IVPB minibag (2,000 mg IntraVENous New Bag 10/14/22 0606)   insulin lispro (HUMALOG) injection vial 0-4 Units (1 Units SubCUTAneous Given 10/13/22 1831)   insulin lispro (HUMALOG) injection vial 0-4 Units (0 Units SubCUTAneous Not Given 10/13/22 2145)   aspirin chewable tablet 81 mg (81 mg Oral Given 10/13/22 1445)   clopidogrel (PLAVIX) tablet 75 mg (75 mg Oral Given 10/13/22 1444)   glipiZIDE (GLUCOTROL) tablet 5 mg (has no administration in time range)   oxyCODONE-acetaminophen (PERCOCET) 5-325 MG per tablet 1 tablet (1 tablet Oral Given 10/13/22 2312)   vancomycin 1000 mg IVPB in 250 mL D5W addavial (0 mg IntraVENous Stopped 10/14/22 0338)   HYDROmorphone (DILAUDID) injection 0.25 mg ( IntraVENous See Alternative 10/14/22 0604)     Or   HYDROmorphone (DILAUDID) injection 0.5 mg (0.5 mg IntraVENous Given 10/14/22 0604)   0.9 % sodium chloride bolus (0 mLs IntraVENous Stopped 10/12/22 1737)   cefepime (MAXIPIME) 2000 mg IVPB minibag (0 mg IntraVENous Stopped 10/12/22 1548)   vancomycin (VANCOCIN) 2,000 mg in dextrose 5 % 500 mL IVPB (0 mg IntraVENous Stopped 10/12/22 1849)   morphine sulfate (PF) injection 4 mg (4 mg IntraVENous Given 10/12/22 1554)   ondansetron (ZOFRAN) injection 4 mg (4 mg IntraVENous Given 10/12/22 1553)   iopamidol (ISOVUE-370) 76 % injection 75 mL (75 mLs IntraVENous Given 10/12/22 1627)   morphine sulfate (PF) injection 4 mg (4 mg IntraVENous Given 10/12/22 1853)   sodium chloride 0.9 % irrigation (3,000 mLs Irrigation New Bag 10/13/22 1041)         IMPRESSION:    ICD-10-CM    1. Other acute osteomyelitis of right foot ([facility]  M86.171       2. Type 2 diabetes mellitus with other skin ulcer, unspecified whether long term insulin use (HCC)  E11.622     L98.499       3. Class 2 obesity with body mass index (BMI) of 35.0 to 35.9 in adult, unspecified obesity type, unspecified whether serious comorbidity present  E66.9     Z68.35       4. [facility]el[facility] right foot, unspecified type [facility]  M86.9 Culture, Surgical     Culture, Surgical     Surgical Pathology     Surgical Pathology      5. Diabetic ulcer of right foot associated with diabetes mellitus due to underlying condition, unspecified part of foot, unspecified ulcer stage [facility])  A77.157 Culture, Surgical    L97.519 Culture, Surgical     Surgical Pathology     Surgical Pathology          I, Melva Kirby DO am the primary clinician of record. This chart was generated in part by using Dragon Dictation system and may contain errors related to that system including errors in grammar, punctuation, and spelling, as well as words and phrases that may be inappropriate. If there are any questions or concerns please feel free to contact the dictating provider for clarification.      Marci CHASE [address], DO  10/14/22 5160

## 2022-10-14 NOTE — CARE COORDINATION
INTERDISCIPLINARY PLAN OF CARE CONFERENCE    Date/Time: 10/14/2022 4:12 PM  Completed by: Nabil Elliott RN, Case Management      Patient Name:  Rich Garner  YOB: 1960  Admitting Diagnosis: Other acute osteomyelitis of right foot ([facility] [M86.171]  Class 2 obesity with body mass index (BMI) of 35.0 to 35.9 in adult, unspecified obesity type, unspecified whether serious comorbidity present [E66.9, Z68.35]  Type 2 diabetes mellitus with other skin ulcer, unspecified whether long term insulin use ([facility] [C79.509, L98.499]  O[facility]omyel[facility] of great toe of right foot [facility] [M86.9]     Admit Date/Time:  10/12/2022  2:19 PM    Chart reviewed. Interdisciplinary team contacted or reviewed plan related to patient progress and discharge plans. Disciplines included Case Management, Nursing, and Dietitian. Current Status:Stable  PT/OT recommendation for discharge plan of care: N/A    Expected D/C Disposition:  Home  Confirmed plan with patient and/or family Yes confirmed with: (name) wife  Met with:patient and wife  Discharge Plan Comments: Reviewed chart and met with pt at bedside. Plan remains to return home with wife. Will follow for [facility]. Noted pt is IPTA.     Home O2 in place on admit: No  Pt informed of need to bring portable home O2 tank on day of discharge for nursing to connect prior to leaving:  Not Indicated  Verbalized agreement/Understanding:  Not Indicated

## 2022-10-14 NOTE — PROGRESS NOTES
BP (!) 146/68   Pulse 80   Temp 98.5 °F (36.9 °C) (Oral)   Resp 18   Ht 5' 10\" (1.778 m)   Wt 250 lb (113.4 kg)   SpO2 98%   BMI 35.87 kg/m²     Assessment complete. Meds passed. Pt denies needs at this time    PRN percocet 10 mg PO given for pain rating 10/10 in right foot. Patient up in chair at this time. Bedside Mobility Assessment Tool (BMAT):     Assessment Level 1- Sit and Shake    1. From a semi-reclined position, ask patient to sit up and rotate to a seated position at the side of the bed. Can use the bedrail. 2. Ask patient to reach out and grab your hand and shake making sure patient reaches across his/her midline. Pass- Patient is able to come to a seated position, maintain core strength. Maintains seated balance while reaching across midline. Move on to Assessment Level 2. Assessment Level 2- Stretch and Point   1. With patient in seated position at the side of the bed, have patient place both feet on the floor (or stool) with knees no higher than hips. 2. Ask patient to stretch one leg and straighten the knee, then bend the ankle/flex and point the toes. If appropriate, repeat with the other leg. Pass- Patient is able to demonstrate appropriate quad strength on intended weight bearing limb(s). Move onto Assessment Level 3. Assessment Level 3- Stand   1. Ask patient to elevate off the bed or chair (seated to standing) using an assistive device (cane, bedrail). 2. Patient should be able to raise buttocks off be and hold for a count of five. May repeat once. Pass- Patient maintains standing stability for at least 5 seconds, proceed to assessment level 4. Assessment Level 4- Walk   1. Ask patient to march in place at bedside. 2. Then ask patient to advance step and return each foot. Some medical conditions may render a patient from stepping backwards, use your best clinical judgement.    Pass- Patient demonstrates balance while shifting weight and ability to step, takes independent steps, does not use assistive device patient is MOBILITY LEVEL 4.       Mobility Level- 4

## 2022-10-14 NOTE — PROGRESS NOTES
PRN percocet 10 mg given for pain rating 8 out of 10 in right foot. Patient states pain is constant and stabbing sensations that are intermittent make it even worse. See MAR for administration details.

## 2022-10-15 LAB
ANION GAP SERPL CALCULATED.3IONS-SCNC: 16 MMOL/L (ref 3–16)
BASOPHILS ABSOLUTE: 0 K/UL (ref 0–0.2)
BASOPHILS RELATIVE PERCENT: 0.5 %
BUN BLDV-MCNC: 6 MG/DL (ref 7–20)
CALCIUM SERPL-MCNC: 8.9 MG/DL (ref 8.3–10.6)
CHLORIDE BLD-SCNC: 98 MMOL/L (ref 99–110)
CO2: 21 MMOL/L (ref 21–32)
CREAT SERPL-MCNC: 0.6 MG/DL (ref 0.8–1.3)
EOSINOPHILS ABSOLUTE: 0.2 K/UL (ref 0–0.6)
EOSINOPHILS RELATIVE PERCENT: 3.8 %
GFR AFRICAN AMERICAN: >60
GFR NON-AFRICAN AMERICAN: >60
GLUCOSE BLD-MCNC: 165 MG/DL (ref 70–99)
GLUCOSE BLD-MCNC: 190 MG/DL (ref 70–99)
GLUCOSE BLD-MCNC: 219 MG/DL (ref 70–99)
GLUCOSE BLD-MCNC: 226 MG/DL (ref 70–99)
GLUCOSE BLD-MCNC: 231 MG/DL (ref 70–99)
HCT VFR BLD CALC: 40.8 % (ref 40.5–52.5)
HEMOGLOBIN: 13.7 G/DL (ref 13.5–17.5)
LYMPHOCYTES ABSOLUTE: 0.7 K/UL (ref 1–5.1)
LYMPHOCYTES RELATIVE PERCENT: 13.9 %
MCH RBC QN AUTO: 29.4 PG (ref 26–34)
MCHC RBC AUTO-ENTMCNC: 33.6 G/DL (ref 31–36)
MCV RBC AUTO: 87.5 FL (ref 80–100)
MONOCYTES ABSOLUTE: 0.5 K/UL (ref 0–1.3)
MONOCYTES RELATIVE PERCENT: 10.7 %
NEUTROPHILS ABSOLUTE: 3.5 K/UL (ref 1.7–7.7)
NEUTROPHILS RELATIVE PERCENT: 71.1 %
PDW BLD-RTO: 13.9 % (ref 12.4–15.4)
PERFORMED ON: ABNORMAL
PLATELET # BLD: 218 K/UL (ref 135–450)
PMV BLD AUTO: 6.9 FL (ref 5–10.5)
POTASSIUM REFLEX MAGNESIUM: 4.5 MMOL/L (ref 3.5–5.1)
RBC # BLD: 4.66 M/UL (ref 4.2–5.9)
SODIUM BLD-SCNC: 135 MMOL/L (ref 136–145)
WBC # BLD: 4.9 K/UL (ref 4–11)

## 2022-10-15 PROCEDURE — 80048 BASIC METABOLIC PNL TOTAL CA: CPT

## 2022-10-15 PROCEDURE — 2580000003 HC RX 258: Performed by: PODIATRIST

## 2022-10-15 PROCEDURE — 6360000002 HC RX W HCPCS: Performed by: PODIATRIST

## 2022-10-15 PROCEDURE — 1200000000 HC SEMI PRIVATE

## 2022-10-15 PROCEDURE — 6360000002 HC RX W HCPCS: Performed by: INTERNAL MEDICINE

## 2022-10-15 PROCEDURE — 36415 COLL VENOUS BLD VENIPUNCTURE: CPT

## 2022-10-15 PROCEDURE — 6370000000 HC RX 637 (ALT 250 FOR IP): Performed by: PODIATRIST

## 2022-10-15 PROCEDURE — 2580000003 HC RX 258: Performed by: INTERNAL MEDICINE

## 2022-10-15 PROCEDURE — 6370000000 HC RX 637 (ALT 250 FOR IP)

## 2022-10-15 PROCEDURE — 99232 SBSQ HOSP IP/OBS MODERATE 35: CPT | Performed by: INTERNAL MEDICINE

## 2022-10-15 PROCEDURE — 85025 COMPLETE CBC W/AUTO DIFF WBC: CPT

## 2022-10-15 RX ADMIN — SODIUM CHLORIDE 25 ML: 9 INJECTION, SOLUTION INTRAVENOUS at 16:01

## 2022-10-15 RX ADMIN — SODIUM CHLORIDE 25 ML: 9 INJECTION, SOLUTION INTRAVENOUS at 09:22

## 2022-10-15 RX ADMIN — METOPROLOL TARTRATE 50 MG: 50 TABLET, FILM COATED ORAL at 20:35

## 2022-10-15 RX ADMIN — METOPROLOL TARTRATE 50 MG: 50 TABLET, FILM COATED ORAL at 09:13

## 2022-10-15 RX ADMIN — CLOPIDOGREL BISULFATE 75 MG: 75 TABLET ORAL at 09:13

## 2022-10-15 RX ADMIN — OXYCODONE HYDROCHLORIDE AND ACETAMINOPHEN 1 TABLET: 5; 325 TABLET ORAL at 05:17

## 2022-10-15 RX ADMIN — VANCOMYCIN HYDROCHLORIDE 1000 MG: 1 INJECTION, POWDER, LYOPHILIZED, FOR SOLUTION INTRAVENOUS at 01:48

## 2022-10-15 RX ADMIN — HYDROMORPHONE HYDROCHLORIDE 1 MG: 1 INJECTION, SOLUTION INTRAMUSCULAR; INTRAVENOUS; SUBCUTANEOUS at 12:33

## 2022-10-15 RX ADMIN — OXYCODONE HYDROCHLORIDE AND ACETAMINOPHEN 2 TABLET: 5; 325 TABLET ORAL at 09:13

## 2022-10-15 RX ADMIN — ATORVASTATIN CALCIUM 40 MG: 40 TABLET, FILM COATED ORAL at 17:38

## 2022-10-15 RX ADMIN — VANCOMYCIN HYDROCHLORIDE 1000 MG: 1 INJECTION, POWDER, LYOPHILIZED, FOR SOLUTION INTRAVENOUS at 09:25

## 2022-10-15 RX ADMIN — Medication 10 ML: at 20:37

## 2022-10-15 RX ADMIN — CEFEPIME 2000 MG: 2 INJECTION, POWDER, FOR SOLUTION INTRAVENOUS at 16:02

## 2022-10-15 RX ADMIN — ENOXAPARIN SODIUM 30 MG: 100 INJECTION SUBCUTANEOUS at 09:14

## 2022-10-15 RX ADMIN — CEFEPIME 2000 MG: 2 INJECTION, POWDER, FOR SOLUTION INTRAVENOUS at 23:22

## 2022-10-15 RX ADMIN — ENOXAPARIN SODIUM 30 MG: 100 INJECTION SUBCUTANEOUS at 20:35

## 2022-10-15 RX ADMIN — INSULIN LISPRO 1 UNITS: 100 INJECTION, SOLUTION INTRAVENOUS; SUBCUTANEOUS at 12:36

## 2022-10-15 RX ADMIN — CEFEPIME 2000 MG: 2 INJECTION, POWDER, FOR SOLUTION INTRAVENOUS at 05:15

## 2022-10-15 RX ADMIN — SODIUM CHLORIDE 25 ML: 9 INJECTION, SOLUTION INTRAVENOUS at 17:39

## 2022-10-15 RX ADMIN — VANCOMYCIN HYDROCHLORIDE 1000 MG: 1 INJECTION, POWDER, LYOPHILIZED, FOR SOLUTION INTRAVENOUS at 17:40

## 2022-10-15 RX ADMIN — OXYCODONE HYDROCHLORIDE AND ACETAMINOPHEN 2 TABLET: 5; 325 TABLET ORAL at 15:58

## 2022-10-15 RX ADMIN — ASPIRIN 81 MG: 81 TABLET, CHEWABLE ORAL at 09:13

## 2022-10-15 RX ADMIN — HYDROMORPHONE HYDROCHLORIDE 1 MG: 1 INJECTION, SOLUTION INTRAMUSCULAR; INTRAVENOUS; SUBCUTANEOUS at 20:34

## 2022-10-15 RX ADMIN — GLIPIZIDE 5 MG: 5 TABLET ORAL at 05:16

## 2022-10-15 ASSESSMENT — PAIN SCALES - GENERAL
PAINLEVEL_OUTOF10: 8
PAINLEVEL_OUTOF10: 4
PAINLEVEL_OUTOF10: 8
PAINLEVEL_OUTOF10: 8
PAINLEVEL_OUTOF10: 7
PAINLEVEL_OUTOF10: 7
PAINLEVEL_OUTOF10: 8
PAINLEVEL_OUTOF10: 6

## 2022-10-15 ASSESSMENT — PAIN DESCRIPTION - ORIENTATION
ORIENTATION: RIGHT

## 2022-10-15 ASSESSMENT — PAIN DESCRIPTION - LOCATION
LOCATION: FOOT

## 2022-10-15 ASSESSMENT — PAIN DESCRIPTION - DESCRIPTORS
DESCRIPTORS: BURNING;ACHING;STABBING
DESCRIPTORS: ACHING

## 2022-10-15 NOTE — PROGRESS NOTES
Shift report received from [facility]. Smiling; talking on telephone; states he would like pain medication in the next hour if possible. States he does not want it right now. Sitting in recliner chair, call light in reach.

## 2022-10-15 NOTE — FLOWSHEET NOTE
10/15/22 0909   Vital Signs   Temp 99.1 °F (37.3 °C)   Temp Source Oral   Heart Rate 88   Heart Rate Source Monitor   Resp 16   BP (!) 165/73   BP Location Right upper arm   BP Method Automatic   MAP (Calculated) 103.67   Patient Position Up in chair   Level of Consciousness 0   MEWS Score 1   Pain Assessment   Pain Assessment 0-10   Pain Level 8   Patient's Stated Pain Goal 5   Pain Location Foot   Pain Orientation Right   Opioid-Induced Sedation   POSS Score 1   Oxygen Therapy   SpO2 99 %   O2 Device None (Room air)   Assessment complete and morning medications administered

## 2022-10-15 NOTE — PROGRESS NOTES
PROGRESS NOTE    Admit Date:  10/12/2022    Subjective:  58 y.o. male who is seen for evaluation of cellulitis and ulcer on the right great toe. S/P partial right foot amputation 10/13/22. States feeling OK. Pain is better controlled now. Does get shooting pains at times.        Past Medical History:        Diagnosis Date    Alcohol abuse 12/03/2012    Cancer ([facility] 2022    Prostate    Cellulitis     sternum    Coronary artery disease     COVID-19     Diabetic neuropathy associated with type 2 diabetes mellitus ([facility]     Hyperlipidemia     Non morbid obesity     NSTEMI (non-ST elevated myocardial infarction) (HCC)     GURDEEP (obstructive sleep apnea)     does not wear cpap machine    Reactive airway disease with wheezing, moderate persistent, with acute exacerbation 03/03/2020    Sepsis ([facility] 04/2021    Type II or unspecified type diabetes mellitus without mention of complication, not stated as uncontrolled        Past Surgical History:        Procedure Laterality Date    BYPASS GRAFT  02/19/2021    COLONOSCOPY  2017    COLONOSCOPY N/A 7/1/2022    COLONOSCOPY POLYPECTOMY SNARE/COLD BIOPSY performed by Klever Barrera DO at [facility]  1/14/2016 9/21/2020    1 [employer] Premier SAMUEL 2.25x16    CORONARY ARTERY BYPASS GRAFT  02/19/2021    CABG x2 & ALEX-Dr. Carlos Morse    CORONARY ARTERY BYPASS GRAFT N/A 2/19/2021    CORONARY ARTERY BYPASS GRAFTING X2, INTERNAL MAMMARY ARTERY, SAPHENOUS VEIN GRAFT, ON PUMP WITH LEFT ATRIAL APPENDAGE CLIP, BILATERAL INTERCOSTAL NERVE BLOCK performed by Alla Penaloza MD at [address] [place]  N/A 4/28/2021    WOUND VAC AND DRESSING CHANGE WITH REMOVAL OF 4 STERNAL WIRES, DRAINAGE OF 2 INTERCOSTAL SPACE ABCESS performed by Homero Lima MD at [address] [facility] 4/26/2021    DEBRIDEMENT OF STERNAL WOUND WITH WOUND VACUUM PLACEMENT performed by Alla Penaloza MD at [facility] 7 Right 10/13/2022    PARTIAL RIGHT FOOT  AMPUTATION performed by Clayton Trevino DPM at [address] [facility]         Current Medications:     insulin lispro  0-4 Units SubCUTAneous TID WC    insulin lispro  0-4 Units SubCUTAneous Nightly    aspirin  81 mg Oral Daily    clopidogrel  75 mg Oral Daily    glipiZIDE  5 mg Oral QAM AC    vancomycin  1,000 mg IntraVENous Q8H    atorvastatin  40 mg Oral QPM    metoprolol tartrate  50 mg Oral BID    sodium chloride flush  5-40 mL IntraVENous 2 times per day    enoxaparin  30 mg SubCUTAneous BID    cefepime  2,000 mg IntraVENous Q8H       Allergies:  Patient has no known allergies.     Social History:    Social History     Tobacco Use    Smoking status: Never    Smokeless tobacco: Never   Vaping Use    Vaping Use: Never used   Substance Use Topics    Alcohol use: Not Currently     Comment: whiskey  - weekly    Drug use: No       Family History:       Problem Relation Age of Onset    Heart Attack Mother     Heart Disease Mother     Heart Attack Father     Cancer Father     Hearing Loss Father     Diabetes Father     Heart Disease Father     Heart Disease Paternal Uncle         bypass       Review of Systems    CONSTITUTIONAL:  negative  EYES:  negative  HEENT:  negative  RESPIRATORY:  negative  CARDIOVASCULAR:  negative  GASTROINTESTINAL:  negative  GENITOURINARY:  negative  INTEGUMENT/BREAST:  positive for ulcer  MUSCULOSKELETAL:  positive for pain  NEUROLOGICAL:  positive for numbness      Objective:   BP (!) 165/73   Pulse 88   Temp 99.1 °F (37.3 °C) (Oral)   Resp 16   Ht 5' 10\" (1.778 m)   Wt 250 lb (113.4 kg)   SpO2 99%   BMI 35.87 kg/m²     Data:  CBC:   Recent Labs     10/13/22  0514 10/14/22  0508 10/15/22  0513   WBC 5.3 5.6 4.9   HGB 12.5* 12.7* 13.7   HCT 36.9* 38.5* 40.8   MCV 87.8 87.6 87.5    269 218     BMP:   Recent Labs     10/13/22  0514 10/14/22  0508 10/15/22  0513    135* 135*   K 4.2 4.1 4.5    100 98*   CO2 24 26 21   BUN 8 7 6*   CREATININE 0.6* 0.6* 0.6* LIVER PROFILE:   Recent Labs     10/12/22  1438 10/13/22  0514   AST 15 11*   ALT 18 13   BILITOT 0.4 0.4   ALKPHOS 96 83     PT/INR:   Recent Labs     10/12/22  1438 10/13/22  0514   PROT 6.9 5.8*   INR  --  1.04     HgBA1c:  Lab Results   Component Value Date    LABA1C 8.3 10/12/2022     SARS-CoV-2 RNA, RT PCR NOT DETECTED     Cultures:   Blood x2 - NGSF    Wound culture -   Gram Stain Result1+ RBC's   1+ WBC's (Mononuclear)   No organisms seen   1+ Epithelial Cells   Culture Surgical-- No growth to date   No growth 36 to 48 hours   Anaerobic CultureAnaerobic culture further report to follow         Imaging:   Xray right foot -   The patient has undergone interval amputation of the 1st ray at the level of   the 1st metatarsal head. The patient also appears to have undergone interval   resection of hallux sesamoids with small bony fragment seen overlying the   medial aspect of the 1st metatarsal head. There are foci of gas density and   overlying soft tissue irregularity, presumably reflecting recent surgery. Mild osteoarthritis affects dorsal midfoot. There is redemonstration of 7 mm   ossicle density overlying the plantar soft tissues of foot near the plantar   fascial insertion. There is tiny posterior calcaneal spur. Vascular   calcification changes are seen. Impression:  Postsurgical changes of interval amputation of the 1st ray and resection of   hallux sesamoids. Venous US -   Right Impression   There is no evidence of deep or superficial venous thrombosis seen involving   the right lower extremity. Left Impression   There is no evidence of deep vein thrombosis seen involving the common femoral   vein. Pathology:   Right great toe, amputation:   - Benign inflamed skin and subcutaneous tissue with ulceration, abscess   formation, and gangrenous necrosis. - Acute osteomyelitis with osteonecrosis.      COMMENT:     The inflammatory changes involve the sections of skin,   subcutaneous tissue, and bone of the proximal margin, but the sections of   separately submitted portions of bone show no significant inflammation. Physical Exam:    General Appearance: alert and oriented to person, place and time, well developed and well- nourished, in no acute distress  Head: normocephalic and atraumatic  Eyes: pupils equal, round  Neck: trachea midline  Pulmonary/Chest: no wheezes  Cardiovascular: normal rate, regular rhythm  Abdomen: soft, non-tender, non-distended    Dressing right foot - clean, dry and intact  No proximal erythema or edema noted. No malodor noted. No calf tenderness noted. Sutures intact right hallux amputation site with mils serosanguinous drainage noted. More from the proximal aspect of the incision. Still mild to moderate periwound erythema and edema but definitely improving. No increase in skin temperature noted. + ecchymosis on the plantar medial aspect of the 1st ray.        Assessment:  Patient Active Problem List   Diagnosis Code    Type II or unspecified type diabetes mellitus without mention of complication, not stated as uncontrolled E11.9    Diabetes mellitus E11.9    History of ETOH abuse F10.11    Patient overweight E66.3    Patient overweight E66.3    Dietary noncompliance Z91.119    Alcohol abuse F10.10    Flank pain R10.9    Prostatism N40.0    Low serum testosterone level R79.89    Arm pain, left M79.602    Shoulder pain M25.519    Precordial pain R07.2    Lung nodule R91.1    Acute angina (HCC) I20.9    Abnormal stress test R94.39    Acute coronary syndrome (HCC) I24.9    Coronary artery disease involving native coronary artery of native heart with unstable angina pectoris (HCC) I25.110    Other chest pain R07.89    S/P drug eluting coronary stent placement Z95.5    Essential hypertension I10    Obesity E66.9    Abnormal chest x-ray R93.89    Angina effort I20.8    Skin lesion L98.9    Angina at rest [facility]) I20.8    Hyperlipidemia E78.5    Angina pectoris ([facility] [address] I20.9    NSTEMI (non-ST elevated myocardial infarction) ([facility]) I21.4    Controlled type 2 diabetes mellitus without complication, without long-term current use of insulin ([facility]) E11.9    ASHD (arteriosclerotic heart disease) I25.10    Mass of right foot R22.41    Overweight E66.3    History of angina pectoris Z86.79    Reactive airway disease J45.909    Bronchitis J40    Reactive airway disease with wheezing, moderate persistent, with acute exacerbation J45.41    Need for prophylactic vaccination and inoculation against varicella Z23    Need for prophylactic vaccination against diphtheria-tetanus-pertussis (DTP) Z23    Chronic right-sided thoracic back pain M54.6, G89.29    CAD in native artery I25.10    S/P PTCA (percutaneous transluminal coronary angioplasty) Z98.61    Chest pain R07.9    Ischemic cardiomyopathy I25.5    GURDEEP (obstructive sleep apnea) G47.33    Cellulitis L03.90    Sepsis due to methicillin susceptible Staphylococcus aureus (HCC) A41.01    Bacteremia due to methicillin susceptible Staphylococcus aureus (MSSA) R78.81, B95.61    Disruption or dehiscence of closure of sternum or sternotomy T81.32XA    Elevated sed rate R70.0    Type 2 diabetes mellitus with obesity ([facility]) E11.69, E66.9    Diabetic ulcer of right foot associated with type 2 diabetes mellitus, with fat layer exposed ([address] E11.621, L97.512    Pyogenic inflammation of bone ([facility]) M86.9    Diabetic ulcer of right foot associated with diabetes mellitus due to underlying condition ([facility] [address] Z96.689, L97.519     diabetic foot ulcer right hallux secondary to peripheral neuropathy   Cellulitis/abscess right foot  Osteomyelitis right foot secondary to diabetes mellitus - S/P hallux amputation 10/13/22  diabetes mellitus with peripheral neuropathy         Plan  Patient examined. Reviewed labs and imaging. Continue IV antibiotics. Elevation of leg to decrease pain and swelling. Control glucose levels to prevent future complications.    Awaiting culture results. Minimal walking. More pressure on the heel when walking. Dressing removed. Applied betadine, mepitel and well padded dry dressing. Patient and wife state looks better than what they were expecting. Will change dressing again on Monday.          Electronically signed by Aaron Pennington DPM on 10/15/2022 at 10:08 AM.

## 2022-10-15 NOTE — PROGRESS NOTES
Vancomycin Day # 4  Current dose = 1000 mg q8h  BUN/SRCR 6/0.6      Est CrCl = 161 ml/min  WBC   4.9            Tmax 98.7  Vancomycin trough ordered for tomorrow @ 1600. Continue same until then.

## 2022-10-15 NOTE — FLOWSHEET NOTE
BP (!) 162/72   Pulse 81   Temp 97.2 °F (36.2 °C) (Oral)   Resp 16   Ht 5' 10\" (1.778 m)   Wt 250 lb (113.4 kg)   SpO2 99%   BMI 35.87 kg/m²     Shift assessment complete; see flowsheets. PM medications administered; see MAR. PRN Dilauded given for 9 out of 10 right foot pain. Dressing to right foot is clean, dry, and intact. Pt AOx4 resting in bed. Respirations even and unlabored. Pt denies any further needs at this time. Call light in reach, bed locked in lowest position.

## 2022-10-15 NOTE — PROGRESS NOTES
Progress Note    Admit Date:  10/12/2022    -pmhx of alcohol abuse, prostate cancer, CAD, DM, HLD, GURDEEP   -presented with right great toe pain, infection, swelling   -follows with Dr. Ella Portillo     S/P Right foot partial amputation on 10/13    Subjective:  Mr. Jose Miguel Lima is feeling fine today. POD #2. Podiatrist has reevaluated wound today and dressings changed. Cultures pending. Foot pain is controlled, no fevers or chills, no nausea or vomiting       Objective:   Patient Vitals for the past 4 hrs:   BP Temp Temp src Pulse Resp   10/15/22 1628 -- -- -- -- 16   10/15/22 1545 (!) 174/80 97.3 °F (36.3 °C) Oral 76 16            Intake/Output Summary (Last 24 hours) at 10/15/2022 1709  Last data filed at 10/15/2022 0924  Gross per 24 hour   Intake 360 ml   Output --   Net 360 ml         Physical Exam:    Gen: No distress. Alert. Pleasant obese male   Eyes: PERRL. No sclera icterus. No conjunctival injection. Neck: No JVD. Trachea midline. Resp: No accessory muscle use. No crackles. No wheezes. No rhonchi. CV: Regular rate. Regular rhythm. No murmur. No rub. 1++right lower extremity pitting edema. Peripheral Pulses: +2 palpable, equal bilaterally   GI: Non-tender. Non-distended. Normal bowel sounds. Skin: M/S:  +right foot wrapped in bandage. Non tender. Left foot - non tender, no edema   Neuro: Awake. Grossly nonfocal    Psych: Oriented x 3. No anxiety or agitation.          Medications:  insulin lispro, 0-4 Units, TID WC  insulin lispro, 0-4 Units, Nightly  aspirin, 81 mg, Daily  clopidogrel, 75 mg, Daily  glipiZIDE, 5 mg, QAM AC  vancomycin, 1,000 mg, Q8H  atorvastatin, 40 mg, QPM  metoprolol tartrate, 50 mg, BID  sodium chloride flush, 5-40 mL, 2 times per day  enoxaparin, 30 mg, BID  cefepime, 2,000 mg, Q8H    PRN Medications:  HYDROmorphone, 1 mg, Q3H PRN   Or  HYDROmorphone, 0.5 mg, Q3H PRN  oxyCODONE-acetaminophen, 1 tablet, Q4H PRN   Or  oxyCODONE-acetaminophen, 2 tablet, Q4H PRN  glucose, 4 tablet, differential.         XR CHEST PORTABLE   Final Result   No acute abnormality identified. Assessment/Plan:    #Right great toe diabetic foot ulcer with abscess and osteomyelitis with pathological fracture at distal phalanx and surround cellulitis   -no leukocytosis   -blood cultures pending   -IVF-> stopped   -IV Vanc and cefepime -> continue  -prn percocet / IV morphine   -podiatry consulted   -S/P partial amputation right foot (right hallux amputated )on 10/13. Seen by podiatry today, dressings changed. Plan  Continue current antibiotics. Wound cultures pending    #Right lower extremity pitting edema   -could be 2/2 to above   -doppler pending   -stopped IVF   -elevate extremity   Improved    #Lactic acidosis  -IVF   -repeat resolved   -stopped IVF     #DM type 2   -holding oral regimen --> resumed glipizide   -SSI   -continue to monitor BG     #CAD  -on ASA, statin, plavix     #HTN   -on lopressor   -continue to monitor     #GURDEEP   -home CPAP or BIPAP     DVT Prophylaxis: Lovenox   Diet: ADULT DIET;  Regular; 5 carb choices (75 gm/meal)  Code Status: Full Code    David Borrego MD  10/15/22  5:09 PM

## 2022-10-16 LAB
ANION GAP SERPL CALCULATED.3IONS-SCNC: 8 MMOL/L (ref 3–16)
BASOPHILS ABSOLUTE: 0 K/UL (ref 0–0.2)
BASOPHILS RELATIVE PERCENT: 0.7 %
BLOOD CULTURE, ROUTINE: NORMAL
BUN BLDV-MCNC: 7 MG/DL (ref 7–20)
CALCIUM SERPL-MCNC: 8.3 MG/DL (ref 8.3–10.6)
CHLORIDE BLD-SCNC: 98 MMOL/L (ref 99–110)
CO2: 29 MMOL/L (ref 21–32)
CREAT SERPL-MCNC: <0.5 MG/DL (ref 0.8–1.3)
CULTURE, BLOOD 2: NORMAL
EOSINOPHILS ABSOLUTE: 0.3 K/UL (ref 0–0.6)
EOSINOPHILS RELATIVE PERCENT: 5.2 %
GFR AFRICAN AMERICAN: >60
GFR NON-AFRICAN AMERICAN: >60
GLUCOSE BLD-MCNC: 193 MG/DL (ref 70–99)
GLUCOSE BLD-MCNC: 199 MG/DL (ref 70–99)
GLUCOSE BLD-MCNC: 218 MG/DL (ref 70–99)
GLUCOSE BLD-MCNC: 227 MG/DL (ref 70–99)
GLUCOSE BLD-MCNC: 247 MG/DL (ref 70–99)
HCT VFR BLD CALC: 35.1 % (ref 40.5–52.5)
HEMOGLOBIN: 12.1 G/DL (ref 13.5–17.5)
LYMPHOCYTES ABSOLUTE: 0.9 K/UL (ref 1–5.1)
LYMPHOCYTES RELATIVE PERCENT: 17.5 %
MCH RBC QN AUTO: 29.5 PG (ref 26–34)
MCHC RBC AUTO-ENTMCNC: 34.5 G/DL (ref 31–36)
MCV RBC AUTO: 85.7 FL (ref 80–100)
MONOCYTES ABSOLUTE: 0.5 K/UL (ref 0–1.3)
MONOCYTES RELATIVE PERCENT: 11 %
NEUTROPHILS ABSOLUTE: 3.2 K/UL (ref 1.7–7.7)
NEUTROPHILS RELATIVE PERCENT: 65.6 %
PDW BLD-RTO: 13.7 % (ref 12.4–15.4)
PERFORMED ON: ABNORMAL
PLATELET # BLD: 267 K/UL (ref 135–450)
PMV BLD AUTO: 6.9 FL (ref 5–10.5)
POTASSIUM REFLEX MAGNESIUM: 4.2 MMOL/L (ref 3.5–5.1)
RBC # BLD: 4.1 M/UL (ref 4.2–5.9)
SODIUM BLD-SCNC: 135 MMOL/L (ref 136–145)
VANCOMYCIN TROUGH: 13.4 UG/ML (ref 10–20)
WBC # BLD: 4.9 K/UL (ref 4–11)

## 2022-10-16 PROCEDURE — 36415 COLL VENOUS BLD VENIPUNCTURE: CPT

## 2022-10-16 PROCEDURE — 6360000002 HC RX W HCPCS: Performed by: PODIATRIST

## 2022-10-16 PROCEDURE — 1200000000 HC SEMI PRIVATE

## 2022-10-16 PROCEDURE — 6370000000 HC RX 637 (ALT 250 FOR IP)

## 2022-10-16 PROCEDURE — 85025 COMPLETE CBC W/AUTO DIFF WBC: CPT

## 2022-10-16 PROCEDURE — 2580000003 HC RX 258: Performed by: INTERNAL MEDICINE

## 2022-10-16 PROCEDURE — 6360000002 HC RX W HCPCS: Performed by: INTERNAL MEDICINE

## 2022-10-16 PROCEDURE — 80202 ASSAY OF VANCOMYCIN: CPT

## 2022-10-16 PROCEDURE — 2580000003 HC RX 258: Performed by: PODIATRIST

## 2022-10-16 PROCEDURE — 6370000000 HC RX 637 (ALT 250 FOR IP): Performed by: PODIATRIST

## 2022-10-16 PROCEDURE — 80048 BASIC METABOLIC PNL TOTAL CA: CPT

## 2022-10-16 PROCEDURE — 99232 SBSQ HOSP IP/OBS MODERATE 35: CPT | Performed by: INTERNAL MEDICINE

## 2022-10-16 RX ADMIN — OXYCODONE HYDROCHLORIDE AND ACETAMINOPHEN 2 TABLET: 5; 325 TABLET ORAL at 23:08

## 2022-10-16 RX ADMIN — VANCOMYCIN HYDROCHLORIDE 1000 MG: 1 INJECTION, POWDER, LYOPHILIZED, FOR SOLUTION INTRAVENOUS at 17:51

## 2022-10-16 RX ADMIN — ASPIRIN 81 MG: 81 TABLET, CHEWABLE ORAL at 08:33

## 2022-10-16 RX ADMIN — CLOPIDOGREL BISULFATE 75 MG: 75 TABLET ORAL at 08:33

## 2022-10-16 RX ADMIN — ATORVASTATIN CALCIUM 40 MG: 40 TABLET, FILM COATED ORAL at 17:48

## 2022-10-16 RX ADMIN — METOPROLOL TARTRATE 50 MG: 50 TABLET, FILM COATED ORAL at 22:48

## 2022-10-16 RX ADMIN — CEFEPIME 2000 MG: 2 INJECTION, POWDER, FOR SOLUTION INTRAVENOUS at 15:38

## 2022-10-16 RX ADMIN — HYDROMORPHONE HYDROCHLORIDE 1 MG: 1 INJECTION, SOLUTION INTRAMUSCULAR; INTRAVENOUS; SUBCUTANEOUS at 10:47

## 2022-10-16 RX ADMIN — OXYCODONE HYDROCHLORIDE AND ACETAMINOPHEN 2 TABLET: 5; 325 TABLET ORAL at 02:43

## 2022-10-16 RX ADMIN — ENOXAPARIN SODIUM 30 MG: 100 INJECTION SUBCUTANEOUS at 22:48

## 2022-10-16 RX ADMIN — METOPROLOL TARTRATE 50 MG: 50 TABLET, FILM COATED ORAL at 08:34

## 2022-10-16 RX ADMIN — OXYCODONE HYDROCHLORIDE AND ACETAMINOPHEN 2 TABLET: 5; 325 TABLET ORAL at 06:35

## 2022-10-16 RX ADMIN — Medication 10 ML: at 08:34

## 2022-10-16 RX ADMIN — Medication 10 ML: at 22:48

## 2022-10-16 RX ADMIN — CEFEPIME 2000 MG: 2 INJECTION, POWDER, FOR SOLUTION INTRAVENOUS at 06:29

## 2022-10-16 RX ADMIN — GLIPIZIDE 5 MG: 5 TABLET ORAL at 06:30

## 2022-10-16 RX ADMIN — VANCOMYCIN HYDROCHLORIDE 1000 MG: 1 INJECTION, POWDER, LYOPHILIZED, FOR SOLUTION INTRAVENOUS at 08:37

## 2022-10-16 RX ADMIN — ENOXAPARIN SODIUM 30 MG: 100 INJECTION SUBCUTANEOUS at 08:34

## 2022-10-16 RX ADMIN — VANCOMYCIN HYDROCHLORIDE 1000 MG: 1 INJECTION, POWDER, LYOPHILIZED, FOR SOLUTION INTRAVENOUS at 01:38

## 2022-10-16 RX ADMIN — OXYCODONE HYDROCHLORIDE AND ACETAMINOPHEN 1 TABLET: 5; 325 TABLET ORAL at 17:53

## 2022-10-16 RX ADMIN — CEFEPIME 2000 MG: 2 INJECTION, POWDER, FOR SOLUTION INTRAVENOUS at 22:54

## 2022-10-16 RX ADMIN — INSULIN LISPRO 1 UNITS: 100 INJECTION, SOLUTION INTRAVENOUS; SUBCUTANEOUS at 08:38

## 2022-10-16 ASSESSMENT — PAIN SCALES - GENERAL
PAINLEVEL_OUTOF10: 9
PAINLEVEL_OUTOF10: 7
PAINLEVEL_OUTOF10: 7
PAINLEVEL_OUTOF10: 3
PAINLEVEL_OUTOF10: 5
PAINLEVEL_OUTOF10: 8
PAINLEVEL_OUTOF10: 6
PAINLEVEL_OUTOF10: 2

## 2022-10-16 ASSESSMENT — PAIN DESCRIPTION - ORIENTATION
ORIENTATION: RIGHT

## 2022-10-16 ASSESSMENT — PAIN DESCRIPTION - DESCRIPTORS
DESCRIPTORS: ACHING
DESCRIPTORS: THROBBING
DESCRIPTORS: STABBING;SHARP

## 2022-10-16 ASSESSMENT — PAIN DESCRIPTION - LOCATION
LOCATION: FOOT

## 2022-10-16 ASSESSMENT — PAIN - FUNCTIONAL ASSESSMENT: PAIN_FUNCTIONAL_ASSESSMENT: ACTIVITIES ARE NOT PREVENTED

## 2022-10-16 NOTE — PROGRESS NOTES
10/16  Vanc trough = 13.4 mcg/mL at 1655. Calculated AUC of 434. Continue current dose and frequency of vancomycin. Will continue to monitor and adjust dose accordingly.   Sylwia Ruth PharmD  10/16/2022 5:57 PM

## 2022-10-16 NOTE — PROGRESS NOTES
Vancomycin Day # 5  Current dose = 1000 mg q8h  BUN/SRCR 7/ < 0.5      Est CrCl = 193 ml/min  WBC   4.9            Tmax 98  Vancomycin trough ordered for today @ 1600.

## 2022-10-16 NOTE — PROGRESS NOTES
See PM shift assessment. Gait steady; respirs e/e. Medicated for pain; pleasant and cooperative. Call light in reach. Reinforced need to use call light for assist; voice understanding.

## 2022-10-16 NOTE — PROGRESS NOTES
Progress Note    Admit Date:  10/12/2022    -pmhx of alcohol abuse, prostate cancer, CAD, DM, HLD, GURDEEP   -presented with right great toe pain, infection, swelling   -follows with Dr. Marlyn Massey     S/P Right foot partial amputation on 10/13    Subjective:  Mr. Rigoberto Linton is feeling fine today. POD #3  Podiatrist has reevaluated wound on Saturday  and dressings changed. Cultures neg so far  Foot pain is controlled, no fevers or chills, no nausea or vomiting       Objective:   Patient Vitals for the past 4 hrs:   BP Temp Temp src Pulse Resp SpO2   10/16/22 1623 (!) 152/72 96.8 °F (36 °C) Oral 79 18 100 %        No intake or output data in the 24 hours ending 10/16/22 1657      Physical Exam:    Gen: No distress. Alert. Pleasant obese male   Eyes: PERRL. No sclera icterus. No conjunctival injection. Neck: No JVD. Trachea midline. Resp: No accessory muscle use. No crackles. No wheezes. No rhonchi. CV: Regular rate. Regular rhythm. No murmur. No rub. 1++right lower extremity pitting edema. Peripheral Pulses: +2 palpable, equal bilaterally   GI: Non-tender. Non-distended. Normal bowel sounds. Skin: M/S:  +right foot wrapped in bandage. Non tender. Left foot - non tender, no edema   Neuro: Awake. Grossly nonfocal    Psych: Oriented x 3. No anxiety or agitation.          Medications:  insulin lispro, 0-4 Units, TID WC  insulin lispro, 0-4 Units, Nightly  aspirin, 81 mg, Daily  clopidogrel, 75 mg, Daily  glipiZIDE, 5 mg, QAM AC  vancomycin, 1,000 mg, Q8H  atorvastatin, 40 mg, QPM  metoprolol tartrate, 50 mg, BID  sodium chloride flush, 5-40 mL, 2 times per day  enoxaparin, 30 mg, BID  cefepime, 2,000 mg, Q8H    PRN Medications:  HYDROmorphone, 1 mg, Q3H PRN   Or  HYDROmorphone, 0.5 mg, Q3H PRN  oxyCODONE-acetaminophen, 1 tablet, Q4H PRN   Or  oxyCODONE-acetaminophen, 2 tablet, Q4H PRN  glucose, 4 tablet, PRN  dextrose bolus, 125 mL, PRN   Or  dextrose bolus, 250 mL, PRN  glucagon (rDNA), 1 mg, PRN  dextrose, , Continuous PRN  sodium chloride flush, 5-40 mL, PRN  sodium chloride, , PRN  ondansetron, 4 mg, Q8H PRN   Or  ondansetron, 4 mg, Q6H PRN  polyethylene glycol, 17 g, Daily PRN  acetaminophen, 650 mg, Q6H PRN   Or  acetaminophen, 650 mg, Q6H PRN  potassium chloride, 40 mEq, PRN   Or  potassium alternative oral replacement, 40 mEq, PRN   Or  potassium chloride, 10 mEq, PRN  magnesium sulfate, 2,000 mg, PRN        Data:  CBC:   Recent Labs     10/14/22  0508 10/15/22  0513 10/16/22  0516   WBC 5.6 4.9 4.9   HGB 12.7* 13.7 12.1*   HCT 38.5* 40.8 35.1*   MCV 87.6 87.5 85.7    218 267       BMP:   Recent Labs     10/14/22  0508 10/15/22  0513 10/16/22  0519   * 135* 135*   K 4.1 4.5 4.2    98* 98*   CO2 26 21 29   BUN 7 6* 7   CREATININE 0.6* 0.6* <0.5*       LIVER PROFILE:   No results for input(s): AST, ALT, LIPASE, BILIDIR, BILITOT, ALKPHOS in the last 72 hours. Invalid input(s): AMYLASE,  ALB    PT/INR:   No results for input(s): PROTIME, INR in the last 72 hours. CULTURES  COVID and Flu not detected   Blood cultures pending   Wound cultures pending     RADIOLOGY  VL Extremity Venous Right   Final Result      XR FOOT RIGHT (2 VIEWS)   Final Result   Postsurgical changes of interval amputation of the 1st ray and resection of   hallux sesamoids. CT FOOT RIGHT W CONTRAST   Final Result   Multilocular rim enhancing fluid collection surrounding the distal phalanx of   the great toe, greatest dorsally, compatible with a small abscess as   described above. That is associated with bony changes of osteomyelitis, including osteolysis,   periosteal new bone formation, with a pathologic fracture at the base of the   distal phalanx.       Soft tissue swelling is seen along the dorsum the foot, which is nonspecific   and can be from systemic etiologies such as venous insufficiency or fluid   overload, but cellulitis remains in the differential.         XR CHEST PORTABLE   Final Result   No acute abnormality identified. Assessment/Plan:    #Right great toe diabetic foot ulcer with abscess and osteomyelitis with pathological fracture at distal phalanx and surround cellulitis   -no leukocytosis   -blood cultures neg   -IVF-> stopped   -IV Vanc and cefepime -> continue  -prn percocet / IV morphine   -podiatry consulted   -S/P partial amputation right foot (right hallux amputated ) on 10/13. Seen by podiatry on Saturday and dressings changed. Plan  Continue current antibiotics. Follow-up on wound cultures -negative so far. Podiatry will reevaluate wound in a.m. Likely plan is for discharge home on oral antibiotics    #Right lower extremity pitting edema   -could be 2/2 to above   -doppler pending   -stopped IVF   -elevate extremity   Improved    #Lactic acidosis  -IVF   -repeat resolved   -stopped IVF     #DM type 2   -resumed glipizide   -SSI   -continue to monitor BG     #CAD  -on ASA, statin, plavix     #HTN   -on lopressor   -continue to monitor     #GURDEEP   -home CPAP or BIPAP     DVT Prophylaxis: Lovenox   Diet: ADULT DIET;  Regular; 5 carb choices (75 gm/meal)  Code Status: Full Code    Robina Moise MD  10/16/22  4:57 PM

## 2022-10-17 VITALS
DIASTOLIC BLOOD PRESSURE: 83 MMHG | HEIGHT: 70 IN | BODY MASS INDEX: 35.79 KG/M2 | SYSTOLIC BLOOD PRESSURE: 172 MMHG | HEART RATE: 81 BPM | WEIGHT: 250 LBS | RESPIRATION RATE: 16 BRPM | TEMPERATURE: 98.2 F | OXYGEN SATURATION: 100 %

## 2022-10-17 LAB
GLUCOSE BLD-MCNC: 171 MG/DL (ref 70–99)
GLUCOSE BLD-MCNC: 247 MG/DL (ref 70–99)
PERFORMED ON: ABNORMAL
PERFORMED ON: ABNORMAL

## 2022-10-17 PROCEDURE — 6370000000 HC RX 637 (ALT 250 FOR IP): Performed by: PODIATRIST

## 2022-10-17 PROCEDURE — 6360000002 HC RX W HCPCS: Performed by: INTERNAL MEDICINE

## 2022-10-17 PROCEDURE — 2580000003 HC RX 258: Performed by: INTERNAL MEDICINE

## 2022-10-17 PROCEDURE — 6360000002 HC RX W HCPCS: Performed by: PODIATRIST

## 2022-10-17 PROCEDURE — 6370000000 HC RX 637 (ALT 250 FOR IP)

## 2022-10-17 PROCEDURE — 2580000003 HC RX 258: Performed by: PODIATRIST

## 2022-10-17 PROCEDURE — 99239 HOSP IP/OBS DSCHRG MGMT >30: CPT | Performed by: INTERNAL MEDICINE

## 2022-10-17 RX ORDER — OXYCODONE HYDROCHLORIDE AND ACETAMINOPHEN 5; 325 MG/1; MG/1
1 TABLET ORAL EVERY 6 HOURS PRN
Qty: 12 TABLET | Refills: 0 | Status: SHIPPED | OUTPATIENT
Start: 2022-10-17 | End: 2022-10-20

## 2022-10-17 RX ORDER — AMOXICILLIN AND CLAVULANATE POTASSIUM 875; 125 MG/1; MG/1
1 TABLET, FILM COATED ORAL 2 TIMES DAILY
Qty: 20 TABLET | Refills: 0 | Status: SHIPPED | OUTPATIENT
Start: 2022-10-17 | End: 2022-10-27

## 2022-10-17 RX ADMIN — CLOPIDOGREL BISULFATE 75 MG: 75 TABLET ORAL at 07:54

## 2022-10-17 RX ADMIN — INSULIN LISPRO 1 UNITS: 100 INJECTION, SOLUTION INTRAVENOUS; SUBCUTANEOUS at 12:01

## 2022-10-17 RX ADMIN — ENOXAPARIN SODIUM 30 MG: 100 INJECTION SUBCUTANEOUS at 07:54

## 2022-10-17 RX ADMIN — METOPROLOL TARTRATE 50 MG: 50 TABLET, FILM COATED ORAL at 07:54

## 2022-10-17 RX ADMIN — GLIPIZIDE 5 MG: 5 TABLET ORAL at 05:44

## 2022-10-17 RX ADMIN — CEFEPIME 2000 MG: 2 INJECTION, POWDER, FOR SOLUTION INTRAVENOUS at 05:46

## 2022-10-17 RX ADMIN — ASPIRIN 81 MG: 81 TABLET, CHEWABLE ORAL at 07:55

## 2022-10-17 RX ADMIN — OXYCODONE HYDROCHLORIDE AND ACETAMINOPHEN 2 TABLET: 5; 325 TABLET ORAL at 07:54

## 2022-10-17 RX ADMIN — VANCOMYCIN HYDROCHLORIDE 1000 MG: 1 INJECTION, POWDER, LYOPHILIZED, FOR SOLUTION INTRAVENOUS at 01:07

## 2022-10-17 ASSESSMENT — PAIN SCALES - GENERAL: PAINLEVEL_OUTOF10: 8

## 2022-10-17 ASSESSMENT — PAIN DESCRIPTION - DESCRIPTORS: DESCRIPTORS: ACHING

## 2022-10-17 ASSESSMENT — PAIN - FUNCTIONAL ASSESSMENT: PAIN_FUNCTIONAL_ASSESSMENT: ACTIVITIES ARE NOT PREVENTED

## 2022-10-17 ASSESSMENT — PAIN DESCRIPTION - LOCATION: LOCATION: FOOT

## 2022-10-17 ASSESSMENT — PAIN DESCRIPTION - ORIENTATION: ORIENTATION: RIGHT

## 2022-10-17 NOTE — DISCHARGE INSTRUCTIONS
Leave dressing in place. Call and make an appointment with Dr. Anika Banks for this Thursday 10/20/22. Minimal walking on right foot. Put more pressure on heel when walking. If signs and symptoms of infection or new/worsening symptoms occur such as fever or any changes notify PCP or come back to ER.

## 2022-10-17 NOTE — PROGRESS NOTES
Vancomycin Day: 6  Current Regimen: 1000 mg IV every 8 hours    Patient's labs, cultures, vitals, and vancomycin regimen reviewed.    SCr stable  U/O not measured/well documented  [employer] updated    Plan:   No change today  Jamilah HOLDER 10/17/491096:52 AM  .

## 2022-10-17 NOTE — PROGRESS NOTES
Progress Note    Admit Date:  10/12/2022    -pmhx of alcohol abuse, prostate cancer, CAD, DM, HLD, GURDEEP   -presented with right great toe pain, infection, swelling   -follows with Dr. Arabella Haley     S/P Right foot partial amputation on 10/13    Subjective:  Mr. Dhara Howe is feeling fine today. POD # 4   Podiatrist has reevaluated wound on Saturday  and dressings changed. Cultures neg so far  Foot pain is controlled, no fevers or chills, no nausea or vomiting       Objective:   Patient Vitals for the past 4 hrs:   BP Temp Temp src Pulse Resp SpO2   10/17/22 0745 (!) 172/83 98.2 °F (36.8 °C) Oral 81 17 100 %        No intake or output data in the 24 hours ending 10/17/22 0915      Physical Exam:    Gen: No distress. Alert. Pleasant obese male   Eyes: PERRL. No sclera icterus. No conjunctival injection. Neck: No JVD. Trachea midline. Resp: No accessory muscle use. No crackles. No wheezes. No rhonchi. CV: Regular rate. Regular rhythm. No murmur. No rub. 1++right lower extremity pitting edema. Peripheral Pulses: +2 palpable, equal bilaterally   GI: Non-tender. Non-distended. Normal bowel sounds. Skin: M/S:  +right foot wrapped in bandage. Non tender. Left foot - non tender, no edema   Neuro: Awake. Grossly nonfocal    Psych: Oriented x 3. No anxiety or agitation.          Medications:  insulin lispro, 0-4 Units, TID WC  insulin lispro, 0-4 Units, Nightly  aspirin, 81 mg, Daily  clopidogrel, 75 mg, Daily  glipiZIDE, 5 mg, QAM AC  vancomycin, 1,000 mg, Q8H  atorvastatin, 40 mg, QPM  metoprolol tartrate, 50 mg, BID  sodium chloride flush, 5-40 mL, 2 times per day  enoxaparin, 30 mg, BID  cefepime, 2,000 mg, Q8H    PRN Medications:  HYDROmorphone, 1 mg, Q3H PRN   Or  HYDROmorphone, 0.5 mg, Q3H PRN  oxyCODONE-acetaminophen, 1 tablet, Q4H PRN   Or  oxyCODONE-acetaminophen, 2 tablet, Q4H PRN  glucose, 4 tablet, PRN  dextrose bolus, 125 mL, PRN   Or  dextrose bolus, 250 mL, PRN  glucagon (rDNA), 1 mg, PRN  dextrose, , diabetic foot ulcer with abscess and osteomyelitis with pathological fracture at distal phalanx and surround cellulitis   -no leukocytosis   -blood cultures neg   -IVF-> stopped   -IV Vanc and cefepime -> continue  -prn percocet / IV morphine   -podiatry consulted   -S/P partial amputation right foot (right hallux amputated ) on 10/13. Seen by podiatry on Saturday and dressings changed. Plan  Continue current antibiotics. Follow-up on wound cultures -negative so far. Podiatry will reevaluate wound in a.m. Likely plan is for discharge home on oral antibiotics    #Right lower extremity pitting edema   -could be 2/2 to above   -doppler pending   -stopped IVF   -elevate extremity   Improved    #Lactic acidosis  -IVF   -repeat resolved   -stopped IVF     #DM type 2   -resumed glipizide   -SSI   -continue to monitor BG     #CAD  -on ASA, statin, plavix     #HTN   -on lopressor   -continue to monitor     #GURDEEP   -home CPAP or BIPAP     DVT Prophylaxis: Lovenox   Diet: ADULT DIET;  Regular; 5 carb choices (75 gm/meal)  Code Status: Full Code    Dc home     Estela Choi MD  10/17/22  9:15 AM

## 2022-10-17 NOTE — CARE COORDINATION
DISCHARGE ORDER  Date/Time 10/17/2022 12:14 PM  Completed by: Pola Lefort, RN, Case Management    Patient Name: Kourtney Deshpande      : 1960  Admitting Diagnosis: Other acute osteomyelitis of right foot ([facility] [M86.171]  Class 2 obesity with body mass index (BMI) of 35.0 to 35.9 in adult, unspecified obesity type, unspecified whether serious comorbidity present [E66.9, Z68.35]  Type 2 diabetes mellitus with other skin ulcer, unspecified whether long term insulin use ([facility] [K17.420, L98.499]  Osteomyelitis of great toe of right foot ([facility] [M86.9]      Admit order Date and Status:10/12/2022  (verify MD's last order for status of admission)      Noted discharge order. If applicable PT/OT recommendation at Discharge: n/a  DME recommendation by PT/OT:n/a  Confirmed discharge plan  (pt): Yes  with whom_____________pt__  If pt confirmed DC plan does family need to be contacted by CM Yes if yes who____pt's wife, Brittny__  Discharge Plan: Reviewed chart. Role of discharge planner explained and patient verbalized understanding. Pt states CM can speak with and to his wife, Darwin Pardo. Discharge order is noted. Pt is being d/c'd to home today with wife and on oral abx. Pt's O2 sats are 100% on RA. CM offered Home Care (HC/HHC) an dot and wife declined. No further discharge needs needed or noted. Date of Last IMM Given: n/a    Reviewed chart. Role of discharge planner explained and patient verbalized understanding. Discharge order is noted. Has Home O2 in place on admit:  No  Informed of need to bring portable home O2 tank on day of discharge for nursing to connect prior to leaving:   No  Verbalized agreement/Understanding:   No    Discharge timeout done with [employer], RN. All discharge needs and concerns addressed.

## 2022-10-17 NOTE — PLAN OF CARE
Problem: Discharge Planning  Goal: Discharge to home or other facility with appropriate resources  10/16/2022 2257 by Brad Quintana RN  Outcome: Progressing  10/16/2022 1621 by Fritz Pinedo RN  Outcome: Progressing     Problem: Chronic Conditions and Co-morbidities  Goal: Patient's chronic conditions and co-morbidity symptoms are monitored and maintained or improved  10/16/2022 2257 by Brad Quintana RN  Outcome: Progressing  10/16/2022 1621 by Fritz Pinedo RN  Outcome: Progressing     Problem: Pain  Goal: Verbalizes/displays adequate comfort level or baseline comfort level  10/16/2022 2257 by Brad Quintana RN  Outcome: Progressing  10/16/2022 1621 by Fritz Pinedo RN  Outcome: Progressing     Problem: Safety - Adult  Goal: Free from fall injury  10/16/2022 2257 by Brad Quintana RN  Outcome: Progressing  10/16/2022 1621 by Fritz Pinedo RN  Outcome: Progressing

## 2022-10-17 NOTE — DISCHARGE INSTR - DIET
Good nutrition is important when healing from an illness, injury, or surgery. Follow any nutrition recommendations given to you during your hospital stay. If you were given an oral nutrition supplement while in the hospital, continue to take this supplement at home. You can take it with meals, in-between meals, and/or before bedtime. These supplements can be purchased at most local grocery stores, pharmacies, and chain [employer]stores. If you have any questions about your diet or nutrition, call the hospital and ask for the dietitian.     5 carb diet

## 2022-10-17 NOTE — FLOWSHEET NOTE
10/17/22 0745   Vital Signs   Temp 98.2 °F (36.8 °C)   Temp Source Oral   Heart Rate 81   Heart Rate Source Monitor   Resp 17   BP (!) 172/83   BP Location Right upper arm   BP Method Automatic   MAP (Calculated) 112.67   Patient Position Sitting   Level of Consciousness 0   MEWS Score 1   Pain Assessment   Pain Assessment 0-10   Pain Level 8   Patient's Stated Pain Goal 2   Pain Location Foot   Pain Orientation Right   Pain Descriptors Aching   Functional Pain Assessment Activities are not prevented   Non-Pharmaceutical Pain Intervention(s) Declines   Opioid-Induced Sedation   POSS Score 1   RASS   Ochoa Agitation Sedation Scale (RASS) 0   Oxygen Therapy   SpO2 100 %   O2 Device None (Room air)     Alert and oriented x4. Skin w/d. Resp e/e unlabored. Dr. Doris Dhillon completed dressing change to foot. Prn pain med given. Nursing asmt completed. No s/s distress noted. Call light in easy reach. Spouse at bedside.

## 2022-10-17 NOTE — PLAN OF CARE
Problem: Discharge Planning  Goal: Discharge to home or other facility with appropriate resources  10/17/2022 1109 by Alejandro Pacheco RN  Outcome: Progressing  10/16/2022 2257 by Skylar Malik RN  Outcome: Progressing     Problem: Chronic Conditions and Co-morbidities  Goal: Patient's chronic conditions and co-morbidity symptoms are monitored and maintained or improved  10/17/2022 1109 by Alejandro Pacheco RN  Outcome: Progressing  10/16/2022 2257 by Skylar Malik RN  Outcome: Progressing     Problem: Pain  Goal: Verbalizes/displays adequate comfort level or baseline comfort level  10/17/2022 1109 by Alejandro Pacheco RN  Outcome: Progressing  10/16/2022 2257 by Skylar Malik RN  Outcome: Progressing     Problem: Safety - Adult  Goal: Free from fall injury  10/17/2022 1109 by Alejandro Pacheco RN  Outcome: Progressing  10/16/2022 2257 by Skylar Malik RN  Outcome: Progressing

## 2022-10-17 NOTE — PROGRESS NOTES
VSS. Skin w/d. Resp e/e unlabored. Dressing to right foot c/d/I. Discharge paperwork given and verbalized understanding. No s/s distress noted. Transport ticket placed.

## 2022-10-20 NOTE — PROGRESS NOTES
[address] [facility]   Cardiovascular Evaluation    PATIENT: Mariely Lozoya  DATE: 10/21/2022  MRN: [id number]  CSN: [id number]  : 1960      Primary Care Doctor: Luis Alberto Hurtado DO  Reason for evaluation:   Follow-up, Coronary Artery Disease, Hypertension, Cardiomyopathy, and Medication Refill (Nitroglycerin)      Subjective:   History of present illness on initial date of evaluation:   Mariely Lozoya is a 58 y.o. patient who initially presented for follow up. He has a PMH DM, HTN, CAD, s/p PCI, alcohol abuse, nonsmoker, + FH. Was admitted to hospital in 2016 with [place] and had abnormal stress test. Underwent PCI of OM as well as [id number] LAD and was d/c'd/ Returned to hospital and noted to have mild troponin elevation and CP relieved with NTG SL. He underwent LHC on 16 and a PCI-D-1 with SAMUEL was done. Stents in prox-mid LAD, distal LAD, proximal-mid OM-1 were found patent. He was switched from Plavix to Brilinta. Imdur was added at office visit in 2016 due to complaints of exertional chest pain. He was admitted to the hospital in late May 2016 with recurrent chest pain and transient troponin elevation. LHC was showed patent stents. He was started on Ranexa. He underwent 17 rotablade guided PCI of OM1 recurrent instent restenosis. Last OV 20 he presented for follow up. He had an elective LHC on 2020 that demonstrated restenosis of mid LAD. Treated successfully with laser atherectomy and aggressive pre dilatation using cutting balloons. He has not noticed any improvement with the Renexa. He stated he continues to have chest pain. He stated he has been taking 2 SL nitro's daily and this does help. He stated the pain will occur in the AM and when he takes a SL nitro the pain stops. He continued to drink a good amount of bourbon nightly. He was positive for COVID in October. Last OV 21 he presented for follow up s/p LHC. On 12/15/20 LHC performed. Severe and progressive in-stent restenosis of OM1. Heavily fibrotic. Multiple runs of noncompliant balloon inflations were performed but there was balloon slippage. Procedure aborted due to HTN and severe chest pain. He reported he feels well overall. He denies CP, SOB, dizziness or syncope. He had not needed NTG. He has been trying to control his carb intake. He has stopped alcohol consumption. His BP at home has been controlled but is elevated on exam.   He underwent CABG x2 and left atrial clip on 2/19/2021. Since last office visit he was recently hospitalized 10/12-10/17/22 for osteomyelitis and underwent partial amputation right foot (right hallux amputated ) on 10/13/22. Today he states he is recovering from partial foot amputation. Patient currently denies any weight gain, edema, palpitations, chest pain, shortness of breath, dizziness, and syncope. He reports nitro administration once in 2 months. He is taking medications as prescribed, tolerating well. He reports PAD was checked by his podiatrist with studies in office.        Patient Active Problem List   Diagnosis    Type II or unspecified type diabetes mellitus without mention of complication, not stated as uncontrolled    Diabetes mellitus    History of ETOH abuse    Patient overweight    Patient overweight    Dietary noncompliance    Alcohol abuse    Flank pain    Prostatism    Low serum testosterone level    Arm pain, left    Shoulder pain    Precordial pain    Lung nodule    Acute angina (HCC)    Abnormal stress test    Acute coronary syndrome (HCC)    Coronary artery disease involving native coronary artery of native heart without angina pectoris    Other chest pain    S/P drug eluting coronary stent placement    Essential hypertension    Obesity    Abnormal chest x-ray    Angina effort    Skin lesion    Angina at rest [facility])    Hyperlipidemia    Angina pectoris ([address]    NSTEMI (non-ST elevated myocardial infarction) ([address]    Controlled type 2 diabetes mellitus without complication, without long-term current use of insulin (HCC)    ASHD (arteriosclerotic heart disease)    Mass of right foot    Overweight    History of angina pectoris    Reactive airway disease    Bronchitis    Reactive airway disease with wheezing, moderate persistent, with acute exacerbation    Need for prophylactic vaccination and inoculation against varicella    Need for prophylactic vaccination against diphtheria-tetanus-pertussis (DTP)    Chronic right-sided thoracic back pain    CAD in native artery    S/P PTCA (percutaneous transluminal coronary angioplasty)    Chest pain    Ischemic cardiomyopathy    GURDEEP (obstructive sleep apnea)    Cellulitis    Sepsis due to methicillin susceptible Staphylococcus aureus ([address]    Bacteremia due to methicillin susceptible Staphylococcus aureus (MSSA)    Disruption or dehiscence of closure of sternum or sternotomy    Elevated sed rate    Type 2 diabetes mellitus with obesity ([address]    Diabetic ulcer of right foot associated with type 2 diabetes mellitus, with fat layer exposed ([address]    Pyogenic inflammation of bone ([address]    Diabetic ulcer of right foot associated with diabetes mellitus due to underlying condition ([address]         Past Medical History:   has a past medical history of Alcohol abuse, Cancer ([address] Cellulitis, Coronary artery disease, COVID-19, Diabetic neuropathy associated with type 2 diabetes mellitus ([address] Hyperlipidemia, Non morbid obesity, NSTEMI (non-ST elevated myocardial infarction) ([address] GURDEEP (obstructive sleep apnea), Reactive airway disease with wheezing, moderate persistent, with acute exacerbation, Sepsis ([address] and Type II or unspecified type diabetes mellitus without mention of complication, not stated as uncontrolled. Surgical History:   has a past surgical history that includes Vein Surgery; Coronary angioplasty with stent (1/14/2016 9/21/2020); Coronary artery bypass graft (02/19/2021);  Coronary artery bypass graft (N/A, 2/19/2021); Bypass Graft (02/19/2021); Leg Surgery (N/A, 4/26/2021); Hardware Removal (N/A, 4/28/2021); Colonoscopy (2017); Colonoscopy (N/A, 7/1/2022); and Toe amputation (Right, 10/13/2022). Social History:   reports that he has never smoked. He has never used smokeless tobacco. He reports that he does not currently use alcohol. He reports that he does not use drugs. Family History:  No evidence for sudden cardiac death or premature CAD    Home Medications:  Reviewed and are listed in nursing record. and/or listed below  Current Outpatient Medications   Medication Sig Dispense Refill    oxyCODONE-acetaminophen (PERCOCET)  MG per tablet       amoxicillin-clavulanate (AUGMENTIN) 875-125 MG per tablet Take 1 tablet by mouth 2 times daily for 10 days 20 tablet 0    atorvastatin (LIPITOR) 40 MG tablet TAKE ONE TABLET BY MOUTH EVERY EVENING 30 tablet 4    metFORMIN (GLUCOPHAGE) 1000 MG tablet TAKE ONE TABLET BY MOUTH TWICE A DAY 60 tablet 2    glipiZIDE (GLUCOTROL) 10 MG tablet Take 11/2 daily in am. For diabetes. 90 tablet 1    metoprolol tartrate (LOPRESSOR) 50 MG tablet TAKE ONE TABLET BY MOUTH TWICE A  tablet 0    clopidogrel (PLAVIX) 75 MG tablet TAKE ONE TABLET BY MOUTH DAILY 90 tablet 2    acetaminophen (TYLENOL) 500 MG tablet Take 500 mg by mouth every 6 hours as needed       aspirin 81 MG chewable tablet CHEW ONE TABLET BY MOUTH DAILY 30 tablet 1    silver sulfADIAZINE (SILVADENE) 1 % cream Apply topically to wounds daily. (Patient not taking: Reported on 10/21/2022) 400 g 0     No current facility-administered medications for this visit. Allergies:  Patient has no known allergies. Review of Systems:   A 14 point review of symptoms completed. Pertinent positives identified in the HPI, all other review of symptoms negative as below.     Objective:   PHYSICAL EXAM:    Vitals:    10/21/22 0744   BP: 132/74   Pulse: 81   SpO2: 94%         Wt Readings from Last 3 Encounters: 10/21/22 248 lb 8 oz (112.7 kg)   10/12/22 250 lb (113.4 kg)   08/09/22 247 lb (112 kg)         General Appearance:  Alert, cooperative, no distress, appears stated age   Head:  Normocephalic, atraumatic   Eyes:  PERRL, conjunctiva/corneas clear   Nose: Nares normal, no drainage or sinus tenderness   Throat: Lips, mucosa, and tongue normal   Neck: Supple, symmetrical, trachea midline, NL thyroid no carotid bruit or JVD   Lungs:   CTAB, respirations unlabored   Chest Wall:  No tenderness or deformity. Well-healed surgical scar.     Heart:  Regular rhythm and normal rate; S1, S2 are normal;   no murmur noted; no rub or gallop   Abdomen:   Soft, non-tender, +BS x 4, no masses, no organomegaly   Extremities: Extremities normal, atraumatic, no cyanosis or edema, rt foot in boot and wrapped   Pulses: 2+ and symmetric   Skin: Skin color, texture, turgor normal, no rashes or lesions   Pysch: Normal mood and affect   Neurologic: Normal gross motor and sensory exam.         LABS   CBC:      Lab Results   Component Value Date/Time    WBC 4.9 10/16/2022 05:16 AM    RBC 4.10 10/16/2022 05:16 AM    HGB 12.1 10/16/2022 05:16 AM    HCT 35.1 10/16/2022 05:16 AM    MCV 85.7 10/16/2022 05:16 AM    RDW 13.7 10/16/2022 05:16 AM     10/16/2022 05:16 AM     CMP:  Lab Results   Component Value Date/Time     10/16/2022 05:19 AM    K 4.2 10/16/2022 05:19 AM    CL 98 10/16/2022 05:19 AM    CO2 29 10/16/2022 05:19 AM    BUN 7 10/16/2022 05:19 AM    CREATININE <0.5 10/16/2022 05:19 AM    GFRAA >60 10/16/2022 05:19 AM    AGRATIO 1.4 10/13/2022 05:14 AM    LABGLOM >60 10/16/2022 05:19 AM    GLUCOSE 227 10/16/2022 05:19 AM    PROT 5.8 10/13/2022 05:14 AM    CALCIUM 8.3 10/16/2022 05:19 AM    BILITOT 0.4 10/13/2022 05:14 AM    ALKPHOS 83 10/13/2022 05:14 AM    AST 11 10/13/2022 05:14 AM    ALT 13 10/13/2022 05:14 AM     PT/INR:   No results found for: PTINR  Liver:  No components found for: CHLPL  Lab Results   Component Value Date ALT 13 10/13/2022    AST 11 (L) 10/13/2022    ALKPHOS 83 10/13/2022    BILITOT 0.4 10/13/2022     Lab Results   Component Value Date    LABA1C 8.3 10/12/2022     Lipids:         Lab Results   Component Value Date    TRIG 97 2021    TRIG 97 2021    TRIG 96 2021            Lab Results   Component Value Date    HDL 53 2021    HDL 38 (L) 2021    HDL 44 2021            Lab Results   Component Value Date    LDLCALC 61 2021    LDLCALC 47 2021    LDLCALC 43 2021            Lab Results   Component Value Date    LABVLDL 19 2021    LABVLDL 19 2021    LABVLDL 19 2021         CARDIAC DATA   EK/15/2020 NSR    ECHO 3/3/2020   Technically difficult examination due to body habitus. Definity® used for   myocardial border enhancement. Normal left ventricle size and systolic function with a visually estimated   ejection fraction of 55%. Mild concentric left ventricular hypertrophy. No regional wall motion abnormalities are seen. Grade I diastolic dysfunction with normal LV filling pressures. The right atrium is mildly enlarged. STRESS TEST 2020  Summary Normal LVEF 59%  Normal wall motion  Diaphragmatic attenuation artifact  Apical scar with small amount of inferoapical gifty-infarct ischemia   Overall, this would be considered an abnormal, intermediate to high risk,  study      CARDIAC CATH  LEFT HEART CATH: 12/15/20  Artery Findings/Result   LM  luminal regularities   LAD  patent stent in proximal LAD with no significant restenosis. ,  GUERLINE-3 flow  Diagonal 1. Patent stent with normal significant restenosis   Cx  luminal irregularities  OM1-proximal 70%. Proximal stent with 50% restenosis. Distal stent edge with 90% restenosis  OM2-proximal 40%         RCA  dominant. Mid 30%   LVEDP  16   LVG  55%.   Normal wall motion  No aortic stenosis, 1+ mitral regurgitation      Results of intervention      Lesion 1: OM1  Multiple noncompliant balloons used up to 2.75 mm. Angio sculpt also used. In addition to several runs of laser atherectomy. Pre:   90% stenosis, GUERLINE 2 flow  Post: 40% stenosis, GUERLINE 3 flow  Assessment/Plan  1. Severe and progressive in-stent restenosis of OM1. Heavily fibrotic. Multiple runs of noncompliant balloon inflations were performed but there was balloon slippage. Angioscope and multiple runs of laser atherectomy was performed. Unfortunately patient was no longer able to tolerate the procedure was severe chest pain and hypertension therefore we aborted the procedure. Residual stenosis was 40% of the distal stent edge but is GUERLINE-3 flow.                -Symptoms return we will consider again laser atherectomy versus rotoblade versus shockwave lithotripsy therapy  2. Continue aspirin and plavix       LEFT HEART CATH 9/21/2020  PCI of mid LAD  Status post predilation using laser arthrectomy, angioscope and NC balloon dilatation. Stent resolute [place] 3.5 mm x 15 mm, postdilated to 3.6 mm. Assessment  1. Interval restenosis of mid LAD in-stent 80% lesion, GUERLINE II flow. Treated successfully with laser atherectomy and aggressive predilatation using cutting balloons                           -0% residual, GUERLINE-3 flow. CATH: 9/6/17  Successful PCI of OM-1 recurrent in-stent restenosis with Rotablator-assisted PCI with placement of Xience Alpine drug-eluting stent. Cath 7/11/17  Intervention:  90% prox OM1 in-stent restenosis, s/p PTCA with 2.5-mm NC as well as 2.5-mm Flexitome cutting balloon     Cath 8/18/17  LEFT HEART CATH  PCI of mid OM1 90% restenosis  Ballooned with 2.25 x as NC trek up to 18atm  Angiosculpt 2.5 x 15mm up to 14atm- multiple passes  90% GUERLINE-2 flow--> 15% GUERLINE-3 flow  Assessment  1. Restenosis of the OM1 stent following recent POBA  2. Aggressive cutting balloon and NC balloon but still with residual stenosis and concerned about ability to completely deploy stent.  If reoccurs or Cp returns, would suggest laser arthrectomy and PCI. CATH: 01/21/2016  1. A 70% proximal medium-caliber first diagonal branch status post 2.25 x  12-mm Xience Alpine drug-eluting stent. 2.  Patent drug-eluting stent in proximal-mid left anterior descending. 3.  Patent drug-eluting stent in the distal left anterior descending. 4.  Patent drug-eluting stent in the [id number] first obtuse marginal  branch. RECOMMENDATIONS:  Aspirin indefinitely. Brilinta at least for 12 months. Aggressive medical therapy and risk factors modification for coronary disease. CATH: 01/15/2016  IMPRESSION:   1. An 80% diffuse mid left anterior descending with a fractional flow  reserve of 0.79 (significant), status post 2.75 x 28-mm XIENCE  Alpine drug-eluting stent postdilated with a 3.5-mm noncompliant balloon. 2. A 60% distal left anterior descending lesion with a fractional flow   reserve of 0.5 (significant), status post 2.25 x 23-mm XIENCE Alpine   drug-eluting stent postdilated with 2.5-mm noncompliant balloon. 3. Patent stent in proximal to mid 1st obtuse marginal branch. CATH: 01/14/2016  1. A 90% large first obtuse marginal branch status post 2.25 x 16 mm Promus  Premier drug-eluting stent. 2. An 80% diffuse mid and 60% distal left anterior descending. 3. An 80% proximal medium caliber first diagonal branch. 4. Normal left ventricular systolic function with an ejection fraction of  55%. 5. Elevated left ventricular end diastolic pressure 19 mmHg. 2/19/21-Dr. Donte Tran  PREOPERATIVE DIAGNOSES:  1.  Status post balloon angioplasty of the left circumflex on this  admission. 2.  Non-STEMI. 3.  Status post multiple PCI with stents in LAD and also left  circumflex. 4.  Hypertension. 5.  Hyperlipidemia. 6.  Obesity. OPERATIONS PERFORMED:  1. Coronary artery bypass grafting x2.  2.  Total cardiopulmonary bypass. 3.  Endoscopic vein harvesting of right lower extremity.   4.  Reverse saphenous vein grafting to obtuse marginal artery. 5.  Left internal mammary artery to left anterior descending artery. 6.  Left atrial appendage clip, 40.  7.  Bilateral intercostal nerve block. 8.  Independent interpretation of transesophageal echocardiogram.    Assessment and Plan   Sissy Mendez is a 58 y.o. male who presents today for the following problems:      1. CAD   - 2/19/2021. Status post CABG (LIMA to LAD, SVG to OM)    -Complicated by retained suture and sternal wound infection   - 12/15/2020 POBA/Laser to OM1 (40% residual) case aborted due to intollerance    -9/21/2020 Laser/cutter and PCI to mid LAD   - 9/2017 Rotablade and PCI to [address]   - 8/2017 PCI to m/d LAD, diag 1  2. HTN: controlled  3. HLD: controlled  4. HX of likely COVID +: Patient has an excellent story for COVID symptomatology but unfortunately testing was too late and was negative  5. EtOH abuse: Has quit!    -Previous several bourbon drinks a night      MD Plan:  1. Patient is doing well with no cardiac symptoms. 2.  States he had diabetic foot ulcer that did not heal and ultimately required amputation of his right big toe. He states that he had vascular checks of his leg and arterial inflow was okay but I do not have these actual report  3. Continue aspirin, Lipitor, Plavix, Lopressor  4.  Continue alcohol cessation          Patient Active Problem List   Diagnosis    Type II or unspecified type diabetes mellitus without mention of complication, not stated as uncontrolled    Diabetes mellitus    History of ETOH abuse    Patient overweight    Patient overweight    Dietary noncompliance    Alcohol abuse    Flank pain    Prostatism    Low serum testosterone level    Arm pain, left    Shoulder pain    Precordial pain    Lung nodule    Acute angina (HCC)    Abnormal stress test    Acute coronary syndrome [facility])    Coronary artery disease involving native coronary artery of native heart without angina pectoris    Other chest pain    S/P drug eluting coronary stent placement    Essential hypertension    Obesity    Abnormal chest x-ray    Angina effort    Skin lesion    Angina at rest [facility])    Hyperlipidemia    Angina pectoris (HCC)    NSTEMI (non-ST elevated myocardial infarction) ([facility]    Controlled type 2 diabetes mellitus without complication, without long-term current use of insulin (HCC)    ASHD (arteriosclerotic heart disease)    Mass of right foot    Overweight    History of angina pectoris    Reactive airway disease    Bronchitis    Reactive airway disease with wheezing, moderate persistent, with acute exacerbation    Need for prophylactic vaccination and inoculation against varicella    Need for prophylactic vaccination against diphtheria-tetanus-pertussis (DTP)    Chronic right-sided thoracic back pain    CAD in native artery    S/P PTCA (percutaneous transluminal coronary angioplasty)    Chest pain    Ischemic cardiomyopathy    GURDEEP (obstructive sleep apnea)    Cellulitis    Sepsis due to methicillin susceptible Staphylococcus aureus ([facility]    Bacteremia due to methicillin susceptible Staphylococcus aureus (MSSA)    Disruption or dehiscence of closure of sternum or sternotomy    Elevated sed rate    Type 2 diabetes mellitus with obesity ([facility]    Diabetic ulcer of right foot associated with type 2 diabetes mellitus, with fat layer exposed ([facility]    Pyogenic inflammation of bone ([facility]    Diabetic ulcer of right foot associated with diabetes mellitus due to underlying condition [facility])       Patient Plan:  1. I recommend that the patient continue their currently prescribed medications. Their drug modifiable risk factors appear to be well controlled. I will continue to address the need/dosing of medications in future visits. 2. The proper use and anticipated side effects of nitroglycerine has been carefully explained.   If a single episode of chest pain is not relieved by one tablet, the patient will try another sublingual tablet within 5 minutes; and if this doesn't relieve the pain, the patient is instructed to call 911 for transportation to an emergency department and administer a final third sublingual tablet. 3. No cardiac testing warranted. 4. Follow up in one year or sooner if needed. Scribe's attestation: This note was scribed in the presence of Basil Peer by Shelly Moreland MD, personally performed the services described in this documentation as scribed by the above signed scribe in my presence, and it is both accurate and complete to the best of our ability and knowledge. I agree with the details independently gathered by my clinical support staff, while the remaining scribed note accurately describes my personal service to the patient. The above RN is working as a scribe for and in the presence of myself . Working as a scribe, the RN may have prepopulated components of this note with my historical intellectual property under my direct supervision. Any additions to this intellectual property were performed at my direction. Furthermore, the content and accuracy of this note have been reviewed by me to the best of my ability.        Shelly Cunningham MD, [address] [facility] Cardiologist  [facility]  [phone] [facility]  [phone] [address] [place] [address]  10/21/2022  8:15 AM

## 2022-10-21 ENCOUNTER — OFFICE VISIT (OUTPATIENT)
Dept: CARDIOLOGY CLINIC | Age: 62
End: 2022-10-21
Payer: COMMERCIAL

## 2022-10-21 ENCOUNTER — OFFICE VISIT (OUTPATIENT)
Dept: FAMILY MEDICINE CLINIC | Age: 62
End: 2022-10-21
Payer: COMMERCIAL

## 2022-10-21 VITALS
HEART RATE: 81 BPM | SYSTOLIC BLOOD PRESSURE: 132 MMHG | HEIGHT: 70 IN | BODY MASS INDEX: 35.58 KG/M2 | OXYGEN SATURATION: 94 % | DIASTOLIC BLOOD PRESSURE: 74 MMHG | WEIGHT: 248.5 LBS

## 2022-10-21 VITALS
BODY MASS INDEX: 35.5 KG/M2 | TEMPERATURE: 97 F | WEIGHT: 248 LBS | OXYGEN SATURATION: 97 % | HEART RATE: 79 BPM | SYSTOLIC BLOOD PRESSURE: 132 MMHG | HEIGHT: 70 IN | DIASTOLIC BLOOD PRESSURE: 80 MMHG

## 2022-10-21 DIAGNOSIS — E78.2 MIXED HYPERLIPIDEMIA: ICD-10-CM

## 2022-10-21 DIAGNOSIS — E11.9 TYPE 2 DIABETES MELLITUS WITHOUT COMPLICATION, WITHOUT LONG-TERM CURRENT USE OF INSULIN (HCC): ICD-10-CM

## 2022-10-21 DIAGNOSIS — Z95.1 S/P CABG (CORONARY ARTERY BYPASS GRAFT): ICD-10-CM

## 2022-10-21 DIAGNOSIS — G47.33 OSA (OBSTRUCTIVE SLEEP APNEA): ICD-10-CM

## 2022-10-21 DIAGNOSIS — I10 PRIMARY HYPERTENSION: ICD-10-CM

## 2022-10-21 DIAGNOSIS — Z09 HOSPITAL DISCHARGE FOLLOW-UP: Primary | ICD-10-CM

## 2022-10-21 DIAGNOSIS — I25.10 CORONARY ARTERY DISEASE INVOLVING NATIVE CORONARY ARTERY OF NATIVE HEART WITHOUT ANGINA PECTORIS: Primary | ICD-10-CM

## 2022-10-21 DIAGNOSIS — M86.8X7 OTHER OSTEOMYELITIS OF RIGHT FOOT (HCC): ICD-10-CM

## 2022-10-21 DIAGNOSIS — I25.810 CORONARY ARTERY DISEASE INVOLVING CORONARY BYPASS GRAFT OF NATIVE HEART WITHOUT ANGINA PECTORIS: ICD-10-CM

## 2022-10-21 DIAGNOSIS — R07.9 CHEST PAIN, UNSPECIFIED TYPE: ICD-10-CM

## 2022-10-21 PROCEDURE — 99214 OFFICE O/P EST MOD 30 MIN: CPT | Performed by: STUDENT IN AN ORGANIZED HEALTH CARE EDUCATION/TRAINING PROGRAM

## 2022-10-21 PROCEDURE — 99214 OFFICE O/P EST MOD 30 MIN: CPT | Performed by: INTERNAL MEDICINE

## 2022-10-21 PROCEDURE — 1111F DSCHRG MED/CURRENT MED MERGE: CPT | Performed by: STUDENT IN AN ORGANIZED HEALTH CARE EDUCATION/TRAINING PROGRAM

## 2022-10-21 PROCEDURE — 3052F HG A1C>EQUAL 8.0%<EQUAL 9.0%: CPT | Performed by: STUDENT IN AN ORGANIZED HEALTH CARE EDUCATION/TRAINING PROGRAM

## 2022-10-21 RX ORDER — NITROGLYCERIN 0.4 MG/1
0.4 TABLET SUBLINGUAL EVERY 5 MIN PRN
Qty: 25 TABLET | Refills: 3 | Status: SHIPPED | OUTPATIENT
Start: 2022-10-21

## 2022-10-21 RX ORDER — METOPROLOL TARTRATE 50 MG/1
TABLET, FILM COATED ORAL
Qty: 180 TABLET | Refills: 3 | Status: SHIPPED | OUTPATIENT
Start: 2022-10-21

## 2022-10-21 RX ORDER — OXYCODONE AND ACETAMINOPHEN 10; 325 MG/1; MG/1
TABLET ORAL
COMMUNITY
Start: 2022-10-20

## 2022-10-21 RX ORDER — CLOPIDOGREL BISULFATE 75 MG/1
TABLET ORAL
Qty: 90 TABLET | Refills: 3 | Status: SHIPPED | OUTPATIENT
Start: 2022-10-21

## 2022-10-21 NOTE — PROGRESS NOTES
Post-Discharge Transitional Care  Follow Up      Kristi Reyes   YOB: 1960    Date of Office Visit:  10/21/2022  Date of Hospital Admission: 10/12/22  Date of Hospital Discharge: 10/17/22  Risk of hospital readmission (high >=14%. Medium >=10%) :Readmission Risk Score: 6.8    Care management risk score Rising risk (score 2-5) and Complex Care (Scores >=6): No Risk Score On File     Non face to face  following discharge, date last encounter closed (first attempt may have been earlier): *No documented post hospital discharge outreach found in the last 14 days    Call initiated 2 business days of discharge: *No response recorded in the last 14 days    ASSESSMENT/PLAN:   Hospital follow up  Other osteomyelitis of right foot ([facility] [address]  - Finish Augmentin course and follow up with podiatry for suture removal   Type 2 diabetes mellitus without complication, without long-term current use of insulin ([facility] [address]  Last A1c (10/13/2022) 8.3, previously 8.6  Continue Glipizide 15 mg daily (10mg AM and 5mg PM)with metformin 1000 mg twice daily,  Discussed low carb diet  Not interested in checking blood sugars despite counseling    Primary hypertension  Controlled, continue metoprolol 50g bid   Coronary artery disease involving coronary bypass graft of native heart without angina pectoris  - 2/19/2021. Status post CABG (LIMA to LAD, SVG to OM)  Continue ASA,Plavix and Lipitor     GURDEEP (obstructive sleep apnea)    Medical Decision Making: low complexity  Return in 3 months (on 1/21/2023) for Diabetes check . On this date 10/21/2022 I have spent 30 minutes reviewing previous notes, test results and face to face with the patient discussing the diagnosis and importance of compliance with the treatment plan as well as documenting on the day of the visit. Return in 3 months (on 1/21/2023) for Diabetes check .     Subjective:   HPI:  Follow up of Hospital problems/diagnosis(es):  Right great toe diabetic foot ulcer with abscess and osteomyelitis with pathological fracture at distal phalanx and surround cellulitis   Inpatient course: Discharge summary reviewed- see chart. Interval history/Current status:   80-year-old male with history of HTN, CAD SP CABG x2, stent placement, DM2, history of EtOH, presented to the hospital with infection of the right great toe pain. Patient completed multiple courses of oral antibiotics without improvement. Started on IV vancomycin and cefepime, Percocet and morphine for pain. Patient had amputation of right hallux on 10/19 patient was discharged home with oral antibiotics. Discharge medications  Augmentin 875-125 mg twice daily for 10-day twice daily   Oxycodone acetaminophen5-325mg for 3 days  Tylenol 500 mg  Aspirin 81 mg  Atorvastatin 40 mg,   metoprolol 50 mg twice daily  Plavix 75 mg daily  Glipizide 10 mg daily    Coronary artery disease -continue aspirin, statin, and Plavix  Hypertension-Patient currently on metoprolol 50 mg twice daily  DM type II -Taking Glipizide 15 mg daily, 1 x10mg pill in the morning AM and 0.5x10mg pill at night, with metformin 1000 mg twice daily,  Compliant with medications. Not on ARB/ACEI. +No hypoglycemic events. Not checking blood glucose at home. Recent amputation of right hallux, pain controlled with pain medications, taking oxycodone. Rated pain 4/10 severity. Following a low carb diet. Saw podiatry yesterday, follow up scheduled on Tuesday and Friday. Podiatry changes the dressings. Reports he quit drinking alcohol. Has started low carb diet.        Patient Active Problem List   Diagnosis    Type II or unspecified type diabetes mellitus without mention of complication, not stated as uncontrolled    Diabetes mellitus    History of ETOH abuse    Patient overweight    Patient overweight    Dietary noncompliance    Alcohol abuse    Flank pain    Prostatism    Low serum testosterone level    Arm pain, left    Shoulder pain    Precordial pain    Lung nodule    Acute angina (HCC)    Abnormal stress test    Acute coronary syndrome (HCC)    Coronary artery disease involving native coronary artery of native heart without angina pectoris    Other chest pain    S/P drug eluting coronary stent placement    Essential hypertension    Obesity    Abnormal chest x-ray    Angina effort    Skin lesion    Angina at rest [facility])    Hyperlipidemia    Angina pectoris (HCC)    NSTEMI (non-ST elevated myocardial infarction) ([address]    Controlled type 2 diabetes mellitus without complication, without long-term current use of insulin (HCC)    ASHD (arteriosclerotic heart disease)    Mass of right foot    Overweight    History of angina pectoris    Reactive airway disease    Bronchitis    Reactive airway disease with wheezing, moderate persistent, with acute exacerbation    Need for prophylactic vaccination and inoculation against varicella    Need for prophylactic vaccination against diphtheria-tetanus-pertussis (DTP)    Chronic right-sided thoracic back pain    CAD in native artery    S/P PTCA (percutaneous transluminal coronary angioplasty)    Chest pain    Ischemic cardiomyopathy    GURDEEP (obstructive sleep apnea)    Cellulitis    Sepsis due to methicillin susceptible Staphylococcus aureus ([address]    Bacteremia due to methicillin susceptible Staphylococcus aureus (MSSA)    Disruption or dehiscence of closure of sternum or sternotomy    Elevated sed rate    Type 2 diabetes mellitus with obesity ([address]    Diabetic ulcer of right foot associated with type 2 diabetes mellitus, with fat layer exposed ([address]    Pyogenic inflammation of bone ([address]    Diabetic ulcer of right foot associated with diabetes mellitus due to underlying condition ([address]       Medications listed as ordered at the time of discharge from hospital     Medication List            Accurate as of October 21, 2022 11:59 PM. If you have any questions, ask your nurse or doctor.                 START taking these medications nitroGLYCERIN 0.4 MG SL tablet  Commonly known as: Nitrostat  Place 1 tablet under the tongue every 5 minutes as needed for Chest pain  Started by: Shane Nye MD            CHANGE how you take these medications      clopidogrel 75 MG tablet  Commonly known as: PLAVIX  Take one tablet by mouth once daily  What changed: See the new instructions. Changed by: Shane Nye MD            CONTINUE taking these medications      acetaminophen 500 MG tablet  Commonly known as: TYLENOL     amoxicillin-clavulanate 875-125 MG per tablet  Commonly known as: AUGMENTIN  Take 1 tablet by mouth 2 times daily for 10 days     aspirin 81 MG chewable tablet  CHEW ONE TABLET BY MOUTH DAILY     atorvastatin 40 MG tablet  Commonly known as: LIPITOR  TAKE ONE TABLET BY MOUTH EVERY EVENING     glipiZIDE 10 MG tablet  Commonly known as: GLUCOTROL  Take 11/2 daily in am. For diabetes. metFORMIN 1000 MG tablet  Commonly known as: GLUCOPHAGE  TAKE ONE TABLET BY MOUTH TWICE A DAY     metoprolol tartrate 50 MG tablet  Commonly known as: LOPRESSOR  TAKE ONE TABLET BY MOUTH TWICE A DAY     oxyCODONE-acetaminophen  MG per tablet  Commonly known as: PERCOCET     silver sulfADIAZINE 1 % cream  Commonly known as: SILVADENE  Apply topically to wounds daily.                Where to Get Your Medications        These medications were sent to [id number] - [place], [place] - [address] [facility] [phone] [address] [phone]  [address] [facility] [address]      Phone: [phone]   clopidogrel 75 MG tablet  metoprolol tartrate 50 MG tablet  nitroGLYCERIN 0.4 MG SL tablet           Medications marked \"taking\" at this time  Outpatient Medications Marked as Taking for the 10/21/22 encounter (Office Visit) with Felix Ruby MD   Medication Sig Dispense Refill    oxyCODONE-acetaminophen (PERCOCET)  MG per tablet       metoprolol tartrate (LOPRESSOR) 50 MG tablet TAKE ONE TABLET BY MOUTH TWICE A  tablet 3 clopidogrel (PLAVIX) 75 MG tablet Take one tablet by mouth once daily 90 tablet 3    nitroGLYCERIN (NITROSTAT) 0.4 MG SL tablet Place 1 tablet under the tongue every 5 minutes as needed for Chest pain 25 tablet 3    amoxicillin-clavulanate (AUGMENTIN) 875-125 MG per tablet Take 1 tablet by mouth 2 times daily for 10 days 20 tablet 0    atorvastatin (LIPITOR) 40 MG tablet TAKE ONE TABLET BY MOUTH EVERY EVENING 30 tablet 4    metFORMIN (GLUCOPHAGE) 1000 MG tablet TAKE ONE TABLET BY MOUTH TWICE A DAY 60 tablet 2    glipiZIDE (GLUCOTROL) 10 MG tablet Take 11/2 daily in am. For diabetes. 90 tablet 1    silver sulfADIAZINE (SILVADENE) 1 % cream Apply topically to wounds daily. 400 g 0    acetaminophen (TYLENOL) 500 MG tablet Take 500 mg by mouth every 6 hours as needed       aspirin 81 MG chewable tablet CHEW ONE TABLET BY MOUTH DAILY 30 tablet 1        Medications patient taking as of now reconciled against medications ordered at time of hospital discharge: Yes  Review of Systems   Constitutional: Negative for activity change, chills, diaphoresis, fatigue, fever and unexpected weight change. HENT: Negative. Respiratory: Negative for cough, shortness of breath,    Cardiovascular: Negative for chest pain, palpitations and leg swelling. Gastrointestinal: Negative for abdominal pain, nausea, vomiting, change in bowel habits,  some constipation ( 3 days have a BM)  Genitourinary: Negative for difficulty urinating or polyuria   Musculoskeletal: Negative for joint pain, back pain, swelling in extremities  Skin: Negative for rashes   Neurological: Negative for dizziness. No neurological complaints  Psychiatric/Behavioral: Normal mood. Objective:    /80 (Site: Left Upper Arm, Position: Sitting, Cuff Size: Large Adult)   Pulse 79   Temp 97 °F (36.1 °C)   Ht 5' 10\" (1.778 m)   Wt 248 lb (112.5 kg)   SpO2 97%   BMI 35.58 kg/m²   Physical Exam  Constitutional:       General: He is not in acute distress. Appearance: Normal appearance. HENT:      Head: Normocephalic. Right Ear: External ear normal.      Left Ear: External ear normal.      Nose: Nose normal.      Mouth/Throat:      Mouth: Mucous membranes are moist.      Pharynx: Oropharynx is clear. Eyes:      Extraocular Movements: Extraocular movements intact. Pupils: Pupils are equal, round, and reactive to light. Cardiovascular:      Rate and Rhythm: Normal rate and regular rhythm. Pulses: Normal pulses. Heart sounds: Normal heart sounds. No murmur heard. Pulmonary:      Effort: Pulmonary effort is normal. No respiratory distress. Breath sounds: Normal breath sounds. No wheezing. Abdominal:      General: Abdomen is flat. Bowel sounds are normal. There is no distension. Palpations: Abdomen is soft. There is no mass. Tenderness: There is no abdominal tenderness. Musculoskeletal:         General: No swelling or tenderness. Normal range of motion. Cervical back: Normal range of motion. Right lower leg: Edema (minimal) present. Comments: Right foot wrapped in dressing clean, dry and intact, no surrounding erythema. Cap refill <2 seconds   Lymphadenopathy:      Cervical: No cervical adenopathy. Skin:     General: Skin is warm and dry. Capillary Refill: Capillary refill takes less than 2 seconds. Neurological:      General: No focal deficit present. Mental Status: He is alert. Psychiatric:         Mood and Affect: Mood normal.         Thought Content: Thought content normal.         An electronic signature was used to authenticate this note.   --Carol Garcia MD

## 2022-10-21 NOTE — PATIENT INSTRUCTIONS
- Continue low carb diet   - Finish antibiotics course   -Follow up with podiatry   - Please try to check blood sugars if having any symptoms     You are confused or cannot think clearly. Watch closely for changes in your health, and be sure to contact your doctor if:        You have any problems.

## 2022-10-21 NOTE — LETTER
Kim Mcrae  1960      [address]   Cardiovascular Evaluation    PATIENT: Kim Mcrae  DATE: 10/21/2022  MRN: [id number]  CSN: [id number]  : 1960      Primary Care Doctor: Ced Patel DO  Reason for evaluation:   Follow-up, Coronary Artery Disease, Hypertension, Cardiomyopathy, and Medication Refill (Nitroglycerin)      Subjective:   History of present illness on initial date of evaluation:   Kim Mcrae is a 58 y.o. patient who initially presented for follow up. He has a PMH DM, HTN, CAD, s/p PCI, alcohol abuse, nonsmoker, + FH. Was admitted to hospital in 2016 with [place] and had abnormal stress test. Underwent PCI of OM as well as vke-ym-yeejun LAD and was d/c'd/ Returned to hospital and noted to have mild troponin elevation and CP relieved with NTG SL. He underwent LHC on 16 and a PCI-D-1 with SAMUEL was done. Stents in prox-mid LAD, distal LAD, proximal-mid OM-1 were found patent. He was switched from Plavix to Brilinta. Imdur was added at office visit in 2016 due to complaints of exertional chest pain. He was admitted to the hospital in late May 2016 with recurrent chest pain and transient troponin elevation. LHC was showed patent stents. He was started on Ranexa. He underwent 17 rotablade guided PCI of OM1 recurrent instent restenosis. Last OV 20 he presented for follow up. He had an elective LHC on 2020 that demonstrated restenosis of mid LAD. Treated successfully with laser atherectomy and aggressive pre dilatation using cutting balloons. He has not noticed any improvement with the Renexa. He stated he continues to have chest pain. He stated he has been taking 2 SL nitro's daily and this does help. He stated the pain will occur in the AM and when he takes a SL nitro the pain stops. He continued to drink a good amount of bourbon nightly. He was positive for COVID in October. Last OV 21 he presented for follow up s/p LHC.   On 12/15/20 [facility] performed. Severe and progressive in-stent restenosis of OM1. Heavily fibrotic. Multiple runs of noncompliant balloon inflations were performed but there was balloon slippage. Procedure aborted due to HTN and severe chest pain. He reported he feels well overall. He denies CP, SOB, dizziness or syncope. He had not needed NTG. He has been trying to control his carb intake. He has stopped alcohol consumption. His BP at home has been controlled but is elevated on exam.   He underwent CABG x2 and left atrial clip on 2/19/2021. Since last office visit he was recently hospitalized 10/12-10/17/22 for osteomyelitis and underwent partial amputation right foot (right hallux amputated ) on 10/13/22. Today he states he is recovering from partial foot amputation. Patient currently denies any weight gain, edema, palpitations, chest pain, shortness of breath, dizziness, and syncope. He reports nitro administration once in 2 months. He is taking medications as prescribed, tolerating well. He reports PAD was checked by his podiatrist with studies in office.        Patient Active Problem List   Diagnosis    Type II or unspecified type diabetes mellitus without mention of complication, not stated as uncontrolled    Diabetes mellitus    History of ETOH abuse    Patient overweight    Patient overweight    Dietary noncompliance    Alcohol abuse    Flank pain    Prostatism    Low serum testosterone level    Arm pain, left    Shoulder pain    Precordial pain    Lung nodule    Acute angina (HCC)    Abnormal stress test    Acute coronary syndrome ([address]    Coronary artery disease involving native coronary artery of native heart without angina pectoris    Other chest pain    S/P drug eluting coronary stent placement    Essential hypertension    Obesity    Abnormal chest x-ray    Angina effort    Skin lesion    Angina at rest [facility])    Hyperlipidemia    Angina pectoris ([address]    NSTEMI (non-ST elevated myocardial infarction) ([address]    Controlled type 2 diabetes mellitus without complication, without long-term current use of insulin ([address]    ASHD (arteriosclerotic heart disease)    Mass of right foot    Overweight    History of angina pectoris    Reactive airway disease    Bronchitis    Reactive airway disease with wheezing, moderate persistent, with acute exacerbation    Need for prophylactic vaccination and inoculation against varicella    Need for prophylactic vaccination against diphtheria-tetanus-pertussis (DTP)    Chronic right-sided thoracic back pain    CAD in native artery    S/P PTCA (percutaneous transluminal coronary angioplasty)    Chest pain    Ischemic cardiomyopathy    GURDEEP (obstructive sleep apnea)    Cellulitis    Sepsis due to methicillin susceptible Staphylococcus aureus (HCC)    Bacteremia due to methicillin susceptible Staphylococcus aureus (MSSA)    Disruption or dehiscence of closure of sternum or sternotomy    Elevated sed rate    Type 2 diabetes mellitus with obesity ([address]    Diabetic ulcer of right foot associated with type 2 diabetes mellitus, with fat layer exposed ([address]    Pyogenic inflammation of bone ([address]    Diabetic ulcer of right foot associated with diabetes mellitus due to underlying condition ([address]         Past Medical History:   has a past medical history of Alcohol abuse, Cancer ([address] Cellulitis, Coronary artery disease, COVID-19, Diabetic neuropathy associated with type 2 diabetes mellitus ([address] Hyperlipidemia, Non morbid obesity, NSTEMI (non-ST elevated myocardial infarction) ([address] GURDEEP (obstructive sleep apnea), Reactive airway disease with wheezing, moderate persistent, with acute exacerbation, Sepsis ([address] and Type II or unspecified type diabetes mellitus without mention of complication, not stated as uncontrolled.     Surgical History:   has a past surgical history that includes Vein Surgery; Coronary angioplasty with stent (1/14/2016 9/21/2020); Coronary artery bypass graft (02/19/2021); Coronary artery bypass graft (N/A, 2/19/2021); Bypass Graft (02/19/2021); Leg Surgery (N/A, 4/26/2021); Hardware Removal (N/A, 4/28/2021); Colonoscopy (2017); Colonoscopy (N/A, 7/1/2022); and Toe amputation (Right, 10/13/2022). Social History:   reports that he has never smoked. He has never used smokeless tobacco. He reports that he does not currently use alcohol. He reports that he does not use drugs. Family History:  No evidence for sudden cardiac death or premature CAD    Home Medications:  Reviewed and are listed in nursing record. and/or listed below  Current Outpatient Medications   Medication Sig Dispense Refill    oxyCODONE-acetaminophen (PERCOCET)  MG per tablet       amoxicillin-clavulanate (AUGMENTIN) 875-125 MG per tablet Take 1 tablet by mouth 2 times daily for 10 days 20 tablet 0    atorvastatin (LIPITOR) 40 MG tablet TAKE ONE TABLET BY MOUTH EVERY EVENING 30 tablet 4    metFORMIN (GLUCOPHAGE) 1000 MG tablet TAKE ONE TABLET BY MOUTH TWICE A DAY 60 tablet 2    glipiZIDE (GLUCOTROL) 10 MG tablet Take 11/2 daily in am. For diabetes. 90 tablet 1    metoprolol tartrate (LOPRESSOR) 50 MG tablet TAKE ONE TABLET BY MOUTH TWICE A  tablet 0    clopidogrel (PLAVIX) 75 MG tablet TAKE ONE TABLET BY MOUTH DAILY 90 tablet 2    acetaminophen (TYLENOL) 500 MG tablet Take 500 mg by mouth every 6 hours as needed       aspirin 81 MG chewable tablet CHEW ONE TABLET BY MOUTH DAILY 30 tablet 1    silver sulfADIAZINE (SILVADENE) 1 % cream Apply topically to wounds daily. (Patient not taking: Reported on 10/21/2022) 400 g 0     No current facility-administered medications for this visit. Allergies:  Patient has no known allergies. Review of Systems:   A 14 point review of symptoms completed. Pertinent positives identified in the HPI, all other review of symptoms negative as below.     Objective:   PHYSICAL EXAM:    Vitals: 10/21/22 0744   BP: 132/74   Pulse: 81   SpO2: 94%         Wt Readings from Last 3 Encounters:   10/21/22 248 lb 8 oz (112.7 kg)   10/12/22 250 lb (113.4 kg)   08/09/22 247 lb (112 kg)         General Appearance:  Alert, cooperative, no distress, appears stated age   Head:  Normocephalic, atraumatic   Eyes:  PERRL, conjunctiva/corneas clear   Nose: Nares normal, no drainage or sinus tenderness   Throat: Lips, mucosa, and tongue normal   Neck: Supple, symmetrical, trachea midline, NL thyroid no carotid bruit or JVD   Lungs:   CTAB, respirations unlabored   Chest Wall:  No tenderness or deformity. Well-healed surgical scar.     Heart:  Regular rhythm and normal rate; S1, S2 are normal;   no murmur noted; no rub or gallop   Abdomen:   Soft, non-tender, +BS x 4, no masses, no organomegaly   Extremities: Extremities normal, atraumatic, no cyanosis or edema, rt foot in boot and wrapped   Pulses: 2+ and symmetric   Skin: Skin color, texture, turgor normal, no rashes or lesions   Pysch: Normal mood and affect   Neurologic: Normal gross motor and sensory exam.         LABS   CBC:      Lab Results   Component Value Date/Time    WBC 4.9 10/16/2022 05:16 AM    RBC 4.10 10/16/2022 05:16 AM    HGB 12.1 10/16/2022 05:16 AM    HCT 35.1 10/16/2022 05:16 AM    MCV 85.7 10/16/2022 05:16 AM    RDW 13.7 10/16/2022 05:16 AM     10/16/2022 05:16 AM     CMP:  Lab Results   Component Value Date/Time     10/16/2022 05:19 AM    K 4.2 10/16/2022 05:19 AM    CL 98 10/16/2022 05:19 AM    CO2 29 10/16/2022 05:19 AM    BUN 7 10/16/2022 05:19 AM    CREATININE <0.5 10/16/2022 05:19 AM    GFRAA >60 10/16/2022 05:19 AM    AGRATIO 1.4 10/13/2022 05:14 AM    LABGLOM >60 10/16/2022 05:19 AM    GLUCOSE 227 10/16/2022 05:19 AM    PROT 5.8 10/13/2022 05:14 AM    CALCIUM 8.3 10/16/2022 05:19 AM    BILITOT 0.4 10/13/2022 05:14 AM    ALKPHOS 83 10/13/2022 05:14 AM    AST 11 10/13/2022 05:14 AM    ALT 13 10/13/2022 05:14 AM     PT/INR:   No results found for: PTINR  Liver:  No components found for: CHLPL  Lab Results   Component Value Date    ALT 13 10/13/2022    AST 11 (L) 10/13/2022    ALKPHOS 83 10/13/2022    BILITOT 0.4 10/13/2022     Lab Results   Component Value Date    LABA1C 8.3 10/12/2022     Lipids:         Lab Results   Component Value Date    TRIG 97 2021    TRIG 97 2021    TRIG 96 2021            Lab Results   Component Value Date    HDL 53 2021    HDL 38 (L) 2021    HDL 44 2021            Lab Results   Component Value Date    LDLCALC 61 2021    LDLCALC 47 2021    LDLCALC 43 2021            Lab Results   Component Value Date    LABVLDL 19 2021    LABVLDL 19 2021    LABVLDL 19 2021         CARDIAC DATA   EK/15/2020 NSR    ECHO 3/3/2020   Technically difficult examination due to body habitus. Definity® used for   myocardial border enhancement. Normal left ventricle size and systolic function with a visually estimated   ejection fraction of 55%. Mild concentric left ventricular hypertrophy. No regional wall motion abnormalities are seen. Grade I diastolic dysfunction with normal LV filling pressures. The right atrium is mildly enlarged. STRESS TEST 2020  Summary Normal LVEF 59%  Normal wall motion  Diaphragmatic attenuation artifact  Apical scar with small amount of inferoapical gifty-infarct ischemia   Overall, this would be considered an abnormal, intermediate to high risk,  study      CARDIAC CATH  LEFT HEART CATH: 12/15/20  Artery Findings/Result   LM  luminal regularities   LAD  patent stent in proximal LAD with no significant restenosis. ,  GUERLINE-3 flow  Diagonal 1. Patent stent with normal significant restenosis   Cx  luminal irregularities  OM1-proximal 70%. Proximal stent with 50% restenosis. Distal stent edge with 90% restenosis  OM2-proximal 40%         RCA  dominant. Mid 30%   LVEDP  16   LVG  55%.   Normal wall motion  No aortic stenosis, 1+ mitral regurgitation      Results of intervention      Lesion 1: OM1  Multiple noncompliant balloons used up to 2.75 mm. Angio sculpt also used. In addition to several runs of laser atherectomy. Pre:   90% stenosis, GUERLINE 2 flow  Post: 40% stenosis, GUERLINE 3 flow  Assessment/Plan  1. Severe and progressive in-stent restenosis of OM1. Heavily fibrotic. Multiple runs of noncompliant balloon inflations were performed but there was balloon slippage. Angioscope and multiple runs of laser atherectomy was performed. Unfortunately patient was no longer able to tolerate the procedure was severe chest pain and hypertension therefore we aborted the procedure. Residual stenosis was 40% of the distal stent edge but is GUERLINE-3 flow.                -Symptoms return we will consider again laser atherectomy versus rotoblade versus shockwave lithotripsy therapy  2. Continue aspirin and plavix       LEFT HEART CATH 9/21/2020  PCI of mid LAD  Status post predilation using laser arthrectomy, angioscope and NC balloon dilatation. Stent resolute Zak 3.5 mm x 15 mm, postdilated to 3.6 mm. Assessment  1. Interval restenosis of mid LAD in-stent 80% lesion, GUERLINE II flow. Treated successfully with laser atherectomy and aggressive predilatation using cutting balloons                           -0% residual, GUERLINE-3 flow. CATH: 9/6/17  Successful PCI of OM-1 recurrent in-stent restenosis with Rotablator-assisted PCI with placement of Xience Alpine drug-eluting stent. Cath 7/11/17  Intervention:  90% prox OM1 in-stent restenosis, s/p PTCA with 2.5-mm NC as well as 2.5-mm Flexitome cutting balloon     Cath 8/18/17  LEFT HEART CATH  PCI of mid OM1 90% restenosis  Ballooned with 2.25 x as NC trek up to 18atm  Angiosculpt 2.5 x 15mm up to 14atm- multiple passes  90% GUERLINE-2 flow--> 15% GUERLINE-3 flow  Assessment  1. Restenosis of the OM1 stent following recent POBA  2.  Aggressive cutting balloon and NC balloon but still with residual stenosis and concerned about ability to completely deploy stent. If reoccurs or Cp returns, would suggest laser arthrectomy and PCI. CATH: 01/21/2016  1. A 70% proximal medium-caliber first diagonal branch status post 2.25 x  12-mm Xience Alpine drug-eluting stent. 2.  Patent drug-eluting stent in proximal-mid left anterior descending. 3.  Patent drug-eluting stent in the distal left anterior descending. 4.  Patent drug-eluting stent in the [id number]-vqy first obtuse marginal  branch. RECOMMENDATIONS:  Aspirin indefinitely. Brilinta at least for 12 months. Aggressive medical therapy and risk factors modification for coronary disease. CATH: 01/15/2016  IMPRESSION:   1. An 80% diffuse mid left anterior descending with a fractional flow  reserve of 0.79 (significant), status post 2.75 x 28-mm XIENCE  Alpine drug-eluting stent postdilated with a 3.5-mm noncompliant balloon. 2. A 60% distal left anterior descending lesion with a fractional flow   reserve of 0.5 (significant), status post 2.25 x 23-mm XIENCE Alpine   drug-eluting stent postdilated with 2.5-mm noncompliant balloon. 3. Patent stent in proximal to mid 1st obtuse marginal branch. CATH: 01/14/2016  1. A 90% large first obtuse marginal branch status post 2.25 x 16 mm Promus  Premier drug-eluting stent. 2. An 80% diffuse mid and 60% distal left anterior descending. 3. An 80% proximal medium caliber first diagonal branch. 4. Normal left ventricular systolic function with an ejection fraction of  55%. 5. Elevated left ventricular end diastolic pressure 19 mmHg. 2/19/21-Dr. Kenneth Mejia  PREOPERATIVE DIAGNOSES:  1.  Status post balloon angioplasty of the left circumflex on this  admission. 2.  Non-STEMI. 3.  Status post multiple PCI with stents in LAD and also left  circumflex. 4.  Hypertension. 5.  Hyperlipidemia. 6.  Obesity. OPERATIONS PERFORMED:  1.   Coronary artery bypass grafting x2.  2.  Total cardiopulmonary bypass. 3.  Endoscopic vein harvesting of right lower extremity. 4.  Reverse saphenous vein grafting to obtuse marginal artery. 5.  Left internal mammary artery to left anterior descending artery. 6.  Left atrial appendage clip, 40.  7.  Bilateral intercostal nerve block. 8.  Independent interpretation of transesophageal echocardiogram.    Assessment and Plan   Berenice Vernon is a 58 y.o. male who presents today for the following problems:      1. CAD   - 2/19/2021. Status post CABG (LIMA to LAD, SVG to OM)    -Complicated by retained suture and sternal wound infection   - 12/15/2020 POBA/Laser to OM1 (40% residual) case aborted due to intollerance    -9/21/2020 Laser/cutter and PCI to mid LAD   - 9/2017 Rotablade and PCI to [address]   - 8/2017 PCI to m/d LAD, diag 1  2. HTN: controlled  3. HLD: controlled  4. HX of likely COVID +: Patient has an excellent story for COVID symptomatology but unfortunately testing was too late and was negative  5. EtOH abuse: Has quit!    -Previous several bourbon drinks a night      MD Plan:  1. Patient is doing well with no cardiac symptoms. 2.  States he had diabetic foot ulcer that did not heal and ultimately required amputation of his right big toe. He states that he had vascular checks of his leg and arterial inflow was okay but I do not have these actual report  3. Continue aspirin, Lipitor, Plavix, Lopressor  4.  Continue alcohol cessation          Patient Active Problem List   Diagnosis    Type II or unspecified type diabetes mellitus without mention of complication, not stated as uncontrolled    Diabetes mellitus    History of ETOH abuse    Patient overweight    Patient overweight    Dietary noncompliance    Alcohol abuse    Flank pain    Prostatism    Low serum testosterone level    Arm pain, left    Shoulder pain    Precordial pain    Lung nodule    Acute angina (HCC)    Abnormal stress test    Acute coronary syndrome (HCC)    Coronary artery disease involving native coronary artery of native heart without angina pectoris    Other chest pain    S/P drug eluting coronary stent placement    Essential hypertension    Obesity    Abnormal chest x-ray    Angina effort    Skin lesion    Angina at rest [facility])    Hyperlipidemia    Angina pectoris ([facility]    NSTEMI (non-ST elevated myocardial infarction) ([facility]    Controlled type 2 diabetes mellitus without complication, without long-term current use of insulin (HCC)    ASHD (arteriosclerotic heart disease)    Mass of right foot    Overweight    History of angina pectoris    Reactive airway disease    Bronchitis    Reactive airway disease with wheezing, moderate persistent, with acute exacerbation    Need for prophylactic vaccination and inoculation against varicella    Need for prophylactic vaccination against diphtheria-tetanus-pertussis (DTP)    Chronic right-sided thoracic back pain    CAD in native artery    S/P PTCA (percutaneous transluminal coronary angioplasty)    Chest pain    Ischemic cardiomyopathy    GURDEEP (obstructive sleep apnea)    Cellulitis    Sepsis due to methicillin susceptible Staphylococcus aureus (HCC)    Bacteremia due to methicillin susceptible Staphylococcus aureus (MSSA)    Disruption or dehiscence of closure of sternum or sternotomy    Elevated sed rate    Type 2 diabetes mellitus with obesity ([facility]    Diabetic ulcer of right foot associated with type 2 diabetes mellitus, with fat layer exposed ([facility]    Pyogenic inflammation of bone ([facility]    Diabetic ulcer of right foot associated with diabetes mellitus due to underlying condition [facility])       Patient Plan:  1. I recommend that the patient continue their currently prescribed medications. Their drug modifiable risk factors appear to be well controlled. I will continue to address the need/dosing of medications in future visits.    2. The proper use and anticipated side effects of nitroglycerine has been carefully explained. If a single episode of chest pain is not relieved by one tablet, the patient will try another sublingual tablet within 5 minutes; and if this doesn't relieve the pain, the patient is instructed to call 911 for transportation to an emergency department and administer a final third sublingual tablet. 3. No cardiac testing warranted. 4. Follow up in one year or sooner if needed. Scribe's attestation: This note was scribed in the presence of Jennifer Beasley by Aidee Schaffer MD, personally performed the services described in this documentation as scribed by the above signed scribe in my presence, and it is both accurate and complete to the best of our ability and knowledge. I agree with the details independently gathered by my clinical support staff, while the remaining scribed note accurately describes my personal service to the patient. The above RN is working as a scribe for and in the presence of myself . Working as a scribe, the RN may have prepopulated components of this note with my historical intellectual property under my direct supervision. Any additions to this intellectual property were performed at my direction. Furthermore, the content and accuracy of this note have been reviewed by me to the best of my ability.        Aidee Mcelroy MD, [address] [facility] Cardiologist  [facility]  [phone] [facility]  [phone] [address] [place] [address]  10/21/2022  8:15 AM

## 2022-10-21 NOTE — PATIENT INSTRUCTIONS
Patient Plan:  1. I recommend that the patient continue their currently prescribed medications. Their drug modifiable risk factors appear to be well controlled. I will continue to address the need/dosing of medications in future visits. 2. The proper use and anticipated side effects of nitroglycerine has been carefully explained. If a single episode of chest pain is not relieved by one tablet, the patient will try another sublingual tablet within 5 minutes; and if this doesn't relieve the pain, the patient is instructed to call 911 for transportation to an emergency department and administer a final third sublingual tablet. 3. No cardiac testing warranted. 4. Follow up in one year or sooner if needed.

## 2022-10-24 DIAGNOSIS — I25.10 CORONARY ARTERY DISEASE INVOLVING NATIVE CORONARY ARTERY OF NATIVE HEART WITHOUT ANGINA PECTORIS: ICD-10-CM

## 2022-10-24 RX ORDER — METOPROLOL TARTRATE 50 MG/1
TABLET, FILM COATED ORAL
Qty: 60 TABLET | OUTPATIENT
Start: 2022-10-24

## 2022-11-13 DIAGNOSIS — E11.9 TYPE 2 DIABETES MELLITUS WITHOUT COMPLICATION, WITHOUT LONG-TERM CURRENT USE OF INSULIN (HCC): ICD-10-CM

## 2022-12-07 RX ORDER — GLIPIZIDE 10 MG/1
TABLET ORAL
Qty: 45 TABLET | Refills: 2 | Status: SHIPPED | OUTPATIENT
Start: 2022-12-07

## 2022-12-07 NOTE — TELEPHONE ENCOUNTER
8/9/2022    Future Appointments   Date Time Provider Abdirahman Craft   1/19/2023  9:30 AM DO CAYLA Tolbert - [facility]

## 2022-12-28 ENCOUNTER — TELEPHONE (OUTPATIENT)
Dept: FAMILY MEDICINE CLINIC | Age: 62
End: 2022-12-28

## 2022-12-28 DIAGNOSIS — I25.10 ASHD (ARTERIOSCLEROTIC HEART DISEASE): Primary | ICD-10-CM

## 2022-12-28 NOTE — TELEPHONE ENCOUNTER
PATIENT DOES HAVE UPCOMING APPT ON 1/23/23.    8/9/2022    Future Appointments   Date Time Provider Abdirahman Craft   1/19/2023  9:30 AM [facility], DO CAYLA SIFUENTES

## 2023-01-19 ENCOUNTER — OFFICE VISIT (OUTPATIENT)
Dept: FAMILY MEDICINE CLINIC | Age: 63
End: 2023-01-19
Payer: COMMERCIAL

## 2023-01-19 VITALS
HEIGHT: 70 IN | OXYGEN SATURATION: 97 % | BODY MASS INDEX: 34.9 KG/M2 | TEMPERATURE: 96.9 F | SYSTOLIC BLOOD PRESSURE: 139 MMHG | DIASTOLIC BLOOD PRESSURE: 81 MMHG | WEIGHT: 243.8 LBS | HEART RATE: 74 BPM

## 2023-01-19 DIAGNOSIS — E11.9 TYPE 2 DIABETES MELLITUS WITHOUT COMPLICATION, WITHOUT LONG-TERM CURRENT USE OF INSULIN (HCC): Primary | ICD-10-CM

## 2023-01-19 DIAGNOSIS — I10 PRIMARY HYPERTENSION: ICD-10-CM

## 2023-01-19 DIAGNOSIS — E11.9 TYPE 2 DIABETES MELLITUS WITHOUT COMPLICATION, WITHOUT LONG-TERM CURRENT USE OF INSULIN (HCC): ICD-10-CM

## 2023-01-19 DIAGNOSIS — I25.10 ASHD (ARTERIOSCLEROTIC HEART DISEASE): ICD-10-CM

## 2023-01-19 DIAGNOSIS — E66.3 OVERWEIGHT: ICD-10-CM

## 2023-01-19 LAB
A/G RATIO: 2 (ref 1.1–2.2)
ALBUMIN SERPL-MCNC: 4.5 G/DL (ref 3.4–5)
ALP BLD-CCNC: 91 U/L (ref 40–129)
ALT SERPL-CCNC: 19 U/L (ref 10–40)
ANION GAP SERPL CALCULATED.3IONS-SCNC: 17 MMOL/L (ref 3–16)
AST SERPL-CCNC: 18 U/L (ref 15–37)
BILIRUB SERPL-MCNC: 0.5 MG/DL (ref 0–1)
BILIRUBIN DIRECT: <0.2 MG/DL (ref 0–0.3)
BILIRUBIN, INDIRECT: NORMAL MG/DL (ref 0–1)
BUN BLDV-MCNC: 10 MG/DL (ref 7–20)
CALCIUM SERPL-MCNC: 9.7 MG/DL (ref 8.3–10.6)
CHLORIDE BLD-SCNC: 100 MMOL/L (ref 99–110)
CHOLESTEROL, TOTAL: 147 MG/DL (ref 0–199)
CO2: 24 MMOL/L (ref 21–32)
CREAT SERPL-MCNC: 0.8 MG/DL (ref 0.8–1.3)
GFR SERPL CREATININE-BSD FRML MDRD: >60 ML/MIN/{1.73_M2}
GLUCOSE BLD-MCNC: 234 MG/DL (ref 70–99)
HBA1C MFR BLD: 7.9 %
HCT VFR BLD CALC: 42.9 % (ref 40.5–52.5)
HDLC SERPL-MCNC: 53 MG/DL (ref 40–60)
HEMOGLOBIN: 14.1 G/DL (ref 13.5–17.5)
LDL CHOLESTEROL CALCULATED: 61 MG/DL
MCH RBC QN AUTO: 29 PG (ref 26–34)
MCHC RBC AUTO-ENTMCNC: 32.9 G/DL (ref 31–36)
MCV RBC AUTO: 88.2 FL (ref 80–100)
PDW BLD-RTO: 15.1 % (ref 12.4–15.4)
PLATELET # BLD: 216 K/UL (ref 135–450)
PMV BLD AUTO: 8.3 FL (ref 5–10.5)
POTASSIUM SERPL-SCNC: 4.8 MMOL/L (ref 3.5–5.1)
PROSTATE SPECIFIC ANTIGEN: 0.83 NG/ML (ref 0–4)
RBC # BLD: 4.87 M/UL (ref 4.2–5.9)
SODIUM BLD-SCNC: 141 MMOL/L (ref 136–145)
TOTAL PROTEIN: 6.8 G/DL (ref 6.4–8.2)
TRIGL SERPL-MCNC: 165 MG/DL (ref 0–150)
TSH SERPL DL<=0.05 MIU/L-ACNC: 1.12 UIU/ML (ref 0.27–4.2)
VLDLC SERPL CALC-MCNC: 33 MG/DL
WBC # BLD: 6.7 K/UL (ref 4–11)

## 2023-01-19 PROCEDURE — 3051F HG A1C>EQUAL 7.0%<8.0%: CPT | Performed by: FAMILY MEDICINE

## 2023-01-19 PROCEDURE — 3075F SYST BP GE 130 - 139MM HG: CPT | Performed by: FAMILY MEDICINE

## 2023-01-19 PROCEDURE — 99214 OFFICE O/P EST MOD 30 MIN: CPT | Performed by: FAMILY MEDICINE

## 2023-01-19 PROCEDURE — 83036 HEMOGLOBIN GLYCOSYLATED A1C: CPT | Performed by: FAMILY MEDICINE

## 2023-01-19 PROCEDURE — 3079F DIAST BP 80-89 MM HG: CPT | Performed by: FAMILY MEDICINE

## 2023-01-19 ASSESSMENT — PATIENT HEALTH QUESTIONNAIRE - PHQ9
2. FEELING DOWN, DEPRESSED OR HOPELESS: 0
SUM OF ALL RESPONSES TO PHQ QUESTIONS 1-9: 0
SUM OF ALL RESPONSES TO PHQ QUESTIONS 1-9: 0
1. LITTLE INTEREST OR PLEASURE IN DOING THINGS: 0
SUM OF ALL RESPONSES TO PHQ9 QUESTIONS 1 & 2: 0
SUM OF ALL RESPONSES TO PHQ QUESTIONS 1-9: 0
SUM OF ALL RESPONSES TO PHQ QUESTIONS 1-9: 0

## 2023-01-19 ASSESSMENT — ENCOUNTER SYMPTOMS
STRIDOR: 0
CHEST TIGHTNESS: 0
SHORTNESS OF BREATH: 0
WHEEZING: 0
BACK PAIN: 0
CHOKING: 0
COUGH: 0
ABDOMINAL PAIN: 0

## 2023-01-19 NOTE — PROGRESS NOTES
Subjective:      Patient ID: Michoacano Noonan is a 58 y.o. male. ARMINDA Lorenzo is here for follow-up on type 2 diabetes whose control is reasonable. His blood pressure is well controlled. His ASHD is stable. He is having no angina even though he is working part-time at a physically demanding job. He remains overweight. But is on a weight losing diet and has joined the [employer]. He is no longer drinking alcohol. Review of Systems   Constitutional:  Negative for activity change, appetite change, chills, diaphoresis, fatigue, fever and unexpected weight change. Respiratory:  Negative for cough, choking, chest tightness, shortness of breath, wheezing and stridor. Cardiovascular:  Negative for chest pain, palpitations and leg swelling. Gastrointestinal:  Negative for abdominal pain. Genitourinary:  Negative for difficulty urinating. Musculoskeletal:  Negative for arthralgias and back pain. Neurological:  Negative for dizziness. All other systems reviewed and are negative. Objective:   Physical Exam  Vitals and nursing note reviewed. Constitutional:       Appearance: He is well-developed. HENT:      Head: Normocephalic and atraumatic. Right Ear: External ear normal.      Left Ear: External ear normal.      Nose: Nose normal.   Eyes:      Conjunctiva/sclera: Conjunctivae normal.      Pupils: Pupils are equal, round, and reactive to light. Neck:      Thyroid: No thyromegaly. Vascular: No JVD. Trachea: No tracheal deviation. Cardiovascular:      Rate and Rhythm: Normal rate and regular rhythm. Heart sounds: Normal heart sounds. No murmur heard. No friction rub. No gallop. Pulmonary:      Effort: Pulmonary effort is normal. No respiratory distress. Breath sounds: Normal breath sounds. No stridor. No wheezing or rales. Chest:      Chest wall: No tenderness. Abdominal:      General: Bowel sounds are normal. There is no distension. Palpations: Abdomen is soft.  There is no mass. Tenderness: There is no abdominal tenderness. There is no guarding or rebound. Musculoskeletal:         General: No tenderness. Normal range of motion. Cervical back: Normal range of motion and neck supple. Lymphadenopathy:      Cervical: No cervical adenopathy. Skin:     General: Skin is warm and dry. Coloration: Skin is not pale. Findings: No erythema or rash. Neurological:      Mental Status: He is alert and oriented to person, place, and time. Cranial Nerves: No cranial nerve deficit. Motor: No abnormal muscle tone. Coordination: Coordination normal.      Deep Tendon Reflexes: Reflexes are normal and symmetric. Reflexes normal.       Assessment / Plan:       1. Type 2 diabetes mellitus without complication, without long-term current use of insulin (HCC)-improved control. Continue exercise weight loss glipizide and metformin    - POCT glycosylated hemoglobin (Hb A1C)  - CBC; Future  - Comprehensive Metabolic Panel; Future  - Hepatic Function Panel; Future  - Lipid Panel; Future  - PSA Screening; Future  - TSH; Future    2. Primary hypertension-controlled. Continue metoprolol      3. ASHD (arteriosclerotic heart disease)-stable. Continue metoprolol Plavix nitroglycerin and frequent follow-ups with Dr. Antonio Garcia, cardiologist      4.  Overweight-continue weight losing diet and exercise

## 2023-02-09 DIAGNOSIS — I25.10 CORONARY ARTERY DISEASE INVOLVING NATIVE CORONARY ARTERY OF NATIVE HEART WITHOUT ANGINA PECTORIS: ICD-10-CM

## 2023-02-09 RX ORDER — CLOPIDOGREL BISULFATE 75 MG/1
TABLET ORAL
Qty: 90 TABLET | Refills: 3 | Status: SHIPPED | OUTPATIENT
Start: 2023-02-09

## 2023-02-09 NOTE — TELEPHONE ENCOUNTER
Medication Refill    Medication needing refilled:clopidogrel (PLAVIX) 75 MG tablet      Dosage of the medication: 75 mg     How are you taking this medication (QD, BID, TID, QID, PRN): Take one tablet by mouth once daily    30 or 90 day supply called in: 90    When will you run out of your medication:    Which Pharmacy are we sending the medication to?:  [facility] [address]  [facility] [address], [address] [facility]  [phone]

## 2023-02-23 DIAGNOSIS — E11.9 TYPE 2 DIABETES MELLITUS WITHOUT COMPLICATION, WITHOUT LONG-TERM CURRENT USE OF INSULIN (HCC): ICD-10-CM

## 2023-03-30 RX ORDER — ATORVASTATIN CALCIUM 40 MG/1
TABLET, FILM COATED ORAL
Qty: 30 TABLET | Refills: 0 | Status: SHIPPED | OUTPATIENT
Start: 2023-03-30

## 2023-03-30 RX ORDER — GLIPIZIDE 10 MG/1
TABLET ORAL
Qty: 45 TABLET | Refills: 0 | Status: SHIPPED | OUTPATIENT
Start: 2023-03-30

## 2023-03-30 NOTE — TELEPHONE ENCOUNTER
1/19/2023    Future Appointments   Date Time Provider Abdirahman Craft   4/20/2023  8:30 AM DO CAYLA Madrigal [place] - DYD   10/12/2023  8:00 AM [facility]

## 2023-04-27 ENCOUNTER — HOSPITAL ENCOUNTER (INPATIENT)
Age: 63
LOS: 1 days | Discharge: HOME | DRG: 287 | End: 2023-04-28
Attending: EMERGENCY MEDICINE | Admitting: INTERNAL MEDICINE
Payer: COMMERCIAL

## 2023-04-27 ENCOUNTER — TELEPHONE (OUTPATIENT)
Dept: FAMILY MEDICINE CLINIC | Age: 63
End: 2023-04-27

## 2023-04-27 ENCOUNTER — APPOINTMENT (OUTPATIENT)
Dept: GENERAL RADIOLOGY | Age: 63
DRG: 287 | End: 2023-04-27
Payer: COMMERCIAL

## 2023-04-27 DIAGNOSIS — R07.9 CHEST PAIN, UNSPECIFIED TYPE: Primary | ICD-10-CM

## 2023-04-27 DIAGNOSIS — R06.09 EXERTIONAL DYSPNEA: ICD-10-CM

## 2023-04-27 LAB
ALBUMIN SERPL-MCNC: 4.7 G/DL (ref 3.4–5)
ALBUMIN/GLOB SERPL: 1.7 {RATIO} (ref 1.1–2.2)
ALP SERPL-CCNC: 95 U/L (ref 40–129)
ALT SERPL-CCNC: 22 U/L (ref 10–40)
ANION GAP SERPL CALCULATED.3IONS-SCNC: 12 MMOL/L (ref 3–16)
AST SERPL-CCNC: 17 U/L (ref 15–37)
BASOPHILS # BLD: 0.1 K/UL (ref 0–0.2)
BASOPHILS NFR BLD: 0.8 %
BILIRUB SERPL-MCNC: 0.6 MG/DL (ref 0–1)
BUN SERPL-MCNC: 10 MG/DL (ref 7–20)
CALCIUM SERPL-MCNC: 9.7 MG/DL (ref 8.3–10.6)
CHLORIDE SERPL-SCNC: 100 MMOL/L (ref 99–110)
CO2 SERPL-SCNC: 27 MMOL/L (ref 21–32)
CREAT SERPL-MCNC: 0.7 MG/DL (ref 0.8–1.3)
DEPRECATED RDW RBC AUTO: 15 % (ref 12.4–15.4)
EKG ATRIAL RATE: 71 BPM
EKG DIAGNOSIS: NORMAL
EKG P AXIS: 69 DEGREES
EKG P-R INTERVAL: 184 MS
EKG Q-T INTERVAL: 402 MS
EKG QRS DURATION: 98 MS
EKG QTC CALCULATION (BAZETT): 436 MS
EKG R AXIS: 24 DEGREES
EKG T AXIS: 40 DEGREES
EKG VENTRICULAR RATE: 71 BPM
EOSINOPHIL # BLD: 0.1 K/UL (ref 0–0.6)
EOSINOPHIL NFR BLD: 1.9 %
GFR SERPLBLD CREATININE-BSD FMLA CKD-EPI: >60 ML/MIN/{1.73_M2}
GLUCOSE BLD-MCNC: 223 MG/DL (ref 70–99)
GLUCOSE BLD-MCNC: 228 MG/DL (ref 70–99)
GLUCOSE SERPL-MCNC: 251 MG/DL (ref 70–99)
HCT VFR BLD AUTO: 47.7 % (ref 40.5–52.5)
HGB BLD-MCNC: 16 G/DL (ref 13.5–17.5)
LYMPHOCYTES # BLD: 1.9 K/UL (ref 1–5.1)
LYMPHOCYTES NFR BLD: 24.4 %
MCH RBC QN AUTO: 30.1 PG (ref 26–34)
MCHC RBC AUTO-ENTMCNC: 33.6 G/DL (ref 31–36)
MCV RBC AUTO: 89.6 FL (ref 80–100)
MONOCYTES # BLD: 0.5 K/UL (ref 0–1.3)
MONOCYTES NFR BLD: 6.7 %
NEUTROPHILS # BLD: 5.2 K/UL (ref 1.7–7.7)
NEUTROPHILS NFR BLD: 66.2 %
PERFORMED ON: ABNORMAL
PERFORMED ON: ABNORMAL
PLATELET # BLD AUTO: 240 K/UL (ref 135–450)
PMV BLD AUTO: 7.7 FL (ref 5–10.5)
POTASSIUM SERPL-SCNC: 4.3 MMOL/L (ref 3.5–5.1)
PROT SERPL-MCNC: 7.5 G/DL (ref 6.4–8.2)
RBC # BLD AUTO: 5.33 M/UL (ref 4.2–5.9)
SODIUM SERPL-SCNC: 139 MMOL/L (ref 136–145)
TROPONIN, HIGH SENSITIVITY: 10 NG/L (ref 0–22)
TROPONIN, HIGH SENSITIVITY: 12 NG/L (ref 0–22)
TROPONIN, HIGH SENSITIVITY: 9 NG/L (ref 0–22)
WBC # BLD AUTO: 7.8 K/UL (ref 4–11)

## 2023-04-27 PROCEDURE — 93010 ELECTROCARDIOGRAM REPORT: CPT | Performed by: INTERNAL MEDICINE

## 2023-04-27 PROCEDURE — G0378 HOSPITAL OBSERVATION PER HR: HCPCS

## 2023-04-27 PROCEDURE — 6360000002 HC RX W HCPCS: Performed by: INTERNAL MEDICINE

## 2023-04-27 PROCEDURE — 1200000000 HC SEMI PRIVATE

## 2023-04-27 PROCEDURE — 83036 HEMOGLOBIN GLYCOSYLATED A1C: CPT

## 2023-04-27 PROCEDURE — 6370000000 HC RX 637 (ALT 250 FOR IP): Performed by: INTERNAL MEDICINE

## 2023-04-27 PROCEDURE — 85025 COMPLETE CBC W/AUTO DIFF WBC: CPT

## 2023-04-27 PROCEDURE — 36415 COLL VENOUS BLD VENIPUNCTURE: CPT

## 2023-04-27 PROCEDURE — 71045 X-RAY EXAM CHEST 1 VIEW: CPT

## 2023-04-27 PROCEDURE — 80053 COMPREHEN METABOLIC PANEL: CPT

## 2023-04-27 PROCEDURE — 93005 ELECTROCARDIOGRAM TRACING: CPT | Performed by: EMERGENCY MEDICINE

## 2023-04-27 PROCEDURE — 84484 ASSAY OF TROPONIN QUANT: CPT

## 2023-04-27 PROCEDURE — 96372 THER/PROPH/DIAG INJ SC/IM: CPT

## 2023-04-27 PROCEDURE — 99285 EMERGENCY DEPT VISIT HI MDM: CPT

## 2023-04-27 PROCEDURE — 2580000003 HC RX 258: Performed by: INTERNAL MEDICINE

## 2023-04-27 RX ORDER — ENOXAPARIN SODIUM 100 MG/ML
40 INJECTION SUBCUTANEOUS DAILY
Status: DISCONTINUED | OUTPATIENT
Start: 2023-04-27 | End: 2023-04-28

## 2023-04-27 RX ORDER — CLOPIDOGREL BISULFATE 75 MG/1
75 TABLET ORAL DAILY
Status: DISCONTINUED | OUTPATIENT
Start: 2023-04-27 | End: 2023-04-28 | Stop reason: HOSPADM

## 2023-04-27 RX ORDER — ONDANSETRON 2 MG/ML
4 INJECTION INTRAMUSCULAR; INTRAVENOUS EVERY 6 HOURS PRN
Status: DISCONTINUED | OUTPATIENT
Start: 2023-04-27 | End: 2023-04-28 | Stop reason: HOSPADM

## 2023-04-27 RX ORDER — SODIUM CHLORIDE 9 MG/ML
INJECTION, SOLUTION INTRAVENOUS PRN
Status: DISCONTINUED | OUTPATIENT
Start: 2023-04-27 | End: 2023-04-28 | Stop reason: HOSPADM

## 2023-04-27 RX ORDER — ONDANSETRON 4 MG/1
4 TABLET, ORALLY DISINTEGRATING ORAL EVERY 8 HOURS PRN
Status: DISCONTINUED | OUTPATIENT
Start: 2023-04-27 | End: 2023-04-28 | Stop reason: HOSPADM

## 2023-04-27 RX ORDER — INSULIN LISPRO 100 [IU]/ML
0-4 INJECTION, SOLUTION INTRAVENOUS; SUBCUTANEOUS
Status: DISCONTINUED | OUTPATIENT
Start: 2023-04-27 | End: 2023-04-28 | Stop reason: HOSPADM

## 2023-04-27 RX ORDER — METOPROLOL TARTRATE 50 MG/1
50 TABLET, FILM COATED ORAL 2 TIMES DAILY
Status: DISCONTINUED | OUTPATIENT
Start: 2023-04-27 | End: 2023-04-28 | Stop reason: HOSPADM

## 2023-04-27 RX ORDER — SODIUM CHLORIDE 0.9 % (FLUSH) 0.9 %
5-40 SYRINGE (ML) INJECTION PRN
Status: DISCONTINUED | OUTPATIENT
Start: 2023-04-27 | End: 2023-04-28 | Stop reason: HOSPADM

## 2023-04-27 RX ORDER — DEXTROSE MONOHYDRATE 100 MG/ML
INJECTION, SOLUTION INTRAVENOUS CONTINUOUS PRN
Status: DISCONTINUED | OUTPATIENT
Start: 2023-04-27 | End: 2023-04-28 | Stop reason: HOSPADM

## 2023-04-27 RX ORDER — ASPIRIN 81 MG/1
81 TABLET, CHEWABLE ORAL DAILY
Status: DISCONTINUED | OUTPATIENT
Start: 2023-04-27 | End: 2023-04-28 | Stop reason: HOSPADM

## 2023-04-27 RX ORDER — SODIUM CHLORIDE 0.9 % (FLUSH) 0.9 %
5-40 SYRINGE (ML) INJECTION EVERY 12 HOURS SCHEDULED
Status: DISCONTINUED | OUTPATIENT
Start: 2023-04-27 | End: 2023-04-28 | Stop reason: HOSPADM

## 2023-04-27 RX ORDER — ATORVASTATIN CALCIUM 40 MG/1
40 TABLET, FILM COATED ORAL NIGHTLY
Status: DISCONTINUED | OUTPATIENT
Start: 2023-04-27 | End: 2023-04-28 | Stop reason: HOSPADM

## 2023-04-27 RX ORDER — ACETAMINOPHEN 650 MG/1
650 SUPPOSITORY RECTAL EVERY 6 HOURS PRN
Status: DISCONTINUED | OUTPATIENT
Start: 2023-04-27 | End: 2023-04-28 | Stop reason: HOSPADM

## 2023-04-27 RX ORDER — POLYETHYLENE GLYCOL 3350 17 G/17G
17 POWDER, FOR SOLUTION ORAL DAILY PRN
Status: DISCONTINUED | OUTPATIENT
Start: 2023-04-27 | End: 2023-04-28 | Stop reason: HOSPADM

## 2023-04-27 RX ORDER — ACETAMINOPHEN 325 MG/1
650 TABLET ORAL EVERY 6 HOURS PRN
Status: DISCONTINUED | OUTPATIENT
Start: 2023-04-27 | End: 2023-04-28 | Stop reason: HOSPADM

## 2023-04-27 RX ORDER — INSULIN LISPRO 100 [IU]/ML
0-4 INJECTION, SOLUTION INTRAVENOUS; SUBCUTANEOUS NIGHTLY
Status: DISCONTINUED | OUTPATIENT
Start: 2023-04-27 | End: 2023-04-28 | Stop reason: HOSPADM

## 2023-04-27 RX ADMIN — ENOXAPARIN SODIUM 40 MG: 100 INJECTION SUBCUTANEOUS at 18:18

## 2023-04-27 RX ADMIN — INSULIN LISPRO 1 UNITS: 100 INJECTION, SOLUTION INTRAVENOUS; SUBCUTANEOUS at 18:19

## 2023-04-27 RX ADMIN — Medication 10 ML: at 20:43

## 2023-04-27 RX ADMIN — METOPROLOL TARTRATE 50 MG: 50 TABLET, FILM COATED ORAL at 20:38

## 2023-04-27 RX ADMIN — ATORVASTATIN CALCIUM 40 MG: 40 TABLET, FILM COATED ORAL at 20:38

## 2023-04-27 ASSESSMENT — ENCOUNTER SYMPTOMS
VOMITING: 0
ABDOMINAL PAIN: 0
NAUSEA: 0
COUGH: 0
SHORTNESS OF BREATH: 1

## 2023-04-27 NOTE — ED NOTES
Mr. Carol Duarte is a 58 y.o. male who had concerns including Chest Pain (X 2 weeks, heart history). Chief Complaint   Patient presents with    Chest Pain     X 2 weeks, heart history       He is being admitted for:    Chest pain    His ED problem list included:    1. Chest pain, unspecified type    2.  Exertional dyspnea        Past Medical History:   Diagnosis Date    Alcohol abuse 12/03/2012    Cancer ([facility] 2022    Prostate    Cellulitis     sternum    Coronary artery disease     COVID-19     Diabetic neuropathy associated with type 2 diabetes mellitus ([facility]     Hyperlipidemia     Non morbid obesity     NSTEMI (non-ST elevated myocardial infarction) (HCC)     GURDEEP (obstructive sleep apnea)     does not wear cpap machine    Reactive airway disease with wheezing, moderate persistent, with acute exacerbation 03/03/2020    Sepsis ([facility] 04/2021    Type II or unspecified type diabetes mellitus without mention of complication, not stated as uncontrolled        Past Surgical History:   Procedure Laterality Date    BYPASS GRAFT  02/19/2021    COLONOSCOPY  2017    COLONOSCOPY N/A 7/1/2022    COLONOSCOPY POLYPECTOMY SNARE/COLD BIOPSY performed by Shalini Sandoval DO at [facility]  1/14/2016 9/21/2020    1 Promus Premier SAMUEL 2.25x16    CORONARY ARTERY BYPASS GRAFT  02/19/2021    CABG x2 & ALEX-Dr. Indio Domínguez    CORONARY ARTERY BYPASS GRAFT N/A 2/19/2021    CORONARY ARTERY BYPASS GRAFTING X2, INTERNAL MAMMARY ARTERY, SAPHENOUS VEIN GRAFT, ON PUMP WITH LEFT ATRIAL APPENDAGE CLIP, BILATERAL INTERCOSTAL NERVE BLOCK performed by Kianna Back MD at [address] N/A 4/28/2021    WOUND VAC AND DRESSING CHANGE WITH REMOVAL OF 4 STERNAL WIRES, DRAINAGE OF 2 INTERCOSTAL SPACE ABCESS performed by Davis Lomeli MD at [address] 4/26/2021    DEBRIDEMENT OF STERNAL WOUND WITH WOUND VACUUM PLACEMENT performed by Kianna Back MD at [address]

## 2023-04-27 NOTE — PROGRESS NOTES
Admit to C-3 31 wheelchair to bed from ED, AAOX4. Denies chest pain, Oriented to room, belongings put way, SR 2/4, call light with in reach, Wife at bedside.

## 2023-04-27 NOTE — ED NOTES
Report called to Juaquin Rubio RN C3.  Transport with tele box requested      Jennifer Valentine RN  04/27/23 9810

## 2023-04-27 NOTE — ED PROVIDER NOTES
Emergency Department Attending Physician Note  Location: [facility]  ED  4/27/2023       Pt Name: Rich Garner  MRN: [id number]  [place] 1960    Date of evaluation: 4/27/2023  Provider: Aniket Vidal DO  PCP: Luis F Fink DO    Note Started: 1:04 PM EDT 4/27/23    CHIEF COMPLAINT  Chief Complaint   Patient presents with    Chest Pain     X 2 weeks, heart history       HISTORY OF PRESENT ILLNESS:  History obtained by patient. Limitations to history : None. Rich Garner is a 58 y.o. male with a significant PMHx of previous alcohol use, history of coronary artery disease, multiple stents, and status post CABG in 2021, per patient, with [place], history of sleep apnea, history of diabetes, presenting with department today with 2 weeks of intermittent chest pain, worse with ambulating, but over the past couple days, now can barely walk across the driveway, without exertional shortness of breath. Has been taking multiple doses of nitroglycerin, which relieved the chest pain. Says he has a cardiology appointment in October, and was trying to wait till then, but the chest pain is becoming daily, if at all times today, prompting him to come to the ER. No syncope or lightheadedness. Is not having chest pain right now, says he feels completely normal at rest here in the ED. No leg swelling. No syncope, back pain, or abdominal pain. No other concerns today in the emergency department. Says this chest pain feels exactly like it did when they told him he needed a bypass. Sounds like he told his doctor, Dr. Taya Temple, in passing that he was having the symptoms, at his wife's appointment yesterday, and they told him to come to the emergency department. Nursing Notes were all reviewed and agreed with or any disagreements were addressed in the HPI.     MEDICAL HISTORY  Past Medical History:   Diagnosis Date    Alcohol abuse 12/03/2012    Cancer ([address] 2022    Prostate    Cellulitis     sternum

## 2023-04-27 NOTE — PROGRESS NOTES
4 Eyes Skin Assessment     The patient is being assess for  Admission    I agree that 2 RN's have performed a thorough Head to Toe Skin Assessment on the patient. ALL assessment sites listed below have been assessed. Areas assessed by both nurses: Marylene Snipe RN  [x]   Head, Face, and Ears   [x]   Shoulders, Back, and Chest  [x]   Arms, Elbows, and Hands   [x]   Coccyx, Sacrum, and IschIum  [x]   Legs, Feet, and Heels        Does the Patient have Skin Breakdown?   No , healing scab to left index finger        Octavio Prevention initiated:  Yes   Wound Care Orders initiated:  No      C nurse consulted for Pressure Injury (Stage 3,4, Unstageable, DTI, NWPT, and Complex wounds), New and Established Ostomies:  No      Nurse 1 eSignature: Electronically signed by Andrew Ambrose RN on 4/27/23 at 5:53 PM EDT    **SHARE this note so that the co-signing nurse is able to place an eSignature**    Nurse 2 eSignature: {Esignature:[id number]}          e

## 2023-04-27 NOTE — TELEPHONE ENCOUNTER
His wife was here today for a visit and told me that Tyrone Sing been having increasing severity and frequency of chest pain and needing frequent doses of nitroglycerin. I called his cardiologist office and they were not going to be able to get him in for 4 days. I called and told him I think he should let his wife drive him to the emergency room at [facility] this morning. After some argument he agreed.

## 2023-04-27 NOTE — H&P
History and Physical      Name:  Juan C Hayward /Age/Sex: 1960  (58 y.o. male)   MRN & CSN:  [id number] & [id number] Encounter Date/Time: 2023 3:57 PM EDT   Location:   PCP: Pato Vegas Day: 1    Assessment and Plan:       Hospital Problems             Last Modified POA    * (Principal) Chest pain 2023 Yes     Assessment/Plan    Chest pain, known CAD  - cardiology consulted  - EKG, trop negative  - echo ordered  - stress test ordered  - continue home ASA, plavix, statin, metoprolol    DMII  - poorly controlled  - holding home oral meds  - SSI ordered    HTN  - well controlled  - continue home metoprolol    HLD  - continue statin    Obesity  With Body mass index is 34.99 kg/m². Complicating assessment and treatment. Placing patient at risk for multiple co-morbidities as well as early death and contributing to the patient's presentation. Counseled on weight loss. Disposition:   Current Living situation: Home  Expected Disposition: home  Estimated D/C: 1-2 days    Diet No diet orders on file   DVT Prophylaxis [x] Lovenox, []  Heparin, [] SCDs, [] Ambulation,  [] Eliquis, [] Xarelto, [] Coumadin   Code Status Full code   Surrogate Decision Maker/ POA Spouse     Personally reviewed Lab Studies and Imaging     Discussed management of the case with ER provider who recommended admission    EKG interpreted personally and results showing NSR    Imaging that was interpreted personally includes CXR and results with no acute disease    Drugs that require monitoring for toxicity include insulin and the method of monitoring was POCT glucose        History from:     patient    History of Present Illness:     Chief Complaint: chest pain    Juan C Hayward is a 58 y.o. male with pmh of CAD, DMII, HTN, HLD who presents with chest pain. Patient has been having intermittent chest pain for several days. Pain is sternal and feels like a heavy pressure.  Pain typically occurs in the morning and is

## 2023-04-28 ENCOUNTER — APPOINTMENT (OUTPATIENT)
Dept: NUCLEAR MEDICINE | Age: 63
DRG: 287 | End: 2023-04-28
Payer: COMMERCIAL

## 2023-04-28 ENCOUNTER — TELEPHONE (OUTPATIENT)
Dept: CARDIOLOGY | Age: 63
End: 2023-04-28

## 2023-04-28 ENCOUNTER — APPOINTMENT (OUTPATIENT)
Dept: CARDIAC CATH/INVASIVE PROCEDURES | Age: 63
DRG: 287 | End: 2023-04-28
Payer: COMMERCIAL

## 2023-04-28 VITALS
WEIGHT: 249.7 LBS | OXYGEN SATURATION: 97 % | SYSTOLIC BLOOD PRESSURE: 150 MMHG | HEIGHT: 70 IN | RESPIRATION RATE: 18 BRPM | BODY MASS INDEX: 35.75 KG/M2 | HEART RATE: 68 BPM | DIASTOLIC BLOOD PRESSURE: 80 MMHG | TEMPERATURE: 97.7 F

## 2023-04-28 PROBLEM — I20.0 UNSTABLE ANGINA (HCC): Status: ACTIVE | Noted: 2023-04-28

## 2023-04-28 PROBLEM — I10 HYPERTENSION: Chronic | Status: ACTIVE | Noted: 2020-11-03

## 2023-04-28 PROBLEM — I10 HYPERTENSION: Chronic | Status: RESOLVED | Noted: 2020-11-03 | Resolved: 2020-11-03

## 2023-04-28 LAB
ANION GAP SERPL CALCULATED.3IONS-SCNC: 11 MMOL/L (ref 3–16)
BUN SERPL-MCNC: 11 MG/DL (ref 7–20)
CALCIUM SERPL-MCNC: 8.9 MG/DL (ref 8.3–10.6)
CHLORIDE SERPL-SCNC: 101 MMOL/L (ref 99–110)
CO2 SERPL-SCNC: 23 MMOL/L (ref 21–32)
CREAT SERPL-MCNC: 0.6 MG/DL (ref 0.8–1.3)
DEPRECATED RDW RBC AUTO: 14.9 % (ref 12.4–15.4)
EKG ATRIAL RATE: 64 BPM
EKG DIAGNOSIS: NORMAL
EKG P AXIS: 61 DEGREES
EKG P-R INTERVAL: 200 MS
EKG Q-T INTERVAL: 416 MS
EKG QRS DURATION: 94 MS
EKG QTC CALCULATION (BAZETT): 429 MS
EKG R AXIS: 16 DEGREES
EKG T AXIS: 42 DEGREES
EKG VENTRICULAR RATE: 64 BPM
EST. AVERAGE GLUCOSE BLD GHB EST-MCNC: 194.4 MG/DL
GFR SERPLBLD CREATININE-BSD FMLA CKD-EPI: >60 ML/MIN/{1.73_M2}
GLUCOSE BLD-MCNC: 199 MG/DL (ref 70–99)
GLUCOSE BLD-MCNC: 234 MG/DL (ref 70–99)
GLUCOSE SERPL-MCNC: 219 MG/DL (ref 70–99)
HBA1C MFR BLD: 8.4 %
HCT VFR BLD AUTO: 42.3 % (ref 40.5–52.5)
HGB BLD-MCNC: 14.1 G/DL (ref 13.5–17.5)
LV EF: 58 %
LV EF: 58 %
LVEF MODALITY: NORMAL
LVEF MODALITY: NORMAL
MCH RBC QN AUTO: 30 PG (ref 26–34)
MCHC RBC AUTO-ENTMCNC: 33.4 G/DL (ref 31–36)
MCV RBC AUTO: 89.9 FL (ref 80–100)
PERFORMED ON: ABNORMAL
PERFORMED ON: ABNORMAL
PLATELET # BLD AUTO: 192 K/UL (ref 135–450)
PMV BLD AUTO: 7.7 FL (ref 5–10.5)
POTASSIUM SERPL-SCNC: 4.3 MMOL/L (ref 3.5–5.1)
RBC # BLD AUTO: 4.7 M/UL (ref 4.2–5.9)
SODIUM SERPL-SCNC: 135 MMOL/L (ref 136–145)
WBC # BLD AUTO: 7.2 K/UL (ref 4–11)

## 2023-04-28 PROCEDURE — 2500000003 HC RX 250 WO HCPCS

## 2023-04-28 PROCEDURE — 2709999900 HC NON-CHARGEABLE SUPPLY

## 2023-04-28 PROCEDURE — 2580000003 HC RX 258: Performed by: INTERNAL MEDICINE

## 2023-04-28 PROCEDURE — 99152 MOD SED SAME PHYS/QHP 5/>YRS: CPT | Performed by: INTERNAL MEDICINE

## 2023-04-28 PROCEDURE — 6370000000 HC RX 637 (ALT 250 FOR IP): Performed by: INTERNAL MEDICINE

## 2023-04-28 PROCEDURE — 93459 L HRT ART/GRFT ANGIO: CPT

## 2023-04-28 PROCEDURE — 78451 HT MUSCLE IMAGE SPECT SING: CPT

## 2023-04-28 PROCEDURE — B2111ZZ FLUOROSCOPY OF MULTIPLE CORONARY ARTERIES USING LOW OSMOLAR CONTRAST: ICD-10-PCS | Performed by: INTERNAL MEDICINE

## 2023-04-28 PROCEDURE — B2131ZZ FLUOROSCOPY OF MULTIPLE CORONARY ARTERY BYPASS GRAFTS USING LOW OSMOLAR CONTRAST: ICD-10-PCS | Performed by: INTERNAL MEDICINE

## 2023-04-28 PROCEDURE — C1769 GUIDE WIRE: HCPCS

## 2023-04-28 PROCEDURE — 3430000000 HC RX DIAGNOSTIC RADIOPHARMACEUTICAL: Performed by: INTERNAL MEDICINE

## 2023-04-28 PROCEDURE — B2151ZZ FLUOROSCOPY OF LEFT HEART USING LOW OSMOLAR CONTRAST: ICD-10-PCS | Performed by: INTERNAL MEDICINE

## 2023-04-28 PROCEDURE — A9502 TC99M TETROFOSMIN: HCPCS | Performed by: INTERNAL MEDICINE

## 2023-04-28 PROCEDURE — 4A023N7 MEASUREMENT OF CARDIAC SAMPLING AND PRESSURE, LEFT HEART, PERCUTANEOUS APPROACH: ICD-10-PCS | Performed by: INTERNAL MEDICINE

## 2023-04-28 PROCEDURE — 93005 ELECTROCARDIOGRAM TRACING: CPT | Performed by: INTERNAL MEDICINE

## 2023-04-28 PROCEDURE — 99255 IP/OBS CONSLTJ NEW/EST HI 80: CPT | Performed by: INTERNAL MEDICINE

## 2023-04-28 PROCEDURE — 6370000000 HC RX 637 (ALT 250 FOR IP)

## 2023-04-28 PROCEDURE — 85027 COMPLETE CBC AUTOMATED: CPT

## 2023-04-28 PROCEDURE — 93010 ELECTROCARDIOGRAM REPORT: CPT | Performed by: INTERNAL MEDICINE

## 2023-04-28 PROCEDURE — B2181ZZ FLUOROSCOPY OF LEFT INTERNAL MAMMARY BYPASS GRAFT USING LOW OSMOLAR CONTRAST: ICD-10-PCS | Performed by: INTERNAL MEDICINE

## 2023-04-28 PROCEDURE — 1200000000 HC SEMI PRIVATE

## 2023-04-28 PROCEDURE — C1894 INTRO/SHEATH, NON-LASER: HCPCS

## 2023-04-28 PROCEDURE — C8929 TTE W OR WO FOL WCON,DOPPLER: HCPCS

## 2023-04-28 PROCEDURE — 80048 BASIC METABOLIC PNL TOTAL CA: CPT

## 2023-04-28 PROCEDURE — 36415 COLL VENOUS BLD VENIPUNCTURE: CPT

## 2023-04-28 PROCEDURE — 6360000002 HC RX W HCPCS

## 2023-04-28 PROCEDURE — 93459 L HRT ART/GRFT ANGIO: CPT | Performed by: INTERNAL MEDICINE

## 2023-04-28 RX ORDER — MIDAZOLAM HYDROCHLORIDE 1 MG/ML
INJECTION INTRAMUSCULAR; INTRAVENOUS
Status: COMPLETED | OUTPATIENT
Start: 2023-04-28 | End: 2023-04-28

## 2023-04-28 RX ORDER — FENTANYL CITRATE 50 UG/ML
INJECTION, SOLUTION INTRAMUSCULAR; INTRAVENOUS
Status: COMPLETED | OUTPATIENT
Start: 2023-04-28 | End: 2023-04-28

## 2023-04-28 RX ORDER — SODIUM CHLORIDE 0.9 % (FLUSH) 0.9 %
5-40 SYRINGE (ML) INJECTION EVERY 12 HOURS SCHEDULED
Status: DISCONTINUED | OUTPATIENT
Start: 2023-04-28 | End: 2023-04-28 | Stop reason: HOSPADM

## 2023-04-28 RX ORDER — SODIUM CHLORIDE 9 MG/ML
INJECTION, SOLUTION INTRAVENOUS CONTINUOUS
Status: ACTIVE | OUTPATIENT
Start: 2023-04-28 | End: 2023-04-28

## 2023-04-28 RX ORDER — SODIUM CHLORIDE 9 MG/ML
INJECTION, SOLUTION INTRAVENOUS PRN
Status: DISCONTINUED | OUTPATIENT
Start: 2023-04-28 | End: 2023-04-28 | Stop reason: HOSPADM

## 2023-04-28 RX ORDER — HYDRALAZINE HYDROCHLORIDE 20 MG/ML
10 INJECTION INTRAMUSCULAR; INTRAVENOUS EVERY 10 MIN PRN
Status: DISCONTINUED | OUTPATIENT
Start: 2023-04-28 | End: 2023-04-28 | Stop reason: HOSPADM

## 2023-04-28 RX ORDER — SODIUM CHLORIDE 0.9 % (FLUSH) 0.9 %
5-40 SYRINGE (ML) INJECTION PRN
Status: DISCONTINUED | OUTPATIENT
Start: 2023-04-28 | End: 2023-04-28 | Stop reason: HOSPADM

## 2023-04-28 RX ORDER — LISINOPRIL 10 MG/1
5 TABLET ORAL DAILY
Status: DISCONTINUED | OUTPATIENT
Start: 2023-04-28 | End: 2023-04-28

## 2023-04-28 RX ORDER — ENOXAPARIN SODIUM 100 MG/ML
30 INJECTION SUBCUTANEOUS 2 TIMES DAILY
Status: DISCONTINUED | OUTPATIENT
Start: 2023-04-28 | End: 2023-04-28 | Stop reason: HOSPADM

## 2023-04-28 RX ORDER — LISINOPRIL 10 MG/1
10 TABLET ORAL DAILY
Qty: 30 TABLET | Refills: 3 | Status: SHIPPED | OUTPATIENT
Start: 2023-04-29

## 2023-04-28 RX ORDER — LISINOPRIL 10 MG/1
10 TABLET ORAL DAILY
Status: DISCONTINUED | OUTPATIENT
Start: 2023-04-28 | End: 2023-04-28 | Stop reason: HOSPADM

## 2023-04-28 RX ADMIN — METOPROLOL TARTRATE 50 MG: 50 TABLET, FILM COATED ORAL at 07:58

## 2023-04-28 RX ADMIN — CLOPIDOGREL BISULFATE 75 MG: 75 TABLET ORAL at 07:58

## 2023-04-28 RX ADMIN — FENTANYL CITRATE 25 MCG: 50 INJECTION, SOLUTION INTRAMUSCULAR; INTRAVENOUS at 11:30

## 2023-04-28 RX ADMIN — FENTANYL CITRATE 50 MCG: 50 INJECTION, SOLUTION INTRAMUSCULAR; INTRAVENOUS at 11:50

## 2023-04-28 RX ADMIN — MIDAZOLAM HYDROCHLORIDE 1 MG: 1 INJECTION INTRAMUSCULAR; INTRAVENOUS at 11:40

## 2023-04-28 RX ADMIN — INSULIN LISPRO 1 UNITS: 100 INJECTION, SOLUTION INTRAVENOUS; SUBCUTANEOUS at 14:03

## 2023-04-28 RX ADMIN — FENTANYL CITRATE 25 MCG: 50 INJECTION, SOLUTION INTRAMUSCULAR; INTRAVENOUS at 11:40

## 2023-04-28 RX ADMIN — Medication 10 ML: at 07:58

## 2023-04-28 RX ADMIN — SODIUM CHLORIDE: 9 INJECTION, SOLUTION INTRAVENOUS at 13:26

## 2023-04-28 RX ADMIN — MIDAZOLAM HYDROCHLORIDE 1 MG: 1 INJECTION INTRAMUSCULAR; INTRAVENOUS at 11:30

## 2023-04-28 RX ADMIN — MIDAZOLAM HYDROCHLORIDE 2 MG: 1 INJECTION INTRAMUSCULAR; INTRAVENOUS at 11:19

## 2023-04-28 RX ADMIN — ASPIRIN 81 MG 81 MG: 81 TABLET ORAL at 07:58

## 2023-04-28 RX ADMIN — FENTANYL CITRATE 50 MCG: 50 INJECTION, SOLUTION INTRAMUSCULAR; INTRAVENOUS at 11:59

## 2023-04-28 RX ADMIN — LISINOPRIL 10 MG: 10 TABLET ORAL at 13:22

## 2023-04-28 RX ADMIN — FENTANYL CITRATE 50 MCG: 50 INJECTION, SOLUTION INTRAMUSCULAR; INTRAVENOUS at 11:19

## 2023-04-28 RX ADMIN — TETROFOSMIN 11 MILLICURIE: 1.38 INJECTION, POWDER, LYOPHILIZED, FOR SOLUTION INTRAVENOUS at 09:20

## 2023-04-28 ASSESSMENT — PAIN SCALES - GENERAL: PAINLEVEL_OUTOF10: 0

## 2023-04-28 NOTE — PROGRESS NOTES
Pt assessment completed and charted. VSS. Pt a/o. Pt denies CP/n/v. Wife @ bedside. Ambulates independently. Bed in lowest position and wheels locked. Call light within reach. Bedside table within reach. Non-skid footwear in place. Pt denies any other needs at this time. Pt calls out appropriately.

## 2023-04-28 NOTE — DISCHARGE SUMMARY
V2.0  Discharge Summary    Name:  Ruperto Scott /Age/Sex: 1960 (58 y.o. male)   Admit Date: 2023  Discharge Date: 23    MRN & CSN:  [id number] & [id number] Encounter Date and Time 23 1:50 PM EDT    Attending:  No att. providers found Discharging Provider: Yaa Boo DO       Hospital Course:     Brief HPI: Ruperto Scott is a 58 y.o. male who presented with chest pain    Brief Problem Based Course:     Chest Pain  -cardiology consulted  -EKG, trop negative, Hgb a1c pending  -EKG NSR  -Echocardiogram performed this AM - LVEF of 55%-60%  -NM stress test performed this AM, results pending  -Diagnostic cardiac cath performed this AM, results show stable CAD with patent saphenous vein graft and left internal mammary artery conduits    -continue at discharge the following medications: ASA 81 mg po daily, clopidogrel 75 mg po daily, atorvastatin 40 mg po daily, metoprolol tartrate 50 mg BID, lisinopril 10 mg po daily      Type 2 Diabetes Mellitus With Obesity (Hcc)  -not controlled  -Hgb a1c result from 23 - 8.4%  -held home glipizide and metformin PO while IP, resume at discharge      Hypertension  -not controlled  -metoprolol tartrate 50 mg BID  -lisinopril 10 mg po daily  -continue to monitor closely      Hyperlipidemia  -controlled  -continue ASA 81 mg po daily, atorvastatin 40 mg po daily      Obesity  -Body mass index is 34.99 kg/m². Complicating assessment and treatment. Placing patient at risk for multiple co-morbidities as well as early death and contributing to the patient's presentation.   - on weight loss. The patient expressed appropriate understanding of, and agreement with the discharge recommendations, medications, and plan.      Consults this admission:  IP CONSULT TO HOSPITALIST  IP CONSULT TO CARDIOLOGY    Discharge Diagnosis:   Chest pain  CAD  T2DM with Obesity  HTN  HLD    Discharge Instruction:   Follow up appointments: follow-up with cardiology

## 2023-04-28 NOTE — PLAN OF CARE
Problem: Safety - Adult  Goal: Free from fall injury  Outcome: Progressing  Flowsheets (Taken 4/27/2023 2350)  Free From Fall Injury: Instruct family/caregiver on patient safety     Problem: Cardiovascular - Adult  Goal: Maintains optimal cardiac output and hemodynamic stability  Outcome: Progressing  Flowsheets (Taken 4/27/2023 2350)  Maintains optimal cardiac output and hemodynamic stability:   Monitor blood pressure and heart rate   Assess for signs of decreased cardiac output

## 2023-04-28 NOTE — PROCEDURES
Temp 97.8 °F (36.6 °C) (Oral)   Resp 14   Ht 5' 10\" (1.778 m)   Wt 249 lb 11.2 oz (113.3 kg)   SpO2 98%   BMI 35.83 kg/m²     The access site was controlled with manual pressure and/or appropriate closure device. Moderate Conscious Sedation Details: An independent trained observer pushed medications at my direction. We monitoring the patient's level of consciousness and vital signs/physiologic status throughout the procedure. CPT codes 75881 and 45799      Start time: 1118  Stop time: 1202  ASA class: 3    Sedation totals:  Versad - 4mg  Fentanyl - 200mcg    EBL - minimal <5 cc blood loss    The patient was monitored continuously with the ECG, pulse oximetry, blood pressure, and direct observation. CONCLUSIONS:    1.  Stable coronary artery disease with patent saphenous vein graft and left internal mammary artery conduits    ASSESSMENT/RECOMMENDATIONS:    1. Secondary risk factor modification and antianginal therapy.       Ange Heath MD, ALEX, [place] [address] Cardiology  [facility]  [phone] Main central office  [phone] [facility] office  4/28/2023  12:07 PM

## 2023-04-28 NOTE — TELEPHONE ENCOUNTER
Dr. Medrano Likes wants to see the patient in [place] in a few weeks for HSFU s/p cath. Patient currently and IP at [facility].

## 2023-04-28 NOTE — PLAN OF CARE
Problem: Safety - Adult  Goal: Free from fall injury  4/28/2023 0954 by Dariana Noonan RN  Outcome: Progressing  Flowsheets (Taken 4/28/2023 0954)  Free From Fall Injury: Instruct family/caregiver on patient safety

## 2023-04-28 NOTE — DISCHARGE INSTRUCTIONS
Please take all medications as prescribed. Call your primary care provider's office to schedule a post-hospitalization follow-up appointment within 14 days of being discharged from hospital.     Remember to go to your scheduled follow-up appointment with cardiology with Dr. Migel Mueller MD. Further information is listed below:  Tamir Bailey S appointment on May 30th at 2:15pm with Dr. Caleb Newton, [facility]. You and your one visitor will need to have your mouth and nose covered with a mask. No children please. [address] [facility] office. [address] [facility], [address] [place] [address], [address], [address] [facility]. Office #: [phone]. If you are unable to make this appointment, please call to reschedule.

## 2023-04-28 NOTE — PRE SEDATION
125 mL  125 mL IntraVENous PRN Kristian Schirmer, MD        Or    dextrose bolus 10% 250 mL  250 mL IntraVENous PRN Kristian Schirmer, MD        glucagon (rDNA) injection 1 mg  1 mg SubCUTAneous PRN Kristian Schirmer, MD        dextrose 10 % infusion   IntraVENous Continuous PRN Kristian Schirmer, MD        sodium chloride flush 0.9 % injection 5-40 mL  5-40 mL IntraVENous 2 times per day Kristian Schirmer, MD   10 mL at 04/28/23 0758    sodium chloride flush 0.9 % injection 5-40 mL  5-40 mL IntraVENous PRN Kristian Schirmer, MD        0.9 % sodium chloride infusion   IntraVENous PRN Kristian Schirmer, MD        ondansetron (ZOFRAN-ODT) disintegrating tablet 4 mg  4 mg Oral Q8H PRN Kristian Schirmer, MD        Or    ondansetron [facility]) injection 4 mg  4 mg IntraVENous Q6H PRN Kristian Schirmer, MD        acetaminophen (TYLENOL) tablet 650 mg  650 mg Oral Q6H PRN Kristian Schirmer, MD        Or    acetaminophen (TYLENOL) suppository 650 mg  650 mg Rectal Q6H PRN Kristian Schirmer, MD        polyethylene glycol (GLYCOLAX) packet 17 g  17 g Oral Daily PRN Kristian Schirmer, MD        perflutren lipid microspheres (DEFINITY) injection 1.5 mL  1.5 mL IntraVENous ONCE PRN Kristian Schirmer, MD        regadenoson [facility]) injection 0.4 mg  0.4 mg IntraVENous ONCE PRN Kristian Schirmer, MD        insulin lispro (HUMALOG) injection vial 0-4 Units  0-4 Units SubCUTAneous TID WC Kristian Schirmer, MD   1 Units at 04/27/23 1819    insulin lispro (HUMALOG) injection vial 0-4 Units  0-4 Units SubCUTAneous Nightly Kristian Schirmer, MD        enoxaparin (LOVENOX) injection 40 mg  40 mg SubCUTAneous Daily Kristian Schirmer, MD   40 mg at 04/27/23 1818       Past Medical History:    Past Medical History:   Diagnosis Date    Alcohol abuse 12/03/2012    Cancer ([facility] 2022    Prostate    Cellulitis     sternum    Coronary artery disease     COVID-19     Diabetic neuropathy associated with type 2 diabetes mellitus ([facility]     Hyperlipidemia     Non morbid obesity     NSTEMI (non-ST elevated myocardial

## 2023-04-28 NOTE — CARE COORDINATION
Case Management Assessment  Initial Evaluation    Date/Time of Evaluation: 4/28/2023 3:17 PM  Assessment Completed by: Bill Grace RN    If patient is discharged prior to next notation, then this note serves as note for discharge by case management. Patient Name: Fox Escobar                   YOB: 1960  Diagnosis: Chest pain [R07.9]  Exertional dyspnea [R06.09]  Chest pain, unspecified type [R07.9]  Unstable angina ([address] [I20.0]                   Date / Time: 4/27/2023 12:53 PM    Patient Admission Status: Inpatient   Readmission Risk (Low < 19, Mod (19-27), High > 27): Readmission Risk Score: 6.4    Current PCP: Russ Barry, DO  PCP verified by CM? Yes    Chart Reviewed: Yes      History Provided by: Patient  Patient Orientation: Alert and Oriented, Person, Place, Situation, Self    Patient Cognition: Alert    Hospitalization in the last 30 days (Readmission):  No    If yes, Readmission Assessment in CM Navigator will be completed. Advance Directives:      Code Status: Full Code   Patient's Primary Decision Maker is: Legal Next of Kin    Primary Decision Maker (Active): Brittny Chin - Spouse - [id number]    Discharge Planning:    Patient lives with: Spouse/Significant Other Type of Home: House  Primary Care Giver: Self  Patient Support Systems include: Spouse/Significant Other   Current Financial resources: Other (Comment) (commercial insurance)  Current community resources: Housing  Current services prior to admission: None            Current DME:              Type of Home Care services:  None    ADLS  Prior functional level: Independent in ADLs/IADLs  Current functional level: Independent in ADLs/IADLs    PT AM-PAC:   /24  OT AM-PAC:   /24    Family can provide assistance at DC: Yes  Would you like Case Management to discuss the discharge plan with any other family members/significant others, and if so, who?  Yes  Plans to Return to Present Housing: Yes  Other Identified Issues/Barriers to

## 2023-04-28 NOTE — DISCHARGE INSTR - COC
Continuity of Care Form    Patient Name: Praveen Waggoner   :  1960  MRN:  [id number]    Admit date:  2023  Discharge date:  ***    Code Status Order: Full Code   Advance Directives:     Admitting Physician:  Nathan Kohli MD  PCP: Arash Sims DO    Discharging Nurse: [facility] Unit/Room#: 5754/9265-07  Discharging Unit Phone Number: ***    Emergency Contact:   Extended Emergency Contact Information  Primary Emergency Contact: Brittny Chin  Address: [address] Phone: [phone]  Work Phone: [phone]  Mobile Phone: [phone]  Relation: Spouse  Secondary Emergency Contact: JacekAlma  Address: [address] Phone: [phone]  Mobile Phone: [phone]  Relation: Brother/Sister  Preferred language: English    Past Surgical History:  Past Surgical History:   Procedure Laterality Date    BYPASS GRAFT  2021    COLONOSCOPY  2017    COLONOSCOPY N/A 2022    COLONOSCOPY POLYPECTOMY SNARE/COLD BIOPSY performed by Francis Escamilla DO at [facility]  2016    1 Promus Premier SAMUEL 2.25x16    CORONARY ARTERY BYPASS GRAFT  2021    CABG x2 & ALEX-Dr. Elmer Friedman    CORONARY ARTERY BYPASS GRAFT N/A 2021    CORONARY ARTERY BYPASS GRAFTING X2, INTERNAL MAMMARY ARTERY, SAPHENOUS VEIN GRAFT, ON PUMP WITH LEFT ATRIAL APPENDAGE CLIP, BILATERAL INTERCOSTAL NERVE BLOCK performed by Dianna Ford MD at [address] 2021    WOUND VAC AND DRESSING CHANGE WITH REMOVAL OF 4 STERNAL WIRES, DRAINAGE OF 2 INTERCOSTAL SPACE ABCESS performed by Juan F Vinson MD at [address] [place] [address] 2021    DEBRIDEMENT OF STERNAL WOUND WITH [address] performed by Dianna Ford MD at [facility] 7 Right 10/13/2022    PARTIAL RIGHT FOOT

## 2023-04-28 NOTE — CONSULTS
[address] [facility]   Cardiovascular Evaluation    PATIENT: Kashmir Segundo  DATE: 2023  MRN: [id number]  CSN: [id number]  : 1960    Primary Care Doctor/Referring provider: Vanessa Romero DO, Celina Manzano MD     Reason for evaluation/Chief complaint:   Chest Pain (X 2 weeks, heart history)      Subjective:    History of present illness on initial date of evaluation:   Kashmir Segundo is a 58 y.o. patient who presents for the evaluation of progressive and recurrent chest pain. The patient is known to our cardiovascular practice and has an extensive history of ischemic heart disease. He is typically followed with our interventional team and is undergone repeated revascularizations with recurrent PCI of the first obtuse marginal branch. He recently states that symptoms returned the last couple of weeks. The symptoms have occurred with exertion described as a tightness in his chest and typical of what he had felt previously. This prompted the patient to seek medical evaluation in the emergency room. He was ultimately admitted to the hospital.  He was set up for stress testing and cardiology consultation.       Patient Active Problem List   Diagnosis    Type II or unspecified type diabetes mellitus without mention of complication, not stated as uncontrolled    Hypertension    Diabetes mellitus    History of ETOH abuse    Patient overweight    Patient overweight    Dietary noncompliance    Alcohol abuse    Flank pain    Prostatism    Low serum testosterone level    Arm pain, left    Shoulder pain    Precordial pain    Lung nodule    Acute angina (HCC)    Abnormal stress test    Acute coronary syndrome (HCC)    Coronary artery disease involving native coronary artery of native heart without angina pectoris    Other chest pain    S/P drug eluting coronary stent placement    Essential hypertension    Obesity    Abnormal chest x-ray    Angina effort    Skin lesion    Angina at rest [facility])

## 2023-04-28 NOTE — PROGRESS NOTES
Pt assessment completed and charted. VSS. Pt a/ox4. pt denies chest pain overnight and this morning. Pt NPO. Pt denies any other needs at this time. Pt calls out appropriately. Pt went to stress lab this morning and now will be going to cath lab. Pt is a fall risk;  -Bed in lowest position and wheels locked. -Call light within reach.   -Bedside table within reach.   -Non-skid footwear in place.

## 2023-04-28 NOTE — POST SEDATION
Patient:  Rich Garner   :   1960    A pre-sedation re-evaluation was performed immediately at the end of the procedure. Procedure:  Cardiac cath  Medications: Procedural sedation with minimal conscious sedation  Complications: None  Estimated Blood Loss: none  Specimens: Were not obtained        Fernanda Medication and Procedural Reconciliation:  I agree that the documented medications and procedures performed are true. The medications were given under my order. The procedures were performed under my direct supervision.

## 2023-04-28 NOTE — PROGRESS NOTES
V2.0    [facility] Progress Note      Name:  Pravin Malhotra /Age/Sex: 1960  (58 y.o. male)   MRN & CSN:  [id number] & [id number] Encounter Date/Time: 2023 8:14 AM EDT   Location:  Cath Pool /NONE PCP: Vasyl Jose DO     Attending:Tremayne Acevedo, [address] [facility] Day: 2    Assessment and Recommendations   Pravin Malhotra is a 58 y.o. male with pmh of CAD, DMII, HTN, HLD who presents with Chest pain    Assessment & Plan   Chest Pain  -cardiology consulted  -EKG, trop negative  -Hgb a1c pending  -EKG NSR this AM  -echo ordered, previous Echo was 2021 with LVEF of 55%  -stress test performed this AM  -patient taken to cardiac cath lab this AM  -continue ASA 81 mg po daily, clopidogrel 75 mg po daily, atorvastatin 40 mg po daily, metoprolol tartrate 50 mg BID     Type 2 Diabetes Mellitus With Obesity (Hcc)  -not controlled  -holding home glipizide and metformin PO  -Hgb a1c pending  -SSI ordered     Hypertension  -not controlled  -continue metoprolol tartrate 50 mg BID  -start lisinopril 10 mg po daily  -continue to monitor closely     Hyperlipidemia  -controlled  -continue ASA 81 mg po daily, atorvastatin 40 mg po daily     Obesity  -Body mass index is 34.99 kg/m². Complicating assessment and treatment. Placing patient at risk for multiple co-morbidities as well as early death and contributing to the patient's presentation.   - on weight loss.           Diet Diet NPO   DVT Prophylaxis [x] Lovenox, []  Heparin, [] SCDs, [] Ambulation,  [] Eliquis, [] Xarelto   Code Status Full Code   Disposition From: home  Expected Disposition: home  Estimated Date of Discharge: -  Patient requires continued admission due to cardiac cath   Surrogate Decision Maker/ POA  spouse     Personally reviewed Lab Studies and Imaging     Discussed management of the case with Dr. Daniel Acevedo MD who recommended follow cardiology rec's    EKG interpreted personally and results showing NSR     Imaging that was interpreted

## 2023-04-28 NOTE — TELEPHONE ENCOUNTER
I scheduled the pt at [facility] 21 with vsp for 5/30/23 at 215p.      *5/23/23 did not have any openings, but 5/30/23 had the 215p open slot

## 2023-04-28 NOTE — PROGRESS NOTES
Discharge paperwork provided and reviewed with patient. All questions answered; review of wrist restrictions post cardiac cath were discussed, pt verbalized understanding. Saline lock and heart monitor removed. Pt declined wheel chair and ambulated out independently with spouse.

## 2023-05-01 ENCOUNTER — TELEPHONE (OUTPATIENT)
Dept: FAMILY MEDICINE CLINIC | Age: 63
End: 2023-05-01

## 2023-05-01 RX ORDER — ATORVASTATIN CALCIUM 40 MG/1
TABLET, FILM COATED ORAL
Qty: 30 TABLET | Refills: 0 | Status: SHIPPED | OUTPATIENT
Start: 2023-05-01

## 2023-05-01 RX ORDER — GLIPIZIDE 10 MG/1
TABLET ORAL
Qty: 45 TABLET | Refills: 0 | Status: SHIPPED | OUTPATIENT
Start: 2023-05-01 | End: 2023-05-04 | Stop reason: SDUPTHER

## 2023-05-01 NOTE — TELEPHONE ENCOUNTER
1/19/2023    Future Appointments   Date Time Provider Abdirahman Craft   5/4/2023 11:20 AM Avinash Zuniga MD [facility] [place] [facility]   5/30/2023  2:15 PM Clay Ramírez MD [facility]   10/12/2023  8:00 AM [facility]

## 2023-05-01 NOTE — TELEPHONE ENCOUNTER
Wife called. Made hospital follow up appointment.   Please call to do TCM    Future Appointments   Date Time Provider Abdirahman Craft   5/4/2023 11:20 AM [facility] [place] [facility]   5/30/2023  2:15 PM Elbert Patricio MD [facility]   10/12/2023  8:00 AM Nancy Cano MD [facility] C[facility]

## 2023-05-01 NOTE — TELEPHONE ENCOUNTER
Care Transitions Initial Follow Up Call    Outreach made within 2 business days of discharge: Yes    Patient: Ector Malik Patient : 1960   MRN: [id number]  Reason for Admission: There are no discharge diagnoses documented for the most recent discharge. Discharge Date: 23       Spoke with:       Discharge department/facility: [facility]      [facility] Interactive Patient Contact:  Was patient able to fill all prescriptions: Yes  Was patient instructed to bring all medications to the follow-up visit: Yes  Is patient taking all medications as directed in the discharge summary?  Yes  Does patient understand their discharge instructions: Yes  Does patient have questions or concerns that need addressed prior to 7-14 day follow up office visit: yes -   Scheduled appointment with PCP within 7-14 days    Follow Up  Future Appointments   Date Time Provider Abdiramhan Craft   2023 11:20 AM MD CAYLA Griffiths [place] [facility]   2023  2:15 PM Agustín Daniels MD [facility]   10/12/2023  8:00 AM Lizz Arellano MD [address]

## 2023-05-04 ENCOUNTER — OFFICE VISIT (OUTPATIENT)
Dept: FAMILY MEDICINE CLINIC | Age: 63
End: 2023-05-04
Payer: COMMERCIAL

## 2023-05-04 VITALS
HEART RATE: 66 BPM | SYSTOLIC BLOOD PRESSURE: 118 MMHG | BODY MASS INDEX: 35.44 KG/M2 | WEIGHT: 247 LBS | DIASTOLIC BLOOD PRESSURE: 78 MMHG | TEMPERATURE: 97.1 F | RESPIRATION RATE: 16 BRPM | OXYGEN SATURATION: 98 %

## 2023-05-04 DIAGNOSIS — I25.10 CORONARY ARTERY DISEASE INVOLVING NATIVE CORONARY ARTERY OF NATIVE HEART WITHOUT ANGINA PECTORIS: ICD-10-CM

## 2023-05-04 DIAGNOSIS — Z98.890 S/P CARDIAC CATHETERIZATION: ICD-10-CM

## 2023-05-04 DIAGNOSIS — I10 PRIMARY HYPERTENSION: ICD-10-CM

## 2023-05-04 DIAGNOSIS — E11.69 TYPE 2 DIABETES MELLITUS WITH OTHER SPECIFIED COMPLICATION, WITHOUT LONG-TERM CURRENT USE OF INSULIN (HCC): ICD-10-CM

## 2023-05-04 DIAGNOSIS — Z09 HOSPITAL DISCHARGE FOLLOW-UP: Primary | ICD-10-CM

## 2023-05-04 DIAGNOSIS — E66.01 CLASS 2 SEVERE OBESITY WITH SERIOUS COMORBIDITY AND BODY MASS INDEX (BMI) OF 35.0 TO 35.9 IN ADULT, UNSPECIFIED OBESITY TYPE (HCC): ICD-10-CM

## 2023-05-04 PROCEDURE — 99214 OFFICE O/P EST MOD 30 MIN: CPT | Performed by: STUDENT IN AN ORGANIZED HEALTH CARE EDUCATION/TRAINING PROGRAM

## 2023-05-04 PROCEDURE — 3074F SYST BP LT 130 MM HG: CPT | Performed by: STUDENT IN AN ORGANIZED HEALTH CARE EDUCATION/TRAINING PROGRAM

## 2023-05-04 PROCEDURE — 3078F DIAST BP <80 MM HG: CPT | Performed by: STUDENT IN AN ORGANIZED HEALTH CARE EDUCATION/TRAINING PROGRAM

## 2023-05-04 PROCEDURE — 1111F DSCHRG MED/CURRENT MED MERGE: CPT | Performed by: STUDENT IN AN ORGANIZED HEALTH CARE EDUCATION/TRAINING PROGRAM

## 2023-05-04 PROCEDURE — 3052F HG A1C>EQUAL 8.0%<EQUAL 9.0%: CPT | Performed by: STUDENT IN AN ORGANIZED HEALTH CARE EDUCATION/TRAINING PROGRAM

## 2023-05-04 RX ORDER — GLIPIZIDE 10 MG/1
10 TABLET ORAL
Qty: 60 TABLET | Refills: 0 | Status: SHIPPED | OUTPATIENT
Start: 2023-05-04

## 2023-05-30 ENCOUNTER — OFFICE VISIT (OUTPATIENT)
Dept: CARDIOLOGY CLINIC | Age: 63
End: 2023-05-30
Payer: COMMERCIAL

## 2023-05-30 VITALS
HEIGHT: 70 IN | OXYGEN SATURATION: 100 % | DIASTOLIC BLOOD PRESSURE: 62 MMHG | BODY MASS INDEX: 36.36 KG/M2 | SYSTOLIC BLOOD PRESSURE: 108 MMHG | HEART RATE: 87 BPM | WEIGHT: 254 LBS

## 2023-05-30 DIAGNOSIS — E11.9 TYPE 2 DIABETES MELLITUS WITHOUT COMPLICATION, WITHOUT LONG-TERM CURRENT USE OF INSULIN (HCC): ICD-10-CM

## 2023-05-30 DIAGNOSIS — E11.69 TYPE 2 DIABETES MELLITUS WITH OTHER SPECIFIED COMPLICATION, WITHOUT LONG-TERM CURRENT USE OF INSULIN (HCC): ICD-10-CM

## 2023-05-30 DIAGNOSIS — I25.10 CORONARY ARTERY DISEASE INVOLVING NATIVE CORONARY ARTERY OF NATIVE HEART WITHOUT ANGINA PECTORIS: Primary | ICD-10-CM

## 2023-05-30 DIAGNOSIS — E66.9 TYPE 2 DIABETES MELLITUS WITH OBESITY (HCC): Chronic | ICD-10-CM

## 2023-05-30 DIAGNOSIS — E11.69 TYPE 2 DIABETES MELLITUS WITH OBESITY (HCC): Chronic | ICD-10-CM

## 2023-05-30 DIAGNOSIS — I10 ESSENTIAL HYPERTENSION: ICD-10-CM

## 2023-05-30 DIAGNOSIS — R07.9 CHEST PAIN, UNSPECIFIED TYPE: ICD-10-CM

## 2023-05-30 DIAGNOSIS — Z95.1 S/P CABG X 2: ICD-10-CM

## 2023-05-30 PROCEDURE — 3078F DIAST BP <80 MM HG: CPT | Performed by: INTERNAL MEDICINE

## 2023-05-30 PROCEDURE — 99214 OFFICE O/P EST MOD 30 MIN: CPT | Performed by: INTERNAL MEDICINE

## 2023-05-30 PROCEDURE — 3074F SYST BP LT 130 MM HG: CPT | Performed by: INTERNAL MEDICINE

## 2023-05-30 PROCEDURE — 3052F HG A1C>EQUAL 8.0%<EQUAL 9.0%: CPT | Performed by: INTERNAL MEDICINE

## 2023-05-30 RX ORDER — NITROGLYCERIN 0.4 MG/1
0.4 TABLET SUBLINGUAL EVERY 5 MIN PRN
Qty: 25 TABLET | Refills: 3 | Status: SHIPPED | OUTPATIENT
Start: 2023-05-30

## 2023-05-30 RX ORDER — ATORVASTATIN CALCIUM 40 MG/1
40 TABLET, FILM COATED ORAL NIGHTLY
Qty: 90 TABLET | Refills: 3 | Status: SHIPPED | OUTPATIENT
Start: 2023-05-30

## 2023-05-30 RX ORDER — GLIPIZIDE 10 MG/1
TABLET ORAL
Qty: 60 TABLET | Refills: 2 | Status: SHIPPED | OUTPATIENT
Start: 2023-05-30

## 2023-05-30 NOTE — PROGRESS NOTES
[address] [facility]   Cardiovascular Evaluation    PATIENT: Lance Suero  DATE: 2023  MRN: [id number]  CSN: [id number]  : 1960    Primary Care Doctor/Referring provider: Addison Almeida DO, No admitting provider for patient encounter. Reason for evaluation/Chief complaint:   Follow-Up from Hospital (Discuss lisinopril), Coronary Artery Disease, Hypertension, and Hyperlipidemia      Subjective:    History of present illness on initial date of evaluation:   Lance Suero is a 58 y.o. male patient who presents for hospital follow up. He previously followed with Elena Paniagua last office visit 10/21/2022. He has a past medical history including DM, HTN, CAD, s/p PCI, alcohol abuse, nonsmoker, and family history of coronary disease. He was admitted to hospital in 2016 with [place] and had abnormal stress test. Underwent PCI of OM as well as [id number] LAD and was d/c'd/ Returned to hospital and noted to have mild troponin elevation and CP relieved with NTG SL. He underwent LHC on 16 and a PCI-D-1 with SAMUEL was done. Stents in prox-mid LAD, distal LAD, proximal-mid OM-1 were found patent. He was switched from Plavix to Brilinta. He was admitted to the hospital in late May 2016 with recurrent chest pain and transient troponin elevation. LHC was showed patent stents. He was started on Ranexa. He underwent 17 rotablade guided PCI of OM1 recurrent instent restenosis. He had an elective LHC on 2020 that demonstrated restenosis of mid LAD. Treated successfully with laser atherectomy and aggressive pre dilatation using cutting balloons. On 12/15/20 LHC performed. Severe and progressive in-stent restenosis of OM1. Heavily fibrotic. Multiple runs of noncompliant balloon inflations were performed but there was balloon slippage. Procedure aborted due to HTN and severe chest pain. He underwent CABG x2 and left atrial clip on 2021.     Since last visit he presented to the

## 2023-05-30 NOTE — TELEPHONE ENCOUNTER
Future Appointments   Date Time Provider Abdirahman Craft   5/30/2023  2:15 PM Margie Orellana MD I [facility]     LOV 5/4/2023

## 2023-05-30 NOTE — PATIENT INSTRUCTIONS
Plan:  1. Recommend continued management with PCP for diabetes management with controlled glucose ~ in correlation with higher risk of cardiovascular disease  2. I recommend that the patient continue their currently prescribed medications. Their drug modifiable risk factors appear to be well controlled. I will continue to address the need/dosing of medications in future visits. 3. Discussed Nora Kobs and Jardiance therapy ~ will discuss at future office visit.   ~ Will discuss with primary care provider Carollee Cogan, DO.  4. Follow up in one year or sooner if needed. Will contact you in regards to medication changes.

## 2023-06-27 ENCOUNTER — TELEPHONE (OUTPATIENT)
Dept: FAMILY MEDICINE CLINIC | Age: 63
End: 2023-06-27

## 2023-06-27 RX ORDER — LISINOPRIL 20 MG/1
TABLET ORAL
Qty: 90 TABLET | Refills: 1 | Status: CANCELLED | OUTPATIENT
Start: 2023-06-27

## 2023-07-10 ENCOUNTER — HOSPITAL ENCOUNTER (EMERGENCY)
Age: 63
Discharge: HOME | End: 2023-07-10
Attending: EMERGENCY MEDICINE
Payer: COMMERCIAL

## 2023-07-10 ENCOUNTER — APPOINTMENT (OUTPATIENT)
Dept: GENERAL RADIOLOGY | Age: 63
End: 2023-07-10
Payer: COMMERCIAL

## 2023-07-10 VITALS
WEIGHT: 248 LBS | RESPIRATION RATE: 20 BRPM | OXYGEN SATURATION: 97 % | SYSTOLIC BLOOD PRESSURE: 153 MMHG | BODY MASS INDEX: 35.58 KG/M2 | DIASTOLIC BLOOD PRESSURE: 66 MMHG | HEART RATE: 71 BPM | TEMPERATURE: 98.9 F

## 2023-07-10 DIAGNOSIS — R06.02 SHORTNESS OF BREATH: Primary | ICD-10-CM

## 2023-07-10 LAB
ALBUMIN SERPL-MCNC: 3.8 G/DL (ref 3.4–5)
ALBUMIN SERPL-MCNC: 3.9 G/DL (ref 3.4–5)
ALBUMIN/GLOB SERPL: 1.4 {RATIO} (ref 1.1–2.2)
ALBUMIN/GLOB SERPL: 1.6 {RATIO} (ref 1.1–2.2)
ALP SERPL-CCNC: 67 U/L (ref 40–129)
ALP SERPL-CCNC: 75 U/L (ref 40–129)
ALT SERPL-CCNC: 21 U/L (ref 10–40)
ALT SERPL-CCNC: 23 U/L (ref 10–40)
ANION GAP SERPL CALCULATED.3IONS-SCNC: 10 MMOL/L (ref 3–16)
ANION GAP SERPL CALCULATED.3IONS-SCNC: 11 MMOL/L (ref 3–16)
AST SERPL-CCNC: 16 U/L (ref 15–37)
AST SERPL-CCNC: 35 U/L (ref 15–37)
BASOPHILS # BLD: 0.1 K/UL (ref 0–0.2)
BASOPHILS NFR BLD: 0.8 %
BILIRUB SERPL-MCNC: 0.4 MG/DL (ref 0–1)
BILIRUB SERPL-MCNC: 0.5 MG/DL (ref 0–1)
BUN SERPL-MCNC: 6 MG/DL (ref 7–20)
BUN SERPL-MCNC: 7 MG/DL (ref 7–20)
CALCIUM SERPL-MCNC: 8.6 MG/DL (ref 8.3–10.6)
CALCIUM SERPL-MCNC: 9 MG/DL (ref 8.3–10.6)
CHLORIDE SERPL-SCNC: 104 MMOL/L (ref 99–110)
CHLORIDE SERPL-SCNC: 98 MMOL/L (ref 99–110)
CO2 SERPL-SCNC: 22 MMOL/L (ref 21–32)
CO2 SERPL-SCNC: 25 MMOL/L (ref 21–32)
CREAT SERPL-MCNC: 0.6 MG/DL (ref 0.8–1.3)
CREAT SERPL-MCNC: 0.7 MG/DL (ref 0.8–1.3)
D DIMER: 0.31 UG/ML FEU (ref 0–0.6)
DEPRECATED RDW RBC AUTO: 15.3 % (ref 12.4–15.4)
EKG ATRIAL RATE: 74 BPM
EKG DIAGNOSIS: NORMAL
EKG P AXIS: 74 DEGREES
EKG P-R INTERVAL: 190 MS
EKG Q-T INTERVAL: 398 MS
EKG QRS DURATION: 92 MS
EKG QTC CALCULATION (BAZETT): 441 MS
EKG R AXIS: 25 DEGREES
EKG T AXIS: 44 DEGREES
EKG VENTRICULAR RATE: 74 BPM
EOSINOPHIL # BLD: 0.6 K/UL (ref 0–0.6)
EOSINOPHIL NFR BLD: 7.3 %
GFR SERPLBLD CREATININE-BSD FMLA CKD-EPI: >60 ML/MIN/{1.73_M2}
GFR SERPLBLD CREATININE-BSD FMLA CKD-EPI: >60 ML/MIN/{1.73_M2}
GLUCOSE SERPL-MCNC: 173 MG/DL (ref 70–99)
GLUCOSE SERPL-MCNC: 225 MG/DL (ref 70–99)
HCT VFR BLD AUTO: 43 % (ref 40.5–52.5)
HGB BLD-MCNC: 14.1 G/DL (ref 13.5–17.5)
LYMPHOCYTES # BLD: 1.4 K/UL (ref 1–5.1)
LYMPHOCYTES NFR BLD: 18.2 %
MCH RBC QN AUTO: 30.5 PG (ref 26–34)
MCHC RBC AUTO-ENTMCNC: 32.8 G/DL (ref 31–36)
MCV RBC AUTO: 93 FL (ref 80–100)
MONOCYTES # BLD: 0.5 K/UL (ref 0–1.3)
MONOCYTES NFR BLD: 6.6 %
NEUTROPHILS # BLD: 5.1 K/UL (ref 1.7–7.7)
NEUTROPHILS NFR BLD: 67.1 %
NT-PROBNP SERPL-MCNC: 185 PG/ML (ref 0–124)
PLATELET # BLD AUTO: 214 K/UL (ref 135–450)
PMV BLD AUTO: 7.9 FL (ref 5–10.5)
POTASSIUM SERPL-SCNC: 4 MMOL/L (ref 3.5–5.1)
POTASSIUM SERPL-SCNC: 5.7 MMOL/L (ref 3.5–5.1)
PROT SERPL-MCNC: 6.3 G/DL (ref 6.4–8.2)
PROT SERPL-MCNC: 6.6 G/DL (ref 6.4–8.2)
RBC # BLD AUTO: 4.62 M/UL (ref 4.2–5.9)
REASON FOR REJECTION: NORMAL
REJECTED TEST: NORMAL
SODIUM SERPL-SCNC: 130 MMOL/L (ref 136–145)
SODIUM SERPL-SCNC: 140 MMOL/L (ref 136–145)
TROPONIN, HIGH SENSITIVITY: 11 NG/L (ref 0–22)
WBC # BLD AUTO: 7.6 K/UL (ref 4–11)

## 2023-07-10 PROCEDURE — 85025 COMPLETE CBC W/AUTO DIFF WBC: CPT

## 2023-07-10 PROCEDURE — 85379 FIBRIN DEGRADATION QUANT: CPT

## 2023-07-10 PROCEDURE — 6370000000 HC RX 637 (ALT 250 FOR IP): Performed by: PHYSICIAN ASSISTANT

## 2023-07-10 PROCEDURE — 36415 COLL VENOUS BLD VENIPUNCTURE: CPT

## 2023-07-10 PROCEDURE — 84484 ASSAY OF TROPONIN QUANT: CPT

## 2023-07-10 PROCEDURE — 99285 EMERGENCY DEPT VISIT HI MDM: CPT

## 2023-07-10 PROCEDURE — 83880 ASSAY OF NATRIURETIC PEPTIDE: CPT

## 2023-07-10 PROCEDURE — 93005 ELECTROCARDIOGRAM TRACING: CPT | Performed by: STUDENT IN AN ORGANIZED HEALTH CARE EDUCATION/TRAINING PROGRAM

## 2023-07-10 PROCEDURE — 93010 ELECTROCARDIOGRAM REPORT: CPT | Performed by: INTERNAL MEDICINE

## 2023-07-10 PROCEDURE — 80053 COMPREHEN METABOLIC PANEL: CPT

## 2023-07-10 PROCEDURE — 71046 X-RAY EXAM CHEST 2 VIEWS: CPT

## 2023-07-10 RX ORDER — PREDNISONE 50 MG/1
50 TABLET ORAL DAILY
Qty: 5 TABLET | Refills: 0 | Status: SHIPPED | OUTPATIENT
Start: 2023-07-10 | End: 2023-07-15

## 2023-07-10 RX ORDER — PREDNISONE 20 MG/1
60 TABLET ORAL ONCE
Status: COMPLETED | OUTPATIENT
Start: 2023-07-10 | End: 2023-07-10

## 2023-07-10 RX ORDER — ALBUTEROL SULFATE 90 UG/1
2 AEROSOL, METERED RESPIRATORY (INHALATION) 4 TIMES DAILY PRN
Qty: 18 G | Refills: 0 | Status: SHIPPED | OUTPATIENT
Start: 2023-07-10

## 2023-07-10 RX ADMIN — PREDNISONE 60 MG: 20 TABLET ORAL at 16:12

## 2023-07-10 ASSESSMENT — PAIN - FUNCTIONAL ASSESSMENT: PAIN_FUNCTIONAL_ASSESSMENT: 0-10

## 2023-07-10 ASSESSMENT — PAIN SCALES - GENERAL: PAINLEVEL_OUTOF10: 0

## 2023-07-12 ENCOUNTER — TELEPHONE (OUTPATIENT)
Dept: CARDIOLOGY CLINIC | Age: 63
End: 2023-07-12

## 2023-07-12 NOTE — TELEPHONE ENCOUNTER
VSP started pt on Lisinopril which made him sick, coughing. Pt stopped Lisinopril week and half ago. VSP was going to speak with Dr Venice Allen pt's PCP about a different B/P medication. Pt has not heard back. Please advise.

## 2023-07-13 NOTE — TELEPHONE ENCOUNTER
OK to stop lisinopril if patient is not tolerating due to side effects. I would like to for him to check his BP 2-3 times per day over the next week and keep a log of his recordings. If BP is consistently running >130/80, can plan to start an alternative antihypertensive agent.

## 2023-07-13 NOTE — TELEPHONE ENCOUNTER
Pt wife called back in and wanted to clarify that lisinopril  is for stent to make sure they don't block. Pt will monitor bp daily.

## 2023-07-13 NOTE — TELEPHONE ENCOUNTER
Called and answered Jeannie Garnica patient wife questions. Awaiting [facility] recommendation.

## 2023-07-13 NOTE — TELEPHONE ENCOUNTER
Called and spoke to patient wife. She reports BP ranges at home 110's/60's on average. Reports patient has not been on lisinopril for last 2 weeks in regards to medication side effects. Discussion at last OV with VSP in regards to starting Dilip Samples, please advise?

## 2023-07-22 ENCOUNTER — APPOINTMENT (OUTPATIENT)
Dept: GENERAL RADIOLOGY | Age: 63
End: 2023-07-22
Payer: COMMERCIAL

## 2023-07-22 ENCOUNTER — HOSPITAL ENCOUNTER (EMERGENCY)
Age: 63
Discharge: HOME | End: 2023-07-22
Attending: STUDENT IN AN ORGANIZED HEALTH CARE EDUCATION/TRAINING PROGRAM
Payer: COMMERCIAL

## 2023-07-22 VITALS
DIASTOLIC BLOOD PRESSURE: 75 MMHG | HEART RATE: 79 BPM | HEIGHT: 70 IN | TEMPERATURE: 97.8 F | BODY MASS INDEX: 35.79 KG/M2 | WEIGHT: 250 LBS | OXYGEN SATURATION: 96 % | SYSTOLIC BLOOD PRESSURE: 142 MMHG | RESPIRATION RATE: 18 BRPM

## 2023-07-22 DIAGNOSIS — R06.2 WHEEZING: Primary | ICD-10-CM

## 2023-07-22 LAB
ALBUMIN SERPL-MCNC: 4.4 G/DL (ref 3.4–5)
ALBUMIN/GLOB SERPL: 1.6 {RATIO} (ref 1.1–2.2)
ALP SERPL-CCNC: 79 U/L (ref 40–129)
ALT SERPL-CCNC: 19 U/L (ref 10–40)
ANION GAP SERPL CALCULATED.3IONS-SCNC: 14 MMOL/L (ref 3–16)
AST SERPL-CCNC: 17 U/L (ref 15–37)
BASE EXCESS BLDV CALC-SCNC: -1.4 MMOL/L (ref -3–3)
BASOPHILS # BLD: 0.1 K/UL (ref 0–0.2)
BASOPHILS NFR BLD: 0.6 %
BILIRUB SERPL-MCNC: 0.8 MG/DL (ref 0–1)
BUN SERPL-MCNC: 9 MG/DL (ref 7–20)
CALCIUM SERPL-MCNC: 9.6 MG/DL (ref 8.3–10.6)
CHLORIDE SERPL-SCNC: 100 MMOL/L (ref 99–110)
CO2 BLDV-SCNC: 25 MMOL/L
CO2 SERPL-SCNC: 25 MMOL/L (ref 21–32)
COHGB MFR BLDV: 1.2 % (ref 0–1.5)
CREAT SERPL-MCNC: 0.7 MG/DL (ref 0.8–1.3)
DEPRECATED RDW RBC AUTO: 15.9 % (ref 12.4–15.4)
EKG ATRIAL RATE: 83 BPM
EKG DIAGNOSIS: NORMAL
EKG P AXIS: 70 DEGREES
EKG P-R INTERVAL: 174 MS
EKG Q-T INTERVAL: 382 MS
EKG QRS DURATION: 84 MS
EKG QTC CALCULATION (BAZETT): 448 MS
EKG R AXIS: 26 DEGREES
EKG T AXIS: 59 DEGREES
EKG VENTRICULAR RATE: 83 BPM
EOSINOPHIL # BLD: 0.5 K/UL (ref 0–0.6)
EOSINOPHIL NFR BLD: 6.3 %
GFR SERPLBLD CREATININE-BSD FMLA CKD-EPI: >60 ML/MIN/{1.73_M2}
GLUCOSE SERPL-MCNC: 241 MG/DL (ref 70–99)
HCO3 BLDV-SCNC: 23.5 MMOL/L (ref 23–29)
HCT VFR BLD AUTO: 44.1 % (ref 40.5–52.5)
HGB BLD-MCNC: 14.9 G/DL (ref 13.5–17.5)
LYMPHOCYTES # BLD: 1.9 K/UL (ref 1–5.1)
LYMPHOCYTES NFR BLD: 22.3 %
MCH RBC QN AUTO: 31 PG (ref 26–34)
MCHC RBC AUTO-ENTMCNC: 33.9 G/DL (ref 31–36)
MCV RBC AUTO: 91.5 FL (ref 80–100)
METHGB MFR BLDV: 0.1 %
MONOCYTES # BLD: 0.6 K/UL (ref 0–1.3)
MONOCYTES NFR BLD: 7.4 %
NEUTROPHILS # BLD: 5.5 K/UL (ref 1.7–7.7)
NEUTROPHILS NFR BLD: 63.4 %
O2 CT VFR BLDV CALC: 22 VOL %
O2 THERAPY: ABNORMAL
PCO2 BLDV: 40.2 MMHG (ref 40–50)
PH BLDV: 7.38 [PH] (ref 7.35–7.45)
PLATELET # BLD AUTO: 205 K/UL (ref 135–450)
PMV BLD AUTO: 8 FL (ref 5–10.5)
PO2 BLDV: 113.1 MMHG (ref 25–40)
POTASSIUM SERPL-SCNC: 4.5 MMOL/L (ref 3.5–5.1)
PROT SERPL-MCNC: 7.1 G/DL (ref 6.4–8.2)
RBC # BLD AUTO: 4.81 M/UL (ref 4.2–5.9)
SAO2 % BLDV: 98 %
SODIUM SERPL-SCNC: 139 MMOL/L (ref 136–145)
TROPONIN, HIGH SENSITIVITY: 21 NG/L (ref 0–22)
WBC # BLD AUTO: 8.7 K/UL (ref 4–11)

## 2023-07-22 PROCEDURE — 36415 COLL VENOUS BLD VENIPUNCTURE: CPT

## 2023-07-22 PROCEDURE — 93005 ELECTROCARDIOGRAM TRACING: CPT | Performed by: STUDENT IN AN ORGANIZED HEALTH CARE EDUCATION/TRAINING PROGRAM

## 2023-07-22 PROCEDURE — 80053 COMPREHEN METABOLIC PANEL: CPT

## 2023-07-22 PROCEDURE — 84484 ASSAY OF TROPONIN QUANT: CPT

## 2023-07-22 PROCEDURE — 71045 X-RAY EXAM CHEST 1 VIEW: CPT

## 2023-07-22 PROCEDURE — 93010 ELECTROCARDIOGRAM REPORT: CPT | Performed by: INTERNAL MEDICINE

## 2023-07-22 PROCEDURE — 99285 EMERGENCY DEPT VISIT HI MDM: CPT

## 2023-07-22 PROCEDURE — 6370000000 HC RX 637 (ALT 250 FOR IP)

## 2023-07-22 PROCEDURE — 6370000000 HC RX 637 (ALT 250 FOR IP): Performed by: STUDENT IN AN ORGANIZED HEALTH CARE EDUCATION/TRAINING PROGRAM

## 2023-07-22 PROCEDURE — 82803 BLOOD GASES ANY COMBINATION: CPT

## 2023-07-22 PROCEDURE — 85025 COMPLETE CBC W/AUTO DIFF WBC: CPT

## 2023-07-22 RX ORDER — PREDNISONE 10 MG/1
TABLET ORAL
Qty: 20 TABLET | Refills: 0 | Status: ON HOLD | OUTPATIENT
Start: 2023-07-22 | End: 2023-07-24 | Stop reason: HOSPADM

## 2023-07-22 RX ORDER — IPRATROPIUM BROMIDE AND ALBUTEROL SULFATE 2.5; .5 MG/3ML; MG/3ML
1 SOLUTION RESPIRATORY (INHALATION) ONCE
Status: COMPLETED | OUTPATIENT
Start: 2023-07-22 | End: 2023-07-22

## 2023-07-22 RX ORDER — PREDNISONE 20 MG/1
60 TABLET ORAL ONCE
Status: COMPLETED | OUTPATIENT
Start: 2023-07-22 | End: 2023-07-22

## 2023-07-22 RX ORDER — IPRATROPIUM BROMIDE AND ALBUTEROL SULFATE 2.5; .5 MG/3ML; MG/3ML
SOLUTION RESPIRATORY (INHALATION)
Status: COMPLETED
Start: 2023-07-22 | End: 2023-07-22

## 2023-07-22 RX ADMIN — IPRATROPIUM BROMIDE AND ALBUTEROL SULFATE 1 DOSE: 2.5; .5 SOLUTION RESPIRATORY (INHALATION) at 05:05

## 2023-07-22 RX ADMIN — IPRATROPIUM BROMIDE AND ALBUTEROL SULFATE 1 DOSE: .5; 2.5 SOLUTION RESPIRATORY (INHALATION) at 05:05

## 2023-07-22 RX ADMIN — PREDNISONE 60 MG: 20 TABLET ORAL at 06:20

## 2023-07-22 ASSESSMENT — LIFESTYLE VARIABLES
HOW MANY STANDARD DRINKS CONTAINING ALCOHOL DO YOU HAVE ON A TYPICAL DAY: 1 OR 2
HOW OFTEN DO YOU HAVE A DRINK CONTAINING ALCOHOL: 2-4 TIMES A MONTH

## 2023-07-22 ASSESSMENT — PAIN - FUNCTIONAL ASSESSMENT: PAIN_FUNCTIONAL_ASSESSMENT: NONE - DENIES PAIN

## 2023-07-22 NOTE — ED NOTES
O2 rnged % during inhalation treatment, pt tolerated well. Pt reports moderate relief in symptoms stating, \"I feel actually comfortable now. \" Audible wheezing has marked decrease, expiratory wheezing still noted in bilateral mid/lower lobes.      Lee Ann Casarez RN  07/22/23 5601

## 2023-07-22 NOTE — ED PROVIDER NOTES
[address] [address] ED     EMERGENCY DEPARTMENT ENCOUNTER         Pt Name: Rufino Oquendo   MRN: [id number]   [address] [facility] 1960   Date of evaluation: 7/22/2023   Provider: Tanvir Lynn MD   PCP: Ernestina Akins DO   Note Started: 4:45 AM EDT 7/22/23       Chief Complaint     Shortness of Breath (Recent admission to [facility] for similar complaints, d/c w/ prednisone w/ improvement, worsened over past couple days)      History of Present Illness     Rufino Oquendo is a 58 y.o. male who presents with shortness of breath and wheezing. The patient was recently admitted to the hospital for similar symptoms his condition improved and he was discharged on a course of steroids as well as antibiotics. Despite this recent admission he does not carry a formal diagnosis of emphysema or other primary pulmonary disease. He does have a pulmonologist with whom he establish care in the context of COVID-19 infection and has previously required course of steroids to manage his wheezing. Denies any recent fever or cough. He was doing well after his discharge though when his steroids stopped he redeveloped wheezing. He denies any chest pain or other acute changes in his health. I have reviewed the nursing notes and agree unless otherwise noted. Review of Systems     Positives and pertinent negatives as per HPI. Past Medical, Surgical, Family, and Social History     He has a past medical history of Alcohol abuse, Cancer ([address]), Cellulitis, Coronary artery disease, COVID-19, Diabetic neuropathy associated with type 2 diabetes mellitus ([address]), Hyperlipidemia, Non morbid obesity, NSTEMI (non-ST elevated myocardial infarction) ([address]), GURDEEP (obstructive sleep apnea), Reactive airway disease with wheezing, moderate persistent, with acute exacerbation, Sepsis ([address]), and Type II or unspecified type diabetes mellitus without mention of complication, not stated as uncontrolled.   He has a past surgical history that includes Vein MG/3ML nebulizer solution     Telly Carson: cabinet overrfrancisco    predniSONE (DELTASONE) tablet 60 mg    predniSONE (DELTASONE) 10 MG tablet     Sig: Take 4 tablets by mouth once daily for 5 days     Dispense:  20 tablet     Refill:  0    albuterol (PROVENTIL) (5 MG/ML) 0.5% nebulizer solution     Sig: Take 0.5 mLs by nebulization every 6 hours as needed for Wheezing     Dispense:  120 each     Refill:  0       CONSULTS:  None      CRITICAL CARE TIME   None    Clinical Impression     1.  Wheezing        Disposition     PATIENT REFERRED TO:  DO Rocio Portillo [address]  [phone]    Schedule an appointment as soon as possible for a visit in 1 week        DISCHARGE MEDICATIONS:  Discharge Medication List as of 7/22/2023  6:21 AM        START taking these medications    Details   predniSONE (DELTASONE) 10 MG tablet Take 4 tablets by mouth once daily for 5 days, Disp-20 tablet, R-0Normal      albuterol (PROVENTIL) (5 MG/ML) 0.5% nebulizer solution Take 0.5 mLs by nebulization every 6 hours as needed for Wheezing, Disp-120 each, R-0Normal             DISPOSITION Decision To Discharge 07/22/2023 06:17:55 AM      Dat Longoria MD (electronically signed)  Attending Emergency Physician            Jovanni Llamas MD  07/22/23 2848

## 2023-07-22 NOTE — ED NOTES
Reviewed discharge information. Patient verbalized understanding of paperwork, medication, and care instructions. Patient denied any questions.       Jose Francisco Rangel RN  07/22/23 9694

## 2023-07-22 NOTE — DISCHARGE INSTRUCTIONS
You were evaluated in the emergency department for wheezing. Assessments and testing completed during your visit were reassuring and at this time there is no indication for further testing, treatment or admission to the hospital. Given this it is appropriate to discharge you from the emergency department. At the time of discharge we discussed the following: You are prescribed another course of steroids as well as albuterol to use in your nebulizer. Please use these medications as prescribed and discuss further with your pulmonologist in the next 1 week. Please note that sometimes it is difficult to diagnose a medical condition early in the disease process before the disease is fully manifest. Because of this, should you develop any new or worsening symptoms, you may return at any time to the emergency department for another evaluation. If available you are also recommended to review this visit with your primary care physician or other medical provider in the next 7 days. Thank you for allowing us to care for you today.

## 2023-07-23 ENCOUNTER — HOSPITAL ENCOUNTER (OUTPATIENT)
Age: 63
Setting detail: OBSERVATION
LOS: 1 days | Discharge: HOME | End: 2023-07-24
Attending: EMERGENCY MEDICINE | Admitting: INTERNAL MEDICINE
Payer: COMMERCIAL

## 2023-07-23 DIAGNOSIS — R06.02 SHORTNESS OF BREATH: ICD-10-CM

## 2023-07-23 DIAGNOSIS — R07.9 CHEST PAIN, UNSPECIFIED TYPE: ICD-10-CM

## 2023-07-23 DIAGNOSIS — E11.69 TYPE 2 DIABETES MELLITUS WITH OTHER SPECIFIED COMPLICATION, WITHOUT LONG-TERM CURRENT USE OF INSULIN (HCC): ICD-10-CM

## 2023-07-23 DIAGNOSIS — R73.9 HYPERGLYCEMIA: ICD-10-CM

## 2023-07-23 DIAGNOSIS — R06.2 WHEEZING: Primary | ICD-10-CM

## 2023-07-23 PROCEDURE — 96375 TX/PRO/DX INJ NEW DRUG ADDON: CPT

## 2023-07-23 PROCEDURE — 85025 COMPLETE CBC W/AUTO DIFF WBC: CPT

## 2023-07-23 PROCEDURE — 93005 ELECTROCARDIOGRAM TRACING: CPT | Performed by: EMERGENCY MEDICINE

## 2023-07-23 PROCEDURE — 36415 COLL VENOUS BLD VENIPUNCTURE: CPT

## 2023-07-23 PROCEDURE — 84484 ASSAY OF TROPONIN QUANT: CPT

## 2023-07-23 PROCEDURE — 99285 EMERGENCY DEPT VISIT HI MDM: CPT

## 2023-07-23 PROCEDURE — 80053 COMPREHEN METABOLIC PANEL: CPT

## 2023-07-23 PROCEDURE — 96361 HYDRATE IV INFUSION ADD-ON: CPT

## 2023-07-23 ASSESSMENT — LIFESTYLE VARIABLES
HOW OFTEN DO YOU HAVE A DRINK CONTAINING ALCOHOL: NEVER
HOW MANY STANDARD DRINKS CONTAINING ALCOHOL DO YOU HAVE ON A TYPICAL DAY: PATIENT DOES NOT DRINK

## 2023-07-23 ASSESSMENT — PAIN - FUNCTIONAL ASSESSMENT: PAIN_FUNCTIONAL_ASSESSMENT: 0-10

## 2023-07-24 ENCOUNTER — APPOINTMENT (OUTPATIENT)
Dept: CT IMAGING | Age: 63
End: 2023-07-24
Payer: COMMERCIAL

## 2023-07-24 ENCOUNTER — TELEPHONE (OUTPATIENT)
Dept: PULMONOLOGY | Age: 63
End: 2023-07-24

## 2023-07-24 VITALS
HEIGHT: 70 IN | WEIGHT: 250.3 LBS | RESPIRATION RATE: 18 BRPM | OXYGEN SATURATION: 96 % | DIASTOLIC BLOOD PRESSURE: 68 MMHG | SYSTOLIC BLOOD PRESSURE: 142 MMHG | BODY MASS INDEX: 35.83 KG/M2 | HEART RATE: 87 BPM | TEMPERATURE: 97 F

## 2023-07-24 PROBLEM — J45.20 RAD (REACTIVE AIRWAY DISEASE) WITH WHEEZING, MILD INTERMITTENT, UNCOMPLICATED: Status: ACTIVE | Noted: 2023-07-24

## 2023-07-24 PROBLEM — R06.2 WHEEZING: Status: ACTIVE | Noted: 2023-07-24

## 2023-07-24 PROBLEM — R73.9 HYPERGLYCEMIA: Status: ACTIVE | Noted: 2023-07-24

## 2023-07-24 PROBLEM — J98.9 REACTIVE AIRWAY DISEASE WITHOUT ASTHMA: Status: ACTIVE | Noted: 2023-07-24

## 2023-07-24 PROBLEM — R06.02 SHORTNESS OF BREATH: Status: ACTIVE | Noted: 2023-07-24

## 2023-07-24 LAB
ALBUMIN SERPL-MCNC: 4.2 G/DL (ref 3.4–5)
ALBUMIN/GLOB SERPL: 1.8 {RATIO} (ref 1.1–2.2)
ALP SERPL-CCNC: 84 U/L (ref 40–129)
ALT SERPL-CCNC: 18 U/L (ref 10–40)
ANION GAP SERPL CALCULATED.3IONS-SCNC: 16 MMOL/L (ref 3–16)
AST SERPL-CCNC: 17 U/L (ref 15–37)
BASOPHILS # BLD: 0.1 K/UL (ref 0–0.2)
BASOPHILS NFR BLD: 0.6 %
BILIRUB SERPL-MCNC: 0.4 MG/DL (ref 0–1)
BUN SERPL-MCNC: 18 MG/DL (ref 7–20)
CALCIUM SERPL-MCNC: 9.4 MG/DL (ref 8.3–10.6)
CHLORIDE SERPL-SCNC: 97 MMOL/L (ref 99–110)
CO2 SERPL-SCNC: 23 MMOL/L (ref 21–32)
CREAT SERPL-MCNC: 0.8 MG/DL (ref 0.8–1.3)
DEPRECATED RDW RBC AUTO: 15.8 % (ref 12.4–15.4)
EKG ATRIAL RATE: 115 BPM
EKG DIAGNOSIS: ABNORMAL
EKG P AXIS: 70 DEGREES
EKG P-R INTERVAL: 160 MS
EKG Q-T INTERVAL: 334 MS
EKG QRS DURATION: 90 MS
EKG QTC CALCULATION (BAZETT): 462 MS
EKG R AXIS: 22 DEGREES
EKG T AXIS: 73 DEGREES
EKG VENTRICULAR RATE: 115 BPM
EOSINOPHIL # BLD: 0.5 K/UL (ref 0–0.6)
EOSINOPHIL NFR BLD: 6.4 %
GFR SERPLBLD CREATININE-BSD FMLA CKD-EPI: >60 ML/MIN/{1.73_M2}
GLUCOSE BLD-MCNC: 256 MG/DL (ref 70–99)
GLUCOSE BLD-MCNC: 273 MG/DL (ref 70–99)
GLUCOSE BLD-MCNC: 370 MG/DL (ref 70–99)
GLUCOSE SERPL-MCNC: 455 MG/DL (ref 70–99)
HCT VFR BLD AUTO: 41 % (ref 40.5–52.5)
HGB BLD-MCNC: 13.6 G/DL (ref 13.5–17.5)
LYMPHOCYTES # BLD: 2.1 K/UL (ref 1–5.1)
LYMPHOCYTES NFR BLD: 23.9 %
MCH RBC QN AUTO: 31.1 PG (ref 26–34)
MCHC RBC AUTO-ENTMCNC: 33.3 G/DL (ref 31–36)
MCV RBC AUTO: 93.5 FL (ref 80–100)
MONOCYTES # BLD: 0.8 K/UL (ref 0–1.3)
MONOCYTES NFR BLD: 8.8 %
NEUTROPHILS # BLD: 5.2 K/UL (ref 1.7–7.7)
NEUTROPHILS NFR BLD: 60.3 %
NT-PROBNP SERPL-MCNC: 205 PG/ML (ref 0–124)
PERFORMED ON: ABNORMAL
PLATELET # BLD AUTO: 193 K/UL (ref 135–450)
PMV BLD AUTO: 8.5 FL (ref 5–10.5)
POTASSIUM SERPL-SCNC: 4.1 MMOL/L (ref 3.5–5.1)
PROT SERPL-MCNC: 6.5 G/DL (ref 6.4–8.2)
RBC # BLD AUTO: 4.39 M/UL (ref 4.2–5.9)
SODIUM SERPL-SCNC: 136 MMOL/L (ref 136–145)
TROPONIN, HIGH SENSITIVITY: 14 NG/L (ref 0–22)
WBC # BLD AUTO: 8.6 K/UL (ref 4–11)

## 2023-07-24 PROCEDURE — 93010 ELECTROCARDIOGRAM REPORT: CPT | Performed by: INTERNAL MEDICINE

## 2023-07-24 PROCEDURE — 96361 HYDRATE IV INFUSION ADD-ON: CPT

## 2023-07-24 PROCEDURE — 6360000002 HC RX W HCPCS: Performed by: INTERNAL MEDICINE

## 2023-07-24 PROCEDURE — 6370000000 HC RX 637 (ALT 250 FOR IP): Performed by: INTERNAL MEDICINE

## 2023-07-24 PROCEDURE — 99223 1ST HOSP IP/OBS HIGH 75: CPT | Performed by: INTERNAL MEDICINE

## 2023-07-24 PROCEDURE — 96375 TX/PRO/DX INJ NEW DRUG ADDON: CPT

## 2023-07-24 PROCEDURE — 36415 COLL VENOUS BLD VENIPUNCTURE: CPT

## 2023-07-24 PROCEDURE — 2580000003 HC RX 258: Performed by: INTERNAL MEDICINE

## 2023-07-24 PROCEDURE — 94640 AIRWAY INHALATION TREATMENT: CPT

## 2023-07-24 PROCEDURE — 71260 CT THORAX DX C+: CPT

## 2023-07-24 PROCEDURE — G0378 HOSPITAL OBSERVATION PER HR: HCPCS

## 2023-07-24 PROCEDURE — 6370000000 HC RX 637 (ALT 250 FOR IP): Performed by: EMERGENCY MEDICINE

## 2023-07-24 PROCEDURE — 2580000003 HC RX 258: Performed by: EMERGENCY MEDICINE

## 2023-07-24 PROCEDURE — 83880 ASSAY OF NATRIURETIC PEPTIDE: CPT

## 2023-07-24 PROCEDURE — 6360000004 HC RX CONTRAST MEDICATION: Performed by: EMERGENCY MEDICINE

## 2023-07-24 PROCEDURE — 6360000002 HC RX W HCPCS: Performed by: EMERGENCY MEDICINE

## 2023-07-24 PROCEDURE — 99254 IP/OBS CNSLTJ NEW/EST MOD 60: CPT | Performed by: INTERNAL MEDICINE

## 2023-07-24 PROCEDURE — 96365 THER/PROPH/DIAG IV INF INIT: CPT

## 2023-07-24 RX ORDER — SODIUM CHLORIDE 9 MG/ML
INJECTION, SOLUTION INTRAVENOUS CONTINUOUS
Status: DISCONTINUED | OUTPATIENT
Start: 2023-07-24 | End: 2023-07-24 | Stop reason: HOSPADM

## 2023-07-24 RX ORDER — LEVALBUTEROL 1.25 MG/.5ML
1.25 SOLUTION, CONCENTRATE RESPIRATORY (INHALATION)
Status: COMPLETED | OUTPATIENT
Start: 2023-07-24 | End: 2023-07-24

## 2023-07-24 RX ORDER — SODIUM CHLORIDE FOR INHALATION 0.9 %
3 VIAL, NEBULIZER (ML) INHALATION EVERY 8 HOURS PRN
Status: DISCONTINUED | OUTPATIENT
Start: 2023-07-24 | End: 2023-07-24 | Stop reason: HOSPADM

## 2023-07-24 RX ORDER — ALBUTEROL SULFATE 90 UG/1
2 AEROSOL, METERED RESPIRATORY (INHALATION) 4 TIMES DAILY PRN
Qty: 18 G | Refills: 0 | Status: SHIPPED | OUTPATIENT
Start: 2023-07-24

## 2023-07-24 RX ORDER — SODIUM CHLORIDE 0.9 % (FLUSH) 0.9 %
5-40 SYRINGE (ML) INJECTION PRN
Status: DISCONTINUED | OUTPATIENT
Start: 2023-07-24 | End: 2023-07-24 | Stop reason: HOSPADM

## 2023-07-24 RX ORDER — ONDANSETRON 2 MG/ML
4 INJECTION INTRAMUSCULAR; INTRAVENOUS EVERY 6 HOURS PRN
Status: DISCONTINUED | OUTPATIENT
Start: 2023-07-24 | End: 2023-07-24 | Stop reason: HOSPADM

## 2023-07-24 RX ORDER — INSULIN LISPRO 200 [IU]/ML
8-10 INJECTION, SOLUTION SUBCUTANEOUS
Qty: 1 ADJUSTABLE DOSE PRE-FILLED PEN SYRINGE | Refills: 0 | Status: SHIPPED | OUTPATIENT
Start: 2023-07-24

## 2023-07-24 RX ORDER — FLUTICASONE PROPIONATE AND SALMETEROL 250; 50 UG/1; UG/1
1 POWDER RESPIRATORY (INHALATION) EVERY 12 HOURS
Qty: 60 EACH | Refills: 3 | Status: SHIPPED | OUTPATIENT
Start: 2023-07-24

## 2023-07-24 RX ORDER — AZITHROMYCIN 250 MG/1
500 TABLET, FILM COATED ORAL DAILY
Status: DISCONTINUED | OUTPATIENT
Start: 2023-07-24 | End: 2023-07-24 | Stop reason: HOSPADM

## 2023-07-24 RX ORDER — INSULIN LISPRO 100 [IU]/ML
8 INJECTION, SOLUTION INTRAVENOUS; SUBCUTANEOUS ONCE
Status: COMPLETED | OUTPATIENT
Start: 2023-07-24 | End: 2023-07-24

## 2023-07-24 RX ORDER — PREDNISONE 10 MG/1
TABLET ORAL
Qty: 30 TABLET | Refills: 0 | Status: SHIPPED | OUTPATIENT
Start: 2023-07-24

## 2023-07-24 RX ORDER — SODIUM CHLORIDE 9 MG/ML
INJECTION, SOLUTION INTRAVENOUS PRN
Status: DISCONTINUED | OUTPATIENT
Start: 2023-07-24 | End: 2023-07-24 | Stop reason: HOSPADM

## 2023-07-24 RX ORDER — POLYETHYLENE GLYCOL 3350 17 G/17G
17 POWDER, FOR SOLUTION ORAL DAILY PRN
Status: DISCONTINUED | OUTPATIENT
Start: 2023-07-24 | End: 2023-07-24 | Stop reason: HOSPADM

## 2023-07-24 RX ORDER — ONDANSETRON 4 MG/1
4 TABLET, ORALLY DISINTEGRATING ORAL EVERY 8 HOURS PRN
Status: DISCONTINUED | OUTPATIENT
Start: 2023-07-24 | End: 2023-07-24 | Stop reason: HOSPADM

## 2023-07-24 RX ORDER — ATORVASTATIN CALCIUM 40 MG/1
40 TABLET, FILM COATED ORAL NIGHTLY
Status: DISCONTINUED | OUTPATIENT
Start: 2023-07-24 | End: 2023-07-24 | Stop reason: HOSPADM

## 2023-07-24 RX ORDER — ALBUTEROL SULFATE 90 UG/1
2 AEROSOL, METERED RESPIRATORY (INHALATION) 4 TIMES DAILY PRN
Status: DISCONTINUED | OUTPATIENT
Start: 2023-07-24 | End: 2023-07-24 | Stop reason: HOSPADM

## 2023-07-24 RX ORDER — ENOXAPARIN SODIUM 100 MG/ML
30 INJECTION SUBCUTANEOUS 2 TIMES DAILY
Status: DISCONTINUED | OUTPATIENT
Start: 2023-07-24 | End: 2023-07-24 | Stop reason: HOSPADM

## 2023-07-24 RX ORDER — PREDNISONE 20 MG/1
40 TABLET ORAL DAILY
Status: DISCONTINUED | OUTPATIENT
Start: 2023-07-26 | End: 2023-07-24 | Stop reason: HOSPADM

## 2023-07-24 RX ORDER — SODIUM CHLORIDE 0.9 % (FLUSH) 0.9 %
5-40 SYRINGE (ML) INJECTION EVERY 12 HOURS SCHEDULED
Status: DISCONTINUED | OUTPATIENT
Start: 2023-07-24 | End: 2023-07-24 | Stop reason: HOSPADM

## 2023-07-24 RX ORDER — CLOPIDOGREL BISULFATE 75 MG/1
75 TABLET ORAL DAILY
Status: DISCONTINUED | OUTPATIENT
Start: 2023-07-24 | End: 2023-07-24 | Stop reason: HOSPADM

## 2023-07-24 RX ORDER — SEMAGLUTIDE 0.68 MG/ML
INJECTION, SOLUTION SUBCUTANEOUS
Qty: 3 ML | Refills: 2 | Status: SHIPPED | OUTPATIENT
Start: 2023-07-24

## 2023-07-24 RX ORDER — NITROGLYCERIN 0.4 MG/1
0.4 TABLET SUBLINGUAL EVERY 5 MIN PRN
Status: DISCONTINUED | OUTPATIENT
Start: 2023-07-24 | End: 2023-07-24 | Stop reason: HOSPADM

## 2023-07-24 RX ORDER — METOPROLOL TARTRATE 50 MG/1
50 TABLET, FILM COATED ORAL 2 TIMES DAILY
Status: DISCONTINUED | OUTPATIENT
Start: 2023-07-24 | End: 2023-07-24 | Stop reason: HOSPADM

## 2023-07-24 RX ORDER — ACETAMINOPHEN 650 MG/1
650 SUPPOSITORY RECTAL EVERY 6 HOURS PRN
Status: DISCONTINUED | OUTPATIENT
Start: 2023-07-24 | End: 2023-07-24 | Stop reason: HOSPADM

## 2023-07-24 RX ORDER — IPRATROPIUM BROMIDE AND ALBUTEROL SULFATE 2.5; .5 MG/3ML; MG/3ML
1 SOLUTION RESPIRATORY (INHALATION)
Status: DISCONTINUED | OUTPATIENT
Start: 2023-07-24 | End: 2023-07-24 | Stop reason: HOSPADM

## 2023-07-24 RX ORDER — GUAIFENESIN 600 MG/1
600 TABLET, EXTENDED RELEASE ORAL 2 TIMES DAILY
Status: DISCONTINUED | OUTPATIENT
Start: 2023-07-24 | End: 2023-07-24 | Stop reason: HOSPADM

## 2023-07-24 RX ORDER — LEVALBUTEROL 1.25 MG/.5ML
0.63 SOLUTION, CONCENTRATE RESPIRATORY (INHALATION) ONCE
Status: COMPLETED | OUTPATIENT
Start: 2023-07-24 | End: 2023-07-24

## 2023-07-24 RX ORDER — 0.9 % SODIUM CHLORIDE 0.9 %
500 INTRAVENOUS SOLUTION INTRAVENOUS ONCE
Status: COMPLETED | OUTPATIENT
Start: 2023-07-24 | End: 2023-07-24

## 2023-07-24 RX ORDER — ACETAMINOPHEN 325 MG/1
650 TABLET ORAL EVERY 6 HOURS PRN
Status: DISCONTINUED | OUTPATIENT
Start: 2023-07-24 | End: 2023-07-24 | Stop reason: HOSPADM

## 2023-07-24 RX ORDER — ASPIRIN 81 MG/1
81 TABLET, CHEWABLE ORAL DAILY
Status: DISCONTINUED | OUTPATIENT
Start: 2023-07-24 | End: 2023-07-24 | Stop reason: HOSPADM

## 2023-07-24 RX ADMIN — IOPAMIDOL 75 ML: 755 INJECTION, SOLUTION INTRAVENOUS at 00:54

## 2023-07-24 RX ADMIN — IPRATROPIUM BROMIDE AND ALBUTEROL SULFATE 1 DOSE: .5; 2.5 SOLUTION RESPIRATORY (INHALATION) at 07:32

## 2023-07-24 RX ADMIN — WATER 40 MG: 1 INJECTION INTRAMUSCULAR; INTRAVENOUS; SUBCUTANEOUS at 11:24

## 2023-07-24 RX ADMIN — LEVALBUTEROL 1.25 MG: 1.25 SOLUTION, CONCENTRATE RESPIRATORY (INHALATION) at 04:52

## 2023-07-24 RX ADMIN — LEVALBUTEROL 0.63 MG: 1.25 SOLUTION, CONCENTRATE RESPIRATORY (INHALATION) at 01:14

## 2023-07-24 RX ADMIN — IPRATROPIUM BROMIDE AND ALBUTEROL SULFATE 1 DOSE: .5; 2.5 SOLUTION RESPIRATORY (INHALATION) at 11:36

## 2023-07-24 RX ADMIN — INSULIN HUMAN 6 UNITS: 100 INJECTION, SOLUTION PARENTERAL at 01:16

## 2023-07-24 RX ADMIN — WATER 40 MG: 1 INJECTION INTRAMUSCULAR; INTRAVENOUS; SUBCUTANEOUS at 06:00

## 2023-07-24 RX ADMIN — LEVALBUTEROL 1.25 MG: 1.25 SOLUTION, CONCENTRATE RESPIRATORY (INHALATION) at 04:10

## 2023-07-24 RX ADMIN — LEVALBUTEROL 1.25 MG: 1.25 SOLUTION, CONCENTRATE RESPIRATORY (INHALATION) at 03:06

## 2023-07-24 RX ADMIN — GUAIFENESIN 600 MG: 600 TABLET, EXTENDED RELEASE ORAL at 08:46

## 2023-07-24 RX ADMIN — CLOPIDOGREL BISULFATE 75 MG: 75 TABLET ORAL at 08:47

## 2023-07-24 RX ADMIN — SODIUM CHLORIDE: 9 INJECTION, SOLUTION INTRAVENOUS at 06:03

## 2023-07-24 RX ADMIN — SODIUM CHLORIDE 500 ML: 9 INJECTION, SOLUTION INTRAVENOUS at 02:11

## 2023-07-24 RX ADMIN — INSULIN LISPRO 8 UNITS: 100 INJECTION, SOLUTION INTRAVENOUS; SUBCUTANEOUS at 08:47

## 2023-07-24 RX ADMIN — AZITHROMYCIN 500 MG: 250 TABLET, FILM COATED ORAL at 08:47

## 2023-07-24 RX ADMIN — METOPROLOL TARTRATE 50 MG: 50 TABLET, FILM COATED ORAL at 08:47

## 2023-07-24 RX ADMIN — ASPIRIN 81 MG: 81 TABLET, CHEWABLE ORAL at 08:47

## 2023-07-24 RX ADMIN — LEVALBUTEROL 1.25 MG: 1.25 SOLUTION, CONCENTRATE RESPIRATORY (INHALATION) at 02:10

## 2023-07-24 NOTE — CARE COORDINATION
Case Management Assessment  Initial Evaluation    Date/Time of Evaluation: 7/24/2023 9:19 AM  Assessment Completed by: Mary Crook RN    If patient is discharged prior to next notation, then this note serves as note for discharge by case management. Patient Name: Tan Moncada                   YOB: 1960  Diagnosis: Shortness of breath [R06.02]  Wheezing [R06.2]  Hyperglycemia [R73.9]  Chest pain, unspecified type [R07.9]  Reactive airway disease without asthma [J98.9]                   Date / Time: 7/23/2023 11:31 PM    Patient Admission Status: Inpatient   Readmission Risk (Low < 19, Mod (19-27), High > 27): Readmission Risk Score: 15.4    Current PCP: Yari Chinchilla, DO  PCP verified by CM? Yes (riggs)    Chart Reviewed: Yes      History Provided by: Patient  Patient Orientation: Alert and Oriented    Patient Cognition: Alert    Hospitalization in the last 30 days (Readmission):  No    If yes, Readmission Assessment in CM Navigator will be completed. Advance Directives:      Code Status: Full Code   Patient's Primary Decision Maker is: Legal Next of Kin    Primary Decision Maker (Active): Brittny Chin - Spouse - [id number]    Discharge Planning:    Patient lives with: Spouse/Significant Other Type of Home: House  Primary Care Giver: Self  Patient Support Systems include: Spouse/Significant Other, Family Members   Current Financial resources: None  Current community resources: None  Current services prior to admission: None            Current DME:              Type of Home Care services:  None    ADLS  Prior functional level: Independent in ADLs/IADLs  Current functional level: Independent in ADLs/IADLs    PT AM-PAC:   /24  OT AM-PAC:   /24    Family can provide assistance at DC: Yes  Would you like Case Management to discuss the discharge plan with any other family members/significant others, and if so, who?  No  Plans to Return to Present Housing: Yes  Other Identified Issues/Barriers to RETURNING to

## 2023-07-24 NOTE — ACP (ADVANCE CARE PLANNING)
Advance Care Planning     General Advance Care Planning (ACP) Conversation    Date of Conversation: 7/23/2023  Conducted with: Patient with Decision Making Capacity    Healthcare Decision Maker:    Primary Decision Maker (Active): Brittny Chin - Spouse - [phone]  Click here to complete Healthcare Decision Makers including selection of the Healthcare Decision Maker Relationship (ie \"Primary\"). Today we documented Decision Maker(s) consistent with Legal Next of Kin hierarchy. Content/Action Overview:   Has ACP document(s) on file - reflects the patient's care preferences  Reviewed DNR/DNI and patient elects Full Code (Attempt Resuscitation)        Length of Voluntary ACP Conversation in minutes:  <16 minutes (Non-Billable)    Jeb Fleischer, RN

## 2023-07-24 NOTE — ED PROVIDER NOTES
[address] [facility]      Pt Name: Kofi Ferrell  MRN: [id number]  [address] [facility] 1960  Date of evaluation: 7/23/2023  Provider: Judd Beaver       Chief Complaint   Patient presents with    Chest Pain     Pt complains of SOB and chest pain. Took 3 nitro with no relief. Pt was here yesterday and received a script for prednisone but pharmacy closed on weekends. HISTORY OF PRESENT ILLNESS   (Location/Symptom, Timing/Onset, Context/Setting, Quality, Duration, Modifying Factors, Severity)  Note limiting factors. Kofi Ferrell is a 58 y.o. male with past medical history of alcohol abuse, prostate cancer in remission, CAD status post stent and CABG, diabetes, hyperlipidemia, sepsis, wheezing, and sleep apnea who presents to the emergency department accompanied by wife at bedside for chief complaint of chest pain and shortness of breath. Patient states that he was seen in the emergency department approximately 36 hours ago for shortness of breath with wheezing and nonproductive cough. Patient has recently been admitted as he developed pulmonary disease after COVID-19. Patient was treated with 60 mg of prednisone in the emergency department 36 hours ago and discharged home. Patient states that his pharmacy was closed over the weekend and he was unable to get his prescription filled for prednisone and his condition worsened over the weekend. Patient states that he is having a worsening nonproductive cough with worsening shortness of breath and difficulty breathing. Patient states that he began having substernal chest pressure without radiation and he took 3 sublingual nitroglycerin with some relief of his chest pain but he still having chest pain. Patient last had a stress test in April 2023. Patient also states that he has been coughing so hard that he almost had a syncopal event and had a near syncope tonight with dizziness.   Patient denies any history of blood

## 2023-07-24 NOTE — PROGRESS NOTES
Discharge instructions reviewed with patient and family. No questions at this time. All peripheral IV's removed. All patient belongings removed from room with patient. Pt transported off unit via wheelchair.

## 2023-07-24 NOTE — TELEPHONE ENCOUNTER
Inpatient Notes  Received: Today  MD Ming Stovall MA  Needs hospital f/u for asthma. Pt of DR. Sandra Ying

## 2023-07-24 NOTE — PROGRESS NOTES
Physical Therapy    Order received and chart reviewed. Spoke to pt who reported he is independent with all mobility and will be discharging from [facility] today. No PT needs. No Charge.     Kim Peres, PT, DPT, OMT-C # [id number]

## 2023-07-24 NOTE — PLAN OF CARE
Problem: Discharge Planning  Goal: Discharge to home or other facility with appropriate resources  Outcome: Adequate for Discharge     Problem: Pain  Goal: Verbalizes/displays adequate comfort level or baseline comfort level  Outcome: Adequate for Discharge     Problem: Chronic Conditions and Co-morbidities  Goal: Patient's chronic conditions and co-morbidity symptoms are monitored and maintained or improved  Outcome: Adequate for Discharge

## 2023-07-24 NOTE — PLAN OF CARE
26-year-old  obese male with past medical history of Alcohol abuse, prostate cancer , Coronary artery disease (coronary angioplasty with stent (1/14/2016 9/21/2020); Coronary artery bypass graft (02/19/2021); Coronary artery bypass graft (N/A, 2/19/2021); Bypass Graft (02/19/2021), COVID-19, Diabetic neuropathy associated with type 2 diabetes mellitus , Hyperlipidemia, Non morbid obesity,  GURDEEP ,Reactive airway disease with wheezing, moderate persistent, with acute exacerbation, and Type II diabetes mellitus presents with shortness of breath and cough. Patient also developed chest pain. Chest pain was pressure-like patient took 3 sublingual nitro which alleviated some of his pain. But patient still continued. Chest pain. CBC unremarkable. BMP unremarkable. ABG unremarkable. EKG unremarkable for ST-T wave changes. Patient has developed reactive airway disease in the context of recent COVID infection. Patient does not have a diagnosis of COPD. She was recently discharged from ED after giving breathing treatment and steroids but came back with worsening shortness of breath. (X2 was unremarkable. On exam patient had trouble breathing shortness of breath and bilateral wheeze. CTA chest ruled out PE.   Chest x-ray unremarkable for any acute cardiopulmonary pathology

## 2023-07-24 NOTE — PROGRESS NOTES
Pt resting comfortably at this time. Denies, pain and SOB, but RN noted on exertion. No s/s of distress, VSS. See flowsheet and eMar for additional documentation    Pt up ad estefany in room. Fall safety discussed, pt verbalized understanding. Wife currently at bedside. Denies any needs at this time.

## 2023-07-24 NOTE — H&P
Regular rhythm. No murmur. No rub. No edema. GI: Non-tender. Non-distended. No masses. No organomegaly. Normal bowel sounds. No hernia. Skin: Warm and dry. No nodule on exposed extremities. No rash on exposed extremities. M/S: No cyanosis. No joint deformity. No clubbing. Neuro: Awake. Grossly nonfocal    Psych: Oriented x 3. No anxiety or agitation. CBC:   Recent Labs     07/22/23 0448 07/23/23  2341   WBC 8.7 8.6   HGB 14.9 13.6   HCT 44.1 41.0   MCV 91.5 93.5    193     BMP:   Recent Labs     07/22/23 0448 07/23/23  2341    136   K 4.5 4.1    97*   CO2 25 23   BUN 9 18   CREATININE 0.7* 0.8     LIVER PROFILE:   Recent Labs     07/22/23 0448 07/23/23  2341   AST 17 17   ALT 19 18   BILITOT 0.8 0.4   ALKPHOS 79 84     PT/INR: No results for input(s): PROTIME, INR in the last 72 hours. APTT: No results for input(s): APTT in the last 72 hours. VBG:     Latest Reference Range & Units 07/22/23 04:48   pH, Oscar 7.350 - 7.450  7.384   pCO2, Oscar 40.0 - 50.0 mmHg 40.2   pO2, Oscar 25.0 - 40.0 mmHg 113.1 (H)   HCO3, Venous 23.0 - 29.0 mmol/L 23.5   TC02 (Calc), Oscar Not Established mmol/L 25   Base Excess, Oscar -3.0 - 3.0 mmol/L -1.4   O2 Content, Oscar Not Established VOL % 22   MetHgb, Oscar <1.5 % 0.1   O2 Sat, Oscar Not Established % 98   (H): Data is abnormally high       CARDIAC ENZYMES   Latest Reference Range & Units 07/23/23 23:41   Troponin, High Sensitivity 0 - 22 ng/L 14         CULTURES  Results       ** No results found for the last 336 hours. **              EKG: Sinus tachycardia with Premature atrial complexes  Nonspecific ST abnormality  When compared with ECG of 22-JUL-2023 04:49, premature atrial complexes are now present  Confirmed by Pedrito Zendejas MD, Carey Dye ([id number]) on 7/24/2023 7:54:44 AM     RADIOLOGY  CT CHEST PULMONARY EMBOLISM W CONTRAST   Final Result   No evidence of pulmonary embolism or acute pulmonary abnormality.              Leila Bhakta MD have

## 2023-07-25 ASSESSMENT — HEART SCORE: ECG: 0

## 2023-08-03 ENCOUNTER — OFFICE VISIT (OUTPATIENT)
Dept: PULMONOLOGY | Age: 63
End: 2023-08-03
Payer: COMMERCIAL

## 2023-08-03 VITALS
OXYGEN SATURATION: 98 % | BODY MASS INDEX: 35.62 KG/M2 | SYSTOLIC BLOOD PRESSURE: 110 MMHG | HEART RATE: 77 BPM | DIASTOLIC BLOOD PRESSURE: 60 MMHG | RESPIRATION RATE: 16 BRPM | TEMPERATURE: 98.3 F | HEIGHT: 70 IN | WEIGHT: 248.8 LBS

## 2023-08-03 DIAGNOSIS — J45.21 MILD INTERMITTENT ASTHMA WITH EXACERBATION: Primary | ICD-10-CM

## 2023-08-03 PROCEDURE — 3078F DIAST BP <80 MM HG: CPT | Performed by: INTERNAL MEDICINE

## 2023-08-03 PROCEDURE — 3074F SYST BP LT 130 MM HG: CPT | Performed by: INTERNAL MEDICINE

## 2023-08-03 PROCEDURE — 99214 OFFICE O/P EST MOD 30 MIN: CPT | Performed by: INTERNAL MEDICINE

## 2023-08-03 RX ORDER — PREDNISONE 10 MG/1
TABLET ORAL
Qty: 30 TABLET | Refills: 0 | Status: SHIPPED | OUTPATIENT
Start: 2023-08-03 | End: 2023-08-15

## 2023-08-03 RX ORDER — FLUTICASONE PROPIONATE AND SALMETEROL 250; 50 UG/1; UG/1
1 POWDER RESPIRATORY (INHALATION) EVERY 12 HOURS
Qty: 180 EACH | Refills: 3 | Status: SHIPPED | OUTPATIENT
Start: 2023-08-03

## 2023-08-07 ENCOUNTER — OFFICE VISIT (OUTPATIENT)
Dept: FAMILY MEDICINE CLINIC | Age: 63
End: 2023-08-07
Payer: COMMERCIAL

## 2023-08-07 VITALS
HEIGHT: 70 IN | SYSTOLIC BLOOD PRESSURE: 137 MMHG | WEIGHT: 244 LBS | OXYGEN SATURATION: 99 % | BODY MASS INDEX: 34.93 KG/M2 | DIASTOLIC BLOOD PRESSURE: 80 MMHG | HEART RATE: 84 BPM

## 2023-08-07 DIAGNOSIS — E66.01 CLASS 3 SEVERE OBESITY DUE TO EXCESS CALORIES WITH SERIOUS COMORBIDITY AND BODY MASS INDEX (BMI) OF 45.0 TO 49.9 IN ADULT (HCC): ICD-10-CM

## 2023-08-07 DIAGNOSIS — J45.40 MODERATE PERSISTENT REACTIVE AIRWAY DISEASE WITHOUT COMPLICATION: ICD-10-CM

## 2023-08-07 DIAGNOSIS — I25.10 ASHD (ARTERIOSCLEROTIC HEART DISEASE): ICD-10-CM

## 2023-08-07 DIAGNOSIS — E11.69 TYPE 2 DIABETES MELLITUS WITH OTHER SPECIFIED COMPLICATION, WITHOUT LONG-TERM CURRENT USE OF INSULIN (HCC): Primary | ICD-10-CM

## 2023-08-07 DIAGNOSIS — Z09 HOSPITAL DISCHARGE FOLLOW-UP: ICD-10-CM

## 2023-08-07 LAB — HBA1C MFR BLD: 8 %

## 2023-08-07 PROCEDURE — 83037 HB GLYCOSYLATED A1C HOME DEV: CPT | Performed by: FAMILY MEDICINE

## 2023-08-07 PROCEDURE — 3052F HG A1C>EQUAL 8.0%<EQUAL 9.0%: CPT | Performed by: FAMILY MEDICINE

## 2023-08-07 PROCEDURE — 3075F SYST BP GE 130 - 139MM HG: CPT | Performed by: FAMILY MEDICINE

## 2023-08-07 PROCEDURE — 99214 OFFICE O/P EST MOD 30 MIN: CPT | Performed by: FAMILY MEDICINE

## 2023-08-07 PROCEDURE — 1111F DSCHRG MED/CURRENT MED MERGE: CPT | Performed by: FAMILY MEDICINE

## 2023-08-07 PROCEDURE — 3079F DIAST BP 80-89 MM HG: CPT | Performed by: FAMILY MEDICINE

## 2023-08-07 ASSESSMENT — ENCOUNTER SYMPTOMS
ABDOMINAL PAIN: 0
STRIDOR: 0
SHORTNESS OF BREATH: 0
COUGH: 0
BACK PAIN: 0
CHOKING: 0
CHEST TIGHTNESS: 0
WHEEZING: 0

## 2023-08-07 NOTE — PROGRESS NOTES
stridor. No wheezing or rales. Chest:      Chest wall: No tenderness. Abdominal:      General: Bowel sounds are normal. There is no distension. Palpations: Abdomen is soft. There is no mass. Tenderness: There is no abdominal tenderness. There is no guarding or rebound. Musculoskeletal:         General: No tenderness. Normal range of motion. Cervical back: Normal range of motion and neck supple. Lymphadenopathy:      Cervical: No cervical adenopathy. Skin:     General: Skin is warm and dry. Coloration: Skin is not pale. Findings: No erythema or rash. Neurological:      Mental Status: He is alert and oriented to person, place, and time. Cranial Nerves: No cranial nerve deficit. Motor: No abnormal muscle tone. Coordination: Coordination normal.      Deep Tendon Reflexes: Reflexes are normal and symmetric. Reflexes normal.       Assessment / Plan:         1. Type 2 diabetes mellitus with other specified complication, without long-term current use of insulin (HCC)-discussed at great length. PLAN will be to discontinue glipizide, continue metformin, Ozempic, Humalog. Carefully monitor glucose twice daily to 3 times daily. Call in a week with progress and may be able to switch to basal insulin and discontinue Humalog    - POCT glycosylated hemoglobin (Hb A1C)    2. Hospital discharge follow-up    - [place] DISCHARGE MEDS RECONCILED W/ CURRENT OUTPATIENT MED LIST    3. ASHD (arteriosclerotic heart disease)-stable. Continue frequent follow-ups with cardiology plus Lipitor nitroglycerin Plavix metoprolol      4. Moderate persistent reactive airway disease without complication-responding to albuterol inhaler albuterol inhalant solution. Continue      5.  Class 3 severe obesity due to excess calories with serious comorbidity and body mass index (BMI) of 45.0 to 49.9 in adult [facility])    -Strongly recommend increasing exercise and decreasing calories

## 2023-08-10 DIAGNOSIS — E11.69 TYPE 2 DIABETES MELLITUS WITH OTHER SPECIFIED COMPLICATION, WITHOUT LONG-TERM CURRENT USE OF INSULIN (HCC): ICD-10-CM

## 2023-08-10 DIAGNOSIS — E11.9 TYPE 2 DIABETES MELLITUS WITHOUT COMPLICATION, WITHOUT LONG-TERM CURRENT USE OF INSULIN (HCC): ICD-10-CM

## 2023-08-10 RX ORDER — ALBUTEROL SULFATE 2.5 MG/3ML
SOLUTION RESPIRATORY (INHALATION)
Qty: 75 ML | Refills: 2 | OUTPATIENT
Start: 2023-08-10

## 2023-08-10 RX ORDER — PEN NEEDLE, DIABETIC 32GX 5/32"
NEEDLE, DISPOSABLE MISCELLANEOUS
Qty: 100 EACH | Refills: 2 | OUTPATIENT
Start: 2023-08-10

## 2023-08-10 RX ORDER — INSULIN LISPRO 200 [IU]/ML
INJECTION, SOLUTION SUBCUTANEOUS
Qty: 6 ML | Refills: 2 | OUTPATIENT
Start: 2023-08-10

## 2023-08-10 RX ORDER — GLIPIZIDE 10 MG/1
TABLET ORAL
Qty: 60 TABLET | Refills: 5 | Status: SHIPPED | OUTPATIENT
Start: 2023-08-10

## 2023-08-10 RX ORDER — ALBUTEROL SULFATE 90 UG/1
AEROSOL, METERED RESPIRATORY (INHALATION)
Qty: 18 EACH | Refills: 2 | OUTPATIENT
Start: 2023-08-10

## 2023-08-10 NOTE — TELEPHONE ENCOUNTER
8/7/2023    Future Appointments   Date Time Provider [address]   11/20/2023  8:30 AM DO CAYLA Townsend - [place]   11/21/2023  9:30 AM MD SANDHYA Aguilar

## 2023-09-01 ENCOUNTER — TELEPHONE (OUTPATIENT)
Dept: FAMILY MEDICINE CLINIC | Age: 63
End: 2023-09-01

## 2023-09-01 DIAGNOSIS — E11.69 TYPE 2 DIABETES MELLITUS WITH OTHER SPECIFIED COMPLICATION, WITH LONG-TERM CURRENT USE OF INSULIN (HCC): Primary | ICD-10-CM

## 2023-09-01 DIAGNOSIS — Z79.4 TYPE 2 DIABETES MELLITUS WITH OTHER SPECIFIED COMPLICATION, WITH LONG-TERM CURRENT USE OF INSULIN (HCC): Primary | ICD-10-CM

## 2023-09-01 RX ORDER — INSULIN LISPRO 200 [IU]/ML
8-10 INJECTION, SOLUTION SUBCUTANEOUS
Qty: 5 ADJUSTABLE DOSE PRE-FILLED PEN SYRINGE | Refills: 2 | Status: SHIPPED | OUTPATIENT
Start: 2023-09-01

## 2023-09-01 RX ORDER — INSULIN LISPRO 200 [IU]/ML
8-10 INJECTION, SOLUTION SUBCUTANEOUS
Qty: 5 ADJUSTABLE DOSE PRE-FILLED PEN SYRINGE | Refills: 4 | Status: SHIPPED | OUTPATIENT
Start: 2023-09-01 | End: 2023-09-01

## 2023-09-01 NOTE — TELEPHONE ENCOUNTER
Patient called.    Refill  30 day  Pharmacy= [facility]  Humalog quick pens 3 times a day with meals  Insulin pen needles 32 x 4 mm   (Put on at hospital and [facility] follow up with Dr. Susy Car and advise to continue same dosage and directions)    He does not have enough for the weekend    8/7/2023 last appt  Future Appointments   Date Time Provider [address]   11/20/2023  8:30 AM [address] CAYLA Baumann [address] [facility]   11/21/2023  9:30 AM MD SANDHYA [facility]

## 2023-10-03 ENCOUNTER — TELEPHONE (OUTPATIENT)
Dept: FAMILY MEDICINE CLINIC | Age: 63
End: 2023-10-03

## 2023-10-03 NOTE — TELEPHONE ENCOUNTER
8/7/2023 last ov    Future Appointments   Date Time Provider [address]   10/9/2023  9:30 AM Eder Quick MD [facility]   10/16/2023 12:30 PM ARGENIS [facility]   11/20/2023  8:30 AM DO CAYLA Landeros [place] - DYD   11/21/2023  9:30 AM Marquis Bartlett MD [facility]

## 2023-10-03 NOTE — TELEPHONE ENCOUNTER
----- Message from [facility] [place] sent at 10/3/2023  9:30 AM EDT -----  Subject: Message to Provider    QUESTIONS  Information for Provider? Pt's wife called today stating her  has   an indention on his right leg and it is an open wound. It is drying up, it   is infected, and it is red around it. It is getting a little better but   this has been going on for about 3 weeks. He also has a split on his right   heel and it is painful for him to walk. He is also having pain in his   right knee. They are very concerned due to him having diabetes and already   loosing a toe. He has appointments for this but they want to speak with   his PCP for advise.   ---------------------------------------------------------------------------  --------------  [address] [place] [address] Marques  [phone]; OK to leave message on voicemail  ---------------------------------------------------------------------------  --------------  SCRIPT ANSWERS  Relationship to Patient? Spouse/Partner  Representative Name? WifeOscar Cook  Is the representative on the Communication Release of Information (KAYY)   form in Epic?  Yes

## 2023-10-04 ENCOUNTER — OFFICE VISIT (OUTPATIENT)
Dept: FAMILY MEDICINE CLINIC | Age: 63
End: 2023-10-04
Payer: COMMERCIAL

## 2023-10-04 VITALS
HEART RATE: 78 BPM | BODY MASS INDEX: 34.58 KG/M2 | WEIGHT: 241 LBS | TEMPERATURE: 97 F | OXYGEN SATURATION: 98 % | RESPIRATION RATE: 16 BRPM | DIASTOLIC BLOOD PRESSURE: 84 MMHG | SYSTOLIC BLOOD PRESSURE: 128 MMHG

## 2023-10-04 DIAGNOSIS — S81.801A OPEN WOUND OF RIGHT LOWER LEG, INITIAL ENCOUNTER: ICD-10-CM

## 2023-10-04 DIAGNOSIS — Z79.4 TYPE 2 DIABETES MELLITUS WITH OTHER SPECIFIED COMPLICATION, WITH LONG-TERM CURRENT USE OF INSULIN (HCC): ICD-10-CM

## 2023-10-04 DIAGNOSIS — E11.69 TYPE 2 DIABETES MELLITUS WITH OTHER SPECIFIED COMPLICATION, WITH LONG-TERM CURRENT USE OF INSULIN (HCC): ICD-10-CM

## 2023-10-04 DIAGNOSIS — S91.311A LACERATION OF RIGHT HEEL, INITIAL ENCOUNTER: Primary | ICD-10-CM

## 2023-10-04 LAB
CREATININE URINE POCT: NORMAL
MICROALBUMIN/CREAT 24H UR: NORMAL MG/G{CREAT}
MICROALBUMIN/CREAT UR-RTO: NORMAL

## 2023-10-04 PROCEDURE — 99214 OFFICE O/P EST MOD 30 MIN: CPT | Performed by: NURSE PRACTITIONER

## 2023-10-04 PROCEDURE — 3074F SYST BP LT 130 MM HG: CPT | Performed by: NURSE PRACTITIONER

## 2023-10-04 PROCEDURE — 82044 UR ALBUMIN SEMIQUANTITATIVE: CPT | Performed by: NURSE PRACTITIONER

## 2023-10-04 PROCEDURE — 3052F HG A1C>EQUAL 8.0%<EQUAL 9.0%: CPT | Performed by: NURSE PRACTITIONER

## 2023-10-04 PROCEDURE — 3079F DIAST BP 80-89 MM HG: CPT | Performed by: NURSE PRACTITIONER

## 2023-10-04 RX ORDER — SEMAGLUTIDE 0.68 MG/ML
INJECTION, SOLUTION SUBCUTANEOUS
Qty: 3 ML | Refills: 2 | OUTPATIENT
Start: 2023-10-04

## 2023-10-04 RX ORDER — DOXYCYCLINE HYCLATE 100 MG
100 TABLET ORAL 2 TIMES DAILY
Qty: 14 TABLET | Refills: 0 | Status: SHIPPED | OUTPATIENT
Start: 2023-10-04 | End: 2023-10-11

## 2023-10-04 RX ORDER — ALBUTEROL SULFATE 2.5 MG/3ML
SOLUTION RESPIRATORY (INHALATION)
COMMUNITY
Start: 2023-07-24

## 2023-10-04 SDOH — ECONOMIC STABILITY: FOOD INSECURITY: WITHIN THE PAST 12 MONTHS, YOU WORRIED THAT YOUR FOOD WOULD RUN OUT BEFORE YOU GOT MONEY TO BUY MORE.: NEVER TRUE

## 2023-10-04 SDOH — ECONOMIC STABILITY: HOUSING INSECURITY
IN THE LAST 12 MONTHS, WAS THERE A TIME WHEN YOU DID NOT HAVE A STEADY PLACE TO SLEEP OR SLEPT IN A SHELTER (INCLUDING NOW)?: NO

## 2023-10-04 SDOH — ECONOMIC STABILITY: FOOD INSECURITY: WITHIN THE PAST 12 MONTHS, THE FOOD YOU BOUGHT JUST DIDN'T LAST AND YOU DIDN'T HAVE MONEY TO GET MORE.: NEVER TRUE

## 2023-10-04 SDOH — ECONOMIC STABILITY: INCOME INSECURITY: HOW HARD IS IT FOR YOU TO PAY FOR THE VERY BASICS LIKE FOOD, HOUSING, MEDICAL CARE, AND HEATING?: NOT HARD AT ALL

## 2023-10-04 ASSESSMENT — ENCOUNTER SYMPTOMS
RESPIRATORY NEGATIVE: 1
FACIAL SWELLING: 0
COLOR CHANGE: 1

## 2023-10-04 ASSESSMENT — PATIENT HEALTH QUESTIONNAIRE - PHQ9
2. FEELING DOWN, DEPRESSED OR HOPELESS: 0
1. LITTLE INTEREST OR PLEASURE IN DOING THINGS: 0
SUM OF ALL RESPONSES TO PHQ QUESTIONS 1-9: 0
SUM OF ALL RESPONSES TO PHQ9 QUESTIONS 1 & 2: 0

## 2023-10-04 ASSESSMENT — ANXIETY QUESTIONNAIRES
GAD7 TOTAL SCORE: 0
6. BECOMING EASILY ANNOYED OR IRRITABLE: 0
7. FEELING AFRAID AS IF SOMETHING AWFUL MIGHT HAPPEN: 0
IF YOU CHECKED OFF ANY PROBLEMS ON THIS QUESTIONNAIRE, HOW DIFFICULT HAVE THESE PROBLEMS MADE IT FOR YOU TO DO YOUR WORK, TAKE CARE OF THINGS AT HOME, OR GET ALONG WITH OTHER PEOPLE: NOT DIFFICULT AT ALL
5. BEING SO RESTLESS THAT IT IS HARD TO SIT STILL: 0
3. WORRYING TOO MUCH ABOUT DIFFERENT THINGS: 0
1. FEELING NERVOUS, ANXIOUS, OR ON EDGE: 0
2. NOT BEING ABLE TO STOP OR CONTROL WORRYING: 0
4. TROUBLE RELAXING: 0

## 2023-10-04 NOTE — PROGRESS NOTES
10/4/2023    This is a 61 y.o. male   Chief Complaint   Patient presents with    Other     bad sores on right leg, 2nd toe discolored and crack on heel, right knee pain     . Holger Burk is seen today for concerns over a nonhealing right lower leg wound as well as a laceration on his right heel. The lower leg wound has been present for about 2 weeks. It is red and tender. The laceration to his heel started this past weekend. He states he was cutting firewood when he believes he stepped on a stick. He denies any retained wood. No fevers, chills. He states his sugars have been getting a little better, but he does not check them prior to or with meals. He states fasting range 120-190. He reports being consistent with his medications.           Patient Active Problem List   Diagnosis    Type II or unspecified type diabetes mellitus without mention of complication, not stated as uncontrolled    Hypertension    Diabetes mellitus    History of ETOH abuse    Patient overweight    Patient overweight    Dietary noncompliance    Alcohol abuse    Flank pain    Prostatism    Low serum testosterone level    Arm pain, left    Shoulder pain    Precordial pain    Lung nodule    Acute angina (HCC)    Abnormal stress test    Acute coronary syndrome (HCC)    Coronary artery disease involving native coronary artery of native heart without angina pectoris    Other chest pain    S/P drug eluting coronary stent placement    Essential hypertension    Obesity    Abnormal chest x-ray    Angina effort    Skin lesion    Angina at rest    Hyperlipidemia    Angina pectoris (HCC)    NSTEMI (non-ST elevated myocardial infarction) ([address])    Controlled type 2 diabetes mellitus without complication, without long-term current use of insulin ([address])    ASHD (arteriosclerotic heart disease)    Mass of right foot    Overweight    History of angina pectoris    Reactive airway disease    Bronchitis    Reactive airway disease with wheezing, moderate

## 2023-10-04 NOTE — PATIENT INSTRUCTIONS
Start antibiotic  Epsom salt soaks  Keep heel clean and use polysporin, cover with bandaid  Keep appointment with wound care  Take blood sugar prior to eating then take insulin  No changes to medication

## 2023-10-06 SDOH — HEALTH STABILITY: PHYSICAL HEALTH: ON AVERAGE, HOW MANY DAYS PER WEEK DO YOU ENGAGE IN MODERATE TO STRENUOUS EXERCISE (LIKE A BRISK WALK)?: 5 DAYS

## 2023-10-06 ASSESSMENT — SOCIAL DETERMINANTS OF HEALTH (SDOH)
WITHIN THE LAST YEAR, HAVE TO BEEN RAPED OR FORCED TO HAVE ANY KIND OF SEXUAL ACTIVITY BY YOUR PARTNER OR EX-PARTNER?: NO
WITHIN THE LAST YEAR, HAVE YOU BEEN KICKED, HIT, SLAPPED, OR OTHERWISE PHYSICALLY HURT BY YOUR PARTNER OR EX-PARTNER?: NO

## 2023-10-09 ENCOUNTER — TELEPHONE (OUTPATIENT)
Dept: FAMILY MEDICINE CLINIC | Age: 63
End: 2023-10-09

## 2023-10-09 ENCOUNTER — OFFICE VISIT (OUTPATIENT)
Dept: ORTHOPEDIC SURGERY | Age: 63
End: 2023-10-09
Payer: COMMERCIAL

## 2023-10-09 DIAGNOSIS — E11.69 TYPE 2 DIABETES MELLITUS WITH OTHER SPECIFIED COMPLICATION, WITH LONG-TERM CURRENT USE OF INSULIN (HCC): ICD-10-CM

## 2023-10-09 DIAGNOSIS — M25.562 LEFT KNEE PAIN, UNSPECIFIED CHRONICITY: ICD-10-CM

## 2023-10-09 DIAGNOSIS — Z79.4 TYPE 2 DIABETES MELLITUS WITH OTHER SPECIFIED COMPLICATION, WITH LONG-TERM CURRENT USE OF INSULIN (HCC): ICD-10-CM

## 2023-10-09 DIAGNOSIS — M17.12 PRIMARY OSTEOARTHRITIS OF LEFT KNEE: Primary | ICD-10-CM

## 2023-10-09 PROCEDURE — 20610 DRAIN/INJ JOINT/BURSA W/O US: CPT | Performed by: ORTHOPAEDIC SURGERY

## 2023-10-09 PROCEDURE — 99204 OFFICE O/P NEW MOD 45 MIN: CPT | Performed by: ORTHOPAEDIC SURGERY

## 2023-10-09 RX ORDER — TRIAMCINOLONE ACETONIDE 40 MG/ML
40 INJECTION, SUSPENSION INTRA-ARTICULAR; INTRAMUSCULAR ONCE
Status: COMPLETED | OUTPATIENT
Start: 2023-10-09 | End: 2023-10-09

## 2023-10-09 RX ORDER — ROPIVACAINE HYDROCHLORIDE 5 MG/ML
4 INJECTION, SOLUTION EPIDURAL; INFILTRATION; PERINEURAL ONCE
Status: COMPLETED | OUTPATIENT
Start: 2023-10-09 | End: 2023-10-09

## 2023-10-09 RX ADMIN — TRIAMCINOLONE ACETONIDE 40 MG: 40 INJECTION, SUSPENSION INTRA-ARTICULAR; INTRAMUSCULAR at 10:17

## 2023-10-09 RX ADMIN — ROPIVACAINE HYDROCHLORIDE 4 ML: 5 INJECTION, SOLUTION EPIDURAL; INFILTRATION; PERINEURAL at 10:17

## 2023-10-09 NOTE — PROGRESS NOTES
ORTHOPAEDIC SURGERY H&P / CONSULTATION NOTE    Chief complaint:   Chief Complaint   Patient presents with    Knee Pain     NP LEFT KNEE      History of present illness: The patient is a 61 y.o. male with subjective symptoms of left knee pain. The chief complaint is located at medial aspect the left knee as well as anterior based. Duration of symptoms has been for 3 weeks. The severity of symptoms is rated at 3/10 pain on intake form. Patient denies trauma. He states progressively getting worse. He is had on again off again pain in the knee however its been more constant over the last month. He states dull throbbing aching pain. Primarily anterior base as well as medial base. He states very minimal swelling if any. He states pain with ADLs. He is unable to take oral anti-inflammatories as he is on Plavix. He denies therapy or recent injections. The patient has tried the below listed items prior to today's consultation for above listed chief complaint.     -   Over-the-counter anti-inflammatories/prescription medication anti-inflammatory.      -   Physical therapy / guided home exercise program -     -   Previous corticosteroid injections    Past medical history:    Past Medical History:   Diagnosis Date    Alcohol abuse 12/03/2012    Cancer ([address]) 2022    Prostate    Cellulitis     sternum    Coronary artery disease     COVID-19     Diabetic neuropathy associated with type 2 diabetes mellitus (HCC)     Hyperlipidemia     Non morbid obesity     NSTEMI (non-ST elevated myocardial infarction) ([facility])     GURDEEP (obstructive sleep apnea)     does not wear cpap machine    Reactive airway disease with wheezing, moderate persistent, with acute exacerbation 03/03/2020    Sepsis ([address]) 04/2021    Type II or unspecified type diabetes mellitus without mention of complication, not stated as uncontrolled         Past surgical history:    Past Surgical History:   Procedure Laterality Date    BYPASS GRAFT  02/19/2021

## 2023-10-09 NOTE — TELEPHONE ENCOUNTER
Patient states when he was in the hospital in July Dr. Hitesh Garcia put him on Humalog and Ozempic. He is asking if Dr. Martha Lerner wants him to continue these. Please send to [facility] in [place]. [place] if he is to continue. Please call patient to notify what Dr. Martha Lerner decides.

## 2023-10-10 DIAGNOSIS — E11.69 TYPE 2 DIABETES MELLITUS WITH OTHER SPECIFIED COMPLICATION, WITH LONG-TERM CURRENT USE OF INSULIN (HCC): ICD-10-CM

## 2023-10-10 DIAGNOSIS — Z79.4 TYPE 2 DIABETES MELLITUS WITH OTHER SPECIFIED COMPLICATION, WITH LONG-TERM CURRENT USE OF INSULIN (HCC): ICD-10-CM

## 2023-10-10 RX ORDER — SEMAGLUTIDE 0.68 MG/ML
INJECTION, SOLUTION SUBCUTANEOUS
Qty: 3 ML | Refills: 2 | Status: SHIPPED | OUTPATIENT
Start: 2023-10-10

## 2023-10-10 RX ORDER — INSULIN LISPRO 200 [IU]/ML
8-10 INJECTION, SOLUTION SUBCUTANEOUS
Qty: 5 ADJUSTABLE DOSE PRE-FILLED PEN SYRINGE | Refills: 2 | Status: SHIPPED | OUTPATIENT
Start: 2023-10-10

## 2023-10-10 RX ORDER — SEMAGLUTIDE 0.68 MG/ML
INJECTION, SOLUTION SUBCUTANEOUS
Qty: 3 ML | Refills: 2 | Status: CANCELLED | OUTPATIENT
Start: 2023-10-10

## 2023-10-10 RX ORDER — INSULIN LISPRO 200 [IU]/ML
10 INJECTION, SOLUTION SUBCUTANEOUS
Qty: 5 ADJUSTABLE DOSE PRE-FILLED PEN SYRINGE | Refills: 2 | Status: CANCELLED | OUTPATIENT
Start: 2023-10-10

## 2023-10-10 NOTE — TELEPHONE ENCOUNTER
Milo Pena, DO  You; [facility] 3 hours ago (8:05 AM)       Please tell Valentino Jack  that I will be glad to renew Ozempic and Humalog but I need to know the doses. Please let me know what the doses are and I will send in a prescription. Called & spoke to pt.   Dosing:    Ozempic .5 once a week  Humulog 10 units TID    Please send to pharm on file [facility])

## 2023-10-11 NOTE — DISCHARGE INSTRUCTIONS
[address] [facility] Physician Orders and Discharge [address], [address] [place] [address], [address]  Telephone: [phone]      Fax: [phone]      Your home care company:  *** . Your wound-care supplies will be provided by:  *** . NAME:  Robina Flores   YOB: 1960  PRIMARY DIAGNOSIS FOR WOUND CARE CENTER:  *** . Wound cleansing:   Do not scrub or use excessive force. Wash hands with soap and water before and after dressing changes. Prior to applying a clean dressing, cleanse wound with normal saline, wound cleanser, or mild soap and water. Ask your physician or nurse before getting the wound(s) wet in the shower. Wound care for home:    ***    Please note, all wounds (unless stated otherwise here) were mechanically debrided at the time of cleansing here in the wound-care center today, so a small amount of pain, drainage or bleeding from that process might be expected, and is normal.     All products for home use, including multiple products for a single wound if applicable, are medically necessary in order to achieve the best chance at timely wound healing. See provider documentation for details if needed. Substituted dressings applied in the [facility] today, if applicable:    ***    New orders for this week (labs, imaging, medications, etc.):    ***    Additional instructions for specific diagnoses:    ***    F/U Appointment is with Dr. Columba Butcher in 1 week, on                                   at                       .     Your nurse  is Giles Aguilar RN. If we applied slip-resistant hospital socks today, be sure to remove them at least once a day to inspect your toes or feet, even if you're not changing the wraps or dressings underneath. If you see anything concerning (redness, excess moisture, etc), please call and let us know right away.     Should you experience any significant changes in your wound(s) (including redness,

## 2023-10-16 ENCOUNTER — HOSPITAL ENCOUNTER (OUTPATIENT)
Dept: WOUND CARE | Age: 63
Discharge: HOME | End: 2023-10-16

## 2023-10-19 ENCOUNTER — TELEPHONE (OUTPATIENT)
Dept: FAMILY MEDICINE CLINIC | Age: 63
End: 2023-10-19

## 2023-10-19 ENCOUNTER — TELEPHONE (OUTPATIENT)
Dept: ORTHOPEDIC SURGERY | Age: 63
End: 2023-10-19

## 2023-10-19 NOTE — TELEPHONE ENCOUNTER
----- Message from June Agustina sent at 10/19/2023 10:40 AM EDT -----  Subject: Appointment Request    Reason for Call: Established Patient Appointment needed: Routine Existing   Condition Follow Up    QUESTIONS    Reason for appointment request? No appointments available during search     Additional Information for Provider? Patient's wife Melva Ramsey called to   schedule appointment for a cut of his foot. Says he say CNP 10/4 CNP. States it has cracked more and feels like there is something in it. He   would really like to see Dr. Venice Allen.  Patient's wife would like a call back   to see if can be fit in somewhere on his schedule.   ---------------------------------------------------------------------------  --------------  [facility]  [phone]; OK to leave message on voicemail  ---------------------------------------------------------------------------  --------------  SCRIPT ANSWERS

## 2023-10-19 NOTE — TELEPHONE ENCOUNTER
OTHER    PATIENT'S SPOUSE CALLED TO SPEAK WITH CLINICAL AND WANTED TO ADDRESS SOME KNEE PAIN THAT PT IS EXPERIENCING. SHE STATED THAT HE'S BEEN TAKING IBUPROFEN AND TOPICAL BUT HASN'T GOTTEN ANY RELIEF.      PLEASE ADVISE

## 2023-10-23 ENCOUNTER — TELEPHONE (OUTPATIENT)
Dept: FAMILY MEDICINE CLINIC | Age: 63
End: 2023-10-23

## 2023-10-23 ENCOUNTER — OFFICE VISIT (OUTPATIENT)
Dept: ORTHOPEDIC SURGERY | Age: 63
End: 2023-10-23
Payer: COMMERCIAL

## 2023-10-23 ENCOUNTER — OFFICE VISIT (OUTPATIENT)
Dept: FAMILY MEDICINE CLINIC | Age: 63
End: 2023-10-23
Payer: COMMERCIAL

## 2023-10-23 VITALS
OXYGEN SATURATION: 98 % | TEMPERATURE: 97.6 F | RESPIRATION RATE: 16 BRPM | WEIGHT: 234 LBS | BODY MASS INDEX: 33.58 KG/M2 | DIASTOLIC BLOOD PRESSURE: 82 MMHG | SYSTOLIC BLOOD PRESSURE: 128 MMHG | HEART RATE: 82 BPM

## 2023-10-23 VITALS — HEIGHT: 70 IN | BODY MASS INDEX: 32.5 KG/M2 | WEIGHT: 227 LBS

## 2023-10-23 DIAGNOSIS — S90.851D FOREIGN BODY IN RIGHT FOOT, SUBSEQUENT ENCOUNTER: Primary | ICD-10-CM

## 2023-10-23 DIAGNOSIS — M17.12 PRIMARY OSTEOARTHRITIS OF LEFT KNEE: Primary | ICD-10-CM

## 2023-10-23 DIAGNOSIS — M25.561 RIGHT KNEE PAIN, UNSPECIFIED CHRONICITY: ICD-10-CM

## 2023-10-23 PROCEDURE — 3079F DIAST BP 80-89 MM HG: CPT | Performed by: NURSE PRACTITIONER

## 2023-10-23 PROCEDURE — 3074F SYST BP LT 130 MM HG: CPT | Performed by: NURSE PRACTITIONER

## 2023-10-23 PROCEDURE — 99213 OFFICE O/P EST LOW 20 MIN: CPT | Performed by: ORTHOPAEDIC SURGERY

## 2023-10-23 PROCEDURE — 99214 OFFICE O/P EST MOD 30 MIN: CPT | Performed by: NURSE PRACTITIONER

## 2023-10-23 ASSESSMENT — ENCOUNTER SYMPTOMS
RESPIRATORY NEGATIVE: 1
COLOR CHANGE: 0

## 2023-10-23 NOTE — PROGRESS NOTES
FOLLOW UP ORTHOPAEDIC NOTE    The patient follows up today for reevaluation of left knee pain. The patient states left knee with minimal overall longer-term relief with the corticosteroid injection that was provided 2 weeks ago. He states no significant relief with Voltaren topical gel. He is doing his home exercise program.  He states dull throbbing aching pain, occasional twisting knee pain. PE:  AAOx3  RR  Unlabored breathing  Skin warm and moist  Focused physical examination of the left knee  Positive tenderness to palpation medial femoral condyle with the knee at 90 degrees as well as osteophyte that is palpable along the medial ridge of the medial femoral condyle. Trace tenderness to palpation medial joint line. Remainder of neurovascular exam unchanged     Diagnosis Orders   1. Primary osteoarthritis of left knee  EUFLEXXA INJECTION (For Auth/Precert)      2. Right knee pain, unspecified chronicity            Assessment and plan: 61 male with continued subjective symptoms of left knee pain with known, correlating diagnosis of left knee osteoarthritis, suspected medial meniscal pathology.  -Time of 16 minutes was spent coordinating and discussing the clinical findings and diagnostic imaging results as they pertain to the patient's presenting subjective symptoms.  -I had a pleasant discussion with the patient today. I reviewed with him that is unfortunate that the corticosteroid injection have not provided further longer-term relief. I did review with him consideration for viscosupplementation injection therapy for treatment of primarily knee osteoarthritic symptoms. He is interested in pursuing this however he has a right foot foreign body that has just been discovered. He is looking to work tomorrow with his podiatrist to alleviate this pain.   He states its been several months.  -I did asked for him to follow-up with his primary care provider Dr. Adilene Crouch to determine if oral anti-inflammatories could

## 2023-10-23 NOTE — PROGRESS NOTES
against varicella    Need for prophylactic vaccination against diphtheria-tetanus-pertussis (DTP)    Chronic right-sided thoracic back pain    CAD in native artery    S/P PTCA (percutaneous transluminal coronary angioplasty)    Chest pain    Ischemic cardiomyopathy    GURDEEP (obstructive sleep apnea)    Cellulitis    Sepsis due to methicillin susceptible Staphylococcus aureus ([address])    Bacteremia due to methicillin susceptible Staphylococcus aureus (MSSA)    Disruption or dehiscence of closure of sternum or sternotomy    Elevated sed rate    Type 2 diabetes mellitus with obesity ([address])    Diabetic ulcer of right foot associated with type 2 diabetes mellitus, with fat layer exposed ([address])    Pyogenic inflammation of bone (HCC)    Diabetic ulcer of right foot associated with diabetes mellitus due to underlying condition ([address])    Unstable angina (HCC)    S/P CABG x 2    Wheezing    Shortness of breath    Hyperglycemia    Reactive airway disease without asthma    RAD (reactive airway disease) with wheezing, mild intermittent, uncomplicated       Current Outpatient Medications   Medication Sig Dispense Refill    insulin lispro (HUMALOG KWIKPEN) 200 UNIT/ML SOPN pen Inject 8-10 Units into the skin 3 times daily (with meals) 5 Adjustable Dose Pre-filled Pen Syringe 2    Semaglutide,0.25 or 0.5MG/DOS, (OZEMPIC, 0.25 OR 0.5 MG/DOSE,) 2 MG/3ML SOPN Inject  0.5 mg weekly 3 mL 2    diclofenac sodium (VOLTAREN) 1 % GEL Apply 4 g topically 4 times daily as needed for Pain 100 g 3    Insulin Pen Needle 32G X 4 MM MISC 1 each by Does not apply route daily 100 each 5    metFORMIN (GLUCOPHAGE) 1000 MG tablet TAKE ONE TABLET BY MOUTH TWO (2) TIMES A DAY 60 tablet 5    fluticasone-salmeterol (ADVAIR) 250-50 MCG/ACT AEPB diskus inhaler Inhale 1 puff into the lungs in the morning and 1 puff in the evening.  180 each 3    atorvastatin (LIPITOR) 40 MG tablet Take 1 tablet by mouth nightly 90 tablet 3    nitroGLYCERIN (NITROSTAT) 0.4 MG SL tablet

## 2023-10-23 NOTE — TELEPHONE ENCOUNTER
Patient saw his ortho regarding his knee today. His ortho wanted him to check with Dr. Fatoumata La to see if he is able to take the anti-inflammatory Mobic with his other medications. The patient also wasn't sure if he is able to take ibuprofen or tylenol with his medications.      Please call his cell with answer [phone]

## 2023-10-23 NOTE — TELEPHONE ENCOUNTER
Called [facility] in [place] pt has been there in 2017 and they got him in 10/24/23 2 3 pm wife notified.

## 2023-10-30 ENCOUNTER — TELEPHONE (OUTPATIENT)
Dept: ORTHOPEDIC SURGERY | Age: 63
End: 2023-10-30

## 2023-10-30 DIAGNOSIS — I25.10 CORONARY ARTERY DISEASE INVOLVING NATIVE CORONARY ARTERY OF NATIVE HEART WITHOUT ANGINA PECTORIS: ICD-10-CM

## 2023-10-30 RX ORDER — METOPROLOL TARTRATE 50 MG/1
TABLET, FILM COATED ORAL
Qty: 180 TABLET | Refills: 2 | Status: SHIPPED | OUTPATIENT
Start: 2023-10-30

## 2023-10-30 NOTE — TELEPHONE ENCOUNTER
Faxing Patient Information     Facility Name: [facility]    Contact Number: [phone] [facility]. A FAX WILL COME OVER WITH THE NEEDED INFO LISTED .        PHONE 1  [address] [facility] # [id number]

## 2023-11-01 ENCOUNTER — TELEPHONE (OUTPATIENT)
Dept: CARDIOLOGY CLINIC | Age: 63
End: 2023-11-01

## 2023-11-01 NOTE — TELEPHONE ENCOUNTER
Pts wife stated that they picked up the metoprolol tartrate 50mg from the pharmacy and they were only given 40 tablets and pt takes bid. In chart prescription was written for 180 tablets take 1 tab by mouth 2x a day. Please advise.

## 2023-11-02 NOTE — TELEPHONE ENCOUNTER
Spoke with pt, relayed that script was sent as 180 with 2 refills. Advised pt to contact pharmacy or insurance to find out why it was filled at 40 tablets

## 2023-11-09 ENCOUNTER — TELEPHONE (OUTPATIENT)
Dept: ORTHOPEDIC SURGERY | Age: 63
End: 2023-11-09

## 2023-11-09 NOTE — TELEPHONE ENCOUNTER
I have spoken with the patients wife. I explained that the insurance has denied the visco auth due to not having PT or a second CSI. Patients wife stated the he is doing just fine and will call for an appointment if needed. I stated that he needs to start PT no later than next week to get it apporved.

## 2023-11-14 ENCOUNTER — TELEPHONE (OUTPATIENT)
Dept: CARDIOLOGY CLINIC | Age: 63
End: 2023-11-14

## 2023-11-14 NOTE — TELEPHONE ENCOUNTER
Called and spoke with patient. He was unable to come to [facility] due to distance. I scheduled him with Mildred Saunders for [place] location this Thursday 11/16 at 2:45 p.m.

## 2023-11-14 NOTE — TELEPHONE ENCOUNTER
Please call and offer Monday 11/20/23 at 2:30 pm with Dr. Shirin Johnston here at [facility]. Please update Moe Robb RN. Thanks.

## 2023-11-14 NOTE — TELEPHONE ENCOUNTER
Pt came in office stated foot doctor wanted him to be seen by Dr. Bell would like to ok this with [facility] since he is his cardiologist. Did not schedule would like to get confirmation.

## 2023-11-14 NOTE — TELEPHONE ENCOUNTER
Ritchie Herndon called in this afternoon, he states Dr. Isabelle Nava would like to get him in right away to see Dr. Trang Hernandez due to an infection on his right leg, possible gangrene. He can be reached at [phone].

## 2023-11-15 NOTE — PROGRESS NOTES
Interventional Cardiology Consultation     Bhavin Gladys  1960    PCP: Josie Murdock DO  Referring Physician: Dr. Tom Collins  Reason for Referral: possible right lower extremity infection  Chief Complaint: \"I got a problem with my heel\"   Chief Complaint   Patient presents with    New Patient     Discuss leg circulation. Coronary Artery Disease    Hyperlipidemia    Hypertension    Cardiomyopathy    Leg Pain     Both legs       Subjective:     History of Present Illness: The patient is 61 y.o. male with a past medical history significant for hypertension, diabetes mellitus type 2, coronary artery disease s/p previous coronary stent placement, and alcohol abuse. Patient presents today for further evaluation of a right lower extremity wound. Patient states he recently saw his podiatrist for a wound on his right heel. There is also an open wound noted on patients right shin. Patient states he bumped it a few weeks ago and it hasn't heeled. 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 Patient currently denies any weight gain, edema, palpitations, chest pain, shortness of breath, dizziness, and syncope.           Past Medical History:   Diagnosis Date    Alcohol abuse 12/03/2012    Cancer ([address]) 2022    Prostate    Cellulitis     sternum    Coronary artery disease     COVID-19     Diabetic neuropathy associated with type 2 diabetes mellitus ([address])     Hyperlipidemia     Non morbid obesity     NSTEMI (non-ST elevated myocardial infarction) (HCC)     GURDEEP (obstructive sleep apnea)     does not wear cpap machine    Reactive airway disease with wheezing, moderate persistent, with acute exacerbation 03/03/2020    Sepsis ([address]) 04/2021    Type II or unspecified type diabetes mellitus without mention of complication, not stated as uncontrolled      Past Surgical History:   Procedure Laterality Date    BYPASS GRAFT  02/19/2021    COLONOSCOPY  2017    COLONOSCOPY N/A 7/1/2022    COLONOSCOPY POLYPECTOMY

## 2023-11-16 ENCOUNTER — OFFICE VISIT (OUTPATIENT)
Dept: CARDIOLOGY CLINIC | Age: 63
End: 2023-11-16
Payer: COMMERCIAL

## 2023-11-16 ENCOUNTER — TELEPHONE (OUTPATIENT)
Dept: CARDIOLOGY CLINIC | Age: 63
End: 2023-11-16

## 2023-11-16 VITALS
BODY MASS INDEX: 32.5 KG/M2 | OXYGEN SATURATION: 99 % | HEART RATE: 91 BPM | HEIGHT: 70 IN | DIASTOLIC BLOOD PRESSURE: 68 MMHG | WEIGHT: 227 LBS | SYSTOLIC BLOOD PRESSURE: 106 MMHG

## 2023-11-16 DIAGNOSIS — I73.9 PAD (PERIPHERAL ARTERY DISEASE) (HCC): Primary | ICD-10-CM

## 2023-11-16 PROCEDURE — 99204 OFFICE O/P NEW MOD 45 MIN: CPT | Performed by: INTERNAL MEDICINE

## 2023-11-16 PROCEDURE — 3074F SYST BP LT 130 MM HG: CPT | Performed by: INTERNAL MEDICINE

## 2023-11-16 PROCEDURE — 3078F DIAST BP <80 MM HG: CPT | Performed by: INTERNAL MEDICINE

## 2023-11-17 NOTE — TELEPHONE ENCOUNTER
Arturo's wife called the office wanting to schedule his angiogram. She shared that the angiogram should say STAT. However Bernarda Epstein is out of the office and it was relayed to both Torrie Nobles and Stephan Wang that she will call to schedule when she returns on Monday. Stephan Wang v/u.

## 2023-11-17 NOTE — TELEPHONE ENCOUNTER
Received a call from [facility], per Dr. Nas Franks and THE [facility] to add patient for peripheral angiogram with possible interventions on Wednesday 11/22. Arrival time 1 pm  Procedure time 230 pm.    Spoke with Holger Burk and [place] with scheduling time and procedure details including pre op instructions. Both v/u. Notified [facility] of patients agreement. [facility] to place on schedule.

## 2023-11-20 ENCOUNTER — OFFICE VISIT (OUTPATIENT)
Dept: FAMILY MEDICINE CLINIC | Age: 63
End: 2023-11-20
Payer: COMMERCIAL

## 2023-11-20 VITALS
BODY MASS INDEX: 32.5 KG/M2 | SYSTOLIC BLOOD PRESSURE: 110 MMHG | DIASTOLIC BLOOD PRESSURE: 64 MMHG | HEIGHT: 70 IN | HEART RATE: 82 BPM | OXYGEN SATURATION: 99 % | WEIGHT: 227 LBS | TEMPERATURE: 97 F | RESPIRATION RATE: 14 BRPM

## 2023-11-20 DIAGNOSIS — I73.9 PERIPHERAL VASCULAR DISEASE (HCC): ICD-10-CM

## 2023-11-20 DIAGNOSIS — I73.9 PAD (PERIPHERAL ARTERY DISEASE) (HCC): ICD-10-CM

## 2023-11-20 DIAGNOSIS — Z79.4 TYPE 2 DIABETES MELLITUS WITH OTHER SPECIFIED COMPLICATION, WITH LONG-TERM CURRENT USE OF INSULIN (HCC): Primary | ICD-10-CM

## 2023-11-20 DIAGNOSIS — I25.10 ASHD (ARTERIOSCLEROTIC HEART DISEASE): ICD-10-CM

## 2023-11-20 DIAGNOSIS — E11.69 TYPE 2 DIABETES MELLITUS WITH OTHER SPECIFIED COMPLICATION, WITH LONG-TERM CURRENT USE OF INSULIN (HCC): Primary | ICD-10-CM

## 2023-11-20 DIAGNOSIS — E66.3 OVERWEIGHT: ICD-10-CM

## 2023-11-20 LAB
ANION GAP SERPL CALCULATED.3IONS-SCNC: 11 MMOL/L (ref 3–16)
BUN SERPL-MCNC: 8 MG/DL (ref 7–20)
CALCIUM SERPL-MCNC: 9.7 MG/DL (ref 8.3–10.6)
CHLORIDE SERPL-SCNC: 104 MMOL/L (ref 99–110)
CO2 SERPL-SCNC: 29 MMOL/L (ref 21–32)
CREAT SERPL-MCNC: 0.6 MG/DL (ref 0.8–1.3)
DEPRECATED RDW RBC AUTO: 14.1 % (ref 12.4–15.4)
GFR SERPLBLD CREATININE-BSD FMLA CKD-EPI: >60 ML/MIN/{1.73_M2}
GLUCOSE SERPL-MCNC: 92 MG/DL (ref 70–99)
HBA1C MFR BLD: 8.8 %
HCT VFR BLD AUTO: 44.5 % (ref 40.5–52.5)
HGB BLD-MCNC: 14.8 G/DL (ref 13.5–17.5)
MCH RBC QN AUTO: 30.8 PG (ref 26–34)
MCHC RBC AUTO-ENTMCNC: 33.1 G/DL (ref 31–36)
MCV RBC AUTO: 93 FL (ref 80–100)
PLATELET # BLD AUTO: 296 K/UL (ref 135–450)
PMV BLD AUTO: 8 FL (ref 5–10.5)
POTASSIUM SERPL-SCNC: 5.3 MMOL/L (ref 3.5–5.1)
RBC # BLD AUTO: 4.79 M/UL (ref 4.2–5.9)
SODIUM SERPL-SCNC: 144 MMOL/L (ref 136–145)
WBC # BLD AUTO: 7.2 K/UL (ref 4–11)

## 2023-11-20 PROCEDURE — 3078F DIAST BP <80 MM HG: CPT | Performed by: FAMILY MEDICINE

## 2023-11-20 PROCEDURE — 3052F HG A1C>EQUAL 8.0%<EQUAL 9.0%: CPT | Performed by: FAMILY MEDICINE

## 2023-11-20 PROCEDURE — 3074F SYST BP LT 130 MM HG: CPT | Performed by: FAMILY MEDICINE

## 2023-11-20 PROCEDURE — 99214 OFFICE O/P EST MOD 30 MIN: CPT | Performed by: FAMILY MEDICINE

## 2023-11-20 PROCEDURE — 83036 HEMOGLOBIN GLYCOSYLATED A1C: CPT | Performed by: FAMILY MEDICINE

## 2023-11-20 SDOH — ECONOMIC STABILITY: FOOD INSECURITY: WITHIN THE PAST 12 MONTHS, YOU WORRIED THAT YOUR FOOD WOULD RUN OUT BEFORE YOU GOT MONEY TO BUY MORE.: NEVER TRUE

## 2023-11-20 SDOH — ECONOMIC STABILITY: FOOD INSECURITY: WITHIN THE PAST 12 MONTHS, THE FOOD YOU BOUGHT JUST DIDN'T LAST AND YOU DIDN'T HAVE MONEY TO GET MORE.: NEVER TRUE

## 2023-11-20 SDOH — ECONOMIC STABILITY: INCOME INSECURITY: HOW HARD IS IT FOR YOU TO PAY FOR THE VERY BASICS LIKE FOOD, HOUSING, MEDICAL CARE, AND HEATING?: NOT HARD AT ALL

## 2023-11-20 ASSESSMENT — ANXIETY QUESTIONNAIRES
6. BECOMING EASILY ANNOYED OR IRRITABLE: 0
2. NOT BEING ABLE TO STOP OR CONTROL WORRYING: 0
GAD7 TOTAL SCORE: 0
IF YOU CHECKED OFF ANY PROBLEMS ON THIS QUESTIONNAIRE, HOW DIFFICULT HAVE THESE PROBLEMS MADE IT FOR YOU TO DO YOUR WORK, TAKE CARE OF THINGS AT HOME, OR GET ALONG WITH OTHER PEOPLE: NOT DIFFICULT AT ALL
1. FEELING NERVOUS, ANXIOUS, OR ON EDGE: 0
7. FEELING AFRAID AS IF SOMETHING AWFUL MIGHT HAPPEN: 0
4. TROUBLE RELAXING: 0
5. BEING SO RESTLESS THAT IT IS HARD TO SIT STILL: 0
3. WORRYING TOO MUCH ABOUT DIFFERENT THINGS: 0

## 2023-11-20 ASSESSMENT — ENCOUNTER SYMPTOMS
WHEEZING: 0
CHOKING: 0
ABDOMINAL PAIN: 0
BACK PAIN: 0
STRIDOR: 0
COUGH: 0
CHEST TIGHTNESS: 0
SHORTNESS OF BREATH: 0

## 2023-11-20 ASSESSMENT — PATIENT HEALTH QUESTIONNAIRE - PHQ9
2. FEELING DOWN, DEPRESSED OR HOPELESS: 0
SUM OF ALL RESPONSES TO PHQ QUESTIONS 1-9: 0
1. LITTLE INTEREST OR PLEASURE IN DOING THINGS: 0
SUM OF ALL RESPONSES TO PHQ9 QUESTIONS 1 & 2: 0

## 2023-11-20 NOTE — PROGRESS NOTES
distension. Palpations: Abdomen is soft. There is no mass. Tenderness: There is no abdominal tenderness. There is no guarding or rebound. Musculoskeletal:         General: No tenderness. Normal range of motion. Cervical back: Normal range of motion and neck supple. Lymphadenopathy:      Cervical: No cervical adenopathy. Skin:     General: Skin is warm and dry. Coloration: Skin is not pale. Findings: No erythema or rash. Comments: 2 slowly healing wounds, shin and heel. Neurological:      Mental Status: He is alert and oriented to person, place, and time. Cranial Nerves: No cranial nerve deficit. Motor: No abnormal muscle tone. Coordination: Coordination normal.      Deep Tendon Reflexes: Reflexes are normal and symmetric. Reflexes normal.         Assessment / Plan:         1. Type 2 diabetes mellitus with other specified complication, with long-term current use of insulin (HCC)-trolled not as good as usual due to his inability to ambulate. Discussed. - POCT glycosylated hemoglobin (Hb A1C)    2. Peripheral vascular disease (HCC)-vascular surgery procedure scheduled for this Wednesday      3. ASHD (arteriosclerotic heart disease)-stable. Continue with cardiology and  Metoprolol atorvastatin nitroglycerin Plavix    4.  Overweight-good weight loss

## 2023-11-22 ENCOUNTER — HOSPITAL ENCOUNTER (OUTPATIENT)
Dept: CARDIAC CATH/INVASIVE PROCEDURES | Age: 63
Discharge: HOME | End: 2023-11-22
Attending: INTERNAL MEDICINE | Admitting: INTERNAL MEDICINE
Payer: COMMERCIAL

## 2023-11-22 VITALS
HEART RATE: 99 BPM | BODY MASS INDEX: 31.57 KG/M2 | RESPIRATION RATE: 11 BRPM | OXYGEN SATURATION: 97 % | DIASTOLIC BLOOD PRESSURE: 64 MMHG | SYSTOLIC BLOOD PRESSURE: 101 MMHG | WEIGHT: 220 LBS

## 2023-11-22 LAB
ANION GAP SERPL CALCULATED.3IONS-SCNC: 13 MMOL/L (ref 3–16)
BUN SERPL-MCNC: 7 MG/DL (ref 7–20)
CALCIUM SERPL-MCNC: 9 MG/DL (ref 8.3–10.6)
CHLORIDE SERPL-SCNC: 99 MMOL/L (ref 99–110)
CHOLEST SERPL-MCNC: 107 MG/DL (ref 0–199)
CO2 SERPL-SCNC: 27 MMOL/L (ref 21–32)
CREAT SERPL-MCNC: 0.6 MG/DL (ref 0.8–1.3)
DEPRECATED RDW RBC AUTO: 14.2 % (ref 12.4–15.4)
EKG ATRIAL RATE: 85 BPM
EKG DIAGNOSIS: NORMAL
EKG P AXIS: 43 DEGREES
EKG P-R INTERVAL: 176 MS
EKG Q-T INTERVAL: 382 MS
EKG QRS DURATION: 96 MS
EKG QTC CALCULATION (BAZETT): 454 MS
EKG R AXIS: 15 DEGREES
EKG T AXIS: 52 DEGREES
EKG VENTRICULAR RATE: 85 BPM
GFR SERPLBLD CREATININE-BSD FMLA CKD-EPI: >60 ML/MIN/{1.73_M2}
GLUCOSE SERPL-MCNC: 115 MG/DL (ref 70–99)
HCT VFR BLD AUTO: 44.7 % (ref 40.5–52.5)
HDLC SERPL-MCNC: 51 MG/DL (ref 40–60)
HGB BLD-MCNC: 15.1 G/DL (ref 13.5–17.5)
LDLC SERPL CALC-MCNC: 41 MG/DL
MCH RBC QN AUTO: 31.1 PG (ref 26–34)
MCHC RBC AUTO-ENTMCNC: 33.7 G/DL (ref 31–36)
MCV RBC AUTO: 92.2 FL (ref 80–100)
PLATELET # BLD AUTO: 269 K/UL (ref 135–450)
PMV BLD AUTO: 7.4 FL (ref 5–10.5)
POTASSIUM SERPL-SCNC: 3.9 MMOL/L (ref 3.5–5.1)
RBC # BLD AUTO: 4.85 M/UL (ref 4.2–5.9)
SODIUM SERPL-SCNC: 139 MMOL/L (ref 136–145)
TRIGL SERPL-MCNC: 73 MG/DL (ref 0–150)
VLDLC SERPL CALC-MCNC: 15 MG/DL
WBC # BLD AUTO: 8 K/UL (ref 4–11)

## 2023-11-22 PROCEDURE — 80048 BASIC METABOLIC PNL TOTAL CA: CPT

## 2023-11-22 PROCEDURE — C1887 CATHETER, GUIDING: HCPCS | Performed by: INTERNAL MEDICINE

## 2023-11-22 PROCEDURE — 75710 ARTERY X-RAYS ARM/LEG: CPT

## 2023-11-22 PROCEDURE — C1760 CLOSURE DEV, VASC: HCPCS | Performed by: INTERNAL MEDICINE

## 2023-11-22 PROCEDURE — 85347 COAGULATION TIME ACTIVATED: CPT

## 2023-11-22 PROCEDURE — 80061 LIPID PANEL: CPT

## 2023-11-22 PROCEDURE — 85027 COMPLETE CBC AUTOMATED: CPT

## 2023-11-22 PROCEDURE — C1769 GUIDE WIRE: HCPCS | Performed by: INTERNAL MEDICINE

## 2023-11-22 PROCEDURE — 93005 ELECTROCARDIOGRAM TRACING: CPT | Performed by: INTERNAL MEDICINE

## 2023-11-22 PROCEDURE — C1894 INTRO/SHEATH, NON-LASER: HCPCS | Performed by: INTERNAL MEDICINE

## 2023-11-22 PROCEDURE — 6360000004 HC RX CONTRAST MEDICATION

## 2023-11-22 PROCEDURE — 6360000002 HC RX W HCPCS

## 2023-11-22 PROCEDURE — 2709999900 HC NON-CHARGEABLE SUPPLY: Performed by: INTERNAL MEDICINE

## 2023-11-22 PROCEDURE — 6370000000 HC RX 637 (ALT 250 FOR IP)

## 2023-11-22 PROCEDURE — 93010 ELECTROCARDIOGRAM REPORT: CPT | Performed by: INTERNAL MEDICINE

## 2023-11-22 PROCEDURE — C1725 CATH, TRANSLUMIN NON-LASER: HCPCS | Performed by: INTERNAL MEDICINE

## 2023-11-22 PROCEDURE — 37224 HC FEM POP TERRITORY PLASTY: CPT

## 2023-11-22 PROCEDURE — 75774 ARTERY X-RAY EACH VESSEL: CPT

## 2023-11-22 RX ORDER — FENTANYL CITRATE 50 UG/ML
INJECTION, SOLUTION INTRAMUSCULAR; INTRAVENOUS
Status: COMPLETED | OUTPATIENT
Start: 2023-11-22 | End: 2023-11-22

## 2023-11-22 RX ORDER — SODIUM CHLORIDE 0.9 % (FLUSH) 0.9 %
5-40 SYRINGE (ML) INJECTION PRN
Status: DISCONTINUED | OUTPATIENT
Start: 2023-11-22 | End: 2023-11-22 | Stop reason: HOSPADM

## 2023-11-22 RX ORDER — SODIUM CHLORIDE 0.9 % (FLUSH) 0.9 %
5-40 SYRINGE (ML) INJECTION EVERY 12 HOURS SCHEDULED
Status: DISCONTINUED | OUTPATIENT
Start: 2023-11-22 | End: 2023-11-22 | Stop reason: HOSPADM

## 2023-11-22 RX ORDER — MIDAZOLAM HYDROCHLORIDE 1 MG/ML
INJECTION INTRAMUSCULAR; INTRAVENOUS
Status: COMPLETED | OUTPATIENT
Start: 2023-11-22 | End: 2023-11-22

## 2023-11-22 RX ORDER — HEPARIN SODIUM 1000 [USP'U]/ML
INJECTION, SOLUTION INTRAVENOUS; SUBCUTANEOUS
Status: COMPLETED | OUTPATIENT
Start: 2023-11-22 | End: 2023-11-22

## 2023-11-22 RX ORDER — ASPIRIN 325 MG
325 TABLET ORAL ONCE
Status: DISCONTINUED | OUTPATIENT
Start: 2023-11-22 | End: 2023-11-22 | Stop reason: HOSPADM

## 2023-11-22 RX ORDER — LORAZEPAM 0.5 MG/1
0.5 TABLET ORAL
Status: DISCONTINUED | OUTPATIENT
Start: 2023-11-22 | End: 2023-11-22 | Stop reason: HOSPADM

## 2023-11-22 RX ORDER — ACETAMINOPHEN 325 MG/1
650 TABLET ORAL EVERY 4 HOURS PRN
Status: DISCONTINUED | OUTPATIENT
Start: 2023-11-22 | End: 2023-11-22 | Stop reason: HOSPADM

## 2023-11-22 RX ORDER — LABETALOL HYDROCHLORIDE 5 MG/ML
INJECTION, SOLUTION INTRAVENOUS
Status: COMPLETED | OUTPATIENT
Start: 2023-11-22 | End: 2023-11-22

## 2023-11-22 RX ORDER — ONDANSETRON 2 MG/ML
4 INJECTION INTRAMUSCULAR; INTRAVENOUS EVERY 6 HOURS PRN
Status: DISCONTINUED | OUTPATIENT
Start: 2023-11-22 | End: 2023-11-22 | Stop reason: HOSPADM

## 2023-11-22 RX ORDER — HYDRALAZINE HYDROCHLORIDE 20 MG/ML
INJECTION INTRAMUSCULAR; INTRAVENOUS
Status: COMPLETED | OUTPATIENT
Start: 2023-11-22 | End: 2023-11-22

## 2023-11-22 RX ORDER — PROTAMINE SULFATE 10 MG/ML
INJECTION, SOLUTION INTRAVENOUS
Status: COMPLETED | OUTPATIENT
Start: 2023-11-22 | End: 2023-11-22

## 2023-11-22 RX ORDER — SODIUM CHLORIDE 9 MG/ML
INJECTION, SOLUTION INTRAVENOUS PRN
Status: DISCONTINUED | OUTPATIENT
Start: 2023-11-22 | End: 2023-11-22 | Stop reason: HOSPADM

## 2023-11-22 RX ADMIN — PROTAMINE SULFATE 20 MG: 10 INJECTION, SOLUTION INTRAVENOUS at 16:07

## 2023-11-22 RX ADMIN — FENTANYL CITRATE 50 MCG: 50 INJECTION, SOLUTION INTRAMUSCULAR; INTRAVENOUS at 15:30

## 2023-11-22 RX ADMIN — MIDAZOLAM HYDROCHLORIDE 2 MG: 1 INJECTION INTRAMUSCULAR; INTRAVENOUS at 15:00

## 2023-11-22 RX ADMIN — FENTANYL CITRATE 100 MCG: 50 INJECTION, SOLUTION INTRAMUSCULAR; INTRAVENOUS at 16:13

## 2023-11-22 RX ADMIN — FENTANYL CITRATE 50 MCG: 50 INJECTION, SOLUTION INTRAMUSCULAR; INTRAVENOUS at 15:13

## 2023-11-22 RX ADMIN — HYDRALAZINE HYDROCHLORIDE 20 MG: 20 INJECTION INTRAMUSCULAR; INTRAVENOUS at 16:10

## 2023-11-22 RX ADMIN — MIDAZOLAM HYDROCHLORIDE 2 MG: 1 INJECTION INTRAMUSCULAR; INTRAVENOUS at 15:14

## 2023-11-22 RX ADMIN — FENTANYL CITRATE 50 MCG: 50 INJECTION, SOLUTION INTRAMUSCULAR; INTRAVENOUS at 15:52

## 2023-11-22 RX ADMIN — MIDAZOLAM HYDROCHLORIDE 2 MG: 1 INJECTION INTRAMUSCULAR; INTRAVENOUS at 15:30

## 2023-11-22 RX ADMIN — Medication 50 MG: at 17:08

## 2023-11-22 RX ADMIN — LABETALOL HYDROCHLORIDE 10 MG: 5 INJECTION, SOLUTION INTRAVENOUS at 16:44

## 2023-11-22 RX ADMIN — FENTANYL CITRATE 50 MCG: 50 INJECTION, SOLUTION INTRAMUSCULAR; INTRAVENOUS at 14:55

## 2023-11-22 RX ADMIN — FENTANYL CITRATE 25 MCG: 50 INJECTION, SOLUTION INTRAMUSCULAR; INTRAVENOUS at 14:51

## 2023-11-22 RX ADMIN — MIDAZOLAM HYDROCHLORIDE 2 MG: 1 INJECTION INTRAMUSCULAR; INTRAVENOUS at 14:52

## 2023-11-22 RX ADMIN — FENTANYL CITRATE 50 MCG: 50 INJECTION, SOLUTION INTRAMUSCULAR; INTRAVENOUS at 15:01

## 2023-11-22 RX ADMIN — FENTANYL CITRATE 25 MCG: 50 INJECTION, SOLUTION INTRAMUSCULAR; INTRAVENOUS at 14:52

## 2023-11-22 RX ADMIN — HEPARIN SODIUM 8000 UNITS: 1000 INJECTION, SOLUTION INTRAVENOUS; SUBCUTANEOUS at 15:30

## 2023-11-22 NOTE — OP NOTE
ultrasound guidance with a micropuncture needle, wire, then sheath. A 4-Nepali sheath was inserted over a wire. An iliofemoral angiogram was performed. Using a pig tail catheter positioned at the level of the renal arteries, a aortoiliac arteriogram was performed. Renal arteries: patent  Abdominal Aorta:  none calcification. Right Common Iliac:  0%  Stenosis  Right Internal Iliac:  is  patent  Right External Iliac:  is  patent  Left Common Iliac:  0% stenosis  Left Internal Iliac:  is  patent  Left External Iliac:  is  patent    The catheter was then pulled back to the level of the bifurcation and a bilateral lower extremity runoff was performed:    Right Leg  Common Femoral:   without disease   Profunda: patent without significant disease  Superficial femoral: mild disease. Popliteal: severe disease. % occluded     Anterior Tibial: is  patent with mild disease  Posterior Tibial:  is not patent with severe disease  Peroneal: is  patent with moderate disease      Left Leg  Common Femoral:   without dsiease   Profunda: patent without significant disease  Superficial femoral: mild disease. Popliteal: mild disease. Anterior Tibial: is  patent with severe disease  Posterior Tibial:  is  patent with severe disease  Peroneal: is  patent with severe disease    A 7 Nepali sheath was inserted over the wire into the femoral artery and positioned up-and-over into the contralateral common femoral artery   and after 800 units of intravenous heparin was administered and three minutes allowed to elapse. The right posterior tibial artery was recanalized using a 0.014 wire and 0.014 micro catheter. - Percutaneous balloon angioplasty of right posterior tibial artery/TP trunk  using a 4.0 mm x 60 mm balloon     - Percutaneous balloon angioplasty of right popliteal artery using a 5.0 X 60mm shockwave lithotripsy balloon     A completion arteriogram was performed.      The sheath was withdrawn over a

## 2023-11-22 NOTE — H&P
H&P Update - see note from 23    I have reviewed the history and physical and examined the patient and find no relevant changes. I have reviewed with the patient and/or family the risks, benefits, and alternatives to the procedure. Pre-sedation Assessment    Patient:  Zeinab Wong   :   1960  Intended Procedure: peripheral angiogram     Vitals:    23 1313   BP: 137/80   SpO2: 98%       Nursing notes reviewed and agreed. Medications reviewed  Allergies:    Allergies   Allergen Reactions    Lisinopril Cough         Pre-Procedure Assessment/Plan:  ASA 3 - Patient with moderate systemic disease with functional limitations    Mallampati Airway Assessment:  Mallampati Class I - (soft palate, fauces, uvula & anterior/posterior tonsillar pillars are visible)    Level of Sedation Plan:Mild sedation    Post Procedure plan: Return to same level of care    Praneeth Chowdary MD [address] [place] [address],[address], Interventional Cardiology, and Peripheral Vascular [address]   (C): [phone]  (O): [phone]

## 2023-11-22 NOTE — DISCHARGE INSTRUCTIONS
Cath Lab at  [facility]    Discharge Instructions        11/22/2023  Ar Lemos   Date of Birth 1960     Activity:  No driving for 24 hours. No Tub baths, swimming or hot tubs for 5 days. In 24 hours you may remove dressing and shower. Wash site with soap and water and leave open to air  No lotions, powders or ointments near site for 5 days. No lifting over 5 pounds for 5 days. Return to work/school in 5 days unless instructed otherwise by your doctor. Diet:  Resume previous diet unless instructed otherwise by your doctor, if so general guidelines for a cardiac diet will be provided to you. Drink extra non-alcoholic/decaffienated fluids for first 24 hours after your procedure. Groin Management:  If you have stairs to walk up, pretend you have a cast on the affected leg walk up them without bending affected leg. When you go home go to the bed or sofa, do not get up except to go to the bathroom. Avoid strenuous activity for 5 days. For example, lifting heavy objects greater than 10 lbs, bicycling, jogging, climbing stairs, or walking long distances. If bleeding occurs from the site or a hematoma (lump), begins to increase in size, apply pressure directly over the site and call 911 to return to the hospital.     Special Instructions:  Report any coolness or numbness in the leg where the cath was done to the MD or call 911  Report any chills, fever, itching, red bumps or rash. Report any of the following to the MD: drainage from the cath site, redness and/or swelling at the site, increased tenderness at the site. If you are currently taking Metformin or Metformin combination medications for Diabetes, hold your dose for 48 hours after your procedure. Do not stop taking Plavix, Brilinta or Effient, without first consulting your cardiologist.      Sedation Discharge Instructions: For the next 24 hours do not drive a car, operate machinery, power tools or kitchen appliances.

## 2023-11-24 ENCOUNTER — PATIENT MESSAGE (OUTPATIENT)
Dept: FAMILY MEDICINE CLINIC | Age: 63
End: 2023-11-24

## 2023-11-25 LAB — POC ACT LR: 306 SEC

## 2023-12-05 ENCOUNTER — TELEPHONE (OUTPATIENT)
Dept: CARDIOLOGY CLINIC | Age: 63
End: 2023-12-05

## 2023-12-05 NOTE — TELEPHONE ENCOUNTER
Pt wife Albino Chan called left message F voicemail stating that they would like a call back, Albino Chan stated the has two that is red with blisters on them and they are wanting to know if it could be  a circulatory problem from the angiogram?      [phone]

## 2023-12-06 NOTE — TELEPHONE ENCOUNTER
Arturo's wife called in this morning, she is returning a call to [facility]. She can be reached at [phone].

## 2023-12-06 NOTE — TELEPHONE ENCOUNTER
Spoke with Aryan Brown, states 2 days ago patient developed cold/red toes. Aryan Brown states the patient did see his podiatrist who was not overly concerned, however suggested patient be seen earlier by Dr. Li Riojas wo [facility] assess. Patient had peripheral angiogram 11/22/2023 with Dr. iL Riojas, recommendation to follow up 4-6 weeks, original follow up appt date 1/10/24, changed follow up appt date to 12/13/23 in [place] with Dr. Li Riojas to further assess. Aryan Brown v/u.

## 2023-12-07 ENCOUNTER — TELEPHONE (OUTPATIENT)
Dept: FAMILY MEDICINE CLINIC | Age: 63
End: 2023-12-07

## 2023-12-07 NOTE — TELEPHONE ENCOUNTER
Patient's wife Grace Mills wanted to update Dr. Carmen Costa on her 's foot. She says he is doing better, and he did go to see Dr. Ulises Fernandez. Dr. Ulises Fernandez put him on a topical and oral antibiotic. He is scheduled to have a combined appointment with Dr. Ulises Fernandez and Dr. Renetta Wilson on 12/13/23.

## 2023-12-12 ENCOUNTER — TELEPHONE (OUTPATIENT)
Dept: FAMILY MEDICINE CLINIC | Age: 63
End: 2023-12-12

## 2023-12-12 RX ORDER — BLOOD-GLUCOSE METER
1 KIT MISCELLANEOUS DAILY
Qty: 1 KIT | Refills: 0 | Status: CANCELLED | OUTPATIENT
Start: 2023-12-12

## 2023-12-12 RX ORDER — BLOOD-GLUCOSE METER
1 EACH MISCELLANEOUS 2 TIMES DAILY
Qty: 1 KIT | Refills: 1 | Status: SHIPPED | OUTPATIENT
Start: 2023-12-12

## 2023-12-12 NOTE — TELEPHONE ENCOUNTER
Pharmacy calling in asking for orders to be sent for Test strips , Lancets one touch verio and alcohol pads

## 2023-12-12 NOTE — PROGRESS NOTES
Interventional Cardiology Consultation     Faiza Evans  1960    PCP: Yamileth Vega DO  Referring Physician: Dr. Dariana Ness  Reason for Referral: possible right lower extremity infection  Chief Complaint: \"I am doing good\"   Chief Complaint   Patient presents with    Follow-up       Subjective:     History of Present Illness: The patient is 61 y.o. male with a past medical history significant for hypertension, diabetes mellitus type 2, coronary artery disease s/p previous coronary stent placement, and alcohol abuse. Patient here today for follow up s/p successful balloon angioplasty of the right PT/TP trunk and popliteal artery and successful shockwave lithotripsy of the right popliteal artery 11/22/23. Patient reports overall he is feeling good. Reports new blister on his right toe post revascularization.            Past Medical History:   Diagnosis Date    Alcohol abuse 12/03/2012    Cancer ([address]) 2022    Prostate    Cellulitis     sternum    Coronary artery disease     COVID-19     Diabetic neuropathy associated with type 2 diabetes mellitus ([address])     Hyperlipidemia     Non morbid obesity     NSTEMI (non-ST elevated myocardial infarction) (HCC)     GURDEEP (obstructive sleep apnea)     does not wear cpap machine    Reactive airway disease with wheezing, moderate persistent, with acute exacerbation 03/03/2020    Sepsis ([address]) 04/2021    Type II or unspecified type diabetes mellitus without mention of complication, not stated as uncontrolled      Past Surgical History:   Procedure Laterality Date    BYPASS GRAFT  02/19/2021    COLONOSCOPY  2017    COLONOSCOPY N/A 7/1/2022    COLONOSCOPY POLYPECTOMY SNARE/COLD BIOPSY performed by Ayaz Cody DO at [address] [facility]  1/14/2016 9/21/2020    1 Promus Premier SAMUEL 2.25x16    CORONARY ARTERY BYPASS GRAFT  02/19/2021    CABG x2 & ALEX-Dr. Rita Devlin    CORONARY ARTERY BYPASS GRAFT N/A 2/19/2021    CORONARY

## 2023-12-12 NOTE — TELEPHONE ENCOUNTER
I spoke to patient wife she got some strips at [facility] behind counter to do for 50 testings  She wants a new meter and testing supplies for the new meter I guess you  can send Generic so that pharmacy can fill what his insurance will cover.   Thanks

## 2023-12-12 NOTE — TELEPHONE ENCOUNTER
Patient's wife, Kashmir Estrada, called to state her  used his last glucose test strip today and doesn't have any left for tonight. She is asking for a kit to be prescribed for lancets and test strips to be sent to [address] [place] [address].

## 2023-12-13 ENCOUNTER — OFFICE VISIT (OUTPATIENT)
Dept: CARDIOLOGY CLINIC | Age: 63
End: 2023-12-13
Payer: COMMERCIAL

## 2023-12-13 VITALS
HEART RATE: 90 BPM | WEIGHT: 226 LBS | BODY MASS INDEX: 32.35 KG/M2 | HEIGHT: 70 IN | SYSTOLIC BLOOD PRESSURE: 108 MMHG | DIASTOLIC BLOOD PRESSURE: 70 MMHG | OXYGEN SATURATION: 97 %

## 2023-12-13 DIAGNOSIS — I73.9 PAD (PERIPHERAL ARTERY DISEASE) (HCC): Primary | ICD-10-CM

## 2023-12-13 PROCEDURE — 99214 OFFICE O/P EST MOD 30 MIN: CPT | Performed by: INTERNAL MEDICINE

## 2023-12-13 PROCEDURE — 3074F SYST BP LT 130 MM HG: CPT | Performed by: INTERNAL MEDICINE

## 2023-12-13 PROCEDURE — 3078F DIAST BP <80 MM HG: CPT | Performed by: INTERNAL MEDICINE

## 2023-12-13 RX ORDER — AMOXICILLIN AND CLAVULANATE POTASSIUM 875; 125 MG/1; MG/1
TABLET, FILM COATED ORAL
COMMUNITY
Start: 2023-12-05

## 2023-12-13 RX ORDER — OXYCODONE AND ACETAMINOPHEN 10; 325 MG/1; MG/1
TABLET ORAL
COMMUNITY
Start: 2023-12-05

## 2023-12-13 RX ORDER — AZITHROMYCIN 250 MG/1
TABLET, FILM COATED ORAL
COMMUNITY

## 2024-01-08 ENCOUNTER — TELEPHONE (OUTPATIENT)
Dept: FAMILY MEDICINE CLINIC | Age: 64
End: 2024-01-08

## 2024-01-08 NOTE — TELEPHONE ENCOUNTER
Patient's spouse called and stated the patient requested information on ending the medication Ozempic. Patient would like to know if it safe to go off of this medication.    Please advise.

## 2024-01-25 NOTE — TELEPHONE ENCOUNTER
Future Appointments   Date Time Provider Department Center   2/7/2024 11:00 AM Jake Bell MD [facility]   2/29/2024  8:30 AM Bucky Pena, DO [facility]     LOV 11/20/2023

## 2024-01-26 RX ORDER — SEMAGLUTIDE 0.68 MG/ML
INJECTION, SOLUTION SUBCUTANEOUS
Qty: 3 ML | Refills: 2 | Status: SHIPPED | OUTPATIENT
Start: 2024-01-26

## 2024-01-29 RX ORDER — SEMAGLUTIDE 0.68 MG/ML
INJECTION, SOLUTION SUBCUTANEOUS
Qty: 3 ML | Refills: 2 | OUTPATIENT
Start: 2024-01-29

## 2024-01-29 NOTE — TELEPHONE ENCOUNTER
Future Appointments   Date Time Provider Department Center   2/7/2024 11:00 AM Jake Bell MD [facility]   2/29/2024  8:30 AM Bucky Pena, DO [facility]     LOV 11/20/2023

## 2024-01-31 ENCOUNTER — HOSPITAL ENCOUNTER (OUTPATIENT)
Dept: GENERAL RADIOLOGY | Age: 64
Discharge: HOME | End: 2024-01-31
Payer: COMMERCIAL

## 2024-01-31 ENCOUNTER — HOSPITAL ENCOUNTER (OUTPATIENT)
Age: 64
Discharge: HOME | End: 2024-01-31
Payer: COMMERCIAL

## 2024-01-31 DIAGNOSIS — L97.512 RIGHT FOOT ULCER, WITH FAT LAYER EXPOSED (HCC): ICD-10-CM

## 2024-01-31 PROCEDURE — 73630 X-RAY EXAM OF FOOT: CPT

## 2024-01-31 NOTE — PROGRESS NOTES
Pulmonary Follow-up Note  Chief Complaint: shortness of breath, wheezing  Referring Provider: hospital f/u    Interval history: Sept had stent, another stent in December then last week had balloon angioplasty for in-stent restenosis, followed by CABG X 2, d/c's on 2/23/21; did pretty well post operatively, came home and did well until this morning when he was having more trouble breathing. Improved after clearing secretions. Still using advair and albuterol. Minimal wheezing. Small to moderate cough with minimal mucus production. Presenting history: ED 3/3/2020 with a 3-week history of URI symptoms that have worsened to include moderately severe shortness of breath, worse with exertion, associated with wheezing. He has been treated with steroids, initially with improvement, but the second course that he obtained in the emergency department earlier this week did not seem to make much difference     reports that he has never smoked. He has never used smokeless tobacco.     Review of Systems:    Physical Exam:  There were no vitals taken for this visit. phone      Data:   2/23/21 CXR  Impression   Stable volume left apical small pneumothorax.       Mild cardiomegaly.       Otherwise, unremarkable single portable AP view of the chest.     Assessment:  Moderate persistent asthma with 3 flares in 1st half of 2020. Very steroid responsive. Now with acute exacerbation   Recent hospitalization with CABG X 2 on 2/19/21  Known post procedural PTX    Not addressed today  Diabetes mellitus   CAD s/p PCI & CABG     Plan:   Prednisone taper and doxycycline  ED with any worsening  Advair 250/50 BID without exception   Albuterol neb/MDI PRN; use prior to exposure to cold/dry air  PA LAT CXR, f/u pneumothorax - I placed order, please schedule for MTO now  See me in 4-6 weeks      Juan Bynum is a 61 y.o. male evaluated via telephone on 2/24/2021.   Consent: He and/or health care decision maker is aware that that he may receive a bill for this telephone service, depending on his insurance coverage, and has provided verbal consent to proceed: YES. I communicated with the patient and/or health care decision maker about: see interval history above. Details of this discussion including any medical advice provided: see plan above. Total Time: minutes: 11-20 minutes. I affirm this is a Patient Initiated Episode with a Patient who has not had a related appointment within my department in the past 7 days or scheduled within the next 24 hours. Patient identification was verified at the start of the visit: YES  Pursuant to the emergency declaration under the [facility], [address] [place] [address] authority and the [employer] Act, this Telephone Visit was conducted with patient's (and/or legal guardian's) consent, to reduce the patient's risk of exposure to COVID-19 and provide necessary medical care.   The patient (and/or legal guardian) his advised to contact this office for worsening conditions or problems, and seek emergency medical treatment and/or call 911 if urgent care needed, [Follow-up Visit ___] : a follow-up visit  for [unfilled]

## 2024-02-07 ENCOUNTER — OFFICE VISIT (OUTPATIENT)
Dept: CARDIOLOGY CLINIC | Age: 64
End: 2024-02-07

## 2024-02-07 ENCOUNTER — TELEPHONE (OUTPATIENT)
Dept: FAMILY MEDICINE CLINIC | Age: 64
End: 2024-02-07

## 2024-02-07 VITALS
BODY MASS INDEX: 31.21 KG/M2 | SYSTOLIC BLOOD PRESSURE: 114 MMHG | WEIGHT: 218 LBS | HEIGHT: 70 IN | HEART RATE: 84 BPM | OXYGEN SATURATION: 100 % | DIASTOLIC BLOOD PRESSURE: 66 MMHG

## 2024-02-07 DIAGNOSIS — I73.9 PAD (PERIPHERAL ARTERY DISEASE) (HCC): Primary | ICD-10-CM

## 2024-02-07 NOTE — PROGRESS NOTES
Interventional Cardiology Consultation     Arturo RODRIGUEZ Elsy  1960    PCP: Bucky Pena DO  Referring Physician: Dr. Mcgee  Reason for Referral: possible right lower extremity infection  Chief Complaint: \"I am doing good\"   Chief Complaint   Patient presents with    Follow-up     Discuss bone on right foot, middle toe.     Results     Discuss Xray of toe.     Edema     Right foot       Subjective:     History of Present Illness: The patient is 63 y.o. male with a past medical history significant for hypertension, diabetes mellitus type 2, coronary artery disease s/p previous coronary stent placement, and alcohol abuse. Patient s/p successful balloon angioplasty of the right PT/TP trunk and popliteal artery and successful shockwave lithotripsy of the right popliteal artery 11/22/23. Patient reports overall he is feeling good. Reports new blister on his right toe post revascularization. Patient here today for follow up. Reports overall he is feeling good. Reports improvement in right lower extremity wound.           Past Medical History:   Diagnosis Date    Alcohol abuse 12/03/2012    Cancer ([facility]) 2022    Prostate    Cellulitis     sternum    Coronary artery disease     COVID-19     Diabetic neuropathy associated with type 2 diabetes mellitus ([facility])     Hyperlipidemia     Non morbid obesity     NSTEMI (non-ST elevated myocardial infarction) ([facility])     GURDEEP (obstructive sleep apnea)     does not wear cpap machine    GURDEEP (obstructive sleep apnea)     Reactive airway disease with wheezing, moderate persistent, with acute exacerbation 03/03/2020    Sepsis ([facility]) 04/2021    Type II or unspecified type diabetes mellitus without mention of complication, not stated as uncontrolled      Past Surgical History:   Procedure Laterality Date    BYPASS GRAFT  02/19/2021    COLONOSCOPY  2017    COLONOSCOPY N/A 7/1/2022    COLONOSCOPY POLYPECTOMY SNARE/COLD BIOPSY performed by Raffi Campos DO at [facility]

## 2024-02-07 NOTE — TELEPHONE ENCOUNTER
Patient stopped in after visit today and asked to send a message to Dr Pena. He would like Dr Pena to call him to discuss starting disability and whether or not that's the right choice. Please call  [phone] or [phone] to discuss.

## 2024-02-08 ENCOUNTER — TELEPHONE (OUTPATIENT)
Dept: FAMILY MEDICINE CLINIC | Age: 64
End: 2024-02-08

## 2024-02-08 NOTE — TELEPHONE ENCOUNTER
Patient's wife called and stated as discussed yesterday with Dr. Pena patient has decided to switch from ozempic to 30 units of Lantus Pen and patient states he will also need needles for the Lantus.     Please advise.

## 2024-02-29 ENCOUNTER — OFFICE VISIT (OUTPATIENT)
Dept: FAMILY MEDICINE CLINIC | Age: 64
End: 2024-02-29
Payer: COMMERCIAL

## 2024-02-29 VITALS
WEIGHT: 219 LBS | RESPIRATION RATE: 14 BRPM | BODY MASS INDEX: 31.35 KG/M2 | OXYGEN SATURATION: 99 % | DIASTOLIC BLOOD PRESSURE: 74 MMHG | TEMPERATURE: 97 F | SYSTOLIC BLOOD PRESSURE: 128 MMHG | HEART RATE: 87 BPM | HEIGHT: 70 IN

## 2024-02-29 DIAGNOSIS — E11.9 TYPE 2 DIABETES MELLITUS WITHOUT COMPLICATION, WITHOUT LONG-TERM CURRENT USE OF INSULIN (HCC): Primary | ICD-10-CM

## 2024-02-29 DIAGNOSIS — I10 PRIMARY HYPERTENSION: ICD-10-CM

## 2024-02-29 DIAGNOSIS — I25.10 CORONARY ARTERY DISEASE INVOLVING NATIVE CORONARY ARTERY OF NATIVE HEART WITHOUT ANGINA PECTORIS: ICD-10-CM

## 2024-02-29 DIAGNOSIS — Z87.898 H/O ABNORMAL WEIGHT LOSS: ICD-10-CM

## 2024-02-29 LAB — HBA1C MFR BLD: 6.6 %

## 2024-02-29 PROCEDURE — 3074F SYST BP LT 130 MM HG: CPT | Performed by: FAMILY MEDICINE

## 2024-02-29 PROCEDURE — 3044F HG A1C LEVEL LT 7.0%: CPT | Performed by: FAMILY MEDICINE

## 2024-02-29 PROCEDURE — 99214 OFFICE O/P EST MOD 30 MIN: CPT | Performed by: FAMILY MEDICINE

## 2024-02-29 PROCEDURE — 3078F DIAST BP <80 MM HG: CPT | Performed by: FAMILY MEDICINE

## 2024-02-29 PROCEDURE — 83036 HEMOGLOBIN GLYCOSYLATED A1C: CPT | Performed by: FAMILY MEDICINE

## 2024-02-29 RX ORDER — INSULIN GLARGINE 100 [IU]/ML
10 INJECTION, SOLUTION SUBCUTANEOUS NIGHTLY
Qty: 5 ADJUSTABLE DOSE PRE-FILLED PEN SYRINGE | Refills: 3 | Status: SHIPPED | OUTPATIENT
Start: 2024-02-29

## 2024-02-29 RX ORDER — CLOPIDOGREL BISULFATE 75 MG/1
TABLET ORAL
Qty: 90 TABLET | Refills: 3 | Status: SHIPPED | OUTPATIENT
Start: 2024-02-29

## 2024-02-29 ASSESSMENT — PATIENT HEALTH QUESTIONNAIRE - PHQ9
SUM OF ALL RESPONSES TO PHQ QUESTIONS 1-9: 0
SUM OF ALL RESPONSES TO PHQ9 QUESTIONS 1 & 2: 0
SUM OF ALL RESPONSES TO PHQ QUESTIONS 1-9: 0
2. FEELING DOWN, DEPRESSED OR HOPELESS: 0
1. LITTLE INTEREST OR PLEASURE IN DOING THINGS: 0

## 2024-02-29 ASSESSMENT — ENCOUNTER SYMPTOMS
SHORTNESS OF BREATH: 0
CHEST TIGHTNESS: 0
STRIDOR: 0
COUGH: 0
BACK PAIN: 0
WHEEZING: 0
ABDOMINAL PAIN: 0
CHOKING: 0

## 2024-02-29 NOTE — PROGRESS NOTES
Subjective:      Patient ID: Arturo Chin is a 63 y.o. male.    HPI Arturo is here for follow-up on type 2 diabetes whose control is good with a hemoglobin A1c of 6.6.  He is unable to continue Ozempic due to his cost of $900 per month.  We decided to discontinue Ozempic and start him on Lantus 10 units at bedtime.  He will call in a week with fasting numbers and I can adjust the Lantus dose.    Review of Systems   Constitutional:  Negative for activity change, appetite change, chills, diaphoresis, fatigue, fever and unexpected weight change.   Respiratory:  Negative for cough, choking, chest tightness, shortness of breath, wheezing and stridor.    Cardiovascular:  Negative for chest pain, palpitations and leg swelling.   Gastrointestinal:  Negative for abdominal pain.   Genitourinary:  Negative for difficulty urinating.   Musculoskeletal:  Negative for arthralgias and back pain.   Neurological:  Negative for dizziness.   All other systems reviewed and are negative.      Objective:   Physical Exam  Vitals and nursing note reviewed.   Constitutional:       Appearance: He is well-developed.   HENT:      Head: Normocephalic and atraumatic.      Right Ear: External ear normal.      Left Ear: External ear normal.      Nose: Nose normal.   Eyes:      Conjunctiva/sclera: Conjunctivae normal.      Pupils: Pupils are equal, round, and reactive to light.   Neck:      Thyroid: No thyromegaly.      Vascular: No JVD.      Trachea: No tracheal deviation.   Cardiovascular:      Rate and Rhythm: Normal rate and regular rhythm.      Heart sounds: Normal heart sounds. No murmur heard.     No friction rub. No gallop.   Pulmonary:      Effort: Pulmonary effort is normal. No respiratory distress.      Breath sounds: Normal breath sounds. No stridor. No wheezing or rales.   Chest:      Chest wall: No tenderness.   Abdominal:      General: Bowel sounds are normal. There is no distension.      Palpations: Abdomen is soft. There is no mass.

## 2024-03-01 ENCOUNTER — TELEPHONE (OUTPATIENT)
Dept: FAMILY MEDICINE CLINIC | Age: 64
End: 2024-03-01

## 2024-03-01 DIAGNOSIS — E11.9 TYPE 2 DIABETES MELLITUS WITHOUT COMPLICATION, WITHOUT LONG-TERM CURRENT USE OF INSULIN (HCC): ICD-10-CM

## 2024-03-01 NOTE — TELEPHONE ENCOUNTER
Patient called.  He was seen yesterday. His Metformin was sent for once daily.  He stated he has been taking it BID.    Please correct Rx states as directed

## 2024-03-05 ENCOUNTER — TELEPHONE (OUTPATIENT)
Dept: FAMILY MEDICINE CLINIC | Age: 64
End: 2024-03-05

## 2024-03-05 NOTE — TELEPHONE ENCOUNTER
Dr Pena put patient on Lantus and patient is already on Humalog. Will this be a problem.  Please call patient  Last appt 2-29-24

## 2024-03-06 NOTE — PROGRESS NOTES
I talked to Arturo's wife Brittny.  I told her that we could continue Humalog and Lantus but from  a cost standpoint it is probably better to discontinue Humalog and increase the Lantus dose as needed.  She agreed.

## 2024-03-14 ENCOUNTER — TELEPHONE (OUTPATIENT)
Dept: FAMILY MEDICINE CLINIC | Age: 64
End: 2024-03-14

## 2024-03-14 NOTE — TELEPHONE ENCOUNTER
I talked to Arturo and he is doing well since starting Lantus his fastings are all less than 160 and he seems to be tolerating it well.  I asked him to call me in a week with progress.  He agreed.

## 2024-03-21 ENCOUNTER — TELEPHONE (OUTPATIENT)
Dept: FAMILY MEDICINE CLINIC | Age: 64
End: 2024-03-21

## 2024-03-28 ENCOUNTER — TELEPHONE (OUTPATIENT)
Dept: FAMILY MEDICINE CLINIC | Age: 64
End: 2024-03-28

## 2024-03-28 NOTE — TELEPHONE ENCOUNTER
Patient said Dr. Pena wanted him to call in with his blood sugar numbers.    blood sugar numbers  139 last week , this week 184

## 2024-03-28 NOTE — TELEPHONE ENCOUNTER
Spoke with patients wife patients wife Brittny stated she will have him record numbers next week and give us a call back.

## 2024-04-04 ENCOUNTER — TELEPHONE (OUTPATIENT)
Dept: FAMILY MEDICINE CLINIC | Age: 64
End: 2024-04-04

## 2024-04-04 NOTE — TELEPHONE ENCOUNTER
Patient called with weekly BS average  186 am fasting for the past week is the average blood sugar

## 2024-05-13 DIAGNOSIS — I25.10 CORONARY ARTERY DISEASE INVOLVING NATIVE CORONARY ARTERY OF NATIVE HEART WITHOUT ANGINA PECTORIS: ICD-10-CM

## 2024-05-13 DIAGNOSIS — Z95.1 S/P CABG X 2: ICD-10-CM

## 2024-05-13 RX ORDER — ATORVASTATIN CALCIUM 40 MG/1
40 TABLET, FILM COATED ORAL NIGHTLY
Qty: 90 TABLET | Refills: 0 | Status: SHIPPED | OUTPATIENT
Start: 2024-05-13

## 2024-05-13 NOTE — TELEPHONE ENCOUNTER
Pt would like refills of atorvastatin 40 mg sent to [facility].      Last OV- 05.30.23 VSP  Next OV- 06.04.24 VSP

## 2024-05-24 ENCOUNTER — TELEPHONE (OUTPATIENT)
Dept: FAMILY MEDICINE CLINIC | Age: 64
End: 2024-05-24

## 2024-05-24 NOTE — TELEPHONE ENCOUNTER
----- Message from Stu Fry sent at 5/24/2024 11:27 AM EDT -----  Regarding: [facility] Appointment Request  [facility] Appointment Request    Patient needs appointment for [facility] Appointment Type: Existing Condition Follow Up.    Reason for Appointment Request: Available appointments did not meet patient need patient is requesting an available appointment patient wants to reschedule the appointment at 6-3-2024 which is 8 am. Patient is requesting for June 4 and around 2pm or 2:30pm for Bucky Ryan DO.        --------------------------------------------------------------------------------------------------------------------------    Relationship to Patient: Spouse/Partner      Call Back Information: OK to leave message on voicemail  Preferred Call Back Number: Phone [phone]

## 2024-05-24 NOTE — TELEPHONE ENCOUNTER
Spoke w/pt wife and advised Dr Pena does not work in the afternoons anymore. She said they would just keep the 6/3 appt

## 2024-06-03 ENCOUNTER — OFFICE VISIT (OUTPATIENT)
Dept: FAMILY MEDICINE CLINIC | Age: 64
End: 2024-06-03
Payer: COMMERCIAL

## 2024-06-03 VITALS
HEART RATE: 67 BPM | HEIGHT: 70 IN | SYSTOLIC BLOOD PRESSURE: 124 MMHG | DIASTOLIC BLOOD PRESSURE: 64 MMHG | WEIGHT: 229 LBS | RESPIRATION RATE: 14 BRPM | OXYGEN SATURATION: 99 % | TEMPERATURE: 97.4 F | BODY MASS INDEX: 32.78 KG/M2

## 2024-06-03 DIAGNOSIS — E66.01 CLASS 3 SEVERE OBESITY DUE TO EXCESS CALORIES WITH SERIOUS COMORBIDITY AND BODY MASS INDEX (BMI) OF 45.0 TO 49.9 IN ADULT (HCC): ICD-10-CM

## 2024-06-03 DIAGNOSIS — I10 PRIMARY HYPERTENSION: ICD-10-CM

## 2024-06-03 DIAGNOSIS — I25.10 CORONARY ARTERY DISEASE INVOLVING NATIVE HEART WITHOUT ANGINA PECTORIS, UNSPECIFIED VESSEL OR LESION TYPE: ICD-10-CM

## 2024-06-03 DIAGNOSIS — E11.9 TYPE 2 DIABETES MELLITUS WITHOUT COMPLICATION, WITHOUT LONG-TERM CURRENT USE OF INSULIN (HCC): Primary | ICD-10-CM

## 2024-06-03 LAB — HBA1C MFR BLD: 8.6 %

## 2024-06-03 PROCEDURE — 99214 OFFICE O/P EST MOD 30 MIN: CPT | Performed by: FAMILY MEDICINE

## 2024-06-03 PROCEDURE — 3078F DIAST BP <80 MM HG: CPT | Performed by: FAMILY MEDICINE

## 2024-06-03 PROCEDURE — 3052F HG A1C>EQUAL 8.0%<EQUAL 9.0%: CPT | Performed by: FAMILY MEDICINE

## 2024-06-03 PROCEDURE — 3074F SYST BP LT 130 MM HG: CPT | Performed by: FAMILY MEDICINE

## 2024-06-03 PROCEDURE — 83036 HEMOGLOBIN GLYCOSYLATED A1C: CPT | Performed by: FAMILY MEDICINE

## 2024-06-03 RX ORDER — INSULIN GLARGINE 100 [IU]/ML
15 INJECTION, SOLUTION SUBCUTANEOUS NIGHTLY
Qty: 5 ADJUSTABLE DOSE PRE-FILLED PEN SYRINGE | Refills: 3 | Status: SHIPPED | OUTPATIENT
Start: 2024-06-03

## 2024-06-03 ASSESSMENT — PATIENT HEALTH QUESTIONNAIRE - PHQ9
1. LITTLE INTEREST OR PLEASURE IN DOING THINGS: NOT AT ALL
SUM OF ALL RESPONSES TO PHQ9 QUESTIONS 1 & 2: 0
SUM OF ALL RESPONSES TO PHQ QUESTIONS 1-9: 0
SUM OF ALL RESPONSES TO PHQ QUESTIONS 1-9: 0
2. FEELING DOWN, DEPRESSED OR HOPELESS: NOT AT ALL
SUM OF ALL RESPONSES TO PHQ QUESTIONS 1-9: 0
SUM OF ALL RESPONSES TO PHQ QUESTIONS 1-9: 0

## 2024-06-03 ASSESSMENT — ANXIETY QUESTIONNAIRES
3. WORRYING TOO MUCH ABOUT DIFFERENT THINGS: NOT AT ALL
5. BEING SO RESTLESS THAT IT IS HARD TO SIT STILL: NOT AT ALL
GAD7 TOTAL SCORE: 0
4. TROUBLE RELAXING: NOT AT ALL
1. FEELING NERVOUS, ANXIOUS, OR ON EDGE: NOT AT ALL
IF YOU CHECKED OFF ANY PROBLEMS ON THIS QUESTIONNAIRE, HOW DIFFICULT HAVE THESE PROBLEMS MADE IT FOR YOU TO DO YOUR WORK, TAKE CARE OF THINGS AT HOME, OR GET ALONG WITH OTHER PEOPLE: NOT DIFFICULT AT ALL
7. FEELING AFRAID AS IF SOMETHING AWFUL MIGHT HAPPEN: NOT AT ALL
6. BECOMING EASILY ANNOYED OR IRRITABLE: NOT AT ALL
2. NOT BEING ABLE TO STOP OR CONTROL WORRYING: NOT AT ALL

## 2024-06-03 ASSESSMENT — ENCOUNTER SYMPTOMS
CHOKING: 0
SHORTNESS OF BREATH: 0
STRIDOR: 0
COUGH: 0
BACK PAIN: 0
CHEST TIGHTNESS: 0
WHEEZING: 0
ABDOMINAL PAIN: 0

## 2024-06-03 NOTE — PROGRESS NOTES
Arturo Chin (:  1960) is a 63 y.o. male,Established patient, here for evaluation of the following chief complaint(s):  Diabetes      Assessment & Plan -1. Type 2 diabetes mellitus without complication, without long-term current use of insulin ([facility])-we had a discussion on the importance of following a low-carb diet.  Obviously he should avoid high carb foods and alcohol.  We discussed  riding a bicycle for exercise.  He did better when he was on Ozempic which is no longer covered by his insurance company.  I recommended he call them up and see if he can pursue an appeal for Ozempic coverage.    - POCT glycosylated hemoglobin (Hb A1C)  - insulin glargine (LANTUS SOLOSTAR) 100 UNIT/ML injection pen; Inject 15 Units into the skin nightly  Dispense: 5 Adjustable Dose Pre-filled Pen Syringe; Refill: 3    2. Primary hypertension-controlled.  Continue metoprolol      3. Class 3 severe obesity due to excess calories with serious comorbidity and body mass index (BMI) of 45.0 to 49.9 in adult ([facility])-he has recently gained some weight.  Strongly recommended that he cut back on total calories and exercise regularly      4. Coronary artery disease involving native heart without angina pectoris, unspecified vessel or lesion type-stable.  He has been very active around his home splitting wood and doing yard work without chest pain.          Return in about 3 months (around 9/3/2024), or if symptoms worsen or fail to improve, for diabetes.       Subjective   HPI Arturo is here for follow-up on type 2 diabetes whose control is not as good as usual.  He tells me that he has been on vacation and has not been following his low-carb diet and has been drinking some alcohol.  I increased his Lantus to 15 units from 12.  He remains obese.  His coronary disease seems stable.  Review of Systems   Constitutional:  Positive for fatigue. Negative for activity change, appetite change, chills, diaphoresis, fever and unexpected weight

## 2024-06-04 ENCOUNTER — OFFICE VISIT (OUTPATIENT)
Dept: CARDIOLOGY CLINIC | Age: 64
End: 2024-06-04
Payer: COMMERCIAL

## 2024-06-04 VITALS
HEIGHT: 70 IN | SYSTOLIC BLOOD PRESSURE: 122 MMHG | DIASTOLIC BLOOD PRESSURE: 64 MMHG | HEART RATE: 78 BPM | BODY MASS INDEX: 32.93 KG/M2 | WEIGHT: 230 LBS | OXYGEN SATURATION: 96 %

## 2024-06-04 DIAGNOSIS — E11.69 TYPE 2 DIABETES MELLITUS WITH OBESITY (HCC): Chronic | ICD-10-CM

## 2024-06-04 DIAGNOSIS — E66.9 TYPE 2 DIABETES MELLITUS WITH OBESITY (HCC): Chronic | ICD-10-CM

## 2024-06-04 DIAGNOSIS — I25.10 CORONARY ARTERY DISEASE INVOLVING NATIVE CORONARY ARTERY OF NATIVE HEART WITHOUT ANGINA PECTORIS: Primary | ICD-10-CM

## 2024-06-04 DIAGNOSIS — Z95.1 S/P CABG X 2: ICD-10-CM

## 2024-06-04 PROCEDURE — 3052F HG A1C>EQUAL 8.0%<EQUAL 9.0%: CPT | Performed by: INTERNAL MEDICINE

## 2024-06-04 PROCEDURE — 3078F DIAST BP <80 MM HG: CPT | Performed by: INTERNAL MEDICINE

## 2024-06-04 PROCEDURE — 3074F SYST BP LT 130 MM HG: CPT | Performed by: INTERNAL MEDICINE

## 2024-06-04 PROCEDURE — 99214 OFFICE O/P EST MOD 30 MIN: CPT | Performed by: INTERNAL MEDICINE

## 2024-06-04 RX ORDER — METOPROLOL TARTRATE 50 MG/1
TABLET, FILM COATED ORAL
Qty: 180 TABLET | Refills: 3 | Status: SHIPPED | OUTPATIENT
Start: 2024-06-04

## 2024-06-04 RX ORDER — ATORVASTATIN CALCIUM 40 MG/1
40 TABLET, FILM COATED ORAL NIGHTLY
Qty: 90 TABLET | Refills: 3 | Status: SHIPPED | OUTPATIENT
Start: 2024-06-04

## 2024-06-04 NOTE — PROGRESS NOTES
midline, no adenopathy, thyroid: not enlarged, symmetric, no tenderness/mass/nodules, no carotid bruit or JVD       Lungs:   Clear to auscultation bilaterally, respirations unlabored   Chest Wall:  No tenderness or deformity   Heart:  Regular rhythm and normal rate; S1, S2 are normal; no murmur noted; no rub or gallop   Abdomen:   Soft, non-tender, bowel sounds active all four quadrants,  no masses, no organomegaly           Extremities: Extremities normal, atraumatic, no cyanosis or edema   Pulses: 2+ and symmetric   Skin: Skin color, texture, turgor normal, no rashes or lesions   Pysch: Normal mood and affect   Neurologic: Normal gross motor and sensory exam.         Labs  No results for input(s): \"WBC\", \"HGB\", \"HCT\", \"MCV\", \"PLT\" in the last 72 hours.  No results for input(s): \"CREATININE\", \"BUN\", \"NA\", \"K\", \"CL\", \"CO2\" in the last 72 hours.  No results for input(s): \"INR\", \"PROTIME\" in the last 72 hours.  No results for input(s): \"PROBNP\" in the last 72 hours.  No results for input(s): \"CHOL\", \"LDL\", \"HDL\" in the last 72 hours.    Invalid input(s): \"TG\"  Lab Results   Component Value Date    TROPHS 14 07/23/2023    TROPHS 21 07/22/2023    TROPHS 11 07/10/2023    TROPHS 9 04/27/2023    TROPHS 10 04/27/2023    TROPHS 12 04/27/2023          Imaging:  I have reviewed the below testing personally and my interpretation is below.  EKG:        CXR:      Assessment:  63 y.o. patient with:  Active Problems:    * No active hospital problems. *  Resolved Problems:    * No resolved hospital problems. *    1. Coronary artery disease involving native coronary artery of native heart without angina pectoris    2. Type 2 diabetes mellitus with obesity (HCC)    3. S/P CABG x 2                  - cath 04/23 - patent grafts   - 2/19/2021. Status post CABG (LIMA to LAD, SVG to OM)    -Complicated by retained suture and sternal wound infection   - 12/15/2020 POBA/Laser to OM1 (40% residual) case aborted due to intollerance

## 2024-06-04 NOTE — PATIENT INSTRUCTIONS
Plan:  1. Start ozempic 0.25 mg injection weekly for 4 weeks ~ weight loss, diabetes mellitus, and coronary artery disease  ~ If tolerating dose may increase to 0.5 mg after initial 4 weeks of 0.25 mg therapy. Please call office and we will send prescription with increase in dose.   2. Follow up in 3 months

## 2024-07-03 ENCOUNTER — TELEPHONE (OUTPATIENT)
Dept: FAMILY MEDICINE CLINIC | Age: 64
End: 2024-07-03

## 2024-07-03 DIAGNOSIS — E11.9 TYPE 2 DIABETES MELLITUS WITHOUT COMPLICATION, WITHOUT LONG-TERM CURRENT USE OF INSULIN (HCC): ICD-10-CM

## 2024-07-03 DIAGNOSIS — R07.9 CHEST PAIN, UNSPECIFIED TYPE: ICD-10-CM

## 2024-07-03 RX ORDER — NITROGLYCERIN 0.4 MG/1
0.4 TABLET SUBLINGUAL EVERY 5 MIN PRN
Qty: 25 TABLET | Refills: 3 | Status: SHIPPED | OUTPATIENT
Start: 2024-07-03

## 2024-07-03 NOTE — TELEPHONE ENCOUNTER
Medication Refill    Medication needing refilled: nitroGLYCERIN (NITROSTAT) 0.4 MG SL tablet [[id number]]    Dosage of the medication:0.4mh    How are you taking this medication (QD, BID, TID, QID, PRN):  Sig: Place 1 tablet under the tongue every 5 minutes as needed for Chest pain              30 or 90 day supply called in:    When will you run out of your medication: has total of 2 pills    Which Pharmacy are we sending the medication to?: [facility] - [place] - [address] - P [phone] - F [phone]  [address] [place] [address]  Phone: [phone]  Fax: [phone]

## 2024-07-03 NOTE — TELEPHONE ENCOUNTER
LOV 6/3/2024    Future Appointments   Date Time Provider Department Center   8/14/2024  9:45 AM Jake Bell MD [facility] [place]   9/9/2024  9:00 AM Bucky Pena DO [facility] [place] [facility]

## 2024-07-03 NOTE — TELEPHONE ENCOUNTER
Patient's wife called, patient needs refill of   metFORMIN (GLUCOPHAGE) 1000 MG tablet      Please use [facility] - [place] - [address] - P [phone] - F [phone] [[id number]]     6/3/2024    Future Appointments   Date Time Provider Department Center   8/14/2024  9:45 AM Jake Bell MD [facility]   9/9/2024  9:00 AM Bucky Pena DO [facility] [place] [facility]

## 2024-07-09 NOTE — TELEPHONE ENCOUNTER
Pt states he is tolerating semiglutide well and is ready for next dosage. Pt would like this sent to [facility].       Last Ov- 06.04.24 [facility]  Next Ov- 08.14.24 Ray

## 2024-08-09 NOTE — PROGRESS NOTES
Interventional Cardiology Consultation     Arturo RODRIGUEZ Elsy  1960    PCP: Bucky Pena DO  Referring Physician: Dr. Mcgee  Reason for Referral: possible right lower extremity infection  Chief Complaint: \"I am okay\"   Chief Complaint   Patient presents with    6 Month Follow-Up    Coronary Artery Disease    Hypertension    Hyperlipidemia    sore on foot, not healing       Subjective:     History of Present Illness: The patient is 63 y.o. male with a past medical history significant for hypertension, diabetes mellitus type 2, coronary artery disease s/p previous coronary stent placement, and alcohol abuse. Patient s/p successful balloon angioplasty of the right PT/TP trunk and popliteal artery and successful shockwave lithotripsy of the right popliteal artery 11/22/23. Patient presents today for follow up. Reports improvement in previous wounds. Patient with new wound to bottom of right foot. States he didn't realize he had a pebble in his shoe. Wife states new wound has been present for 2-3 weeks. She has been keeping it clean. Reports worsening intermittent claudication of both legs recently with some numbness.           Past Medical History:   Diagnosis Date    Alcohol abuse 12/03/2012    Cancer ([facility]) 2022    Prostate    Cellulitis     sternum    Coronary artery disease     COVID-19     Diabetic neuropathy associated with type 2 diabetes mellitus ([facility])     Hyperlipidemia     Non morbid obesity     NSTEMI (non-ST elevated myocardial infarction) ([facility])     GURDEEP (obstructive sleep apnea)     does not wear cpap machine    GURDEEP (obstructive sleep apnea)     Reactive airway disease with wheezing, moderate persistent, with acute exacerbation 03/03/2020    Sepsis ([facility]) 04/2021    Type II or unspecified type diabetes mellitus without mention of complication, not stated as uncontrolled      Past Surgical History:   Procedure Laterality Date    BYPASS GRAFT  02/19/2021    COLONOSCOPY  2017    COLONOSCOPY

## 2024-08-14 ENCOUNTER — OFFICE VISIT (OUTPATIENT)
Dept: CARDIOLOGY CLINIC | Age: 64
End: 2024-08-14
Payer: COMMERCIAL

## 2024-08-14 VITALS
DIASTOLIC BLOOD PRESSURE: 78 MMHG | SYSTOLIC BLOOD PRESSURE: 118 MMHG | HEART RATE: 83 BPM | WEIGHT: 231.4 LBS | BODY MASS INDEX: 33.13 KG/M2 | HEIGHT: 70 IN | OXYGEN SATURATION: 97 %

## 2024-08-14 DIAGNOSIS — I73.9 CLAUDICATION (HCC): ICD-10-CM

## 2024-08-14 DIAGNOSIS — I73.9 PAD (PERIPHERAL ARTERY DISEASE) (HCC): Primary | ICD-10-CM

## 2024-08-14 PROCEDURE — 99214 OFFICE O/P EST MOD 30 MIN: CPT | Performed by: INTERNAL MEDICINE

## 2024-08-14 PROCEDURE — 3074F SYST BP LT 130 MM HG: CPT | Performed by: INTERNAL MEDICINE

## 2024-08-14 PROCEDURE — 3078F DIAST BP <80 MM HG: CPT | Performed by: INTERNAL MEDICINE

## 2024-09-09 ENCOUNTER — OFFICE VISIT (OUTPATIENT)
Dept: FAMILY MEDICINE CLINIC | Age: 64
End: 2024-09-09
Payer: COMMERCIAL

## 2024-09-09 ENCOUNTER — TELEPHONE (OUTPATIENT)
Dept: CARDIOLOGY CLINIC | Age: 64
End: 2024-09-09

## 2024-09-09 VITALS
SYSTOLIC BLOOD PRESSURE: 126 MMHG | WEIGHT: 228 LBS | HEART RATE: 83 BPM | TEMPERATURE: 97.4 F | OXYGEN SATURATION: 98 % | HEIGHT: 70 IN | DIASTOLIC BLOOD PRESSURE: 66 MMHG | RESPIRATION RATE: 14 BRPM | BODY MASS INDEX: 32.64 KG/M2

## 2024-09-09 DIAGNOSIS — E11.9 TYPE 2 DIABETES MELLITUS WITHOUT COMPLICATION, WITHOUT LONG-TERM CURRENT USE OF INSULIN (HCC): Primary | ICD-10-CM

## 2024-09-09 DIAGNOSIS — I25.10 ASHD (ARTERIOSCLEROTIC HEART DISEASE): ICD-10-CM

## 2024-09-09 DIAGNOSIS — E78.5 HYPERLIPIDEMIA, UNSPECIFIED HYPERLIPIDEMIA TYPE: ICD-10-CM

## 2024-09-09 DIAGNOSIS — I73.9 PVD (PERIPHERAL VASCULAR DISEASE) (HCC): ICD-10-CM

## 2024-09-09 LAB — HBA1C MFR BLD: 7.6 %

## 2024-09-09 PROCEDURE — 83036 HEMOGLOBIN GLYCOSYLATED A1C: CPT | Performed by: FAMILY MEDICINE

## 2024-09-09 PROCEDURE — 3051F HG A1C>EQUAL 7.0%<8.0%: CPT | Performed by: FAMILY MEDICINE

## 2024-09-09 PROCEDURE — 3074F SYST BP LT 130 MM HG: CPT | Performed by: FAMILY MEDICINE

## 2024-09-09 PROCEDURE — 3078F DIAST BP <80 MM HG: CPT | Performed by: FAMILY MEDICINE

## 2024-09-09 PROCEDURE — 99214 OFFICE O/P EST MOD 30 MIN: CPT | Performed by: FAMILY MEDICINE

## 2024-09-09 SDOH — ECONOMIC STABILITY: FOOD INSECURITY: WITHIN THE PAST 12 MONTHS, THE FOOD YOU BOUGHT JUST DIDN'T LAST AND YOU DIDN'T HAVE MONEY TO GET MORE.: NEVER TRUE

## 2024-09-09 SDOH — ECONOMIC STABILITY: INCOME INSECURITY: HOW HARD IS IT FOR YOU TO PAY FOR THE VERY BASICS LIKE FOOD, HOUSING, MEDICAL CARE, AND HEATING?: NOT VERY HARD

## 2024-09-09 SDOH — ECONOMIC STABILITY: FOOD INSECURITY: WITHIN THE PAST 12 MONTHS, YOU WORRIED THAT YOUR FOOD WOULD RUN OUT BEFORE YOU GOT MONEY TO BUY MORE.: NEVER TRUE

## 2024-09-09 ASSESSMENT — ENCOUNTER SYMPTOMS
CHEST TIGHTNESS: 0
STRIDOR: 0
COUGH: 0
ABDOMINAL PAIN: 0
CHOKING: 0
BACK PAIN: 0
SHORTNESS OF BREATH: 0
WHEEZING: 0

## 2024-09-09 ASSESSMENT — ANXIETY QUESTIONNAIRES
IF YOU CHECKED OFF ANY PROBLEMS ON THIS QUESTIONNAIRE, HOW DIFFICULT HAVE THESE PROBLEMS MADE IT FOR YOU TO DO YOUR WORK, TAKE CARE OF THINGS AT HOME, OR GET ALONG WITH OTHER PEOPLE: NOT DIFFICULT AT ALL
3. WORRYING TOO MUCH ABOUT DIFFERENT THINGS: NOT AT ALL
6. BECOMING EASILY ANNOYED OR IRRITABLE: NOT AT ALL
7. FEELING AFRAID AS IF SOMETHING AWFUL MIGHT HAPPEN: NOT AT ALL
GAD7 TOTAL SCORE: 0
2. NOT BEING ABLE TO STOP OR CONTROL WORRYING: NOT AT ALL
1. FEELING NERVOUS, ANXIOUS, OR ON EDGE: NOT AT ALL
5. BEING SO RESTLESS THAT IT IS HARD TO SIT STILL: NOT AT ALL
4. TROUBLE RELAXING: NOT AT ALL

## 2024-09-09 ASSESSMENT — PATIENT HEALTH QUESTIONNAIRE - PHQ9
SUM OF ALL RESPONSES TO PHQ QUESTIONS 1-9: 0
SUM OF ALL RESPONSES TO PHQ QUESTIONS 1-9: 0
2. FEELING DOWN, DEPRESSED OR HOPELESS: NOT AT ALL
1. LITTLE INTEREST OR PLEASURE IN DOING THINGS: NOT AT ALL
SUM OF ALL RESPONSES TO PHQ9 QUESTIONS 1 & 2: 0
SUM OF ALL RESPONSES TO PHQ QUESTIONS 1-9: 0
SUM OF ALL RESPONSES TO PHQ QUESTIONS 1-9: 0

## 2024-09-25 ENCOUNTER — NURSE ONLY (OUTPATIENT)
Dept: FAMILY MEDICINE CLINIC | Age: 64
End: 2024-09-25

## 2024-09-25 ENCOUNTER — TELEMEDICINE (OUTPATIENT)
Age: 64
End: 2024-09-25
Payer: COMMERCIAL

## 2024-09-25 DIAGNOSIS — J06.9 VIRAL URI: ICD-10-CM

## 2024-09-25 DIAGNOSIS — J06.9 VIRAL URI: Primary | ICD-10-CM

## 2024-09-25 LAB
INFLUENZA A ANTIBODY: NORMAL
INFLUENZA B ANTIBODY: NORMAL
Lab: NORMAL
PERFORMING INSTRUMENT: NORMAL
QC PASS/FAIL: NORMAL
SARS-COV-2, POC: NORMAL

## 2024-09-25 PROCEDURE — 99213 OFFICE O/P EST LOW 20 MIN: CPT | Performed by: NURSE PRACTITIONER

## 2024-09-25 RX ORDER — BENZONATATE 100 MG/1
100-200 CAPSULE ORAL 3 TIMES DAILY PRN
Qty: 30 CAPSULE | Refills: 0 | Status: SHIPPED | OUTPATIENT
Start: 2024-09-25 | End: 2024-10-02

## 2024-09-25 RX ORDER — DEXTROMETHORPHAN HYDROBROMIDE AND PROMETHAZINE HYDROCHLORIDE 15; 6.25 MG/5ML; MG/5ML
5 SYRUP ORAL 4 TIMES DAILY PRN
Qty: 118 ML | Refills: 0 | Status: SHIPPED | OUTPATIENT
Start: 2024-09-25 | End: 2024-10-02

## 2024-09-25 ASSESSMENT — ENCOUNTER SYMPTOMS
SHORTNESS OF BREATH: 0
NAUSEA: 0
CHEST TIGHTNESS: 0
SORE THROAT: 1
DIARRHEA: 0
COUGH: 1
WHEEZING: 0
TROUBLE SWALLOWING: 0
VOMITING: 0

## 2024-10-01 ENCOUNTER — TELEPHONE (OUTPATIENT)
Dept: FAMILY MEDICINE CLINIC | Age: 64
End: 2024-10-01

## 2024-10-01 NOTE — TELEPHONE ENCOUNTER
Future Appointments   Date Time Provider Department Center   12/10/2024  8:30 AM Bucky Pena DO [place] [facility]   LOV 9/9/2024

## 2024-10-01 NOTE — TELEPHONE ENCOUNTER
Semaglutide,0.25 or 0.5MG/DOS, 2 MG/1.5ML SOPN [[id number]]     9/9/2024     Future Appointments   Date Time Provider Department Center   12/10/2024  8:30 AM Bucky Pena DO [place] [facility] [address] - P [phone] -  [phone] [[id number]]

## 2024-12-10 ENCOUNTER — OFFICE VISIT (OUTPATIENT)
Dept: FAMILY MEDICINE CLINIC | Age: 64
End: 2024-12-10
Payer: COMMERCIAL

## 2024-12-10 VITALS
TEMPERATURE: 97.6 F | OXYGEN SATURATION: 97 % | BODY MASS INDEX: 33.72 KG/M2 | SYSTOLIC BLOOD PRESSURE: 118 MMHG | RESPIRATION RATE: 16 BRPM | WEIGHT: 235 LBS | DIASTOLIC BLOOD PRESSURE: 72 MMHG | HEART RATE: 72 BPM

## 2024-12-10 DIAGNOSIS — E11.9 TYPE 2 DIABETES MELLITUS WITHOUT COMPLICATION, WITHOUT LONG-TERM CURRENT USE OF INSULIN (HCC): Primary | ICD-10-CM

## 2024-12-10 DIAGNOSIS — T14.8XXA CHRONIC WOUND: ICD-10-CM

## 2024-12-10 DIAGNOSIS — I51.9 HEART DISEASE: ICD-10-CM

## 2024-12-10 LAB — HBA1C MFR BLD: 7.7 %

## 2024-12-10 PROCEDURE — 3074F SYST BP LT 130 MM HG: CPT | Performed by: FAMILY MEDICINE

## 2024-12-10 PROCEDURE — 3078F DIAST BP <80 MM HG: CPT | Performed by: FAMILY MEDICINE

## 2024-12-10 PROCEDURE — 99214 OFFICE O/P EST MOD 30 MIN: CPT | Performed by: FAMILY MEDICINE

## 2024-12-10 PROCEDURE — 83036 HEMOGLOBIN GLYCOSYLATED A1C: CPT | Performed by: FAMILY MEDICINE

## 2024-12-10 PROCEDURE — 3051F HG A1C>EQUAL 7.0%<8.0%: CPT | Performed by: FAMILY MEDICINE

## 2024-12-10 ASSESSMENT — ENCOUNTER SYMPTOMS
COUGH: 0
CHOKING: 0
WHEEZING: 0
BACK PAIN: 0
ABDOMINAL PAIN: 0
SHORTNESS OF BREATH: 0
CHEST TIGHTNESS: 0
STRIDOR: 0

## 2024-12-10 ASSESSMENT — PATIENT HEALTH QUESTIONNAIRE - PHQ9
SUM OF ALL RESPONSES TO PHQ QUESTIONS 1-9: 0
SUM OF ALL RESPONSES TO PHQ9 QUESTIONS 1 & 2: 0
2. FEELING DOWN, DEPRESSED OR HOPELESS: NOT AT ALL
SUM OF ALL RESPONSES TO PHQ QUESTIONS 1-9: 0
1. LITTLE INTEREST OR PLEASURE IN DOING THINGS: NOT AT ALL

## 2024-12-10 ASSESSMENT — ANXIETY QUESTIONNAIRES
2. NOT BEING ABLE TO STOP OR CONTROL WORRYING: NOT AT ALL
6. BECOMING EASILY ANNOYED OR IRRITABLE: NOT AT ALL
3. WORRYING TOO MUCH ABOUT DIFFERENT THINGS: NOT AT ALL
IF YOU CHECKED OFF ANY PROBLEMS ON THIS QUESTIONNAIRE, HOW DIFFICULT HAVE THESE PROBLEMS MADE IT FOR YOU TO DO YOUR WORK, TAKE CARE OF THINGS AT HOME, OR GET ALONG WITH OTHER PEOPLE: NOT DIFFICULT AT ALL
5. BEING SO RESTLESS THAT IT IS HARD TO SIT STILL: NOT AT ALL
4. TROUBLE RELAXING: NOT AT ALL
GAD7 TOTAL SCORE: 0
1. FEELING NERVOUS, ANXIOUS, OR ON EDGE: NOT AT ALL
7. FEELING AFRAID AS IF SOMETHING AWFUL MIGHT HAPPEN: NOT AT ALL

## 2024-12-10 NOTE — PROGRESS NOTES
Arturo Chin (:  1960) is a 64 y.o. male,Established patient, here for evaluation of the following chief complaint(s):  Diabetes and Hypertension         Assessment & Plan  Type 2 diabetes mellitus without complication, without long-term current use of insulin (HCC)   Chronic, at goal (stable), continue current treatment plan-continue Ozempic metformin Lantus    Orders:    POCT glycosylated hemoglobin (Hb A1C)    Jos Barreto DPM, Podiatry, [place]    Chronic wound   Chronic, worsening (exacerbation), changes made today: I referred Arturo to the podiatrist.  He agreed to call to make an appointment.    Orders:    Jos Barreto DPM, Podiatry, [place]    Heart disease   Chronic, at goal (stable), continue current treatment plan-continue scheduled follow-ups with his cardiologist, nitroglycerin atorvastatin metoprolol clopidogrel aspirin.           No follow-ups on file.       Subjective   HPI Arturo is here for follow-up on type 2 diabetes whose control is reasonable.  Heart disease is stable.  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  He has a chronic foot wound on foot and I referred him to podiatry.  He agreed to make an appointment.    Review of Systems   Constitutional:  Negative for activity change, appetite change, chills, diaphoresis, fatigue, fever and unexpected weight change.   Respiratory:  Negative for cough, choking, chest tightness, shortness of breath, wheezing and stridor.    Cardiovascular:  Negative for chest pain, palpitations and leg swelling.   Gastrointestinal:  Negative for abdominal pain.   Genitourinary:  Negative for difficulty urinating.   Musculoskeletal:  Negative for arthralgias and back pain.   Skin:  Positive for wound.   Neurological:  Negative for dizziness.   All other systems reviewed and are negative.         Objective   Physical Exam  Vitals and nursing note reviewed.   Constitutional:       Appearance: He is well-developed.   HENT:

## 2024-12-10 NOTE — ASSESSMENT & PLAN NOTE
Chronic, at goal (stable), continue current treatment plan-continue Ozempic metformin Lantus    Orders:    POCT glycosylated hemoglobin (Hb A1C)    JOSH - Jos Talbert DPM, Podiatry, [facility]

## 2025-01-30 ENCOUNTER — TELEPHONE (OUTPATIENT)
Dept: FAMILY MEDICINE CLINIC | Age: 65
End: 2025-01-30

## 2025-02-28 DIAGNOSIS — I25.10 CORONARY ARTERY DISEASE INVOLVING NATIVE CORONARY ARTERY OF NATIVE HEART WITHOUT ANGINA PECTORIS: ICD-10-CM

## 2025-02-28 RX ORDER — CLOPIDOGREL BISULFATE 75 MG/1
TABLET ORAL
Qty: 30 TABLET | Refills: 0 | Status: SHIPPED | OUTPATIENT
Start: 2025-02-28

## 2025-02-28 NOTE — TELEPHONE ENCOUNTER
Seeing new doctor outside of [facility] per Monet he needs enough for thirty days to get him to his New Doctor Appt

## 2025-03-12 ENCOUNTER — TELEPHONE (OUTPATIENT)
Dept: FAMILY MEDICINE CLINIC | Age: 65
End: 2025-03-12

## 2025-03-12 DIAGNOSIS — E11.9 TYPE 2 DIABETES MELLITUS WITHOUT COMPLICATION, WITHOUT LONG-TERM CURRENT USE OF INSULIN (HCC): ICD-10-CM

## 2025-03-12 NOTE — TELEPHONE ENCOUNTER
Patient needs a refill on Metformin. They need a 30 day supply.     Mail order or local pharmacy: local    Pharmacy: [facility]    Last OV: 12/10/24 w/Dr Pena    Pt is transferring care to another practice, but his appt is not until 4-7-25. He is asking for enough medication to get thru to this appt.     Please advise.

## 2025-04-09 DIAGNOSIS — I25.10 CORONARY ARTERY DISEASE INVOLVING NATIVE CORONARY ARTERY OF NATIVE HEART WITHOUT ANGINA PECTORIS: ICD-10-CM

## 2025-04-10 RX ORDER — CLOPIDOGREL BISULFATE 75 MG/1
75 TABLET ORAL DAILY
Qty: 30 TABLET | Refills: 0 | OUTPATIENT
Start: 2025-04-10

## 2025-04-14 LAB
ALBUMIN: 4.2 G/DL
ALP BLD-CCNC: 101 U/L
ALT SERPL-CCNC: 21 U/L
ANION GAP SERPL CALCULATED.3IONS-SCNC: ABNORMAL MMOL/L
AST SERPL-CCNC: 15 U/L
BASOPHILS ABSOLUTE: 0.1 /ΜL
BASOPHILS RELATIVE PERCENT: 1 %
BILIRUB SERPL-MCNC: 0.6 MG/DL (ref 0.1–1.4)
BUN BLDV-MCNC: 12 MG/DL
CALCIUM SERPL-MCNC: 9.4 MG/DL
CHLORIDE BLD-SCNC: 96 MMOL/L
CHOLESTEROL, TOTAL: 141 MG/DL
CHOLESTEROL/HDL RATIO: NORMAL
CO2: 25 MMOL/L
CREAT SERPL-MCNC: 0.83 MG/DL
EOSINOPHILS ABSOLUTE: 0.2 /ΜL
EOSINOPHILS RELATIVE PERCENT: 3 %
ESTIMATED AVERAGE GLUCOSE: 240
GFR, ESTIMATED: 98
GLUCOSE BLD-MCNC: 516 MG/DL
HBA1C MFR BLD: 10 %
HCT VFR BLD CALC: 45.2 % (ref 41–53)
HDLC SERPL-MCNC: 56 MG/DL (ref 35–70)
HEMOGLOBIN: 14.2 G/DL (ref 13.5–17.5)
LDL CHOLESTEROL: 54
LYMPHOCYTES ABSOLUTE: 1.3 /ΜL
LYMPHOCYTES RELATIVE PERCENT: 21 %
MCH RBC QN AUTO: 30.1 PG
MCHC RBC AUTO-ENTMCNC: 31.4 G/DL
MCV RBC AUTO: 96 FL
MONOCYTES ABSOLUTE: 0.4 /ΜL
MONOCYTES RELATIVE PERCENT: 7 %
NEUTROPHILS ABSOLUTE: 4.3 /ΜL
NEUTROPHILS RELATIVE PERCENT: 67 %
NONHDLC SERPL-MCNC: NORMAL MG/DL
PLATELET # BLD: 213 K/ΜL
PMV BLD AUTO: NORMAL FL
POTASSIUM SERPL-SCNC: 5.2 MMOL/L
RBC # BLD: 4.72 10^6/ΜL
SODIUM BLD-SCNC: 136 MMOL/L
TOTAL PROTEIN: 6.2 G/DL (ref 6.4–8.2)
TRIGL SERPL-MCNC: 192 MG/DL
TSH SERPL DL<=0.05 MIU/L-ACNC: 1.37 UIU/ML
VITAMIN B-12: 487
VITAMIN D 25-HYDROXY: 25.3
VITAMIN D2, 25 HYDROXY: NORMAL
VITAMIN D3,25 HYDROXY: NORMAL
VLDLC SERPL CALC-MCNC: 31 MG/DL
WBC # BLD: 6.3 10^3/ML

## 2025-05-09 ENCOUNTER — TELEPHONE (OUTPATIENT)
Age: 65
End: 2025-05-09

## 2025-05-09 NOTE — TELEPHONE ENCOUNTER
Brittny called for Arturo.  She wants to know if Dr. Briggs can send a referral to a Mohs surgeon for 3 areas on his head that are cancerous.  She said he used to see Dr. Briggs in [place] a few years ago.  Please call Brittny at [phone].

## 2025-05-12 NOTE — TELEPHONE ENCOUNTER
Spoke with patient's wife and advised her of 's information. Patient's wife verbalized understanding.

## 2025-06-19 DIAGNOSIS — I25.10 CORONARY ARTERY DISEASE INVOLVING NATIVE CORONARY ARTERY OF NATIVE HEART WITHOUT ANGINA PECTORIS: ICD-10-CM

## 2025-06-19 DIAGNOSIS — Z79.899 MEDICATION MANAGEMENT: Primary | ICD-10-CM

## 2025-06-19 DIAGNOSIS — Z95.1 S/P CABG X 2: ICD-10-CM

## 2025-06-20 RX ORDER — METOPROLOL TARTRATE 50 MG
50 TABLET ORAL 2 TIMES DAILY
Qty: 180 TABLET | Refills: 1 | Status: SHIPPED | OUTPATIENT
Start: 2025-06-20

## 2025-06-20 RX ORDER — ATORVASTATIN CALCIUM 40 MG/1
TABLET, FILM COATED ORAL
Qty: 90 TABLET | Refills: 1 | Status: SHIPPED | OUTPATIENT
Start: 2025-06-20

## 2025-06-20 NOTE — TELEPHONE ENCOUNTER
Last Office Visit:06/04/24 Provider: [facility]  **Is provider OOT? Yes    Next Office Visit: Visit date not found Provider: [facility]  **If no OV, when does pt need to be seen? Yes   **Has patient already had 30 day supply? No    LAST LABS:   Liver:  Lab Results   Component Value Date    ALT 18 07/23/2023    AST 17 07/23/2023    ALKPHOS 84 07/23/2023    BILITOT 0.4 07/23/2023     Lipid:  Lab Results   Component Value Date    CHOL 107 11/22/2023    TRIG 73 11/22/2023    HDL 51 11/22/2023    LDL 41 11/22/2023    VLDL 15 11/22/2023     Lab orders needed?          Called and spoke to patient wife Brittny. She reports patient follows with [facility] PCP  and will request lab work results from their office. She will bring results to office visit that was scheduled for patient for [facility].

## 2025-06-23 ENCOUNTER — TELEPHONE (OUTPATIENT)
Dept: CARDIOLOGY CLINIC | Age: 65
End: 2025-06-23

## (undated) DEVICE — SUTURE PERMA-HAND SZ 2-0 L30IN NONABSORBABLE BLK L26MM SH K833H

## (undated) DEVICE — KIT,ANTI FOG,W/SPONGE & FLUID,SOFT PACK: Brand: MEDLINE

## (undated) DEVICE — [HIGH FLOW INSUFFLATOR,  DO NOT USE IF PACKAGE IS DAMAGED,  KEEP DRY,  KEEP AWAY FROM SUNLIGHT,  PROTECT FROM HEAT AND RADIOACTIVE SOURCES.]: Brand: PNEUMOSURE

## (undated) DEVICE — PERFUSION PACK O2

## (undated) DEVICE — SUTURE VCRL + SZ 2-0 L27IN ABSRB VLT L26MM CT-2 1/2 CIR VCP333H

## (undated) DEVICE — GAUZE,SPONGE,4"X4",16PLY,XRAY,STRL,LF: Brand: MEDLINE

## (undated) DEVICE — SUTURE VCRL SZ 3-0 L27IN ABSRB UD L36MM CT-1 1/2 CIR J258H

## (undated) DEVICE — GLOVE ORANGE PI 7 1/2   MSG9075

## (undated) DEVICE — RESERVOIR AUTOTRANSFUSION 225/120 CC GS FILTERED XTRA

## (undated) DEVICE — SPONGE LAP W18XL18IN WHT COT 4 PLY FLD STRUNG RADPQ DISP ST

## (undated) DEVICE — NEEDLE HYPO 25GA L1.5IN BLU POLYPR HUB S STL REG BVL STR

## (undated) DEVICE — HANDPIECE SET WITH HIGH FLOW TIP AND SUCTION TUBE: Brand: INTERPULSE

## (undated) DEVICE — PUNCH AORT DIA4MM LNG HNDL

## (undated) DEVICE — 3M™ COBAN™ NL STERILE NON-LATEX SELF-ADHERENT WRAP, 2084S, 4 IN X 5 YD (10 CM X 4,5 M), 18 ROLLS/CASE: Brand: 3M™ COBAN™

## (undated) DEVICE — Z INACTIVE USE 2735373 APPLICATOR FBR LAIN COT WOOD TIP ECONOMICAL

## (undated) DEVICE — SOLUTION IV IRRIG POUR BRL 0.9% SODIUM CHL 2F7124

## (undated) DEVICE — Device

## (undated) DEVICE — SUTURE PERMA-HAND 1 L30IN NONABSORBABLE BLK SILK BRAID W/O SA87G

## (undated) DEVICE — SUTURE PERMA-HAND SZ 0 L18IN NONABSORBABLE BLK L30MM FSL 678G

## (undated) DEVICE — SUTURE ETHBND EXCEL SZ 2-0 L36IN NONABSORBABLE GRN L26MM SH X523H

## (undated) DEVICE — PAD,ABDOMINAL,8"X10",ST,LF: Brand: MEDLINE

## (undated) DEVICE — BANDAGE,GAUZE,BULKEE II,4.5"X4.1YD,STRL: Brand: MEDLINE

## (undated) DEVICE — BLADE SAW W10XL54MM FOR PRI REPEAT STRNOTMY

## (undated) DEVICE — WAX SURG 2.5GM HEMSTAT BNE BEESWAX PARAFFIN ISO PALMITATE

## (undated) DEVICE — ENDOSCOPIC KIT 2 12 FT OP4 DE2 GWN SYR

## (undated) DEVICE — APPLICATOR MEDICATED 26 CC SOLUTION HI LT ORNG CHLORAPREP

## (undated) DEVICE — DRESSING KIT SM 10X75X1 CM VAC WHITEFOAM SENSATRAC

## (undated) DEVICE — SUTURE PROL SZ 4-0 L36IN NONABSORBABLE BLU BB L17MM 3/8 CIR 8581H

## (undated) DEVICE — Z CONVERTED USE 2271043 CONTAINER SPEC COLL 4OZ SCR ON LID PEEL PCH

## (undated) DEVICE — COVER TRNSDUC W6XL96IN POLY TELESCOPICALLY FLD W/ ATTCH FRM

## (undated) DEVICE — RETRACTOR SURG INSRT SUT HLD OCTOBASE

## (undated) DEVICE — EVERGRIP INSERT SET 86MM: Brand: FOGARTY EVERGRIP

## (undated) DEVICE — CANNULA NSL L4M O2 AD FILTERLINE

## (undated) DEVICE — SNARE ENDOSCP POLYP 2.4 MM 240 CM 10 MM 2.8 MM CAPTIVATOR

## (undated) DEVICE — SHOE, POST-OPERATIVE: Brand: DEROYAL

## (undated) DEVICE — SUTURE ETHLN SZ 3-0 L30IN NONABSORBABLE BLK FSL L30MM 3/8 1671H

## (undated) DEVICE — SUTURE ETHBND EXCEL SZ 3-0 L36IN NONABSORBABLE GRN SH L26MM X522H

## (undated) DEVICE — SUTURE SZ 7 L18IN NONABSORBABLE SIL CCS L48MM 1/2 CIR STRNM M655G

## (undated) DEVICE — CANISTER VAC 500ML TBNG RESVR FOR INFOVAC W O GEL CLMP CONN

## (undated) DEVICE — INTENDED FOR TISSUE SEPARATION, AND OTHER PROCEDURES THAT REQUIRE A SHARP SURGICAL BLADE TO PUNCTURE OR CUT.: Brand: BARD-PARKER ® STAINLESS STEEL BLADES

## (undated) DEVICE — SUTURE ETHBND EXCEL SZ 0 L18IN NONABSORBABLE GRN L36MM CT-1 CX21D

## (undated) DEVICE — CANNULA PERF L15IN DIA29FR VEN 3 STG THN WALL DSGN W  VENT

## (undated) DEVICE — CANNULA PERF L12IN DIA20FR GWIRE DIA0.038IN ART ELONG 1 PC

## (undated) DEVICE — LEAD PACE 2.6FR L50CM UPLR TEMP ATR NONSTEROID ELUT PIN

## (undated) DEVICE — CHLORAPREP 26ML ORANGE

## (undated) DEVICE — X-RAY CASSETTE COVER: Brand: CONVERTORS

## (undated) DEVICE — CATHETER THOR 28FR SIL 6 EYE STR HI TEAR STRENGTH

## (undated) DEVICE — DRESSING SM V.A.C. GRANUFOAM

## (undated) DEVICE — ELECTRODE PT RET AD L9FT HI MOIST COND ADH HYDRGEL CORDED

## (undated) DEVICE — MAJOR SET UP PK

## (undated) DEVICE — DRESSING NEG PRSS M W18XH3.3XL12.5CM BLK POLYUR FOAM WND

## (undated) DEVICE — LEAD PACEMKR MYOCARDIAL UNIPOLAR TEMP

## (undated) DEVICE — PACK PROCEDURE SURG OPN HRT A BASIC

## (undated) DEVICE — SYRINGE MED 10ML LUERLOCK TIP W/O SFTY DISP

## (undated) DEVICE — SUTURE PROL SZ 6-0 L24IN NONABSORBABLE BLU L13MM C-1 3/8 8726H

## (undated) DEVICE — TEMP PACING WIRE: Brand: MYO/WIRE

## (undated) DEVICE — CLEANER ES TIP W2XL2IN ADH BK RADPQ FOR S STL ELECTRD

## (undated) DEVICE — Z CONVERTED USE 2275871 SPONGE GZ W4XL4IN WHT 8 PLY CURITY

## (undated) DEVICE — SURGICAL PROCEDURE PACK IV U-BAR

## (undated) DEVICE — CANNULA PERF 7FR L5.5IN AORT ROOT RADPQ STD TIP W/ VENT LN

## (undated) DEVICE — DRESSING,GAUZE,XEROFORM,CURAD,1"X8",ST: Brand: CURAD

## (undated) DEVICE — BLADE RETRCT 3 SH FNGR ASST

## (undated) DEVICE — COTTON UNDERCAST PADDING,CRIMPED FINISH: Brand: WEBRIL

## (undated) DEVICE — ELECTRODE,ECG,STRESS,FOAM,3PK: Brand: MEDLINE

## (undated) DEVICE — THORACIC CATHETER, STRAIGHT, SILICONE, WITH CLOTSTOP®: Brand: AXIOM® ATRAUM™ WITH CLOTSTOP®

## (undated) DEVICE — GOWN SIRUS NONREIN XL W/TWL: Brand: MEDLINE INDUSTRIES, INC.

## (undated) DEVICE — SUTURE NONABSORBABLE MONOFILAMENT 7-0 BV-1 1X24 IN PROLENE 8702H

## (undated) DEVICE — SOLUTION IV IRRIG 500ML 0.9% SODIUM CHL 2F7123

## (undated) DEVICE — TTL1LYR 16FR10ML 100%SIL TMPST TR: Brand: MEDLINE

## (undated) DEVICE — GAUZE,SPONGE,4"X4",8PLY,STRL,LF,10/TRAY: Brand: MEDLINE

## (undated) DEVICE — SUTURE PDS II SZ 0 L36IN ABSRB VLT L36MM CT-1 1/2 CIR Z346H

## (undated) DEVICE — GOWN,SIRUS,POLYRNF,BRTHSLV,XL,30/CS: Brand: MEDLINE

## (undated) DEVICE — BLADE ES L6IN ELASTOMERIC COAT EXT DURABLE BEND UPTO 90DEG

## (undated) DEVICE — AGENT HEMSTAT W6XL9IN OXIDIZED REGENERATED CELOS ABSRB FOR

## (undated) DEVICE — BANDAGE COMPR W4INXL12FT E DISP ESMARCH EVEN

## (undated) DEVICE — SUTURE VCRL SZ 3-0 L27IN ABSRB UD L26MM SH 1/2 CIR J416H

## (undated) DEVICE — SUTURE NONABSORBABLE MONOFILAMENT 4-0 RB-1 36 IN BLU PROLENE 8557H

## (undated) DEVICE — PACK ORTH LO EXT VI

## (undated) DEVICE — TIP SUCT DIA12FR W STYL CTRL VENT DISPOSABLE FRAZ

## (undated) DEVICE — SUTURE ABSORBABLE BRAIDED 2-0 CT-1 27 IN UD VICRYL J259H

## (undated) DEVICE — SUTURE MCRYL + SZ 4-0 L18IN ABSRB UD L19MM PS-2 3/8 CIR MCP496G

## (undated) DEVICE — SYSTEM VES HARV ENDOSCP VASOVIEW HEMOPRO

## (undated) DEVICE — TRAP SPEC POLYPR SGL CHMBR FN MESH SCRN

## (undated) DEVICE — Z INACTIVE NO SUPPLIER SOLUTIONIRRIG 3000ML 0.9% SOD CHL FLX CONT [79720808] [HOSPIRA WORLDWIDE INC]